# Patient Record
Sex: FEMALE | Race: BLACK OR AFRICAN AMERICAN | NOT HISPANIC OR LATINO | Employment: OTHER | URBAN - METROPOLITAN AREA
[De-identification: names, ages, dates, MRNs, and addresses within clinical notes are randomized per-mention and may not be internally consistent; named-entity substitution may affect disease eponyms.]

---

## 2017-01-10 ENCOUNTER — DOCUMENTATION ONLY (OUTPATIENT)
Dept: ORTHOPEDICS | Facility: CLINIC | Age: 82
End: 2017-01-10

## 2017-01-12 ENCOUNTER — DOCUMENTATION ONLY (OUTPATIENT)
Dept: ORTHOPEDICS | Facility: CLINIC | Age: 82
End: 2017-01-12

## 2017-01-12 NOTE — PROGRESS NOTES
I am writing to request coverage for Forteo therapy for Nydia Stevens her therapy would include injecting 1 dose (20 µg) subcutaneously every day.  This letter documents the medical necessity for this therapy and treatment of osteoporosis. It provides information about the patient's medical history, treatment, and a copy of the products labeling.     Forteo is used in both men and postmenopausal women with osteoporosis at high risk for having broken bones (fractures).  Forteo was also used in both men and women with osteoporosis secondary  to use of corticosteroids such as prednisone, for several months, placing them at high risk for fractures.     Endogenous 84-amino acid parathyroid hormone (PTH) is the primary regulator of calcium and phosphate metabolism in bone and kidney.  Forteo contained recombinant human parathyroid hormone (hPTH) the first 34 amino acids of the 84 amino acids hPTH.  Physiological actions of PTH include regulation of bone metabolism, renal tubular reabsorption of calcium and phosphate and intestinal calcium absorption.  The biological actions of PTH and teriparatide are mediated through binding to specific high affinity cell surface receptors.  teriparatide and the 34 N-terminal amino acids of PTH bind to these receptors with the same affinity and have the same physiological actions on bone and kidney.  Teriparatide is not expected to accumulate in bone or other tissues.  The skeletal affects of teriparatide depend upon the pattern of systemic exposure.  Once daily administration of teriparatide stimulates new bone formation on trabecular and cortical ( Periosteal and./ or endosteal ) bone surfaces by preferential stimulations of osteoblastic activity over osteoclastic activity.  New bone overfills existing resorption sites and new bone is formed on adjacent or quiescent surfaces without prior resorption resulting in a positive bone balance. In monkey studies teriparatide  improved trabecular micro-architecture and increased bone mass and strength by stimulating new bone formation in both cancellous and cortical bone.  In human's, the anabolic effects of teriparatide manifest as an increase in skeletal mass, an increase in markers of bone formation and resorption, and an increase in bone strength.  By contrast, continuous excess of endogenous PTH, as occurs in hyperparathyroidism, may be detrimental to the skeleton because bone resorption may be stimulated more than bone formation.     Nydia Stevens is a 82 year old female who suffers from postmenopausal/senile osteoporosis (diagnosis code 773.01)@ her most recent T score was -3.9 for the lumbar spine and -4.4 for the hip   FRAX  Score  Major fracture risk 24 %  Hip fracture 13%    she is at high risk for additional fracture.  According to the World Health Organization, who had sent the standards for osteoporosis diagnostics, a patient's diagnosis should not be solely based off of T score, but other risk factors should also be evaluated. Nydia Stevens's additional risk factors include early-onset menopause, deficiency anemia. her symptoms include fracture of left femur and left elbow which required surgical repair; she is suffering with pain, and instability.  The national osteoporosis Foundation recommends considering initiation of treatment in individuals 50 years or older if the 10 year probability of major osteoporotic fracture is 20% or higher or the 10 year probability of the hip fracture is 3% or higher.  Also, any secondary causes should be treated.  Note that the FRAX is intended for use in untreated individuals 50 years of age or older. she is not a candidate for Bisphosphonates due to her  medical history of an deficiency anemia and concern for gastrointestinal bleeding.  Biphosphonate's at best will only maintain her current bone density which is at a dangerous level.  Anabolic drugs work by increasing  formation of new bone, whereas other drugs such as oral biphosphonate's (Alendronate) only block the re-absorption of bone.  Forteo has been shown to be effective in speeding up the healing process and enhancing bone formation.  Forteo will actually help grow new bone, improving her overall bone health.  Forteo is her best treatment option at this time.  Without this treatment I fear her condition will worsen causing increased pain and suffering, additional bone fractures, additional surgeries and potential hospitalization.  Please approve Forteo for Nydia Stevens as soon as possible soshecan begin treatment immediately.     Forteo is used to treat severe osteoporosis and promote healing of hard to manage fractures in the elderly and others.  Forteo reduces pain and time spent in nursing homes.  In preliminary studies, 93% of 145 patient's who had unhealed bone fractures (some for as long as 6 months) had significant healing after only 8-12 weeks on Forteo.  Studies suggest that once a patient suffers an osteoporotic fracture, is found to have a very low T score, or cannot tolerate biphosphonate therapy, Forteo is the drug of choice.  Even patients who have been on biphosphonate previously can repeat benefits from Forteo, but the effects may not be seen as quickly as in patients who have never taken medication for osteoporosis.      In my clinical judgment, Forteo therapy would provide significant clinical benefit for Nydia Stevens.  Forteo is medically necessary and appropriate to treat her at this stage in her course of care.  I am enclosing documentation supporting the medical necessity for Forteo for this patient.  I urged to provide coverage at this time.  Please contact me at [phone] if you require additional information would like to discuss the case in greater detail.        Thank you,            Abner Harman PA-C  [facility]  Orthopedic Surgery

## 2017-01-13 ENCOUNTER — TELEPHONE (OUTPATIENT)
Dept: ORTHOPEDICS | Facility: CLINIC | Age: 82
End: 2017-01-13

## 2017-01-13 NOTE — TELEPHONE ENCOUNTER
Called Representative back to correct issue with patient's prescription. Prescription had been authorized since 1/10/17 and is good until 1/10/18.

## 2017-01-13 NOTE — TELEPHONE ENCOUNTER
----- Message from Santos Suárez sent at 1/11/2017  2:38 PM CST -----  [facility]  Requesting additional information from fax that was received. Regarding patient teriparatide (FORTEO) 20 mcg/dose - 600 mcg/2.4 mL R[place]    Please call; [phone]

## 2017-02-01 ENCOUNTER — LAB VISIT (OUTPATIENT)
Dept: LAB | Facility: HOSPITAL | Age: 82
End: 2017-02-01
Attending: INTERNAL MEDICINE
Payer: MEDICARE

## 2017-02-01 DIAGNOSIS — E78.5 HYPERLIPIDEMIA, UNSPECIFIED HYPERLIPIDEMIA TYPE: ICD-10-CM

## 2017-02-01 LAB
CHOLEST/HDLC SERPL: 3.9 {RATIO}
HDL/CHOLESTEROL RATIO: 25.8 %
HDLC SERPL-MCNC: 233 MG/DL
HDLC SERPL-MCNC: 60 MG/DL
LDLC SERPL CALC-MCNC: 153.8 MG/DL
NONHDLC SERPL-MCNC: 173 MG/DL
TRIGL SERPL-MCNC: 96 MG/DL

## 2017-02-01 PROCEDURE — 36415 COLL VENOUS BLD VENIPUNCTURE: CPT

## 2017-02-01 PROCEDURE — 80061 LIPID PANEL: CPT

## 2017-02-02 ENCOUNTER — TELEPHONE (OUTPATIENT)
Dept: PHARMACY | Facility: CLINIC | Age: 82
End: 2017-02-02

## 2017-02-02 NOTE — TELEPHONE ENCOUNTER
DOCUMENTATION ONLY:  Prior authorization for forteo approved from 10/14/2016-1/12/2018  REf# [id number]

## 2017-02-08 ENCOUNTER — LAB VISIT (OUTPATIENT)
Dept: LAB | Facility: HOSPITAL | Age: 82
End: 2017-02-08
Attending: INTERNAL MEDICINE
Payer: MEDICARE

## 2017-02-08 ENCOUNTER — OFFICE VISIT (OUTPATIENT)
Dept: INTERNAL MEDICINE | Facility: CLINIC | Age: 82
End: 2017-02-08
Payer: MEDICARE

## 2017-02-08 VITALS
HEIGHT: 67 IN | WEIGHT: 121.69 LBS | DIASTOLIC BLOOD PRESSURE: 60 MMHG | BODY MASS INDEX: 19.1 KG/M2 | HEART RATE: 96 BPM | SYSTOLIC BLOOD PRESSURE: 132 MMHG

## 2017-02-08 DIAGNOSIS — S72.002D CLOSED FRACTURE OF NECK OF LEFT FEMUR WITH ROUTINE HEALING, SUBSEQUENT ENCOUNTER: ICD-10-CM

## 2017-02-08 DIAGNOSIS — E78.5 HYPERLIPIDEMIA, UNSPECIFIED HYPERLIPIDEMIA TYPE: ICD-10-CM

## 2017-02-08 DIAGNOSIS — D50.9 IRON DEFICIENCY ANEMIA, UNSPECIFIED IRON DEFICIENCY ANEMIA TYPE: ICD-10-CM

## 2017-02-08 DIAGNOSIS — S52.022D CLOSED FRACTURE OF LEFT OLECRANON PROCESS, WITH ROUTINE HEALING, SUBSEQUENT ENCOUNTER: ICD-10-CM

## 2017-02-08 DIAGNOSIS — M81.0 OSTEOPOROSIS: ICD-10-CM

## 2017-02-08 DIAGNOSIS — Z23 NEED FOR TDAP VACCINATION: ICD-10-CM

## 2017-02-08 DIAGNOSIS — Z00.00 ANNUAL PHYSICAL EXAM: ICD-10-CM

## 2017-02-08 DIAGNOSIS — I35.0 AORTIC STENOSIS, MILD: ICD-10-CM

## 2017-02-08 DIAGNOSIS — H61.23 CERUMINOSIS, BILATERAL: ICD-10-CM

## 2017-02-08 DIAGNOSIS — D50.9 IRON DEFICIENCY ANEMIA, UNSPECIFIED IRON DEFICIENCY ANEMIA TYPE: Primary | ICD-10-CM

## 2017-02-08 DIAGNOSIS — H25.12 NUCLEAR SCLEROSIS, LEFT: ICD-10-CM

## 2017-02-08 LAB
ERYTHROCYTE [DISTWIDTH] IN BLOOD BY AUTOMATED COUNT: 15.9 %
FERRITIN SERPL-MCNC: 26 NG/ML
HCT VFR BLD AUTO: 29.7 %
HGB BLD-MCNC: 9.2 G/DL
IRON SERPL-MCNC: 15 UG/DL
MCH RBC QN AUTO: 26.4 PG
MCHC RBC AUTO-ENTMCNC: 31 %
MCV RBC AUTO: 85 FL
PLATELET # BLD AUTO: 498 K/UL
PMV BLD AUTO: 9.3 FL
RBC # BLD AUTO: 3.48 M/UL
RETICS/RBC NFR AUTO: 3.6 %
SATURATED IRON: 3 %
TOTAL IRON BINDING CAPACITY: 481 UG/DL
TRANSFERRIN SERPL-MCNC: 325 MG/DL
WBC # BLD AUTO: 5.67 K/UL

## 2017-02-08 PROCEDURE — 36415 COLL VENOUS BLD VENIPUNCTURE: CPT

## 2017-02-08 PROCEDURE — 99215 OFFICE O/P EST HI 40 MIN: CPT | Mod: S$PBB,,, | Performed by: INTERNAL MEDICINE

## 2017-02-08 PROCEDURE — 83540 ASSAY OF IRON: CPT

## 2017-02-08 PROCEDURE — 99999 PR PBB SHADOW E&M-EST. PATIENT-LVL III: CPT | Mod: PBBFAC,,, | Performed by: INTERNAL MEDICINE

## 2017-02-08 PROCEDURE — 82728 ASSAY OF FERRITIN: CPT

## 2017-02-08 PROCEDURE — 85045 AUTOMATED RETICULOCYTE COUNT: CPT

## 2017-02-08 PROCEDURE — 85027 COMPLETE CBC AUTOMATED: CPT

## 2017-02-08 RX ORDER — FERROUS SULFATE 325(65) MG
325 TABLET ORAL 2 TIMES DAILY WITH MEALS
Qty: 60 TABLET | Refills: 5 | Status: SHIPPED | OUTPATIENT
Start: 2017-02-08 | End: 2017-11-08 | Stop reason: SDUPTHER

## 2017-02-08 NOTE — MR AVS SNAPSHOT
[facility] - Internal Medicine  [address] [address]  Phone: [phone]  Fax: [phone]                  Nydia Stevens   2017 9:00 AM   Office Visit    Description:  Female : 1934   Provider:  Donaldo Carlin MD   Department:  [facility] - Internal Medicine           Diagnoses this Visit        Comments    Need for Tdap vaccination    -  Primary     Iron deficiency anemia, unspecified iron deficiency anemia type         Aortic stenosis, mild         Ceruminosis, bilateral         Osteoporosis         Hyperlipidemia, unspecified hyperlipidemia type         Closed fracture of left olecranon process, with routine healing, subsequent encounter         Closed fracture of neck of left femur with routine healing, subsequent encounter         Nuclear sclerosis, left                To Do List           Goals (5 Years of Data)     None      Follow-Up and Disposition     Return in about 6 months (around 2017).       These Medications        Disp Refills Start End    ferrous sulfate 325 mg (65 mg iron) Tab tablet 60 tablet 5 2017     Take 1 tablet (325 mg total) by mouth 2 (two) times daily with meals. - Oral    Pharmacy:  [facility] [id number] [address] [facility] Ph #: [phone]       diphth,pertus,acell,,tetanus (BOOSTRIX) 2.5-8-5 Lf-mcg-Lf/0.5mL Susp 0.5 mL 0 2017    Inject 0.5 mLs into the muscle once. - Intramuscular    [facility] [address] [facility] Ph #: [phone]         [facility]     [facility] On Call Nurse Care Line -  Assistance  Registered nurses in the [facility] provide clinical advisement, health education, appointment booking, and other advisory services.  Call for this free service at [phone].             Medications           Message regarding Medications     Verify the changes and/or additions to your medication regime listed below are  the same as discussed with your clinician today.  If any of these changes or additions are incorrect, please notify your healthcare provider.        START taking these NEW medications        Refills    diphth,pertus,acell,,tetanus (BOOSTRIX) 2.5-8-5 Lf-mcg-Lf/0.5mL Susp 0    Sig: Inject 0.5 mLs into the muscle once.    Class: Print    Route: Intramuscular      CHANGE how you are taking these medications     Start Taking Instead of    ferrous sulfate 325 mg (65 mg iron) Tab tablet ferrous sulfate 325 mg (65 mg iron) Tab tablet    Dosage:  Take 1 tablet (325 mg total) by mouth 2 (two) times daily with meals. Dosage:  Take 1 tablet (325 mg total) by mouth 2 (two) times daily.    Reason for Change:  Reorder       STOP taking these medications     oxycodone-acetaminophen (PERCOCET) 5-325 mg per tablet Take 1 tablet by mouth every 6 (six) hours as needed for Pain.           Verify that the below list of medications is an accurate representation of the medications you are currently taking.  If none reported, the list may be blank. If incorrect, please contact your healthcare provider. Carry this list with you in case of emergency.           Current Medications     amlodipine (NORVASC) 10 MG tablet Take 1 tablet (10 mg total) by mouth once daily.    aspirin (ECOTRIN) 81 MG EC tablet Take 1 tablet (81 mg total) by mouth once daily.    calcium carbonate (OS-JAILYN) 500 mg calcium (1,250 mg) tablet Take 1 tablet (500 mg total) by mouth once daily.    cholecalciferol, vitamin D3, (VITAMIN D3) 1,000 unit capsule Take 1,000 Units by mouth once daily.    ferrous sulfate 325 mg (65 mg iron) Tab tablet Take 1 tablet (325 mg total) by mouth 2 (two) times daily with meals.    rosuvastatin (CRESTOR) 40 MG Tab Take 1 tablet (40 mg total) by mouth once daily.    VIT C/VIT E ACETATE/LUTEIN/MIN (OCUVITE LUTEIN ORAL) Take 1 tablet by mouth once daily.      diphth,pertus,acell,,tetanus (BOOSTRIX) 2.5-8-5 Lf-mcg-Lf/0.5mL Susp Inject 0.5 mLs into  "the muscle once.    senna-docusate 8.6-50 mg (PERICOLACE) 8.6-50 mg per tablet Take 1 tablet by mouth 2 (two) times daily.    sodium,potassium,mag sulfates (SUPREP BOWEL PREP KIT) 17.5-3.13-1.6 gram SolR Take 1 each by mouth as directed.    teriparatide (FORTEO) 20 mcg/dose - 600 mcg/2.4 mL PnIj Inject 0.08 mLs (20 mcg total) into the skin once daily.           Clinical Reference Information           Your Vitals Were     BP Pulse Height Weight BMI    132/60 (BP Location: Right arm, Patient Position: Sitting, BP Method: Manual) 96 5' 7" (1.702 m) 55.2 kg (121 lb 11.1 oz) 19.06 kg/m2      Blood Pressure          Most Recent Value    BP  132/60      Allergies as of 2/8/2017     No Known Allergies      Immunizations Administered on Date of Encounter - 2/8/2017     None      Orders Placed During Today's Visit     Future Labs/Procedures Expected by Expires    CBC Without Differential  2/8/2017 4/9/2018    Ferritin  2/8/2017 4/9/2018    Iron and TIBC  2/8/2017 4/9/2018    Reticulocytes  2/8/2017 4/9/2018      Instructions    For pain in elbow and hip, please try over-the-counter extra-strength Tylenol 500 mg every 6 hours as needed. You can also try using over-the-counter Aleve two times a day as needed. You can continue to use over-the-counter topical treatments like IcyHot, Aspercreme, or TigerBalm. If the pain persists or worsens, we can try to get insurance approval for other medicated lotions.    Please take the iron supplements with breakfast and with dinner. Please also make sure to drink a full glass of water with this to limit constipation.    For earwax, avoid Qtips or cotton swabs so that wax doesn't get pushed further in the ear. Instead use 1 drop daily of mineral oil or olive oil to the ears. After about 2-3 days, the wax should be loose and can be rinsed out with water from the shower or an over-the-counter syringe with warm water. If this doesn't work, please notify the office for a referral to the ear " clinic to help clean out the wax.        Language Assistance Services     ATTENTION: Language assistance services are available, free of charge. Please call [phone].      ATENCIÓN: Si habla ana m, tiene a stockton disposición servicios gratuitos de asistencia lingüística. Llame al [phone].     CHÚ Ý: N?u b?n nói Ti?ng Vi?t, có các d?ch v? h? tr? ngôn ng? mi?n phí dành cho b?n. G?i s? [phone].         [facility] complies with applicable Federal civil rights laws and does not discriminate on the basis of race, color, national origin, age, disability, or sex.

## 2017-02-08 NOTE — PROGRESS NOTES
"Subjective:       Patient ID: Nydia Stevens is a 82 y.o. female.    Chief Complaint: Follow-up    HPI    Last visit with me 11/2016. Since then seen by Orthopedics, Gastroenterology.     Being cautious with falls. Did Home Health with Rehab and is doing HEP. occasionally light pain in left hip and left elbow. Using some over-the-counter meds and helps the pain, also pain is improving. Uses Aleve.    Using iron pills two times a day.     Reviewed PMH, PSH, SH, FH, allergies, and medications.     Review of Systems   All other systems reviewed and are negative.      Objective:      Physical Exam   Constitutional: She is oriented to person, place, and time. No distress.   HENT:   Head: Atraumatic.   Mouth/Throat: Oropharynx is clear and moist. No oropharyngeal exudate.   tympanic membrane obscured by cerumen bilaterally    Eyes: Pupils are equal, round, and reactive to light. Right eye exhibits no discharge. Left eye exhibits no discharge.   Neck: Normal range of motion. No thyromegaly present.   Cardiovascular: Normal rate and regular rhythm.    Murmur heard.   Systolic (RUSB) murmur is present with a grade of 2/6   Pulmonary/Chest: Effort normal and breath sounds normal. No stridor. She has no wheezes. She has no rales.   Abdominal: Soft. She exhibits no distension. There is no tenderness. There is no guarding.   Musculoskeletal: She exhibits no edema or tenderness.        Left elbow: Normal.   Lymphadenopathy:     She has no cervical adenopathy.   Neurological: She is alert and oriented to person, place, and time.   Skin: Skin is warm and dry. No rash noted.   Psychiatric: She has a normal mood and affect. Her behavior is normal.   Nursing note and vitals reviewed.      Vitals:    02/08/17 0913   BP: 132/60   BP Location: Right arm   Patient Position: Sitting   BP Method: Manual   Pulse: 96   Weight: 55.2 kg (121 lb 11.1 oz)   Height: 5' 7" (1.702 m)     Body mass index is 19.06 kg/(m^2).    RESULTS: Reviewed " "labs from last 12 months    Assessment:       1. Iron deficiency anemia, unspecified iron deficiency anemia type    2. Aortic stenosis, mild    3. Ceruminosis, bilateral    4. Osteoporosis    5. Hyperlipidemia, unspecified hyperlipidemia type    6. Closed fracture of left olecranon process, with routine healing, subsequent encounter    7. Closed fracture of neck of left femur with routine healing, subsequent encounter    8. Nuclear sclerosis, left    9. Need for Tdap vaccination    10. Annual physical exam        Plan:   Nydia was seen today for follow-up.    Diagnoses and all orders for this visit:    Iron deficiency anemia, unspecified iron deficiency anemia type:  Unclear etiology, pt currently defers further Gastroenterology workup. Recheck labs.  -     ferrous sulfate 325 mg (65 mg iron) Tab tablet; Take 1 tablet (325 mg total) by mouth 2 (two) times daily with meals.  -     CBC Without Differential; Future  -     Ferritin; Future  -     Reticulocytes; Future  -     Iron and TIBC; Future    Aortic stenosis, mild:  Seen on prior echocardiogram, not repeated for over 1 year, recheck at next visit.    Ceruminosis, bilateral:  Use oil and water to rinse out:  "For earwax, avoid Qtips or cotton swabs so that wax doesn't get pushed further in the ear. Instead use 1 drop daily of mineral oil or olive oil to the ears. After about 2-3 days, the wax should be loose and can be rinsed out with water from the shower or an over-the-counter syringe with warm water. If this doesn't work, please notify the office for a referral to the ear clinic to help clean out the wax."     Osteoporosis:  Started on Forteo by Orthopedics, continue follow up with their service, ask schedulers to coordinate an appointment.    Hyperlipidemia, unspecified hyperlipidemia type:  Prior diagnosis, well controlled on current management.     Closed fracture of left olecranon process, with routine healing, subsequent encounter:  continue follow up " "with Orthopedics. occasionally some mild pain, treatment with over-the-counter meds as below:  "For pain in elbow and hip, please try over-the-counter extra-strength Tylenol 500 mg every 6 hours as needed. You can also try using over-the-counter Aleve two times a day as needed. You can continue to use over-the-counter topical treatments like IcyHot, Aspercreme, or TigerBalm. If the pain persists or worsens, we can try to get insurance approval for other medicated lotions."    Closed fracture of neck of left femur with routine healing, subsequent encounter:  continue follow up with Orthopedics     Nuclear sclerosis, left:  Return for follow up with Optometry.    Need for Tdap vaccination  -     diphth,pertus,acell,,tetanus (BOOSTRIX) 2.5-8-5 Lf-mcg-Lf/0.5mL Susp; Inject 0.5 mLs into the muscle once.    Annual physical exam:  Age-appropriate health screening reviewed, indicated tests ordered.       Return in about 6 months (around 8/8/2017). Repeat echocardiogram next visit to evaluate aortic stenosis.  Donaldo Carlin MD  Internal Medicine    Portions of this note were completed using [employer] dictation software. Please excuse typographical or syntax errors.   "

## 2017-02-08 NOTE — PATIENT INSTRUCTIONS
For pain in elbow and hip, please try over-the-counter extra-strength Tylenol 500 mg every 6 hours as needed. You can also try using over-the-counter Aleve two times a day as needed. You can continue to use over-the-counter topical treatments like IcyHot, Aspercreme, or TigerBalm. If the pain persists or worsens, we can try to get insurance approval for other medicated lotions.    Please take the iron supplements with breakfast and with dinner. Please also make sure to drink a full glass of water with this to limit constipation.    For earwax, avoid Qtips or cotton swabs so that wax doesn't get pushed further in the ear. Instead use 1 drop daily of mineral oil or olive oil to the ears. After about 2-3 days, the wax should be loose and can be rinsed out with water from the shower or an over-the-counter syringe with warm water. If this doesn't work, please notify the office for a referral to the ear clinic to help clean out the wax.

## 2017-02-10 ENCOUNTER — TELEPHONE (OUTPATIENT)
Dept: PHARMACY | Facility: CLINIC | Age: 82
End: 2017-02-10

## 2017-02-10 NOTE — TELEPHONE ENCOUNTER
DOCUMENTATION ONLY    Forteo [facility] approved 10-14-16 through 1-12-18  Reference # [id number]      Jinny  2-10-17

## 2017-02-15 NOTE — TELEPHONE ENCOUNTER
Patient presented to pharmacy for counseling on once daily Forteo 20mcg pen injection.  Patient injected first dose of Forteo in the presence of clinical pharmacist on 2/15/17. Confirmed 2 patient identifiers - name and . Therapy Appropriate.     Counseled patient on administration directions:  -Keep your FORTEO delivery device in the refrigerator between 36° to 46°F (2° to 8°C).  -Do not use FORTEO after the expiration date printed on the delivery device and packaging. Throw away the -FORTEO delivery device after 28 days even if it has medicine in it   - Wash hands before and after injection.  - Monthly RX will come with pen needles and alcohol swabs.  - Patient may inject in either the tops of the thighs or abdomen- but at least 2 inches away from her belly button.  Patient was instructed to rotate injections sites.  - Patient is to wipe down the injection site with the alcohol pad, wait to dry.    1. Pull off white cap,   2. Attach new pen needle,   3. Set dose by pulling out black injection button until it stops - make sure red line shows.  4. Gently squeeze the area of the cleaned skin and hold it firmly. Insert needle straight into the skin. Push black injection button until it stops - hold and count to 5 to ensure complete dose administered.  5. Pull the needle from skin and confirm dose - black button all the way in confirms full dose administered.  6. Use large needle cover to remove needle safely and recap.    - Patient will use sharps container to discard all injectable items; once full, per [place] law, she/ he may lock the sharps container and place in her trash. She/ he can then contact the Pharmacy and we will replace the sharps at no additional charge.  -Patient demonstrated ability of injection technique with test pen.    DDIs: Medication list reviewed. No DDIs or allergies. Medication list reviewed and potential DDIs addressed.     Counseled patient on possible side effects: dizziness, joint pain,  upset stomach.  Patient advised to administer injection while seated and to watch for symptoms of orthostatic hypotension.    Patient confirmed understanding. Patient did not have additional questions. I will f/u with patient in 2 weeks and [facility] will contact patient in 3 weeks to coordinate further refills.   Patient injected first dose of Forteo in the presence of clinical pharmacist on 2/15/17. Consultation included: indication; goals of treatment; administration; storage and handling; side effects; how to handle side effects; the importance of compliance; how to handle missed doses; the importance of laboratory monitoring; the importance of keeping all follow up appointments.  Patient understands to report any medication changes to [facility] and provider. All questions answered and addressed to patients satisfaction. I will f/u with her in 1 week from start, [facility] to contact patient in 3 weeks for refills.

## 2017-02-22 DIAGNOSIS — I10 ESSENTIAL HYPERTENSION: ICD-10-CM

## 2017-02-22 DIAGNOSIS — E78.5 HYPERLIPIDEMIA: ICD-10-CM

## 2017-02-22 NOTE — TELEPHONE ENCOUNTER
Forteo f/u complete. Name/ confirmed. Medication list reviewed and updated. Consultation included: indication; goals of treatment; administration; storage and handling; side effects; how to handle side effects; the importance of compliance; how to handle missed doses; the importance of laboratory monitoring; the importance of keeping all follow up appointments. Patient understands to report any medication changes to [facility] and provider. All questions answered and addressed to patients satisfaction. Patient endorses strict compliance. She has no questions regarding treatment goals and regimen.

## 2017-02-23 DIAGNOSIS — I10 ESSENTIAL HYPERTENSION: ICD-10-CM

## 2017-02-23 RX ORDER — ROSUVASTATIN CALCIUM 40 MG/1
40 TABLET, COATED ORAL DAILY
Qty: 90 TABLET | Refills: 3 | Status: SHIPPED | OUTPATIENT
Start: 2017-02-23 | End: 2018-05-14 | Stop reason: SDUPTHER

## 2017-02-23 RX ORDER — AMLODIPINE BESYLATE 10 MG/1
10 TABLET ORAL DAILY
Qty: 90 TABLET | Refills: 3 | Status: SHIPPED | OUTPATIENT
Start: 2017-02-23 | End: 2017-02-23 | Stop reason: SDUPTHER

## 2017-02-24 RX ORDER — AMLODIPINE BESYLATE 10 MG/1
TABLET ORAL
Qty: 90 TABLET | Refills: 3 | Status: SHIPPED | OUTPATIENT
Start: 2017-02-24 | End: 2018-03-07 | Stop reason: SDUPTHER

## 2017-03-06 ENCOUNTER — OFFICE VISIT (OUTPATIENT)
Dept: OPTOMETRY | Facility: CLINIC | Age: 82
End: 2017-03-06
Payer: MEDICARE

## 2017-03-06 DIAGNOSIS — I10 ESSENTIAL HYPERTENSION: ICD-10-CM

## 2017-03-06 DIAGNOSIS — Z98.41 S/P CATARACT SURGERY, RIGHT: ICD-10-CM

## 2017-03-06 DIAGNOSIS — Z98.42 S/P CATARACT SURGERY, LEFT: ICD-10-CM

## 2017-03-06 DIAGNOSIS — Z96.1 PSEUDOPHAKIA OF BOTH EYES: ICD-10-CM

## 2017-03-06 DIAGNOSIS — H52.7 REFRACTIVE ERRORS: ICD-10-CM

## 2017-03-06 DIAGNOSIS — H35.371 EPIRETINAL MEMBRANE, RIGHT: Primary | ICD-10-CM

## 2017-03-06 DIAGNOSIS — Z13.5 SCREENING FOR EYE CONDITION: ICD-10-CM

## 2017-03-06 PROCEDURE — 99999 PR PBB SHADOW E&M-EST. PATIENT-LVL III: CPT | Mod: PBBFAC,,, | Performed by: OPTOMETRIST

## 2017-03-06 PROCEDURE — 99213 OFFICE O/P EST LOW 20 MIN: CPT | Mod: PBBFAC | Performed by: OPTOMETRIST

## 2017-03-06 PROCEDURE — 92015 DETERMINE REFRACTIVE STATE: CPT | Mod: ,,, | Performed by: OPTOMETRIST

## 2017-03-06 PROCEDURE — 92014 COMPRE OPH EXAM EST PT 1/>: CPT | Mod: S$PBB,,, | Performed by: OPTOMETRIST

## 2017-03-06 NOTE — MR AVS SNAPSHOT
[facility]  [address]  [facility] [address]  Phone: [phone]  Fax: [phone]                  Nydia SERRA Hilda   3/6/2017 9:30 AM   Office Visit    Description:  Female : 1934   Provider:  Rick Maxwell OD   Department:  [facility]           Reason for Visit     Concerns About Ocular Health                To Do List           Future Appointments        Provider Department Dept Phone    4/3/2017 9:15 AM Ander Alegria MD [facility] [phone]      Goals (5 Years of Data)     None      [facility] On [facility]     [facility] On Call Nurse Care Line -  Assistance  Registered nurses in the [facility] On Call Center provide clinical advisement, health education, appointment booking, and other advisory services.  Call for this free service at [phone].             Medications           Message regarding Medications     Verify the changes and/or additions to your medication regime listed below are the same as discussed with your clinician today.  If any of these changes or additions are incorrect, please notify your healthcare provider.             Verify that the below list of medications is an accurate representation of the medications you are currently taking.  If none reported, the list may be blank. If incorrect, please contact your healthcare provider. Carry this list with you in case of emergency.           Current Medications     amlodipine (NORVASC) 10 MG tablet TAKE ONE TABLET BY MOUTH EVERY DAY    aspirin (ECOTRIN) 81 MG EC tablet Take 1 tablet (81 mg total) by mouth once daily.    cholecalciferol, vitamin D3, (VITAMIN D3) 1,000 unit capsule Take 1,000 Units by mouth once daily.    ferrous sulfate 325 mg (65 mg iron) Tab tablet Take 1 tablet (325 mg total) by mouth 2 (two) times daily with meals.    rosuvastatin (CRESTOR) 40 MG Tab Take 1 tablet (40 mg total) by mouth once daily.    senna-docusate 8.6-50 mg (PERICOLACE) 8.6-50 mg per  tablet Take 1 tablet by mouth 2 (two) times daily.    sodium,potassium,mag sulfates (SUPREP BOWEL PREP KIT) 17.5-3.13-1.6 gram SolR Take 1 each by mouth as directed.    teriparatide (FORTEO) 20 mcg/dose - 600 mcg/2.4 mL PnIj Inject 0.08 mLs (20 mcg total) into the skin once daily.    VIT C/VIT E ACETATE/LUTEIN/MIN (OCUVITE LUTEIN ORAL) Take 1 tablet by mouth once daily.      calcium carbonate (OS-JAILYN) 500 mg calcium (1,250 mg) tablet Take 1 tablet (500 mg total) by mouth once daily.           Clinical Reference Information           Allergies as of 3/6/2017     No Known Allergies      Immunizations Administered on Date of Encounter - 3/6/2017     None      Instructions    Epiretinal membrane at posterior pole of the right eye, as noted previously Not impacting correctable VA. No need for treatment.  Otherwise, ocular health appears good in both eyes.  S/P cataract surgery in both eyes. Bilateral posterior chamber intraocular lens.  Residual refractive error in each eye, with very satisfactory visual acuity in each eye.   New spectacle lens Rx issued for full-time wear, but no compelling need to replace lenses at this time.  Recheck in one year.             Language Assistance Services     ATTENTION: Language assistance services are available, free of charge. Please call [phone].      ATENCIÓN: Si joe viramontes, tiene a stockton disposición servicios gratuitos de asistencia lingüística. Llame al [phone].     [facility] Ý: N?u b?n nói Ti?ng Vi?t, có các d?ch v? h? tr? ngôn ng? mi?n phí dành cho b?n. G?i s? [phone].         [facility] complies with applicable Federal civil rights laws and does not discriminate on the basis of race, color, national origin, age, disability, or sex.

## 2017-03-06 NOTE — PATIENT INSTRUCTIONS
Epiretinal membrane at posterior pole of the right eye, as noted previously Not impacting correctable VA. No need for treatment.  Otherwise, ocular health appears good in both eyes.  S/P cataract surgery in both eyes. Bilateral posterior chamber intraocular lens.  Residual refractive error in each eye, with very satisfactory visual acuity in each eye.   New spectacle lens Rx issued for full-time wear, but no compelling need to replace lenses at this time.  Recheck in one year.

## 2017-03-06 NOTE — PROGRESS NOTES
"HPI     Concerns About Ocular Health    Additional comments: Eye exam and refaction.            Comments   Patient's age: 82 y.o. female  Occupation:  [employer].  Approximate date of last eye examination:  10/19/2015  Name of last eye doctor seen: Dr. Maxwell   City/State: [facility]  Wears glasses? Yes     If yes, wears  Full-time or part-time?  Full-time  Present glasses are: Bifocal, SV Distance, SV Reading?  Lined Bifocal  Approximate age of present glasses:  2014   Got new glasses following last exam, or subsequently?:  Yes   Any problem with VA with glasses?  No  Wears CLs?:  No  Headaches?  No  Eye pain/discomfort?  No                                                                                     Flashes?  No  Floaters?  No  Diplopia/Double vision?  No  Patient's Ocular History:         Any eye surgeries? [facility] - Dr Llamas         Any eye injury?  No         Any treatment for eye disease?  No  Family history of eye disease?  No  Significant patient medical history:         1. Diabetes?  No   2. HBP?  Yes, controlled by medication and diet              3. Other (describe):     ! OTC eyedrops currently using:  None - Ocuvite   ! Prescription eye meds currently using:  None   ! Any history of allergy/adverse reaction to any eye meds used   previously?  No    ! Any history of allergy/adverse reaction to eyedrops used during prior   eye exam(s)? No    ! Any history of allergy/adverse reaction to Novacaine or similar meds?   No   ! Any history of allergy/adverse reaction to Epinephrine or similar meds?   No    ! Patient okay with use of anesthetic eyedrops to check eye pressure?    Yes        ! Patient okay with use of eyedrops to dilate pupils today?  Yes   !  Allergies/Medications/Medical History/Family History reviewed today?    Yes      PD =   69/65  Desired reading distance =  13.5"                                                                      Last edited by Rick Maxwell, " OD on 3/6/2017 10:25 AM. (History)            Assessment /Plan     For exam results, see Encounter Report.    1. Epiretinal membrane, right     2. S/P cataract surgery, left     3. S/P cataract surgery, right     4. Pseudophakia of both eyes     5. Essential hypertension     6. Screening for eye condition     7. Refractive errors                  Epiretinal membrane at posterior pole of the right eye, as noted previously Not impacting correctable VA. No need for treatment.  Otherwise, ocular health appears good in both eyes.  S/P cataract surgery in both eyes. Bilateral posterior chamber intraocular lens.  Residual refractive error in each eye, with very satisfactory visual acuity in each eye.   New spectacle lens Rx issued for full-time wear, but no compelling need to replace lenses at this time.  Recheck in one year.

## 2017-03-23 ENCOUNTER — TELEPHONE (OUTPATIENT)
Dept: PHARMACY | Facility: CLINIC | Age: 82
End: 2017-03-23

## 2017-04-03 ENCOUNTER — OFFICE VISIT (OUTPATIENT)
Dept: ORTHOPEDICS | Facility: CLINIC | Age: 82
End: 2017-04-03
Payer: MEDICARE

## 2017-04-03 ENCOUNTER — LAB VISIT (OUTPATIENT)
Dept: LAB | Facility: HOSPITAL | Age: 82
End: 2017-04-03
Attending: ORTHOPAEDIC SURGERY
Payer: MEDICARE

## 2017-04-03 VITALS
DIASTOLIC BLOOD PRESSURE: 69 MMHG | HEART RATE: 88 BPM | SYSTOLIC BLOOD PRESSURE: 146 MMHG | BODY MASS INDEX: 18.68 KG/M2 | WEIGHT: 119 LBS | HEIGHT: 67 IN

## 2017-04-03 DIAGNOSIS — M81.0 AGE-RELATED OSTEOPOROSIS WITHOUT CURRENT PATHOLOGICAL FRACTURE: ICD-10-CM

## 2017-04-03 DIAGNOSIS — M80.80XA PATHOLOGICAL FRACTURE DUE TO OSTEOPOROSIS, UNSPECIFIED FRACTURE SITE, UNSPECIFIED OSTEOPOROSIS TYPE, INITIAL ENCOUNTER: Primary | ICD-10-CM

## 2017-04-03 LAB — CALCIUM SERPL-MCNC: 9.4 MG/DL

## 2017-04-03 PROCEDURE — 99213 OFFICE O/P EST LOW 20 MIN: CPT | Mod: S$PBB,,, | Performed by: ORTHOPAEDIC SURGERY

## 2017-04-03 PROCEDURE — 36415 COLL VENOUS BLD VENIPUNCTURE: CPT

## 2017-04-03 PROCEDURE — 99999 PR PBB SHADOW E&M-EST. PATIENT-LVL III: CPT | Mod: PBBFAC,,,

## 2017-04-03 PROCEDURE — 82310 ASSAY OF CALCIUM: CPT

## 2017-04-03 NOTE — PROGRESS NOTES
Chief Complaint & History of Present Illness:  Nydia Stevens is a established patient 82 y.o. female who is seen here today for lab she has been taking her Forteo since December and is tolerating it very well.  She is able to give herself injections daily with no problems and is feeling very good.  She did report some body aches during the first week but these have since resolved.      Review of patient's allergies indicates:  No Known Allergies      Current Outpatient Prescriptions   Medication Sig Dispense Refill    amlodipine (NORVASC) 10 MG tablet TAKE ONE TABLET BY MOUTH EVERY DAY 90 tablet 3    aspirin (ECOTRIN) 81 MG EC tablet Take 1 tablet (81 mg total) by mouth once daily.      cholecalciferol, vitamin D3, (VITAMIN D3) 1,000 unit capsule Take 1,000 Units by mouth once daily.      ferrous sulfate 325 mg (65 mg iron) Tab tablet Take 1 tablet (325 mg total) by mouth 2 (two) times daily with meals. 60 tablet 5    rosuvastatin (CRESTOR) 40 MG Tab Take 1 tablet (40 mg total) by mouth once daily. 90 tablet 3    senna-docusate 8.6-50 mg (PERICOLACE) 8.6-50 mg per tablet Take 1 tablet by mouth 2 (two) times daily.      teriparatide (FORTEO) 20 mcg/dose - 600 mcg/2.4 mL PnIj Inject 0.08 mLs (20 mcg total) into the skin once daily. 2.4 mL 12    VIT C/VIT E ACETATE/LUTEIN/MIN (OCUVITE LUTEIN ORAL) Take 1 tablet by mouth once daily.        calcium carbonate (OS-JAILYN) 500 mg calcium (1,250 mg) tablet Take 1 tablet (500 mg total) by mouth once daily.  0    sodium,potassium,mag sulfates (SUPREP BOWEL PREP KIT) 17.5-3.13-1.6 gram SolR Take 1 each by mouth as directed. 1 Bottle 0     No current facility-administered medications for this visit.        Past Medical History:   Diagnosis Date    Cataract     Hypertension        Past Surgical History:   Procedure Laterality Date    CATARACT EXTRACTION W/  INTRAOCULAR LENS IMPLANT Left 8/11/14    [facility]    CATARACT EXTRACTION W/  INTRAOCULAR LENS IMPLANT Right  09/15/14    [facility]    FOOT SURGERY      left    HYSTERECTOMY         Lab Results   Component Value Date     11/29/2016    K 4.1 11/29/2016     11/29/2016    CO2 26 11/29/2016    GLU 98 11/29/2016    BUN 12 11/29/2016    CREATININE 0.8 11/29/2016    CALCIUM 9.6 11/29/2016    CALCIUM 9.6 11/29/2016    PROT 7.4 11/29/2016    ALBUMIN 4.0 11/29/2016    BILITOT 0.2 11/29/2016    ALKPHOS 84 11/29/2016    AST 18 11/29/2016    ALT 8 (L) 11/29/2016    ANIONGAP 10 11/29/2016    ESTGFRAFRICA >60.0 11/29/2016    EGFRNONAA >60.0 11/29/2016      Lab Results   Component Value Date    WBC 5.67 02/08/2017    RBC 3.48 (L) 02/08/2017    HGB 9.2 (L) 02/08/2017    HCT 29.7 (L) 02/08/2017    MCV 85 02/08/2017    MCH 26.4 (L) 02/08/2017    MCHC 31.0 (L) 02/08/2017    RDW 15.9 (H) 02/08/2017     (H) 02/08/2017    MPV 9.3 02/08/2017    GRAN 3.2 10/24/2016    GRAN 58.8 10/24/2016    LYMPH 1.0 10/24/2016    LYMPH 18.9 10/24/2016    MONO 1.0 10/24/2016    MONO 18.9 (H) 10/24/2016    EOS 0.2 10/24/2016    BASO 0.03 10/24/2016    EOSINOPHIL 2.9 10/24/2016    BASOPHIL 0.5 10/24/2016    DIFFMETHOD Automated 10/24/2016      Lab Results   Component Value Date    MG 2.0 10/24/2016      Lab Results   Component Value Date    PHOS 4.2 10/24/2016      Lab Results   Component Value Date    MHSGYMVR40VE 43 11/29/2016      Lab Results   Component Value Date    PTH 45.0 11/29/2016      Lab Results   Component Value Date    TSH 1.453 11/29/2016      Lab Results   Component Value Date    FREET4 1.18 11/29/2016      Lab Results   Component Value Date    HGBA1C 5.8 12/11/2015    ESTIMATEDAVG 120 12/11/2015      No results found for: FREETESTOSTE       Vital Signs (Most Recent)  Vitals:    04/03/17 1003   BP: (!) 146/69   Pulse: 88         Review of Systems:  ROS:  Constitutional: no fever or chills  Eyes: no visual changes  ENT: no nasal congestion or sore throat  Respiratory: no cough or shortness of breath  Cardiovascular: no chest pain or  palpitations  Gastrointestinal: no nausea or vomiting, tolerating diet  Genitourinary: no hematuria or dysuria  Integument/Breast: no rash or pruritis  Hematologic/Lymphatic: no easy bruising or lymphadenopathy  Musculoskeletal: no arthralgias or myalgias  Neurological: no seizures or tremors  Behavioral/Psych: no auditory or visual hallucinations  Endocrine: no heat or cold intolerance      She will continue with her calcium and vitamin D, falls prevention and personal safety.  We have discussed the need for core strengthening and balance exercises for fall prevention, we may consider formal physical therapy at her next visit.    Assessment and plan:    Osteoporosis    Continue Forteo treatments follow-up in 1 year   Calcium, Vitamin D levels and access tolerance to medication.

## 2017-04-07 ENCOUNTER — TELEPHONE (OUTPATIENT)
Dept: PHARMACY | Facility: CLINIC | Age: 82
End: 2017-04-07

## 2017-04-21 ENCOUNTER — TELEPHONE (OUTPATIENT)
Dept: PHARMACY | Facility: CLINIC | Age: 82
End: 2017-04-21

## 2017-04-21 NOTE — TELEPHONE ENCOUNTER
Spoke to pt for Forteo refill. Verified 2 patient identifiers. Reviewed general adherence. Patient has 0 injections remaining and injects everyday. No new medications, allergies or health conditions reported at this time. Address confirmed. Will ship 4/24 For 4/25 delivery via [employer]. $0 Copay. Patient voiced understanding.    Upon speaking to the patient she said she has 19 doses left and upon further questioning, she was basing doses on the amount of Pen needles she had left (box of 100 was supplied with First fill of Forteo.)    As of today patient was under the impression that she was injecting medication into herself everyday, when in fact she had run out. She would have been due to be out March 14th (based of initial start date of 2/15). Estimated 41 doses/days missed.    When asked if she needed a refill last month, patient requested we call her in 2 weeks which is why I called her today. She cannot come pickup the medication therefore we will be shipping the soonest we can which is Monday for Tuesday delivery. She will throw away old pen and all needles and we will dispense new Forteo pen which i emphasized is only to be used for 28 days then thrown in sharps container. Offered phone number for Forteo [facility] [phone] is she wanted a nurse to come to her home for any extra help     Going forward, we will only send 28 pen needles to avoid future confusion and I will be following up with her more frequently for now. Pt states she feels great and has not experienced any side effects. Pt voiced understanding.     Autumn Reyes, Pharm.D  Specialty Pharmacy Clinical Pharmacist  [facility]  Phone: [phone]

## 2017-04-25 ENCOUNTER — TELEPHONE (OUTPATIENT)
Dept: ORTHOPEDICS | Facility: CLINIC | Age: 82
End: 2017-04-25

## 2017-04-25 NOTE — TELEPHONE ENCOUNTER
Patient returned call. Informed her that her medication is not being sent to her requested pharmacy but she should be expecting her medication today. If she does not receive her medication, she should return call to clinic. She verbalized understanding.

## 2017-04-25 NOTE — TELEPHONE ENCOUNTER
----- Message from Milton Mata sent at 4/25/2017 10:46 AM CDT -----  Contact: [facility] ([facility]  Pharmacy request a call regarding the pt's miladys. [phone]

## 2017-05-10 ENCOUNTER — TELEPHONE (OUTPATIENT)
Dept: ORTHOPEDICS | Facility: CLINIC | Age: 82
End: 2017-05-10

## 2017-05-11 NOTE — TELEPHONE ENCOUNTER
Returned patient's call. Informed her that she should call the pharmacy where she received her medication and needles from to receive refills. Informed her that a number to reach the pharmacy should be on the box the medication arrived in. She found the medication's packaging and located the phone number for the pharmacy, [facility]. She states she will contact pharmacy in the morning for more medication and needles.

## 2017-05-11 NOTE — TELEPHONE ENCOUNTER
----- Message from Abner Harman PA-C sent at 5/10/2017  1:13 PM CDT -----  Contact: self  Please check in with her  ----- Message -----     From: Danni Go MA     Sent: 5/10/2017  10:07 AM       To: Abner Harman PA-C    Notified pt. Pt was only calling D.S; to let him that she have 8 pins left.  ----- Message -----     From: Ousmane Rviera     Sent: 5/10/2017   8:53 AM       To: [facility] Staff    Pt would like to speak with you regarding her injections. Pt can be reached at [phone].

## 2017-05-11 NOTE — TELEPHONE ENCOUNTER
Returned call. Spoke with patient. She stated she has 7 needles left to give herself her daily injection of Forteo. I informed patient that I would call Forteo in the morning to arrange more needles and medication be delivered. She verbalized understanding.

## 2017-05-12 ENCOUNTER — TELEPHONE (OUTPATIENT)
Dept: PHARMACY | Facility: CLINIC | Age: 82
End: 2017-05-12

## 2017-06-07 ENCOUNTER — TELEPHONE (OUTPATIENT)
Dept: PHARMACY | Facility: CLINIC | Age: 82
End: 2017-06-07

## 2017-06-07 NOTE — TELEPHONE ENCOUNTER
Spoke to pt's Nurse Rayne for a Forteo refill. Verified 2 patient identifiers. Reviewed general adherence. Patient has unknown number of injections remaining (she was in mass and unable to check) and injects everyday around the same time. No missed doses. No new medications, allergies, health conditions or side effects reported at this time. Address confirmed. Will ship 6/8 for 6/9 delivery via [employer]. $0 Copay. Nurse voiced understanding.

## 2017-07-03 ENCOUNTER — TELEPHONE (OUTPATIENT)
Dept: PHARMACY | Facility: CLINIC | Age: 82
End: 2017-07-03

## 2017-07-03 NOTE — TELEPHONE ENCOUNTER
Spoke to pt for Forteo refill. Verified 2 patient identifiers. Reviewed general adherence. Patient has 10 injections remaining and injects everyday around 9:15am. No missed doses. No new medications, allergies, health conditions or side effects reported at this time. Address confirmed. Will ship 7/10 for 7/11 delivery via [employer]. $0 Copay- 004. Patient voiced understanding.    Patient states she is doing well on therapy and reviewed general information about Forteo. She is taking calcium and vitamin D and doing exercises.    Autumn Reyes, Pharm.D  Specialty Pharmacy Clinical Pharmacist  [facility]  Phone: [phone]

## 2017-07-31 ENCOUNTER — TELEPHONE (OUTPATIENT)
Dept: PHARMACY | Facility: CLINIC | Age: 82
End: 2017-07-31

## 2017-07-31 NOTE — TELEPHONE ENCOUNTER
forteo refill call, confirmed shipping for 8/3 to arrive 8/4 with consent, shipping address confirmed, $0 copay . no new medications or allergies and has no questions for a pharmacist at this time. patient request we sent her some more alcohol pads and #28 pen needles.    Ame Chen  [facility]- Refill Technician  Phone: [phone]

## 2017-08-04 ENCOUNTER — TELEPHONE (OUTPATIENT)
Dept: PHARMACY | Facility: CLINIC | Age: 82
End: 2017-08-04

## 2017-08-04 NOTE — TELEPHONE ENCOUNTER
Delivery Exception from [employer] was received. Called [facility] (pt's residence) and confirmed that they were open and someone was there to receive the package, as delivery was expected.  [employer] was called and asked to reattempt delivery, since Forteo is a refrigerated medication. [employer] will return call to confirm.

## 2017-08-25 ENCOUNTER — TELEPHONE (OUTPATIENT)
Dept: PHARMACY | Facility: CLINIC | Age: 82
End: 2017-08-25

## 2017-10-02 ENCOUNTER — TELEPHONE (OUTPATIENT)
Dept: PHARMACY | Facility: CLINIC | Age: 82
End: 2017-10-02

## 2017-10-02 NOTE — TELEPHONE ENCOUNTER
Spoke to patient for Forteo refill. Verified 2 patient identifiers. Reviewed general adherence. Patient has 3 injections remaining and injects every morning.    No missed doses. No new medications, allergies, health conditions or side effects reported at this time. Address confirmed. Will ship Forteo on 10/3 for 10/4 delivery via [employer]. $0 Copay- 004. Patient voiced understanding.    Autumn Reyes, Pharm.D  Specialty Pharmacy Clinical Pharmacist  [facility]  Phone: [phone]

## 2017-10-26 ENCOUNTER — TELEPHONE (OUTPATIENT)
Dept: PHARMACY | Facility: CLINIC | Age: 82
End: 2017-10-26

## 2017-11-08 DIAGNOSIS — D50.9 IRON DEFICIENCY ANEMIA, UNSPECIFIED IRON DEFICIENCY ANEMIA TYPE: ICD-10-CM

## 2017-11-08 RX ORDER — FERROUS SULFATE 325(65) MG
TABLET ORAL
Qty: 60 TABLET | Refills: 11 | Status: SHIPPED | OUTPATIENT
Start: 2017-11-08 | End: 2018-02-16 | Stop reason: SDUPTHER

## 2017-11-17 ENCOUNTER — TELEPHONE (OUTPATIENT)
Dept: PHARMACY | Facility: CLINIC | Age: 82
End: 2017-11-17

## 2017-12-06 ENCOUNTER — TELEPHONE (OUTPATIENT)
Dept: PHARMACY | Facility: CLINIC | Age: 82
End: 2017-12-06

## 2017-12-11 RX ORDER — TERIPARATIDE 250 UG/ML
20 INJECTION, SOLUTION SUBCUTANEOUS DAILY
Qty: 2.4 ML | Refills: 12 | OUTPATIENT
Start: 2017-12-11 | End: 2018-12-11

## 2017-12-11 NOTE — TELEPHONE ENCOUNTER
I advised patient with appointment on 12/15/2017 @ 1pm, to discuss changing forteo to prolia, patient understood.

## 2017-12-11 NOTE — TELEPHONE ENCOUNTER
----- Message from Abner Harman PA-C sent at 12/11/2017 12:47 PM CST -----  She needs to come in so we can stop Forteo and start Prolia

## 2017-12-15 ENCOUNTER — OFFICE VISIT (OUTPATIENT)
Dept: ORTHOPEDICS | Facility: CLINIC | Age: 82
End: 2017-12-15
Payer: MEDICARE

## 2017-12-15 VITALS
HEIGHT: 67 IN | BODY MASS INDEX: 19.98 KG/M2 | WEIGHT: 127.31 LBS | HEART RATE: 98 BPM | DIASTOLIC BLOOD PRESSURE: 81 MMHG | SYSTOLIC BLOOD PRESSURE: 130 MMHG

## 2017-12-15 DIAGNOSIS — M81.0 AGE-RELATED OSTEOPOROSIS WITHOUT CURRENT PATHOLOGICAL FRACTURE: Primary | ICD-10-CM

## 2017-12-15 PROCEDURE — 99999 PR PBB SHADOW E&M-EST. PATIENT-LVL III: CPT | Mod: PBBFAC,,, | Performed by: PHYSICIAN ASSISTANT

## 2017-12-15 PROCEDURE — 99213 OFFICE O/P EST LOW 20 MIN: CPT | Mod: S$PBB,,, | Performed by: PHYSICIAN ASSISTANT

## 2017-12-15 PROCEDURE — 99213 OFFICE O/P EST LOW 20 MIN: CPT | Mod: PBBFAC

## 2017-12-15 NOTE — PROGRESS NOTES
Chief Complaint & History of Present Illness:  Nydia Stevens is a established patient 83 y.o. female who is seen here today for her one-year follow-up after initiation of Forteo in January 2017 she has 3 more doses of Forteo left.  She has had no problems complications or concerns with her medication treatment.  His had no other instances of injury or fractures      Review of patient's allergies indicates:  No Known Allergies      Current Outpatient Prescriptions   Medication Sig Dispense Refill    amlodipine (NORVASC) 10 MG tablet TAKE ONE TABLET BY MOUTH EVERY DAY 90 tablet 3    aspirin (ECOTRIN) 81 MG EC tablet Take 1 tablet (81 mg total) by mouth once daily.      cholecalciferol, vitamin D3, (VITAMIN D3) 1,000 unit capsule Take 1,000 Units by mouth once daily.      ferrous sulfate 325 mg (65 mg iron) Tab tablet TAKE ONE TABLET BY MOUTH TWICE DAILY WITH MEALS 60 tablet 11    rosuvastatin (CRESTOR) 40 MG Tab Take 1 tablet (40 mg total) by mouth once daily. 90 tablet 3    senna-docusate 8.6-50 mg (PERICOLACE) 8.6-50 mg per tablet Take 1 tablet by mouth 2 (two) times daily.      sodium,potassium,mag sulfates (SUPREP BOWEL PREP KIT) 17.5-3.13-1.6 gram SolR Take 1 each by mouth as directed. 1 Bottle 0    VIT C/VIT E ACETATE/LUTEIN/MIN (OCUVITE LUTEIN ORAL) Take 1 tablet by mouth once daily.        calcium carbonate (OS-JAILYN) 500 mg calcium (1,250 mg) tablet Take 1 tablet (500 mg total) by mouth once daily.  0    teriparatide (FORTEO) 20 mcg/dose - 600 mcg/2.4 mL PnIj Inject 0.08 mLs (20 mcg total) into the skin once daily. 2.4 mL 12     No current facility-administered medications for this visit.        Past Medical History:   Diagnosis Date    Cataract     Hypertension        Past Surgical History:   Procedure Laterality Date    CATARACT EXTRACTION W/  INTRAOCULAR LENS IMPLANT Left 8/11/14    [facility]    CATARACT EXTRACTION W/  INTRAOCULAR LENS IMPLANT Right 09/15/14    [facility]    FOOT SURGERY      left     HYSTERECTOMY         Lab Results   Component Value Date     11/29/2016    K 4.1 11/29/2016     11/29/2016    CO2 26 11/29/2016    GLU 98 11/29/2016    BUN 12 11/29/2016    CREATININE 0.8 11/29/2016    CALCIUM 9.4 04/03/2017    PROT 7.4 11/29/2016    ALBUMIN 4.0 11/29/2016    BILITOT 0.2 11/29/2016    ALKPHOS 84 11/29/2016    AST 18 11/29/2016    ALT 8 (L) 11/29/2016    ANIONGAP 10 11/29/2016    ESTGFRAFRICA >60.0 11/29/2016    EGFRNONAA >60.0 11/29/2016      Lab Results   Component Value Date    WBC 5.67 02/08/2017    RBC 3.48 (L) 02/08/2017    HGB 9.2 (L) 02/08/2017    HCT 29.7 (L) 02/08/2017    MCV 85 02/08/2017    MCH 26.4 (L) 02/08/2017    MCHC 31.0 (L) 02/08/2017    RDW 15.9 (H) 02/08/2017     (H) 02/08/2017    MPV 9.3 02/08/2017    GRAN 3.2 10/24/2016    GRAN 58.8 10/24/2016    LYMPH 1.0 10/24/2016    LYMPH 18.9 10/24/2016    MONO 1.0 10/24/2016    MONO 18.9 (H) 10/24/2016    EOS 0.2 10/24/2016    BASO 0.03 10/24/2016    EOSINOPHIL 2.9 10/24/2016    BASOPHIL 0.5 10/24/2016    DIFFMETHOD Automated 10/24/2016      Lab Results   Component Value Date    MG 2.0 10/24/2016      Lab Results   Component Value Date    PHOS 4.2 10/24/2016      Lab Results   Component Value Date    XOJMOCRT30LS 43 11/29/2016      Lab Results   Component Value Date    PTH 45.0 11/29/2016      Lab Results   Component Value Date    TSH 1.453 11/29/2016      Lab Results   Component Value Date    FREET4 1.18 11/29/2016      Lab Results   Component Value Date    HGBA1C 5.8 12/11/2015    ESTIMATEDAVG 120 12/11/2015    Vital Signs (Most Recent)  Vitals:    12/15/17 1311   BP: 130/81   Pulse: 98         Review of Systems:  ROS:  Constitutional: no fever or chills  Eyes: no visual changes  ENT: no nasal congestion or sore throat  Respiratory: no cough or shortness of breath  Cardiovascular: no chest pain or palpitations  Gastrointestinal: no nausea or vomiting, tolerating diet  Genitourinary: no hematuria or  dysuria  Integument/Breast: no rash or pruritis  Hematologic/Lymphatic: no easy bruising or lymphadenopathy  Musculoskeletal: no arthralgias or myalgias  Neurological: no seizures or tremors  Behavioral/Psych: no auditory or visual hallucinations  Endocrine: no heat or cold intolerance      Nydia Stevens will discontinue her Forteo injections this weekend we will scheduled to begin following treatment with Prolia first injection scheduled for 01/03/2018 was given instructions on administration of Prolia today.    She will continue with her calcium and vitamin D.  Fall prevention and personal safety have been reviewed.    We have discussed the need for continued core strengthening and balance exercises for fall prevention,   Assessment and plan:    Osteoporosis    Discontinue Forteo injection therapy  Begin Prolia 60 mg IM every 6 months 01/03/2018  Scheduled DEXA scan in follow-up visit July 2018 to coordinate with second Prolia injection

## 2018-01-03 ENCOUNTER — INFUSION (OUTPATIENT)
Dept: INFECTIOUS DISEASES | Facility: HOSPITAL | Age: 83
End: 2018-01-03
Attending: INTERNAL MEDICINE
Payer: MEDICARE

## 2018-01-03 VITALS — BODY MASS INDEX: 19.98 KG/M2 | WEIGHT: 127.31 LBS | HEIGHT: 67 IN

## 2018-01-03 DIAGNOSIS — M81.0 AGE-RELATED OSTEOPOROSIS WITHOUT CURRENT PATHOLOGICAL FRACTURE: Primary | ICD-10-CM

## 2018-01-03 PROCEDURE — 63600175 PHARM REV CODE 636 W HCPCS: Mod: JG | Performed by: PHYSICIAN ASSISTANT

## 2018-01-03 PROCEDURE — 96372 THER/PROPH/DIAG INJ SC/IM: CPT

## 2018-01-03 RX ADMIN — DENOSUMAB 60 MG: 60 INJECTION SUBCUTANEOUS at 10:01

## 2018-02-15 ENCOUNTER — OFFICE VISIT (OUTPATIENT)
Dept: INTERNAL MEDICINE | Facility: CLINIC | Age: 83
End: 2018-02-15
Payer: MEDICARE

## 2018-02-15 ENCOUNTER — LAB VISIT (OUTPATIENT)
Dept: LAB | Facility: HOSPITAL | Age: 83
End: 2018-02-15
Attending: INTERNAL MEDICINE
Payer: MEDICARE

## 2018-02-15 VITALS
HEART RATE: 82 BPM | WEIGHT: 127.88 LBS | HEIGHT: 67 IN | BODY MASS INDEX: 20.07 KG/M2 | SYSTOLIC BLOOD PRESSURE: 130 MMHG | DIASTOLIC BLOOD PRESSURE: 60 MMHG

## 2018-02-15 DIAGNOSIS — D50.9 IRON DEFICIENCY ANEMIA, UNSPECIFIED IRON DEFICIENCY ANEMIA TYPE: Primary | ICD-10-CM

## 2018-02-15 DIAGNOSIS — Z23 NEED FOR DIPHTHERIA-TETANUS-PERTUSSIS (TDAP) VACCINE: ICD-10-CM

## 2018-02-15 DIAGNOSIS — I10 ESSENTIAL HYPERTENSION: ICD-10-CM

## 2018-02-15 DIAGNOSIS — I35.0 AORTIC STENOSIS, MILD: ICD-10-CM

## 2018-02-15 DIAGNOSIS — M81.0 AGE-RELATED OSTEOPOROSIS WITHOUT CURRENT PATHOLOGICAL FRACTURE: ICD-10-CM

## 2018-02-15 DIAGNOSIS — E78.5 HYPERLIPIDEMIA, UNSPECIFIED HYPERLIPIDEMIA TYPE: ICD-10-CM

## 2018-02-15 DIAGNOSIS — D50.9 IRON DEFICIENCY ANEMIA, UNSPECIFIED IRON DEFICIENCY ANEMIA TYPE: ICD-10-CM

## 2018-02-15 LAB
ALBUMIN SERPL BCP-MCNC: 4.2 G/DL
ALP SERPL-CCNC: 78 U/L
ALT SERPL W/O P-5'-P-CCNC: 14 U/L
ANION GAP SERPL CALC-SCNC: 10 MMOL/L
AST SERPL-CCNC: 18 U/L
BILIRUB SERPL-MCNC: 0.2 MG/DL
BUN SERPL-MCNC: 16 MG/DL
CALCIUM SERPL-MCNC: 8.9 MG/DL
CHLORIDE SERPL-SCNC: 104 MMOL/L
CO2 SERPL-SCNC: 26 MMOL/L
CREAT SERPL-MCNC: 0.8 MG/DL
ERYTHROCYTE [DISTWIDTH] IN BLOOD BY AUTOMATED COUNT: 22.6 %
EST. GFR  (AFRICAN AMERICAN): >60 ML/MIN/1.73 M^2
EST. GFR  (NON AFRICAN AMERICAN): >60 ML/MIN/1.73 M^2
FERRITIN SERPL-MCNC: 12 NG/ML
GLUCOSE SERPL-MCNC: 101 MG/DL
HCT VFR BLD AUTO: 27.4 %
HGB BLD-MCNC: 7.6 G/DL
IRON SERPL-MCNC: 11 UG/DL
MCH RBC QN AUTO: 22.1 PG
MCHC RBC AUTO-ENTMCNC: 27.7 G/DL
MCV RBC AUTO: 80 FL
PLATELET # BLD AUTO: 543 K/UL
PMV BLD AUTO: 9.5 FL
POTASSIUM SERPL-SCNC: 4.1 MMOL/L
PROT SERPL-MCNC: 7.2 G/DL
RBC # BLD AUTO: 3.44 M/UL
SATURATED IRON: 2 %
SODIUM SERPL-SCNC: 140 MMOL/L
TOTAL IRON BINDING CAPACITY: 512 UG/DL
TRANSFERRIN SERPL-MCNC: 346 MG/DL
WBC # BLD AUTO: 5.82 K/UL

## 2018-02-15 PROCEDURE — 85027 COMPLETE CBC AUTOMATED: CPT

## 2018-02-15 PROCEDURE — 99999 PR PBB SHADOW E&M-EST. PATIENT-LVL IV: CPT | Mod: PBBFAC,,, | Performed by: INTERNAL MEDICINE

## 2018-02-15 PROCEDURE — 82728 ASSAY OF FERRITIN: CPT

## 2018-02-15 PROCEDURE — 83540 ASSAY OF IRON: CPT

## 2018-02-15 PROCEDURE — 99214 OFFICE O/P EST MOD 30 MIN: CPT | Mod: PBBFAC | Performed by: INTERNAL MEDICINE

## 2018-02-15 PROCEDURE — 36415 COLL VENOUS BLD VENIPUNCTURE: CPT

## 2018-02-15 PROCEDURE — 99215 OFFICE O/P EST HI 40 MIN: CPT | Mod: S$PBB,,, | Performed by: INTERNAL MEDICINE

## 2018-02-15 PROCEDURE — 80053 COMPREHEN METABOLIC PANEL: CPT

## 2018-02-15 NOTE — PROGRESS NOTES
"Subjective:       Patient ID: Nydia Stevens is a 83 y.o. female.    Chief Complaint: Annual Exam    HPI    Last visit with me 02/2017. Seen in last year by Optometry and Orthopedics. Uses walker occasionally but not always.    No new problems or issues.  Denies any palpitations, chest pain, dyspnea on exertion.    Review of Systems   All other systems reviewed and are negative.      Objective:      Physical Exam   Constitutional: No distress.   -American woman whose Body mass index is 20.03 kg/m².    HENT:   Head: Atraumatic.   Mouth/Throat: Oropharynx is clear and moist. No oropharyngeal exudate.   tympanic membrane obscured by cerumen bilaterally    Eyes: Pupils are equal, round, and reactive to light. Right eye exhibits no discharge. Left eye exhibits no discharge.   Neck: Normal range of motion. No thyromegaly present.   Cardiovascular: Regular rhythm.  Tachycardia present.    Murmur heard.   Systolic (RUSB) murmur is present with a grade of 1/6   Tachycardic on exam, resting pulse is normal    Pulmonary/Chest: Effort normal and breath sounds normal. No stridor. She has no wheezes. She has no rales.   Abdominal: Soft. There is no tenderness. There is no guarding.   Musculoskeletal: She exhibits no edema or tenderness.   No foot pain or tenderness to palpation    Lymphadenopathy:     She has no cervical adenopathy.   Skin: Skin is warm and dry. No rash noted.   Psychiatric: She has a normal mood and affect. Her behavior is normal.   Nursing note and vitals reviewed.      Vitals:    02/15/18 0753   BP: 130/60   BP Location: Right arm   Patient Position: Sitting   BP Method: Medium (Manual)   Pulse: 82   Weight: 58 kg (127 lb 13.9 oz)   Height: 5' 7" (1.702 m)     Body mass index is 20.03 kg/m².    RESULTS: Reviewed labs from last 18 months    Assessment:       1. Iron deficiency anemia, unspecified iron deficiency anemia type    2. Age-related osteoporosis without current pathological fracture    3. " Essential hypertension -- goal SBP < 150    4. Hyperlipidemia, unspecified hyperlipidemia type    5. Aortic stenosis, mild    6. Need for diphtheria-tetanus-pertussis (Tdap) vaccine        Plan:   Nydia was seen today for annual exam.    Diagnoses and all orders for this visit:    Iron deficiency anemia, unspecified iron deficiency anemia type:  Prior dx, persistent but stable at last check. Continue daily iron supplement, recheck levels. For now continue ASA, but if ANANDA is worse will tell the patient to stop ASA as well.  -     Iron and TIBC; Future  -     Ferritin; Future  -     CBC Without Differential; Future    Age-related osteoporosis without current pathological fracture:  Prior dx, needs treatment. Sees Orthopedics, they stopped Forteo in favor of Prolia q6mo. continue follow up with Orthopedics.  -     Cancel: DXA Bone Density Spine And Hip; Future    Essential hypertension -- goal SBP < 150:  Prior diagnosis, well controlled on current management. No changes at this time, will continue to monitor.    -     Comprehensive metabolic panel; Future    Hyperlipidemia, unspecified hyperlipidemia type:  Prior diagnosis, well controlled on current management. No changes at this time, will continue to monitor. Continue ASA for now for primary prevention, but if iron deficiency anemia recurred will stop. Plan to stop by 85th birthday to minimize risks as benefits likely low by this age.    Aortic stenosis, mild:  Prior diagnosis, asymptomatic. No evidence worsening. No changes at this time, will continue to monitor.   -     Cancel: 2D echo with color flow doppler; Future    Need for diphtheria-tetanus-pertussis (Tdap) vaccine  -     diphth,pertus,acell,,tetanus (BOOSTRIX) 2.5-8-5 Lf-mcg-Lf/0.5mL Susp; Inject 0.5 mLs into the muscle once.    Follow-up in about 1 year (around 2/15/2019) for EPP annual exam.  Donaldo Carlin MD  Internal Medicine    Portions of this note were completed using Dragon medical dictation  software. Please excuse typographical or syntax errors.

## 2018-02-16 ENCOUNTER — TELEPHONE (OUTPATIENT)
Dept: INTERNAL MEDICINE | Facility: CLINIC | Age: 83
End: 2018-02-16

## 2018-02-16 DIAGNOSIS — D50.9 IRON DEFICIENCY ANEMIA, UNSPECIFIED IRON DEFICIENCY ANEMIA TYPE: Primary | ICD-10-CM

## 2018-02-16 RX ORDER — FERROUS SULFATE 325(65) MG
1 TABLET ORAL
Qty: 30 TABLET | Refills: 11 | Status: SHIPPED | OUTPATIENT
Start: 2018-02-16 | End: 2019-04-18 | Stop reason: SDUPTHER

## 2018-02-16 NOTE — TELEPHONE ENCOUNTER
Pt wanted clarification of which meds to take, informed pt to continue taking all medications except Asprin.

## 2018-02-16 NOTE — TELEPHONE ENCOUNTER
Please call the patient to notify that her iron is still low, and her anemia has gotten worse. She needs to continue on her daily iron supplement. She should take this once daily, preferably in the morning before breakfast. She should also stop taking the daily aspirin as well. I would like to recheck her levels in 8-12 weeks; please schedule with the patient.    All other labs are stable. I have sent the results in a letter.

## 2018-02-21 ENCOUNTER — TELEPHONE (OUTPATIENT)
Dept: INTERNAL MEDICINE | Facility: CLINIC | Age: 83
End: 2018-02-21

## 2018-02-21 NOTE — TELEPHONE ENCOUNTER
Please advise if it is okay for pt to eat crawfish, pt states she hasn't had any reactions or anything.

## 2018-02-21 NOTE — TELEPHONE ENCOUNTER
Eating the crawfish should be fine, no concerns on my end.  If she notices any problems later that day or the following day, please have her notify the office so we can make note of it.

## 2018-02-21 NOTE — TELEPHONE ENCOUNTER
----- Message from Madeline Flores sent at 2/21/2018 10:01 AM CST -----  Contact: patient [phone]  Patient's school is having a  and selling crawfish. Patient wants to know if it is ok to eat them, She has no seafood allergies to her knowledge.

## 2018-02-22 ENCOUNTER — TELEPHONE (OUTPATIENT)
Dept: INTERNAL MEDICINE | Facility: CLINIC | Age: 83
End: 2018-02-22

## 2018-02-22 NOTE — TELEPHONE ENCOUNTER
----- Message from Ignacio Pizano sent at 2/22/2018  2:18 PM CST -----  Contact: self/[phone]  Pt is calling to speak with someone in regards to some things that she is able to eat. Pt is wanting to know if she can eat crawfish. Please advise.    Thank You

## 2018-03-07 DIAGNOSIS — I10 ESSENTIAL HYPERTENSION: ICD-10-CM

## 2018-03-08 RX ORDER — AMLODIPINE BESYLATE 10 MG/1
TABLET ORAL
Qty: 90 TABLET | Refills: 3 | Status: SHIPPED | OUTPATIENT
Start: 2018-03-08 | End: 2019-04-18 | Stop reason: SDUPTHER

## 2018-03-09 ENCOUNTER — OFFICE VISIT (OUTPATIENT)
Dept: OPTOMETRY | Facility: CLINIC | Age: 83
End: 2018-03-09
Payer: MEDICARE

## 2018-03-09 DIAGNOSIS — Z98.41 S/P CATARACT SURGERY, RIGHT: ICD-10-CM

## 2018-03-09 DIAGNOSIS — H35.371 EPIRETINAL MEMBRANE, RIGHT: Primary | ICD-10-CM

## 2018-03-09 DIAGNOSIS — I10 ESSENTIAL HYPERTENSION: ICD-10-CM

## 2018-03-09 DIAGNOSIS — H52.7 REFRACTIVE ERRORS: ICD-10-CM

## 2018-03-09 DIAGNOSIS — Z98.42 S/P CATARACT SURGERY, LEFT: ICD-10-CM

## 2018-03-09 DIAGNOSIS — Z13.5 SCREENING FOR EYE CONDITION: ICD-10-CM

## 2018-03-09 DIAGNOSIS — Z96.1 PSEUDOPHAKIA OF BOTH EYES: ICD-10-CM

## 2018-03-09 PROCEDURE — 92014 COMPRE OPH EXAM EST PT 1/>: CPT | Mod: S$PBB,,, | Performed by: OPTOMETRIST

## 2018-03-09 PROCEDURE — 99212 OFFICE O/P EST SF 10 MIN: CPT | Mod: PBBFAC | Performed by: OPTOMETRIST

## 2018-03-09 PROCEDURE — 99999 PR PBB SHADOW E&M-EST. PATIENT-LVL II: CPT | Mod: PBBFAC,,, | Performed by: OPTOMETRIST

## 2018-03-09 NOTE — PATIENT INSTRUCTIONS
Mild epiretinal membrane at posterior pole of the right eye, as noted previously Not impacting correctable VA. No need for treatment.  Otherwise, ocular health appears good in both eyes.  S/P cataract surgery in both eyes. Bilateral posterior chamber intraocular lens.  Residual refractive error in each eye, with very satisfactory visual acuity in each eye.   New spectacle lens Rx issued for full-time wear, but no compelling need to replace lenses at this time.  Recheck in one year.

## 2018-03-09 NOTE — PROGRESS NOTES
"HPI     Concerns About Ocular Health    Additional comments: Eye exam            Comments   Patient's age: 83 y.o. female  Occupation:  [employer].  Approximate date of last eye examination:  03/06/2017  Name of last eye doctor seen: Dr. Maxwell   City/State: [facility]  Wears glasses? Yes     If yes, wears  Full-time or part-time?  Full-time  Present glasses are: Bifocal, SV Distance, SV Reading?  Lined Bifocal  Approximate age of present glasses:  2014   Got new glasses following last exam, or subsequently?:  NO   Any problem with VA with glasses?  No  Wears CLs?:  No  Headaches?  No  Eye pain/discomfort?  No                                                                                     Flashes?  No  Floaters?  No  Diplopia/Double vision?  No  Patient's Ocular History:         Any eye surgeries? [facility] - Dr Llamas         Any eye injury?  No         Any treatment for eye disease?  No  Family history of eye disease?  No  Significant patient medical history:         1. Diabetes?  No   2. HBP?  Yes, controlled by medication and diet                ! OTC eyedrops currently using:  None - Ocuvite   ! Prescription eye meds currently using:  None   ! Any history of allergy/adverse reaction to any eye meds used   previously?  No    ! Any history of allergy/adverse reaction to eyedrops used during prior   eye exam(s)? No    ! Any history of allergy/adverse reaction to Novacaine or similar meds?   No   ! Any history of allergy/adverse reaction to Epinephrine or similar meds?   No    ! Patient okay with use of anesthetic eyedrops to check eye pressure?    Yes        ! Patient okay with use of eyedrops to dilate pupils today?  Yes   !  Allergies/Medications/Medical History/Family History reviewed today?    Yes      PD =   69/65  Desired reading distance =  13.5"                                                                      Last edited by Rick Maxwell, OD on 3/9/2018 10:10 AM. (History)    "         Assessment /Plan     For exam results, see Encounter Report.    1. Epiretinal membrane, right     2. S/P cataract surgery, left     3. S/P cataract surgery, right     4. Pseudophakia of both eyes     5. Essential hypertension     6. Screening for eye condition     7. Refractive errors                  Mild epiretinal membrane at posterior pole of the right eye, as noted previously Not impacting correctable VA. No need for treatment.  Otherwise, ocular health appears good in both eyes.  S/P cataract surgery in both eyes. Bilateral posterior chamber intraocular lens.  Residual refractive error in each eye, with very satisfactory visual acuity in each eye.   New spectacle lens Rx issued for full-time wear, but no compelling need to replace lenses at this time.  Recheck in one year.

## 2018-04-03 ENCOUNTER — OFFICE VISIT (OUTPATIENT)
Dept: ORTHOPEDICS | Facility: CLINIC | Age: 83
End: 2018-04-03
Payer: MEDICARE

## 2018-04-03 ENCOUNTER — HOSPITAL ENCOUNTER (OUTPATIENT)
Dept: RADIOLOGY | Facility: CLINIC | Age: 83
Discharge: HOME | End: 2018-04-03
Attending: PHYSICIAN ASSISTANT
Payer: MEDICARE

## 2018-04-03 VITALS
WEIGHT: 129.06 LBS | HEART RATE: 84 BPM | HEIGHT: 67 IN | DIASTOLIC BLOOD PRESSURE: 75 MMHG | SYSTOLIC BLOOD PRESSURE: 142 MMHG | BODY MASS INDEX: 20.26 KG/M2

## 2018-04-03 DIAGNOSIS — M81.0 AGE-RELATED OSTEOPOROSIS WITHOUT CURRENT PATHOLOGICAL FRACTURE: ICD-10-CM

## 2018-04-03 DIAGNOSIS — M81.0 AGE-RELATED OSTEOPOROSIS WITHOUT CURRENT PATHOLOGICAL FRACTURE: Primary | ICD-10-CM

## 2018-04-03 PROCEDURE — 77080 DXA BONE DENSITY AXIAL: CPT | Mod: TC

## 2018-04-03 PROCEDURE — 99999 PR PBB SHADOW E&M-EST. PATIENT-LVL III: CPT | Mod: PBBFAC,,, | Performed by: PHYSICIAN ASSISTANT

## 2018-04-03 PROCEDURE — 77080 DXA BONE DENSITY AXIAL: CPT | Mod: 26,,, | Performed by: INTERNAL MEDICINE

## 2018-04-03 PROCEDURE — 99213 OFFICE O/P EST LOW 20 MIN: CPT | Mod: PBBFAC,25

## 2018-04-03 PROCEDURE — 99213 OFFICE O/P EST LOW 20 MIN: CPT | Mod: S$PBB,,, | Performed by: PHYSICIAN ASSISTANT

## 2018-04-03 NOTE — PROGRESS NOTES
Chief Complaint & History of Present Illness:  Nydia Stevens is a established patient 83 y.o. female who is seen here today for review of , DEXA scan results, and determine a treatment plan for her osteoporosis.  She has completed one year of Forteo treatment and is approximate 1 month from her second Prolia injection.       Review of patient's allergies indicates:  No Known Allergies      Current Outpatient Prescriptions   Medication Sig Dispense Refill    amLODIPine (NORVASC) 10 MG tablet TAKE ONE TABLET BY MOUTH EVERY DAY 90 tablet 3    cholecalciferol, vitamin D3, (VITAMIN D3) 1,000 unit capsule Take 1,000 Units by mouth once daily.      ferrous sulfate 325 mg (65 mg iron) Tab tablet Take 1 tablet (325 mg total) by mouth before breakfast. 30 tablet 11    rosuvastatin (CRESTOR) 40 MG Tab Take 1 tablet (40 mg total) by mouth once daily. 90 tablet 3    senna-docusate 8.6-50 mg (PERICOLACE) 8.6-50 mg per tablet Take 1 tablet by mouth 2 (two) times daily.      VIT C/VIT E ACETATE/LUTEIN/MIN (OCUVITE LUTEIN ORAL) Take 1 tablet by mouth once daily.        calcium carbonate (OS-JAILYN) 500 mg calcium (1,250 mg) tablet Take 1 tablet (500 mg total) by mouth once daily.  0     No current facility-administered medications for this visit.        Past Medical History:   Diagnosis Date    Cataract     Hypertension        Past Surgical History:   Procedure Laterality Date    CATARACT EXTRACTION W/  INTRAOCULAR LENS IMPLANT [facility] 8/11/14    [facility]    CATARACT EXTRACTION W/  INTRAOCULAR LENS IMPLANT [facility] 09/15/14    [facility]    FOOT SURGERY      left    HYSTERECTOMY         Lab Results   Component Value Date     02/15/2018    K 4.1 02/15/2018     02/15/2018    CO2 26 02/15/2018     02/15/2018    BUN 16 02/15/2018    CREATININE 0.8 02/15/2018    CALCIUM 8.9 02/15/2018    PROT 7.2 02/15/2018    ALBUMIN 4.2 02/15/2018    BILITOT 0.2 02/15/2018    ALKPHOS 78 02/15/2018    AST 18 02/15/2018    ALT 14  02/15/2018    ANIONGAP 10 02/15/2018    ESTGFRAFRICA >60 02/15/2018    EGFRNONAA >60 02/15/2018      Lab Results   Component Value Date    WBC 5.82 02/15/2018    RBC 3.44 (L) 02/15/2018    HGB 7.6 (L) 02/15/2018    HCT 27.4 (L) 02/15/2018    MCV 80 (L) 02/15/2018    MCH 22.1 (L) 02/15/2018    MCHC 27.7 (L) 02/15/2018    RDW 22.6 (H) 02/15/2018     (H) 02/15/2018    MPV 9.5 02/15/2018    GRAN 3.2 10/24/2016    GRAN 58.8 10/24/2016    LYMPH 1.0 10/24/2016    LYMPH 18.9 10/24/2016    MONO 1.0 10/24/2016    MONO 18.9 (H) 10/24/2016    EOS 0.2 10/24/2016    BASO 0.03 10/24/2016    EOSINOPHIL 2.9 10/24/2016    BASOPHIL 0.5 10/24/2016    DIFFMETHOD Automated 10/24/2016      Lab Results   Component Value Date    MG 2.0 10/24/2016      Lab Results   Component Value Date    PHOS 4.2 10/24/2016      Lab Results   Component Value Date    CWUAGHWN00PH 43 11/29/2016      Lab Results   Component Value Date    PTH 45.0 11/29/2016      Lab Results   Component Value Date    TSH 1.453 11/29/2016      Lab Results   Component Value Date    FREET4 1.18 11/29/2016      Lab Results   Component Value Date    HGBA1C 5.8 12/11/2015    ESTIMATEDAVG 120 12/11/2015      No results found for: FREETESTOSTE     DEXA Scan was reviewed and demonstrates :     T-Score Hip: -3.5     An improvement of 14.9 %     T-Score Spine: -2.6     An improvement of  21.6 %      FRAX:      Major Fx.: 18%         Hip Fx.: 8.1%      Vital Signs (Most Recent)  Vitals:    04/03/18 0924   BP: (!) 142/75   Pulse: 84         Review of Systems:  ROS:  Constitutional: no fever or chills  Eyes: no visual changes  ENT: no nasal congestion or sore throat  Respiratory: no cough or shortness of breath  Cardiovascular: no chest pain or palpitations  Gastrointestinal: no nausea or vomiting, tolerating diet  Genitourinary: no hematuria or dysuria  Integument/Breast: no rash or pruritis  Hematologic/Lymphatic: no easy bruising or lymphadenopathy  Musculoskeletal: no arthralgias  or myalgias  Neurological: no seizures or tremors  Behavioral/Psych: no auditory or visual hallucinations  Endocrine: no heat or cold intolerance      She will continue with her calcium and vitamin D.  Fall prevention and personal safety have been reviewed.    We have discussed the need for core strengthening and balance exercises for fall prevention,       Assessment and plan:    Osteoporosis    Continue with Prolia treatments as directed    Follow up 1-2 weeks after initiation of treatment to check Calcium, Vitamin D levels and access tolerance to medication.

## 2018-04-27 ENCOUNTER — TELEPHONE (OUTPATIENT)
Dept: INTERNAL MEDICINE | Facility: CLINIC | Age: 83
End: 2018-04-27

## 2018-04-27 ENCOUNTER — LAB VISIT (OUTPATIENT)
Dept: LAB | Facility: HOSPITAL | Age: 83
End: 2018-04-27
Attending: INTERNAL MEDICINE
Payer: MEDICARE

## 2018-04-27 DIAGNOSIS — D50.9 IRON DEFICIENCY ANEMIA, UNSPECIFIED IRON DEFICIENCY ANEMIA TYPE: Primary | ICD-10-CM

## 2018-04-27 DIAGNOSIS — D50.9 IRON DEFICIENCY ANEMIA, UNSPECIFIED IRON DEFICIENCY ANEMIA TYPE: ICD-10-CM

## 2018-04-27 LAB
ERYTHROCYTE [DISTWIDTH] IN BLOOD BY AUTOMATED COUNT: 18.3 %
FERRITIN SERPL-MCNC: 16 NG/ML
HCT VFR BLD AUTO: 31.4 %
HGB BLD-MCNC: 9.2 G/DL
IRON SERPL-MCNC: 17 UG/DL
MCH RBC QN AUTO: 22.8 PG
MCHC RBC AUTO-ENTMCNC: 29.3 G/DL
MCV RBC AUTO: 78 FL
PLATELET # BLD AUTO: 465 K/UL
PMV BLD AUTO: 9.2 FL
RBC # BLD AUTO: 4.03 M/UL
RETICS/RBC NFR AUTO: 1.7 %
SATURATED IRON: 4 %
TOTAL IRON BINDING CAPACITY: 466 UG/DL
TRANSFERRIN SERPL-MCNC: 315 MG/DL
WBC # BLD AUTO: 6.32 K/UL

## 2018-04-27 PROCEDURE — 36415 COLL VENOUS BLD VENIPUNCTURE: CPT

## 2018-04-27 PROCEDURE — 85045 AUTOMATED RETICULOCYTE COUNT: CPT

## 2018-04-27 PROCEDURE — 83540 ASSAY OF IRON: CPT

## 2018-04-27 PROCEDURE — 82728 ASSAY OF FERRITIN: CPT

## 2018-04-27 PROCEDURE — 85027 COMPLETE CBC AUTOMATED: CPT

## 2018-04-27 NOTE — TELEPHONE ENCOUNTER
Please call the patient to notify that she still has iron deficiency anemia, but it is getting slightly better.  Please stay off of the aspirin and continue the iron supplement daily.  I would like to follow up with her in in clinic 3 months with labs 3-7 days prior to reassess, please schedule with the patient.

## 2018-05-11 DIAGNOSIS — E78.5 HYPERLIPIDEMIA: ICD-10-CM

## 2018-05-11 RX ORDER — ROSUVASTATIN CALCIUM 40 MG/1
TABLET, COATED ORAL
Qty: 90 TABLET | Refills: 4 | Status: CANCELLED | OUTPATIENT
Start: 2018-05-11

## 2018-05-11 NOTE — TELEPHONE ENCOUNTER
----- Message from González Eden sent at 5/11/2018  9:20 AM CDT -----  Contact: [facility] [phone]  Is this a refill or new RX:  Refill    RX name and strength: rosuvastatin (CRESTOR) 40 MG Tab  :    Pharmacy name and phone # [facility] - [place], [place] - [id number] [place] [address] GLADIS BURGESS [phone] (Phone) [phone] (Fax)

## 2018-05-14 DIAGNOSIS — E78.5 HYPERLIPIDEMIA: ICD-10-CM

## 2018-05-14 DIAGNOSIS — E78.5 HYPERLIPIDEMIA, UNSPECIFIED HYPERLIPIDEMIA TYPE: ICD-10-CM

## 2018-05-15 RX ORDER — ROSUVASTATIN CALCIUM 40 MG/1
TABLET, COATED ORAL
Qty: 90 TABLET | Refills: 0 | OUTPATIENT
Start: 2018-05-15

## 2018-05-15 RX ORDER — ROSUVASTATIN CALCIUM 40 MG/1
40 TABLET, COATED ORAL DAILY
Qty: 90 TABLET | Refills: 3 | Status: SHIPPED | OUTPATIENT
Start: 2018-05-15 | End: 2019-07-05 | Stop reason: SDUPTHER

## 2018-05-15 NOTE — TELEPHONE ENCOUNTER
----- Message from Ronaldo Hennessy sent at 5/15/2018  9:29 AM CDT -----  Contact: self [phone]  Type: Rx    Name of medication(s):  rosuvastatin (CRESTOR) 40 MG Tab    Is this a refill? New rx? Refill      Who prescribed medication?    Pharmacy Name, Phone, & Location: [facility]    Comments: please advise , Thanks

## 2018-07-06 ENCOUNTER — INFUSION (OUTPATIENT)
Dept: INFECTIOUS DISEASES | Facility: HOSPITAL | Age: 83
End: 2018-07-06
Attending: INTERNAL MEDICINE
Payer: MEDICARE

## 2018-07-06 VITALS — BODY MASS INDEX: 20.26 KG/M2 | HEIGHT: 67 IN | WEIGHT: 129.06 LBS

## 2018-07-06 DIAGNOSIS — M81.0 AGE-RELATED OSTEOPOROSIS WITHOUT CURRENT PATHOLOGICAL FRACTURE: Primary | ICD-10-CM

## 2018-07-06 PROCEDURE — 63600175 PHARM REV CODE 636 W HCPCS: Mod: JG | Performed by: PHYSICIAN ASSISTANT

## 2018-07-06 PROCEDURE — 96372 THER/PROPH/DIAG INJ SC/IM: CPT

## 2018-07-06 RX ADMIN — DENOSUMAB 60 MG: 60 INJECTION SUBCUTANEOUS at 09:07

## 2018-07-06 NOTE — PLAN OF CARE
Problem: Health Knowledge, Opportunity to Enhance (Adult,NICU,[place],Obstetrics,Pediatric)  Goal: Knowledgeable about Health Subject/Topic  Patient will demonstrate the desired outcomes by discharge/transition of care.  Outcome: Ongoing (interventions implemented as appropriate)  PATIENT EDUCATED ON HAND WASHING AND INFECTION CONTROL. PATIENT VERBALIZED UNDERSTANDING

## 2018-07-13 ENCOUNTER — LAB VISIT (OUTPATIENT)
Dept: LAB | Facility: HOSPITAL | Age: 83
End: 2018-07-13
Attending: INTERNAL MEDICINE
Payer: MEDICARE

## 2018-07-13 DIAGNOSIS — D50.9 IRON DEFICIENCY ANEMIA, UNSPECIFIED IRON DEFICIENCY ANEMIA TYPE: ICD-10-CM

## 2018-07-13 LAB
ERYTHROCYTE [DISTWIDTH] IN BLOOD BY AUTOMATED COUNT: 16.8 %
FERRITIN SERPL-MCNC: 16 NG/ML
HCT VFR BLD AUTO: 30.8 %
HGB BLD-MCNC: 9 G/DL
IRON SERPL-MCNC: 15 UG/DL
MCH RBC QN AUTO: 23.4 PG
MCHC RBC AUTO-ENTMCNC: 29.2 G/DL
MCV RBC AUTO: 80 FL
PLATELET # BLD AUTO: 448 K/UL
PMV BLD AUTO: 9.7 FL
RBC # BLD AUTO: 3.84 M/UL
SATURATED IRON: 3 %
TOTAL IRON BINDING CAPACITY: 438 UG/DL
TRANSFERRIN SERPL-MCNC: 296 MG/DL
WBC # BLD AUTO: 6.41 K/UL

## 2018-07-13 PROCEDURE — 85027 COMPLETE CBC AUTOMATED: CPT

## 2018-07-13 PROCEDURE — 83540 ASSAY OF IRON: CPT

## 2018-07-13 PROCEDURE — 82728 ASSAY OF FERRITIN: CPT

## 2018-07-13 PROCEDURE — 36415 COLL VENOUS BLD VENIPUNCTURE: CPT

## 2018-07-25 ENCOUNTER — OFFICE VISIT (OUTPATIENT)
Dept: INTERNAL MEDICINE | Facility: CLINIC | Age: 83
End: 2018-07-25
Payer: MEDICARE

## 2018-07-25 VITALS
WEIGHT: 125.69 LBS | HEART RATE: 94 BPM | BODY MASS INDEX: 19.73 KG/M2 | DIASTOLIC BLOOD PRESSURE: 62 MMHG | SYSTOLIC BLOOD PRESSURE: 134 MMHG | HEIGHT: 67 IN

## 2018-07-25 DIAGNOSIS — I10 ESSENTIAL HYPERTENSION: ICD-10-CM

## 2018-07-25 DIAGNOSIS — D50.9 IRON DEFICIENCY ANEMIA, UNSPECIFIED IRON DEFICIENCY ANEMIA TYPE: Primary | ICD-10-CM

## 2018-07-25 DIAGNOSIS — Z99.89 USES ROLLER WALKER: ICD-10-CM

## 2018-07-25 DIAGNOSIS — I35.0 AORTIC STENOSIS, MILD: ICD-10-CM

## 2018-07-25 DIAGNOSIS — E78.5 HYPERLIPIDEMIA, UNSPECIFIED HYPERLIPIDEMIA TYPE: ICD-10-CM

## 2018-07-25 DIAGNOSIS — R26.81 UNSTEADINESS ON FEET: ICD-10-CM

## 2018-07-25 PROCEDURE — 99999 PR PBB SHADOW E&M-EST. PATIENT-LVL III: CPT | Mod: PBBFAC,,, | Performed by: INTERNAL MEDICINE

## 2018-07-25 PROCEDURE — 99214 OFFICE O/P EST MOD 30 MIN: CPT | Mod: S$PBB,,, | Performed by: INTERNAL MEDICINE

## 2018-07-25 PROCEDURE — 99213 OFFICE O/P EST LOW 20 MIN: CPT | Mod: PBBFAC | Performed by: INTERNAL MEDICINE

## 2018-07-25 NOTE — PROGRESS NOTES
"Subjective:       Patient ID: Nydia Stevens is a 83 y.o. female.    Chief Complaint: Follow-up    HPI    Last visit with me 02/2018. Since then seen by Optometry and Orthopedics. Has been keeping healthy diet with greens and beets for iron, also no fatigue or restless leg syndrome. Pt not interested in more workup. Uses Rollator for long distance walker.     Reviewed PMH, PSH, SH, FH, allergies, and medications.     Review of Systems   All other systems reviewed and are negative.      Objective:      Physical Exam   Constitutional: No distress.   -American woman whose Body mass index is 19.68 kg/m².    HENT:   Mouth/Throat: Oropharynx is clear and moist. No oropharyngeal exudate.   Neck: No thyromegaly present.   Cardiovascular: Normal rate and regular rhythm.    Murmur heard.   Systolic (RUSB) murmur is present with a grade of 1/6   Pulmonary/Chest: Effort normal and breath sounds normal. No stridor. She has no wheezes. She has no rales.   Lymphadenopathy:     She has no cervical adenopathy.   Psychiatric: She has a normal mood and affect. Her behavior is normal.   Nursing note and vitals reviewed.      Vitals:    07/25/18 0843   BP: 134/62   BP Location: Right arm   Patient Position: Sitting   BP Method: Large (Manual)   Pulse: 94   Weight: 57 kg (125 lb 10.6 oz)   Height: 5' 7" (1.702 m)     Body mass index is 19.68 kg/m².    RESULTS: Reviewed labs from last 12 months    Assessment:       1. Iron deficiency anemia, unspecified iron deficiency anemia type    2. Hyperlipidemia, unspecified hyperlipidemia type    3. Essential hypertension -- goal SBP < 150    4. Aortic stenosis, mild    5. Unsteadiness on feet     6. Uses roller walker        Plan:   Nydia was seen today for follow-up.    Diagnoses and all orders for this visit:    Iron deficiency anemia, unspecified iron deficiency anemia type:  Prior dx, persists, continue iron supplement at least 4 days/week with diet of high iron vegetables. Pt " denies symptoms, declines further workup, will continue to monitor on labs.  -     CBC Without Differential; Future  -     Ferritin; Future  -     Iron and TIBC; Future    Hyperlipidemia, unspecified hyperlipidemia type:  Prior diagnosis, sufficiently controlled on current management. No changes at this time, will continue to monitor.   -     Lipid panel; Future    Essential hypertension -- goal SBP < 150:  Prior diagnosis, well controlled on current management. No changes at this time, will continue to monitor.   -     Comprehensive metabolic panel; Future    Aortic stenosis, mild:  Prior dx, stable, the patient is asymptomatic. No new workup indicated.    Unsteadiness on feet:  Uses rolling walking long distance. Longstanding problem. rule out B12 deficiency.  -     Vitamin B12; Future    Uses roller walker  -     Vitamin B12; Future    Follow-up in about 6 months (around 1/25/2019) for EPP annual exam, fasting labs 1 week prior.  Donaldo Carlin MD  Internal Medicine    Portions of this note were completed using medical dictation software. Please excuse typographical or syntax errors that were missed on review.

## 2018-12-03 ENCOUNTER — TELEPHONE (OUTPATIENT)
Dept: INTERNAL MEDICINE | Facility: CLINIC | Age: 83
End: 2018-12-03

## 2018-12-03 NOTE — TELEPHONE ENCOUNTER
----- Message from Madeline Flores sent at 12/3/2018  9:31 AM CST -----  Contact: fyi  Doctor appointment and lab have been scheduled.  Please link lab orders to the lab appointment.  Date of doctor appointment:  02/15/19  Physical or EP:  physical  Date of lab appointment:  02/08/19  Comments:

## 2019-01-07 ENCOUNTER — INFUSION (OUTPATIENT)
Dept: INFECTIOUS DISEASES | Facility: HOSPITAL | Age: 84
End: 2019-01-07
Attending: INTERNAL MEDICINE
Payer: MEDICARE

## 2019-01-07 VITALS — WEIGHT: 125.69 LBS | BODY MASS INDEX: 19.73 KG/M2 | HEIGHT: 67 IN

## 2019-01-07 DIAGNOSIS — M81.0 AGE-RELATED OSTEOPOROSIS WITHOUT CURRENT PATHOLOGICAL FRACTURE: Primary | ICD-10-CM

## 2019-01-07 PROCEDURE — 63600175 PHARM REV CODE 636 W HCPCS: Mod: JG | Performed by: PHYSICIAN ASSISTANT

## 2019-01-07 PROCEDURE — 96372 THER/PROPH/DIAG INJ SC/IM: CPT

## 2019-01-07 RX ADMIN — DENOSUMAB 60 MG: 60 INJECTION SUBCUTANEOUS at 08:01

## 2019-01-07 NOTE — PLAN OF CARE
Problem: Adult Inpatient Plan of Care  Goal: Absence of Hospital-Acquired Illness or Injury    Intervention: Prevent Infection  PATIENT EDUCATED ON HAND WASHING AND INFECTION CONTROL. PATIENT VERBALIZED UNDERSTANDING

## 2019-02-28 ENCOUNTER — TELEPHONE (OUTPATIENT)
Dept: INTERNAL MEDICINE | Facility: CLINIC | Age: 84
End: 2019-02-28

## 2019-02-28 ENCOUNTER — LAB VISIT (OUTPATIENT)
Dept: LAB | Facility: HOSPITAL | Age: 84
End: 2019-02-28
Attending: INTERNAL MEDICINE
Payer: MEDICARE

## 2019-02-28 ENCOUNTER — OFFICE VISIT (OUTPATIENT)
Dept: INTERNAL MEDICINE | Facility: CLINIC | Age: 84
End: 2019-02-28
Payer: MEDICARE

## 2019-02-28 VITALS
BODY MASS INDEX: 18.68 KG/M2 | DIASTOLIC BLOOD PRESSURE: 60 MMHG | HEIGHT: 67 IN | SYSTOLIC BLOOD PRESSURE: 142 MMHG | WEIGHT: 119 LBS | HEART RATE: 83 BPM

## 2019-02-28 DIAGNOSIS — I10 ESSENTIAL HYPERTENSION: ICD-10-CM

## 2019-02-28 DIAGNOSIS — D50.9 IRON DEFICIENCY ANEMIA, UNSPECIFIED IRON DEFICIENCY ANEMIA TYPE: ICD-10-CM

## 2019-02-28 DIAGNOSIS — I49.49 EXTRASYSTOLES: ICD-10-CM

## 2019-02-28 DIAGNOSIS — R26.9 GAIT ABNORMALITY: Primary | ICD-10-CM

## 2019-02-28 DIAGNOSIS — R26.81 UNSTEADINESS ON FEET: ICD-10-CM

## 2019-02-28 DIAGNOSIS — M81.0 AGE-RELATED OSTEOPOROSIS WITHOUT CURRENT PATHOLOGICAL FRACTURE: ICD-10-CM

## 2019-02-28 DIAGNOSIS — Z99.89 USES ROLLER WALKER: ICD-10-CM

## 2019-02-28 DIAGNOSIS — I35.0 AORTIC STENOSIS, MILD: ICD-10-CM

## 2019-02-28 DIAGNOSIS — E78.5 HYPERLIPIDEMIA, UNSPECIFIED HYPERLIPIDEMIA TYPE: ICD-10-CM

## 2019-02-28 LAB
ALBUMIN SERPL BCP-MCNC: 3.6 G/DL
ALP SERPL-CCNC: 73 U/L
ALT SERPL W/O P-5'-P-CCNC: 8 U/L
ANION GAP SERPL CALC-SCNC: 8 MMOL/L
AST SERPL-CCNC: 12 U/L
BILIRUB SERPL-MCNC: 0.2 MG/DL
BUN SERPL-MCNC: 12 MG/DL
CALCIUM SERPL-MCNC: 9 MG/DL
CHLORIDE SERPL-SCNC: 107 MMOL/L
CHOLEST SERPL-MCNC: 200 MG/DL
CHOLEST/HDLC SERPL: 4.5 {RATIO}
CO2 SERPL-SCNC: 24 MMOL/L
CREAT SERPL-MCNC: 0.7 MG/DL
ERYTHROCYTE [DISTWIDTH] IN BLOOD BY AUTOMATED COUNT: 21.4 %
EST. GFR  (AFRICAN AMERICAN): >60 ML/MIN/1.73 M^2
EST. GFR  (NON AFRICAN AMERICAN): >60 ML/MIN/1.73 M^2
FERRITIN SERPL-MCNC: 8 NG/ML
GLUCOSE SERPL-MCNC: 99 MG/DL
HCT VFR BLD AUTO: 24 %
HDLC SERPL-MCNC: 44 MG/DL
HDLC SERPL: 22 %
HGB BLD-MCNC: 6.2 G/DL
IRON SERPL-MCNC: <10 UG/DL
LDLC SERPL CALC-MCNC: 138.8 MG/DL
MCH RBC QN AUTO: 16.1 PG
MCHC RBC AUTO-ENTMCNC: 25.8 G/DL
MCV RBC AUTO: 63 FL
NONHDLC SERPL-MCNC: 156 MG/DL
PLATELET # BLD AUTO: 707 K/UL
PMV BLD AUTO: 9.3 FL
POTASSIUM SERPL-SCNC: 4.3 MMOL/L
PROT SERPL-MCNC: 6.7 G/DL
RBC # BLD AUTO: 3.84 M/UL
SATURATED IRON: ABNORMAL %
SODIUM SERPL-SCNC: 139 MMOL/L
TOTAL IRON BINDING CAPACITY: 465 UG/DL
TRANSFERRIN SERPL-MCNC: 314 MG/DL
TRIGL SERPL-MCNC: 86 MG/DL
VIT B12 SERPL-MCNC: 184 PG/ML
WBC # BLD AUTO: 6.56 K/UL

## 2019-02-28 PROCEDURE — 82607 VITAMIN B-12: CPT

## 2019-02-28 PROCEDURE — 99213 OFFICE O/P EST LOW 20 MIN: CPT | Mod: PBBFAC | Performed by: INTERNAL MEDICINE

## 2019-02-28 PROCEDURE — 80061 LIPID PANEL: CPT

## 2019-02-28 PROCEDURE — 99215 PR OFFICE/OUTPT VISIT, EST, LEVL V, 40-54 MIN: ICD-10-PCS | Mod: S$PBB,,, | Performed by: INTERNAL MEDICINE

## 2019-02-28 PROCEDURE — 85027 COMPLETE CBC AUTOMATED: CPT

## 2019-02-28 PROCEDURE — 99999 PR PBB SHADOW E&M-EST. PATIENT-LVL III: CPT | Mod: PBBFAC,,, | Performed by: INTERNAL MEDICINE

## 2019-02-28 PROCEDURE — 80053 COMPREHEN METABOLIC PANEL: CPT

## 2019-02-28 PROCEDURE — 99999 PR PBB SHADOW E&M-EST. PATIENT-LVL III: ICD-10-PCS | Mod: PBBFAC,,, | Performed by: INTERNAL MEDICINE

## 2019-02-28 PROCEDURE — 36415 COLL VENOUS BLD VENIPUNCTURE: CPT

## 2019-02-28 PROCEDURE — 99215 OFFICE O/P EST HI 40 MIN: CPT | Mod: S$PBB,,, | Performed by: INTERNAL MEDICINE

## 2019-02-28 PROCEDURE — 83540 ASSAY OF IRON: CPT

## 2019-02-28 PROCEDURE — 82728 ASSAY OF FERRITIN: CPT

## 2019-02-28 NOTE — PATIENT INSTRUCTIONS
Please take the printed order for the walker to a durable medical equipment (DME) store. [facility] has a DME store on [address]. Let them know you are looking to get a tall Rollator walker. They can let you know if it is covered by insurance.

## 2019-02-28 NOTE — PROGRESS NOTES
Subjective:       Patient ID: Nydia Stevens is a 84 y.o. female.    Chief Complaint: Annual Exam    HPI    Last visit with me July 2018, no visits since.  Upcoming appointment with Optometry.  Labs drawn today, currently pending.     Still taking iron and eating raisins and beets. Denies palpitations.    occasionally uses Rollator walker that is too low. Looking to get higher walker.    Reviewed PMH, PSH, SH, FH, allergies, and medications.     Review of Systems   All other systems reviewed and are negative.      Objective:      Physical Exam   Constitutional: No distress.   HENT:   Head: Atraumatic.   Right Ear: Tympanic membrane normal. No tenderness.   Left Ear: Tympanic membrane normal. No tenderness.   Mouth/Throat: Oropharynx is clear and moist. No oropharyngeal exudate.   Eyes: Pupils are equal, round, and reactive to light. Right eye exhibits no discharge. Left eye exhibits no discharge.   Neck: Normal range of motion. No thyromegaly present.   Cardiovascular: Regular rhythm. Frequent extrasystoles are present. Tachycardia present.   Murmur heard.   Systolic (RUSB) murmur is present with a grade of 3/6.  Pulmonary/Chest: Effort normal and breath sounds normal. No stridor. She has no wheezes. She has no rales.   Abdominal: Soft. She exhibits no distension and no mass. There is no tenderness. There is no guarding.   Musculoskeletal: She exhibits no edema or tenderness.   Lymphadenopathy:     She has no cervical adenopathy.   Neurological: She is alert. She displays no tremor. Gait (narrow steps, uses examiner hand to get up and down from exam table) abnormal.   Skin: Skin is warm and dry. No rash noted.   Psychiatric: She has a normal mood and affect. Her behavior is normal.   Nursing note and vitals reviewed.      Vitals:    02/28/19 0914 02/28/19 0931   BP: (!) 140/60 (!) 142/60   BP Location: Right arm Left arm   Patient Position: Sitting Sitting   BP Method: Small (Manual) Medium (Manual)   Pulse: 83  "   Weight: 54 kg (119 lb)    Height: 5' 7" (1.702 m)      Body mass index is 18.64 kg/m².    I have personally checked the blood pressure and documented my findings.     RESULTS: Reviewed labs from last 12 months    Assessment:       1. Gait abnormality    2. Extrasystoles    3. Essential hypertension -- goal SBP < 150    4. Iron deficiency anemia, unspecified iron deficiency anemia type    5. Aortic stenosis, mild    6. Age-related osteoporosis without current pathological fracture        Plan:   Nydia was seen today for annual exam.    Diagnoses and all orders for this visit:    Gait abnormality:  Narrow gait, does not appear related to Park[place]'s, more consistent with age-related debility.  Current walker has her bent over to far, asking for taller walker, I have placed an order.  Have instructed the patient to go to a [employer] store.  -     WALKER FOR HOME USE    Extrasystoles:  New dx, the patient asymptomatic. slightly tachycardic on exam. Pt attributes to stress related to her sister's health. No irregularity to suggest afib. Last EKG stable in 2016, recheck in 3 weeks.  -     EKG 12-lead; Future    Essential hypertension -- goal SBP < 150:  Prior diagnosis, stable, well controlled on current management. No changes at this time, will continue to monitor.     Iron deficiency anemia, unspecified iron deficiency anemia type:  Prior dx, labs from today pending, if worsening consider referral for IV iron infusions.    Aortic stenosis, mild:  Prior dx, stable. Asymptomatic. No further evaluation needed at this time.    Age-related osteoporosis without current pathological fracture:  Prior dx, stable. Continue Prolia infusions. Repeat DXA later this year.    Follow-up in about 6 months (around 8/28/2019) for follow up visit.  Donaldo Carlin MD  Internal Medicine    Portions of this note were completed using medical dictation software. Please excuse typographical or syntax errors that were missed on review.    "

## 2019-02-28 NOTE — TELEPHONE ENCOUNTER
Please notify the patient review that she has severe iron deficiency anemia.  She needs to be set up at the infusion center for IV iron treatment.  Order has been placed, please call the infusion center to coordinate this.  If additional orders are needed please let me know.    The patient will also subsequently need upper endoscopy and colonoscopy for evaluation since the anemia is so severe. I have placed the order, they should be calling her to schedule.

## 2019-03-01 RX ORDER — LANOLIN ALCOHOL/MO/W.PET/CERES
1000 CREAM (GRAM) TOPICAL DAILY
COMMUNITY
End: 2021-06-04 | Stop reason: SDUPTHER

## 2019-03-01 NOTE — TELEPHONE ENCOUNTER
In addition to the information in the prior note, please also notify the patient that she has severe vitamin B12 deficiency, which is likely causing some of her unsteadiness. She needs to start an over-the-counter supplement of vitamin B12. She should take 1000 mcg (micrograms or MCG) once daily.

## 2019-03-21 ENCOUNTER — OFFICE VISIT (OUTPATIENT)
Dept: OPTOMETRY | Facility: CLINIC | Age: 84
End: 2019-03-21
Payer: MEDICARE

## 2019-03-21 ENCOUNTER — HOSPITAL ENCOUNTER (OUTPATIENT)
Dept: CARDIOLOGY | Facility: CLINIC | Age: 84
Discharge: HOME | End: 2019-03-21
Payer: MEDICARE

## 2019-03-21 ENCOUNTER — TELEPHONE (OUTPATIENT)
Dept: INTERNAL MEDICINE | Facility: CLINIC | Age: 84
End: 2019-03-21

## 2019-03-21 DIAGNOSIS — H35.371 EPIRETINAL MEMBRANE, RIGHT: Primary | ICD-10-CM

## 2019-03-21 DIAGNOSIS — Z13.5 SCREENING FOR EYE CONDITION: ICD-10-CM

## 2019-03-21 DIAGNOSIS — I10 ESSENTIAL HYPERTENSION: ICD-10-CM

## 2019-03-21 DIAGNOSIS — Z98.41 S/P CATARACT SURGERY, RIGHT: ICD-10-CM

## 2019-03-21 DIAGNOSIS — Z96.1 PSEUDOPHAKIA OF BOTH EYES: ICD-10-CM

## 2019-03-21 DIAGNOSIS — Z98.42 S/P CATARACT SURGERY, LEFT: ICD-10-CM

## 2019-03-21 DIAGNOSIS — H52.7 REFRACTIVE ERRORS: ICD-10-CM

## 2019-03-21 DIAGNOSIS — I49.49 EXTRASYSTOLES: ICD-10-CM

## 2019-03-21 PROCEDURE — 92014 PR EYE EXAM, EST PATIENT,COMPREHESV: ICD-10-PCS | Mod: S$PBB,,, | Performed by: OPTOMETRIST

## 2019-03-21 PROCEDURE — 92014 COMPRE OPH EXAM EST PT 1/>: CPT | Mod: S$PBB,,, | Performed by: OPTOMETRIST

## 2019-03-21 PROCEDURE — 93010 ELECTROCARDIOGRAM REPORT: CPT | Mod: S$PBB,,, | Performed by: INTERNAL MEDICINE

## 2019-03-21 PROCEDURE — 93010 EKG 12-LEAD: ICD-10-PCS | Mod: S$PBB,,, | Performed by: INTERNAL MEDICINE

## 2019-03-21 PROCEDURE — 92015 PR REFRACTION: ICD-10-PCS | Mod: ,,, | Performed by: OPTOMETRIST

## 2019-03-21 PROCEDURE — 99999 PR PBB SHADOW E&M-EST. PATIENT-LVL III: ICD-10-PCS | Mod: PBBFAC,,, | Performed by: OPTOMETRIST

## 2019-03-21 PROCEDURE — 99999 PR PBB SHADOW E&M-EST. PATIENT-LVL III: CPT | Mod: PBBFAC,,, | Performed by: OPTOMETRIST

## 2019-03-21 PROCEDURE — 93005 ELECTROCARDIOGRAM TRACING: CPT | Mod: PBBFAC | Performed by: INTERNAL MEDICINE

## 2019-03-21 PROCEDURE — 99213 OFFICE O/P EST LOW 20 MIN: CPT | Mod: PBBFAC,25 | Performed by: OPTOMETRIST

## 2019-03-21 PROCEDURE — 92015 DETERMINE REFRACTIVE STATE: CPT | Mod: ,,, | Performed by: OPTOMETRIST

## 2019-03-21 NOTE — PROGRESS NOTES
"HPI     Concerns About Ocular Health      Additional comments: Annual general eye examination.               Comments     Patient's age: 84 y.o. female  Occupation:  [employer].  Approximate date of last eye examination:  03/09/2018  Name of last eye doctor seen: Dr. Maxwell   City/State: [facility]  Wears glasses? Yes     If yes, wears  Full-time or part-time?  Full-time  Present glasses are: Bifocal, SV Distance, SV Reading?  Lined Bifocal  Approximate age of present glasses:  2014   Got new glasses following last exam, or subsequently?:  NO   Any problem with VA with glasses?  No  Wears CLs?:  No  Headaches?  No  Eye pain/discomfort?  No                                                                                     Flashes?  No  Floaters?  No  Diplopia/Double vision?  No  Patient's Ocular History:         Any eye surgeries? Phaco WIOL OU - Dr Llamas         Any eye injury?  No         Any treatment for eye disease?  No  Family history of eye disease?  No  Significant patient medical history:         1. Diabetes?  No   2. HBP?  Yes, controlled by medication and diet                ! OTC eyedrops currently using:  None - Ocuvite   ! Prescription eye meds currently using:  None   ! Any history of allergy/adverse reaction to any eye meds used   previously?  No    ! Any history of allergy/adverse reaction to eyedrops used during prior   eye exam(s)? No    ! Any history of allergy/adverse reaction to Novacaine or similar meds?   No   ! Any history of allergy/adverse reaction to Epinephrine or similar meds?   No    ! Patient okay with use of anesthetic eyedrops to check eye pressure?    Yes        ! Patient okay with use of eyedrops to dilate pupils today?  Yes   !  Allergies/Medications/Medical History/Family History reviewed today?    Yes      PD =   69/65  Desired reading distance =  13.5"                                                                         Last edited by Rick Maxwell, OD on " 3/21/2019 10:57 AM. (History)            Assessment /Plan     For exam results, see Encounter Report.    1. Epiretinal membrane, right     2. S/P cataract surgery, left     3. S/P cataract surgery, right     4. Pseudophakia of both eyes     5. Essential hypertension     6. Screening for eye condition     7. Refractive errors                  Mild epiretinal membrane at posterior pole of the right eye, as noted previously Not impacting correctable VA. No need for treatment.    Otherwise, ocular health appears good in both eyes.    S/P cataract surgery in both eyes. Bilateral posterior chamber intraocular lens.  Residual refractive error in each eye, with very satisfactory visual acuity in each eye.   Stable refraction in each eye.   New (duplicate)spectacle lens Rx issued for full-time wear, but no compelling need to replace lenses at this time.    Recheck in one year.

## 2019-03-21 NOTE — PATIENT INSTRUCTIONS
Mild epiretinal membrane at posterior pole of the right eye, as noted previously Not impacting correctable VA. No need for treatment.    Otherwise, ocular health appears good in both eyes.    S/P cataract surgery in both eyes. Bilateral posterior chamber intraocular lens.  Residual refractive error in each eye, with very satisfactory visual acuity in each eye.   Stable refraction in each eye.   New (duplicate)spectacle lens Rx issued for full-time wear, but no compelling need to replace lenses at this time.    Recheck in one year.

## 2019-03-21 NOTE — TELEPHONE ENCOUNTER
Please call the patient to notify that her EKG shows a normal heart rhythm without any signs of heart injury or heart attack. No further evaluation needed at this time.

## 2019-04-18 DIAGNOSIS — D50.9 IRON DEFICIENCY ANEMIA, UNSPECIFIED IRON DEFICIENCY ANEMIA TYPE: ICD-10-CM

## 2019-04-18 DIAGNOSIS — I10 ESSENTIAL HYPERTENSION: ICD-10-CM

## 2019-04-18 RX ORDER — AMLODIPINE BESYLATE 10 MG/1
TABLET ORAL
Qty: 90 TABLET | Refills: 0 | Status: SHIPPED | OUTPATIENT
Start: 2019-04-18 | End: 2019-09-23 | Stop reason: SDUPTHER

## 2019-04-18 RX ORDER — FERROUS SULFATE 325(65) MG
TABLET ORAL
Qty: 60 TABLET | Refills: 0 | Status: SHIPPED | OUTPATIENT
Start: 2019-04-18 | End: 2019-10-24 | Stop reason: SDUPTHER

## 2019-04-24 ENCOUNTER — TELEPHONE (OUTPATIENT)
Dept: INTERNAL MEDICINE | Facility: CLINIC | Age: 84
End: 2019-04-24

## 2019-04-24 NOTE — TELEPHONE ENCOUNTER
----- Message from Jocelyne Aaron sent at 4/24/2019  9:35 AM CDT -----  Contact: [phone]  Patient is requesting a call from the office regarding medication amLODIPine (NORVASC) 10 MG tablet.  Patient it is making her ankles swell, should she continue to take the medication?    Please advise, thank you

## 2019-04-24 NOTE — TELEPHONE ENCOUNTER
Spoke to Sister Hilda she sd she is not having any pain no other symptoms just the swelling of the ankles  Wants to know if she should cont this medication

## 2019-04-24 NOTE — TELEPHONE ENCOUNTER
This is a common side effect.  If it is not causing any pain or otherwise bothering her then it is safe for her to continue the medication.

## 2019-07-05 DIAGNOSIS — E78.5 HYPERLIPIDEMIA, UNSPECIFIED HYPERLIPIDEMIA TYPE: ICD-10-CM

## 2019-07-05 RX ORDER — ROSUVASTATIN CALCIUM 40 MG/1
TABLET, COATED ORAL
Qty: 90 TABLET | Refills: 3 | Status: SHIPPED | OUTPATIENT
Start: 2019-07-05 | End: 2020-07-07

## 2019-07-11 ENCOUNTER — INFUSION (OUTPATIENT)
Dept: INFECTIOUS DISEASES | Facility: HOSPITAL | Age: 84
End: 2019-07-11
Attending: PHYSICIAN ASSISTANT
Payer: MEDICARE

## 2019-07-11 VITALS — HEIGHT: 67 IN | BODY MASS INDEX: 18.69 KG/M2 | WEIGHT: 119.06 LBS

## 2019-07-11 DIAGNOSIS — M81.0 AGE-RELATED OSTEOPOROSIS WITHOUT CURRENT PATHOLOGICAL FRACTURE: Primary | ICD-10-CM

## 2019-07-11 PROCEDURE — 63600175 PHARM REV CODE 636 W HCPCS: Mod: JG | Performed by: PHYSICIAN ASSISTANT

## 2019-07-11 PROCEDURE — 96372 THER/PROPH/DIAG INJ SC/IM: CPT

## 2019-07-11 RX ADMIN — DENOSUMAB 60 MG: 60 INJECTION SUBCUTANEOUS at 08:07

## 2019-08-28 ENCOUNTER — TELEPHONE (OUTPATIENT)
Dept: ENDOSCOPY | Facility: HOSPITAL | Age: 84
End: 2019-08-28

## 2019-08-28 NOTE — TELEPHONE ENCOUNTER
Patient has order for EGD and colonoscopy. Spoke with patient and she stated she was 84 years old and did not want to have either of these procedures

## 2019-09-23 DIAGNOSIS — I10 ESSENTIAL HYPERTENSION: ICD-10-CM

## 2019-09-23 RX ORDER — AMLODIPINE BESYLATE 10 MG/1
TABLET ORAL
Qty: 90 TABLET | Refills: 3 | Status: SHIPPED | OUTPATIENT
Start: 2019-09-23 | End: 2020-02-03

## 2019-10-24 DIAGNOSIS — D50.9 IRON DEFICIENCY ANEMIA, UNSPECIFIED IRON DEFICIENCY ANEMIA TYPE: ICD-10-CM

## 2019-10-24 RX ORDER — FERROUS SULFATE 325(65) MG
TABLET ORAL
Qty: 60 TABLET | Refills: 0 | Status: ON HOLD | OUTPATIENT
Start: 2019-10-24 | End: 2020-03-23 | Stop reason: HOSPADM

## 2019-12-23 ENCOUNTER — TELEPHONE (OUTPATIENT)
Dept: INFECTIOUS DISEASES | Facility: HOSPITAL | Age: 84
End: 2019-12-23

## 2020-01-14 ENCOUNTER — INFUSION (OUTPATIENT)
Dept: INFECTIOUS DISEASES | Facility: HOSPITAL | Age: 85
End: 2020-01-14
Attending: INTERNAL MEDICINE
Payer: MEDICARE

## 2020-01-14 VITALS — BODY MASS INDEX: 18.69 KG/M2 | WEIGHT: 119.06 LBS | HEIGHT: 67 IN

## 2020-01-14 DIAGNOSIS — M81.0 AGE-RELATED OSTEOPOROSIS WITHOUT CURRENT PATHOLOGICAL FRACTURE: Primary | ICD-10-CM

## 2020-01-14 PROCEDURE — 96372 THER/PROPH/DIAG INJ SC/IM: CPT

## 2020-01-14 PROCEDURE — 63600175 PHARM REV CODE 636 W HCPCS: Mod: JG | Performed by: PHYSICIAN ASSISTANT

## 2020-01-14 RX ADMIN — DENOSUMAB 60 MG: 60 INJECTION SUBCUTANEOUS at 09:01

## 2020-01-31 ENCOUNTER — TELEPHONE (OUTPATIENT)
Dept: INTERNAL MEDICINE | Facility: CLINIC | Age: 85
End: 2020-01-31

## 2020-01-31 NOTE — TELEPHONE ENCOUNTER
I called the number provided in the message. I was placed on hold then disconnected. I called back and there is no answer.

## 2020-01-31 NOTE — TELEPHONE ENCOUNTER
----- Message from Doris Negrete sent at 1/31/2020  2:12 PM CST -----  Type:  Needs Medical Advice    Who Called: Sister Hilda    Symptoms (please be specific): bilateral lower extremity edema    Would the patient rather a call back or a response via [employer]? Call    Best Call Back Number: [phone]    Additional Information: Sister Hilda called to speak with provider or nurse. She is requesting a call back and asks nurse or dr to ask for Sister Hilda.

## 2020-02-03 ENCOUNTER — OFFICE VISIT (OUTPATIENT)
Dept: INTERNAL MEDICINE | Facility: CLINIC | Age: 85
End: 2020-02-03
Payer: MEDICARE

## 2020-02-03 VITALS — OXYGEN SATURATION: 96 % | DIASTOLIC BLOOD PRESSURE: 60 MMHG | HEART RATE: 102 BPM | SYSTOLIC BLOOD PRESSURE: 120 MMHG

## 2020-02-03 DIAGNOSIS — R60.9 EDEMA, UNSPECIFIED TYPE: Primary | ICD-10-CM

## 2020-02-03 DIAGNOSIS — I10 HYPERTENSION, UNSPECIFIED TYPE: ICD-10-CM

## 2020-02-03 PROCEDURE — 99214 OFFICE O/P EST MOD 30 MIN: CPT | Mod: PBBFAC | Performed by: INTERNAL MEDICINE

## 2020-02-03 PROCEDURE — 99213 OFFICE O/P EST LOW 20 MIN: CPT | Mod: S$PBB,,, | Performed by: INTERNAL MEDICINE

## 2020-02-03 PROCEDURE — 99999 PR PBB SHADOW E&M-EST. PATIENT-LVL IV: CPT | Mod: PBBFAC,,, | Performed by: INTERNAL MEDICINE

## 2020-02-03 PROCEDURE — 99999 PR PBB SHADOW E&M-EST. PATIENT-LVL IV: ICD-10-PCS | Mod: PBBFAC,,, | Performed by: INTERNAL MEDICINE

## 2020-02-03 PROCEDURE — 99213 PR OFFICE/OUTPT VISIT, EST, LEVL III, 20-29 MIN: ICD-10-PCS | Mod: S$PBB,,, | Performed by: INTERNAL MEDICINE

## 2020-02-03 RX ORDER — VALSARTAN AND HYDROCHLOROTHIAZIDE 80; 12.5 MG/1; MG/1
1 TABLET, FILM COATED ORAL DAILY
Qty: 90 TABLET | Refills: 3 | Status: SHIPPED | OUTPATIENT
Start: 2020-02-03 | End: 2020-03-02

## 2020-02-03 NOTE — TELEPHONE ENCOUNTER
----- Message from Shelia Seo sent at 2/3/2020 11:51 AM CST -----  Contact: [facility] [place] - [address] [phone] (Phone)  Patient is calling for an RX refill or new RX.  Is this a refill or new RX:  refill  RX name and strength: valsartan-hydrochlorothiazide (DIOVAN-HCT) 80-12.5 mg per tablet  Directions (copy/paste from chart):    Is this a 30 day or 90 day RX:    Local pharmacy or mail order pharmacy:  local  Pharmacy name and phone # (copy/paste from chart):   [facility] [place] - [address] [phone] (Phone)  [phone] (Fax)    Comments:  On backorder

## 2020-02-05 RX ORDER — VALSARTAN AND HYDROCHLOROTHIAZIDE 80; 12.5 MG/1; MG/1
1 TABLET, FILM COATED ORAL DAILY
Qty: 90 TABLET | Refills: 3 | OUTPATIENT
Start: 2020-02-05

## 2020-02-05 NOTE — PROGRESS NOTES
Refill Authorization Note     is requesting a refill authorization.    Brief assessment and rationale for refill: QUICK DC: duplicate                                         Comments:     Last Prescribed Info:        Ordering Provider: -- DILIP #:  -- NPI:  --    Authorizing Provider: Gus Farrell Jr., MD DILIP #:  [id number] NPI:  [id number]    Ordering User:  Gus Farrell Jr., MD               Pharmacy:  [facility] [employer] DILIP #:  --     Pharmacy Comments:  --          Fill quantity remaining:  -- Fill quantity used:  -- Next fill due: --       Outpatient Medication Detail      Disp Refills Start End    valsartan-hydrochlorothiazide (DIOVAN-HCT) 80-12.5 mg per tablet 90 tablet 3 2/3/2020     Sig - Route: Take 1 tablet by mouth once daily. - Oral    Sent to pharmacy as: valsartan-hydrochlorothiazide (DIOVAN-HCT) 80-12.5 mg per tablet    E-Prescribing Status: Receipt confirmed by pharmacy (2/3/2020 10:33 AM CST)         Appointments  past 12m or future 3m with PCP    Date Provider   Last Visit   2/28/2019 Donaldo Carlin MD   Next Visit   3/2/2020 Donaldo Carlin MD

## 2020-02-17 ENCOUNTER — PATIENT OUTREACH (OUTPATIENT)
Dept: ADMINISTRATIVE | Facility: HOSPITAL | Age: 85
End: 2020-02-17

## 2020-02-17 DIAGNOSIS — M81.0 AGE-RELATED OSTEOPOROSIS WITHOUT CURRENT PATHOLOGICAL FRACTURE: Primary | ICD-10-CM

## 2020-03-02 ENCOUNTER — TELEPHONE (OUTPATIENT)
Dept: HEMATOLOGY/ONCOLOGY | Facility: CLINIC | Age: 85
End: 2020-03-02

## 2020-03-02 ENCOUNTER — HOSPITAL ENCOUNTER (INPATIENT)
Facility: HOSPITAL | Age: 85
LOS: 1 days | Discharge: HOME | DRG: 812 | End: 2020-03-03
Attending: EMERGENCY MEDICINE | Admitting: EMERGENCY MEDICINE
Payer: MEDICARE

## 2020-03-02 ENCOUNTER — OFFICE VISIT (OUTPATIENT)
Dept: INTERNAL MEDICINE | Facility: CLINIC | Age: 85
DRG: 812 | End: 2020-03-02
Payer: MEDICARE

## 2020-03-02 ENCOUNTER — TELEPHONE (OUTPATIENT)
Dept: INTERNAL MEDICINE | Facility: CLINIC | Age: 85
End: 2020-03-02

## 2020-03-02 VITALS
WEIGHT: 109.13 LBS | DIASTOLIC BLOOD PRESSURE: 52 MMHG | HEIGHT: 67 IN | BODY MASS INDEX: 17.13 KG/M2 | SYSTOLIC BLOOD PRESSURE: 118 MMHG | HEART RATE: 84 BPM

## 2020-03-02 DIAGNOSIS — D50.9 IRON DEFICIENCY ANEMIA, UNSPECIFIED IRON DEFICIENCY ANEMIA TYPE: Primary | ICD-10-CM
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
## 2020-03-02 DIAGNOSIS — D64.9 SYMPTOMATIC ANEMIA: ICD-10-CM

## 2020-03-02 DIAGNOSIS — D50.9 IRON DEFICIENCY ANEMIA, UNSPECIFIED IRON DEFICIENCY ANEMIA TYPE: ICD-10-CM

## 2020-03-02 DIAGNOSIS — I10 ESSENTIAL HYPERTENSION: ICD-10-CM

## 2020-03-02 DIAGNOSIS — R64 CACHEXIA: ICD-10-CM

## 2020-03-02 DIAGNOSIS — M81.0 AGE-RELATED OSTEOPOROSIS WITHOUT CURRENT PATHOLOGICAL FRACTURE: ICD-10-CM

## 2020-03-02 DIAGNOSIS — E78.5 HYPERLIPIDEMIA, UNSPECIFIED HYPERLIPIDEMIA TYPE: ICD-10-CM

## 2020-03-02 PROBLEM — R60.0 BILATERAL LOWER EXTREMITY EDEMA: Status: ACTIVE | Noted: 2020-03-02

## 2020-03-02 LAB
ABO + RH BLD: NORMAL
ALBUMIN SERPL BCP-MCNC: 3.1 G/DL (ref 3.5–5.2)
ALP SERPL-CCNC: 82 U/L (ref 55–135)
ALT SERPL W/O P-5'-P-CCNC: 13 U/L (ref 10–44)
ANION GAP SERPL CALC-SCNC: 12 MMOL/L (ref 8–16)
AST SERPL-CCNC: 35 U/L (ref 10–40)
BASOPHILS # BLD AUTO: 0.04 K/UL (ref 0–0.2)
BASOPHILS NFR BLD: 0.6 % (ref 0–1.9)
BILIRUB SERPL-MCNC: 0.2 MG/DL (ref 0.1–1)
BLD GP AB SCN CELLS X3 SERPL QL: NORMAL
BUN SERPL-MCNC: 17 MG/DL (ref 8–23)
CALCIUM SERPL-MCNC: 8.5 MG/DL (ref 8.7–10.5)
CHLORIDE SERPL-SCNC: 102 MMOL/L (ref 95–110)
CO2 SERPL-SCNC: 23 MMOL/L (ref 23–29)
CREAT SERPL-MCNC: 0.8 MG/DL (ref 0.5–1.4)
DIFFERENTIAL METHOD: ABNORMAL
EOSINOPHIL # BLD AUTO: 0.1 K/UL (ref 0–0.5)
EOSINOPHIL NFR BLD: 1.8 % (ref 0–8)
ERYTHROCYTE [DISTWIDTH] IN BLOOD BY AUTOMATED COUNT: 22 % (ref 11.5–14.5)
EST. GFR  (AFRICAN AMERICAN): >60 ML/MIN/1.73 M^2
EST. GFR  (NON AFRICAN AMERICAN): >60 ML/MIN/1.73 M^2
GLUCOSE SERPL-MCNC: 102 MG/DL (ref 70–110)
HCT VFR BLD AUTO: 17.7 % (ref 37–48.5)
HGB BLD-MCNC: 4.4 G/DL (ref 12–16)
IMM GRANULOCYTES # BLD AUTO: 0.04 K/UL (ref 0–0.04)
IMM GRANULOCYTES NFR BLD AUTO: 0.6 % (ref 0–0.5)
INR PPP: 1 (ref 0.8–1.2)
LYMPHOCYTES # BLD AUTO: 1.8 K/UL (ref 1–4.8)
LYMPHOCYTES NFR BLD: 26.2 % (ref 18–48)
MCH RBC QN AUTO: 14.6 PG (ref 27–31)
MCHC RBC AUTO-ENTMCNC: 24.9 G/DL (ref 32–36)
MCV RBC AUTO: 59 FL (ref 82–98)
MONOCYTES # BLD AUTO: 1.1 K/UL (ref 0.3–1)
MONOCYTES NFR BLD: 16.2 % (ref 4–15)
NEUTROPHILS # BLD AUTO: 3.7 K/UL (ref 1.8–7.7)
NEUTROPHILS NFR BLD: 54.6 % (ref 38–73)
NRBC BLD-RTO: 0 /100 WBC
PLATELET # BLD AUTO: 611 K/UL (ref 150–350)
PMV BLD AUTO: 9.4 FL (ref 9.2–12.9)
POTASSIUM SERPL-SCNC: 4.2 MMOL/L (ref 3.5–5.1)
PROT SERPL-MCNC: 6.5 G/DL (ref 6–8.4)
PROTHROMBIN TIME: 10.6 SEC (ref 9–12.5)
RBC # BLD AUTO: 3.02 M/UL (ref 4–5.4)
SODIUM SERPL-SCNC: 137 MMOL/L (ref 136–145)
WBC # BLD AUTO: 6.71 K/UL (ref 3.9–12.7)

## 2020-03-02 PROCEDURE — 99285 EMERGENCY DEPT VISIT HI MDM: CPT | Mod: ,,, | Performed by: EMERGENCY MEDICINE

## 2020-03-02 PROCEDURE — 99285 EMERGENCY DEPT VISIT HI MDM: CPT | Mod: 25,27

## 2020-03-02 PROCEDURE — 93010 ELECTROCARDIOGRAM REPORT: CPT | Mod: ,,, | Performed by: INTERNAL MEDICINE

## 2020-03-02 PROCEDURE — P9021 RED BLOOD CELLS UNIT: HCPCS

## 2020-03-02 PROCEDURE — 99215 OFFICE O/P EST HI 40 MIN: CPT | Mod: S$PBB,,, | Performed by: INTERNAL MEDICINE

## 2020-03-02 PROCEDURE — 85025 COMPLETE CBC W/AUTO DIFF WBC: CPT

## 2020-03-02 PROCEDURE — 99999 PR PBB SHADOW E&M-EST. PATIENT-LVL III: CPT | Mod: PBBFAC,,, | Performed by: INTERNAL MEDICINE

## 2020-03-02 PROCEDURE — 86920 COMPATIBILITY TEST SPIN: CPT

## 2020-03-02 PROCEDURE — 93005 ELECTROCARDIOGRAM TRACING: CPT

## 2020-03-02 PROCEDURE — 86901 BLOOD TYPING SEROLOGIC RH(D): CPT

## 2020-03-02 PROCEDURE — 80053 COMPREHEN METABOLIC PANEL: CPT

## 2020-03-02 PROCEDURE — 36430 TRANSFUSION BLD/BLD COMPNT: CPT

## 2020-03-02 PROCEDURE — 93010 EKG 12-LEAD: ICD-10-PCS | Mod: ,,, | Performed by: INTERNAL MEDICINE

## 2020-03-02 PROCEDURE — 99223 1ST HOSP IP/OBS HIGH 75: CPT | Mod: ,,, | Performed by: PHYSICIAN ASSISTANT

## 2020-03-02 PROCEDURE — 99215 PR OFFICE/OUTPT VISIT, EST, LEVL V, 40-54 MIN: ICD-10-PCS | Mod: S$PBB,,, | Performed by: INTERNAL MEDICINE

## 2020-03-02 PROCEDURE — 99999 PR PBB SHADOW E&M-EST. PATIENT-LVL III: ICD-10-PCS | Mod: PBBFAC,,, | Performed by: INTERNAL MEDICINE

## 2020-03-02 PROCEDURE — 11000001 HC ACUTE MED/SURG PRIVATE ROOM

## 2020-03-02 PROCEDURE — 85610 PROTHROMBIN TIME: CPT

## 2020-03-02 PROCEDURE — 99213 OFFICE O/P EST LOW 20 MIN: CPT | Mod: PBBFAC,25 | Performed by: INTERNAL MEDICINE

## 2020-03-02 PROCEDURE — 99223 PR INITIAL HOSPITAL CARE,LEVL III: ICD-10-PCS | Mod: ,,, | Performed by: PHYSICIAN ASSISTANT

## 2020-03-02 PROCEDURE — 99285 PR EMERGENCY DEPT VISIT,LEVEL V: ICD-10-PCS | Mod: ,,, | Performed by: EMERGENCY MEDICINE

## 2020-03-02 RX ORDER — DEXTROSE MONOHYDRATE 100 MG/ML
12.5 INJECTION, SOLUTION INTRAVENOUS
Status: DISCONTINUED | OUTPATIENT
Start: 2020-03-02 | End: 2020-03-03 | Stop reason: HOSPADM

## 2020-03-02 RX ORDER — POLYETHYLENE GLYCOL 3350 17 G/17G
17 POWDER, FOR SOLUTION ORAL DAILY
Status: DISCONTINUED | OUTPATIENT
Start: 2020-03-03 | End: 2020-03-03 | Stop reason: HOSPADM

## 2020-03-02 RX ORDER — HYDROCODONE BITARTRATE AND ACETAMINOPHEN 500; 5 MG/1; MG/1
TABLET ORAL
Status: DISCONTINUED | OUTPATIENT
Start: 2020-03-02 | End: 2020-03-03 | Stop reason: HOSPADM

## 2020-03-02 RX ORDER — IBUPROFEN 200 MG
16 TABLET ORAL
Status: DISCONTINUED | OUTPATIENT
Start: 2020-03-02 | End: 2020-03-03 | Stop reason: HOSPADM

## 2020-03-02 RX ORDER — ROSUVASTATIN CALCIUM 20 MG/1
40 TABLET, COATED ORAL DAILY
Status: DISCONTINUED | OUTPATIENT
Start: 2020-03-03 | End: 2020-03-03 | Stop reason: HOSPADM

## 2020-03-02 RX ORDER — TALC
6 POWDER (GRAM) TOPICAL NIGHTLY PRN
Status: DISCONTINUED | OUTPATIENT
Start: 2020-03-02 | End: 2020-03-03 | Stop reason: HOSPADM

## 2020-03-02 RX ORDER — LANOLIN ALCOHOL/MO/W.PET/CERES
1000 CREAM (GRAM) TOPICAL DAILY
Status: DISCONTINUED | OUTPATIENT
Start: 2020-03-03 | End: 2020-03-03 | Stop reason: HOSPADM

## 2020-03-02 RX ORDER — ONDANSETRON 4 MG/1
4 TABLET, ORALLY DISINTEGRATING ORAL EVERY 8 HOURS PRN
Status: DISCONTINUED | OUTPATIENT
Start: 2020-03-02 | End: 2020-03-03 | Stop reason: HOSPADM

## 2020-03-02 RX ORDER — CALCIUM CARBONATE 500(1250)
500 TABLET ORAL DAILY
Status: DISCONTINUED | OUTPATIENT
Start: 2020-03-03 | End: 2020-03-03 | Stop reason: HOSPADM

## 2020-03-02 RX ORDER — DEXTROSE MONOHYDRATE 100 MG/ML
25 INJECTION, SOLUTION INTRAVENOUS
Status: DISCONTINUED | OUTPATIENT
Start: 2020-03-02 | End: 2020-03-03 | Stop reason: HOSPADM

## 2020-03-02 RX ORDER — NAPROXEN 500 MG/1
500 TABLET ORAL
Status: ON HOLD | COMMUNITY
Start: 2012-12-12 | End: 2020-03-02

## 2020-03-02 RX ORDER — ACETAMINOPHEN 325 MG/1
650 TABLET ORAL EVERY 4 HOURS PRN
Status: DISCONTINUED | OUTPATIENT
Start: 2020-03-02 | End: 2020-03-03 | Stop reason: HOSPADM

## 2020-03-02 RX ORDER — IPRATROPIUM BROMIDE AND ALBUTEROL SULFATE 2.5; .5 MG/3ML; MG/3ML
3 SOLUTION RESPIRATORY (INHALATION) EVERY 4 HOURS PRN
Status: DISCONTINUED | OUTPATIENT
Start: 2020-03-02 | End: 2020-03-03 | Stop reason: HOSPADM

## 2020-03-02 RX ORDER — FERROUS SULFATE 325(65) MG
325 TABLET, DELAYED RELEASE (ENTERIC COATED) ORAL 2 TIMES DAILY
Status: DISCONTINUED | OUTPATIENT
Start: 2020-03-03 | End: 2020-03-03 | Stop reason: HOSPADM

## 2020-03-02 RX ORDER — IBUPROFEN 200 MG
24 TABLET ORAL
Status: DISCONTINUED | OUTPATIENT
Start: 2020-03-02 | End: 2020-03-03 | Stop reason: HOSPADM

## 2020-03-02 RX ORDER — AMOXICILLIN 250 MG
1 CAPSULE ORAL 2 TIMES DAILY PRN
Status: DISCONTINUED | OUTPATIENT
Start: 2020-03-02 | End: 2020-03-03 | Stop reason: HOSPADM

## 2020-03-02 RX ORDER — CHOLECALCIFEROL (VITAMIN D3) 25 MCG
1000 TABLET ORAL DAILY
Status: DISCONTINUED | OUTPATIENT
Start: 2020-03-03 | End: 2020-03-03 | Stop reason: HOSPADM

## 2020-03-02 RX ORDER — GLUCAGON 1 MG
1 KIT INJECTION
Status: DISCONTINUED | OUTPATIENT
Start: 2020-03-02 | End: 2020-03-03 | Stop reason: HOSPADM

## 2020-03-02 RX ORDER — SODIUM CHLORIDE 0.9 % (FLUSH) 0.9 %
5 SYRINGE (ML) INJECTION
Status: DISCONTINUED | OUTPATIENT
Start: 2020-03-02 | End: 2020-03-03 | Stop reason: HOSPADM

## 2020-03-02 NOTE — PROGRESS NOTES
"Subjective:       Patient ID: Ndyia Stevens is a 85 y.o. female.    Chief Complaint: No chief complaint on file.      Last visit with me 2/28/2019. Seen by Internal Medicine last month, no visits since.  After last visit ordered iron injections for severe iron deficiency anemia.    HPI     Patient reports feeling sad regarding the death of her sister 3 weeks ago.  Also reports having a fall yesterday because of low blood pressure.  Patient does not recall being contacted for scheduling of iron injections.    Review of Systems   All other systems reviewed and are negative.      Objective:      Physical Exam   Constitutional: No distress.   Unsteady getting down from exam table   HENT:   Head: Atraumatic.   Mouth/Throat: Oropharynx is clear and moist. No oropharyngeal exudate.   Eyes: Pupils are equal, round, and reactive to light. Right eye exhibits no discharge. Left eye exhibits no discharge.   Conjunctival pallor bilaterally    Neck: Normal range of motion. No thyromegaly present.   Cardiovascular: Regular rhythm. Tachycardia present.   Murmur heard.   Systolic (RUSB) murmur is present with a grade of 2/6.  Pulmonary/Chest: Effort normal and breath sounds normal. No stridor. She has no wheezes. She has no rales.   Abdominal: Soft. There is no tenderness. There is no guarding.   Musculoskeletal: She exhibits edema (pitting in b/L LE). She exhibits no tenderness.   Lymphadenopathy:     She has no cervical adenopathy.   Neurological: She is alert. She displays no tremor.   Skin: Skin is warm and dry. No rash noted. There is pallor.   Psychiatric: She has a normal mood and affect. Her behavior is normal.   Nursing note and vitals reviewed.      Vitals:    03/02/20 0936   BP: (!) 118/52   BP Location: Right arm   Patient Position: Sitting   BP Method: Medium (Manual)   Pulse: 84   Weight: 49.5 kg (109 lb 2 oz)   Height: 5' 7" (1.702 m)     Body mass index is 17.09 kg/m².    RESULTS: Reviewed labs from last 12 " months    Assessment:       1. Fall, initial encounter    2. Essential hypertension -- goal SBP < 150    3. Iron deficiency anemia, unspecified iron deficiency anemia type    4. Hyperlipidemia, unspecified hyperlipidemia type    5. Age-related osteoporosis without current pathological fracture    6. Cachexia        Plan:   Diagnoses and all orders for this visit:    Fall, initial encounter:  New problem, patient attributes to hypoTN but suspect severe anemia as well. Check labs today.    Essential hypertension -- goal SBP < 150:  Prior diagnosis, resolved likely due to weight loss, well controlled off of medications. continue to monitor at home.    -     CBC Without Differential; Future  -     Magnesium; Future    Iron deficiency anemia, unspecified iron deficiency anemia type:  Prior diagnosis, severe, no follow up in 1 yr. Recheck today, patient reports taking iron supplement but given weight loss and pallor suspect severe iron deficiency anemia. If still with Hgb <7 will need hospital admission.  -     Ferritin; Future  -     Iron and TIBC; Future  -     Ambulatory referral/consult to Hematology / Oncology; Future    Hyperlipidemia, unspecified hyperlipidemia type  -     TSH; Future  -     Comprehensive metabolic panel; Future  -     Lipid panel; Future    Age-related osteoporosis without current pathological fracture    Cachexia:  New diagnosis, BMI <17.5, possibly related to recent death of her sister. Concern for underlying malignancy but patient hasn't wanted workup in past.     Follow up in about 12 weeks (around 5/25/2020) for follow up visit. Will likely need to be involved in additional discussions regarding iron deficiency anemia; patient hasn't wanted additional workup in past, may need to consider hospice for decline.    Donaldo Carlin MD  Internal Medicine    Portions of this note were completed using medical dictation software. Please excuse typographical or syntax errors that were missed on review.

## 2020-03-02 NOTE — PATIENT INSTRUCTIONS
Currently please make sure to hold all blood pressure medications (including the valsartan-hydrochlorothiazide and amlodipine). Please continue to monitor the blood pressure. Goal blood pressure is top number under 150 and bottom under 90. If the blood pressure consistently is elevated above this (2 or more times) please notify the office.

## 2020-03-03 ENCOUNTER — TELEPHONE (OUTPATIENT)
Dept: HEMATOLOGY/ONCOLOGY | Facility: CLINIC | Age: 85
End: 2020-03-03

## 2020-03-03 VITALS
HEIGHT: 67 IN | HEART RATE: 74 BPM | OXYGEN SATURATION: 96 % | BODY MASS INDEX: 17.11 KG/M2 | DIASTOLIC BLOOD PRESSURE: 64 MMHG | RESPIRATION RATE: 18 BRPM | TEMPERATURE: 98 F | SYSTOLIC BLOOD PRESSURE: 112 MMHG | WEIGHT: 109 LBS

## 2020-03-03 LAB
ALBUMIN SERPL BCP-MCNC: 2.9 G/DL (ref 3.5–5.2)
ALP SERPL-CCNC: 80 U/L (ref 55–135)
ALT SERPL W/O P-5'-P-CCNC: 11 U/L (ref 10–44)
ANION GAP SERPL CALC-SCNC: 10 MMOL/L (ref 8–16)
ANISOCYTOSIS BLD QL SMEAR: SLIGHT
AST SERPL-CCNC: 27 U/L (ref 10–40)
BASOPHILS # BLD AUTO: 0.05 K/UL (ref 0–0.2)
BASOPHILS NFR BLD: 0.8 % (ref 0–1.9)
BILIRUB SERPL-MCNC: 0.6 MG/DL (ref 0.1–1)
BLD PROD TYP BPU: NORMAL
BLD PROD TYP BPU: NORMAL
BLOOD UNIT EXPIRATION DATE: NORMAL
BLOOD UNIT EXPIRATION DATE: NORMAL
BLOOD UNIT TYPE CODE: 6200
BLOOD UNIT TYPE CODE: 6200
BLOOD UNIT TYPE: NORMAL
BLOOD UNIT TYPE: NORMAL
BNP SERPL-MCNC: 1213 PG/ML (ref 0–99)
BUN SERPL-MCNC: 14 MG/DL (ref 8–23)
BURR CELLS BLD QL SMEAR: ABNORMAL
CALCIUM SERPL-MCNC: 8 MG/DL (ref 8.7–10.5)
CHLORIDE SERPL-SCNC: 103 MMOL/L (ref 95–110)
CO2 SERPL-SCNC: 23 MMOL/L (ref 23–29)
CODING SYSTEM: NORMAL
CODING SYSTEM: NORMAL
CREAT SERPL-MCNC: 0.7 MG/DL (ref 0.5–1.4)
DACRYOCYTES BLD QL SMEAR: ABNORMAL
DIFFERENTIAL METHOD: ABNORMAL
DISPENSE STATUS: NORMAL
DISPENSE STATUS: NORMAL
EOSINOPHIL # BLD AUTO: 0.1 K/UL (ref 0–0.5)
EOSINOPHIL NFR BLD: 1.7 % (ref 0–8)
ERYTHROCYTE [DISTWIDTH] IN BLOOD BY AUTOMATED COUNT: ABNORMAL % (ref 11.5–14.5)
EST. GFR  (AFRICAN AMERICAN): >60 ML/MIN/1.73 M^2
EST. GFR  (NON AFRICAN AMERICAN): >60 ML/MIN/1.73 M^2
GLUCOSE SERPL-MCNC: 87 MG/DL (ref 70–110)
HCT VFR BLD AUTO: 31.6 % (ref 37–48.5)
HGB BLD-MCNC: 8.9 G/DL (ref 12–16)
HYPOCHROMIA BLD QL SMEAR: ABNORMAL
IMM GRANULOCYTES # BLD AUTO: 0.03 K/UL (ref 0–0.04)
IMM GRANULOCYTES NFR BLD AUTO: 0.5 % (ref 0–0.5)
LYMPHOCYTES # BLD AUTO: 0.6 K/UL (ref 1–4.8)
LYMPHOCYTES NFR BLD: 8.5 % (ref 18–48)
MAGNESIUM SERPL-MCNC: 1.8 MG/DL (ref 1.6–2.6)
MCH RBC QN AUTO: 20.6 PG (ref 27–31)
MCHC RBC AUTO-ENTMCNC: 28.2 G/DL (ref 32–36)
MCV RBC AUTO: 73 FL (ref 82–98)
MONOCYTES # BLD AUTO: 0.5 K/UL (ref 0.3–1)
MONOCYTES NFR BLD: 7.8 % (ref 4–15)
NEUTROPHILS # BLD AUTO: 5.3 K/UL (ref 1.8–7.7)
NEUTROPHILS NFR BLD: 80.7 % (ref 38–73)
NRBC BLD-RTO: 0 /100 WBC
OVALOCYTES BLD QL SMEAR: ABNORMAL
PHOSPHATE SERPL-MCNC: 3 MG/DL (ref 2.7–4.5)
PLATELET # BLD AUTO: 551 K/UL (ref 150–350)
PLATELET BLD QL SMEAR: ABNORMAL
PMV BLD AUTO: 9 FL (ref 9.2–12.9)
POIKILOCYTOSIS BLD QL SMEAR: ABNORMAL
POLYCHROMASIA BLD QL SMEAR: ABNORMAL
POTASSIUM SERPL-SCNC: 3.6 MMOL/L (ref 3.5–5.1)
PROT SERPL-MCNC: 6.1 G/DL (ref 6–8.4)
RBC # BLD AUTO: 4.31 M/UL (ref 4–5.4)
SCHISTOCYTES BLD QL SMEAR: PRESENT
SODIUM SERPL-SCNC: 136 MMOL/L (ref 136–145)
TRANS ERYTHROCYTES VOL PATIENT: NORMAL ML
TRANS ERYTHROCYTES VOL PATIENT: NORMAL ML
VIT B12 SERPL-MCNC: <146 PG/ML (ref 210–950)
WBC # BLD AUTO: 6.58 K/UL (ref 3.9–12.7)

## 2020-03-03 PROCEDURE — 80053 COMPREHEN METABOLIC PANEL: CPT

## 2020-03-03 PROCEDURE — 94761 N-INVAS EAR/PLS OXIMETRY MLT: CPT

## 2020-03-03 PROCEDURE — 36415 COLL VENOUS BLD VENIPUNCTURE: CPT

## 2020-03-03 PROCEDURE — 83735 ASSAY OF MAGNESIUM: CPT

## 2020-03-03 PROCEDURE — 82607 VITAMIN B-12: CPT

## 2020-03-03 PROCEDURE — 99239 PR HOSPITAL DISCHARGE DAY,>30 MIN: ICD-10-PCS | Mod: ,,, | Performed by: INTERNAL MEDICINE

## 2020-03-03 PROCEDURE — 83880 ASSAY OF NATRIURETIC PEPTIDE: CPT

## 2020-03-03 PROCEDURE — 84100 ASSAY OF PHOSPHORUS: CPT

## 2020-03-03 PROCEDURE — 99239 HOSP IP/OBS DSCHRG MGMT >30: CPT | Mod: ,,, | Performed by: INTERNAL MEDICINE

## 2020-03-03 PROCEDURE — P9021 RED BLOOD CELLS UNIT: HCPCS

## 2020-03-03 PROCEDURE — 85025 COMPLETE CBC W/AUTO DIFF WBC: CPT

## 2020-03-03 PROCEDURE — 25000003 PHARM REV CODE 250: Performed by: PHYSICIAN ASSISTANT

## 2020-03-03 RX ORDER — EZETIMIBE 10 MG/1
10 TABLET ORAL DAILY
Status: DISCONTINUED | OUTPATIENT
Start: 2020-03-03 | End: 2020-03-03 | Stop reason: HOSPADM

## 2020-03-03 RX ADMIN — MELATONIN 1000 UNITS: at 08:03

## 2020-03-03 RX ADMIN — CALCIUM 500 MG: 500 TABLET ORAL at 08:03

## 2020-03-03 RX ADMIN — CYANOCOBALAMIN TAB 1000 MCG 1000 MCG: 1000 TAB at 08:03

## 2020-03-03 RX ADMIN — POLYETHYLENE GLYCOL 3350 17 G: 17 POWDER, FOR SOLUTION ORAL at 08:03

## 2020-03-03 RX ADMIN — ROSUVASTATIN CALCIUM 40 MG: 20 TABLET, FILM COATED ORAL at 08:03

## 2020-03-03 RX ADMIN — FERROUS SULFATE TAB EC 325 MG (65 MG FE EQUIVALENT) 325 MG: 325 (65 FE) TABLET DELAYED RESPONSE at 08:03

## 2020-03-03 RX ADMIN — EZETIMIBE 10 MG: 10 TABLET ORAL at 08:03

## 2020-03-03 NOTE — HPI
Sister Nydia Stevens is a 85 y.o. F with severe iron deficiency anemia, HTN, HLD who presents from PCP office for low hemoglobin of 5.1.  Patient reports DONOVAN and fatigue on exertion, but no sxs at rest. She denies any abd pain, NVD, hematuria, melena, hematemesis and reports normal brown stools.  Sh denies any NSAID uses, blood thinners, alcohol.  She reports last colonoscopy >10 years ago and was unremarkable (per patient).  She denies any previous need for blood transfusions.  She does report weight loss, but is unsure how much.  Of note, she does not like the prep for colonoscopies and reports she is 85 and does not want further GI workup. She states that she wants to received blood transfusions and return home tomorrow.     Also, patient reports lower ext edema over the past year, which has recently improved with valsartan/hctz (started 2/3).  She denies orthopnea and reports improvement in her edema.     ED: Mildly tachycardic, HDS,  Hg 4.4 (BL 7-9; no recent CBC).  Anemia workup remarkable for iron deficiency. She deferred rectal exam.  2U prbcs ordered.

## 2020-03-03 NOTE — PLAN OF CARE
03/03/20 1013   Post-Acute Status   Post-Acute Authorization Home Health/Hospice   Home Health/Hospice Status Referrals Sent     SW faxed HH orders to [facility] via  for review. SW will follow up as needed.    11:19 AM  Patient was accepted to [facility] for HH services.     Ce Weber LMSW  Case Management   [facility]

## 2020-03-03 NOTE — ASSESSMENT & PLAN NOTE
Bilateral lower extremity edema  Aortic stenosis, mild    - holding valsartan/hctz in setting of anemia  - checking BNP; no sxs of CHF  - strict I/O, daily weights

## 2020-03-03 NOTE — PLAN OF CARE
Patient discharged to home with [facility].    Future Appointments   Date Time Provider Department Center   3/17/2020  2:30 PM Donaldo Carlin MD [facility] IM [facility]   6/11/2020  9:30 AM Donaldo Carlin MD [facility]   7/16/2020  9:45 AM EPO, INJECTION [facility] ID INF [facility]        03/03/20 7390   Final Note   Assessment Type Final Discharge Note   Anticipated Discharge Disposition Home-Health   Right Care Referral Info   Post Acute Recommendation Home-care   Referral Type Home Health   Facility Name [facility]

## 2020-03-03 NOTE — ED NOTES
Adult Physical Assessment  LOC: Nydia Stevens, 85 y.o. female verified via two identifiers.  The patient is awake, alert, oriented x4 and speaking appropriately at this time.  APPEARANCE: Patient resting comfortably and appears to be in no acute distress at this time. Patient is clean and well groomed, patient's clothing is properly fastened.  SKIN:The skin is warm and dry, color consistent with ethnicity, patient has normal skin turgor and moist mucus membranes, skin intact, no breakdown or brusing noted. Pt notably pale in color to all extremities.  MUSCULOSKELETAL: Patient moving all extremities well, no obvious swelling or deformities noted. Pt reports generalized weakness  RESPIRATORY: Airway is open and patent, respirations are spontaneous, patient has a normal effort and rate, no accessory muscle use noted. Denies SOB.  CARDIAC: Patient has a normal rate and rhythm, no periphreal edema noted in any extremity, capillary refill < 3 seconds in all extremities. Denies CP.  ABDOMEN: Soft and non tender to palpation, no abdominal distention noted. Bowel sounds present in all four quadrants. Denies N/V/D.  NEUROLOGIC: Eyes open spontaneously, behavior appropriate to situation, follows commands, facial expression symmetrical, bilateral hand grasp equal and even, purposeful motor response noted, normal sensation in all extremities when touched with a finger. Denies dizziness/lightheadedness, numbness/tingling, headache.

## 2020-03-03 NOTE — ASSESSMENT & PLAN NOTE
Iron deficiency anemia    - Hg 4.4 on arrival with no labs since 2/2019.  - HDS, asymptomatic at rest  - Giving 2U prbcs and rechecking labs in AM; may need add'l unit of prbc  - Hypochromic microcytic and confirmed iron deficiency anemia: iron 11, TIBC 537, sat iron 2%, ferritin 6%  - Hg 6.2 2/2019 and was advised to receive iron infusion and was lost to follow up.    - c/w ferrous sulfate PO BID  - refusing any further GI workup and do not suspect GI source;  abd exam benign, no GI sxs.   - monitor with orthostatics, transfuse for Hg<7

## 2020-03-03 NOTE — ED NOTES
Telemetry Verification   Patient placed on Telemetry Box  Verified with War Room  Box # [id number]   Monitor Tech    Rate 90   Rhythm NSR

## 2020-03-03 NOTE — ED TRIAGE NOTES
Patient comes into ER with complaints of low blood count. Patient states that she had blood work done and her physician told her to come to the ER to be transfused.

## 2020-03-03 NOTE — PLAN OF CARE
[facility]    HOME HEALTH ORDERS  FACE TO FACE ENCOUNTER    Patient Name: Nydia Stevens  YOB: 1934    PCP: Donaldo Carlin MD   PCP Address: [address] / [place] [address]  PCP Phone Number: [phone]  PCP Fax: [phone]    Encounter Date: 03/03/2020    Admit to Home Health    Diagnoses:  Active Hospital Problems    Diagnosis  POA    *Symptomatic anemia [D64.9]  Yes    Bilateral lower extremity edema [R60.0]  Unknown    Iron deficiency anemia [D50.9]  Yes    Pure hypercholesterolemia [E78.00]  Yes    Essential hypertension -- goal SBP < 150 [I10]  Yes      Resolved Hospital Problems   No resolved problems to display.       Future Appointments   Date Time Provider Department Center   6/11/2020  9:30 AM Donaldo Carlin MD [facility] IM [facility] PC[facility]   7/16/2020  9:45 AM EPO, INJECTION [facility] ID INF [facility]     Follow-up Information     Donaldo Carlin MD.    Specialty:  Internal Medicine  Contact information:  [address]  [place] [address]  [phone]                     I have seen and examined this patient face to face today. My clinical findings that support the need for the home health skilled services and home bound status are the following:  Weakness/numbness causing balance and gait disturbance due to Weakness/Debility and Anemia making it taxing to leave home.    Allergies:Review of patient's allergies indicates:  No Known Allergies    Diet: regular diet    Activities: activity as tolerated    Nursing:   SN to complete comprehensive assessment including routine vital signs. Instruct on disease process and s/s of complications to report to MD. Review/verify medication list sent home with the patient at time of discharge  and instruct patient/caregiver as needed. Frequency may be adjusted depending on start of care date.    Notify MD if SBP > 160 or < 90; DBP > 90 or < 50; HR > 120 or < 50; Temp > 101; Other:         CONSULTS:    Physical Therapy  to evaluate and treat. Evaluate for home safety and equipment needs; Establish/upgrade home exercise program. Perform / instruct on therapeutic exercises, gait training, transfer training, and Range of Motion.  Occupational Therapy to evaluate and treat. Evaluate home environment for safety and equipment needs. Perform/Instruct on transfers, ADL training, ROM, and therapeutic exercises.   to evaluate for community resources/long-range planning.  Aide to provide assistance with personal care, ADLs, and vital signs.    MISCELLANEOUS CARE:  N/A    WOUND CARE ORDERS  n/a      Medications: Review discharge medications with patient and family and provide education.      Current Discharge Medication List      CONTINUE these medications which have NOT CHANGED    Details   calcium carbonate (OS-JAILYN) 500 mg calcium (1,250 mg) tablet Take 1 tablet (500 mg total) by mouth once daily.  Refills: 0      cholecalciferol, vitamin D3, (VITAMIN D3) 1,000 unit capsule Take 1,000 Units by mouth once daily.      cyanocobalamin (VITAMIN B-12) 1000 MCG tablet Take 1,000 mcg by mouth once daily.      ferrous sulfate (FEOSOL) 325 mg (65 mg iron) Tab tablet TAKE ONE TABLET BY MOUTH TWICE DAILY WITH MEALS  Qty: 60 tablet, Refills: 0    Comments: $  Associated Diagnoses: Iron deficiency anemia, unspecified iron deficiency anemia type      rosuvastatin (CRESTOR) 40 MG Tab TAKE ONE TABLET BY MOUTH EVERY DAY FOR CHOLESTEROL  Qty: 90 tablet, Refills: 3    Associated Diagnoses: Hyperlipidemia, unspecified hyperlipidemia type      senna-docusate 8.6-50 mg (PERICOLACE) 8.6-50 mg per tablet Take 1 tablet by mouth 2 (two) times daily.      VIT C/VIT E ACETATE/LUTEIN/MIN (OCUVITE LUTEIN ORAL) Take 1 tablet by mouth once daily.           STOP taking these medications       naproxen (NAPROSYN) 500 MG tablet Comments:   Reason for Stopping:         valsartan-hydrochlorothiazide (DIOVAN-HCT) 80-12.5 mg per tablet Comments:   Reason for Stopping:                I certify that this patient is confined to her home and needs intermittent skilled nursing care, physical therapy and occupational therapy.

## 2020-03-03 NOTE — H&P
[facility] Hospital Medicine  History & Physical    Patient Name: Nydia Stevens  MRN: [id number]  Admission Date: 3/2/2020  Attending Physician: Terrance Purvis MD   Primary Care Provider: Donaldo Carlin MD    [facility] Medicine Team: Networked reference to record PCT  Ayaz Prado PA-C     Patient information was obtained from patient, past medical records and ER records.     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  Patient reports DONOVAN and fatigue on exertion, but no sxs at rest. She denies any abd pain, NVD, hematuria, melena, hematemesis and reports normal brown stools.  Sh denies any NSAID uses, blood thinners, alcohol.  She reports last colonoscopy >10 years ago and was unremarkable (per patient).  She denies any previous need for blood transfusions.  She does report weight loss, but is unsure how much.  Of note, she does not like the prep for colonoscopies and reports she is 85 and does not want further GI workup. She states that she wants to received blood transfusions and return home tomorrow.     Also, patient reports lower ext edema over the past year, which has recently improved with valsartan/hctz (started 2/3).  She denies orthopnea and reports improvement in her edema.     ED: Mildly tachycardic, HDS,  Hg 4.4 (BL 7-9; no recent CBC).  Anemia workup remarkable for iron deficiency. She deferred rectal exam.  2U prbcs ordered.     Past Medical History:   Diagnosis Date    Cataract     Hypertension        Past Surgical History:   Procedure Laterality Date    CATARACT EXTRACTION W/  INTRAOCULAR LENS IMPLANT Left 8/11/14    [facility]    CATARACT EXTRACTION W/  INTRAOCULAR LENS IMPLANT  Right 09/15/14    [facility]    FOOT SURGERY      left    HYSTERECTOMY         Review of patient's allergies indicates:  No Known Allergies    No current facility-administered medications on file prior to encounter.      Current Outpatient Medications on File Prior to Encounter   Medication Sig    calcium carbonate (OS-JAILYN) 500 mg calcium (1,250 mg) tablet Take 1 tablet (500 mg total) by mouth once daily.    cholecalciferol, vitamin D3, (VITAMIN D3) 1,000 unit capsule Take 1,000 Units by mouth once daily.    cyanocobalamin (VITAMIN B-12) 1000 MCG tablet Take 1,000 mcg by mouth once daily.    ferrous sulfate (FEOSOL) 325 mg (65 mg iron) Tab tablet TAKE ONE TABLET BY MOUTH TWICE DAILY WITH MEALS    naproxen (NAPROSYN) 500 MG tablet Take 500 mg by mouth.    rosuvastatin (CRESTOR) 40 MG Tab TAKE ONE TABLET BY MOUTH EVERY DAY FOR CHOLESTEROL    senna-docusate 8.6-50 mg (PERICOLACE) 8.6-50 mg per tablet Take 1 tablet by mouth 2 (two) times daily.    VIT C/VIT E ACETATE/LUTEIN/MIN (OCUVITE LUTEIN ORAL) Take 1 tablet by mouth once daily.      [DISCONTINUED] valsartan-hydrochlorothiazide (DIOVAN-HCT) 80-12.5 mg per tablet Take 1 tablet by mouth once daily.     Family History     Problem Relation (Age of Onset)    Cataracts Brother, Sister, Sister    Diabetes Brother, Sister    Heart attack Father, Brother    No Known Problems Mother, Maternal Aunt, Maternal Uncle, Paternal Aunt, Paternal Uncle, Maternal Grandmother, Maternal Grandfather, Paternal Grandmother, Paternal Grandfather    Stroke Sister, Sister        Tobacco Use    Smoking status: Never Smoker    Smokeless tobacco: Never Used   Substance and Sexual Activity    Alcohol use: No     Alcohol/week: 0.0 standard drinks    Drug use: No    Sexual activity: Not on file     Review of Systems   Constitutional: Positive for fatigue and unexpected weight change (weight loss). Negative for chills and fever.   HENT: Negative for congestion and rhinorrhea.    Eyes:  Negative for photophobia and visual disturbance.   Respiratory: Positive for shortness of breath (on exertion). Negative for cough and chest tightness.    Cardiovascular: Positive for leg swelling (chronic). Negative for chest pain and palpitations.   Gastrointestinal: Negative for abdominal pain, diarrhea, nausea and vomiting.   Genitourinary: Negative for difficulty urinating and dysuria.   Musculoskeletal: Negative for back pain and gait problem.   Skin: Positive for color change and pallor. Negative for rash and wound.   Neurological: Positive for weakness. Negative for dizziness and headaches.   Psychiatric/Behavioral: Negative for agitation and confusion.     Objective:     Vital Signs (Most Recent):  Temp: 98 °F (36.7 °C) (03/02/20 2159)  Pulse: 99 (03/02/20 2159)  Resp: 18 (03/02/20 2159)  BP: 124/60 (03/02/20 2159)  SpO2: 100 % (03/02/20 2159) Vital Signs (24h Range):  Temp:  [98 °F (36.7 °C)-98.8 °F (37.1 °C)] 98 °F (36.7 °C)  Pulse:  [] 99  Resp:  [18] 18  SpO2:  [96 %-100 %] 100 %  BP: (118-144)/(52-63) 124/60     Weight: 49.4 kg (109 lb)  Body mass index is 17.07 kg/m².    Physical Exam   Constitutional: She is oriented to person, place, and time. She appears well-developed and well-nourished.   Cachectic elderly female   HENT:   Head: Normocephalic and atraumatic.   Eyes: Pupils are equal, round, and reactive to light. EOM are normal.   Pale conjunctiva   Neck: Normal range of motion. Neck supple.   Cardiovascular: Normal rate and regular rhythm.   Murmur (3/6 systolic murmur) heard.  Pulmonary/Chest: Effort normal and breath sounds normal. She has no wheezes.   Abdominal: Soft. Bowel sounds are normal.   Musculoskeletal: Normal range of motion.   Neurological: She is alert and oriented to person, place, and time.   Skin: Skin is warm and dry. Capillary refill takes less than 2 seconds. There is pallor (severe pallor).   Psychiatric: She has a normal mood and affect. Her speech is normal and  behavior is normal. Judgment and thought content normal.   Poor historian but oriented x3 and appropriate   Nursing note and vitals reviewed.        CRANIAL NERVES     CN III, IV, VI   Pupils are equal, round, and reactive to light.  Extraocular motions are normal.        Significant Labs:   CBC:   Recent Labs   Lab 03/02/20  1022 03/02/20 2003   WBC 6.44 6.71   HGB 5.1* 4.4*   HCT 22.3* 17.7*   * 611*     CMP:   Recent Labs   Lab 03/02/20 1022 03/02/20 2003    137   K 4.4 4.2    102   CO2 22* 23   GLU 95 102   BUN 11 17   CREATININE 0.8 0.8   CALCIUM 8.8 8.5*   PROT 7.1 6.5   ALBUMIN 3.4* 3.1*   BILITOT 0.3 0.2   ALKPHOS 87 82   AST 43* 35   ALT 13 13   ANIONGAP 13 12   EGFRNONAA >60.0 >60.0       Significant Imaging: I have reviewed all pertinent imaging results/findings within the past 24 hours.    Assessment/Plan:     * Symptomatic anemia  Iron deficiency anemia    - Hg 4.4 on arrival with no labs since 2/2019.  - HDS, asymptomatic at rest  - Giving 2U prbcs and rechecking labs in AM; may need add'l unit of prbc  - Hypochromic microcytic and confirmed iron deficiency anemia: iron 11, TIBC 537, sat iron 2%, ferritin 6%  - Hg 6.2 2/2019 and was advised to receive iron infusion and was lost to follow up.    - c/w ferrous sulfate PO BID  - refusing any further GI workup and do not suspect GI source;  abd exam benign, no GI sxs.   - monitor with orthostatics, transfuse for Hg<7    Pure hypercholesterolemia  - LDL elevated to 187 despite statin  - c/w rosuvastatin 40 mg PO daily  - discussed diet changes; possible addition of ezetimibe  - cardiac/low chol diet    Essential hypertension -- goal SBP < 150  Bilateral lower extremity edema  Aortic stenosis, mild    - holding valsartan/hctz in setting of anemia  - checking BNP; no sxs of CHF  - strict I/O, daily weights    VTE Risk Mitigation (From admission, onward)         Ordered     IP VTE HIGH RISK PATIENT  Once      03/02/20 2301     [facility]  hose  Until discontinued      03/02/20 2301     Place sequential compression device  Until discontinued      03/02/20 2301                   Ayaz Prado PA-C  Department of Hospital Medicine   [facility]

## 2020-03-03 NOTE — SUBJECTIVE & OBJECTIVE
Past Medical History:   Diagnosis Date    Cataract     Hypertension        Past Surgical History:   Procedure Laterality Date    CATARACT EXTRACTION W/  INTRAOCULAR LENS IMPLANT Left 8/11/14    [facility]    CATARACT EXTRACTION W/  INTRAOCULAR LENS IMPLANT Right 09/15/14    [facility]    FOOT SURGERY      left    HYSTERECTOMY         Review of patient's allergies indicates:  No Known Allergies    No current facility-administered medications on file prior to encounter.      Current Outpatient Medications on File Prior to Encounter   Medication Sig    calcium carbonate (OS-JAILYN) 500 mg calcium (1,250 mg) tablet Take 1 tablet (500 mg total) by mouth once daily.    cholecalciferol, vitamin D3, (VITAMIN D3) 1,000 unit capsule Take 1,000 Units by mouth once daily.    cyanocobalamin (VITAMIN B-12) 1000 MCG tablet Take 1,000 mcg by mouth once daily.    ferrous sulfate (FEOSOL) 325 mg (65 mg iron) Tab tablet TAKE ONE TABLET BY MOUTH TWICE DAILY WITH MEALS    naproxen (NAPROSYN) 500 MG tablet Take 500 mg by mouth.    rosuvastatin (CRESTOR) 40 MG Tab TAKE ONE TABLET BY MOUTH EVERY DAY FOR CHOLESTEROL    senna-docusate 8.6-50 mg (PERICOLACE) 8.6-50 mg per tablet Take 1 tablet by mouth 2 (two) times daily.    VIT C/VIT E ACETATE/LUTEIN/MIN (OCUVITE LUTEIN ORAL) Take 1 tablet by mouth once daily.      [DISCONTINUED] valsartan-hydrochlorothiazide (DIOVAN-HCT) 80-12.5 mg per tablet Take 1 tablet by mouth once daily.     Family History     Problem Relation (Age of Onset)    Cataracts Brother, Sister, Sister    Diabetes Brother, Sister    Heart attack Father, Brother    No Known Problems Mother, Maternal Aunt, Maternal Uncle, Paternal Aunt, Paternal Uncle, Maternal Grandmother, Maternal Grandfather, Paternal Grandmother, Paternal Grandfather    Stroke Sister, Sister        Tobacco Use    Smoking status: Never Smoker    Smokeless tobacco: Never Used   Substance and Sexual Activity    Alcohol use: No     Alcohol/week: 0.0 standard  drinks    Drug use: No    Sexual activity: Not on file     Review of Systems   Constitutional: Positive for fatigue and unexpected weight change (weight loss). Negative for chills and fever.   HENT: Negative for congestion and rhinorrhea.    Eyes: Negative for photophobia and visual disturbance.   Respiratory: Positive for shortness of breath (on exertion). Negative for cough and chest tightness.    Cardiovascular: Positive for leg swelling (chronic). Negative for chest pain and palpitations.   Gastrointestinal: Negative for abdominal pain, diarrhea, nausea and vomiting.   Genitourinary: Negative for difficulty urinating and dysuria.   Musculoskeletal: Negative for back pain and gait problem.   Skin: Positive for color change and pallor. Negative for rash and wound.   Neurological: Positive for weakness. Negative for dizziness and headaches.   Psychiatric/Behavioral: Negative for agitation and confusion.     Objective:     Vital Signs (Most Recent):  Temp: 98 °F (36.7 °C) (03/02/20 2159)  Pulse: 99 (03/02/20 2159)  Resp: 18 (03/02/20 2159)  BP: 124/60 (03/02/20 2159)  SpO2: 100 % (03/02/20 2159) Vital Signs (24h Range):  Temp:  [98 °F (36.7 °C)-98.8 °F (37.1 °C)] 98 °F (36.7 °C)  Pulse:  [] 99  Resp:  [18] 18  SpO2:  [96 %-100 %] 100 %  BP: (118-144)/(52-63) 124/60     Weight: 49.4 kg (109 lb)  Body mass index is 17.07 kg/m².    Physical Exam   Constitutional: She is oriented to person, place, and time. She appears well-developed and well-nourished.   Cachectic elderly female   HENT:   Head: Normocephalic and atraumatic.   Eyes: Pupils are equal, round, and reactive to light. EOM are normal.   Pale conjunctiva   Neck: Normal range of motion. Neck supple.   Cardiovascular: Normal rate and regular rhythm.   Murmur (3/6 systolic murmur) heard.  Pulmonary/Chest: Effort normal and breath sounds normal. She has no wheezes.   Abdominal: Soft. Bowel sounds are normal.   Musculoskeletal: Normal range of motion.    Neurological: She is alert and oriented to person, place, and time.   Skin: Skin is warm and dry. Capillary refill takes less than 2 seconds. There is pallor (severe pallor).   Psychiatric: She has a normal mood and affect. Her speech is normal and behavior is normal. Judgment and thought content normal.   Poor historian but oriented x3 and appropriate   Nursing note and vitals reviewed.        CRANIAL NERVES     CN III, IV, VI   Pupils are equal, round, and reactive to light.  Extraocular motions are normal.        Significant Labs:   CBC:   Recent Labs   Lab 03/02/20  1022 03/02/20 2003   WBC 6.44 6.71   HGB 5.1* 4.4*   HCT 22.3* 17.7*   * 611*     CMP:   Recent Labs   Lab 03/02/20  1022 03/02/20 2003    137   K 4.4 4.2    102   CO2 22* 23   GLU 95 102   BUN 11 17   CREATININE 0.8 0.8   CALCIUM 8.8 8.5*   PROT 7.1 6.5   ALBUMIN 3.4* 3.1*   BILITOT 0.3 0.2   ALKPHOS 87 82   AST 43* 35   ALT 13 13   ANIONGAP 13 12   EGFRNONAA >60.0 >60.0       Significant Imaging: I have reviewed all pertinent imaging results/findings within the past 24 hours.

## 2020-03-03 NOTE — ED PROVIDER NOTES
Encounter Date: 3/2/2020       History     Chief Complaint   Patient presents with    Abnormal Lab     sent from  for blood transfusion, very pale     HPI       This is an 85-year-old woman with a history of iron deficiency anemia, hypertension, who is referred in with low hemoglobin, of 5.1.  She  Was seen by her internal medicine doctor today, who farida labs,  And the resulted as above.  She denies any blood loss, reports brown stool, longstanding history of iron deficiency anemia for which she takes iron.  She does feel dyspnea on exertion and fatigue, but denies any symptoms at rest.  She does not take blood thinning medications.  She has had a colonoscopy many years ago, but reports that because she is 85 years old she does not want that any more.  When I instructed her that she may be recommended to have it, she declined any further GI workup, reports that she just wants a transfusion and then to go home.  Review of patient's allergies indicates:  No Known Allergies  Past Medical History:   Diagnosis Date    Cataract     Hypertension      Past Surgical History:   Procedure Laterality Date    CATARACT EXTRACTION W/  INTRAOCULAR LENS IMPLANT Left 8/11/14    [facility]    CATARACT EXTRACTION W/  INTRAOCULAR LENS IMPLANT Right 09/15/14    [facility]    FOOT SURGERY      left    HYSTERECTOMY       Family History   Problem Relation Age of Onset    Heart attack Father     Cataracts Brother     Heart attack Brother     Diabetes Brother     No Known Problems Mother     Cataracts Sister     Stroke Sister     Diabetes Sister     Cataracts Sister     Stroke Sister     No Known Problems Maternal Aunt     No Known Problems Maternal Uncle     No Known Problems Paternal Aunt     No Known Problems Paternal Uncle     No Known Problems Maternal Grandmother     No Known Problems Maternal Grandfather     No Known Problems Paternal Grandmother     No Known Problems Paternal Grandfather     Amblyopia Neg Hx      Blindness Neg Hx     Cancer Neg Hx     Glaucoma Neg Hx     Hypertension Neg Hx     Macular degeneration Neg Hx     Retinal detachment Neg Hx     Strabismus Neg Hx     Thyroid disease Neg Hx     Colon cancer Neg Hx     Esophageal cancer Neg Hx     Irritable bowel syndrome Neg Hx     Rectal cancer Neg Hx     Stomach cancer Neg Hx     Ulcerative colitis Neg Hx     Crohn's disease Neg Hx     Celiac disease Neg Hx      Social History     Tobacco Use    Smoking status: Never Smoker    Smokeless tobacco: Never Used   Substance Use Topics    Alcohol use: No     Alcohol/week: 0.0 standard drinks    Drug use: No     Review of Systems   Constitutional: Positive for fatigue. Negative for chills, diaphoresis and fever.   HENT: Negative for congestion, rhinorrhea and sore throat.    Eyes: Negative for visual disturbance.   Respiratory: Negative for cough, chest tightness and shortness of breath.    Cardiovascular: Negative for chest pain.   Gastrointestinal: Negative for abdominal pain, blood in stool, constipation, diarrhea and vomiting.   Genitourinary: Negative for dysuria, hematuria and urgency.   Musculoskeletal: Negative for back pain.   Skin: Negative for rash.   Neurological: Negative for seizures and syncope.   Hematological: Does not bruise/bleed easily.   Psychiatric/Behavioral: Negative for agitation and hallucinations.       Physical Exam     Initial Vitals [03/02/20 1742]   BP Pulse Resp Temp SpO2   125/61 105 18 98.8 °F (37.1 °C) 96 %      MAP       --         Physical Exam    Gen: AxOx3, NAD, well nourished  Eye: sclera anicteric,  Subconjunctival pallor, EOMI, no conjunctivitis, no periorbital edema  ENT: NCAT, OP clear, neck supple with FROM, no stridor  CVS: RRR, no m/r/g, distal pulses intact/symmetric  Pulm: CTAB, no wheezes, rales or rhonchi, no increased work of breathing  Abd: soft, nontender, nondistended, no organomegaly, no CVAT   patient refused a rectal exam  Ext: no edema, lesions,  rashes, or deformity  Neuro: GCS15, moving all extremities, gait intact  Psych: normal affect, cooperative  ED Course   Procedures  Labs Reviewed   CBC W/ AUTO DIFFERENTIAL - Abnormal; Notable for the following components:       Result Value    RBC 3.02 (*)     Hemoglobin 4.4 (*)     Hematocrit 17.7 (*)     Mean Corpuscular Volume 59 (*)     Mean Corpuscular Hemoglobin 14.6 (*)     Mean Corpuscular Hemoglobin Conc 24.9 (*)     RDW 22.0 (*)     Platelets 611 (*)     Immature Granulocytes 0.6 (*)     Mono # 1.1 (*)     Mono% 16.2 (*)     All other components within normal limits    Narrative:       hgb & hct critical result(s) called and verbal readback obtained   from ann ryan rn  by SEBASTIEN 03/02/2020 20:45   COMPREHENSIVE METABOLIC PANEL - Abnormal; Notable for the following components:    Calcium 8.5 (*)     Albumin 3.1 (*)     All other components within normal limits   PROTIME-INR   TYPE & SCREEN   PREPARE RBC SOFT     EKG Readings: (Independently Interpreted)    Normal sinus rhythm at a rate of 98, downsloping ST segments V4 through V6  Worse from prior from March 2019, no ST elevation, or other ischemic changes       Imaging Results    None          Medical Decision Making:   History:   Old Medical Records: I decided to obtain old medical records.  Old Records Summarized: records from clinic visits and records from previous admission(s).  Initial Assessment:    This is an 85-year-old woman who has a chronic history of iron deficiency anemia, has refused GI workup in the past and is refusing today, on iron, who presents with worsening anemia.  She has required transfusions before, and feels similar to prior.   Her exam is notable for subconjunctival pallor, and pale skin, but is otherwise unremarkable, though she did refuse rectal exam.  She does not describe signs of melena.  I discussed the patient risks versus benefits of blood transfusion, and she consented to transfusion.  I have ordered her 2 units at  this time.  Plan for CBC, CMP, type and screen, and close reassessment, cardiac monitoring.  Clinical Tests:   Lab Tests: Ordered and Reviewed  ED Management:    Patient's labs show a hemoglobin of 4.4, worse from earlier today.  She is hemodynamically stable, I doubt hemorrhage.  Plan for transfusion of multiple units, and admission to the hospital for further management of her iron deficiency anemia and critical anemia.  She is agreeable to plan.  I discussed the patient with Hospital Medicine who accepted her for admission.  Other:   I have discussed this case with another health care provider.                                 Clinical Impression:       ICD-10-CM ICD-9-CM   1. Symptomatic anemia D64.9 285.9             ED Disposition Condition    Admit                           Chitra Cuba MD  03/02/20 7902

## 2020-03-03 NOTE — ASSESSMENT & PLAN NOTE
- LDL elevated to 187 despite statin  - c/w rosuvastatin 40 mg PO daily  - discussed diet changes; possible addition of ezetimibe  - cardiac/low chol diet

## 2020-03-03 NOTE — ED NOTES
Admitted to floor. Diagnosis, medications, & POC discussed with patient. Patient verbalized understanding. All questions and concerns answered. No needs expressed at the time. Pt is awake, alert and oriented with no acute distress noted. Respirations even and unlabored. Per stretcher out of ED to floor by RN with blood transfusing. Blood consent with pt upon departure.

## 2020-03-04 ENCOUNTER — TELEPHONE (OUTPATIENT)
Dept: INTERNAL MEDICINE | Facility: CLINIC | Age: 85
End: 2020-03-04

## 2020-03-04 NOTE — TELEPHONE ENCOUNTER
----- Message from Alison Gonzales sent at 3/4/2020  2:58 PM CST -----  Contact: Self [phone]  Patient want to thank you for your service and you're such a wonderful doctor who cares for his patient. She want you to know that she appreciates you .

## 2020-03-04 NOTE — TELEPHONE ENCOUNTER
Contact: Jair Crawford [phone]  Patient was referred for home health Dr. Garcia.  Need to know if you would signing future orders. Request all back

## 2020-03-05 PROCEDURE — G0180 MD CERTIFICATION HHA PATIENT: HCPCS | Mod: ,,, | Performed by: INTERNAL MEDICINE

## 2020-03-05 PROCEDURE — G0180 PR HOME HEALTH MD CERTIFICATION: ICD-10-PCS | Mod: ,,, | Performed by: INTERNAL MEDICINE

## 2020-03-05 NOTE — DISCHARGE SUMMARY
[facility] Hospital Medicine  Discharge Summary      Patient Name: Nydia Stevens  MRN: [id number]  Admission Date: 3/2/2020  Hospital Length of Stay: 1 days  Discharge Date and Time:  03/04/2020 10:04 PM  Attending Physician: No att. providers found   Discharging Provider: Lily Osborne MD  Primary Care Provider: Donaldo Carlin MD  [facility] Medicine Team: [facility]OSP MED  Lily Osborne MD    HPI:   Sister Nydia Stevens is a 85 y.o. F with severe iron deficiency anemia, HTN, HLD who presents from PCP office for low hemoglobin of 5.1.  Patient reports DONOVAN and fatigue on exertion, but no sxs at rest. She denies any abd pain, NVD, hematuria, melena, hematemesis and reports normal brown stools.  Sh denies any NSAID uses, blood thinners, alcohol.  She reports last colonoscopy >10 years ago and was unremarkable (per patient).  She denies any previous need for blood transfusions.  She does report weight loss, but is unsure how much.  Of note, she does not like the prep for colonoscopies and reports she is 85 and does not want further GI workup. She states that she wants to received blood transfusions and return home tomorrow.     Also, patient reports lower ext edema over the past year, which has recently improved with valsartan/hctz (started 2/3).  She denies orthopnea and reports improvement in her edema.     ED: Mildly tachycardic, HDS,  Hg 4.4 (BL 7-9; no recent CBC).  Anemia workup remarkable for iron deficiency. She deferred rectal exam.  2U prbcs ordered.     * No surgery found *      Hospital Course:   Admitted for profound anemia with hgb 4.4. Received 2 u prbc with symptomatic improvement and rise in hgb to 8.9. Anemia workup remarkable for severe Fe deficiency. Pt not interested in further workup including upper or lower GI scope. Medically stable for discharge home with hh, PCP and hematology follow-up.     Consults:     Pure hypercholesterolemia  - LDL elevated  to 187 despite statin  - c/w rosuvastatin 40 mg PO daily  - discussed diet changes; possible addition of ezetimibe  - cardiac/low chol diet      Final Active Diagnoses:    Diagnosis Date Noted POA    PRINCIPAL PROBLEM:  Symptomatic anemia [D64.9] 03/02/2020 Yes    Bilateral lower extremity edema [R60.0] 03/02/2020 Unknown    Iron deficiency anemia [D50.9] 06/20/2014 Yes    Pure hypercholesterolemia [E78.00] 01/22/2013 Yes    Essential hypertension -- goal SBP < 150 [I10] 01/22/2013 Yes      Problems Resolved During this Admission:       Discharged Condition: stable    Disposition: Home or Self Care    Follow Up:  Follow-up Information     Donaldo Carlin MD.    Specialty:  Internal Medicine  Contact information:  [address]  [place] [id number] [phone]                 Patient Instructions:      Ambulatory referral/consult to Hematology / Oncology   Standing Status: Future   Referral Priority: Routine Referral Type: Consultation   Referral Reason: Specialty Services Required   Requested Specialty: Hematology and Oncology   Number of Visits Requested: 1     Diet Adult Regular     Notify your health care provider if you experience any of the following:  temperature >100.4     Notify your health care provider if you experience any of the following:  persistent dizziness, light-headedness, or visual disturbances     Notify your health care provider if you experience any of the following:  increased confusion or weakness     Activity as tolerated       Significant Diagnostic Studies: Labs: All labs within the past 24 hours have been reviewed    Pending Diagnostic Studies:     None         Medications:  Reconciled Home Medications:      Medication List      CONTINUE taking these medications    calcium carbonate 500 mg calcium (1,250 mg) tablet  Commonly known as:  OS-JAILYN  Take 1 tablet (500 mg total) by mouth once daily.     cyanocobalamin 1000 MCG tablet  Commonly known as:  VITAMIN B-12  Take 1,000 mcg by  mouth once daily.     ferrous sulfate 325 mg (65 mg iron) Tab tablet  Commonly known as:  FEOSOL  TAKE ONE TABLET BY MOUTH TWICE DAILY WITH MEALS     OCUVITE LUTEIN ORAL  Take 1 tablet by mouth once daily.     rosuvastatin 40 MG Tab  Commonly known as:  CRESTOR  TAKE ONE TABLET BY MOUTH EVERY DAY FOR CHOLESTEROL     senna-docusate 8.6-50 mg 8.6-50 mg per tablet  Commonly known as:  PERICOLACE  Take 1 tablet by mouth 2 (two) times daily.     Vitamin D3 25 mcg (1,000 unit) capsule  Generic drug:  cholecalciferol (vitamin D3)  Take 1,000 Units by mouth once daily.        STOP taking these medications    naproxen 500 MG tablet  Commonly known as:  NAPROSYN     valsartan-hydrochlorothiazide 80-12.5 mg per tablet  Commonly known as:  DIOVAN-HCT            Indwelling Lines/Drains at time of discharge:   Lines/Drains/Airways     None                 Time spent on the discharge of patient: 35 minutes  Patient was seen and examined on the date of discharge and determined to be suitable for discharge.         Lily Osborne MD  Department of Hospital Medicine  [facility]

## 2020-03-05 NOTE — HOSPITAL COURSE
Admitted for profound anemia with hgb 4.4. Received 2 u prbc with symptomatic improvement and rise in hgb to 8.9. Anemia workup remarkable for severe Fe deficiency. Pt not interested in further workup including upper or lower GI scope. Medically stable for discharge home with hh, PCP and hematology follow-up.

## 2020-03-06 ENCOUNTER — TELEPHONE (OUTPATIENT)
Dept: HEMATOLOGY/ONCOLOGY | Facility: CLINIC | Age: 85
End: 2020-03-06

## 2020-03-12 ENCOUNTER — EXTERNAL HOME HEALTH (OUTPATIENT)
Dept: HOME HEALTH SERVICES | Facility: HOSPITAL | Age: 85
End: 2020-03-12
Payer: MEDICARE

## 2020-03-13 ENCOUNTER — TELEPHONE (OUTPATIENT)
Dept: HOME HEALTH SERVICES | Facility: HOSPITAL | Age: 85
End: 2020-03-13

## 2020-03-13 ENCOUNTER — PATIENT OUTREACH (OUTPATIENT)
Dept: ADMINISTRATIVE | Facility: HOSPITAL | Age: 85
End: 2020-03-13

## 2020-03-16 ENCOUNTER — TELEPHONE (OUTPATIENT)
Dept: HOME HEALTH SERVICES | Facility: HOSPITAL | Age: 85
End: 2020-03-16

## 2020-03-16 ENCOUNTER — TELEPHONE (OUTPATIENT)
Dept: INTERNAL MEDICINE | Facility: CLINIC | Age: 85
End: 2020-03-16

## 2020-03-16 NOTE — TELEPHONE ENCOUNTER
Pt reported to the nurse that this am when she pushed to have a BM she saw just blood come out and no BM. The nurse did not seen this. Pt had a transfusion 2 weeks ago. The pt is not complaining of any abdominal pain, dizziness or feeling faint.

## 2020-03-16 NOTE — TELEPHONE ENCOUNTER
Please notify that they should check her blood pressure tonight, and tomorrow in the morning before her appointment with me that is scheduled for 10:30 a.m.  If her blood pressure drops or if she starts feeling weak, she should present to the emergency room.

## 2020-03-16 NOTE — TELEPHONE ENCOUNTER
----- Message from Mavis Flowers sent at 3/16/2020  8:20 AM CDT -----  Needs Advice    Reason for call:--Blood in stool--        Communication Preference:--Yasemin-- house nurse--[phone]--    Additional Information:yasemin the nurse states that pt informed her that she had blood in her stool. She would liket o see if the pt needs to be seen. I schedule an appointment for  tomorrow. Please call to advise.

## 2020-03-17 ENCOUNTER — TELEPHONE (OUTPATIENT)
Dept: INTERNAL MEDICINE | Facility: CLINIC | Age: 85
End: 2020-03-17

## 2020-03-17 ENCOUNTER — HOSPITAL ENCOUNTER (INPATIENT)
Facility: HOSPITAL | Age: 85
LOS: 6 days | Discharge: HOME HEALTH | DRG: 330 | End: 2020-03-23
Attending: EMERGENCY MEDICINE | Admitting: EMERGENCY MEDICINE
Payer: MEDICARE

## 2020-03-17 ENCOUNTER — OFFICE VISIT (OUTPATIENT)
Dept: INTERNAL MEDICINE | Facility: CLINIC | Age: 85
DRG: 330 | End: 2020-03-17
Payer: MEDICARE

## 2020-03-17 VITALS
HEART RATE: 104 BPM | BODY MASS INDEX: 16.88 KG/M2 | DIASTOLIC BLOOD PRESSURE: 61 MMHG | WEIGHT: 107.56 LBS | TEMPERATURE: 98 F | HEIGHT: 67 IN | SYSTOLIC BLOOD PRESSURE: 134 MMHG

## 2020-03-17 DIAGNOSIS — D64.9 SYMPTOMATIC ANEMIA: ICD-10-CM

## 2020-03-17 DIAGNOSIS — D50.0 IRON DEFICIENCY ANEMIA DUE TO CHRONIC BLOOD LOSS: ICD-10-CM

## 2020-03-17 DIAGNOSIS — D50.9 IRON DEFICIENCY ANEMIA, UNSPECIFIED IRON DEFICIENCY ANEMIA TYPE: ICD-10-CM

## 2020-03-17 DIAGNOSIS — Z09 HOSPITAL DISCHARGE FOLLOW-UP: Primary | ICD-10-CM

## 2020-03-17 DIAGNOSIS — E53.8 VITAMIN B12 DEFICIENCY: ICD-10-CM

## 2020-03-17 DIAGNOSIS — R53.81 DEBILITY: ICD-10-CM

## 2020-03-17 DIAGNOSIS — K92.2 GASTROINTESTINAL BLEED: ICD-10-CM

## 2020-03-17 DIAGNOSIS — D75.839 THROMBOCYTOSIS: ICD-10-CM

## 2020-03-17 DIAGNOSIS — K63.89 COLONIC MASS: Primary | ICD-10-CM

## 2020-03-17 DIAGNOSIS — I50.9 HEART FAILURE: ICD-10-CM

## 2020-03-17 DIAGNOSIS — R79.89 ELEVATED BRAIN NATRIURETIC PEPTIDE (BNP) LEVEL: ICD-10-CM

## 2020-03-17 DIAGNOSIS — K92.2 GASTROINTESTINAL HEMORRHAGE, UNSPECIFIED GASTROINTESTINAL HEMORRHAGE TYPE: ICD-10-CM

## 2020-03-17 DIAGNOSIS — R64 CACHEXIA: ICD-10-CM

## 2020-03-17 PROBLEM — I50.42 CHRONIC COMBINED SYSTOLIC AND DIASTOLIC HEART FAILURE: Status: ACTIVE | Noted: 2020-03-17

## 2020-03-17 LAB
ABO + RH BLD: NORMAL
ALBUMIN SERPL BCP-MCNC: 3.2 G/DL (ref 3.5–5.2)
ALP SERPL-CCNC: 67 U/L (ref 55–135)
ALT SERPL W/O P-5'-P-CCNC: 5 U/L (ref 10–44)
ANION GAP SERPL CALC-SCNC: 9 MMOL/L (ref 8–16)
APTT BLDCRRT: 21.1 SEC (ref 21–32)
ASCENDING AORTA: 2.58 CM
AST SERPL-CCNC: 35 U/L (ref 10–40)
AV INDEX (PROSTH): 0.45
AV MEAN GRADIENT: 14 MMHG
AV PEAK GRADIENT: 21 MMHG
AV VALVE AREA: 1.42 CM2
AV VELOCITY RATIO: 0.51
BILIRUB SERPL-MCNC: 1.4 MG/DL (ref 0.1–1)
BLD GP AB SCN CELLS X3 SERPL QL: NORMAL
BLD PROD TYP BPU: NORMAL
BLD PROD TYP BPU: NORMAL
BLOOD UNIT EXPIRATION DATE: NORMAL
BLOOD UNIT EXPIRATION DATE: NORMAL
BLOOD UNIT TYPE CODE: 6200
BLOOD UNIT TYPE CODE: 6200
BLOOD UNIT TYPE: NORMAL
BLOOD UNIT TYPE: NORMAL
BSA FOR ECHO PROCEDURE: 1.51 M2
BUN SERPL-MCNC: 11 MG/DL (ref 8–23)
BUN SERPL-MCNC: 12 MG/DL (ref 6–30)
CALCIUM SERPL-MCNC: 7.9 MG/DL (ref 8.7–10.5)
CHLORIDE SERPL-SCNC: 104 MMOL/L (ref 95–110)
CHLORIDE SERPL-SCNC: 105 MMOL/L (ref 95–110)
CO2 SERPL-SCNC: 23 MMOL/L (ref 23–29)
CODING SYSTEM: NORMAL
CODING SYSTEM: NORMAL
CREAT SERPL-MCNC: 0.7 MG/DL (ref 0.5–1.4)
CREAT SERPL-MCNC: 0.7 MG/DL (ref 0.5–1.4)
CV ECHO LV RWT: 0.43 CM
DISPENSE STATUS: NORMAL
DISPENSE STATUS: NORMAL
DOP CALC AO PEAK VEL: 2.28 M/S
DOP CALC AO VTI: 55.12 CM
DOP CALC LVOT AREA: 3.1 CM2
DOP CALC LVOT DIAMETER: 2 CM
DOP CALC LVOT PEAK VEL: 1.17 M/S
DOP CALC LVOT STROKE VOLUME: 78.12 CM3
DOP CALCLVOT PEAK VEL VTI: 24.88 CM
E WAVE DECELERATION TIME: 302.26 MSEC
E/A RATIO: 0.77
E/E' RATIO: 19.67 M/S
ECHO LV POSTERIOR WALL: 0.77 CM (ref 0.6–1.1)
EST. GFR  (AFRICAN AMERICAN): >60 ML/MIN/1.73 M^2
EST. GFR  (NON AFRICAN AMERICAN): >60 ML/MIN/1.73 M^2
FRACTIONAL SHORTENING: 14 % (ref 28–44)
GLUCOSE SERPL-MCNC: 88 MG/DL (ref 70–110)
GLUCOSE SERPL-MCNC: 94 MG/DL (ref 70–110)
HCT VFR BLD CALC: 19 %PCV (ref 36–54)
INR PPP: 0.9 (ref 0.8–1.2)
INTERVENTRICULAR SEPTUM: 0.79 CM (ref 0.6–1.1)
LA MAJOR: 5.69 CM
LA MINOR: 5.6 CM
LA WIDTH: 4.11 CM
LEFT ATRIUM SIZE: 3.48 CM
LEFT ATRIUM VOLUME INDEX: 44.3 ML/M2
LEFT ATRIUM VOLUME: 68.62 CM3
LEFT INTERNAL DIMENSION IN SYSTOLE: 3.09 CM (ref 2.1–4)
LEFT VENTRICLE DIASTOLIC VOLUME INDEX: 34.53 ML/M2
LEFT VENTRICLE DIASTOLIC VOLUME: 53.53 ML
LEFT VENTRICLE MASS INDEX: 49 G/M2
LEFT VENTRICLE SYSTOLIC VOLUME INDEX: 24.2 ML/M2
LEFT VENTRICLE SYSTOLIC VOLUME: 37.57 ML
LEFT VENTRICULAR INTERNAL DIMENSION IN DIASTOLE: 3.58 CM (ref 3.5–6)
LEFT VENTRICULAR MASS: 75.41 G
LV LATERAL E/E' RATIO: 16.86 M/S
LV SEPTAL E/E' RATIO: 23.6 M/S
MAGNESIUM SERPL-MCNC: 2.1 MG/DL (ref 1.6–2.6)
MV PEAK A VEL: 1.54 M/S
MV PEAK E VEL: 1.18 M/S
PISA TR MAX VEL: 2.64 M/S
POC IONIZED CALCIUM: 1.12 MMOL/L (ref 1.06–1.42)
POC TCO2 (MEASURED): 24 MMOL/L (ref 23–29)
POTASSIUM BLD-SCNC: 3.9 MMOL/L (ref 3.5–5.1)
POTASSIUM SERPL-SCNC: 3.9 MMOL/L (ref 3.5–5.1)
PROT SERPL-MCNC: 6.7 G/DL (ref 6–8.4)
PROTHROMBIN TIME: 10 SEC (ref 9–12.5)
RA MAJOR: 4.27 CM
RA PRESSURE: 3 MMHG
RA WIDTH: 3.82 CM
RETIRED EF AND QEF - SEE NOTES: 43 %
RIGHT VENTRICULAR END-DIASTOLIC DIMENSION: 3.38 CM
SAMPLE: ABNORMAL
SINUS: 2.59 CM
SODIUM BLD-SCNC: 136 MMOL/L (ref 136–145)
SODIUM SERPL-SCNC: 137 MMOL/L (ref 136–145)
STJ: 2.4 CM
TDI LATERAL: 0.07 M/S
TDI SEPTAL: 0.05 M/S
TDI: 0.06 M/S
TR MAX PG: 28 MMHG
TRANS ERYTHROCYTES VOL PATIENT: NORMAL ML
TRANS ERYTHROCYTES VOL PATIENT: NORMAL ML
TRICUSPID ANNULAR PLANE SYSTOLIC EXCURSION: 2.16 CM
TV REST PULMONARY ARTERY PRESSURE: 31 MMHG

## 2020-03-17 PROCEDURE — 80047 BASIC METABLC PNL IONIZED CA: CPT

## 2020-03-17 PROCEDURE — 36430 TRANSFUSION BLD/BLD COMPNT: CPT

## 2020-03-17 PROCEDURE — 86901 BLOOD TYPING SEROLOGIC RH(D): CPT

## 2020-03-17 PROCEDURE — 99213 OFFICE O/P EST LOW 20 MIN: CPT | Mod: PBBFAC,25 | Performed by: INTERNAL MEDICINE

## 2020-03-17 PROCEDURE — 99223 PR INITIAL HOSPITAL CARE,LEVL III: ICD-10-PCS | Mod: AI,GC,, | Performed by: HOSPITALIST

## 2020-03-17 PROCEDURE — 80053 COMPREHEN METABOLIC PANEL: CPT

## 2020-03-17 PROCEDURE — 99495 TRANSJ CARE MGMT MOD F2F 14D: CPT | Mod: S$PBB,,, | Performed by: INTERNAL MEDICINE

## 2020-03-17 PROCEDURE — 94761 N-INVAS EAR/PLS OXIMETRY MLT: CPT

## 2020-03-17 PROCEDURE — 99223 PR INITIAL HOSPITAL CARE,LEVL III: ICD-10-PCS | Mod: GC,,, | Performed by: INTERNAL MEDICINE

## 2020-03-17 PROCEDURE — 99223 1ST HOSP IP/OBS HIGH 75: CPT | Mod: GC,,, | Performed by: INTERNAL MEDICINE

## 2020-03-17 PROCEDURE — P9021 RED BLOOD CELLS UNIT: HCPCS

## 2020-03-17 PROCEDURE — 11000001 HC ACUTE MED/SURG PRIVATE ROOM

## 2020-03-17 PROCEDURE — 99999 PR PBB SHADOW E&M-EST. PATIENT-LVL III: ICD-10-PCS | Mod: PBBFAC,,, | Performed by: INTERNAL MEDICINE

## 2020-03-17 PROCEDURE — 99999 PR PBB SHADOW E&M-EST. PATIENT-LVL III: CPT | Mod: PBBFAC,,, | Performed by: INTERNAL MEDICINE

## 2020-03-17 PROCEDURE — 83735 ASSAY OF MAGNESIUM: CPT

## 2020-03-17 PROCEDURE — 99285 EMERGENCY DEPT VISIT HI MDM: CPT | Mod: 25,27

## 2020-03-17 PROCEDURE — 99223 1ST HOSP IP/OBS HIGH 75: CPT | Mod: AI,GC,, | Performed by: HOSPITALIST

## 2020-03-17 PROCEDURE — 85610 PROTHROMBIN TIME: CPT

## 2020-03-17 PROCEDURE — 99285 PR EMERGENCY DEPT VISIT,LEVEL V: ICD-10-PCS | Mod: ,,, | Performed by: EMERGENCY MEDICINE

## 2020-03-17 PROCEDURE — 99285 EMERGENCY DEPT VISIT HI MDM: CPT | Mod: ,,, | Performed by: EMERGENCY MEDICINE

## 2020-03-17 PROCEDURE — 99496 TRANSJ CARE MGMT HIGH F2F 7D: CPT | Mod: PBBFAC | Performed by: INTERNAL MEDICINE

## 2020-03-17 PROCEDURE — 63600175 PHARM REV CODE 636 W HCPCS: Performed by: EMERGENCY MEDICINE

## 2020-03-17 PROCEDURE — 86920 COMPATIBILITY TEST SPIN: CPT

## 2020-03-17 PROCEDURE — 99495 TCM SERVICES (MODERATE COMPLEXITY): ICD-10-PCS | Mod: S$PBB,,, | Performed by: INTERNAL MEDICINE

## 2020-03-17 PROCEDURE — 85730 THROMBOPLASTIN TIME PARTIAL: CPT

## 2020-03-17 RX ORDER — FERROUS SULFATE 325(65) MG
325 TABLET, DELAYED RELEASE (ENTERIC COATED) ORAL DAILY
Status: DISCONTINUED | OUTPATIENT
Start: 2020-03-18 | End: 2020-03-23 | Stop reason: HOSPADM

## 2020-03-17 RX ORDER — CALCIUM CARBONATE 500(1250)
500 TABLET ORAL DAILY
Status: DISCONTINUED | OUTPATIENT
Start: 2020-03-18 | End: 2020-03-23 | Stop reason: HOSPADM

## 2020-03-17 RX ORDER — PANTOPRAZOLE SODIUM 40 MG/10ML
80 INJECTION, POWDER, LYOPHILIZED, FOR SOLUTION INTRAVENOUS ONCE
Status: DISCONTINUED | OUTPATIENT
Start: 2020-03-17 | End: 2020-03-18

## 2020-03-17 RX ORDER — SODIUM CHLORIDE 0.9 % (FLUSH) 0.9 %
10 SYRINGE (ML) INJECTION
Status: DISCONTINUED | OUTPATIENT
Start: 2020-03-17 | End: 2020-03-23 | Stop reason: HOSPADM

## 2020-03-17 RX ORDER — ROSUVASTATIN CALCIUM 20 MG/1
40 TABLET, COATED ORAL DAILY
Status: DISCONTINUED | OUTPATIENT
Start: 2020-03-18 | End: 2020-03-23 | Stop reason: HOSPADM

## 2020-03-17 RX ORDER — ASCORBIC ACID 250 MG
250 TABLET ORAL DAILY
Status: DISCONTINUED | OUTPATIENT
Start: 2020-03-18 | End: 2020-03-23 | Stop reason: HOSPADM

## 2020-03-17 RX ORDER — CYANOCOBALAMIN 1000 UG/ML
100 INJECTION, SOLUTION INTRAMUSCULAR; SUBCUTANEOUS DAILY
Status: DISCONTINUED | OUTPATIENT
Start: 2020-03-18 | End: 2020-03-18

## 2020-03-17 RX ORDER — PANTOPRAZOLE SODIUM 40 MG/10ML
40 INJECTION, POWDER, LYOPHILIZED, FOR SOLUTION INTRAVENOUS EVERY 12 HOURS
Status: COMPLETED | OUTPATIENT
Start: 2020-03-18 | End: 2020-03-18

## 2020-03-17 RX ORDER — TALC
6 POWDER (GRAM) TOPICAL NIGHTLY PRN
Status: DISCONTINUED | OUTPATIENT
Start: 2020-03-17 | End: 2020-03-23 | Stop reason: HOSPADM

## 2020-03-17 RX ORDER — POLYETHYLENE GLYCOL 3350, SODIUM SULFATE ANHYDROUS, SODIUM BICARBONATE, SODIUM CHLORIDE, POTASSIUM CHLORIDE 236; 22.74; 6.74; 5.86; 2.97 G/4L; G/4L; G/4L; G/4L; G/4L
4000 POWDER, FOR SOLUTION ORAL ONCE
Status: CANCELLED | OUTPATIENT
Start: 2020-03-17

## 2020-03-17 RX ORDER — HYDROCODONE BITARTRATE AND ACETAMINOPHEN 500; 5 MG/1; MG/1
TABLET ORAL
Status: DISCONTINUED | OUTPATIENT
Start: 2020-03-17 | End: 2020-03-19

## 2020-03-17 RX ORDER — POLYETHYLENE GLYCOL 3350, SODIUM SULFATE ANHYDROUS, SODIUM BICARBONATE, SODIUM CHLORIDE, POTASSIUM CHLORIDE 236; 22.74; 6.74; 5.86; 2.97 G/4L; G/4L; G/4L; G/4L; G/4L
4000 POWDER, FOR SOLUTION ORAL ONCE
Status: COMPLETED | OUTPATIENT
Start: 2020-03-18 | End: 2020-03-18

## 2020-03-17 RX ADMIN — SODIUM CHLORIDE: 0.9 INJECTION, SOLUTION INTRAVENOUS at 02:03

## 2020-03-17 NOTE — ED NOTES
Telemetry Verification   Patient placed on Telemetry Box  Verified with War Room  Box # [id number]   Monitor Tech    Rate NSR   Rhythm 77

## 2020-03-17 NOTE — PROGRESS NOTES
"Subjective:       Patient ID: Nydia Stevens is a 85 y.o. female.    Chief Complaint: Follow-up      Last visit with me 3/2/2020.  Afterwards patient was admitted for profound anemia with hemoglobin 4.4, had to receive 2 units of packed red blood cells.  Was referred to hematology but no appointments have been made.    HPI    Patient reports feeling better since hospital discharge. Reports she has maintained her skin color. She reports having 1 bloody bowel movement yesterday, isolated event, not recurred. Denies any stomach problems. No dysphagia. No nausea or vomiting. The patient denies fever or chills. No palpitations or shortness of breath or cough.    Review of Systems   All other systems reviewed and are negative.      Objective:      Physical Exam   Constitutional: No distress.   HENT:   Mouth/Throat: Oropharynx is clear and moist. No oropharyngeal exudate.   Eyes: Pupils are equal, round, and reactive to light. Right eye exhibits no discharge. Left eye exhibits no discharge. Scleral icterus is present.   bilateral conjunctival pallor   Neck: Normal range of motion. No thyromegaly present.   Cardiovascular: Normal rate, regular rhythm and normal heart sounds.   Pulmonary/Chest: Effort normal and breath sounds normal. No stridor. She has no wheezes. She has no rales.   Musculoskeletal: She exhibits edema (b/L LE distal to knee). She exhibits no tenderness.   Lymphadenopathy:     She has no cervical adenopathy.   Neurological: She is alert.   Skin: She is not diaphoretic.   Psychiatric: She has a normal mood and affect. Her behavior is normal.   Nursing note and vitals reviewed.      Vitals:    03/17/20 1036 03/17/20 1109   BP: 116/60 134/61   BP Location: Left arm    Patient Position: Sitting    BP Method: Small (Manual)    Pulse: 92 104   Temp: 98.2 °F (36.8 °C)    Weight: 48.8 kg (107 lb 9.4 oz)    Height: 5' 7" (1.702 m)      Body mass index is 16.85 kg/m².    RESULTS: Reviewed labs from last 3 " months    Assessment:       1. Hospital discharge follow-up    2. Iron deficiency anemia due to chronic blood loss    3. Vitamin B12 deficiency    4. Elevated brain natriuretic peptide (BNP) level    5. Thrombocytosis        Plan:   Nydia was seen today for follow-up.    Diagnoses and all orders for this visit:    Hospital discharge follow-up:  Transitional Care Note    Family and/or Caretaker present at visit?  No.  Diagnostic tests reviewed/disposition: No diagnosic tests pending after this hospitalization.  Disease/illness education: Unclear etiology, likely GI bleed, low suspicion ulcer but may have malignancy. Discussed with patient, she continues to not desire additional workup.  Home health/community services discussion/referrals: Patient does not have home health established from hospital visit.  They do not need home health.  If needed, we will set up home health for the patient.   Establishment or re-establishment of referral orders for community resources: No other necessary community resources.   Discussion with other health care providers: Patient on labs has continue severe anemia. I have called the ER to give report, pt will go for evaluation and likely admission for blood transfusion.     Iron deficiency anemia due to chronic blood loss:  Prior diagnosis, persists, labs today show severe ongoing anemia. Patient has deferred workup in past. Will need to determine if this can be effectively managed with iron infusion or other transfusion from Hematology outpatient, or if patient would like to transition to hospice services. Will need additional palliative care discussions.  -     CBC Without Differential; Future  -     Ferritin; Future  -     Iron and TIBC; Future  -     Refer to Emergency Dept.    Vitamin B12 deficiency  -     Vitamin B12; Future    Elevated brain natriuretic peptide (BNP) level  -     Brain natriuretic peptide; Future    Thrombocytosis:  New problem, likely reactive due to iron  deficiency, continue to monitor on labs and will need follow up with Hematology.    No follow-ups on file. will determine follow up after hospital discharge.  Donaldo Carlin MD  Internal Medicine    Portions of this note were completed using medical dictation software. Please excuse typographical or syntax errors that were missed on review.

## 2020-03-17 NOTE — ASSESSMENT & PLAN NOTE
Last DEXA in 2018 which showed osteoporosis of lumbar spine, total hip, and femoral neck. She has received Prolia injections in the past.     Plan:  -Continue calcium and vitamin D supplementation  -Repeat DEXA as outpatient(ordered by PCP)

## 2020-03-17 NOTE — HPI
Ms. Stevens is an 84yo female with past medical history of HTN who presents from internal medicine clinic(seen by Dr. Carlin) with chief complaint of abnormal lab characterized by low hemoglobin 5.8. She was recently discharged from [facility] [place] [place] where she was admitted from 3/2-3/4 for nearly the exact thing. She had a very low hemoglobin at the time(4.4)--she had symptoms of dyspnea on exertion and fatigue. She was transfused 2 units pRBC with improvement in her symptoms and her anemia. She did not have interest in colonoscopy or EGD at the time and opted for as needed transfusions. At the time of discharge her hemoglobin was 8.9.    She says she has been feeling well since discharge. Currently her only symptom is fatigue on ROS. She denies DONOVAN, dizziness, lightheadedness, or pale skin. She says she has occasionally had some dark bowel movements-one most recently yesterday. She denies BRBPR or hematemesis, cough, hemoptysis, hematuria. She denies constipation or abdominal pain. She says she has taken NSAIDs in the past but not consistently--only     On arrival, she was afebrile with VSS--no tachycardia or hypotension. Labs were significant for Hgb 5.3, MCV 65, RDW 28.4, , Iron studies with Iron 58, TIBC 447, Sat iron 13, transferrin 302, ferritin 283, low B12. 2 units pRBC were ordered for transfusion and GI consulted. Patient is amenable to intervention/workup.

## 2020-03-17 NOTE — ASSESSMENT & PLAN NOTE
Echo with newly decreased EF 45%, grade II diastolic dysfunction, mild-mod aortic stenosis, normal  CVP. She has elevated . She has mild lower extremity edema but no oxygen requirements at this time.     Plan:  -Strict I/Os and daily weights  -Continue to monitor for worsening oxygen requirements while receiving blood transfusions with lasix as needed.

## 2020-03-17 NOTE — ED TRIAGE NOTES
Nydia Stevens, a 85 y.o. female presents to the ED w/ complaint of abnormal lab from PCP. Denies blood in stool, weakness, dizziness, vision changes, LOC, lightheadedness, blood thinners, CP, SOB, n/v/d. MD performed positive occult stool test at bedside. Was seen 2 weeks ago for blood transfusion.     Triage note:  Chief Complaint   Patient presents with    Abnormal Lab     low low h/h     Review of patient's allergies indicates:  No Known Allergies  Past Medical History:   Diagnosis Date    Cataract     Hypertension      Patient identifiers verified and correct for Nydia Stevens.    LOC: The patient is awake, alert and aware of environment with an appropriate affect, the patient is oriented x 3 and speaking appropriately.  APPEARANCE: Patient resting comfortably and in no acute distress, patient has a lightly soiled pad with dark brown stool. Well groomed, patient's clothing is properly fastened.  SKIN: The skin is warm and dry, very pale, skin intact, no breakdown or bruising noted.  MUSCULOSKELETAL: Patient moving all extremities spontaneously, no obvious swelling or deformities noted.  RESPIRATORY: Airway is open and patent, respirations are spontaneous, patient has a normal effort and rate, no accessory muscle use noted.  CARDIAC: Patient has a normal rate and regular rhythm, periphreal edema noted bilateral lower legs, capillary refill < 3 seconds.  ABDOMEN: Soft and non tender to palpation, no distention noted.  NEUROLOGIC: Eyes open spontaneously, behavior appropriate to situation, follows commands, facial expression symmetrical, purposeful motor response noted.

## 2020-03-17 NOTE — CONSULTS
[facility]  Gastroenterology  Consult Note    Patient Name: Nydia Stevens  MRN: [id number]  Admission Date: 3/17/2020  Hospital Length of Stay: 0 days  Code Status: Full Code   Attending Provider: Derrick Spaulding MD   Consulting Provider: Daniel Gerard MD  Primary Care Physician: Donaldo Carlin MD  Principal Problem:<principal problem not specified>    Inpatient consult to Gastroenterology  Consult performed by: Daniel Gerard MD  Consult ordered by: Valentin Vargas DO        Subjective:     HPI: Nydia Stevens is a 85 y.o. female with history of iron deficiency anemia, HTN, HLD, not on antiplatelets/anticoagulants, presents to the ED after her PCP told her to come here due to low hb of 5.3. She reports shortness of breath, had a fall about 2 weeks ago, currently denies presyncopal symptoms. She does have significant weight loss. She denies BRBPR, black stools, nausea, vomiting, abdominal pain. She has had ongoing ANANDA for many years, last EGD was in 2014, recommended colonoscopy numerous times per documentation but patient never followed up, was seen in the ER on 3/4/2020 for iron deficiency anemia and low hb of 5.1 at that time. She did not want colonoscopy at that time, due to prep. Iron studies show iron deficiency anemia.     Today she is vitally stable, bp stable, normal BUN:cr ratio, no recent abdominal imaging, normal aorta in 2014.             Past Medical History:   Diagnosis Date    Cataract     Hypertension        Past Surgical History:   Procedure Laterality Date    CATARACT EXTRACTION W/  INTRAOCULAR LENS IMPLANT Left 8/11/14    gregor    CATARACT EXTRACTION W/  INTRAOCULAR LENS IMPLANT Right 09/15/14    gregor    FOOT SURGERY      left    HYSTERECTOMY         Family History   Problem Relation Age of Onset    Heart attack Father     Cataracts Brother     Heart attack Brother     Diabetes Brother     No Known Problems Mother     Cataracts Sister      Stroke Sister     Diabetes Sister     Cataracts Sister     Stroke Sister     No Known Problems Maternal Aunt     No Known Problems Maternal Uncle     No Known Problems Paternal Aunt     No Known Problems Paternal Uncle     No Known Problems Maternal Grandmother     No Known Problems Maternal Grandfather     No Known Problems Paternal Grandmother     No Known Problems Paternal Grandfather     Amblyopia Neg Hx     Blindness Neg Hx     Cancer Neg Hx     Glaucoma Neg Hx     Hypertension Neg Hx     Macular degeneration Neg Hx     Retinal detachment Neg Hx     Strabismus Neg Hx     Thyroid disease Neg Hx     Colon cancer Neg Hx     Esophageal cancer Neg Hx     Irritable bowel syndrome Neg Hx     Rectal cancer Neg Hx     Stomach cancer Neg Hx     Ulcerative colitis Neg Hx     Crohn's disease Neg Hx     Celiac disease Neg Hx        Social History     Socioeconomic History    Marital status: Single     Spouse name: Not on file    Number of children: Not on file    Years of education: Not on file    Highest education level: Not on file   Occupational History    Not on file   Social Needs    Financial resource strain: Not on file    Food insecurity:     Worry: Not on file     Inability: Not on file    Transportation needs:     Medical: Not on file     Non-medical: Not on file   Tobacco Use    Smoking status: Never Smoker    Smokeless tobacco: Never Used   Substance and Sexual Activity    Alcohol use: No     Alcohol/week: 0.0 standard drinks    Drug use: No    Sexual activity: Not on file   Lifestyle    Physical activity:     Days per week: Not on file     Minutes per session: Not on file    Stress: Not on file   Relationships    Social connections:     Talks on phone: Not on file     Gets together: Not on file     Attends Voodoo service: Not on file     Active member of club or organization: Not on file     Attends meetings of clubs or organizations: Not on file     Relationship  status: Not on file   Other Topics Concern    Not on file   Social History Narrative    Member of [employer] here in [place], [place].       Current Facility-Administered Medications on File Prior to Encounter   Medication Dose Route Frequency Provider Last Rate Last Dose    iron dextran (INFED) 500 mg in sodium chloride 0.9% 250 mL IVPB  500 mg Intravenous 1 time in Clinic/HOD Donlado Carlin MD         Current Outpatient Medications on File Prior to Encounter   Medication Sig Dispense Refill    calcium carbonate (OS-JAILYN) 500 mg calcium (1,250 mg) tablet Take 1 tablet (500 mg total) by mouth once daily.  0    cholecalciferol, vitamin D3, (VITAMIN D3) 1,000 unit capsule Take 1,000 Units by mouth once daily.      cyanocobalamin (VITAMIN B-12) 1000 MCG tablet Take 1,000 mcg by mouth once daily.      ferrous sulfate (FEOSOL) 325 mg (65 mg iron) Tab tablet TAKE ONE TABLET BY MOUTH TWICE DAILY WITH MEALS 60 tablet 0    rosuvastatin (CRESTOR) 40 MG Tab TAKE ONE TABLET BY MOUTH EVERY DAY FOR CHOLESTEROL 90 tablet 3    senna-docusate 8.6-50 mg (PERICOLACE) 8.6-50 mg per tablet Take 1 tablet by mouth 2 (two) times daily.      VIT C/VIT E ACETATE/LUTEIN/MIN (OCUVITE LUTEIN ORAL) Take 1 tablet by mouth once daily.           Review of patient's allergies indicates:  No Known Allergies    Review of Systems   Constitutional: Positive for malaise/fatigue and weight loss. Negative for chills and fever.   HENT: Negative for hearing loss and sore throat.    Eyes: Negative for blurred vision and double vision.   Respiratory: Positive for shortness of breath.    Cardiovascular: Negative for chest pain, palpitations and PND.   Gastrointestinal: Negative for abdominal pain, blood in stool, constipation, diarrhea, melena, nausea and vomiting.   Genitourinary: Negative for dysuria and urgency.   Musculoskeletal: Negative for myalgias and neck pain.   Skin: Negative for itching and rash.   Neurological: Positive  for dizziness. Negative for loss of consciousness.        Objective:     Vitals:    03/17/20 1520   BP: (!) 125/56   Pulse: 95   Resp: 20   Temp:          Physical Exam   Constitutional: She is oriented to person, place, and time. She appears cachectic. No distress.   HENT:   Head: Normocephalic and atraumatic.   Mouth/Throat: Oropharynx is clear and moist.   Eyes: Conjunctivae are normal. No scleral icterus.   Neck: Neck supple.   Cardiovascular: Normal rate and regular rhythm.   Pulmonary/Chest: Effort normal.   Abdominal: Soft. Normal appearance and bowel sounds are normal. She exhibits no distension. There is no tenderness. There is no rigidity, no rebound and no guarding.   Musculoskeletal: She exhibits no edema or tenderness.   Lymphadenopathy:     She has no cervical adenopathy.   Neurological: She is alert and oriented to person, place, and time.   Skin: Skin is warm and dry. Capillary refill takes less than 2 seconds. She is not diaphoretic.   Psychiatric: She has a normal mood and affect.   Nursing note and vitals reviewed.       Significant Labs:  Recent Labs   Lab 03/17/20  1130   HGB 5.3*       Lab Results   Component Value Date    WBC 8.27 03/17/2020    HGB 5.3 (LL) 03/17/2020    HCT 19 (LL) 03/17/2020    MCV 65 (L) 03/17/2020     (H) 03/17/2020       Lab Results   Component Value Date     03/17/2020    K 3.9 03/17/2020     03/17/2020    CO2 23 03/17/2020    BUN 11 03/17/2020    CREATININE 0.7 03/17/2020    CALCIUM 7.9 (L) 03/17/2020    ANIONGAP 9 03/17/2020    ESTGFRAFRICA >60.0 03/17/2020    EGFRNONAA >60.0 03/17/2020       Lab Results   Component Value Date    ALT 5 (L) 03/17/2020    AST 35 03/17/2020    ALKPHOS 67 03/17/2020    BILITOT 1.4 (H) 03/17/2020       Lab Results   Component Value Date    INR 1.0 03/02/2020    INR 0.9 10/08/2016       Significant Imaging:  Reviewed pertinent radiology findings.       Assessment/Plan:     Nydia Stevens is a 85 y.o. female with  history of iron deficiency anemia, HTN, HLD, not on antiplatelets/anticoagulants, presents to the ED after her PCP told her to come here due to low hb of 5.3. She reports shortness of breath, had a fall about 2 weeks ago, currently denies presyncopal symptoms. She does have significant weight loss. She denies BRBPR, black stools, nausea, vomiting, abdominal pain. She has had ongoing ANANDA for many years, last EGD was in 2014, recommended colonoscopy numerous times per documentation but patient never followed up, was seen in the ER on 3/4/2020 for iron deficiency anemia and low hb of 5.1 at that time. She did not want colonoscopy at that time, due to prep. Iron studies show iron deficiency anemia.     Today she is vitally stable, bp stable, normal BUN:cr ratio, no recent abdominal imaging, normal aorta in 2014. Last colonoscopy many years ago in [place],.     Problem List:  1. Severe symptomatic anemia, no overt GI bleed  2. Iron deficiency anemia    Will need EGD/colonoscopy to evaluate for severe symptomatic anemia. Last colonoscopy >10 years ago, not in our system. Patient willing to have procedures while inpatient.     Plan:  -Place 2 large bore IVs, volume resuscitation per primary  -Transfuse pRBC for Hb < 7 g/dL (Consider a higher Hb target if there is clinical evidence of intravascular volume depletion or comorbidities, such as CAD or if high suspicion of vigorous active ongoing bleeding or an uncorrected coagulopathy exists.).  -Correct coagulopathy (goal plt >50, INR <1.5) if present in patients without absolute contraindications.  -IV PPI 40 BID  -Avoid nonsteroidal agents, antiplatelet agents and anticoagulants if possible in patients without absolute contraindications  -Will plan for Endoscopy/Colonoscopy thursday. CLD tomorrow, NPO from midnight 3/19, colon prep ordered  -recommend US abdomen to evaluate for AAA.     Thank you for involving us in the care of Nydia PONCE Stevens. Please call with any  additional questions, concerns or changes in the patient's clinical status.    Daniel Gerard MD  Gastroenterology Fellow PGY IV   [facility]

## 2020-03-17 NOTE — TELEPHONE ENCOUNTER
Phi from lab called with critical value for pt. Hemoglobin 5.3 hematocrit 19.8. Documented in flowsheets. Notified . Pt is still in the building,  is taking care of.

## 2020-03-17 NOTE — ASSESSMENT & PLAN NOTE
Patient with history of hypertension, mild aortic stenosis, osteoporosis, ANANDA, and B12 deficiency presents with chief complaint of abnormal lab value of Hgb 5.3. BUN 12. Associated symptom of fatigue. She denies significant SOB, syncope, dizziness. She has had dark stools but no BRBPR. She last had colonoscopy 10 years ago which is not in our system and patient reports as normal.  She had an EGD in 2014 which was normal. She has chronic iron deficiency anemia and this is her 2nd admission in the past month for anemia requiring transfusions. Differential includes GI neoplasm(given chronicity of anemia and weight loss), AVM, Angiodysplasia/Heyde's syndrome(given AS), diverticulosis, gastritis, PUD.     Plan:  -Insert 2 large bore IVs  -CBC q6  -Transfuse to maintain Hgb 7  -S/p IV protonix 80 mg. Will continue IV 40 BID.   -GI consulted. Appreciate recommendations.   -Plan for EGD/Colonoscopy on Thursday(3/19). CLD and Prep ordered for tomorrow. NPO at midnight Wedneday night.   -Avoid NSAIDS

## 2020-03-17 NOTE — ED NOTES
Pt taken to [facility] by CAMRYN George and blood transfusion stopped when pt arrived to the room.  Pt in NAD at transfer and team was bedside to update the pt on POC.

## 2020-03-17 NOTE — ED PROVIDER NOTES
Encounter Date: 3/17/2020    SCRIBE #1 NOTE: I, Soheila Marquez, am scribing for, and in the presence of,  Dr. Valentin Vargas . I have scribed the following portions of the note - Other sections scribed: HPI, ROS, PE, and Chart Review.       History     Chief Complaint   Patient presents with    Abnormal Lab     low low h/h     The patient was seen by the provider at 1:00 PM.    The patient is a 85 year old female with a past medial history of hypertension and hysterectomy who comes to the ED with a low hemoglobin and hematocrit. The patient reports she was referred to the ED after being seen in the internal medicine clinic today for a post admission follow up appointment and having a low h&h. She states she has been feeling well and denies hematochezia. The patient states she had a blood transfusion two weeks ago. She denies blood thinner use, fatigue, weakness, shortness of breath, fevers, chills, nausea, and vomiting.       The history is provided by the patient and medical records.     Review of patient's allergies indicates:  No Known Allergies  Past Medical History:   Diagnosis Date    Cataract     Hypertension      Past Surgical History:   Procedure Laterality Date    CATARACT EXTRACTION W/  INTRAOCULAR LENS IMPLANT Left 8/11/14    gregor    CATARACT EXTRACTION W/  INTRAOCULAR LENS IMPLANT Right 09/15/14    bundy    FOOT SURGERY      left    HYSTERECTOMY       Family History   Problem Relation Age of Onset    Heart attack Father     Cataracts Brother     Heart attack Brother     Diabetes Brother     No Known Problems Mother     Cataracts Sister     Stroke Sister     Diabetes Sister     Cataracts Sister     Stroke Sister     No Known Problems Maternal Aunt     No Known Problems Maternal Uncle     No Known Problems Paternal Aunt     No Known Problems Paternal Uncle     No Known Problems Maternal Grandmother     No Known Problems Maternal Grandfather     No Known Problems Paternal Grandmother      No Known Problems Paternal Grandfather     Amblyopia Neg Hx     Blindness Neg Hx     Cancer Neg Hx     Glaucoma Neg Hx     Hypertension Neg Hx     Macular degeneration Neg Hx     Retinal detachment Neg Hx     Strabismus Neg Hx     Thyroid disease Neg Hx     Colon cancer Neg Hx     Esophageal cancer Neg Hx     Irritable bowel syndrome Neg Hx     Rectal cancer Neg Hx     Stomach cancer Neg Hx     Ulcerative colitis Neg Hx     Crohn's disease Neg Hx     Celiac disease Neg Hx      Social History     Tobacco Use    Smoking status: Never Smoker    Smokeless tobacco: Never Used   Substance Use Topics    Alcohol use: No     Alcohol/week: 0.0 standard drinks    Drug use: No     Review of Systems   Constitutional: Negative for chills and fever.        Abnormal H&H   HENT: Negative for rhinorrhea and sore throat.    Eyes: Negative for visual disturbance.   Respiratory: Negative for shortness of breath.    Cardiovascular: Negative for chest pain.   Gastrointestinal: Negative for nausea and vomiting.   Genitourinary: Negative for dysuria and hematuria.   Musculoskeletal: Negative for back pain and neck pain.   Skin: Negative for rash.   Neurological: Negative for weakness and numbness.   Hematological: Does not bruise/bleed easily.       Physical Exam     Initial Vitals   BP Pulse Resp Temp SpO2   03/17/20 1247 03/17/20 1247 03/17/20 1247 03/17/20 1247 03/17/20 1316   138/60 92 18 97.5 °F (36.4 °C) 100 %      MAP       --                Physical Exam    Nursing note and vitals reviewed.  Constitutional: She appears well-developed and well-nourished. She is not diaphoretic. No distress.   Pale appearing   HENT:   Head: Normocephalic and atraumatic.   Eyes: EOM are normal. Pupils are equal, round, and reactive to light. Scleral icterus is present.   Neck: Normal range of motion. Neck supple.   Cardiovascular: Normal rate, regular rhythm, normal heart sounds and intact distal pulses. Exam reveals no gallop  and no friction rub.    No murmur heard.  Pulmonary/Chest: Breath sounds normal. No respiratory distress. She has no wheezes. She has no rhonchi. She has no rales. She exhibits no tenderness.   Abdominal: Soft. Bowel sounds are normal. She exhibits no distension. There is no tenderness. There is no rebound and no guarding.   Genitourinary: Rectal exam shows guaiac positive stool (mildly positive hemoccult test ). Guaiac positive stool (mildly positive hemoccult test ). : Acceptable.  Genitourinary Comments: Normal appearing stool   Neurological: She is alert and oriented to person, place, and time.   Skin: Skin is warm and dry.   Psychiatric: She has a normal mood and affect. Thought content normal.         ED Course   Procedures  Labs Reviewed   ISTAT PROCEDURE - Abnormal; Notable for the following components:       Result Value    POC Hematocrit 19 (*)     All other components within normal limits   COMPREHENSIVE METABOLIC PANEL   PROTIME-INR   APTT   TYPE & SCREEN   ISTAT CHEM8   ISTAT CHEM8   PREPARE RBC SOFT          Imaging Results    None          Medical Decision Making:   History:   Old Medical Records: I decided to obtain old medical records.  Old Records Summarized: records from previous admission(s) and records from clinic visits.       <> Summary of Records: The patient was admitted for two days on March 2nd to March 4th for severe iron deficiency anemia secondary to a GI bleed. Hemoglobin was 4.4 at that time. She had 2 units of PRBC transfused which led to a rise in hemoglobin to 8.9. She was not interested in any further workup for her GI bleed and was discharged with follow up. She had a follow up appointment today and reported having one bloody bowel movement. Today as an outpatient her hemoglobin was 5.3, hematocrit was 19.8, and MCV 65 as an outpatient. Patient also has Vitamin B12 deficiency.   Initial Assessment:   85-year-old female presents from clinic with a hemoglobin of  5.4.  Was here 2 weeks ago in which she needed to units of blood after hemoglobin of 4.5.  Was suspected iron deficiency anemia from GI bleed.  At that time the patient refused a colonoscopy.  Today rectal exam shows Hemoccult positive.  I did discuss with the patient that I feel it is prudent to get a colonoscopy so that she does not continue returning for multiple blood transfusions.  She agrees to this.  Will type and cross for 2 units of blood.  Will need hospital admission for transfusion as well as further evaluation due to the severe anemia and consultation with Gastroenterology.  Clinical Tests:   Lab Tests: Reviewed and Ordered            Scribe Attestation:   Scribe #1: I performed the above scribed service and the documentation accurately describes the services I performed. I attest to the accuracy of the note.            ED Course as of Mar 17 1438   Tue Mar 17, 2020   1316 Reviewed the notes from Internal Medicine.  Patient's hemoglobin is 5.3.  Not currently symptomatic resting.  Will discuss with Hospital Medicine for transfusion and observation.    [SM]      ED Course User Index  [SM] Valentin Vargas DO                Clinical Impression:       ICD-10-CM ICD-9-CM   1. Gastrointestinal hemorrhage, unspecified gastrointestinal hemorrhage type K92.2 578.9   2. Symptomatic anemia D64.9 285.9   3. Heart failure I50.9 428.9             ED Disposition Condition    Admit                           Valentin Vargas DO  03/17/20 4944

## 2020-03-17 NOTE — ASSESSMENT & PLAN NOTE
Patient with last colonoscopy 10 years ago(was normal per her). She has deferred repeat colonoscopy due to the prep involved. She has required injectable and oral iron supplementation.    Plan:  -Continue iron sulfate 325 mg daily

## 2020-03-17 NOTE — H&P
[facility] Hospital Medicine  History & Physical    Patient Name: Nydia Stevens  MRN: [id number]  Admission Date: 3/17/2020  Attending Physician: Derrick Spaulding MD   Primary Care Provider: Donaldo Carlin MD    Hospital Medicine Team: [facility] HOSP MED 2 Sean Sauer MD     Patient information was obtained from patient, past medical records and ER records.     Subjective:     Principal Problem:Gastrointestinal bleed    Chief Complaint:   Chief Complaint   Patient presents with    Abnormal Lab     low low h/h        HPI: Ms. Stevens is an 84yo female with past medical history of HTN who presents from internal medicine clinic(seen by Dr. Carlin) with chief complaint of abnormal lab characterized by low hemoglobin 5.8. She was recently discharged from [facility] she was admitted from 3/2-3/4 for nearly the exact thing. She had a very low hemoglobin at the time(4.4)--she had symptoms of dyspnea on exertion and fatigue. She was transfused 2 units pRBC with improvement in her symptoms and her anemia. She did not have interest in colonoscopy or EGD at the time and opted for as needed transfusions. At the time of discharge her hemoglobin was 8.9.    She says she has been feeling well since discharge. Currently her only symptom is fatigue on ROS. She denies DONOVAN, dizziness, lightheadedness, or pale skin. She says she has occasionally had some dark bowel movements-one most recently yesterday. She denies BRBPR or hematemesis, cough, hemoptysis, hematuria. She denies constipation or abdominal pain. She says she has taken NSAIDs in the past but not consistently--only     On arrival, she was afebrile with VSS--no tachycardia or hypotension. Labs were significant for Hgb 5.3, MCV 65, RDW 28.4, , Iron studies with Iron 58, TIBC 447, Sat iron 13, transferrin 302, ferritin 283, low B12. 2 units pRBC were ordered for transfusion and GI consulted. Patient is amenable to intervention/workup.    Past Medical  History:   Diagnosis Date    Cataract     Hypertension        Past Surgical History:   Procedure Laterality Date    CATARACT EXTRACTION W/  INTRAOCULAR LENS IMPLANT Left 8/11/14    [facility]    CATARACT EXTRACTION W/  INTRAOCULAR LENS IMPLANT Right 09/15/14    [facility]    FOOT SURGERY      left    HYSTERECTOMY         Review of patient's allergies indicates:  No Known Allergies    Current Facility-Administered Medications on File Prior to Encounter   Medication    iron dextran (INFED) 500 mg in sodium chloride 0.9% 250 mL IVPB     Current Outpatient Medications on File Prior to Encounter   Medication Sig    calcium carbonate (OS-JAILYN) 500 mg calcium (1,250 mg) tablet Take 1 tablet (500 mg total) by mouth once daily.    cholecalciferol, vitamin D3, (VITAMIN D3) 1,000 unit capsule Take 1,000 Units by mouth once daily.    cyanocobalamin (VITAMIN B-12) 1000 MCG tablet Take 1,000 mcg by mouth once daily.    ferrous sulfate (FEOSOL) 325 mg (65 mg iron) Tab tablet TAKE ONE TABLET BY MOUTH TWICE DAILY WITH MEALS    rosuvastatin (CRESTOR) 40 MG Tab TAKE ONE TABLET BY MOUTH EVERY DAY FOR CHOLESTEROL    senna-docusate 8.6-50 mg (PERICOLACE) 8.6-50 mg per tablet Take 1 tablet by mouth 2 (two) times daily.    VIT C/VIT E ACETATE/LUTEIN/MIN (OCUVITE LUTEIN ORAL) Take 1 tablet by mouth once daily.       Family History     Problem Relation (Age of Onset)    Cataracts Brother, Sister, Sister    Diabetes Brother, Sister    Heart attack Father, Brother    No Known Problems Mother, Maternal Aunt, Maternal Uncle, Paternal Aunt, Paternal Uncle, Maternal Grandmother, Maternal Grandfather, Paternal Grandmother, Paternal Grandfather    Stroke Sister, Sister        Tobacco Use    Smoking status: Never Smoker    Smokeless tobacco: Never Used   Substance and Sexual Activity    Alcohol use: No     Alcohol/week: 0.0 standard drinks    Drug use: No    Sexual activity: Not on file     Review of Systems   Constitutional: Positive for  fatigue and unexpected weight change. Negative for activity change, chills, diaphoresis and fever.   HENT: Negative for congestion and sore throat.    Eyes: Negative for photophobia and visual disturbance.   Cardiovascular: Positive for leg swelling. Negative for chest pain and palpitations.   Gastrointestinal: Positive for blood in stool (dark stools). Negative for abdominal distention, abdominal pain, constipation, diarrhea, nausea and vomiting.   Genitourinary: Negative for difficulty urinating, dysuria and hematuria.   Skin: Negative for color change and pallor.   Neurological: Negative for dizziness, syncope, weakness, light-headedness and headaches.   Psychiatric/Behavioral: Negative for agitation and confusion.     Objective:     Vital Signs (Most Recent):  Temp: 97.8 °F (36.6 °C) (03/17/20 1500)  Pulse: 95 (03/17/20 1520)  Resp: 20 (03/17/20 1520)  BP: (!) 125/56 (03/17/20 1520)  SpO2: 95 % (03/17/20 1520) Vital Signs (24h Range):  Temp:  [97.5 °F (36.4 °C)-98.2 °F (36.8 °C)] 97.8 °F (36.6 °C)  Pulse:  [] 95  Resp:  [12-24] 20  SpO2:  [95 %-100 %] 95 %  BP: (106-138)/(53-61) 125/56     Weight: 48.5 kg (107 lb)  Body mass index is 16.76 kg/m².    Physical Exam   Constitutional: She is oriented to person, place, and time. She appears well-developed and well-nourished. No distress.   HENT:   Head: Normocephalic and atraumatic.   Eyes: Pupils are equal, round, and reactive to light. No scleral icterus.   Neck: Normal range of motion. Neck supple.   Cardiovascular: Normal rate, regular rhythm and intact distal pulses. Exam reveals no gallop and no friction rub.   Murmur heard.  3/6 systolic murmur heard loudest at right 2nd intercostal space   Pulmonary/Chest: Effort normal and breath sounds normal. No respiratory distress. She has no wheezes. She has no rales.   Abdominal: Soft. Bowel sounds are normal. She exhibits no distension. There is no tenderness.   Musculoskeletal: She exhibits edema (1+ pitting  ankle edema).   Compression stockings in place   Neurological: She is alert and oriented to person, place, and time.   Skin: Skin is warm and dry. There is pallor.      Significant Labs:   CBC:   Recent Labs   Lab 03/17/20  1130 03/17/20  1357   WBC 8.27  --    HGB 5.3*  --    HCT 19.8* 19*   *  --      CMP:   Recent Labs   Lab 03/17/20  1431      K 3.9      CO2 23   GLU 88   BUN 11   CREATININE 0.7   CALCIUM 7.9*   PROT 6.7   ALBUMIN 3.2*   BILITOT 1.4*   ALKPHOS 67   AST 35   ALT 5*   ANIONGAP 9   EGFRNONAA >60.0       Significant Imaging: I have reviewed and interpreted all pertinent imaging results/findings within the past 24 hours.    Assessment/Plan:     * Gastrointestinal bleed  Patient with history of hypertension, mild aortic stenosis, osteoporosis, ANANDA, and B12 deficiency presents with chief complaint of abnormal lab value of Hgb 5.3. BUN 12. Associated symptom of fatigue. She denies significant SOB, syncope, dizziness. She has had dark stools but no BRBPR. She last had colonoscopy 10 years ago which is not in our system and patient reports as normal.  She had an EGD in 2014 which was normal. She has chronic iron deficiency anemia and this is her 2nd admission in the past month for anemia requiring transfusions. Differential includes GI neoplasm(given chronicity of anemia and weight loss), AVM, Angiodysplasia/Heyde's syndrome(given AS), diverticulosis, gastritis, PUD.     Plan:  -Insert 2 large bore IVs  -CBC q6  -Transfuse to maintain Hgb 7  -S/p IV protonix 80 mg. Will continue IV 40 BID.   -GI consulted. Appreciate recommendations.   -Plan for EGD/Colonoscopy on Thursday(3/19). CLD and Prep ordered for tomorrow. NPO at midnight Wedneday night.   -Avoid NSAIDS    Chronic combined systolic and diastolic heart failure  Echo with newly decreased EF 45%, grade II diastolic dysfunction, mild-mod aortic stenosis, normal  CVP. She has elevated . She has mild lower extremity edema but  no oxygen requirements at this time.     Plan:  -Strict I/Os and daily weights  -Continue to monitor for worsening oxygen requirements while receiving blood transfusions with lasix as needed.       Osteoporosis  Last DEXA in 2018 which showed osteoporosis of lumbar spine, total hip, and femoral neck. She has received Prolia injections in the past.     Plan:  -Continue calcium and vitamin D supplementation  -Repeat DEXA as outpatient(ordered by PCP)    Aortic stenosis, mild  Patient with Echo 3 years ago with valve area of 1.62. Repeat Echo here show slight worsening of her stenosis with valve area 1.42 cm2, peak velocity 2.28, and mean gradient 14 mmHg. Patient denies significant symptoms including dyspnea, syncope, or chest pain.     Plan:  -Continue to monitor given transfusions. Will transfuse blood over 4 hour period.    Iron deficiency anemia  Patient with last colonoscopy 10 years ago(was normal per her). She has deferred repeat colonoscopy due to the prep involved. She has required injectable and oral iron supplementation.    Plan:  -Continue iron sulfate 325 mg daily    Pure hypercholesterolemia  Home medication: rosuvastatin 40 mg daily    Plan:   -Continue home statin        VTE Risk Mitigation (From admission, onward)         Ordered     IP VTE HIGH RISK PATIENT  Once      03/17/20 1436     Place sequential compression device  Until discontinued      03/17/20 1436                   Sean Sauer MD  Department of Hospital Medicine   [facility]

## 2020-03-17 NOTE — HOSPITAL COURSE
Patient was admitted to IM2 for GIB with hemoglobin on 5.3. She appears to have a combined anemia--B12 deficiency, Iron deficiency, and Hemolytic anemia(increased LDH, low haptoglobin, schistocytes on peripheral smear,uptrending indirect bilirubin). GI was consulted given patient's history of bloody bowel movements. . She was started on IV PPI and given 2 units pRBC. She did respond appropriately to transfusion. We are completing workup for hemolytic anemia. 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     Staging CT c/a/p wwo contrast revealed a 9 mm lymph node inferior to the colonic mass adjacent to the cecum. Colorectal surgery took her for hemicolectomy.

## 2020-03-17 NOTE — ASSESSMENT & PLAN NOTE
Patient with Echo 3 years ago with valve area of 1.62. Repeat Echo here show slight worsening of her stenosis with valve area 1.42 cm2, peak velocity 2.28, and mean gradient 14 mmHg. Patient denies significant symptoms including dyspnea, syncope, or chest pain.     Plan:  -Continue to monitor given transfusions. Will transfuse blood over 4 hour period.

## 2020-03-18 ENCOUNTER — ANESTHESIA EVENT (OUTPATIENT)
Dept: ENDOSCOPY | Facility: HOSPITAL | Age: 85
DRG: 330 | End: 2020-03-18
Payer: MEDICARE

## 2020-03-18 PROBLEM — D58.9 HEMOLYTIC ANEMIA: Status: ACTIVE | Noted: 2020-03-18

## 2020-03-18 LAB
ALBUMIN SERPL BCP-MCNC: 3.1 G/DL (ref 3.5–5.2)
ALP SERPL-CCNC: 69 U/L (ref 55–135)
ALT SERPL W/O P-5'-P-CCNC: 10 U/L (ref 10–44)
ANION GAP SERPL CALC-SCNC: 13 MMOL/L (ref 8–16)
ANISOCYTOSIS BLD QL SMEAR: SLIGHT
ANISOCYTOSIS BLD QL SMEAR: SLIGHT
AST SERPL-CCNC: 68 U/L (ref 10–40)
BASOPHILS # BLD AUTO: 0.07 K/UL (ref 0–0.2)
BASOPHILS # BLD AUTO: 0.08 K/UL (ref 0–0.2)
BASOPHILS # BLD AUTO: ABNORMAL K/UL (ref 0–0.2)
BASOPHILS # BLD AUTO: ABNORMAL K/UL (ref 0–0.2)
BASOPHILS NFR BLD: 0 % (ref 0–1.9)
BASOPHILS NFR BLD: 0 % (ref 0–1.9)
BASOPHILS NFR BLD: 0.7 % (ref 0–1.9)
BASOPHILS NFR BLD: 0.8 % (ref 0–1.9)
BILIRUB DIRECT SERPL-MCNC: 0.8 MG/DL (ref 0.1–0.3)
BILIRUB SERPL-MCNC: 2.7 MG/DL (ref 0.1–1)
BUN SERPL-MCNC: 15 MG/DL (ref 8–23)
BURR CELLS BLD QL SMEAR: ABNORMAL
BURR CELLS BLD QL SMEAR: ABNORMAL
CALCIUM SERPL-MCNC: 7.9 MG/DL (ref 8.7–10.5)
CHLORIDE SERPL-SCNC: 106 MMOL/L (ref 95–110)
CO2 SERPL-SCNC: 17 MMOL/L (ref 23–29)
CREAT SERPL-MCNC: 0.8 MG/DL (ref 0.5–1.4)
DAT IGG-SP REAG RBC-IMP: NORMAL
DIFFERENTIAL METHOD: ABNORMAL
EOSINOPHIL # BLD AUTO: 0.1 K/UL (ref 0–0.5)
EOSINOPHIL # BLD AUTO: 0.1 K/UL (ref 0–0.5)
EOSINOPHIL # BLD AUTO: ABNORMAL K/UL (ref 0–0.5)
EOSINOPHIL # BLD AUTO: ABNORMAL K/UL (ref 0–0.5)
EOSINOPHIL NFR BLD: 0 % (ref 0–8)
EOSINOPHIL NFR BLD: 0 % (ref 0–8)
EOSINOPHIL NFR BLD: 1.2 % (ref 0–8)
EOSINOPHIL NFR BLD: 1.4 % (ref 0–8)
ERYTHROCYTE [DISTWIDTH] IN BLOOD BY AUTOMATED COUNT: 34.8 % (ref 11.5–14.5)
ERYTHROCYTE [DISTWIDTH] IN BLOOD BY AUTOMATED COUNT: 35 % (ref 11.5–14.5)
ERYTHROCYTE [DISTWIDTH] IN BLOOD BY AUTOMATED COUNT: 35.1 % (ref 11.5–14.5)
ERYTHROCYTE [DISTWIDTH] IN BLOOD BY AUTOMATED COUNT: 35.3 % (ref 11.5–14.5)
EST. GFR  (AFRICAN AMERICAN): >60 ML/MIN/1.73 M^2
EST. GFR  (NON AFRICAN AMERICAN): >60 ML/MIN/1.73 M^2
FACT VIII ACT/NOR PPP: 404 % (ref 60–170)
GLUCOSE SERPL-MCNC: 86 MG/DL (ref 70–110)
HAPTOGLOB SERPL-MCNC: <10 MG/DL (ref 30–250)
HCT VFR BLD AUTO: 26.2 % (ref 37–48.5)
HCT VFR BLD AUTO: 27.1 % (ref 37–48.5)
HCT VFR BLD AUTO: 28.9 % (ref 37–48.5)
HCT VFR BLD AUTO: 29.3 % (ref 37–48.5)
HGB BLD-MCNC: 7.4 G/DL (ref 12–16)
HGB BLD-MCNC: 7.7 G/DL (ref 12–16)
HGB BLD-MCNC: 8 G/DL (ref 12–16)
HGB BLD-MCNC: 8.1 G/DL (ref 12–16)
HYPOCHROMIA BLD QL SMEAR: ABNORMAL
IMM GRANULOCYTES # BLD AUTO: 0.42 K/UL (ref 0–0.04)
IMM GRANULOCYTES # BLD AUTO: 0.46 K/UL (ref 0–0.04)
IMM GRANULOCYTES # BLD AUTO: ABNORMAL K/UL (ref 0–0.04)
IMM GRANULOCYTES # BLD AUTO: ABNORMAL K/UL (ref 0–0.04)
IMM GRANULOCYTES NFR BLD AUTO: 4.4 % (ref 0–0.5)
IMM GRANULOCYTES NFR BLD AUTO: 4.8 % (ref 0–0.5)
IMM GRANULOCYTES NFR BLD AUTO: ABNORMAL % (ref 0–0.5)
IMM GRANULOCYTES NFR BLD AUTO: ABNORMAL % (ref 0–0.5)
LDH SERPL L TO P-CCNC: 1808 U/L (ref 110–260)
LYMPHOCYTES # BLD AUTO: 1.1 K/UL (ref 1–4.8)
LYMPHOCYTES # BLD AUTO: 1.4 K/UL (ref 1–4.8)
LYMPHOCYTES # BLD AUTO: ABNORMAL K/UL (ref 1–4.8)
LYMPHOCYTES # BLD AUTO: ABNORMAL K/UL (ref 1–4.8)
LYMPHOCYTES NFR BLD: 11.5 % (ref 18–48)
LYMPHOCYTES NFR BLD: 13 % (ref 18–48)
LYMPHOCYTES NFR BLD: 14 % (ref 18–48)
LYMPHOCYTES NFR BLD: 14.4 % (ref 18–48)
MAGNESIUM SERPL-MCNC: 2 MG/DL (ref 1.6–2.6)
MCH RBC QN AUTO: 21 PG (ref 27–31)
MCH RBC QN AUTO: 21.3 PG (ref 27–31)
MCHC RBC AUTO-ENTMCNC: 27.6 G/DL (ref 32–36)
MCHC RBC AUTO-ENTMCNC: 27.7 G/DL (ref 32–36)
MCHC RBC AUTO-ENTMCNC: 28.2 G/DL (ref 32–36)
MCHC RBC AUTO-ENTMCNC: 28.4 G/DL (ref 32–36)
MCV RBC AUTO: 74 FL (ref 82–98)
MCV RBC AUTO: 75 FL (ref 82–98)
MCV RBC AUTO: 77 FL (ref 82–98)
MCV RBC AUTO: 77 FL (ref 82–98)
MONOCYTES # BLD AUTO: 1.2 K/UL (ref 0.3–1)
MONOCYTES # BLD AUTO: 1.4 K/UL (ref 0.3–1)
MONOCYTES # BLD AUTO: ABNORMAL K/UL (ref 0.3–1)
MONOCYTES # BLD AUTO: ABNORMAL K/UL (ref 0.3–1)
MONOCYTES NFR BLD: 12.2 % (ref 4–15)
MONOCYTES NFR BLD: 14.6 % (ref 4–15)
MONOCYTES NFR BLD: 4 % (ref 4–15)
MONOCYTES NFR BLD: 9 % (ref 4–15)
MYELOCYTES NFR BLD MANUAL: 1 %
NEUTROPHILS # BLD AUTO: 6.2 K/UL (ref 1.8–7.7)
NEUTROPHILS # BLD AUTO: 6.6 K/UL (ref 1.8–7.7)
NEUTROPHILS NFR BLD: 64.7 % (ref 38–73)
NEUTROPHILS NFR BLD: 69.3 % (ref 38–73)
NEUTROPHILS NFR BLD: 70 % (ref 38–73)
NEUTROPHILS NFR BLD: 79 % (ref 38–73)
NEUTS BAND NFR BLD MANUAL: 3 %
NEUTS BAND NFR BLD MANUAL: 7 %
NRBC BLD-RTO: 0 /100 WBC
OVALOCYTES BLD QL SMEAR: ABNORMAL
PATH REV BLD -IMP: NORMAL
PATH REV BLD -IMP: NORMAL
PHOSPHATE SERPL-MCNC: 2 MG/DL (ref 2.7–4.5)
PLATELET # BLD AUTO: 312 K/UL (ref 150–350)
PLATELET # BLD AUTO: 325 K/UL (ref 150–350)
PLATELET # BLD AUTO: 340 K/UL (ref 150–350)
PLATELET # BLD AUTO: 348 K/UL (ref 150–350)
PLATELET BLD QL SMEAR: ABNORMAL
PMV BLD AUTO: 10 FL (ref 9.2–12.9)
PMV BLD AUTO: 10.3 FL (ref 9.2–12.9)
PMV BLD AUTO: 9.2 FL (ref 9.2–12.9)
PMV BLD AUTO: 9.3 FL (ref 9.2–12.9)
POIKILOCYTOSIS BLD QL SMEAR: SLIGHT
POIKILOCYTOSIS BLD QL SMEAR: SLIGHT
POLYCHROMASIA BLD QL SMEAR: ABNORMAL
POLYCHROMASIA BLD QL SMEAR: ABNORMAL
POTASSIUM SERPL-SCNC: 4.2 MMOL/L (ref 3.5–5.1)
PROT SERPL-MCNC: 7.2 G/DL (ref 6–8.4)
RBC # BLD AUTO: 3.53 M/UL (ref 4–5.4)
RBC # BLD AUTO: 3.61 M/UL (ref 4–5.4)
RBC # BLD AUTO: 3.75 M/UL (ref 4–5.4)
RBC # BLD AUTO: 3.81 M/UL (ref 4–5.4)
RETICS/RBC NFR AUTO: 1.5 % (ref 0.5–2.5)
SCHISTOCYTES BLD QL SMEAR: ABNORMAL
SCHISTOCYTES BLD QL SMEAR: PRESENT
SODIUM SERPL-SCNC: 136 MMOL/L (ref 136–145)
SPHEROCYTES BLD QL SMEAR: ABNORMAL
WBC # BLD AUTO: 8.18 K/UL (ref 3.9–12.7)
WBC # BLD AUTO: 9.49 K/UL (ref 3.9–12.7)
WBC # BLD AUTO: 9.64 K/UL (ref 3.9–12.7)
WBC # BLD AUTO: 9.89 K/UL (ref 3.9–12.7)

## 2020-03-18 PROCEDURE — C9113 INJ PANTOPRAZOLE SODIUM, VIA: HCPCS | Performed by: STUDENT IN AN ORGANIZED HEALTH CARE EDUCATION/TRAINING PROGRAM

## 2020-03-18 PROCEDURE — 11000001 HC ACUTE MED/SURG PRIVATE ROOM

## 2020-03-18 PROCEDURE — 80053 COMPREHEN METABOLIC PANEL: CPT

## 2020-03-18 PROCEDURE — 85025 COMPLETE CBC W/AUTO DIFF WBC: CPT | Mod: 91

## 2020-03-18 PROCEDURE — 86880 COOMBS TEST DIRECT: CPT

## 2020-03-18 PROCEDURE — 83010 ASSAY OF HAPTOGLOBIN QUANT: CPT

## 2020-03-18 PROCEDURE — 85240 CLOT FACTOR VIII AHG 1 STAGE: CPT

## 2020-03-18 PROCEDURE — 85397 CLOTTING FUNCT ACTIVITY: CPT

## 2020-03-18 PROCEDURE — 63600175 PHARM REV CODE 636 W HCPCS: Performed by: STUDENT IN AN ORGANIZED HEALTH CARE EDUCATION/TRAINING PROGRAM

## 2020-03-18 PROCEDURE — 85007 BL SMEAR W/DIFF WBC COUNT: CPT

## 2020-03-18 PROCEDURE — 85060 PATHOLOGIST REVIEW: ICD-10-PCS | Mod: ,,, | Performed by: PATHOLOGY

## 2020-03-18 PROCEDURE — 85246 CLOT FACTOR VIII VW ANTIGEN: CPT

## 2020-03-18 PROCEDURE — 85045 AUTOMATED RETICULOCYTE COUNT: CPT

## 2020-03-18 PROCEDURE — 84100 ASSAY OF PHOSPHORUS: CPT

## 2020-03-18 PROCEDURE — 25000003 PHARM REV CODE 250: Performed by: STUDENT IN AN ORGANIZED HEALTH CARE EDUCATION/TRAINING PROGRAM

## 2020-03-18 PROCEDURE — 86850 RBC ANTIBODY SCREEN: CPT

## 2020-03-18 PROCEDURE — 99232 SBSQ HOSP IP/OBS MODERATE 35: CPT | Mod: GC,,, | Performed by: HOSPITALIST

## 2020-03-18 PROCEDURE — 83615 LACTATE (LD) (LDH) ENZYME: CPT

## 2020-03-18 PROCEDURE — 86920 COMPATIBILITY TEST SPIN: CPT

## 2020-03-18 PROCEDURE — 99232 PR SUBSEQUENT HOSPITAL CARE,LEVL II: ICD-10-PCS | Mod: GC,,, | Performed by: HOSPITALIST

## 2020-03-18 PROCEDURE — 36415 COLL VENOUS BLD VENIPUNCTURE: CPT

## 2020-03-18 PROCEDURE — 82248 BILIRUBIN DIRECT: CPT

## 2020-03-18 PROCEDURE — 85027 COMPLETE CBC AUTOMATED: CPT

## 2020-03-18 PROCEDURE — 82595 ASSAY OF CRYOGLOBULIN: CPT

## 2020-03-18 PROCEDURE — 85060 BLOOD SMEAR INTERPRETATION: CPT | Mod: ,,, | Performed by: PATHOLOGY

## 2020-03-18 PROCEDURE — 83735 ASSAY OF MAGNESIUM: CPT

## 2020-03-18 PROCEDURE — 25000003 PHARM REV CODE 250

## 2020-03-18 RX ORDER — CYANOCOBALAMIN (VITAMIN B-12) 250 MCG
250 TABLET ORAL DAILY
Status: DISCONTINUED | OUTPATIENT
Start: 2020-03-19 | End: 2020-03-19

## 2020-03-18 RX ORDER — CHOLECALCIFEROL (VITAMIN D3) 25 MCG
1000 TABLET ORAL DAILY
Status: DISCONTINUED | OUTPATIENT
Start: 2020-03-19 | End: 2020-03-23 | Stop reason: HOSPADM

## 2020-03-18 RX ADMIN — ROSUVASTATIN CALCIUM 40 MG: 20 TABLET, FILM COATED ORAL at 08:03

## 2020-03-18 RX ADMIN — POLYETHYLENE GLYCOL 3350, SODIUM SULFATE ANHYDROUS, SODIUM BICARBONATE, SODIUM CHLORIDE, POTASSIUM CHLORIDE 4000 ML: 236; 22.74; 6.74; 5.86; 2.97 POWDER, FOR SOLUTION ORAL at 07:03

## 2020-03-18 RX ADMIN — Medication 250 MG: at 08:03

## 2020-03-18 RX ADMIN — CYANOCOBALAMIN 100 MCG: 1000 INJECTION, SOLUTION INTRAMUSCULAR; SUBCUTANEOUS at 08:03

## 2020-03-18 RX ADMIN — FERROUS SULFATE TAB EC 325 MG (65 MG FE EQUIVALENT) 325 MG: 325 (65 FE) TABLET DELAYED RESPONSE at 08:03

## 2020-03-18 RX ADMIN — PANTOPRAZOLE SODIUM 40 MG: 40 INJECTION, POWDER, FOR SOLUTION INTRAVENOUS at 08:03

## 2020-03-18 RX ADMIN — CALCIUM 500 MG: 500 TABLET ORAL at 08:03

## 2020-03-18 RX ADMIN — SODIUM PHOSPHATE, MONOBASIC, MONOHYDRATE 20.01 MMOL: 276; 142 INJECTION, SOLUTION INTRAVENOUS at 09:03

## 2020-03-18 NOTE — SUBJECTIVE & OBJECTIVE
Interval History:   Patient with no acute events overnight. She is s/p 2 units pRBC. She is feeling a little better today. She denies any more bloody bowel movements. She denies shortness of breath. To complete prep for colonoscopy scheduled for tomorrow.    Review of Systems   Constitutional: Positive for fatigue and unexpected weight change. Negative for activity change, chills, diaphoresis and fever.   HENT: Negative for congestion and sore throat.    Eyes: Negative for photophobia and visual disturbance.   Cardiovascular: Negative for chest pain, palpitations and leg swelling.   Gastrointestinal: Negative for abdominal distention, abdominal pain, blood in stool, constipation, diarrhea, nausea and vomiting.   Genitourinary: Negative for difficulty urinating, dysuria and hematuria.   Skin: Negative for color change and pallor.   Neurological: Negative for dizziness, syncope, weakness, light-headedness and headaches.   Psychiatric/Behavioral: Negative for agitation and confusion.     Objective:     Vital Signs (Most Recent):  Temp: 98.9 °F (37.2 °C) (03/18/20 1500)  Pulse: 88 (03/18/20 1535)  Resp: 18 (03/18/20 1500)  BP: 132/64 (03/18/20 1500)  SpO2: 98 % (03/18/20 1500) Vital Signs (24h Range):  Temp:  [97.9 °F (36.6 °C)-99.4 °F (37.4 °C)] 98.9 °F (37.2 °C)  Pulse:  [79-92] 88  Resp:  [16-18] 18  SpO2:  [90 %-98 %] 98 %  BP: (126-143)/(60-69) 132/64     Weight: 48.5 kg (107 lb)  Body mass index is 16.76 kg/m².    Intake/Output Summary (Last 24 hours) at 3/18/2020 1628  Last data filed at 3/17/2020 1815  Gross per 24 hour   Intake 443 ml   Output --   Net 443 ml      Physical Exam   Constitutional: She is oriented to person, place, and time. She appears well-developed and well-nourished. No distress.   HENT:   Head: Normocephalic and atraumatic.   Eyes: Pupils are equal, round, and reactive to light. No scleral icterus.   Neck: Normal range of motion. Neck supple.   Cardiovascular: Normal rate, regular rhythm and  normal heart sounds. Exam reveals no gallop and no friction rub.   No murmur heard.  Pulmonary/Chest: Effort normal and breath sounds normal. No respiratory distress. She has no wheezes. She has no rales.   Abdominal: Soft. Bowel sounds are normal. She exhibits no distension. There is no tenderness.   Musculoskeletal: She exhibits no edema.   Neurological: She is alert and oriented to person, place, and time.   Skin: Skin is warm and dry. There is pallor.   Jaundice present       Significant Labs:   Bilirubin:   Recent Labs   Lab 03/02/20  1022 03/02/20 2003 03/03/20  0711 03/17/20  1431 03/18/20  0538 03/18/20  1137   BILIDIR  --   --   --   --   --  0.8*   BILITOT 0.3 0.2 0.6 1.4* 2.7*  --      CBC:   Recent Labs   Lab 03/18/20  0738 03/18/20  0834 03/18/20  1527   WBC 9.89 9.64 8.18   HGB 8.1* 7.7* 7.4*   HCT 29.3* 27.1* 26.2*    340 348     CMP:   Recent Labs   Lab 03/17/20  1431 03/18/20  0538    136   K 3.9 4.2    106   CO2 23 17*   GLU 88 86   BUN 11 15   CREATININE 0.7 0.8   CALCIUM 7.9* 7.9*   PROT 6.7 7.2   ALBUMIN 3.2* 3.1*   BILITOT 1.4* 2.7*   ALKPHOS 67 69   AST 35 68*   ALT 5* 10   ANIONGAP 9 13   EGFRNONAA >60.0 >60.0     Magnesium:   Recent Labs   Lab 03/17/20  1431 03/18/20  0538   MG 2.1 2.0       Significant Imaging:  EXAMINATION:  US ABDOMINAL AORTA    CLINICAL HISTORY:  To evaluate for AAA;    TECHNIQUE:  Ultrasound aorta limited.    COMPARISON:  None.    FINDINGS:  Aortic measurements in cm:    Proximal abdominal aorta: 1.4 x 1.4    Mid abdominal aorta: 1.2 x 1.1    Distal abdominal aorta: 0.8 x 0.9    The right iliac artery measures 0.7 x 0.7 cm.    The left iliac artery measures 0.7 x 0.7 cm.    No evidence of aneurysm.      Impression       Extensive calcific atherosclerotic plaques of the abdominal aorta.  No evidence of aneurysmal dilatation.

## 2020-03-18 NOTE — ANESTHESIA PREPROCEDURE EVALUATION
[facility]  Anesthesia Pre-Operative Evaluation         Patient Name: Nydia Stevens  YOB: 1934  MRN: [id number]    SUBJECTIVE:     Pre-operative evaluation for Procedure(s) (LRB):  EGD (ESOPHAGOGASTRODUODENOSCOPY) (N/A)  COLONOSCOPY (N/A)     03/18/2020    Nydia Stevens is a 85 y.o. female w/ a significant PMHx of iron deficiency anemia, HTN, HLD, not on antiplatelets/anticoagulants. She presented to the ED after her PCP told her to come here due to low hb of 5.3.     Hgb 7.7 on 3/18/20.    Patient now presents for the above procedure(s).    2D ECHO: TTE 3/17/20    · Mildly decreased left ventricular systolic function. The estimated ejection fraction is 45%.  · Grade I (mild) grade II (moderate) left ventricular diastolic dysfunction consistent with pseudonormalization.  · Concentric left ventricular remodeling.  · Moderate mitral sclerosis.  · Mild-to-moderate aortic valve stenosis.  · Aortic valve area is 1.42 cm2; peak velocity is 2.28 m/s; mean gradient is 14 mmHg.  · Moderate left atrial enlargement.  · Mild mitral regurgitation.  · Small posterolateral pericardial effusion.  · Mild to moderate tricuspid regurgitation.  · Normal right ventricular systolic function.  · Moderate right atrial enlargement.  · Mild right ventricular enlargement.  · The quantitatively dervived ejection fraction is 43%.  · Local segmental wall motion abnormalities.  · Mild aortic regurgitation.  · Normal central venous pressure (3 mmHg).  · The estimated PA systolic pressure is 31 mmHg.      LDA:        Peripheral IV - Single Lumen 03/02/20 2011 20 G Right Antecubital (Active)   Number of days: 15            Peripheral IV - Single Lumen 03/17/20 1326 20 G Right Antecubital (Active)   Site Assessment Clean;Dry;Intact;No redness 3/18/2020 10:26 AM   Line Status Flushed 3/18/2020 10:26 AM   Dressing Status Clean;Dry;Intact 3/18/2020 10:26 AM   Number of days: 0       Prev airway:    10/10/16;  Placement Time: 1623; Method of Intubation: Direct laryngoscopy; Inserted by: CRNA; Airway Device: Endotracheal Tube; Mask Ventilation: Easy; Intubated: Postinduction; Blade: Degroot #2; Airway Device Size: 7.0; Style: Cuffed; Cuff Inflation: Minimal occlusive pressure; Placement Verified By: Auscultation, Capnometry; Grade: Grade I; Complicating Factors: None; Intubation Findings: Positive EtCO2, Bilateral breath sounds, Atraumatic/Condition of teeth unchanged;  Depth of Insertion: 22; Securment: Lips; Complications: None;     Drips: None documented.      Patient Active Problem List   Diagnosis    Essential hypertension -- goal SBP < 150    Pure hypercholesterolemia    Iron deficiency anemia    Nuclear sclerosis    Palliative care encounter    Syncope    Closed fracture of left olecranon process    Abnormal CT of the head    Debility    Aortic stenosis, mild    Osteoporosis    Cachexia    Symptomatic anemia    Bilateral lower extremity edema    Thrombocytosis    Gastrointestinal bleed    Chronic combined systolic and diastolic heart failure       Review of patient's allergies indicates:  No Known Allergies    Current Inpatient Medications:   ascorbic acid (vitamin C)  250 mg Oral Daily    calcium carbonate  500 mg Oral Daily    cyanocobalamin  100 mcg Intramuscular Daily    ferrous sulfate  325 mg Oral Daily    pantoprazole  40 mg Intravenous Q12H    pantoprazole  80 mg Intravenous Once    polyethylene glycol  4,000 mL Oral Once    rosuvastatin  40 mg Oral Daily    sodium phosphate IVPB  20.01 mmol Intravenous Once       No current facility-administered medications on file prior to encounter.      Current Outpatient Medications on File Prior to Encounter   Medication Sig Dispense Refill    calcium carbonate (OS-JAILYN) 500 mg calcium (1,250 mg) tablet Take 1 tablet (500 mg total) by mouth once daily.  0    cholecalciferol, vitamin D3, (VITAMIN D3) 1,000 unit capsule Take 1,000 Units by mouth  once daily.      cyanocobalamin (VITAMIN B-12) 1000 MCG tablet Take 1,000 mcg by mouth once daily.      ferrous sulfate (FEOSOL) 325 mg (65 mg iron) Tab tablet TAKE ONE TABLET BY MOUTH TWICE DAILY WITH MEALS 60 tablet 0    rosuvastatin (CRESTOR) 40 MG Tab TAKE ONE TABLET BY MOUTH EVERY DAY FOR CHOLESTEROL 90 tablet 3    senna-docusate 8.6-50 mg (PERICOLACE) 8.6-50 mg per tablet Take 1 tablet by mouth 2 (two) times daily.      VIT C/VIT E ACETATE/LUTEIN/MIN (OCUVITE LUTEIN ORAL) Take 1 tablet by mouth once daily.           Past Surgical History:   Procedure Laterality Date    CATARACT EXTRACTION W/  INTRAOCULAR LENS IMPLANT Left 8/11/14    gregor    CATARACT EXTRACTION W/  INTRAOCULAR LENS IMPLANT Right 09/15/14    bundy    FOOT SURGERY      left    HYSTERECTOMY         Social History     Socioeconomic History    Marital status: Single     Spouse name: Not on file    Number of children: Not on file    Years of education: Not on file    Highest education level: Not on file   Occupational History    Not on file   Social Needs    Financial resource strain: Not on file    Food insecurity:     Worry: Not on file     Inability: Not on file    Transportation needs:     Medical: Not on file     Non-medical: Not on file   Tobacco Use    Smoking status: Never Smoker    Smokeless tobacco: Never Used   Substance and Sexual Activity    Alcohol use: No     Alcohol/week: 0.0 standard drinks    Drug use: No    Sexual activity: Not on file   Lifestyle    Physical activity:     Days per week: Not on file     Minutes per session: Not on file    Stress: Not on file   Relationships    Social connections:     Talks on phone: Not on file     Gets together: Not on file     Attends Nondenominational service: Not on file     Active member of club or organization: Not on file     Attends meetings of clubs or organizations: Not on file     Relationship status: Not on file   Other Topics Concern    Not on file   Social History  Narrative    Member of [employer] here in [place], [place].       OBJECTIVE:     Vital Signs Range (Last 24H):  Temp:  [36.4 °C (97.5 °F)-37.4 °C (99.4 °F)]   Pulse:  []   Resp:  [12-24]   BP: (106-143)/(53-69)   SpO2:  [90 %-100 %]       Significant Labs:  Lab Results   Component Value Date    WBC 9.64 03/18/2020    HGB 7.7 (L) 03/18/2020    HCT 27.1 (L) 03/18/2020     03/18/2020    CHOL 252 (H) 03/02/2020    TRIG 118 03/02/2020    HDL 41 03/02/2020    ALT 10 03/18/2020    AST 68 (H) 03/18/2020     03/18/2020    K 4.2 03/18/2020     03/18/2020    CREATININE 0.8 03/18/2020    BUN 15 03/18/2020    CO2 17 (L) 03/18/2020    TSH 2.918 03/02/2020    INR 0.9 03/17/2020    HGBA1C 5.8 12/11/2015       Diagnostic Studies: No relevant studies.    EKG: No recent studies available.    2D ECHO: TTE 3/17/20    · Mildly decreased left ventricular systolic function. The estimated ejection fraction is 45%.  · Grade I (mild) grade II (moderate) left ventricular diastolic dysfunction consistent with pseudonormalization.  · Concentric left ventricular remodeling.  · Moderate mitral sclerosis.  · Mild-to-moderate aortic valve stenosis.  · Aortic valve area is 1.42 cm2; peak velocity is 2.28 m/s; mean gradient is 14 mmHg.  · Moderate left atrial enlargement.  · Mild mitral regurgitation.  · Small posterolateral pericardial effusion.  · Mild to moderate tricuspid regurgitation.  · Normal right ventricular systolic function.  · Moderate right atrial enlargement.  · Mild right ventricular enlargement.  · The quantitatively dervived ejection fraction is 43%.  · Local segmental wall motion abnormalities.  · Mild aortic regurgitation.  · Normal central venous pressure (3 mmHg).  · The estimated PA systolic pressure is 31 mmHg.    ASSESSMENT/PLAN:       Anesthesia Evaluation    I have reviewed the Patient Summary Reports.        Review of Systems  Anesthesia Hx:  No problems with previous Anesthesia  Denies Hx of Anesthetic complications  History of prior surgery of interest to airway management or planning: Denies Family Hx of Anesthesia complications.   Denies Personal Hx of Anesthesia complications.   Social:  Non-Smoker, No Alcohol Use    Hematology/Oncology:         -- Anemia:   Cardiovascular:   Hypertension (amlodipine. BPs currently 120s-130s/70s.), well controlled Valvular problems/Murmurs (mild AS per TTE 2014.  3/6 systolic murmur on exam with radiation to carotids.), AS  hyperlipidemia    Renal/:  Renal/ Normal         Physical Exam  General:  Well nourished    Airway/Jaw/Neck:  Airway Findings: Mouth Opening: Normal Tongue: Normal  General Airway Assessment: Adult  Mallampati: II  Improves to I with phonation.  TM Distance: 4 - 6 cm  Jaw/Neck Findings:         Dental:  Dental Findings: In tact, Periodontal disease, Mild   Chest/Lungs:  Chest/Lungs Findings: Clear to auscultation, Normal Respiratory Rate     Heart/Vascular:  Heart Findings: Rate: Tachycardia  Rhythm: Regular Rhythm  Sounds: Normal  Heart Murmur  Systolic  Systolic Heart Murmur Description: R Upper Sternal Border  Systolic Heart Murmur Grade: Grade III     Abdomen:  Abdomen Findings:  Soft       Mental Status:  Mental Status Findings:  Cooperative, Alert and Oriented         Anesthesia Plan  Type of Anesthesia, risks & benefits discussed:  Anesthesia Type:  general, MAC, regional  Patient's Preference:   Intra-op Monitoring Plan: standard ASA monitors  Intra-op Monitoring Plan Comments:   Post Op Pain Control Plan: multimodal analgesia and per primary service following discharge from PACU  Post Op Pain Control Plan Comments:   Induction:   IV  Beta Blocker:  Patient is not currently on a Beta-Blocker (No further documentation required).       Informed Consent: Patient understands risks and agrees with Anesthesia plan.  Questions answered. Anesthesia consent signed with patient.  ASA Score: 3     Day of Surgery Review of History &  Physical:    H&P update referred to the surgeon.         Ready For Surgery From Anesthesia Perspective.

## 2020-03-18 NOTE — PLAN OF CARE
CM called patient for discharge planning, no answer, CM called Sister Saima, patient's emergency contact.  Sister Saima states the patient lives in a [facility] with other Nuns.  There are steps to climb but she can take the elevator.  Patient ambulates with a rollator.  Patient is current with [facility].  Plan is to discharge home with continued home health.    Pharmacy:    [facility] [place] [facility] [address] [address] [facility]  [address] [address] [facility] [address]  Phone: [phone] Fax: [phone]    PCP:  Donaldo Carlin MD    Payor: MEDICARE / Plan: MEDICARE PART A & B / Product Type: [facility] /      03/18/20 1021   Discharge Assessment   Assessment Type Discharge Planning Assessment   Confirmed/corrected address and phone number on facesheet? Yes   Assessment information obtained from? Caregiver   Expected Length of Stay (days) 3   Communicated expected length of stay with patient/caregiver yes   Prior to hospitalization functional status: Assistive Equipment  (Rollator)   Current Functional Status: Assistive Equipment   Facility Arrived From: Emergency room   Lives With other (see comments)  ([facility]uns at [facility])   Able to Return to Prior Arrangements yes   Is patient able to care for self after discharge? Unable to determine at this time (comments)   Patient's perception of discharge disposition home health   Readmission Within the Last 30 Days previous discharge plan unsuccessful   Patient currently being followed by outpatient case management? No   Patient currently receives any other outside agency services? No   Equipment Currently Used at Home rollator   Do you have any problems affording any of your prescribed medications? No   Is the patient taking medications as prescribed? yes   Does the patient have transportation home? Yes   Transportation Anticipated family or friend will provide   Does the patient receive services at the [facility]? No   Discharge Plan A Home  Health   Discharge Plan B Skilled Nursing Facility   DME Needed Upon Discharge  none   Patient/Family in Agreement with Plan yes   Readmission Questionnaire   At the time of your discharge, did someone talk to you about what your health problems were? Yes   At the time of discharge, did someone talk to you about what to watch out for regarding worsening of your health problem? Yes   At the time of discharge, did someone talk to you about what to do if you experienced worsening of your health problem? Yes   At the time of discharge, did someone talk to you about which medication to take when you left the hospital and which ones to stop taking? Yes   At the time of discharge, did someone talk to you about when and where to follow up with a doctor after you left the hospital? Yes   What do you believe caused you to be sick enough to be re-admitted? Anemia   How often do you need to have someone help you when you read instructions, pamphlets, or other written material from your doctor or pharmacy? Never   Do you have problems taking your medications as prescribed? No   Do you have any problems affording any of  your prescribed medications? No   Do you have problems obtaining/receiving your medications? No   Living Arrangements house

## 2020-03-18 NOTE — PROGRESS NOTES
Patient alert and oriented x4. Sat up in chair for a good bit of the shift today. Spoke to the medical team and she will start the [place] this evening. I ordered a bedside commode but it has not reached the floor Yet. I mixed the powder with water and left it at the bedside with a few packets of crystal light and some extra cups. Will call again about the status of the bedside commode. Patient will have colonoscopy and scope in the morning. Please make sure she drinks all the regime.

## 2020-03-18 NOTE — PLAN OF CARE
Pt aaox4. Blood transfused. Npo for upcoming EGD. She is up with assistance and is able to make needs known. She is free of any falls or trauma. Call bell in reach. Bed wheels locked and low. wctm

## 2020-03-18 NOTE — ASSESSMENT & PLAN NOTE
Patient with history of hypertension, mild aortic stenosis, osteoporosis, ANANDA, and B12 deficiency presents with chief complaint of abnormal lab value of Hgb 5.3. BUN 12. Associated symptom of fatigue. She denies significant SOB, syncope, dizziness. She has had dark stools but no BRBPR. She last had colonoscopy 10 years ago which is not in our system and patient reports as normal.  She had an EGD in 2014 which was normal. She has chronic iron deficiency anemia and this is her 2nd admission in the past month for anemia requiring transfusions. Differential includes GI neoplasm(given chronicity of anemia and weight loss), AVM, Angiodysplasia/Heyde's syndrome(given AS), diverticulosis, gastritis, PUD.     Plan:  -Insert 2 large bore IVs  -CBC spaced to q8  -Transfuse to maintain Hgb 7  -S/p IV protonix 80 mg. Will continue IV 40 BID.   -GI consulted. Appreciate recommendations.   -Plan for EGD/Colonoscopy on Thursday(3/18). CLD and Prep today. NPO at midnight.  -Avoid NSAIDS

## 2020-03-18 NOTE — PROGRESS NOTES
[facility] Hospital Medicine  Progress Note    Patient Name: Nydia Stevens  MRN: [id number]  Patient Class: IP- Inpatient   Admission Date: 3/17/2020  Length of Stay: 1 days  Attending Physician: Derrick Spaulding MD  Primary Care Provider: Donaldo Carlin MD    [facility] Medicine Team: [facility] 2 Sean Sauer MD    Subjective:     Principal Problem:Gastrointestinal bleed        HPI:  Ms. Stevens is an 84yo female with past medical history of HTN who presents from internal medicine clinic(seen by Dr. Carlin) with chief complaint of abnormal lab characterized by low hemoglobin 5.8. [place] [facility] [place] [facility]2-3/4 for nearly the exact thing. She had a very low hemoglobin at the time(4.4)--she had symptoms of dyspnea on exertion and fatigue. She was transfused 2 units pRBC with improvement in her symptoms and her anemia. She did not have interest in colonoscopy or EGD at the time and opted for as needed transfusions. At the time of discharge her hemoglobin was 8.9.    She says she has been feeling well since discharge. Currently her only symptom is fatigue on ROS. She denies DONOVAN, dizziness, lightheadedness, or pale skin. She says she has occasionally had some dark bowel movements-one most recently yesterday. She denies BRBPR or hematemesis, cough, hemoptysis, hematuria. She denies constipation or abdominal pain. She says she has taken NSAIDs in the past but not consistently--only     On arrival, she was afebrile with VSS--no tachycardia or hypotension. Labs were significant for Hgb 5.3, MCV 65, RDW 28.4, , Iron studies with Iron 58, TIBC 447, Sat iron 13, transferrin 302, ferritin 283, low B12. 2 units pRBC were ordered for transfusion and GI consulted. Patient is amenable to intervention/workup.    Overview/Hospital Course:  Patient was admitted to [facility] for GIB with hemoglobin on 5.3. She appears to have a combined anemia--B12 deficiency, Iron  deficiency, and Hemolytic anemia(increased LDH, low haptoglobin, schistocytes on peripheral smear,uptrending indirect bilirubin). GI was consulted given patient's history of bloody bowel movements. . She was started on IV PPI and given 2 units pRBC. She did respond appropriately to transfusion. We are completing workup for hemolytic anemia.     Interval History:   Patient with no acute events overnight. She is s/p 2 units pRBC. She is feeling a little better today. She denies any more bloody bowel movements. She denies shortness of breath. To complete prep for colonoscopy scheduled for tomorrow.    Review of Systems   Constitutional: Positive for fatigue and unexpected weight change. Negative for activity change, chills, diaphoresis and fever.   HENT: Negative for congestion and sore throat.    Eyes: Negative for photophobia and visual disturbance.   Cardiovascular: Negative for chest pain, palpitations and leg swelling.   Gastrointestinal: Negative for abdominal distention, abdominal pain, blood in stool, constipation, diarrhea, nausea and vomiting.   Genitourinary: Negative for difficulty urinating, dysuria and hematuria.   Skin: Negative for color change and pallor.   Neurological: Negative for dizziness, syncope, weakness, light-headedness and headaches.   Psychiatric/Behavioral: Negative for agitation and confusion.     Objective:     Vital Signs (Most Recent):  Temp: 98.9 °F (37.2 °C) (03/18/20 1500)  Pulse: 88 (03/18/20 1535)  Resp: 18 (03/18/20 1500)  BP: 132/64 (03/18/20 1500)  SpO2: 98 % (03/18/20 1500) Vital Signs (24h Range):  Temp:  [97.9 °F (36.6 °C)-99.4 °F (37.4 °C)] 98.9 °F (37.2 °C)  Pulse:  [79-92] 88  Resp:  [16-18] 18  SpO2:  [90 %-98 %] 98 %  BP: (126-143)/(60-69) 132/64     Weight: 48.5 kg (107 lb)  Body mass index is 16.76 kg/m².    Intake/Output Summary (Last 24 hours) at 3/18/2020 1628  Last data filed at 3/17/2020 1815  Gross per 24 hour   Intake 443 ml   Output --   Net 443 ml       Physical Exam   Constitutional: She is oriented to person, place, and time. She appears well-developed and well-nourished. No distress.   HENT:   Head: Normocephalic and atraumatic.   Eyes: Pupils are equal, round, and reactive to light. No scleral icterus.   Neck: Normal range of motion. Neck supple.   Cardiovascular: Normal rate, regular rhythm and normal heart sounds. Exam reveals no gallop and no friction rub.   No murmur heard.  Pulmonary/Chest: Effort normal and breath sounds normal. No respiratory distress. She has no wheezes. She has no rales.   Abdominal: Soft. Bowel sounds are normal. She exhibits no distension. There is no tenderness.   Musculoskeletal: She exhibits no edema.   Neurological: She is alert and oriented to person, place, and time.   Skin: Skin is warm and dry. There is pallor.   Jaundice present       Significant Labs:   Bilirubin:   Recent Labs   Lab 03/02/20  1022 03/02/20  2003 03/03/20  0711 03/17/20  1431 03/18/20  0538 03/18/20  1137   BILIDIR  --   --   --   --   --  0.8*   BILITOT 0.3 0.2 0.6 1.4* 2.7*  --      CBC:   Recent Labs   Lab 03/18/20  0738 03/18/20  0834 03/18/20  1527   WBC 9.89 9.64 8.18   HGB 8.1* 7.7* 7.4*   HCT 29.3* 27.1* 26.2*    340 348     CMP:   Recent Labs   Lab 03/17/20  1431 03/18/20  0538    136   K 3.9 4.2    106   CO2 23 17*   GLU 88 86   BUN 11 15   CREATININE 0.7 0.8   CALCIUM 7.9* 7.9*   PROT 6.7 7.2   ALBUMIN 3.2* 3.1*   BILITOT 1.4* 2.7*   ALKPHOS 67 69   AST 35 68*   ALT 5* 10   ANIONGAP 9 13   EGFRNONAA >60.0 >60.0     Magnesium:   Recent Labs   Lab 03/17/20  1431 03/18/20  0538   MG 2.1 2.0       Significant Imaging:  EXAMINATION:  US ABDOMINAL AORTA    CLINICAL HISTORY:  To evaluate for AAA;    TECHNIQUE:  Ultrasound aorta limited.    COMPARISON:  None.    FINDINGS:  Aortic measurements in cm:    Proximal abdominal aorta: 1.4 x 1.4    Mid abdominal aorta: 1.2 x 1.1    Distal abdominal aorta: 0.8 x 0.9    The right iliac artery  measures 0.7 x 0.7 cm.    The left iliac artery measures 0.7 x 0.7 cm.    No evidence of aneurysm.      Impression       Extensive calcific atherosclerotic plaques of the abdominal aorta.  No evidence of aneurysmal dilatation.           Assessment/Plan:      * Gastrointestinal bleed  Patient with history of hypertension, mild aortic stenosis, osteoporosis, ANANDA, and B12 deficiency presents with chief complaint of abnormal lab value of Hgb 5.3. BUN 12. Associated symptom of fatigue. She denies significant SOB, syncope, dizziness. She has had dark stools but no BRBPR. She last had colonoscopy 10 years ago which is not in our system and patient reports as normal.  She had an EGD in 2014 which was normal. She has chronic iron deficiency anemia and this is her 2nd admission in the past month for anemia requiring transfusions. Differential includes GI neoplasm(given chronicity of anemia and weight loss), AVM, Angiodysplasia/Heyde's syndrome(given AS), diverticulosis, gastritis, PUD.     Plan:  -Insert 2 large bore IVs  -CBC spaced to q8  -Transfuse to maintain Hgb 7  -S/p IV protonix 80 mg. Will continue IV 40 BID.   -GI consulted. Appreciate recommendations.   -Plan for EGD/Colonoscopy on Thursday(3/18). CLD and Prep today. NPO at midnight.  -Avoid NSAIDS    Hemolytic anemia  Patient with elevated LDH, low haptoglobin, inappropriately low retic count, uptrending bilirubin. She does not have thromboyctopenia. She has normal coags. 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 No thrombocytopenia argues against MAHA. Could be 2/2 aortic stenosis.     Plan:  -Continue to monitor  -F/u indirect regina, cryoglobulins    Chronic combined systolic and diastolic heart failure  Echo with newly decreased EF 45%, grade II diastolic dysfunction, mild-mod aortic stenosis, normal  CVP. She has elevated . She has mild lower extremity edema but no oxygen requirements at this time.     Plan:  -Strict I/Os and daily  weights  -Continue to monitor for worsening oxygen requirements while receiving blood transfusions with lasix as needed.       Osteoporosis  Last DEXA in 2018 which showed osteoporosis of lumbar spine, total hip, and femoral neck. She has received Prolia injections in the past.     Plan:  -Continue calcium and vitamin D supplementation  -Repeat DEXA as outpatient(ordered by PCP)    Aortic stenosis, mild  Patient with Echo 3 years ago with valve area of 1.62. Repeat Echo here show slight worsening of her stenosis with valve area 1.42 cm2, peak velocity 2.28, and mean gradient 14 mmHg. Patient denies significant symptoms including dyspnea, syncope, or chest pain.     Plan:  -Continue to monitor given transfusions. Will transfuse blood over 4 hour period.    Iron deficiency anemia  Patient with last colonoscopy 10 years ago(was normal per her). She has deferred repeat colonoscopy due to the prep involved. She has required injectable and oral iron supplementation.    Plan:  -Continue iron sulfate 325 mg daily    Pure hypercholesterolemia  Home medication: rosuvastatin 40 mg daily    Plan:   -Continue home statin        VTE Risk Mitigation (From admission, onward)         Ordered     IP VTE HIGH RISK PATIENT  Once      03/17/20 1436     Place sequential compression device  Until discontinued      03/17/20 1436                      Sean Sauer MD  Department of Hospital Medicine   [facility]

## 2020-03-18 NOTE — ASSESSMENT & PLAN NOTE
Patient with elevated LDH, low haptoglobin, inappropriately low retic count, uptrending bilirubin. She does not have thromboyctopenia. She has normal coags. 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 No thrombocytopenia argues against MAHA. Could be 2/2 aortic stenosis.     Plan:  -Continue to monitor  -F/u indirect regina, cryoglobulins

## 2020-03-19 ENCOUNTER — ANESTHESIA (OUTPATIENT)
Dept: ENDOSCOPY | Facility: HOSPITAL | Age: 85
DRG: 330 | End: 2020-03-19
Payer: MEDICARE

## 2020-03-19 ENCOUNTER — ANESTHESIA EVENT (OUTPATIENT)
Dept: SURGERY | Facility: HOSPITAL | Age: 85
DRG: 330 | End: 2020-03-19
Payer: MEDICARE

## 2020-03-19 DIAGNOSIS — K29.60 EROSIVE GASTRITIS: Primary | ICD-10-CM

## 2020-03-19 DIAGNOSIS — D50.9 IRON DEFICIENCY ANEMIA, UNSPECIFIED IRON DEFICIENCY ANEMIA TYPE: ICD-10-CM

## 2020-03-19 DIAGNOSIS — K29.60 EROSIVE GASTRITIS: ICD-10-CM

## 2020-03-19 PROBLEM — K63.89 COLONIC MASS: Status: ACTIVE | Noted: 2020-03-19

## 2020-03-19 LAB
ACANTHOCYTES BLD QL SMEAR: PRESENT
ALBUMIN SERPL BCP-MCNC: 2.9 G/DL (ref 3.5–5.2)
ALP SERPL-CCNC: 69 U/L (ref 55–135)
ALT SERPL W/O P-5'-P-CCNC: 9 U/L (ref 10–44)
ANION GAP SERPL CALC-SCNC: 10 MMOL/L (ref 8–16)
ANISOCYTOSIS BLD QL SMEAR: ABNORMAL
ANISOCYTOSIS BLD QL SMEAR: ABNORMAL
ANISOCYTOSIS BLD QL SMEAR: SLIGHT
AST SERPL-CCNC: 48 U/L (ref 10–40)
BASOPHILS # BLD AUTO: 0.03 K/UL (ref 0–0.2)
BASOPHILS # BLD AUTO: 0.05 K/UL (ref 0–0.2)
BASOPHILS # BLD AUTO: 0.05 K/UL (ref 0–0.2)
BASOPHILS NFR BLD: 0.4 % (ref 0–1.9)
BASOPHILS NFR BLD: 0.6 % (ref 0–1.9)
BASOPHILS NFR BLD: 0.6 % (ref 0–1.9)
BILIRUB SERPL-MCNC: 1.6 MG/DL (ref 0.1–1)
BUN SERPL-MCNC: 9 MG/DL (ref 8–23)
BURR CELLS BLD QL SMEAR: ABNORMAL
BURR CELLS BLD QL SMEAR: ABNORMAL
CALCIUM SERPL-MCNC: 7.9 MG/DL (ref 8.7–10.5)
CEA SERPL-MCNC: 16.7 NG/ML (ref 0–5)
CHLORIDE SERPL-SCNC: 102 MMOL/L (ref 95–110)
CO2 SERPL-SCNC: 23 MMOL/L (ref 23–29)
CREAT SERPL-MCNC: 0.7 MG/DL (ref 0.5–1.4)
DACRYOCYTES BLD QL SMEAR: ABNORMAL
DIFFERENTIAL METHOD: ABNORMAL
EOSINOPHIL # BLD AUTO: 0.1 K/UL (ref 0–0.5)
EOSINOPHIL # BLD AUTO: 0.1 K/UL (ref 0–0.5)
EOSINOPHIL # BLD AUTO: 0.2 K/UL (ref 0–0.5)
EOSINOPHIL NFR BLD: 0.9 % (ref 0–8)
EOSINOPHIL NFR BLD: 1.6 % (ref 0–8)
EOSINOPHIL NFR BLD: 1.8 % (ref 0–8)
ERYTHROCYTE [DISTWIDTH] IN BLOOD BY AUTOMATED COUNT: 36 % (ref 11.5–14.5)
ERYTHROCYTE [DISTWIDTH] IN BLOOD BY AUTOMATED COUNT: 36.9 % (ref 11.5–14.5)
ERYTHROCYTE [DISTWIDTH] IN BLOOD BY AUTOMATED COUNT: 37.4 % (ref 11.5–14.5)
EST. GFR  (AFRICAN AMERICAN): >60 ML/MIN/1.73 M^2
EST. GFR  (NON AFRICAN AMERICAN): >60 ML/MIN/1.73 M^2
GLUCOSE SERPL-MCNC: 93 MG/DL (ref 70–110)
HCT VFR BLD AUTO: 26.4 % (ref 37–48.5)
HCT VFR BLD AUTO: 27.1 % (ref 37–48.5)
HCT VFR BLD AUTO: 27.3 % (ref 37–48.5)
HGB BLD-MCNC: 7.4 G/DL (ref 12–16)
HGB BLD-MCNC: 7.5 G/DL (ref 12–16)
HGB BLD-MCNC: 7.7 G/DL (ref 12–16)
HYPOCHROMIA BLD QL SMEAR: ABNORMAL
IMM GRANULOCYTES # BLD AUTO: 0.22 K/UL (ref 0–0.04)
IMM GRANULOCYTES # BLD AUTO: 0.27 K/UL (ref 0–0.04)
IMM GRANULOCYTES # BLD AUTO: 0.35 K/UL (ref 0–0.04)
IMM GRANULOCYTES NFR BLD AUTO: 3 % (ref 0–0.5)
IMM GRANULOCYTES NFR BLD AUTO: 3.1 % (ref 0–0.5)
IMM GRANULOCYTES NFR BLD AUTO: 3.9 % (ref 0–0.5)
LYMPHOCYTES # BLD AUTO: 1.2 K/UL (ref 1–4.8)
LYMPHOCYTES # BLD AUTO: 1.2 K/UL (ref 1–4.8)
LYMPHOCYTES # BLD AUTO: 1.3 K/UL (ref 1–4.8)
LYMPHOCYTES NFR BLD: 13.7 % (ref 18–48)
LYMPHOCYTES NFR BLD: 14 % (ref 18–48)
LYMPHOCYTES NFR BLD: 17.7 % (ref 18–48)
MAGNESIUM SERPL-MCNC: 1.9 MG/DL (ref 1.6–2.6)
MCH RBC QN AUTO: 20.6 PG (ref 27–31)
MCH RBC QN AUTO: 20.8 PG (ref 27–31)
MCH RBC QN AUTO: 21.3 PG (ref 27–31)
MCHC RBC AUTO-ENTMCNC: 27.7 G/DL (ref 32–36)
MCHC RBC AUTO-ENTMCNC: 28 G/DL (ref 32–36)
MCHC RBC AUTO-ENTMCNC: 28.2 G/DL (ref 32–36)
MCV RBC AUTO: 74 FL (ref 82–98)
MCV RBC AUTO: 75 FL (ref 82–98)
MCV RBC AUTO: 76 FL (ref 82–98)
MONOCYTES # BLD AUTO: 1.2 K/UL (ref 0.3–1)
MONOCYTES NFR BLD: 13.6 % (ref 4–15)
MONOCYTES NFR BLD: 13.8 % (ref 4–15)
MONOCYTES NFR BLD: 16.2 % (ref 4–15)
NEUTROPHILS # BLD AUTO: 4.5 K/UL (ref 1.8–7.7)
NEUTROPHILS # BLD AUTO: 6 K/UL (ref 1.8–7.7)
NEUTROPHILS # BLD AUTO: 6 K/UL (ref 1.8–7.7)
NEUTROPHILS NFR BLD: 61.1 % (ref 38–73)
NEUTROPHILS NFR BLD: 66.2 % (ref 38–73)
NEUTROPHILS NFR BLD: 67.8 % (ref 38–73)
NRBC BLD-RTO: 0 /100 WBC
OVALOCYTES BLD QL SMEAR: ABNORMAL
PHOSPHATE SERPL-MCNC: 1.9 MG/DL (ref 2.7–4.5)
PLATELET # BLD AUTO: 364 K/UL (ref 150–350)
PLATELET # BLD AUTO: 367 K/UL (ref 150–350)
PLATELET # BLD AUTO: 384 K/UL (ref 150–350)
PLATELET BLD QL SMEAR: ABNORMAL
PMV BLD AUTO: 10 FL (ref 9.2–12.9)
PMV BLD AUTO: 9.3 FL (ref 9.2–12.9)
PMV BLD AUTO: 9.4 FL (ref 9.2–12.9)
POIKILOCYTOSIS BLD QL SMEAR: SLIGHT
POLYCHROMASIA BLD QL SMEAR: ABNORMAL
POTASSIUM SERPL-SCNC: 3.7 MMOL/L (ref 3.5–5.1)
PROT SERPL-MCNC: 6.4 G/DL (ref 6–8.4)
RBC # BLD AUTO: 3.56 M/UL (ref 4–5.4)
RBC # BLD AUTO: 3.61 M/UL (ref 4–5.4)
RBC # BLD AUTO: 3.64 M/UL (ref 4–5.4)
SCHISTOCYTES BLD QL SMEAR: ABNORMAL
SCHISTOCYTES BLD QL SMEAR: PRESENT
SCHISTOCYTES BLD QL SMEAR: PRESENT
SICKLE CELLS BLD QL SMEAR: ABNORMAL
SICKLE CELLS BLD QL SMEAR: ABNORMAL
SMUDGE CELLS BLD QL SMEAR: PRESENT
SODIUM SERPL-SCNC: 135 MMOL/L (ref 136–145)
VWF AG ACT/NOR PPP IA: NORMAL %
VWF:AC ACT/NOR PPP IA: NORMAL %
WBC # BLD AUTO: 7.39 K/UL (ref 3.9–12.7)
WBC # BLD AUTO: 8.81 K/UL (ref 3.9–12.7)
WBC # BLD AUTO: 8.98 K/UL (ref 3.9–12.7)

## 2020-03-19 PROCEDURE — 11000001 HC ACUTE MED/SURG PRIVATE ROOM

## 2020-03-19 PROCEDURE — 25000003 PHARM REV CODE 250: Performed by: NURSE ANESTHETIST, CERTIFIED REGISTERED

## 2020-03-19 PROCEDURE — 45381 COLONOSCOPY SUBMUCOUS NJX: CPT | Performed by: INTERNAL MEDICINE

## 2020-03-19 PROCEDURE — 45385 COLONOSCOPY W/LESION REMOVAL: CPT | Performed by: INTERNAL MEDICINE

## 2020-03-19 PROCEDURE — D9220A PRA ANESTHESIA: ICD-10-PCS | Mod: CRNA,,, | Performed by: NURSE ANESTHETIST, CERTIFIED REGISTERED

## 2020-03-19 PROCEDURE — 25000003 PHARM REV CODE 250: Performed by: STUDENT IN AN ORGANIZED HEALTH CARE EDUCATION/TRAINING PROGRAM

## 2020-03-19 PROCEDURE — 82378 CARCINOEMBRYONIC ANTIGEN: CPT

## 2020-03-19 PROCEDURE — 27200997: Performed by: INTERNAL MEDICINE

## 2020-03-19 PROCEDURE — 99223 PR INITIAL HOSPITAL CARE,LEVL III: ICD-10-PCS | Mod: ,,, | Performed by: COLON & RECTAL SURGERY

## 2020-03-19 PROCEDURE — 45381 PR COLONOSCPY,FLEX,W/DIR SUBMUC INJECT: ICD-10-PCS | Mod: 51,,, | Performed by: INTERNAL MEDICINE

## 2020-03-19 PROCEDURE — 63600175 PHARM REV CODE 636 W HCPCS: Performed by: STUDENT IN AN ORGANIZED HEALTH CARE EDUCATION/TRAINING PROGRAM

## 2020-03-19 PROCEDURE — 85025 COMPLETE CBC W/AUTO DIFF WBC: CPT

## 2020-03-19 PROCEDURE — 63600175 PHARM REV CODE 636 W HCPCS: Performed by: NURSE ANESTHETIST, CERTIFIED REGISTERED

## 2020-03-19 PROCEDURE — 97161 PT EVAL LOW COMPLEX 20 MIN: CPT

## 2020-03-19 PROCEDURE — 97116 GAIT TRAINING THERAPY: CPT

## 2020-03-19 PROCEDURE — 99233 PR SUBSEQUENT HOSPITAL CARE,LEVL III: ICD-10-PCS | Mod: GC,,, | Performed by: HOSPITALIST

## 2020-03-19 PROCEDURE — 43239 PR EGD, FLEX, W/BIOPSY, SGL/MULTI: ICD-10-PCS | Mod: 51,,, | Performed by: INTERNAL MEDICINE

## 2020-03-19 PROCEDURE — 45380 PR COLONOSCOPY,BIOPSY: ICD-10-PCS | Mod: 59,,, | Performed by: INTERNAL MEDICINE

## 2020-03-19 PROCEDURE — 43239 EGD BIOPSY SINGLE/MULTIPLE: CPT | Performed by: INTERNAL MEDICINE

## 2020-03-19 PROCEDURE — 80053 COMPREHEN METABOLIC PANEL: CPT

## 2020-03-19 PROCEDURE — D9220A PRA ANESTHESIA: ICD-10-PCS | Mod: ANES,,, | Performed by: ANESTHESIOLOGY

## 2020-03-19 PROCEDURE — 36415 COLL VENOUS BLD VENIPUNCTURE: CPT

## 2020-03-19 PROCEDURE — 45381 COLONOSCOPY SUBMUCOUS NJX: CPT | Mod: 51,,, | Performed by: INTERNAL MEDICINE

## 2020-03-19 PROCEDURE — 97165 OT EVAL LOW COMPLEX 30 MIN: CPT

## 2020-03-19 PROCEDURE — 37000009 HC ANESTHESIA EA ADD 15 MINS: Performed by: INTERNAL MEDICINE

## 2020-03-19 PROCEDURE — 45385 COLONOSCOPY W/LESION REMOVAL: CPT | Mod: 22,,, | Performed by: INTERNAL MEDICINE

## 2020-03-19 PROCEDURE — 88305 TISSUE EXAM BY PATHOLOGIST: CPT | Performed by: PATHOLOGY

## 2020-03-19 PROCEDURE — 27202087 HC PROBE, APC: Performed by: INTERNAL MEDICINE

## 2020-03-19 PROCEDURE — 97110 THERAPEUTIC EXERCISES: CPT

## 2020-03-19 PROCEDURE — 37000008 HC ANESTHESIA 1ST 15 MINUTES: Performed by: INTERNAL MEDICINE

## 2020-03-19 PROCEDURE — 25500020 PHARM REV CODE 255: Performed by: STUDENT IN AN ORGANIZED HEALTH CARE EDUCATION/TRAINING PROGRAM

## 2020-03-19 PROCEDURE — 88305 TISSUE EXAM BY PATHOLOGIST: ICD-10-PCS | Mod: 26,,, | Performed by: PATHOLOGY

## 2020-03-19 PROCEDURE — 27201012 HC FORCEPS, HOT/COLD, DISP: Performed by: INTERNAL MEDICINE

## 2020-03-19 PROCEDURE — 88305 TISSUE EXAM BY PATHOLOGIST: CPT | Mod: 26,,, | Performed by: PATHOLOGY

## 2020-03-19 PROCEDURE — 83735 ASSAY OF MAGNESIUM: CPT

## 2020-03-19 PROCEDURE — D9220A PRA ANESTHESIA: Mod: CRNA,,, | Performed by: NURSE ANESTHETIST, CERTIFIED REGISTERED

## 2020-03-19 PROCEDURE — 25500020 PHARM REV CODE 255: Performed by: HOSPITALIST

## 2020-03-19 PROCEDURE — D9220A PRA ANESTHESIA: Mod: ANES,,, | Performed by: ANESTHESIOLOGY

## 2020-03-19 PROCEDURE — 45380 COLONOSCOPY AND BIOPSY: CPT | Performed by: INTERNAL MEDICINE

## 2020-03-19 PROCEDURE — 27201028 HC NEEDLE, SCLERO: Performed by: INTERNAL MEDICINE

## 2020-03-19 PROCEDURE — 99223 1ST HOSP IP/OBS HIGH 75: CPT | Mod: ,,, | Performed by: COLON & RECTAL SURGERY

## 2020-03-19 PROCEDURE — 99233 SBSQ HOSP IP/OBS HIGH 50: CPT | Mod: GC,,, | Performed by: HOSPITALIST

## 2020-03-19 PROCEDURE — 45385 PR COLONOSCOPY,REMV LESN,SNARE: ICD-10-PCS | Mod: 22,,, | Performed by: INTERNAL MEDICINE

## 2020-03-19 PROCEDURE — 84100 ASSAY OF PHOSPHORUS: CPT

## 2020-03-19 PROCEDURE — 27201089 HC SNARE, DISP (ANY): Performed by: INTERNAL MEDICINE

## 2020-03-19 PROCEDURE — 45380 COLONOSCOPY AND BIOPSY: CPT | Mod: 59,,, | Performed by: INTERNAL MEDICINE

## 2020-03-19 PROCEDURE — 43239 EGD BIOPSY SINGLE/MULTIPLE: CPT | Mod: 51,,, | Performed by: INTERNAL MEDICINE

## 2020-03-19 RX ORDER — PHENYLEPHRINE HYDROCHLORIDE 10 MG/ML
INJECTION INTRAVENOUS
Status: DISCONTINUED | OUTPATIENT
Start: 2020-03-19 | End: 2020-03-19

## 2020-03-19 RX ORDER — METRONIDAZOLE 500 MG/1
500 TABLET ORAL ONCE
Status: COMPLETED | OUTPATIENT
Start: 2020-03-20 | End: 2020-03-20

## 2020-03-19 RX ORDER — METRONIDAZOLE 500 MG/1
500 TABLET ORAL ONCE
Status: COMPLETED | OUTPATIENT
Start: 2020-03-19 | End: 2020-03-19

## 2020-03-19 RX ORDER — HYDROCODONE BITARTRATE AND ACETAMINOPHEN 500; 5 MG/1; MG/1
TABLET ORAL
Status: DISCONTINUED | OUTPATIENT
Start: 2020-03-19 | End: 2020-03-23 | Stop reason: HOSPADM

## 2020-03-19 RX ORDER — LIDOCAINE HYDROCHLORIDE 20 MG/ML
INJECTION INTRAVENOUS
Status: DISCONTINUED | OUTPATIENT
Start: 2020-03-19 | End: 2020-03-19

## 2020-03-19 RX ORDER — NEOMYCIN SULFATE 500 MG/1
1000 TABLET ORAL ONCE
Status: COMPLETED | OUTPATIENT
Start: 2020-03-19 | End: 2020-03-19

## 2020-03-19 RX ORDER — FERROUS SULFATE 325(65) MG
325 TABLET ORAL EVERY 12 HOURS
Qty: 60 TABLET | Refills: 3 | Status: SHIPPED | OUTPATIENT
Start: 2020-03-19 | End: 2020-03-19 | Stop reason: SDUPTHER

## 2020-03-19 RX ORDER — PROPOFOL 10 MG/ML
VIAL (ML) INTRAVENOUS
Status: DISCONTINUED | OUTPATIENT
Start: 2020-03-19 | End: 2020-03-19

## 2020-03-19 RX ORDER — CYANOCOBALAMIN 1000 UG/ML
1000 INJECTION, SOLUTION INTRAMUSCULAR; SUBCUTANEOUS ONCE
Status: COMPLETED | OUTPATIENT
Start: 2020-03-19 | End: 2020-03-19

## 2020-03-19 RX ORDER — PANTOPRAZOLE SODIUM 40 MG/1
40 TABLET, DELAYED RELEASE ORAL
Qty: 90 TABLET | Refills: 3 | Status: ON HOLD | OUTPATIENT
Start: 2020-03-19 | End: 2021-04-15

## 2020-03-19 RX ORDER — GLYCOPYRROLATE 0.2 MG/ML
INJECTION INTRAMUSCULAR; INTRAVENOUS
Status: DISCONTINUED | OUTPATIENT
Start: 2020-03-19 | End: 2020-03-19

## 2020-03-19 RX ORDER — CEFTRIAXONE 1 G/1
1 INJECTION, POWDER, FOR SOLUTION INTRAMUSCULAR; INTRAVENOUS
Status: DISCONTINUED | OUTPATIENT
Start: 2020-03-20 | End: 2020-03-20

## 2020-03-19 RX ORDER — PANTOPRAZOLE SODIUM 40 MG/1
40 TABLET, DELAYED RELEASE ORAL DAILY
Status: DISCONTINUED | OUTPATIENT
Start: 2020-03-20 | End: 2020-03-23 | Stop reason: HOSPADM

## 2020-03-19 RX ORDER — LANOLIN ALCOHOL/MO/W.PET/CERES
1000 CREAM (GRAM) TOPICAL DAILY
Status: DISCONTINUED | OUTPATIENT
Start: 2020-03-20 | End: 2020-03-23 | Stop reason: HOSPADM

## 2020-03-19 RX ORDER — METRONIDAZOLE 500 MG/100ML
500 INJECTION, SOLUTION INTRAVENOUS
Status: DISCONTINUED | OUTPATIENT
Start: 2020-03-20 | End: 2020-03-20

## 2020-03-19 RX ORDER — FENTANYL CITRATE 50 UG/ML
INJECTION, SOLUTION INTRAMUSCULAR; INTRAVENOUS
Status: DISCONTINUED | OUTPATIENT
Start: 2020-03-19 | End: 2020-03-19

## 2020-03-19 RX ORDER — SODIUM CHLORIDE 9 MG/ML
INJECTION, SOLUTION INTRAVENOUS CONTINUOUS PRN
Status: DISCONTINUED | OUTPATIENT
Start: 2020-03-19 | End: 2020-03-19

## 2020-03-19 RX ORDER — PANTOPRAZOLE SODIUM 40 MG/1
40 TABLET, DELAYED RELEASE ORAL
Qty: 90 TABLET | Refills: 3 | Status: SHIPPED | OUTPATIENT
Start: 2020-03-19 | End: 2020-03-19 | Stop reason: SDUPTHER

## 2020-03-19 RX ORDER — NEOMYCIN SULFATE 500 MG/1
1000 TABLET ORAL ONCE
Status: COMPLETED | OUTPATIENT
Start: 2020-03-20 | End: 2020-03-20

## 2020-03-19 RX ORDER — FERROUS SULFATE 325(65) MG
325 TABLET ORAL EVERY 12 HOURS
Qty: 60 TABLET | Refills: 3 | Status: SHIPPED | OUTPATIENT
Start: 2020-03-19 | End: 2020-07-17

## 2020-03-19 RX ORDER — PROPOFOL 10 MG/ML
VIAL (ML) INTRAVENOUS CONTINUOUS PRN
Status: DISCONTINUED | OUTPATIENT
Start: 2020-03-19 | End: 2020-03-19

## 2020-03-19 RX ADMIN — IOHEXOL 15 ML: 350 INJECTION, SOLUTION INTRAVENOUS at 06:03

## 2020-03-19 RX ADMIN — GLYCOPYRROLATE 0.1 MG: 0.2 INJECTION, SOLUTION INTRAMUSCULAR; INTRAVENOUS at 02:03

## 2020-03-19 RX ADMIN — NEOMYCIN SULFATE 1000 MG: 500 TABLET ORAL at 07:03

## 2020-03-19 RX ADMIN — PHENYLEPHRINE HYDROCHLORIDE 100 MCG: 10 INJECTION INTRAVENOUS at 02:03

## 2020-03-19 RX ADMIN — LIDOCAINE HYDROCHLORIDE 40 MG: 20 INJECTION, SOLUTION INTRAVENOUS at 02:03

## 2020-03-19 RX ADMIN — METRONIDAZOLE 500 MG: 500 TABLET ORAL at 06:03

## 2020-03-19 RX ADMIN — ROSUVASTATIN CALCIUM 40 MG: 20 TABLET, FILM COATED ORAL at 10:03

## 2020-03-19 RX ADMIN — METRONIDAZOLE 500 MG: 500 TABLET ORAL at 08:03

## 2020-03-19 RX ADMIN — PHENYLEPHRINE HYDROCHLORIDE 50 MCG: 10 INJECTION INTRAVENOUS at 03:03

## 2020-03-19 RX ADMIN — Medication 250 MG: at 10:03

## 2020-03-19 RX ADMIN — FENTANYL CITRATE 25 MCG: 50 INJECTION, SOLUTION INTRAMUSCULAR; INTRAVENOUS at 03:03

## 2020-03-19 RX ADMIN — PROPOFOL 75 MCG/KG/MIN: 10 INJECTION, EMULSION INTRAVENOUS at 02:03

## 2020-03-19 RX ADMIN — CYANOCOBALAMIN 1000 MCG: 1000 INJECTION, SOLUTION INTRAMUSCULAR; SUBCUTANEOUS at 10:03

## 2020-03-19 RX ADMIN — IOHEXOL 75 ML: 350 INJECTION, SOLUTION INTRAVENOUS at 10:03

## 2020-03-19 RX ADMIN — FENTANYL CITRATE 25 MCG: 50 INJECTION, SOLUTION INTRAMUSCULAR; INTRAVENOUS at 02:03

## 2020-03-19 RX ADMIN — NEOMYCIN SULFATE 1000 MG: 500 TABLET ORAL at 08:03

## 2020-03-19 RX ADMIN — SODIUM CHLORIDE, SODIUM GLUCONATE, SODIUM ACETATE, POTASSIUM CHLORIDE, MAGNESIUM CHLORIDE, SODIUM PHOSPHATE, DIBASIC, AND POTASSIUM PHOSPHATE: .53; .5; .37; .037; .03; .012; .00082 INJECTION, SOLUTION INTRAVENOUS at 03:03

## 2020-03-19 RX ADMIN — FERROUS SULFATE TAB EC 325 MG (65 MG FE EQUIVALENT) 325 MG: 325 (65 FE) TABLET DELAYED RESPONSE at 10:03

## 2020-03-19 RX ADMIN — SODIUM CHLORIDE: 0.9 INJECTION, SOLUTION INTRAVENOUS at 02:03

## 2020-03-19 RX ADMIN — PHENYLEPHRINE HYDROCHLORIDE 100 MCG: 10 INJECTION INTRAVENOUS at 03:03

## 2020-03-19 RX ADMIN — VITAMIN D, TAB 1000IU (100/BT) 1000 UNITS: 25 TAB at 10:03

## 2020-03-19 RX ADMIN — CALCIUM 500 MG: 500 TABLET ORAL at 10:03

## 2020-03-19 RX ADMIN — PROPOFOL 30 MG: 10 INJECTION, EMULSION INTRAVENOUS at 02:03

## 2020-03-19 RX ADMIN — SODIUM PHOSPHATE, MONOBASIC, MONOHYDRATE 30 MMOL: 276; 142 INJECTION, SOLUTION INTRAVENOUS at 10:03

## 2020-03-19 NOTE — ASSESSMENT & PLAN NOTE
Hilda is an 86 yo female with past medical history of HTN who was admitted recently for anemia found to have a large obstructing mass in the Ascending colon today.     1. Partially obstructing and bleeding mass in the ascending colon - discussed with patient will proceed with surgery tomorrow given rapidly recurrent anemic symptoms.     - Patient can have Clear liquid diet today but NPO after Midnight.   - Obtain CT scan overnight for staging.   - Oral antibiotics (neomycin and Flagyl) ordered.   - Consent to be obtained tomorrow as patient just had anesthesia.   - States she can go up to flight of stairs without any issues.   - Please call if any questions, thank you.

## 2020-03-19 NOTE — HPI
Hilda is an 86 yo female with past medical history of HTN who was admitted recently for anemia found to have a large obstructing mass in the Right Colon today.     Patient presented from internal medicine clinic as she was found to have a low hemoglobin 5.8 on lab work. She was recently [place] [facility] [place] where she was admitted for anemia with an Hb down to 4.4 and was given 2 pRBC. She had a good response with an increase of her Hb up to 8.2 but at that time she refused any further work up with EGD or Colonoscopy.     Since then patient reports some fatigue and some darker BM but no bright red blood, last BM yesterday and she is passing flatus.     GI did a colonoscopy today which showed a near obstructing mass in the ascending colon as well as a descending colonic anastomosis.     She had a post procedure XR that showed Clips in the RLQ.     States she only had a hysterectomy in the past.     States she is able to go up a flight of stairs with any issues.

## 2020-03-19 NOTE — PLAN OF CARE
Problem: Fall Injury Risk  Goal: Absence of Fall and Fall-Related Injury  Outcome: Ongoing, Progressing  Intervention: Identify and Manage Contributors to Fall Injury Risk  Flowsheets (Taken 3/19/2020 0432)  Self-Care Promotion: safe use of adaptive equipment encouraged  Intervention: Promote Injury-Free Environment  Flowsheets (Taken 3/19/2020 0432)  Safety Promotion/Fall Prevention: assistive device/personal item within reach; bed alarm set; commode/urinal/bedpan at bedside; Fall Risk reviewed with patient/family; nonskid shoes/socks when out of bed  Environmental Safety Modification: assistive device/personal items within reach; clutter free environment maintained  Educated pt on RTF, use of call light, and bed alarm. Bed alarm set. Bedside commode at bedside. Fall risk band on.      Problem: Adult Inpatient Plan of Care  Goal: Plan of Care Review  Outcome: Ongoing, Progressing  Flowsheets (Taken 3/19/2020 0432)  Plan of Care Reviewed With: patient  Goal: Patient-Specific Goal (Individualization)  Outcome: Ongoing, Progressing  Flowsheets (Taken 3/19/2020 0432)  Patient-Specific Goals (Include Timeframe): to get some sleep tonight  Goal: Absence of Hospital-Acquired Illness or Injury  Outcome: Ongoing, Progressing  Goal: Optimal Comfort and Wellbeing  Outcome: Ongoing, Progressing  Goal: Readiness for Transition of Care  Outcome: Ongoing, Progressing  Goal: Rounds/Family Conference  Outcome: Ongoing, Progressing     Problem: Adjustment to Illness (Gastrointestinal Bleeding)  Goal: Optimal Coping with Acute Illness  Outcome: Ongoing, Progressing  Intervention: Optimize Psychosocial Response to Unexpected Illness  Flowsheets (Taken 3/19/2020 0432)  Supportive Measures: active listening utilized; positive reinforcement provided; verbalization of feelings encouraged     Problem: Bleeding (Gastrointestinal Bleeding)  Goal: Hemostasis  Outcome: Ongoing, Progressing  Intervention: Manage Gastrointestinal  Bleeding  Flowsheets (Taken 3/19/2020 8177)  Environmental Support: calm environment promoted; rest periods encouraged; environmental consistency promoted    Pt has been prepped overnight for a colonoscopy in the AM. Sonal almost completed. Stool clear and yellowish/orange. No evidence of GIB at this time. Pt asymptomatic

## 2020-03-19 NOTE — PLAN OF CARE
Eval completed and POC established     Derrick Mcdaniel, PT, DPT  3/19/2020         Problem: Physical Therapy Goal  Goal: Physical Therapy Goal  Description  Goals to be met by: 2020    Patient will increase functional independence with mobility by performin. Supine to sit with Modified [place]  2. Sit to supine with Modified [place]  3. Sit to stand transfer with Modified [place]  4. Gait  x 200 feet with Modified [place] using LRAD  5. Lower extremity exercise program x15 reps per handout, with independence    Outcome: Ongoing, Progressing

## 2020-03-19 NOTE — [employer] PATIENT INSTRUCTIONS
Discharge Summary/Instructions after an Endoscopic Procedure  Patient Name: Nydia Stevens  Patient MRN: [id number]  Patient YOB: 1934 Thursday, March 19, 2020  Real Mabry MD  RESTRICTIONS:  During your procedure today, you received medications for sedation.  These   medications may affect your judgment, balance and coordination.  Therefore,   for 24 hours, you have the following restrictions:   - DO NOT drive a car, operate machinery, make legal/financial decisions,   sign important papers or drink alcohol.    ACTIVITY:  Today: no heavy lifting, straining or running due to procedural   sedation/anesthesia.  The following day: return to full activity including work.  DIET:  Eat and drink normally unless instructed otherwise.     TREATMENT FOR COMMON SIDE EFFECTS:  - Mild abdominal pain, nausea, belching, bloating or excessive gas:  rest,   eat lightly and use a heating pad.  - Sore Throat: treat with throat lozenges and/or gargle with warm salt   water.  - Because air was used during the procedure, expelling large amounts of air   from your rectum or belching is normal.  - If a bowel prep was taken, you may not have a bowel movement for 1-3 days.    This is normal.  SYMPTOMS TO WATCH FOR AND REPORT TO YOUR PHYSICIAN:  1. Abdominal pain or bloating, other than gas cramps.  2. Chest pain.  3. Back pain.  4. Signs of infection such as: chills or fever occurring within 24 hours   after the procedure.  5. Rectal bleeding, which would show as bright red, maroon, or black stools.   (A tablespoon of blood from the rectum is not serious, especially if   hemorrhoids are present.)  6. Vomiting.  7. Weakness or dizziness.  GO DIRECTLY TO THE NEAREST EMERGENCY ROOM IF YOU HAVE ANY OF THE FOLLOWING:      Difficulty breathing              Chills and/or fever over 101 F   Persistent vomiting and/or vomiting blood   Severe abdominal pain   Severe chest pain   Black, tarry stools   Bleeding- more than one  tablespoon   Any other symptom or condition that you feel may need urgent attention  Your doctor recommends these additional instructions:  If any biopsies were taken, your doctors clinic will contact you in 1 to 2   weeks with any results.  - [facility].   - Perform a CT scan (computed tomography) of chest with contrast, abdomen   with contrast and pelvis with contrast for staging.   - Await pathology results.   - Refer to a colo-rectal surgeon today - Case discussed with Dr. Thierno Berg.   - Repeat colonoscopy in 1 year for surveillance.   - Consider avoiding all non-steroidal anti-inflammatory drugs (aspirin,   ibuprofen, naproxen, etc.), unless needed for cardiovascular protection.    Recommend you discuss with your prescribing doctor (of your aspirin) to see   if cardiovascular benefits of your aspirin outweigh the risks of GI   bleeding.  - Perform a flat plate abdominal x-ray today -FOR MARKING CLIP LOCATION   (CLIP PLACE AT THE SITE OF THE RIGHT SIDED COLON MASS).   - Return to GI clinic in 3 months.   - The findings and recommendations were discussed with the patient's primary   physician.   - The findings and recommendations were discussed with the patient.  For questions, problems or results please call your physician - Real Mabry MD at Work:  [phone].  [facility], EMERGENCY ROOM PHONE NUMBER: [phone]  IF A COMPLICATION OR EMERGENCY SITUATION ARISES AND YOU ARE UNABLE TO REACH   YOUR PHYSICIAN - GO DIRECTLY TO THE EMERGENCY ROOM.  Real Mabry MD  3/19/2020 4:00:33 PM  This report has been verified and signed electronically.  [employer]

## 2020-03-19 NOTE — ANESTHESIA POSTPROCEDURE EVALUATION
Anesthesia Post Evaluation    Patient: Nydia Stevens    Procedure(s) Performed: Procedure(s) (LRB):  EGD (ESOPHAGOGASTRODUODENOSCOPY) (N/A)  COLONOSCOPY (N/A)    Final Anesthesia Type: general    Patient location during evaluation: PACU  Patient participation: Yes- Able to Participate  Level of consciousness: awake and alert  Post-procedure vital signs: reviewed and stable  Pain management: adequate  Airway patency: patent    PONV status at discharge: No PONV  Anesthetic complications: no      Cardiovascular status: stable  Respiratory status: spontaneous ventilation and room air  Hydration status: euvolemic  Follow-up not needed.          Vitals Value Taken Time   /59 3/19/2020  4:01 PM   Temp 36.1 °C (97 °F) 3/19/2020  3:46 PM   Pulse 87 3/19/2020  4:11 PM   Resp 17 3/19/2020  4:11 PM   SpO2 100 % 3/19/2020  4:11 PM   Vitals shown include unvalidated device data.      No case tracking events are documented in the log.      Pain/Connie Score: Connie Score: 9 (3/19/2020  3:46 PM)

## 2020-03-19 NOTE — CONSULTS
[facility]  Colorectal Surgery  Consult Note    Patient Name: Nydia Stevens  MRN: [id number]  Admission Date: 3/17/2020  Hospital Length of Stay: 2 days  Attending Physician: Derrick Spaulding MD  Primary Care Provider: Donaldo Carlin MD    Inpatient consult to Colorectal Surgery  Consult performed by: Precious Cota MD  Consult ordered by: Sean Sauer MD        Subjective:     Chief Complaint/Reason for Admission: Mass in the Ascending colon.     History of Present Illness:    Hilda is an 84 yo female with past medical history of HTN who was admitted recently for anemia found to have a large obstructing mass in the Right Colon today.     Patient presented from internal medicine clinic as she was found to have a low hemoglobin 5.8 on lab work. She was recently discharged from [facility] (3/2-3/4) where she was admitted for anemia with an Hb down to 4.4 and was given 2 pRBC. She had a good response with an increase of her Hb up to 8.2 but at that time she refused any further work up with EGD or Colonoscopy.     Since then patient reports some fatigue and some darker BM but no bright red blood, last BM yesterday and she is passing flatus.     GI did a colonoscopy today which showed a near obstructing mass in the ascending colon as well as a descending colonic anastomosis.     She had a post procedure XR that showed Clips in the RLQ.     States she only had a hysterectomy in the past.     States she is able to go up a flight of stairs with any issues.     Facility-Administered Medications Prior to Admission   Medication    iron dextran (INFED) 500 mg in sodium chloride 0.9% 250 mL IVPB     PTA Medications   Medication Sig    calcium carbonate (OS-JAILYN) 500 mg calcium (1,250 mg) tablet Take 1 tablet (500 mg total) by mouth once daily.    cholecalciferol, vitamin D3, (VITAMIN D3) 1,000 unit capsule Take 1,000 Units by mouth once daily.    cyanocobalamin (VITAMIN B-12) 1000 MCG tablet Take  1,000 mcg by mouth once daily.    ferrous sulfate (FEOSOL) 325 mg (65 mg iron) Tab tablet TAKE ONE TABLET BY MOUTH TWICE DAILY WITH MEALS    rosuvastatin (CRESTOR) 40 MG Tab TAKE ONE TABLET BY MOUTH EVERY DAY FOR CHOLESTEROL    senna-docusate 8.6-50 mg (PERICOLACE) 8.6-50 mg per tablet Take 1 tablet by mouth 2 (two) times daily.    VIT C/VIT E ACETATE/LUTEIN/MIN (OCUVITE LUTEIN ORAL) Take 1 tablet by mouth once daily.         Review of patient's allergies indicates:  No Known Allergies    Past Medical History:   Diagnosis Date    Cataract     Hypertension      Past Surgical History:   Procedure Laterality Date    CATARACT EXTRACTION W/  INTRAOCULAR LENS IMPLANT Left 8/11/14    [facility]    CATARACT EXTRACTION W/  INTRAOCULAR LENS IMPLANT Right 09/15/14    gregor    FOOT SURGERY      left    HYSTERECTOMY       Family History     Problem Relation (Age of Onset)    Cataracts Brother, Sister, Sister    Diabetes Brother, Sister    Heart attack Father, Brother    No Known Problems Mother, Maternal Aunt, Maternal Uncle, Paternal Aunt, Paternal Uncle, Maternal Grandmother, Maternal Grandfather, Paternal Grandmother, Paternal Grandfather    Stroke Sister, Sister        Tobacco Use    Smoking status: Never Smoker    Smokeless tobacco: Never Used   Substance and Sexual Activity    Alcohol use: No     Alcohol/week: 0.0 standard drinks    Drug use: No    Sexual activity: Not on file     Review of Systems     Negative except above.     Objective:     Vital Signs (Most Recent):  Temp: 97 °F (36.1 °C) (03/19/20 1546)  Pulse: 93 (03/19/20 1546)  Resp: 19 (03/19/20 1546)  BP: 119/70 (03/19/20 1546)  SpO2: 98 % (03/19/20 1546) Vital Signs (24h Range):  Temp:  [97 °F (36.1 °C)-98.9 °F (37.2 °C)] 97 °F (36.1 °C)  Pulse:  [] 93  Resp:  [16-24] 19  SpO2:  [93 %-100 %] 98 %  BP: (119-145)/(53-70) 119/70     Weight: 48.3 kg (106 lb 7.7 oz)  Body mass index is 16.68 kg/m².    Physical Exam    General: In no acute distress, well  appearance.   CV: RRR   Pulm: non labored breathing  Abd: soft, non distended, non tender. Midline surgical scar from above the umbilicus to the pubis.   Ext: wwp      Significant Labs:    BMP (Last 3 Results):   Recent Labs   Lab 03/17/20  1431 03/18/20  0538 03/19/20  0701   GLU 88 86 93    136 135*   K 3.9 4.2 3.7    106 102   CO2 23 17* 23   BUN 11 15 9   CREATININE 0.7 0.8 0.7   CALCIUM 7.9* 7.9* 7.9*   MG 2.1 2.0 1.9     CBC (Last 3 Results):   Recent Labs   Lab 03/18/20  1527 03/18/20  2314 03/19/20  0913   WBC 8.18 8.98 8.81   RBC 3.53* 3.64* 3.56*   HGB 7.4* 7.5* 7.4*   HCT 26.2* 27.1* 26.4*    384* 364*   MCV 74* 75* 74*   MCH 21.0* 20.6* 20.8*   MCHC 28.2* 27.7* 28.0*           Assessment/Plan:     Colonic mass  Hilda is an 84 yo female with past medical history of HTN who was admitted recently for anemia found to have a large obstructing mass in the Ascending colon today.     1. Partially obstructing and bleeding mass in the ascending colon - discussed with patient will proceed with surgery tomorrow given rapidly recurrent anemic symptoms.     - Patient can have Clear liquid diet today but NPO after Midnight.   - Obtain CT scan overnight for staging.   - Oral antibiotics (neomycin and Flagyl) ordered.   - Consent to be obtained tomorrow as patient just had anesthesia.   - States she can go up to flight of stairs without any issues.   - Please call if any questions, thank you.       Precious Brand MD  Colorectal Research Fellow PGY VI  Beeper: [phone]      I have personally obtained a history and performed a physical exam with and independent to my resident and discussed the findings and plan with the patient.  I agree with the above findings and plan with the following exceptions:  None    H, Thierno Berg MD, FACS, FASCRS  Staff Surgeon  Dept of Colon and Rectal Surgery  [facility] [place], [place]

## 2020-03-19 NOTE — PT/OT/SLP EVAL
Physical Therapy Evaluation    Patient Name:  Nydia Stevens   MRN:  [id number]    Recommendations:     Discharge Recommendations:  home health PT   Discharge Equipment Recommendations:     Barriers to discharge: None    Assessment:     Nydia Stevens is a 85 y.o. female admitted with a medical diagnosis of Gastrointestinal bleed.  She presents with the following impairments/functional limitations:  weakness, impaired endurance, gait instability, impaired balance. Pt presenting near functional baseline but slightly limited by decreased endurance. Pt was current w/HH therapy prior to this admission and would benefit from continued skilled acute PT 4x/wk to improve functional mobility.  Recommending pt receive PT services in HHPT setting following discharge from hospital once medically cleared.     Rehab Prognosis: Good; patient would benefit from acute skilled PT services to address these deficits and reach maximum level of function.    Recent Surgery: Procedure(s) (LRB):  EGD (ESOPHAGOGASTRODUODENOSCOPY) (N/A)  COLONOSCOPY (N/A) Day of Surgery    Plan:     During this hospitalization, patient to be seen 4 x/week to address the identified rehab impairments via gait training, therapeutic exercises, therapeutic activities, neuromuscular re-education and progress toward the following goals:    · Plan of Care Expires:  04/19/20    Subjective     Chief Complaint: none noted   Patient/Family Comments/goals: Pt pleasant and willing to participate with therapy on this date.    Pain/Comfort:  · Pain Rating 1: 0/10    Patients cultural, spiritual, Gnosticist conflicts given the current situation: no    Living Environment:  Pt lives at mother house w/other nuns on second floor w/elevtor access.   Prior to admission, patients level of function was amb w/Rollator Mod I and (I) ADLs.  Equipment used at home: rollator, walker, rolling, shower chair, wheelchair.  DME owned (not currently used): none.    Objective:      Communicated with NSG prior to session.  Patient found supine with    upon PT entry to room.    General Precautions: Standard, fall   Orthopedic Precautions:N/A   Braces: N/A     Exams:  · Cognitive Exam:  Patient is oriented to Person, Place, Time and Situation  · Gross Motor Coordination:  WFL  · RLE ROM: WFL  · RLE Strength: WFL  · LLE ROM: WFL  · LLE Strength: WFL    Functional Mobility:  · Bed Mobility:     · Scooting: stand by assistance  · Supine to Sit: stand by assistance  · Transfers:     · Sit to Stand:  stand by assistance with 4 wheeled walker  · Gait: 105ft Rollator SBA  · Balance: SBA      Therapeutic Activities and Exercises:   - Pt educated on:   -PT roles, expectations, and POC    -Safety with mobility   -Benefits of OOB activities to increase strength and functional mobility    -Performing ther ex for increasing LE ROM and strength   -Discharge recommendations     AM-PAC 6 CLICK MOBILITY  Total Score:18     Patient left up in chair with call button in reach.    GOALS:   Multidisciplinary Problems     Physical Therapy Goals        Problem: Physical Therapy Goal    Goal Priority Disciplines Outcome Goal Variances Interventions   Physical Therapy Goal     PT, PT/OT Ongoing, Progressing     Description:  Goals to be met by: 2020    Patient will increase functional independence with mobility by performin. Supine to sit with Modified [place]  2. Sit to supine with Modified [place]  3. Sit to stand transfer with Modified [place]  4. Gait  x 200 feet with Modified [place] using LRAD  5. Lower extremity exercise program x15 reps per handout, with independence                     History:     Past Medical History:   Diagnosis Date    Cataract     Hypertension        Past Surgical History:   Procedure Laterality Date    CATARACT EXTRACTION W/  INTRAOCULAR LENS IMPLANT Left 14    gregor    CATARACT EXTRACTION W/  INTRAOCULAR LENS IMPLANT Right 09/15/14    gregor     FOOT SURGERY      left    HYSTERECTOMY         Time Tracking:     PT Received On: 03/19/20  PT Start Time: 0935     PT Stop Time: 0953  PT Total Time (min): 18 min     Billable Minutes: Evaluation 10 and Gait Training 8      Temo Mcdaniel, PT  03/19/2020

## 2020-03-19 NOTE — H&P
[facility]  History & Physical    Subjective:      Chief Complaint/Reason for Admission:     EGD and colonoscopy    Nydia Stevens is a 85 y.o. female.    Past Medical History:   Diagnosis Date    Cataract     Hypertension      Past Surgical History:   Procedure Laterality Date    CATARACT EXTRACTION W/  INTRAOCULAR LENS IMPLANT Left 8/11/14    [facility]    CATARACT EXTRACTION W/  INTRAOCULAR LENS IMPLANT Right 09/15/14    [facility]    FOOT SURGERY      left    HYSTERECTOMY       Family History   Problem Relation Age of Onset    Heart attack Father     Cataracts Brother     Heart attack Brother     Diabetes Brother     No Known Problems Mother     Cataracts Sister     Stroke Sister     Diabetes Sister     Cataracts Sister     Stroke Sister     No Known Problems Maternal Aunt     No Known Problems Maternal Uncle     No Known Problems Paternal Aunt     No Known Problems Paternal Uncle     No Known Problems Maternal Grandmother     No Known Problems Maternal Grandfather     No Known Problems Paternal Grandmother     No Known Problems Paternal Grandfather     Amblyopia Neg Hx     Blindness Neg Hx     Cancer Neg Hx     Glaucoma Neg Hx     Hypertension Neg Hx     Macular degeneration Neg Hx     Retinal detachment Neg Hx     Strabismus Neg Hx     Thyroid disease Neg Hx     Colon cancer Neg Hx     Esophageal cancer Neg Hx     Irritable bowel syndrome Neg Hx     Rectal cancer Neg Hx     Stomach cancer Neg Hx     Ulcerative colitis Neg Hx     Crohn's disease Neg Hx     Celiac disease Neg Hx      Social History     Tobacco Use    Smoking status: Never Smoker    Smokeless tobacco: Never Used   Substance Use Topics    Alcohol use: No     Alcohol/week: 0.0 standard drinks    Drug use: No       Facility-Administered Medications Prior to Admission   Medication    iron dextran (INFED) 500 mg in sodium chloride 0.9% 250 mL IVPB     PTA Medications   Medication Sig     calcium carbonate (OS-JAILYN) 500 mg calcium (1,250 mg) tablet Take 1 tablet (500 mg total) by mouth once daily.    cholecalciferol, vitamin D3, (VITAMIN D3) 1,000 unit capsule Take 1,000 Units by mouth once daily.    cyanocobalamin (VITAMIN B-12) 1000 MCG tablet Take 1,000 mcg by mouth once daily.    ferrous sulfate (FEOSOL) 325 mg (65 mg iron) Tab tablet TAKE ONE TABLET BY MOUTH TWICE DAILY WITH MEALS    rosuvastatin (CRESTOR) 40 MG Tab TAKE ONE TABLET BY MOUTH EVERY DAY FOR CHOLESTEROL    senna-docusate 8.6-50 mg (PERICOLACE) 8.6-50 mg per tablet Take 1 tablet by mouth 2 (two) times daily.    VIT C/VIT E ACETATE/LUTEIN/MIN (OCUVITE LUTEIN ORAL) Take 1 tablet by mouth once daily.       Review of patient's allergies indicates:  No Known Allergies     Review of Systems   Constitutional: Positive for weight loss. Negative for chills and fever.   Respiratory: Negative for shortness of breath and wheezing.    Cardiovascular: Negative for chest pain.   Gastrointestinal: Negative for abdominal pain.       Objective:      Vital Signs (Most Recent)  Temp: 98.1 °F (36.7 °C) (03/19/20 1232)  Pulse: 95 (03/19/20 1232)  Resp: 18 (03/19/20 1232)  BP: 138/65 (03/19/20 1232)  SpO2: 99 % (03/19/20 1232)    Vital Signs Range (Last 24H):  Temp:  [97.7 °F (36.5 °C)-98.9 °F (37.2 °C)]   Pulse:  []   Resp:  [16-24]   BP: (119-145)/(53-65)   SpO2:  [93 %-100 %]     Physical Exam   Constitutional: She is oriented to person, place, and time. She appears well-developed and well-nourished.   Cardiovascular: Normal rate.   Pulmonary/Chest: Effort normal.   Abdominal: Soft.   Neurological: She is alert and oriented to person, place, and time.   Psychiatric: She has a normal mood and affect. Her behavior is normal. Judgment and thought content normal.         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 Scheduled DEXA scan in follow-up visit July 2018 to coordinate with second Prolia injection.      Iron deficiency anemia [D50.9]  Yes    Essential hypertension -- goal SBP < 150 [I10]  Yes    Pure hypercholesterolemia [E78.00]  Yes      Resolved Hospital Problems   No resolved problems to display.       Plan:    EGD and colonoscopy

## 2020-03-19 NOTE — TRANSFER OF CARE
"Anesthesia Transfer of Care Note    Patient: Nydia Stevens    Procedure(s) Performed: Procedure(s) (LRB):  EGD (ESOPHAGOGASTRODUODENOSCOPY) (N/A)  COLONOSCOPY (N/A)    Patient location: PACU    Anesthesia Type: general    Transport from OR: Transported from OR on room air with adequate spontaneous ventilation    Post pain: adequate analgesia    Post assessment: no apparent anesthetic complications and tolerated procedure well    Post vital signs: stable    Level of consciousness: awake    Nausea/Vomiting: no nausea/vomiting    Complications: none    Transfer of care protocol was followed      Last vitals:   Visit Vitals  /70   Pulse 93   Temp 36.1 °C (97 °F) (Axillary)   Resp 19   Ht 5' 7" (1.702 m)   Wt 48.3 kg (106 lb 7.7 oz)   SpO2 98%   Breastfeeding? No   BMI 16.68 kg/m²     "

## 2020-03-19 NOTE — PROGRESS NOTES
[facility] Hospital Medicine  Progress Note    Patient Name: Nydia Stevens  MRN: [id number]  Patient Class: IP- Inpatient   Admission Date: 3/17/2020  Length of Stay: 2 days  Attending Physician: Derrick Spaulding MD  Primary Care Provider: Donaldo Carlin MD    [facility] Medicine Team: [facility] MED 2 Sean Sauer MD    Subjective:     Principal Problem:Gastrointestinal bleed        HPI:  Ms. Stevens is an 84yo female with past medical history of HTN who presents from internal medicine clinic(seen by Dr. Carlin) with chief complaint of abnormal lab characterized by low hemoglobin 5.8. She was recently discharged from [facility] where she was admitted from 3/2-3/4 for nearly the exact thing. She had a very low hemoglobin at the time(4.4)--she had symptoms of dyspnea on exertion and fatigue. She was transfused 2 units pRBC with improvement in her symptoms and her anemia. She did not have interest in colonoscopy or EGD at the time and opted for as needed transfusions. At the time of discharge her hemoglobin was 8.9.    She says she has been feeling well since discharge. Currently her only symptom is fatigue on ROS. She denies DONOVAN, dizziness, lightheadedness, or pale skin. She says she has occasionally had some dark bowel movements-one most recently yesterday. She denies BRBPR or hematemesis, cough, hemoptysis, hematuria. She denies constipation or abdominal pain. She says she has taken NSAIDs in the past but not consistently--only     On arrival, she was afebrile with VSS--no tachycardia or hypotension. Labs were significant for Hgb 5.3, MCV 65, RDW 28.4, , Iron studies with Iron 58, TIBC 447, Sat iron 13, transferrin 302, ferritin 283, low B12. 2 units pRBC were ordered for transfusion and GI consulted. Patient is amenable to intervention/workup.    Overview/Hospital Course:  Patient was admitted to [facility] for GIB with hemoglobin on 5.3. She appears to have a combined anemia--B12 deficiency, Iron  deficiency, and Hemolytic anemia(increased LDH, low haptoglobin, schistocytes on peripheral smear,uptrending indirect bilirubin). GI was consulted given patient's history of bloody bowel movements. . She was started on IV PPI and given 2 units pRBC. She did respond appropriately to transfusion. We are completing workup for hemolytic anemia. 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    Interval History: No acute events overnight. Hemoglobin stable. Patient without any bloody bowel movements. She had successful prep. She denies other symptoms.    Review of Systems   Constitutional: Positive for fatigue and unexpected weight change. Negative for activity change, chills, diaphoresis and fever.   HENT: Negative for congestion and sore throat.    Eyes: Negative for photophobia and visual disturbance.   Cardiovascular: Negative for chest pain, palpitations and leg swelling.   Gastrointestinal: Negative for abdominal distention, abdominal pain, blood in stool, constipation, diarrhea, nausea and vomiting.   Genitourinary: Negative for difficulty urinating, dysuria and hematuria.   Skin: Negative for color change and pallor.   Neurological: Negative for dizziness, syncope, weakness, light-headedness and headaches.   Psychiatric/Behavioral: Negative for agitation and confusion.     Objective:     Vital Signs (Most Recent):  Temp: 98.1 °F (36.7 °C) (03/19/20 1232)  Pulse: 95 (03/19/20 1232)  Resp: 18 (03/19/20 1232)  BP: 138/65 (03/19/20 1232)  SpO2: 99 % (03/19/20 1232) Vital Signs (24h Range):  Temp:  [97.7 °F (36.5 °C)-98.9 °F (37.2 °C)] 98.1 °F (36.7 °C)  Pulse:  [] 95  Resp:  [16-24] 18  SpO2:  [93 %-100 %] 99 %  BP: (119-145)/(53-65) 138/65     Weight: 48.3 kg (106 lb 7.7 oz)  Body mass index is 16.68 kg/m².    Intake/Output Summary (Last 24 hours) at 3/19/2020 8322  Last data filed at 3/19/2020 0503  Gross per 24 hour   Intake  4000 ml   Output --   Net 4000 ml      Physical Exam   Constitutional: She is oriented to person, place, and time. She appears well-developed and well-nourished. No distress.   HENT:   Head: Normocephalic and atraumatic.   Eyes: Pupils are equal, round, and reactive to light. No scleral icterus.   Neck: Normal range of motion. Neck supple.   Cardiovascular: Normal rate, regular rhythm and normal heart sounds. Exam reveals no gallop and no friction rub.   No murmur heard.  Pulmonary/Chest: Effort normal and breath sounds normal. No respiratory distress. She has no wheezes. She has no rales.   Abdominal: Soft. Bowel sounds are normal. She exhibits no distension. There is no tenderness.   Musculoskeletal: She exhibits no edema.   Neurological: She is alert and oriented to person, place, and time.   Skin: Skin is warm and dry. There is pallor.   Jaundice present       Significant Labs:   CBC:   Recent Labs   Lab 03/18/20  1527 03/18/20  2314 03/19/20  0913   WBC 8.18 8.98 8.81   HGB 7.4* 7.5* 7.4*   HCT 26.2* 27.1* 26.4*    384* 364*     CMP:   Recent Labs   Lab 03/18/20  0538 03/19/20  0701    135*   K 4.2 3.7    102   CO2 17* 23   GLU 86 93   BUN 15 9   CREATININE 0.8 0.7   CALCIUM 7.9* 7.9*   PROT 7.2 6.4   ALBUMIN 3.1* 2.9*   BILITOT 2.7* 1.6*   ALKPHOS 69 69   AST 68* 48*   ALT 10 9*   ANIONGAP 13 10   EGFRNONAA >60.0 >60.0     Magnesium:   Recent Labs   Lab 03/18/20  0538 03/19/20  0701   MG 2.0 1.9       Significant Imaging: I have reviewed and interpreted all pertinent imaging results/findings within the past 24 hours.      Assessment/Plan:      * Gastrointestinal bleed  Patient with history of hypertension, mild aortic stenosis, osteoporosis, ANANDA, and B12 deficiency presents with chief complaint of abnormal lab value of Hgb 5.3. BUN 12. Associated symptom of fatigue. She denies significant SOB, syncope, dizziness. She has had dark stools but no BRBPR. She last had colonoscopy 10 years ago  which is not in our system and patient reports as normal.  She had an EGD in 2014 which was normal. She has chronic iron deficiency anemia and this is her 2nd admission in the past month for anemia requiring transfusions. Differential includes GI neoplasm(given chronicity of anemia and weight loss), AVM, Angiodysplasia/Heyde's syndrome(given AS), diverticulosis, gastritis, PUD.     Plan:  -Insert 2 large bore IVs  -CBC spaced to q12  -Transfuse to maintain Hgb 7  -S/p IV protonix 80 mg and 40 BID. Now discontinued.   -GI consulted. Appreciate recommendations.   -Patient s/p colonoscopy with obstructing mass present. Biopsies taken andolorectal surgery consulted.  -Avoid NSAIDS    Colonic mass  84 yo female with ANANDA and evidence of BRBPR. She is s/p colonoscopy with evidence of obstructing colon mass. Biopsies have been taken and area tattooed by GI.     Plan:  -Colorectal surgery consulted for further eval(Dr. Berg). Appreciate recommendations.  -CT Chest Abdomen Pelvis with oral and IV contrast  -CEA ordered  -Pending imaging will discuss case and follow up with heme onc      Hemolytic anemia  Patient with elevated LDH, low haptoglobin, inappropriately low retic count, uptrending bilirubin. She does not have thromboyctopenia. She has normal coags. 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 No thrombocytopenia argues against MAHA. Could be 2/2 aortic stenosis.     Plan:  -Continue to monitor  -F/u indirect regina, cryoglobulins  -She may need to have Heme/onc consulted given colon mass and hemolytic anemia    Chronic combined systolic and diastolic heart failure  Echo with newly decreased EF 45%, grade II diastolic dysfunction, mild-mod aortic stenosis, normal  CVP. She has elevated . She has mild lower extremity edema but no oxygen requirements at this time.     Plan:  -Strict I/Os and daily weights  -Continue to monitor for worsening oxygen requirements while receiving blood transfusions  with lasix as needed.       Osteoporosis  Last DEXA in 2018 which showed osteoporosis of lumbar spine, total hip, and femoral neck. She has received Prolia injections in the past.     Plan:  -Continue calcium and vitamin D supplementation  -Repeat DEXA as outpatient(ordered by PCP)    Aortic stenosis, mild  Patient with Echo 3 years ago with valve area of 1.62. Repeat Echo here show slight worsening of her stenosis with valve area 1.42 cm2, peak velocity 2.28, and mean gradient 14 mmHg. Patient denies significant symptoms including dyspnea, syncope, or chest pain.     Plan:  -Continue to monitor given transfusions. Will transfuse blood over 4 hour period.    Iron deficiency anemia  Patient with last colonoscopy 10 years ago(was normal per her). She has deferred repeat colonoscopy due to the prep involved. She has required injectable and oral iron supplementation.    Plan:  -Continue iron sulfate 325 mg daily    Pure hypercholesterolemia  Home medication: rosuvastatin 40 mg daily    Plan:   -Continue home statin        VTE Risk Mitigation (From admission, onward)         Ordered     IP VTE HIGH RISK PATIENT  Once      03/17/20 1436     Place sequential compression device  Until discontinued      03/17/20 1436                      Sean Sauer MD  Department of Hospital Medicine   [facility]

## 2020-03-19 NOTE — ASSESSMENT & PLAN NOTE
Patient with elevated LDH, low haptoglobin, inappropriately low retic count, uptrending bilirubin. She does not have thromboyctopenia. She has normal coags. 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 No thrombocytopenia argues against MAHA. Could be 2/2 aortic stenosis.     Plan:  -Continue to monitor  -F/u indirect regina, cryoglobulins  -She may need to have Heme/onc consulted given colon mass and hemolytic anemia

## 2020-03-19 NOTE — SUBJECTIVE & OBJECTIVE
Facility-Administered Medications Prior to Admission   Medication    iron dextran (INFED) 500 mg in sodium chloride 0.9% 250 mL IVPB     PTA Medications   Medication Sig    calcium carbonate (OS-JAILYN) 500 mg calcium (1,250 mg) tablet Take 1 tablet (500 mg total) by mouth once daily.    cholecalciferol, vitamin D3, (VITAMIN D3) 1,000 unit capsule Take 1,000 Units by mouth once daily.    cyanocobalamin (VITAMIN B-12) 1000 MCG tablet Take 1,000 mcg by mouth once daily.    ferrous sulfate (FEOSOL) 325 mg (65 mg iron) Tab tablet TAKE ONE TABLET BY MOUTH TWICE DAILY WITH MEALS    rosuvastatin (CRESTOR) 40 MG Tab TAKE ONE TABLET BY MOUTH EVERY DAY FOR CHOLESTEROL    senna-docusate 8.6-50 mg (PERICOLACE) 8.6-50 mg per tablet Take 1 tablet by mouth 2 (two) times daily.    VIT C/VIT E ACETATE/LUTEIN/MIN (OCUVITE LUTEIN ORAL) Take 1 tablet by mouth once daily.         Review of patient's allergies indicates:  No Known Allergies    Past Medical History:   Diagnosis Date    Cataract     Hypertension      Past Surgical History:   Procedure Laterality Date    CATARACT EXTRACTION W/  INTRAOCULAR LENS IMPLANT Left 8/11/14    gregor    CATARACT EXTRACTION W/  INTRAOCULAR LENS IMPLANT Right 09/15/14    gregor    FOOT SURGERY      left    HYSTERECTOMY       Family History     Problem Relation (Age of Onset)    Cataracts Brother, Sister, Sister    Diabetes Brother, Sister    Heart attack Father, Brother    No Known Problems Mother, Maternal Aunt, Maternal Uncle, Paternal Aunt, Paternal Uncle, Maternal Grandmother, Maternal Grandfather, Paternal Grandmother, Paternal Grandfather    Stroke Sister, Sister        Tobacco Use    Smoking status: Never Smoker    Smokeless tobacco: Never Used   Substance and Sexual Activity    Alcohol use: No     Alcohol/week: 0.0 standard drinks    Drug use: No    Sexual activity: Not on file     Review of Systems     Negative except above.     Objective:     Vital Signs (Most Recent):  Temp: 97  °F (36.1 °C) (03/19/20 1546)  Pulse: 93 (03/19/20 1546)  Resp: 19 (03/19/20 1546)  BP: 119/70 (03/19/20 1546)  SpO2: 98 % (03/19/20 1546) Vital Signs (24h Range):  Temp:  [97 °F (36.1 °C)-98.9 °F (37.2 °C)] 97 °F (36.1 °C)  Pulse:  [] 93  Resp:  [16-24] 19  SpO2:  [93 %-100 %] 98 %  BP: (119-145)/(53-70) 119/70     Weight: 48.3 kg (106 lb 7.7 oz)  Body mass index is 16.68 kg/m².    Physical Exam    General: In no acute distress, well appearance.   CV: RRR   Pulm: non labored breathing  Abd: soft, non distended, non tender. Midline surgical scar from above the umbilicus to the pubis.   Ext: wwp      Significant Labs:    BMP (Last 3 Results):   Recent Labs   Lab 03/17/20  1431 03/18/20  0538 03/19/20  0701   GLU 88 86 93    136 135*   K 3.9 4.2 3.7    106 102   CO2 23 17* 23   BUN 11 15 9   CREATININE 0.7 0.8 0.7   CALCIUM 7.9* 7.9* 7.9*   MG 2.1 2.0 1.9     CBC (Last 3 Results):   Recent Labs   Lab 03/18/20  1527 03/18/20  2314 03/19/20  0913   WBC 8.18 8.98 8.81   RBC 3.53* 3.64* 3.56*   HGB 7.4* 7.5* 7.4*   HCT 26.2* 27.1* 26.4*    384* 364*   MCV 74* 75* 74*   MCH 21.0* 20.6* 20.8*   MCHC 28.2* 27.7* 28.0*

## 2020-03-19 NOTE — PROVATION PATIENT INSTRUCTIONS
Discharge Summary/Instructions after an Endoscopic Procedure  Patient Name: Nydia Stevens  Patient MRN: [id number]  Patient YOB: 1934 Thursday, March 19, 2020  Real Mabry MD  RESTRICTIONS:  During your procedure today, you received medications for sedation.  These   medications may affect your judgment, balance and coordination.  Therefore,   for 24 hours, you have the following restrictions:   - DO NOT drive a car, operate machinery, make legal/financial decisions,   sign important papers or drink alcohol.    ACTIVITY:  Today: no heavy lifting, straining or running due to procedural   sedation/anesthesia.  The following day: return to full activity including work.  DIET:  Eat and drink normally unless instructed otherwise.     TREATMENT FOR COMMON SIDE EFFECTS:  - Mild abdominal pain, nausea, belching, bloating or excessive gas:  rest,   eat lightly and use a heating pad.  - Sore Throat: treat with throat lozenges and/or gargle with warm salt   water.  - Because air was used during the procedure, expelling large amounts of air   from your rectum or belching is normal.  - If a bowel prep was taken, you may not have a bowel movement for 1-3 days.    This is normal.  SYMPTOMS TO WATCH FOR AND REPORT TO YOUR PHYSICIAN:  1. Abdominal pain or bloating, other than gas cramps.  2. Chest pain.  3. Back pain.  4. Signs of infection such as: chills or fever occurring within 24 hours   after the procedure.  5. Rectal bleeding, which would show as bright red, maroon, or black stools.   (A tablespoon of blood from the rectum is not serious, especially if   hemorrhoids are present.)  6. Vomiting.  7. Weakness or dizziness.  GO DIRECTLY TO THE NEAREST EMERGENCY ROOM IF YOU HAVE ANY OF THE FOLLOWING:      Difficulty breathing              Chills and/or fever over 101 F   Persistent vomiting and/or vomiting blood   Severe abdominal pain   Severe chest pain   Black, tarry stools   Bleeding- more than one  tablespoon   Any other symptom or condition that you feel may need urgent attention  Your doctor recommends these additional instructions:  If any biopsies were taken, your doctors clinic will contact you in 1 to 2   weeks with any results.  - Return patient to [facility] for [facility] care.   - Await pathology results.   - Telephone endoscopist for pathology results in 2 weeks.   - Use Protonix (pantoprazole) 40 mg by mouth once daily for 12 weeks - best   taken 45 minutes before your first protein containing meal.   - Perform a colonoscopy today.   - The findings and recommendations were discussed with the patient's primary   physician.   - The findings and recommendations were discussed with the patient.  For questions, problems or results please call your physician - Real Mabry MD at Work:  [phone].  [facility], EMERGENCY ROOM PHONE NUMBER: [phone]  IF A COMPLICATION OR EMERGENCY SITUATION ARISES AND YOU ARE UNABLE TO REACH   YOUR PHYSICIAN - GO DIRECTLY TO THE EMERGENCY ROOM.  Real Mabry MD  3/19/2020 4:13:21 PM  This report has been verified and signed electronically.  [employer]ATION

## 2020-03-19 NOTE — PT/OT/SLP EVAL
"Occupational Therapy   Evaluation and Discharge Note    Name: Nydia Stevens  MRN: [id number]  Admitting Diagnosis:  Gastrointestinal bleed Day of Surgery    Recommendations:     Discharge Recommendations: home with home health  Discharge Equipment Recommendations:  shower chair  Barriers to discharge:  None    Assessment:     Nydia Stevens is a 85 y.o. female with a medical diagnosis of Gastrointestinal bleed. At this time, patient is functioning at their prior level of function and does not require further acute OT services.   Patient to continue with PT. Please re consult OT in event of (-) status changes. Patient functioning at mod (I). No current nursing reports of unreasonable safety concerns at present re: Patient in room mobility and ADL task initiation.    Plan:     During this hospitalization, patient does not require further acute OT services.  Please re-consult if situation changes.    · Plan of Care Reviewed with: patient    Subjective     Chief Complaint: No patient complaints at time of eval.Patient seeking reassurance in vague terms only "Harjeet everything be alright, do you think?" Positive Patient responses to casual orientation measures. Patient/Family Comments/goals: Patient states wish for return to [facility] following in Patient intervention " a transfusion I am waiting for"    Occupational Profile:  Living Environment: [employer] residing in [facility].   Previous level of function: (I) ADLs, mod (I) ambulation with rollator walker standing for showers w/ walk in shower, no driving, house keeping and meal prep provided at [facility] where Patient resides.Roles and Routines:   Equipment Used at home:  rollator  Assistance upon Discharge: Communal living in [place]. Hx of .     Pain/Comfort:  · Pain Rating 1: 0/10    Patients cultural, spiritual, Cheondoism conflicts given the current situation: no    Objective:     Communicated with: RN prior to session.  Patient found supine with   " upon OT entry to room.    General Precautions: Standard, fall   Orthopedic Precautions:N/A   Braces: N/A     Occupational Performance:    Bed Mobility:    · Patient completed Rolling/Turning to Right with independence and modified independence  · Patient completed Scooting/Bridging with independence  · Patient completed Supine to Sit with independence  · Patient completed Sit to Supine with modified independence    Functional Mobility/Transfers:  · Patient completed Sit <> Stand Transfer with modified independence  with  hand-held assist   · Patient completed Bed <> Chair Transfer using Step Transfer technique with modified independence with rolling walker  Functional Mobility: Patient demonstrating (I) to mod (I) bed mobility, good task tolerance for OOB with VC only and in room amb.   Activities of Daily Living:  · Upper Body Dressing: independence VC for prompt/task intation only.   · Lower Body Dressing: supervision seated OOB socks. VC for prompt/task intation only.     Cognitive/Visual Perceptual:  Cog: A+Ox4.  Mild reassurance re: [facility] POC requested x3. (+) Patient responses to supportive feedback.   Patient has glasses at bedside, politely refused offer. States primarily for reading. Patient reports no visual difficulties at time of eval.   Physical Exam:  Balance: GOOD (-) dynamic standing without functional reach component and with rollator walker use.   Upper Extremity Range of Motion:     -       Right Upper Extremity: WNL  -       Left Upper Extremity: WNL  Upper Extremity Strength:    Strength:    3+/5 (B)  -       Right Upper Extremity:  3+/5 grossly  -       Left Upper Extremity: 3+/5 grossly    AMPAC 6 Click ADL:  AMPAC Total Score: 20    Treatment & Education:  In room safety eud, return to room for (I) there ex intro/edu. Patient return demonstration (+).   Education:    Patient left up in chair with call button in reach, RN notified.     GOALS:   Multidisciplinary Problems      Occupational Therapy Goals     Not on file                History:     Past Medical History:   Diagnosis Date    Cataract     Hypertension        Past Surgical History:   Procedure Laterality Date    CATARACT EXTRACTION W/  INTRAOCULAR LENS IMPLANT Left 8/11/14    gregor    CATARACT EXTRACTION W/  INTRAOCULAR LENS IMPLANT Right 09/15/14    gregor    FOOT SURGERY      left    HYSTERECTOMY         Time Tracking:     OT Date of Treatment: 03/19/20  OT Start Time: 0956  OT Stop Time: 1036  OT Total Time (min): 40 min    Billable Minutes:Evaluation 30 minutes  Therapeutic Exercise 10 minutes    Shani Kiser OT  3/19/2020

## 2020-03-19 NOTE — ASSESSMENT & PLAN NOTE
86 yo female with ANANDA and evidence of BRBPR. She is s/p colonoscopy with evidence of obstructing colon mass. Biopsies have been taken and area tattooed by GI.     Plan:  -Colorectal surgery consulted for further eval(Dr. Berg). Appreciate recommendations.  -CT Chest Abdomen Pelvis with oral and IV contrast  -CEA ordered  -Pending imaging will discuss case and follow up with heme onc

## 2020-03-19 NOTE — ASSESSMENT & PLAN NOTE
Patient with history of hypertension, mild aortic stenosis, osteoporosis, ANANDA, and B12 deficiency presents with chief complaint of abnormal lab value of Hgb 5.3. BUN 12. Associated symptom of fatigue. She denies significant SOB, syncope, dizziness. She has had dark stools but no BRBPR. She last had colonoscopy 10 years ago which is not in our system and patient reports as normal.  She had an EGD in 2014 which was normal. She has chronic iron deficiency anemia and this is her 2nd admission in the past month for anemia requiring transfusions. Differential includes GI neoplasm(given chronicity of anemia and weight loss), AVM, Angiodysplasia/Heyde's syndrome(given AS), diverticulosis, gastritis, PUD.     Plan:  -Insert 2 large bore IVs  -CBC spaced to q12  -Transfuse to maintain Hgb 7  -S/p IV protonix 80 mg and 40 BID. Now discontinued.   -GI consulted. Appreciate recommendations.   -Patient s/p colonoscopy with obstructing mass present. Biopsies taken andolorectal surgery consulted.  -Avoid NSAIDS

## 2020-03-19 NOTE — SUBJECTIVE & OBJECTIVE
Interval History: No acute events overnight. Hemoglobin stable. Patient without any bloody bowel movements. She had successful prep. She denies other symptoms.    Review of Systems   Constitutional: Positive for fatigue and unexpected weight change. Negative for activity change, chills, diaphoresis and fever.   HENT: Negative for congestion and sore throat.    Eyes: Negative for photophobia and visual disturbance.   Cardiovascular: Negative for chest pain, palpitations and leg swelling.   Gastrointestinal: Negative for abdominal distention, abdominal pain, blood in stool, constipation, diarrhea, nausea and vomiting.   Genitourinary: Negative for difficulty urinating, dysuria and hematuria.   Skin: Negative for color change and pallor.   Neurological: Negative for dizziness, syncope, weakness, light-headedness and headaches.   Psychiatric/Behavioral: Negative for agitation and confusion.     Objective:     Vital Signs (Most Recent):  Temp: 98.1 °F (36.7 °C) (03/19/20 1232)  Pulse: 95 (03/19/20 1232)  Resp: 18 (03/19/20 1232)  BP: 138/65 (03/19/20 1232)  SpO2: 99 % (03/19/20 1232) Vital Signs (24h Range):  Temp:  [97.7 °F (36.5 °C)-98.9 °F (37.2 °C)] 98.1 °F (36.7 °C)  Pulse:  [] 95  Resp:  [16-24] 18  SpO2:  [93 %-100 %] 99 %  BP: (119-145)/(53-65) 138/65     Weight: 48.3 kg (106 lb 7.7 oz)  Body mass index is 16.68 kg/m².    Intake/Output Summary (Last 24 hours) at 3/19/2020 1517  Last data filed at 3/19/2020 0525  Gross per 24 hour   Intake 4000 ml   Output --   Net 4000 ml      Physical Exam   Constitutional: She is oriented to person, place, and time. She appears well-developed and well-nourished. No distress.   HENT:   Head: Normocephalic and atraumatic.   Eyes: Pupils are equal, round, and reactive to light. No scleral icterus.   Neck: Normal range of motion. Neck supple.   Cardiovascular: Normal rate, regular rhythm and normal heart sounds. Exam reveals no gallop and no friction rub.   No murmur  heard.  Pulmonary/Chest: Effort normal and breath sounds normal. No respiratory distress. She has no wheezes. She has no rales.   Abdominal: Soft. Bowel sounds are normal. She exhibits no distension. There is no tenderness.   Musculoskeletal: She exhibits no edema.   Neurological: She is alert and oriented to person, place, and time.   Skin: Skin is warm and dry. There is pallor.   Jaundice present       Significant Labs:   CBC:   Recent Labs   Lab 03/18/20  1527 03/18/20  2314 03/19/20  0913   WBC 8.18 8.98 8.81   HGB 7.4* 7.5* 7.4*   HCT 26.2* 27.1* 26.4*    384* 364*     CMP:   Recent Labs   Lab 03/18/20  0538 03/19/20  0701    135*   K 4.2 3.7    102   CO2 17* 23   GLU 86 93   BUN 15 9   CREATININE 0.8 0.7   CALCIUM 7.9* 7.9*   PROT 7.2 6.4   ALBUMIN 3.1* 2.9*   BILITOT 2.7* 1.6*   ALKPHOS 69 69   AST 68* 48*   ALT 10 9*   ANIONGAP 13 10   EGFRNONAA >60.0 >60.0     Magnesium:   Recent Labs   Lab 03/18/20  0538 03/19/20  0701   MG 2.0 1.9       Significant Imaging: I have reviewed and interpreted all pertinent imaging results/findings within the past 24 hours.

## 2020-03-20 ENCOUNTER — ANESTHESIA (OUTPATIENT)
Dept: SURGERY | Facility: HOSPITAL | Age: 85
DRG: 330 | End: 2020-03-20
Payer: MEDICARE

## 2020-03-20 ENCOUNTER — TELEPHONE (OUTPATIENT)
Dept: SURGERY | Facility: CLINIC | Age: 85
End: 2020-03-20

## 2020-03-20 LAB
ACANTHOCYTES BLD QL SMEAR: PRESENT
ALBUMIN SERPL BCP-MCNC: 3.1 G/DL (ref 3.5–5.2)
ALP SERPL-CCNC: 75 U/L (ref 55–135)
ALT SERPL W/O P-5'-P-CCNC: 11 U/L (ref 10–44)
ANION GAP SERPL CALC-SCNC: 13 MMOL/L (ref 8–16)
ANION GAP SERPL CALC-SCNC: 14 MMOL/L (ref 8–16)
ANISOCYTOSIS BLD QL SMEAR: ABNORMAL
ANISOCYTOSIS BLD QL SMEAR: SLIGHT
AST SERPL-CCNC: 54 U/L (ref 10–40)
BASOPHILS # BLD AUTO: 0.05 K/UL (ref 0–0.2)
BASOPHILS # BLD AUTO: 0.07 K/UL (ref 0–0.2)
BASOPHILS NFR BLD: 0.7 % (ref 0–1.9)
BASOPHILS NFR BLD: 0.8 % (ref 0–1.9)
BILIRUB SERPL-MCNC: 2.3 MG/DL (ref 0.1–1)
BLD PROD TYP BPU: NORMAL
BLOOD UNIT EXPIRATION DATE: NORMAL
BLOOD UNIT TYPE CODE: 6200
BLOOD UNIT TYPE: NORMAL
BUN SERPL-MCNC: 5 MG/DL (ref 8–23)
BUN SERPL-MCNC: 5 MG/DL (ref 8–23)
BURR CELLS BLD QL SMEAR: ABNORMAL
BURR CELLS BLD QL SMEAR: ABNORMAL
CALCIUM SERPL-MCNC: 7.4 MG/DL (ref 8.7–10.5)
CALCIUM SERPL-MCNC: 7.8 MG/DL (ref 8.7–10.5)
CHLORIDE SERPL-SCNC: 101 MMOL/L (ref 95–110)
CHLORIDE SERPL-SCNC: 103 MMOL/L (ref 95–110)
CO2 SERPL-SCNC: 18 MMOL/L (ref 23–29)
CO2 SERPL-SCNC: 18 MMOL/L (ref 23–29)
CODING SYSTEM: NORMAL
CREAT SERPL-MCNC: 0.6 MG/DL (ref 0.5–1.4)
CREAT SERPL-MCNC: 0.7 MG/DL (ref 0.5–1.4)
DACRYOCYTES BLD QL SMEAR: ABNORMAL
DIFFERENTIAL METHOD: ABNORMAL
DIFFERENTIAL METHOD: ABNORMAL
DISPENSE STATUS: NORMAL
EOSINOPHIL # BLD AUTO: 0.2 K/UL (ref 0–0.5)
EOSINOPHIL # BLD AUTO: 0.2 K/UL (ref 0–0.5)
EOSINOPHIL NFR BLD: 1.9 % (ref 0–8)
EOSINOPHIL NFR BLD: 2.2 % (ref 0–8)
ERYTHROCYTE [DISTWIDTH] IN BLOOD BY AUTOMATED COUNT: 35.2 % (ref 11.5–14.5)
ERYTHROCYTE [DISTWIDTH] IN BLOOD BY AUTOMATED COUNT: 35.3 % (ref 11.5–14.5)
EST. GFR  (AFRICAN AMERICAN): >60 ML/MIN/1.73 M^2
EST. GFR  (AFRICAN AMERICAN): >60 ML/MIN/1.73 M^2
EST. GFR  (NON AFRICAN AMERICAN): >60 ML/MIN/1.73 M^2
EST. GFR  (NON AFRICAN AMERICAN): >60 ML/MIN/1.73 M^2
GLUCOSE SERPL-MCNC: 101 MG/DL (ref 70–110)
GLUCOSE SERPL-MCNC: 73 MG/DL (ref 70–110)
GLUCOSE SERPL-MCNC: 87 MG/DL (ref 70–110)
GLUCOSE SERPL-MCNC: 97 MG/DL (ref 70–110)
HCO3 UR-SCNC: 17.2 MMOL/L (ref 24–28)
HCO3 UR-SCNC: 18.5 MMOL/L (ref 24–28)
HCT VFR BLD AUTO: 33.8 % (ref 37–48.5)
HCT VFR BLD AUTO: 37.7 % (ref 37–48.5)
HCT VFR BLD CALC: 24 %PCV (ref 36–54)
HCT VFR BLD CALC: 25 %PCV (ref 36–54)
HGB BLD-MCNC: 10.5 G/DL (ref 12–16)
HGB BLD-MCNC: 9.6 G/DL (ref 12–16)
HGB FRACT BLD ELPH-IMP: NORMAL
HGB S MFR BLD ELPH: 0 %
HYPOCHROMIA BLD QL SMEAR: ABNORMAL
HYPOCHROMIA BLD QL SMEAR: ABNORMAL
IMM GRANULOCYTES # BLD AUTO: 0.22 K/UL (ref 0–0.04)
IMM GRANULOCYTES # BLD AUTO: 0.29 K/UL (ref 0–0.04)
IMM GRANULOCYTES NFR BLD AUTO: 3 % (ref 0–0.5)
IMM GRANULOCYTES NFR BLD AUTO: 3.3 % (ref 0–0.5)
LYMPHOCYTES # BLD AUTO: 0.8 K/UL (ref 1–4.8)
LYMPHOCYTES # BLD AUTO: 1 K/UL (ref 1–4.8)
LYMPHOCYTES NFR BLD: 14.1 % (ref 18–48)
LYMPHOCYTES NFR BLD: 8.6 % (ref 18–48)
MAGNESIUM SERPL-MCNC: 1.7 MG/DL (ref 1.6–2.6)
MAGNESIUM SERPL-MCNC: 1.8 MG/DL (ref 1.6–2.6)
MCH RBC QN AUTO: 21.5 PG (ref 27–31)
MCH RBC QN AUTO: 21.7 PG (ref 27–31)
MCHC RBC AUTO-ENTMCNC: 27.9 G/DL (ref 32–36)
MCHC RBC AUTO-ENTMCNC: 28.4 G/DL (ref 32–36)
MCV RBC AUTO: 76 FL (ref 82–98)
MCV RBC AUTO: 78 FL (ref 82–98)
MONOCYTES # BLD AUTO: 0.8 K/UL (ref 0.3–1)
MONOCYTES # BLD AUTO: 1.1 K/UL (ref 0.3–1)
MONOCYTES NFR BLD: 10.2 % (ref 4–15)
MONOCYTES NFR BLD: 12.6 % (ref 4–15)
NEUTROPHILS # BLD AUTO: 5.1 K/UL (ref 1.8–7.7)
NEUTROPHILS # BLD AUTO: 6.4 K/UL (ref 1.8–7.7)
NEUTROPHILS NFR BLD: 69.8 % (ref 38–73)
NEUTROPHILS NFR BLD: 72.8 % (ref 38–73)
NRBC BLD-RTO: 0 /100 WBC
NRBC BLD-RTO: 0 /100 WBC
OVALOCYTES BLD QL SMEAR: ABNORMAL
OVALOCYTES BLD QL SMEAR: ABNORMAL
PCO2 BLDA: 30.2 MMHG (ref 35–45)
PCO2 BLDA: 35.1 MMHG (ref 35–45)
PH SMN: 7.33 [PH] (ref 7.35–7.45)
PH SMN: 7.36 [PH] (ref 7.35–7.45)
PHOSPHATE SERPL-MCNC: 2.3 MG/DL (ref 2.7–4.5)
PHOSPHATE SERPL-MCNC: 2.4 MG/DL (ref 2.7–4.5)
PLATELET # BLD AUTO: 317 K/UL (ref 150–350)
PLATELET # BLD AUTO: 324 K/UL (ref 150–350)
PLATELET BLD QL SMEAR: ABNORMAL
PMV BLD AUTO: 9.5 FL (ref 9.2–12.9)
PMV BLD AUTO: ABNORMAL FL (ref 9.2–12.9)
PO2 BLDA: 170 MMHG (ref 80–100)
PO2 BLDA: 209 MMHG (ref 80–100)
POC BE: -7 MMOL/L
POC BE: -8 MMOL/L
POC IONIZED CALCIUM: 0.9 MMOL/L (ref 1.06–1.42)
POC IONIZED CALCIUM: 0.99 MMOL/L (ref 1.06–1.42)
POC SATURATED O2: 100 % (ref 95–100)
POC SATURATED O2: 100 % (ref 95–100)
POC TCO2: 18 MMOL/L (ref 23–27)
POC TCO2: 20 MMOL/L (ref 23–27)
POIKILOCYTOSIS BLD QL SMEAR: ABNORMAL
POIKILOCYTOSIS BLD QL SMEAR: SLIGHT
POLYCHROMASIA BLD QL SMEAR: ABNORMAL
POLYCHROMASIA BLD QL SMEAR: ABNORMAL
POTASSIUM BLD-SCNC: 2.6 MMOL/L (ref 3.5–5.1)
POTASSIUM BLD-SCNC: 3.3 MMOL/L (ref 3.5–5.1)
POTASSIUM SERPL-SCNC: 3.2 MMOL/L (ref 3.5–5.1)
POTASSIUM SERPL-SCNC: 3.5 MMOL/L (ref 3.5–5.1)
PROT SERPL-MCNC: 7.2 G/DL (ref 6–8.4)
RBC # BLD AUTO: 4.47 M/UL (ref 4–5.4)
RBC # BLD AUTO: 4.84 M/UL (ref 4–5.4)
SAMPLE: ABNORMAL
SAMPLE: ABNORMAL
SCHISTOCYTES BLD QL SMEAR: ABNORMAL
SCHISTOCYTES BLD QL SMEAR: ABNORMAL
SODIUM BLD-SCNC: 136 MMOL/L (ref 136–145)
SODIUM BLD-SCNC: 136 MMOL/L (ref 136–145)
SODIUM SERPL-SCNC: 133 MMOL/L (ref 136–145)
SODIUM SERPL-SCNC: 134 MMOL/L (ref 136–145)
TRANS ERYTHROCYTES VOL PATIENT: NORMAL ML
WBC # BLD AUTO: 7.35 K/UL (ref 3.9–12.7)
WBC # BLD AUTO: 8.84 K/UL (ref 3.9–12.7)

## 2020-03-20 PROCEDURE — D9220A PRA ANESTHESIA: Mod: ANES,,, | Performed by: ANESTHESIOLOGY

## 2020-03-20 PROCEDURE — 63600175 PHARM REV CODE 636 W HCPCS: Performed by: NURSE ANESTHETIST, CERTIFIED REGISTERED

## 2020-03-20 PROCEDURE — 71000016 HC POSTOP RECOV ADDL HR: Performed by: COLON & RECTAL SURGERY

## 2020-03-20 PROCEDURE — 88341 PR IHC OR ICC EACH ADD'L SINGLE ANTIBODY  STAINPR: ICD-10-PCS | Mod: 26,,, | Performed by: PATHOLOGY

## 2020-03-20 PROCEDURE — 63600175 PHARM REV CODE 636 W HCPCS: Performed by: STUDENT IN AN ORGANIZED HEALTH CARE EDUCATION/TRAINING PROGRAM

## 2020-03-20 PROCEDURE — 37000008 HC ANESTHESIA 1ST 15 MINUTES: Performed by: COLON & RECTAL SURGERY

## 2020-03-20 PROCEDURE — 80053 COMPREHEN METABOLIC PANEL: CPT

## 2020-03-20 PROCEDURE — 25000003 PHARM REV CODE 250: Performed by: NURSE ANESTHETIST, CERTIFIED REGISTERED

## 2020-03-20 PROCEDURE — 36000711: Performed by: COLON & RECTAL SURGERY

## 2020-03-20 PROCEDURE — 25000003 PHARM REV CODE 250: Performed by: STUDENT IN AN ORGANIZED HEALTH CARE EDUCATION/TRAINING PROGRAM

## 2020-03-20 PROCEDURE — D9220A PRA ANESTHESIA: Mod: CRNA,,, | Performed by: NURSE ANESTHETIST, CERTIFIED REGISTERED

## 2020-03-20 PROCEDURE — 88341 IMHCHEM/IMCYTCHM EA ADD ANTB: CPT | Mod: 26,,, | Performed by: PATHOLOGY

## 2020-03-20 PROCEDURE — 71000015 HC POSTOP RECOV 1ST HR: Performed by: COLON & RECTAL SURGERY

## 2020-03-20 PROCEDURE — 36620 PR INSERT CATH,ART,PERCUT,SHORTTERM: ICD-10-PCS | Mod: 59,,, | Performed by: ANESTHESIOLOGY

## 2020-03-20 PROCEDURE — 88342 IMHCHEM/IMCYTCHM 1ST ANTB: CPT | Mod: 26,,, | Performed by: PATHOLOGY

## 2020-03-20 PROCEDURE — 85025 COMPLETE CBC W/AUTO DIFF WBC: CPT

## 2020-03-20 PROCEDURE — P9021 RED BLOOD CELLS UNIT: HCPCS

## 2020-03-20 PROCEDURE — 88341 IMHCHEM/IMCYTCHM EA ADD ANTB: CPT | Performed by: PATHOLOGY

## 2020-03-20 PROCEDURE — 25000003 PHARM REV CODE 250: Performed by: COLON & RECTAL SURGERY

## 2020-03-20 PROCEDURE — D9220A PRA ANESTHESIA: ICD-10-PCS | Mod: ANES,,, | Performed by: ANESTHESIOLOGY

## 2020-03-20 PROCEDURE — D9220A PRA ANESTHESIA: ICD-10-PCS | Mod: CRNA,,, | Performed by: NURSE ANESTHETIST, CERTIFIED REGISTERED

## 2020-03-20 PROCEDURE — 71000039 HC RECOVERY, EACH ADD'L HOUR: Performed by: COLON & RECTAL SURGERY

## 2020-03-20 PROCEDURE — 63600175 PHARM REV CODE 636 W HCPCS: Performed by: COLON & RECTAL SURGERY

## 2020-03-20 PROCEDURE — 44160 PR REMVL COLON & TERM ILEUM W/ILEOCOLOSTOMY: ICD-10-PCS | Mod: ,,, | Performed by: COLON & RECTAL SURGERY

## 2020-03-20 PROCEDURE — 88342 CHG IMMUNOCYTOCHEMISTRY: ICD-10-PCS | Mod: 26,,, | Performed by: PATHOLOGY

## 2020-03-20 PROCEDURE — 88342 IMHCHEM/IMCYTCHM 1ST ANTB: CPT | Performed by: PATHOLOGY

## 2020-03-20 PROCEDURE — S0030 INJECTION, METRONIDAZOLE: HCPCS | Performed by: STUDENT IN AN ORGANIZED HEALTH CARE EDUCATION/TRAINING PROGRAM

## 2020-03-20 PROCEDURE — 27201423 OPTIME MED/SURG SUP & DEVICES STERILE SUPPLY: Performed by: COLON & RECTAL SURGERY

## 2020-03-20 PROCEDURE — 83020 HEMOGLOBIN ELECTROPHORESIS: CPT

## 2020-03-20 PROCEDURE — 20600001 HC STEP DOWN PRIVATE ROOM

## 2020-03-20 PROCEDURE — 80048 BASIC METABOLIC PNL TOTAL CA: CPT

## 2020-03-20 PROCEDURE — 88307 PR  SURG PATH,LEVEL V: ICD-10-PCS | Mod: 26,,, | Performed by: PATHOLOGY

## 2020-03-20 PROCEDURE — 36620 INSERTION CATHETER ARTERY: CPT | Mod: 59,,, | Performed by: ANESTHESIOLOGY

## 2020-03-20 PROCEDURE — 94761 N-INVAS EAR/PLS OXIMETRY MLT: CPT

## 2020-03-20 PROCEDURE — 37000009 HC ANESTHESIA EA ADD 15 MINS: Performed by: COLON & RECTAL SURGERY

## 2020-03-20 PROCEDURE — 84100 ASSAY OF PHOSPHORUS: CPT | Mod: 91

## 2020-03-20 PROCEDURE — 27201037 HC PRESSURE MONITORING SET UP

## 2020-03-20 PROCEDURE — 36000710: Performed by: COLON & RECTAL SURGERY

## 2020-03-20 PROCEDURE — 83735 ASSAY OF MAGNESIUM: CPT | Mod: 91

## 2020-03-20 PROCEDURE — 99233 SBSQ HOSP IP/OBS HIGH 50: CPT | Mod: 57,,, | Performed by: COLON & RECTAL SURGERY

## 2020-03-20 PROCEDURE — 71000033 HC RECOVERY, INTIAL HOUR: Performed by: COLON & RECTAL SURGERY

## 2020-03-20 PROCEDURE — 88307 TISSUE EXAM BY PATHOLOGIST: CPT | Mod: 26,,, | Performed by: PATHOLOGY

## 2020-03-20 PROCEDURE — 99233 PR SUBSEQUENT HOSPITAL CARE,LEVL III: ICD-10-PCS | Mod: 57,,, | Performed by: COLON & RECTAL SURGERY

## 2020-03-20 PROCEDURE — 36000708 HC OR TIME LEV III 1ST 15 MIN: Performed by: COLON & RECTAL SURGERY

## 2020-03-20 PROCEDURE — 44160 REMOVAL OF COLON: CPT | Mod: ,,, | Performed by: COLON & RECTAL SURGERY

## 2020-03-20 PROCEDURE — 36000709 HC OR TIME LEV III EA ADD 15 MIN: Performed by: COLON & RECTAL SURGERY

## 2020-03-20 PROCEDURE — C9290 INJ, BUPIVACAINE LIPOSOME: HCPCS | Performed by: COLON & RECTAL SURGERY

## 2020-03-20 PROCEDURE — 88307 TISSUE EXAM BY PATHOLOGIST: CPT | Performed by: PATHOLOGY

## 2020-03-20 PROCEDURE — 36415 COLL VENOUS BLD VENIPUNCTURE: CPT

## 2020-03-20 RX ORDER — PHENYLEPHRINE HYDROCHLORIDE 10 MG/ML
INJECTION INTRAVENOUS
Status: DISCONTINUED | OUTPATIENT
Start: 2020-03-20 | End: 2020-03-20

## 2020-03-20 RX ORDER — LIDOCAINE HYDROCHLORIDE 20 MG/ML
INJECTION INTRAVENOUS
Status: DISCONTINUED | OUTPATIENT
Start: 2020-03-20 | End: 2020-03-20

## 2020-03-20 RX ORDER — ONDANSETRON 2 MG/ML
INJECTION INTRAMUSCULAR; INTRAVENOUS
Status: DISCONTINUED | OUTPATIENT
Start: 2020-03-20 | End: 2020-03-20

## 2020-03-20 RX ORDER — ACETAMINOPHEN 10 MG/ML
15 INJECTION, SOLUTION INTRAVENOUS EVERY 8 HOURS
Status: DISPENSED | OUTPATIENT
Start: 2020-03-20 | End: 2020-03-21

## 2020-03-20 RX ORDER — MUPIROCIN 20 MG/G
OINTMENT TOPICAL 2 TIMES DAILY
Status: DISCONTINUED | OUTPATIENT
Start: 2020-03-20 | End: 2020-03-23 | Stop reason: HOSPADM

## 2020-03-20 RX ORDER — OXYCODONE HYDROCHLORIDE 5 MG/1
5 TABLET ORAL EVERY 4 HOURS PRN
Status: DISCONTINUED | OUTPATIENT
Start: 2020-03-20 | End: 2020-03-23 | Stop reason: HOSPADM

## 2020-03-20 RX ORDER — BUPIVACAINE HYDROCHLORIDE 2.5 MG/ML
INJECTION, SOLUTION EPIDURAL; INFILTRATION; INTRACAUDAL
Status: DISCONTINUED | OUTPATIENT
Start: 2020-03-20 | End: 2020-03-20 | Stop reason: HOSPADM

## 2020-03-20 RX ORDER — CEFTRIAXONE 1 G/1
1 INJECTION, POWDER, FOR SOLUTION INTRAMUSCULAR; INTRAVENOUS
Status: DISCONTINUED | OUTPATIENT
Start: 2020-03-20 | End: 2020-03-20

## 2020-03-20 RX ORDER — ONDANSETRON 2 MG/ML
4 INJECTION INTRAMUSCULAR; INTRAVENOUS EVERY 6 HOURS PRN
Status: DISCONTINUED | OUTPATIENT
Start: 2020-03-20 | End: 2020-03-23 | Stop reason: HOSPADM

## 2020-03-20 RX ORDER — HEPARIN SODIUM 5000 [USP'U]/ML
5000 INJECTION, SOLUTION INTRAVENOUS; SUBCUTANEOUS
Status: COMPLETED | OUTPATIENT
Start: 2020-03-20 | End: 2020-03-20

## 2020-03-20 RX ORDER — POTASSIUM CHLORIDE 750 MG/1
30 CAPSULE, EXTENDED RELEASE ORAL
Status: DISCONTINUED | OUTPATIENT
Start: 2020-03-20 | End: 2020-03-20

## 2020-03-20 RX ORDER — POTASSIUM CHLORIDE 14.9 MG/ML
INJECTION INTRAVENOUS CONTINUOUS PRN
Status: DISCONTINUED | OUTPATIENT
Start: 2020-03-20 | End: 2020-03-20

## 2020-03-20 RX ORDER — FENTANYL CITRATE 50 UG/ML
INJECTION, SOLUTION INTRAMUSCULAR; INTRAVENOUS
Status: DISCONTINUED | OUTPATIENT
Start: 2020-03-20 | End: 2020-03-20

## 2020-03-20 RX ORDER — PROPOFOL 10 MG/ML
VIAL (ML) INTRAVENOUS
Status: DISCONTINUED | OUTPATIENT
Start: 2020-03-20 | End: 2020-03-20

## 2020-03-20 RX ORDER — OXYCODONE HYDROCHLORIDE 10 MG/1
10 TABLET ORAL EVERY 4 HOURS PRN
Status: DISCONTINUED | OUTPATIENT
Start: 2020-03-20 | End: 2020-03-23 | Stop reason: HOSPADM

## 2020-03-20 RX ORDER — SODIUM CHLORIDE 0.9 % (FLUSH) 0.9 %
10 SYRINGE (ML) INJECTION
Status: DISCONTINUED | OUTPATIENT
Start: 2020-03-20 | End: 2020-03-23 | Stop reason: HOSPADM

## 2020-03-20 RX ORDER — GABAPENTIN 300 MG/1
300 CAPSULE ORAL 3 TIMES DAILY
Status: DISCONTINUED | OUTPATIENT
Start: 2020-03-20 | End: 2020-03-23 | Stop reason: HOSPADM

## 2020-03-20 RX ORDER — SODIUM CHLORIDE 9 MG/ML
INJECTION, SOLUTION INTRAVENOUS CONTINUOUS
Status: DISCONTINUED | OUTPATIENT
Start: 2020-03-20 | End: 2020-03-21

## 2020-03-20 RX ORDER — ACETAMINOPHEN 10 MG/ML
INJECTION, SOLUTION INTRAVENOUS
Status: DISCONTINUED | OUTPATIENT
Start: 2020-03-20 | End: 2020-03-20

## 2020-03-20 RX ORDER — ENOXAPARIN SODIUM 100 MG/ML
40 INJECTION SUBCUTANEOUS EVERY 24 HOURS
Status: DISCONTINUED | OUTPATIENT
Start: 2020-03-21 | End: 2020-03-21

## 2020-03-20 RX ORDER — TRAMADOL HYDROCHLORIDE 50 MG/1
50 TABLET ORAL EVERY 6 HOURS PRN
Status: DISCONTINUED | OUTPATIENT
Start: 2020-03-20 | End: 2020-03-23 | Stop reason: HOSPADM

## 2020-03-20 RX ORDER — HYDROCODONE BITARTRATE AND ACETAMINOPHEN 500; 5 MG/1; MG/1
TABLET ORAL
Status: DISCONTINUED | OUTPATIENT
Start: 2020-03-20 | End: 2020-03-20

## 2020-03-20 RX ORDER — ACETAMINOPHEN 500 MG
1000 TABLET ORAL EVERY 8 HOURS
Status: DISCONTINUED | OUTPATIENT
Start: 2020-03-21 | End: 2020-03-23 | Stop reason: HOSPADM

## 2020-03-20 RX ORDER — ROCURONIUM BROMIDE 10 MG/ML
INJECTION, SOLUTION INTRAVENOUS
Status: DISCONTINUED | OUTPATIENT
Start: 2020-03-20 | End: 2020-03-20

## 2020-03-20 RX ORDER — IBUPROFEN 400 MG/1
800 TABLET ORAL EVERY 8 HOURS
Status: DISCONTINUED | OUTPATIENT
Start: 2020-03-21 | End: 2020-03-23 | Stop reason: HOSPADM

## 2020-03-20 RX ORDER — SODIUM CHLORIDE 9 MG/ML
INJECTION, SOLUTION INTRAVENOUS CONTINUOUS PRN
Status: DISCONTINUED | OUTPATIENT
Start: 2020-03-20 | End: 2020-03-20

## 2020-03-20 RX ORDER — INDOMETHACIN 25 MG/1
CAPSULE ORAL
Status: DISCONTINUED | OUTPATIENT
Start: 2020-03-20 | End: 2020-03-20

## 2020-03-20 RX ADMIN — MUPIROCIN: 20 OINTMENT TOPICAL at 09:03

## 2020-03-20 RX ADMIN — ROCURONIUM BROMIDE 10 MG: 10 INJECTION, SOLUTION INTRAVENOUS at 10:03

## 2020-03-20 RX ADMIN — SODIUM CHLORIDE: 0.9 INJECTION, SOLUTION INTRAVENOUS at 09:03

## 2020-03-20 RX ADMIN — PROPOFOL 100 MG: 10 INJECTION, EMULSION INTRAVENOUS at 09:03

## 2020-03-20 RX ADMIN — FENTANYL CITRATE 50 MCG: 50 INJECTION, SOLUTION INTRAMUSCULAR; INTRAVENOUS at 10:03

## 2020-03-20 RX ADMIN — METRONIDAZOLE 500 MG: 500 SOLUTION INTRAVENOUS at 10:03

## 2020-03-20 RX ADMIN — IBUPROFEN 800 MG: 800 INJECTION INTRAVENOUS at 02:03

## 2020-03-20 RX ADMIN — POTASSIUM PHOSPHATE, MONOBASIC AND POTASSIUM PHOSPHATE, DIBASIC 20 MMOL: 224; 236 INJECTION, SOLUTION, CONCENTRATE INTRAVENOUS at 02:03

## 2020-03-20 RX ADMIN — SODIUM BICARBONATE 50 MEQ: 84 INJECTION, SOLUTION INTRAVENOUS at 12:03

## 2020-03-20 RX ADMIN — LIDOCAINE HYDROCHLORIDE 40 MG: 20 INJECTION, SOLUTION INTRAVENOUS at 09:03

## 2020-03-20 RX ADMIN — GABAPENTIN 300 MG: 300 CAPSULE ORAL at 09:03

## 2020-03-20 RX ADMIN — POTASSIUM CHLORIDE: 14.9 INJECTION, SOLUTION INTRAVENOUS at 10:03

## 2020-03-20 RX ADMIN — ONDANSETRON 4 MG: 2 INJECTION INTRAMUSCULAR; INTRAVENOUS at 12:03

## 2020-03-20 RX ADMIN — ACETAMINOPHEN 1000 MG: 10 INJECTION, SOLUTION INTRAVENOUS at 10:03

## 2020-03-20 RX ADMIN — FERROUS SULFATE TAB EC 325 MG (65 MG FE EQUIVALENT) 325 MG: 325 (65 FE) TABLET DELAYED RESPONSE at 04:03

## 2020-03-20 RX ADMIN — ROCURONIUM BROMIDE 10 MG: 10 INJECTION, SOLUTION INTRAVENOUS at 11:03

## 2020-03-20 RX ADMIN — CALCIUM CHLORIDE 500 MG: 100 INJECTION, SOLUTION INTRAVENOUS at 12:03

## 2020-03-20 RX ADMIN — CALCIUM 500 MG: 500 TABLET ORAL at 04:03

## 2020-03-20 RX ADMIN — PHENYLEPHRINE HYDROCHLORIDE 200 MCG: 10 INJECTION INTRAVENOUS at 10:03

## 2020-03-20 RX ADMIN — CALCIUM CHLORIDE 500 MG: 100 INJECTION, SOLUTION INTRAVENOUS at 11:03

## 2020-03-20 RX ADMIN — METRONIDAZOLE 500 MG: 500 TABLET ORAL at 12:03

## 2020-03-20 RX ADMIN — ROCURONIUM BROMIDE 40 MG: 10 INJECTION, SOLUTION INTRAVENOUS at 09:03

## 2020-03-20 RX ADMIN — GABAPENTIN 300 MG: 300 CAPSULE ORAL at 04:03

## 2020-03-20 RX ADMIN — IBUPROFEN 800 MG: 800 INJECTION INTRAVENOUS at 09:03

## 2020-03-20 RX ADMIN — NEOMYCIN SULFATE 1000 MG: 500 TABLET ORAL at 12:03

## 2020-03-20 RX ADMIN — SODIUM CHLORIDE: 0.9 INJECTION, SOLUTION INTRAVENOUS at 01:03

## 2020-03-20 RX ADMIN — FENTANYL CITRATE 50 MCG: 50 INJECTION, SOLUTION INTRAMUSCULAR; INTRAVENOUS at 09:03

## 2020-03-20 RX ADMIN — HEPARIN SODIUM 5000 UNITS: 5000 INJECTION, SOLUTION INTRAVENOUS; SUBCUTANEOUS at 09:03

## 2020-03-20 RX ADMIN — SUGAMMADEX 100 MG: 100 INJECTION, SOLUTION INTRAVENOUS at 12:03

## 2020-03-20 RX ADMIN — CEFTRIAXONE SODIUM 1 G: 1 INJECTION, POWDER, FOR SOLUTION INTRAMUSCULAR; INTRAVENOUS at 10:03

## 2020-03-20 RX ADMIN — PANTOPRAZOLE SODIUM 40 MG: 40 TABLET, DELAYED RELEASE ORAL at 04:03

## 2020-03-20 NOTE — ASSESSMENT & PLAN NOTE
1. Partially obstructing and bleeding mass in the ascending colon - discussed with patient will proceed with surgery tomorrow given rapidly recurrent anemic symptoms.     - NPO for procedure  - CT obtained overnight for staging; no evidence of metastases on my read; official report pending  - Oral antibiotics (neomycin and Flagyl) given yesterday; IV antibiotic on call for OR   - Consent obtained after discussing risks, benefits, and alternatives including risk of bleeding with need for transfusion, anastomotic leak, infection, and damage to surrounding structures.  She wishes to proceed.  - [facility] on call to OR for immediately prior to procedure.

## 2020-03-20 NOTE — TRANSFER OF CARE
"Anesthesia Transfer of Care Note    Patient: Nydia Stevens    Procedure(s) Performed: Procedure(s) (LRB):  HEMICOLECTOMY, RIGHT (Right)    Patient location: PACU    Anesthesia Type: general    Transport from OR: Transported from OR on 6-10 L/min O2 by face mask with adequate spontaneous ventilation    Post pain: adequate analgesia    Post assessment: no apparent anesthetic complications and tolerated procedure well    Post vital signs: stable    Level of consciousness: awake    Nausea/Vomiting: no nausea/vomiting    Complications: none    Transfer of care protocol was followed      Last vitals:   Visit Vitals  BP (!) 178/77   Pulse 68   Temp 36.3 °C (97.3 °F) (Temporal)   Resp 15   Ht 5' 7" (1.702 m)   Wt 48.1 kg (106 lb)   SpO2 98%   Breastfeeding? No   BMI 16.60 kg/m²     "

## 2020-03-20 NOTE — ANESTHESIA PROCEDURE NOTES
Arterial    Diagnosis: hemicolectomy  Doctor requesting consult: Otis    Patient location during procedure: done in OR  Procedure start time: 3/20/2020 10:10 AM  Timeout: 3/20/2020 10:10 AM  Procedure end time: 3/20/2020 10:15 AM    Staffing  Authorizing Provider: Tim Olvera MD  Performing Provider: Tim Olvera MD    Anesthesiologist was present at the time of the procedure.    Preanesthetic Checklist  Completed: patient identified, surgical consent, pre-op evaluation, timeout performed, IV checked, risks and benefits discussed, monitors and equipment checked and anesthesia consent givenArterial  Skin Prep: chlorhexidine gluconate  Local Infiltration: none  Location: radial  Catheter Size: 20 G  Catheter placement by Ultrasound guidance. Heme positive aspiration all ports.  Vessel Caliber: small, patent  Needle advanced into vessel with real time Ultrasound guidance.Insertion Attempts: 2  Assessment  Dressing: secured with tape and tegaderm  Patient: Tolerated well

## 2020-03-20 NOTE — PROGRESS NOTES
[facility]  Colorectal Surgery  Progress Note    Patient Name: Nydia Stevens  MRN: [id number]  Admission Date: 3/17/2020  Hospital Length of Stay: 3 days  Attending Physician: Derrick Spaulding MD    Subjective:     Interval History: No acute events overnight  Patient able to relate understanding of cancer diagnosis and need for right colectomy; consent obtained  Denies nausea/vomiting  Denies abdominal pain  Patient unsure regarding BM/flatus since colonoscopy    Post-Op Info:  Procedure(s) (LRB):  EGD (ESOPHAGOGASTRODUODENOSCOPY) (N/A)  COLONOSCOPY (N/A)   1 Day Post-Op      Medications:  Continuous Infusions:  Scheduled Meds:   ascorbic acid (vitamin C)  250 mg Oral Daily    calcium carbonate  500 mg Oral Daily    cefTRIAXone (ROCEPHIN) IVPB  1 g Intravenous Once Pre-Op    cyanocobalamin  1,000 mcg Oral Daily    ferrous sulfate  325 mg Oral Daily    pantoprazole  40 mg Oral Daily    rosuvastatin  40 mg Oral Daily    vitamin D  1,000 Units Oral Daily     PRN Meds:   sodium chloride    melatonin    metronidazole    sodium chloride 0.9%        Objective:     Vital Signs (Most Recent):  Temp: 97.4 °F (36.3 °C) (03/20/20 0428)  Pulse: 74 (03/20/20 0428)  Resp: 18 (03/20/20 0428)  BP: (!) 153/71 (03/20/20 0428)  SpO2: 100 % (03/20/20 0428) Vital Signs (24h Range):  Temp:  [97 °F (36.1 °C)-98.6 °F (37 °C)] 97.4 °F (36.3 °C)  Pulse:  [64-99] 74  Resp:  [16-20] 18  SpO2:  [97 %-100 %] 100 %  BP: (117-153)/(55-71) 153/71     Intake/Output - Last 3 Shifts       03/18 0700 - 03/19 0659 03/19 0700 - 03/20 0659    P.O. 4000 900    I.V. (mL/kg)  1000 (20.7)    Blood  482.5    Other  0    Total Intake(mL/kg) 4000 (82.8) 2382.5 (49.3)    Net +4000 +2382.5          Urine Occurrence 6 x 3 x    Stool Occurrence 9 x 0 x    Emesis Occurrence  0 x          Physical Exam   Constitutional: She is oriented to person, place, and time. She appears well-nourished. No distress.   HENT:   Head: Normocephalic.    Eyes: Conjunctivae are normal.   Neck: No tracheal deviation present.   Cardiovascular: Regular rhythm.   Pulmonary/Chest: Effort normal. No respiratory distress.   Abdominal:   Soft, non-tender.  Well healed midline scar   Neurological: She is alert and oriented to person, place, and time.       Significant Labs:  BMP (Last 3 Results):   Recent Labs   Lab 03/17/20  1431 03/18/20  0538 03/19/20  0701   GLU 88 86 93    136 135*   K 3.9 4.2 3.7    106 102   CO2 23 17* 23   BUN 11 15 9   CREATININE 0.7 0.8 0.7   CALCIUM 7.9* 7.9* 7.9*   MG 2.1 2.0 1.9     CBC (Last 3 Results):   Recent Labs   Lab 03/18/20  2314 03/19/20  0913 03/19/20  1939   WBC 8.98 8.81 7.39   RBC 3.64* 3.56* 3.61*   HGB 7.5* 7.4* 7.7*   HCT 27.1* 26.4* 27.3*   * 364* 367*   MCV 75* 74* 76*   MCH 20.6* 20.8* 21.3*   MCHC 27.7* 28.0* 28.2*       Significant Diagnostics:  CT: I have reviewed all pertinent results/findings within the past 24 hours:  Mass in the cecum; no evidence of metastatic disease on my personal read    Assessment/Plan:     Colonic mass  1. Partially obstructing and bleeding mass in the ascending colon - discussed with patient will proceed with surgery tomorrow given rapidly recurrent anemic symptoms.     - NPO for procedure  - CT obtained overnight for staging; no evidence of metastases on my read; official report pending  - Oral antibiotics (neomycin and Flagyl) given yesterday; IV antibiotic on call for OR   - Consent obtained after discussing risks, benefits, and alternatives including risk of bleeding with need for transfusion, anastomotic leak, infection, and damage to surrounding structures.  She wishes to proceed.  - [facility] on call to OR for immediately prior to procedure.          Thierno Mccartney MD  Colorectal Surgery  [facility]

## 2020-03-20 NOTE — ANESTHESIA PREPROCEDURE EVALUATION
[facility]  Anesthesia Pre-Operative Evaluation         Patient Name: Nydia Stevens  YOB: 1934  MRN: [id number]    SUBJECTIVE:     Pre-operative evaluation for Procedure(s) (LRB):  HEMICOLECTOMY, RIGHT (N/A)       03/19/2020    Nydia Stevens is a 85 y.o. female w/ a significant PMHx of hypercholesterolemia,  HTN who was admitted recently for anemia found to have a large obstructing mass in the Right Colon today.     Patient now presents for the above procedure(s).      LDA: None documented.       Peripheral IV - Single Lumen 03/02/20 2011 20 G Right Antecubital (Active)   Number of days: 16            Peripheral IV - Single Lumen 03/19/20 1238 20 G Right Forearm (Active)   Site Assessment Clean;No redness;Intact;Dry;No swelling 3/19/2020  4:30 PM   Line Status Saline locked 3/19/2020  4:30 PM   Dressing Status Clean;Dry;Intact 3/19/2020  4:30 PM   Number of days: 0       Prev airway: Placement Date: 10/08/16; Placement Time: 0814; Method of Intubation: Direct laryngoscopy; Inserted by: CRNA; Airway Device: Endotracheal Tube; Mask Ventilation: Easy; Blade: Degroot #2; Airway Device Size: 7.0; Style: Cuffed; Cuff Inflation: Minimal occlusive pressure; Inflation Amount: 5; Placement Verified By: Auscultation, Capnometry; Grade: Grade I; Complicating Factors: None; Intubation Findings: Positive EtCO2, Bilateral breath sounds, Atraumatic/Condition of teeth unchanged;  Depth of Insertion: 21; Securment: Lips; Complications: None; Breath Sounds: Equal Bilateral; Insertion Attempts: 1; Removal Date: 10/08/16;  Removal Time: 0944    Drips: None documented.      Patient Active Problem List   Diagnosis    Essential hypertension -- goal SBP < 150    Pure hypercholesterolemia    Iron deficiency anemia    Nuclear sclerosis    Palliative care encounter    Syncope    Closed fracture of left olecranon process    Abnormal CT of the head    Debility    Aortic stenosis, mild     Osteoporosis    Cachexia    Symptomatic anemia    Bilateral lower extremity edema    Thrombocytosis    Gastrointestinal bleed    Chronic combined systolic and diastolic heart failure    Hemolytic anemia    Colonic mass       Review of patient's allergies indicates:  No Known Allergies    Current Inpatient Medications:   ascorbic acid (vitamin C)  250 mg Oral Daily    calcium carbonate  500 mg Oral Daily    [START ON 3/20/2020] cefTRIAXone (ROCEPHIN) IVPB  1 g Intravenous Once Pre-Op    [START ON 3/20/2020] cyanocobalamin  1,000 mcg Oral Daily    ferrous sulfate  325 mg Oral Daily    metroNIDAZOLE  500 mg Oral Once    Followed by    [START ON 3/20/2020] metroNIDAZOLE  500 mg Oral Once    neomycin  1,000 mg Oral Once    Followed by    [START ON 3/20/2020] neomycin  1,000 mg Oral Once    rosuvastatin  40 mg Oral Daily    vitamin D  1,000 Units Oral Daily       No current facility-administered medications on file prior to encounter.      Current Outpatient Medications on File Prior to Encounter   Medication Sig Dispense Refill    calcium carbonate (OS-JAILYN) 500 mg calcium (1,250 mg) tablet Take 1 tablet (500 mg total) by mouth once daily.  0    cholecalciferol, vitamin D3, (VITAMIN D3) 1,000 unit capsule Take 1,000 Units by mouth once daily.      cyanocobalamin (VITAMIN B-12) 1000 MCG tablet Take 1,000 mcg by mouth once daily.      ferrous sulfate (FEOSOL) 325 mg (65 mg iron) Tab tablet TAKE ONE TABLET BY MOUTH TWICE DAILY WITH MEALS 60 tablet 0    rosuvastatin (CRESTOR) 40 MG Tab TAKE ONE TABLET BY MOUTH EVERY DAY FOR CHOLESTEROL 90 tablet 3    senna-docusate 8.6-50 mg (PERICOLACE) 8.6-50 mg per tablet Take 1 tablet by mouth 2 (two) times daily.      VIT C/VIT E ACETATE/LUTEIN/MIN (OCUVITE LUTEIN ORAL) Take 1 tablet by mouth once daily.           Past Surgical History:   Procedure Laterality Date    CATARACT EXTRACTION W/  INTRAOCULAR LENS IMPLANT Left 8/11/14    bundy    CATARACT EXTRACTION  W/  INTRAOCULAR LENS IMPLANT Right 09/15/14    bundy    FOOT SURGERY      left    HYSTERECTOMY         Social History     Socioeconomic History    Marital status: Single     Spouse name: Not on file    Number of children: Not on file    Years of education: Not on file    Highest education level: Not on file   Occupational History    Not on file   Social Needs    Financial resource strain: Not on file    Food insecurity:     Worry: Not on file     Inability: Not on file    Transportation needs:     Medical: Not on file     Non-medical: Not on file   Tobacco Use    Smoking status: Never Smoker    Smokeless tobacco: Never Used   Substance and Sexual Activity    Alcohol use: No     Alcohol/week: 0.0 standard drinks    Drug use: No    Sexual activity: Not on file   Lifestyle    Physical activity:     Days per week: Not on file     Minutes per session: Not on file    Stress: Not on file   Relationships    Social connections:     Talks on phone: Not on file     Gets together: Not on file     Attends Muslim service: Not on file     Active member of club or organization: Not on file     Attends meetings of clubs or organizations: Not on file     Relationship status: Not on file   Other Topics Concern    Not on file   Social History Narrative    Member of [employer] here in [place], [place].       OBJECTIVE:     Vital Signs Range (Last 24H):  Temp:  [36.1 °C (97 °F)-37.2 °C (98.9 °F)]   Pulse:  []   Resp:  [16-24]   BP: (117-145)/(53-70)   SpO2:  [93 %-100 %]       Significant Labs:  Lab Results   Component Value Date    WBC 8.81 03/19/2020    HGB 7.4 (L) 03/19/2020    HCT 26.4 (L) 03/19/2020     (H) 03/19/2020    CHOL 252 (H) 03/02/2020    TRIG 118 03/02/2020    HDL 41 03/02/2020    ALT 9 (L) 03/19/2020    AST 48 (H) 03/19/2020     (L) 03/19/2020    K 3.7 03/19/2020     03/19/2020    CREATININE 0.7 03/19/2020    BUN 9 03/19/2020    CO2 23 03/19/2020    TSH  2.918 03/02/2020    INR 0.9 03/17/2020    HGBA1C 5.8 12/11/2015       Diagnostic Studies: No relevant studies.    EKG:   Results for orders placed or performed during the hospital encounter of 03/02/20   EKG 12-lead    Collection Time: 03/02/20  8:08 PM    Narrative    Test Reason : D64.9,    Vent. Rate : 098 BPM     Atrial Rate : 098 BPM     P-R Int : 136 ms          QRS Dur : 078 ms      QT Int : 338 ms       P-R-T Axes : 094 078 -39 degrees     QTc Int : 431 ms      Normal sinus rhythm  ST and T wave abnormality, consider lateral ischemia  Abnormal ECG  When compared with ECG of 21-MAR-2019 08:43,  Inverted T waves have replaced nonspecific T wave abnormality in Inferior  leads  Confirmed by Timbo Whitfield MD ([id number]) on 3/3/2020 7:50:42 AM    Referred By: [email or web address]   SELF           Confirmed By:Timbo Whitfield MD       2D ECHO:  TTE:  Results for orders placed or performed during the hospital encounter of 03/17/20   Echo Color Flow Doppler? Yes   Result Value Ref Range    Ascending aorta 2.58 cm    STJ 2.40 cm    AV mean gradient 14 mmHg    Ao peak trudy 2.28 m/s    Ao VTI 55.12 cm    IVS 0.79 0.6 - 1.1 cm    LA size 3.48 cm    Left Atrium Major Axis 5.69 cm    Left Atrium Minor Axis 5.60 cm    LVIDD 3.58 3.5 - 6.0 cm    LVIDS 3.09 2.1 - 4.0 cm    LVOT diameter 2.00 cm    LVOT peak VTI 24.88 cm    PW 0.77 0.6 - 1.1 cm    MV Peak A Trudy 1.54 m/s    E wave decelartion time 302.26 msec    MV Peak E Trudy 1.18 m/s    RA Major Axis 4.27 cm    RA Width 3.82 cm    RVDD 3.38 cm    Sinus 2.59 cm    TAPSE 2.16 cm    TR Max Trudy 2.64 m/s    TDI LATERAL 0.07 m/s    TDI SEPTAL 0.05 m/s    LA WIDTH 4.11 cm    LV Diastolic Volume 53.53 mL    LV Systolic Volume 37.57 mL    LVOT peak trudy 1.17 m/s    LV LATERAL E/E' RATIO 16.86 m/s    LV SEPTAL E/E' RATIO 23.60 m/s    FS 14 %    LA volume 68.62 cm3    LV mass 75.41 g    Left Ventricle Relative Wall Thickness 0.43 cm    AV valve area 1.42 cm2    AV Velocity Ratio 0.51     AV index (prosthetic)  0.45     E/A ratio 0.77     Mean e' 0.06 m/s    LVOT area 3.1 cm2    LVOT stroke volume 78.12 cm3    AV peak gradient 21 mmHg    E/E' ratio 19.67 m/s    LV Systolic Volume Index 24.2 mL/m2    LV Diastolic Volume Index 34.53 mL/m2    LA Volume Index 44.3 mL/m2    LV Mass Index 49 g/m2    Triscuspid Valve Regurgitation Peak Gradient 28 mmHg    BSA 1.51 m2    Right Atrial Pressure (from IVC) 3 mmHg    QEF 43 %    TV rest pulmonary artery pressure 31 mmHg    Narrative    · Mildly decreased left ventricular systolic function. The estimated   ejection fraction is 45%.  · Grade I (mild) grade II (moderate) left ventricular diastolic   dysfunction consistent with pseudonormalization.  · Concentric left ventricular remodeling.  · Moderate mitral sclerosis.  · Mild-to-moderate aortic valve stenosis.  · Aortic valve area is 1.42 cm2; peak velocity is 2.28 m/s; mean gradient   is 14 mmHg.  · Moderate left atrial enlargement.  · Mild mitral regurgitation.  · Small posterolateral pericardial effusion.  · Mild to moderate tricuspid regurgitation.  · Normal right ventricular systolic function.  · Moderate right atrial enlargement.  · Mild right ventricular enlargement.  · The quantitatively dervived ejection fraction is 43%.  · Local segmental wall motion abnormalities.  · Mild aortic regurgitation.  · Normal central venous pressure (3 mmHg).  · The estimated PA systolic pressure is 31 mmHg.          ASAF:  No results found for this or any previous visit.    ASSESSMENT/PLAN:                                                                                                                 03/19/2020  Nydia Stevens is a 85 y.o., female.    Anesthesia Evaluation    I have reviewed the Patient Summary Reports.        Review of Systems  Anesthesia Hx:  No problems with previous Anesthesia Denies Hx of Anesthetic complications  History of prior surgery of interest to airway management or planning: Denies Family Hx of Anesthesia  complications.   Denies Personal Hx of Anesthesia complications.   Social:  Non-Smoker, No Alcohol Use    Hematology/Oncology:         -- Anemia:   Cardiovascular:   Hypertension (amlodipine. BPs currently 120s-130s/70s.), well controlled Valvular problems/Murmurs (mild AS per TTE 2014.  3/6 systolic murmur on exam with radiation to carotids.), AS  hyperlipidemia    > 4METS.   Pulmonary:   Denies COPD.  Denies Asthma.    Renal/:  Renal/ Normal     Hepatic/GI:   Denies PUD. Denies GERD.    Neurological:  Neurology Normal    Endocrine:  Endocrine Normal    Psych:  Psychiatric Normal           Physical Exam  General:  Well nourished    Airway/Jaw/Neck:  Airway Findings: Mouth Opening: Normal Tongue: Normal  General Airway Assessment: Adult  Mallampati: II  Improves to I with phonation.  TM Distance: 4 - 6 cm  Jaw/Neck Findings:         Dental:  Dental Findings: In tact, Periodontal disease, Mild   Chest/Lungs:  Chest/Lungs Findings: Clear to auscultation, Normal Respiratory Rate     Heart/Vascular:  Heart Findings: Rate: Tachycardia  Rhythm: Regular Rhythm  Sounds: Normal        Mental Status:  Mental Status Findings:  Cooperative, Alert and Oriented         Anesthesia Plan  Type of Anesthesia, risks & benefits discussed:  Anesthesia Type:  general, MAC, regional  Patient's Preference:   Intra-op Monitoring Plan: standard ASA monitors  Intra-op Monitoring Plan Comments:   Post Op Pain Control Plan: multimodal analgesia and per primary service following discharge from PACU  Post Op Pain Control Plan Comments:   Induction:   IV  Beta Blocker:  Patient is not currently on a Beta-Blocker (No further documentation required).       Informed Consent: Patient understands risks and agrees with Anesthesia plan.  Questions answered. Anesthesia consent signed with patient.  ASA Score: 3     Day of Surgery Review of History & Physical:    H&P update referred to the surgeon.         Ready For Surgery From Anesthesia Perspective.

## 2020-03-20 NOTE — PROGRESS NOTES
Type and screen verified with Jenifer in the blood bank. Type and screen is valid for surgery and expires tonight at midnight.

## 2020-03-20 NOTE — NURSING
Pt arrived to room 507 via stretcher by hospital staff. Pt AA&O x 4, VSS, and no s/s of distress. Pt on tele monitor NSR. All needs addressed.

## 2020-03-20 NOTE — PLAN OF CARE
03/20/20 0935   Post-Acute Status   Post-Acute Authorization Placement   Post-Acute Placement Status Referrals Sent  (Referral sent to [facility] )       SW sent referral to [facility] via  per consultation w/ CM Patricia.     Will follow as needed.    Tori Leo LMSW  Case Management Social Worker   [facility]

## 2020-03-20 NOTE — TELEPHONE ENCOUNTER
Called phone number I was given but I believe it is a nursing home. I was told pt is still in the hospital and they didn't think she was being discharged this weekend. Will get with Dr Berg to inquire about pt's discharge.

## 2020-03-20 NOTE — PLAN OF CARE
Plan of care reviewed and discussed with pt. Pt verbalizes understanding. Pt AA&O x 4, VSS, and no s/s of distress. PIV x 1 saline locked. Purposeful rounding completed. All safety precautions in place. No falls this shift.

## 2020-03-20 NOTE — PT/OT/SLP PROGRESS
Physical Therapy      Patient Name:  Nydia Stevens   MRN:  [id number]    Patient not seen today secondary to (pt off the floor for procedure ). Will follow-up as able.    Temo Mcdaniel, PT

## 2020-03-20 NOTE — PLAN OF CARE
Pt AAOx4; Plan of care reviewed with patient; verbalized understanding.   Medications reviewed and administered as ordered. Pain assessed, no pain noted. Pt NPO since midnight for procedure today. 1 Unit of blood administered, tolerated well. Rounding for safety and patient care per policy. Vital signs as charted. Safety precautions maintained. Call light within reach, bed wheels locked, bed in lowest position, side rails ^x2, safety maintained. NADN, Will continue monitor.

## 2020-03-20 NOTE — PROGRESS NOTES
Spoke with MD May regarding potassium 3.2. No further orders at this time. Pt NSR on monitor rhythm free from ectopy. Monitor alarms on. Will continue to monitor.

## 2020-03-20 NOTE — OP NOTE
Date of procedure:   3/20/2020    Indications for procedure:  Nydia Stevens is an 86 yo female who was found to have severe anemia of 4.4 and was [place] [facility] [place].  She was given 2 units of pRBCs with good response but she refused any further work-up at that time.  She then presented with fatigue and continued melena.  She had a colonoscopy yesterday that revealed a partially obstructing mass in the ascending colon.  CT chest/abdomen/pelvis was negative for metastatic disease but revealed visible ascending colon mass.  CEA 16.7  She had a previous hysterectomy and a descending colectomy without known details.  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 No evidence of metastatic disease  2. Near circumferential right colon mass    Technique in detail:  The patient was brought to the operating room where her identity was confirmed.  She had received a pre-operative mechanical and antibiotic bowel prep.  The patient was administered general endotracheal anesthesia.  Bilateral SCDs were applied.  A yu catheter was inserted.  She was positioned supine with the right upper extremity abducted and the left upper extremity tucked at her side with cushioning of the elbow.  The abdomen was prepped and draped in sterile fashion.    A multidisciplinary time out was performed.    A midline incision approximately 8 cm in length was made through the umbilicus.  Lysis of omental adhesions to the abdominal wall was performed.  The right colon was  palpated and the site of the mass confirmed.  Exploration of the abdomen did not reveal any evidence of metastatic disease.  An Sheng wound protector was placed.    The gastrocolic ligament was then divided with entry into the lesser sac.  The hepatic flexure was mobilized with division of renocolic ligament and the duodenum was identified and protect.  Mobilization of the right colon was continued with division of Toldt's fascia and development of the retromesenteric plane.  The right ureter was identified and protected.  Non-embryologic adhesions of the terminal ileum were also divided until the terminal ileum was also free with complete mobilization of the right colon and ileum.    A high ligation of the ileocolic artery was then performed with skeletonization of the vessel near its takeoff from the SMA and a suture ligasure of the vessel performed with a heavy Vicryl.  The right branch of the middle colic was identified and divided and tied.  The marginal artery of T[place]hawn was then sharply divided and excellent pulsatile flow present at the level of division.  The proximal division site on the ileum was then selected 15 cm proximal from the ileocecal valve.  A mesenteric window was created and the remainder of the mesentery divided with successive clamp, division, and tying.   At the most distal aspect of the mesentery the mesentery was sharply divided and excellent pulsatile flow confirmed.    The ileum was then divided with a [facility]ess clamp proximally and an [facility] distally.  A 2-0 Nylon pursestring was placed utilizing the Furness clamp.  The anvil from a 25 mm EEA stapler was placed and the pursestring tied in anticipation of performing a end ileum to side transverse colon anastomosis.  The transverse colon was divided sharply.  The EEA stapler was inserted and the spike deployed through the antimesenteric transverse colon.  The spike was  with the anvil and the stapler fired.  A 60 mm TA stapler  was then used to close the end of the transverse colon.  The TA staple line was inverted using a running 3-0 PDS.  Omentum was then sutured over the anastomosis.  The anastomosis was reduced into the abdomen.    All participants gown and gloves were changed and the Sheng removed.  The suction tip and light handles were changed.  A dedicated closing tray was utilized.  The fascia was closed with a heavy looped PDS suture.  Irrigation was performed and hemostasis confirmed.  The skin was then closed using a running 4-0 Monocryl.  Skin glue was applied.    There were no complications.  Sponge and instrument counts were reported to be correct.  The patient was extubated and taken to the post-anesthesia recovery unit in stable condition.      I was scrubbed and present for the entire procedure.      AUTUMN Berg MD

## 2020-03-20 NOTE — BRIEF OP NOTE
[facility]  Brief Operative Note    SUMMARY     Surgery Date: 3/20/2020     Surgeon(s) and Role:     * AUTUMN Berg MD - Primary     * Thierno Mccartney MD - Fellow    Pre-Operative Care:  Pre-operative bowel prep Mechanical and PO antibiotics  IV antibiotics given within 1 hour of incision? Yes  Preoperative multimodal pain control? TAP    Pre-op Diagnosis:  Colonic mass [K63.89]    Operative Care:  Post-op Diagnosis: Post-Op Diagnosis Codes:     * Colonic mass [K63.89]    Procedure(s) (LRB):  Open right colectomy with ileocolic anastomosis    Anesthesia: General  Total volume administered 2000 ml   Total UOP: 600 ml  Anesthesia Start: 0942  Anesthesia Stop: 1223    Technical Procedures Used:   Use of closing instrument setup? Yes  Gown/Glove Change @ time of closing? Yes  Suction tip change at time of closing? Yes  Wound Protector used if open case? Yes    Description of the findings of the procedure:   1. Ascending colon mass near circumferential  2. Prior intra-abdominal adhesions  Wound Class (Clean-contaminated)    Complications: No     Estimated Blood Loss: 50 mL          Specimens: Right colon and terminal ileum    Implants: None    Post-Operative Care:         Disposition: PACU - hemodynamically stable.           Condition: Good  PT Temp >36 upon leaving the OR? Yes    Thierno Mccartney MD  Colon and Rectal Surgery Fellow

## 2020-03-20 NOTE — TELEPHONE ENCOUNTER
----- Message from Lian Ferguson sent at 3/20/2020  2:33 PM CDT -----  Contact: pt [phone]  Pt needs a post op appt due case dee. Please call pt

## 2020-03-21 LAB
ALBUMIN SERPL BCP-MCNC: 2.1 G/DL (ref 3.5–5.2)
ALP SERPL-CCNC: 55 U/L (ref 55–135)
ALT SERPL W/O P-5'-P-CCNC: 8 U/L (ref 10–44)
ANION GAP SERPL CALC-SCNC: 12 MMOL/L (ref 8–16)
ANISOCYTOSIS BLD QL SMEAR: SLIGHT
AST SERPL-CCNC: 39 U/L (ref 10–40)
BASOPHILS # BLD AUTO: 0.03 K/UL (ref 0–0.2)
BASOPHILS NFR BLD: 0.3 % (ref 0–1.9)
BILIRUB SERPL-MCNC: 0.9 MG/DL (ref 0.1–1)
BLD PROD TYP BPU: NORMAL
BLOOD UNIT EXPIRATION DATE: NORMAL
BLOOD UNIT TYPE CODE: 6200
BLOOD UNIT TYPE: NORMAL
BUN SERPL-MCNC: 5 MG/DL (ref 8–23)
BURR CELLS BLD QL SMEAR: ABNORMAL
CALCIUM SERPL-MCNC: 6.8 MG/DL (ref 8.7–10.5)
CHLORIDE SERPL-SCNC: 102 MMOL/L (ref 95–110)
CO2 SERPL-SCNC: 20 MMOL/L (ref 23–29)
CODING SYSTEM: NORMAL
CREAT SERPL-MCNC: 0.6 MG/DL (ref 0.5–1.4)
DACRYOCYTES BLD QL SMEAR: ABNORMAL
DIFFERENTIAL METHOD: ABNORMAL
DISPENSE STATUS: NORMAL
EOSINOPHIL # BLD AUTO: 0.2 K/UL (ref 0–0.5)
EOSINOPHIL NFR BLD: 2.3 % (ref 0–8)
ERYTHROCYTE [DISTWIDTH] IN BLOOD BY AUTOMATED COUNT: 36.2 % (ref 11.5–14.5)
EST. GFR  (AFRICAN AMERICAN): >60 ML/MIN/1.73 M^2
EST. GFR  (NON AFRICAN AMERICAN): >60 ML/MIN/1.73 M^2
GLUCOSE SERPL-MCNC: 85 MG/DL (ref 70–110)
HCT VFR BLD AUTO: 30.9 % (ref 37–48.5)
HGB BLD-MCNC: 8.9 G/DL (ref 12–16)
HYPOCHROMIA BLD QL SMEAR: ABNORMAL
IMM GRANULOCYTES # BLD AUTO: 0.14 K/UL (ref 0–0.04)
IMM GRANULOCYTES NFR BLD AUTO: 1.6 % (ref 0–0.5)
LYMPHOCYTES # BLD AUTO: 1 K/UL (ref 1–4.8)
LYMPHOCYTES NFR BLD: 11.4 % (ref 18–48)
MAGNESIUM SERPL-MCNC: 1.5 MG/DL (ref 1.6–2.6)
MCH RBC QN AUTO: 21.9 PG (ref 27–31)
MCHC RBC AUTO-ENTMCNC: 28.8 G/DL (ref 32–36)
MCV RBC AUTO: 76 FL (ref 82–98)
MONOCYTES # BLD AUTO: 1 K/UL (ref 0.3–1)
MONOCYTES NFR BLD: 11.8 % (ref 4–15)
NEUTROPHILS # BLD AUTO: 6.4 K/UL (ref 1.8–7.7)
NEUTROPHILS NFR BLD: 72.6 % (ref 38–73)
NRBC BLD-RTO: 0 /100 WBC
OVALOCYTES BLD QL SMEAR: ABNORMAL
PHOSPHATE SERPL-MCNC: 2.7 MG/DL (ref 2.7–4.5)
PLATELET # BLD AUTO: 305 K/UL (ref 150–350)
PLATELET BLD QL SMEAR: ABNORMAL
PMV BLD AUTO: 9.7 FL (ref 9.2–12.9)
POIKILOCYTOSIS BLD QL SMEAR: ABNORMAL
POLYCHROMASIA BLD QL SMEAR: ABNORMAL
POTASSIUM SERPL-SCNC: 3.2 MMOL/L (ref 3.5–5.1)
PROT SERPL-MCNC: 4.9 G/DL (ref 6–8.4)
RBC # BLD AUTO: 4.06 M/UL (ref 4–5.4)
SCHISTOCYTES BLD QL SMEAR: PRESENT
SICKLE CELLS BLD QL SMEAR: ABNORMAL
SODIUM SERPL-SCNC: 134 MMOL/L (ref 136–145)
TRANS ERYTHROCYTES VOL PATIENT: NORMAL ML
WBC # BLD AUTO: 8.84 K/UL (ref 3.9–12.7)

## 2020-03-21 PROCEDURE — 36415 COLL VENOUS BLD VENIPUNCTURE: CPT

## 2020-03-21 PROCEDURE — 80053 COMPREHEN METABOLIC PANEL: CPT

## 2020-03-21 PROCEDURE — 97164 PT RE-EVAL EST PLAN CARE: CPT

## 2020-03-21 PROCEDURE — 83735 ASSAY OF MAGNESIUM: CPT

## 2020-03-21 PROCEDURE — 97116 GAIT TRAINING THERAPY: CPT

## 2020-03-21 PROCEDURE — 97535 SELF CARE MNGMENT TRAINING: CPT

## 2020-03-21 PROCEDURE — 25000003 PHARM REV CODE 250: Performed by: STUDENT IN AN ORGANIZED HEALTH CARE EDUCATION/TRAINING PROGRAM

## 2020-03-21 PROCEDURE — 63600175 PHARM REV CODE 636 W HCPCS: Performed by: STUDENT IN AN ORGANIZED HEALTH CARE EDUCATION/TRAINING PROGRAM

## 2020-03-21 PROCEDURE — 84100 ASSAY OF PHOSPHORUS: CPT

## 2020-03-21 PROCEDURE — 85025 COMPLETE CBC W/AUTO DIFF WBC: CPT

## 2020-03-21 PROCEDURE — 97168 OT RE-EVAL EST PLAN CARE: CPT

## 2020-03-21 PROCEDURE — 20600001 HC STEP DOWN PRIVATE ROOM

## 2020-03-21 PROCEDURE — 94799 UNLISTED PULMONARY SVC/PX: CPT

## 2020-03-21 RX ORDER — POTASSIUM CHLORIDE 7.45 MG/ML
10 INJECTION INTRAVENOUS
Status: DISPENSED | OUTPATIENT
Start: 2020-03-21 | End: 2020-03-21

## 2020-03-21 RX ORDER — POTASSIUM CHLORIDE 20 MEQ/1
20 TABLET, EXTENDED RELEASE ORAL ONCE
Status: COMPLETED | OUTPATIENT
Start: 2020-03-21 | End: 2020-03-21

## 2020-03-21 RX ORDER — MAGNESIUM SULFATE HEPTAHYDRATE 40 MG/ML
2 INJECTION, SOLUTION INTRAVENOUS ONCE
Status: COMPLETED | OUTPATIENT
Start: 2020-03-21 | End: 2020-03-21

## 2020-03-21 RX ORDER — ENOXAPARIN SODIUM 100 MG/ML
30 INJECTION SUBCUTANEOUS EVERY 24 HOURS
Status: DISCONTINUED | OUTPATIENT
Start: 2020-03-21 | End: 2020-03-23 | Stop reason: HOSPADM

## 2020-03-21 RX ORDER — CALCIUM CARBONATE 200(500)MG
1000 TABLET,CHEWABLE ORAL DAILY PRN
Status: DISCONTINUED | OUTPATIENT
Start: 2020-03-21 | End: 2020-03-23 | Stop reason: HOSPADM

## 2020-03-21 RX ADMIN — IBUPROFEN 800 MG: 800 INJECTION INTRAVENOUS at 07:03

## 2020-03-21 RX ADMIN — GABAPENTIN 300 MG: 300 CAPSULE ORAL at 02:03

## 2020-03-21 RX ADMIN — Medication 250 MG: at 09:03

## 2020-03-21 RX ADMIN — CALCIUM 500 MG: 500 TABLET ORAL at 09:03

## 2020-03-21 RX ADMIN — CALCIUM GLUCONATE 1000 MG: 98 INJECTION, SOLUTION INTRAVENOUS at 10:03

## 2020-03-21 RX ADMIN — ROSUVASTATIN CALCIUM 40 MG: 20 TABLET, FILM COATED ORAL at 09:03

## 2020-03-21 RX ADMIN — ENOXAPARIN SODIUM 30 MG: 30 INJECTION SUBCUTANEOUS at 05:03

## 2020-03-21 RX ADMIN — ACETAMINOPHEN 1000 MG: 500 TABLET ORAL at 06:03

## 2020-03-21 RX ADMIN — MUPIROCIN: 20 OINTMENT TOPICAL at 09:03

## 2020-03-21 RX ADMIN — FERROUS SULFATE TAB EC 325 MG (65 MG FE EQUIVALENT) 325 MG: 325 (65 FE) TABLET DELAYED RESPONSE at 09:03

## 2020-03-21 RX ADMIN — POTASSIUM CHLORIDE 20 MEQ: 1500 TABLET, EXTENDED RELEASE ORAL at 02:03

## 2020-03-21 RX ADMIN — PANTOPRAZOLE SODIUM 40 MG: 40 TABLET, DELAYED RELEASE ORAL at 09:03

## 2020-03-21 RX ADMIN — GABAPENTIN 300 MG: 300 CAPSULE ORAL at 09:03

## 2020-03-21 RX ADMIN — IBUPROFEN 800 MG: 400 TABLET, FILM COATED ORAL at 02:03

## 2020-03-21 RX ADMIN — MAGNESIUM SULFATE HEPTAHYDRATE 2 G: 40 INJECTION, SOLUTION INTRAVENOUS at 11:03

## 2020-03-21 RX ADMIN — CYANOCOBALAMIN TAB 1000 MCG 1000 MCG: 1000 TAB at 09:03

## 2020-03-21 RX ADMIN — ACETAMINOPHEN 720 MG: 10 INJECTION, SOLUTION INTRAVENOUS at 03:03

## 2020-03-21 RX ADMIN — VITAMIN D, TAB 1000IU (100/BT) 1000 UNITS: 25 TAB at 09:03

## 2020-03-21 RX ADMIN — ACETAMINOPHEN 720 MG: 10 INJECTION, SOLUTION INTRAVENOUS at 09:03

## 2020-03-21 NOTE — PLAN OF CARE
Plan of care reviewed with pt: transferred here from PACU at 1820, AAOx4, on RA, VS stable. Midline incision with Dermabond CDI JUAN DAVID. Tolerated her clear liquid diet with no complaints of nausea. No complains of pain. Xiong intact with jayson color urine. NS at 40. SCD on. Call light in reach. WCTM

## 2020-03-21 NOTE — PLAN OF CARE
POC reviewed with patient, states understanding. AOx4. VS WDL. ML with dermabond. Clear liquid diet, tolerated well. No complaints of pain or nausea. Tyrese d/c this am @ 0600 as ordered. IVF infusing per order. Will continue to manage POC.

## 2020-03-21 NOTE — ASSESSMENT & PLAN NOTE
1. Partially obstructing and bleeding mass in the ascending colon - discussed with patient will proceed with surgery tomorrow given rapidly recurrent anemic symptoms. Open right colectomy w/ ileocolic anastomosis on 3/20/20    - CLD for now due to abdominal distension. Possibly advance later today  - continue mIVF  - PO PRN/scheduled pain control  - Encourage OOB/ambulation  - d/c yu this AM - voiding trial  - monitor for full return of bowel function

## 2020-03-21 NOTE — PLAN OF CARE
Problem: Physical Therapy Goal  Goal: Physical Therapy Goal  Description  Goals to be met by: 2020    Patient will increase functional independence with mobility by performin. Supine to sit with Modified [place] -not met  2. Sit to stand transfer with Modified [place] -not met  3. Gait  x 250 feet with Modified [place] using rollator -not met     Outcome: Ongoing, Progressing   Re-evaluation completed and goals appropriate. Marialuisa Ndiaye, PT  3/21/2020

## 2020-03-21 NOTE — SUBJECTIVE & OBJECTIVE
Subjective:     Interval History: POD 1 from open right colectomy w/ ileocolic anastomosis. No acute events overnight. Patient tolerating CLD w/o N/V. Pain well controlled. No flatus or BM. Not yet OOB.     Post-Op Info:  Procedure(s) (LRB):  HEMICOLECTOMY, RIGHT (Right)   1 Day Post-Op      Medications:  Continuous Infusions:   sodium chloride 0.9% 40 mL/hr at 03/20/20 1303     Scheduled Meds:   acetaminophen  15 mg/kg Intravenous Q8H    Followed by    acetaminophen  1,000 mg Oral Q8H    ascorbic acid (vitamin C)  250 mg Oral Daily    calcium carbonate  500 mg Oral Daily    calcium gluconate IVPB  1,000 mg Intravenous Once    cyanocobalamin  1,000 mcg Oral Daily    enoxaparin  40 mg Subcutaneous Daily    ferrous sulfate  325 mg Oral Daily    gabapentin  300 mg Oral TID    ibuprofen  800 mg Intravenous Q8H    Followed by    ibuprofen  800 mg Oral Q8H    magnesium sulfate IVPB  2 g Intravenous Once    mupirocin   Nasal BID    pantoprazole  40 mg Oral Daily    rosuvastatin  40 mg Oral Daily    sodium phosphate IVPB  15 mmol Intravenous Once    vitamin D  1,000 Units Oral Daily     PRN Meds:   sodium chloride    melatonin    ondansetron    oxyCODONE    oxyCODONE    sodium chloride 0.9%    sodium chloride 0.9%    traMADoL        Objective:     Vital Signs (Most Recent):  Temp: 98.3 °F (36.8 °C) (03/21/20 0406)  Pulse: 78 (03/21/20 0718)  Resp: 16 (03/21/20 0406)  BP: (!) 125/59 (03/21/20 0406)  SpO2: 99 % (03/21/20 0406) Vital Signs (24h Range):  Temp:  [96.2 °F (35.7 °C)-98.4 °F (36.9 °C)] 98.3 °F (36.8 °C)  Pulse:  [64-91] 78  Resp:  [13-20] 16  SpO2:  [91 %-100 %] 99 %  BP: (120-184)/(58-81) 125/59     Intake/Output - Last 3 Shifts       03/19 0700 - 03/20 0659 03/20 0700 - 03/21 0659 03/21 0700 - 03/22 0659    P.O. 900 240     I.V. (mL/kg) 1000 (20.7) 2025.7 (42.1)     Blood 482.5      Other 0      IV Piggyback  500     Total Intake(mL/kg) 2382.5 (49.3) 2765.7 (57.5)     Urine (mL/kg/hr)   2750 (2.4)     Blood  50     Total Output  2800     Net +2382.5 -34.3            Urine Occurrence 3 x      Stool Occurrence 0 x      Emesis Occurrence 0 x            Physical Exam   Constitutional: She is oriented to person, place, and time. She appears well-nourished. No distress.   HENT:   Head: Normocephalic.   Eyes: Conjunctivae are normal.   Neck: No tracheal deviation present.   Cardiovascular: Regular rhythm.   Pulmonary/Chest: Effort normal. No respiratory distress.   Abdominal: She exhibits distension.   Appropriately tender  Surgical incision sites c/d/i   Neurological: She is alert and oriented to person, place, and time.       Significant Labs:  BMP (Last 3 Results):   Recent Labs   Lab 03/20/20  0556 03/20/20  1257 03/21/20  0333   GLU 73 101 85   * 134* 134*   K 3.2* 3.5 3.2*    103 102   CO2 18* 18* 20*   BUN 5* 5* 5*   CREATININE 0.7 0.6 0.6   CALCIUM 7.8* 7.4* 6.8*   MG 1.8 1.7 1.5*     CBC (Last 3 Results):   Recent Labs   Lab 03/20/20  0741  03/20/20  1158 03/20/20  1257 03/21/20  0333   WBC 8.84  --   --  7.35 8.84   RBC 4.84  --   --  4.47 4.06   HGB 10.5*  --   --  9.6* 8.9*   HCT 37.7   < > 25* 33.8* 30.9*     --   --  317 305   MCV 78*  --   --  76* 76*   MCH 21.7*  --   --  21.5* 21.9*   MCHC 27.9*  --   --  28.4* 28.8*    < > = values in this interval not displayed.       Significant Diagnostics:  CT: I have reviewed all pertinent results/findings within the past 24 hours:  Mass in the cecum; no evidence of metastatic disease on my personal read

## 2020-03-21 NOTE — PROGRESS NOTES
[facility]  Colorectal Surgery  Progress Note    Patient Name: Nydia Stevens  MRN: [id number]  Admission Date: 3/17/2020  Hospital Length of Stay: 4 days  Attending Physician: AUTUMN Berg MD    Subjective:     Interval History: POD 1 from open right colectomy w/ ileocolic anastomosis. No acute events overnight. Patient tolerating CLD w/o N/V. Pain well controlled. No flatus or BM. Not yet OOB.     Post-Op Info:  Procedure(s) (LRB):  HEMICOLECTOMY, RIGHT (Right)   1 Day Post-Op      Medications:  Continuous Infusions:   sodium chloride 0.9% 40 mL/hr at 03/20/20 1303     Scheduled Meds:   acetaminophen  15 mg/kg Intravenous Q8H    Followed by    acetaminophen  1,000 mg Oral Q8H    ascorbic acid (vitamin C)  250 mg Oral Daily    calcium carbonate  500 mg Oral Daily    calcium gluconate IVPB  1,000 mg Intravenous Once    cyanocobalamin  1,000 mcg Oral Daily    enoxaparin  40 mg Subcutaneous Daily    ferrous sulfate  325 mg Oral Daily    gabapentin  300 mg Oral TID    ibuprofen  800 mg Intravenous Q8H    Followed by    ibuprofen  800 mg Oral Q8H    magnesium sulfate IVPB  2 g Intravenous Once    mupirocin   Nasal BID    pantoprazole  40 mg Oral Daily    rosuvastatin  40 mg Oral Daily    sodium phosphate IVPB  15 mmol Intravenous Once    vitamin D  1,000 Units Oral Daily     PRN Meds:   sodium chloride    melatonin    ondansetron    oxyCODONE    oxyCODONE    sodium chloride 0.9%    sodium chloride 0.9%    traMADoL        Objective:     Vital Signs (Most Recent):  Temp: 98.3 °F (36.8 °C) (03/21/20 0406)  Pulse: 78 (03/21/20 0718)  Resp: 16 (03/21/20 0406)  BP: (!) 125/59 (03/21/20 0406)  SpO2: 99 % (03/21/20 0406) Vital Signs (24h Range):  Temp:  [96.2 °F (35.7 °C)-98.4 °F (36.9 °C)] 98.3 °F (36.8 °C)  Pulse:  [64-91] 78  Resp:  [13-20] 16  SpO2:  [91 %-100 %] 99 %  BP: (120-184)/(58-81) 125/59     Intake/Output - Last 3 Shifts       03/19 0700 - 03/20 0659 03/20 0700 -  03/21 0659 03/21 0700 - 03/22 0659    P.O. 900 240     I.V. (mL/kg) 1000 (20.7) 2025.7 (42.1)     Blood 482.5      Other 0      IV Piggyback  500     Total Intake(mL/kg) 2382.5 (49.3) 2765.7 (57.5)     Urine (mL/kg/hr)  2750 (2.4)     Blood  50     Total Output  2800     Net +2382.5 -34.3            Urine Occurrence 3 x      Stool Occurrence 0 x      Emesis Occurrence 0 x            Physical Exam   Constitutional: She is oriented to person, place, and time. She appears well-nourished. No distress.   HENT:   Head: Normocephalic.   Eyes: Conjunctivae are normal.   Neck: No tracheal deviation present.   Cardiovascular: Regular rhythm.   Pulmonary/Chest: Effort normal. No respiratory distress.   Abdominal: She exhibits distension.   Appropriately tender  Surgical incision sites c/d/i   Neurological: She is alert and oriented to person, place, and time.       Significant Labs:  BMP (Last 3 Results):   Recent Labs   Lab 03/20/20  0556 03/20/20  1257 03/21/20  0333   GLU 73 101 85   * 134* 134*   K 3.2* 3.5 3.2*    103 102   CO2 18* 18* 20*   BUN 5* 5* 5*   CREATININE 0.7 0.6 0.6   CALCIUM 7.8* 7.4* 6.8*   MG 1.8 1.7 1.5*     CBC (Last 3 Results):   Recent Labs   Lab 03/20/20  0741  03/20/20  1158 03/20/20  1257 03/21/20  0333   WBC 8.84  --   --  7.35 8.84   RBC 4.84  --   --  4.47 4.06   HGB 10.5*  --   --  9.6* 8.9*   HCT 37.7   < > 25* 33.8* 30.9*     --   --  317 305   MCV 78*  --   --  76* 76*   MCH 21.7*  --   --  21.5* 21.9*   MCHC 27.9*  --   --  28.4* 28.8*    < > = values in this interval not displayed.       Significant Diagnostics:  CT: I have reviewed all pertinent results/findings within the past 24 hours:  Mass in the cecum; no evidence of metastatic disease on my personal read    Assessment/Plan:     * Colonic mass  1. Partially obstructing and bleeding mass in the ascending colon - discussed with patient will proceed with surgery tomorrow given rapidly recurrent anemic symptoms. Open  right colectomy w/ ileocolic anastomosis on 3/20/20    - CLD for now due to abdominal distension. Possibly advance later today  - continue mIVF  - PO PRN/scheduled pain control  - Encourage OOB/ambulation  - d/c yu this AM - voiding trial  - monitor for full return of bowel function          Abigail Ayala MD  Colorectal Surgery  [facility]

## 2020-03-21 NOTE — PT/OT/SLP RE-EVAL
Physical Therapy Re-evaluation and treatment    Patient Name:  Nydia Stevens   MRN:  [id number]    Recommendations:     Discharge Recommendations:  home health PT   Discharge Equipment Recommendations: none   Barriers to discharge: None    Assessment:     Nydia Stevens is a 85 y.o. female admitted with a medical diagnosis of Colonic mass.  She presents with the following impairments/functional limitations:  gait instability, impaired balance, impaired endurance, impaired functional mobilty. pt regla treatment well and will benefit from skilled PT 3x/wk to progress physically. Pt will be able to discharge home with HHPT and no DME when medically stable. Pt was evaluated on 3/19 with GIB. Pt is s/p R open colectomy with ileocolic anastomosis 3/20/20.      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 She lives on the 2nd floor and has elevator access. Pt uses rollator. Pt owns rollator, RW, shower chair, w/c.     Rehab Prognosis:  good; patient would benefit from acute skilled PT services to address these deficits and reach maximum level of function.      Recent Surgery: Procedure(s) (LRB):  HEMICOLECTOMY, RIGHT (Right) 1 Day Post-Op    Plan:     During this hospitalization, patient to be seen 3 x/week to address the above listed problems via gait training, therapeutic activities  · Plan of Care Expires:  04/19/20   Plan of Care Reviewed with: patient    Subjective     Communicated with nurse prior to session.  Patient found supine with telemetry, peripheral IV upon PT entry to room, agreeable to evaluation.      Chief Complaint: pt had no complaints during treatment.   Patient comments/goals:  To get better and go home.   Pain/Comfort:  · Pain Rating 1: 0/10  · Pain Rating Post-Intervention 1: 0/10    Patients cultural, spiritual, Baptism conflicts given the current situation: no      Objective:     Patient found with: telemetry, peripheral IV     General Precautions: Standard,  fall   Orthopedic Precautions:N/A   Braces:       Exams:  · Cognitive Exam:  Patient is oriented to Person, Place, Time and Situation  · RLE ROM: WFL  · RLE Strength: WFL  · LLE ROM: WFL  · LLE Strength: WFL    Functional Mobility:  · Bed Mobility:   Pt needed verbal cues for hand placement and sequencing for functional mobility.   · Rolling Right: minimum assistance  · Supine to Sit: minimum assistance  ·   · Transfers:     · Sit to Stand:  contact guard assistance with 4 wheeled walker. Pt stood at sink with SBA to perform grooming.   ·   · Gait: pt received gait training ~ 550 ft with rollator and SBA.     AM-PAC 6 CLICK MOBILITY  Total Score:18       Therapeutic Activities and Exercises:   pt received verbal instructions in role of PT and POC. Pt verbally expressed understanding of such.     Patient left up in chair with all lines intact and call button in reach.    GOALS:   Multidisciplinary Problems     Physical Therapy Goals        Problem: Physical Therapy Goal    Goal Priority Disciplines Outcome Goal Variances Interventions   Physical Therapy Goal     PT, PT/OT Ongoing, Progressing     Description:  Goals to be met by: 2020    Patient will increase functional independence with mobility by performin. Supine to sit with Modified [place] -not met  2. Sit to stand transfer with Modified [place] -not met  3. Gait  x 250 feet with Modified [place] using rollator -not met                      History:     Past Medical History:   Diagnosis Date    Cataract     Hypertension        Past Surgical History:   Procedure Laterality Date    CATARACT EXTRACTION W/  INTRAOCULAR LENS IMPLANT Left 14    gregor    CATARACT EXTRACTION W/  INTRAOCULAR LENS IMPLANT Right 09/15/14    gregor    COLONOSCOPY N/A 3/19/2020    Procedure: COLONOSCOPY;  Surgeon: Real Mabry MD;  Location: [address];  Service: Endoscopy;  Laterality: N/A;    ESOPHAGOGASTRODUODENOSCOPY N/A 3/19/2020     Procedure: EGD (ESOPHAGOGASTRODUODENOSCOPY);  Surgeon: Real Mabry MD;  Location: [facility] ([address] [place] [address]);  Service: Endoscopy;  Laterality: N/A;    FOOT SURGERY      left    HYSTERECTOMY         Time Tracking:     PT Received On: 03/21/20  PT Start Time: 0829     PT Stop Time: 0854  PT Total Time (min): 25 min     Billable Minutes: Re-eval 8 min  and Gait Training 17 min      Marialuisa Ndiaye, PT  03/21/2020

## 2020-03-21 NOTE — NURSING
POC reviewed with patient who verbalized understanding. AAOx4 VSS on RA. Patient up to bathroom to urinate, had 2 loose BM today. Unable to maintain IV Midline was consult but unable to place during shift today refer to nursing note. Up to chair through out the day. Tolerating diet well. No Complaints of pain during nursing shift. Denies any further needs at this time. Bed in lowest position call light in reach bed rails up x2 WCTM

## 2020-03-21 NOTE — CONSULTS
Informed Charge RN that [facility] is unable to see patient for access placement.  Suggested calling anesthesia fellow on call if needed for access.

## 2020-03-22 LAB
ALBUMIN SERPL BCP-MCNC: 2.5 G/DL (ref 3.5–5.2)
ALP SERPL-CCNC: 67 U/L (ref 55–135)
ALT SERPL W/O P-5'-P-CCNC: 11 U/L (ref 10–44)
ANION GAP SERPL CALC-SCNC: 9 MMOL/L (ref 8–16)
ANISOCYTOSIS BLD QL SMEAR: SLIGHT
AST SERPL-CCNC: 43 U/L (ref 10–40)
BASOPHILS # BLD AUTO: 0.05 K/UL (ref 0–0.2)
BASOPHILS NFR BLD: 0.8 % (ref 0–1.9)
BILIRUB SERPL-MCNC: 0.9 MG/DL (ref 0.1–1)
BUN SERPL-MCNC: 5 MG/DL (ref 8–23)
BURR CELLS BLD QL SMEAR: ABNORMAL
CALCIUM SERPL-MCNC: 7.7 MG/DL (ref 8.7–10.5)
CHLORIDE SERPL-SCNC: 101 MMOL/L (ref 95–110)
CO2 SERPL-SCNC: 23 MMOL/L (ref 23–29)
CREAT SERPL-MCNC: 0.7 MG/DL (ref 0.5–1.4)
DACRYOCYTES BLD QL SMEAR: ABNORMAL
DIFFERENTIAL METHOD: ABNORMAL
EOSINOPHIL # BLD AUTO: 0.3 K/UL (ref 0–0.5)
EOSINOPHIL NFR BLD: 4.6 % (ref 0–8)
ERYTHROCYTE [DISTWIDTH] IN BLOOD BY AUTOMATED COUNT: 38.2 % (ref 11.5–14.5)
EST. GFR  (AFRICAN AMERICAN): >60 ML/MIN/1.73 M^2
EST. GFR  (NON AFRICAN AMERICAN): >60 ML/MIN/1.73 M^2
GIANT PLATELETS BLD QL SMEAR: PRESENT
GLUCOSE SERPL-MCNC: 71 MG/DL (ref 70–110)
HCT VFR BLD AUTO: 34 % (ref 37–48.5)
HGB BLD-MCNC: 9.4 G/DL (ref 12–16)
HYPOCHROMIA BLD QL SMEAR: ABNORMAL
IMM GRANULOCYTES # BLD AUTO: 0.09 K/UL (ref 0–0.04)
IMM GRANULOCYTES NFR BLD AUTO: 1.4 % (ref 0–0.5)
LYMPHOCYTES # BLD AUTO: 1 K/UL (ref 1–4.8)
LYMPHOCYTES NFR BLD: 15.7 % (ref 18–48)
MAGNESIUM SERPL-MCNC: 1.7 MG/DL (ref 1.6–2.6)
MCH RBC QN AUTO: 21.7 PG (ref 27–31)
MCHC RBC AUTO-ENTMCNC: 27.6 G/DL (ref 32–36)
MCV RBC AUTO: 78 FL (ref 82–98)
MONOCYTES # BLD AUTO: 0.9 K/UL (ref 0.3–1)
MONOCYTES NFR BLD: 13.8 % (ref 4–15)
NEUTROPHILS # BLD AUTO: 4.2 K/UL (ref 1.8–7.7)
NEUTROPHILS NFR BLD: 63.7 % (ref 38–73)
NRBC BLD-RTO: 0 /100 WBC
OVALOCYTES BLD QL SMEAR: ABNORMAL
PHOSPHATE SERPL-MCNC: 2.4 MG/DL (ref 2.7–4.5)
PLATELET # BLD AUTO: 334 K/UL (ref 150–350)
PLATELET BLD QL SMEAR: ABNORMAL
PMV BLD AUTO: ABNORMAL FL (ref 9.2–12.9)
POIKILOCYTOSIS BLD QL SMEAR: SLIGHT
POLYCHROMASIA BLD QL SMEAR: ABNORMAL
POTASSIUM SERPL-SCNC: 3.8 MMOL/L (ref 3.5–5.1)
PROT SERPL-MCNC: 5.9 G/DL (ref 6–8.4)
RBC # BLD AUTO: 4.34 M/UL (ref 4–5.4)
SCHISTOCYTES BLD QL SMEAR: ABNORMAL
SCHISTOCYTES BLD QL SMEAR: PRESENT
SICKLE CELLS BLD QL SMEAR: ABNORMAL
SODIUM SERPL-SCNC: 133 MMOL/L (ref 136–145)
SPHEROCYTES BLD QL SMEAR: ABNORMAL
WBC # BLD AUTO: 6.58 K/UL (ref 3.9–12.7)

## 2020-03-22 PROCEDURE — 80053 COMPREHEN METABOLIC PANEL: CPT

## 2020-03-22 PROCEDURE — 83735 ASSAY OF MAGNESIUM: CPT

## 2020-03-22 PROCEDURE — 25000003 PHARM REV CODE 250: Performed by: STUDENT IN AN ORGANIZED HEALTH CARE EDUCATION/TRAINING PROGRAM

## 2020-03-22 PROCEDURE — 63600175 PHARM REV CODE 636 W HCPCS: Performed by: STUDENT IN AN ORGANIZED HEALTH CARE EDUCATION/TRAINING PROGRAM

## 2020-03-22 PROCEDURE — 84100 ASSAY OF PHOSPHORUS: CPT

## 2020-03-22 PROCEDURE — 20600001 HC STEP DOWN PRIVATE ROOM

## 2020-03-22 PROCEDURE — 36415 COLL VENOUS BLD VENIPUNCTURE: CPT

## 2020-03-22 PROCEDURE — 85025 COMPLETE CBC W/AUTO DIFF WBC: CPT

## 2020-03-22 RX ADMIN — CYANOCOBALAMIN TAB 1000 MCG 1000 MCG: 1000 TAB at 10:03

## 2020-03-22 RX ADMIN — MUPIROCIN: 20 OINTMENT TOPICAL at 09:03

## 2020-03-22 RX ADMIN — MUPIROCIN: 20 OINTMENT TOPICAL at 10:03

## 2020-03-22 RX ADMIN — PANTOPRAZOLE SODIUM 40 MG: 40 TABLET, DELAYED RELEASE ORAL at 10:03

## 2020-03-22 RX ADMIN — Medication 250 MG: at 10:03

## 2020-03-22 RX ADMIN — FERROUS SULFATE TAB EC 325 MG (65 MG FE EQUIVALENT) 325 MG: 325 (65 FE) TABLET DELAYED RESPONSE at 10:03

## 2020-03-22 RX ADMIN — ENOXAPARIN SODIUM 30 MG: 30 INJECTION SUBCUTANEOUS at 06:03

## 2020-03-22 RX ADMIN — CALCIUM 500 MG: 500 TABLET ORAL at 10:03

## 2020-03-22 RX ADMIN — VITAMIN D, TAB 1000IU (100/BT) 1000 UNITS: 25 TAB at 10:03

## 2020-03-22 RX ADMIN — ROSUVASTATIN CALCIUM 40 MG: 20 TABLET, FILM COATED ORAL at 10:03

## 2020-03-22 RX ADMIN — GABAPENTIN 300 MG: 300 CAPSULE ORAL at 10:03

## 2020-03-22 NOTE — PLAN OF CARE
POC reviewed with patient, understanding verbalized. Pt ANOx4, VSS on RA. Pts ML incision w/ DB is approximated, C/D/I. Low fiber/low residue diet tolerated well as pt denies any N/V/D. Pt ambulating to RR w/ minimal assist and walker as needed. Denies any pain tonight. Telemetry monitoring continued, NSR noted. Pt sleeping quietly between care and able to make needs known. Remains free of fall or injury as safety measures intact. No further questions or needs reported at this time. Will continue to monitor.

## 2020-03-22 NOTE — SUBJECTIVE & OBJECTIVE
Subjective:     Interval History: No acute events overnight. Patient did not received low fiber diet on yesterday despite order being in place. Continued to tolerated CLD w/o N/V. Pain well controlled. Passing flatus and having BMs. Voiding spontaneously.    Post-Op Info:  Procedure(s) (LRB):  HEMICOLECTOMY, RIGHT (Right)   2 Days Post-Op      Medications:  Continuous Infusions:    Scheduled Meds:   acetaminophen  1,000 mg Oral Q8H    ascorbic acid (vitamin C)  250 mg Oral Daily    calcium carbonate  500 mg Oral Daily    cyanocobalamin  1,000 mcg Oral Daily    enoxaparin  30 mg Subcutaneous Daily    ferrous sulfate  325 mg Oral Daily    gabapentin  300 mg Oral TID    ibuprofen  800 mg Oral Q8H    mupirocin   Nasal BID    pantoprazole  40 mg Oral Daily    rosuvastatin  40 mg Oral Daily    vitamin D  1,000 Units Oral Daily     PRN Meds:   sodium chloride    calcium carbonate    melatonin    ondansetron    oxyCODONE    oxyCODONE    sodium chloride 0.9%    sodium chloride 0.9%    traMADoL        Objective:     Vital Signs (Most Recent):  Temp: 97.6 °F (36.4 °C) (03/22/20 0435)  Pulse: 76 (03/22/20 0435)  Resp: 16 (03/22/20 0435)  BP: (!) 121/55 (03/22/20 0435)  SpO2: 100 % (03/22/20 0435) Vital Signs (24h Range):  Temp:  [96.2 °F (35.7 °C)-97.6 °F (36.4 °C)] 97.6 °F (36.4 °C)  Pulse:  [69-89] 76  Resp:  [15-18] 16  SpO2:  [95 %-100 %] 100 %  BP: ()/(48-67) 121/55     Intake/Output - Last 3 Shifts       03/20 0700 - 03/21 0659 03/21 0700 - 03/22 0659 03/22 0700 - 03/23 0659    P.O. 240 600     I.V. (mL/kg) 2025.7 (42.1)      Blood       Other       IV Piggyback 500 144     Total Intake(mL/kg) 2765.7 (57.5) 744 (15.5)     Urine (mL/kg/hr) 2750 (2.4) 200 (0.2)     Stool  0     Blood 50      Total Output 2800 200     Net -34.3 +544            Urine Occurrence  6 x     Stool Occurrence  4 x           Physical Exam   Constitutional: She is oriented to person, place, and time. She appears  well-nourished. No distress.   HENT:   Head: Normocephalic.   Eyes: Conjunctivae are normal.   Neck: No tracheal deviation present.   Cardiovascular: Regular rhythm.   Pulmonary/Chest: Effort normal. No respiratory distress.   Abdominal: She exhibits no distension.   Appropriately tender  Surgical incision sites c/d/i   Neurological: She is alert and oriented to person, place, and time.       Significant Labs:  BMP (Last 3 Results):   Recent Labs   Lab 03/20/20  1257 03/21/20  0333 03/22/20  0451    85 71   * 134* 133*   K 3.5 3.2* 3.8    102 101   CO2 18* 20* 23   BUN 5* 5* 5*   CREATININE 0.6 0.6 0.7   CALCIUM 7.4* 6.8* 7.7*   MG 1.7 1.5* 1.7     CBC (Last 3 Results):   Recent Labs   Lab 03/20/20  1257 03/21/20  0333 03/22/20  0451   WBC 7.35 8.84 6.58   RBC 4.47 4.06 4.34   HGB 9.6* 8.9* 9.4*   HCT 33.8* 30.9* 34.0*    305 334   MCV 76* 76* 78*   MCH 21.5* 21.9* 21.7*   MCHC 28.4* 28.8* 27.6*       Significant Diagnostics:  CT: I have reviewed all pertinent results/findings within the past 24 hours:  Mass in the cecum; no evidence of metastatic disease on my personal read

## 2020-03-22 NOTE — PROGRESS NOTES
[facility]  Colorectal Surgery  Progress Note    Patient Name: Nydia Stevens  MRN: [id number]  Admission Date: 3/17/2020  Hospital Length of Stay: 5 days  Attending Physician: AUTUMN Berg MD    Subjective:     Interval History: No acute events overnight. Patient did not received low fiber diet on yesterday despite order being in place. Continued to tolerated CLD w/o N/V. Pain well controlled. Passing flatus and having BMs. Voiding spontaneously.    Post-Op Info:  Procedure(s) (LRB):  HEMICOLECTOMY, RIGHT (Right)   2 Days Post-Op      Medications:  Continuous Infusions:    Scheduled Meds:   acetaminophen  1,000 mg Oral Q8H    ascorbic acid (vitamin C)  250 mg Oral Daily    calcium carbonate  500 mg Oral Daily    cyanocobalamin  1,000 mcg Oral Daily    enoxaparin  30 mg Subcutaneous Daily    ferrous sulfate  325 mg Oral Daily    gabapentin  300 mg Oral TID    ibuprofen  800 mg Oral Q8H    mupirocin   Nasal BID    pantoprazole  40 mg Oral Daily    rosuvastatin  40 mg Oral Daily    vitamin D  1,000 Units Oral Daily     PRN Meds:   sodium chloride    calcium carbonate    melatonin    ondansetron    oxyCODONE    oxyCODONE    sodium chloride 0.9%    sodium chloride 0.9%    traMADoL        Objective:     Vital Signs (Most Recent):  Temp: 97.6 °F (36.4 °C) (03/22/20 0435)  Pulse: 76 (03/22/20 0435)  Resp: 16 (03/22/20 0435)  BP: (!) 121/55 (03/22/20 0435)  SpO2: 100 % (03/22/20 0435) Vital Signs (24h Range):  Temp:  [96.2 °F (35.7 °C)-97.6 °F (36.4 °C)] 97.6 °F (36.4 °C)  Pulse:  [69-89] 76  Resp:  [15-18] 16  SpO2:  [95 %-100 %] 100 %  BP: ()/(48-67) 121/55     Intake/Output - Last 3 Shifts       03/20 0700 - 03/21 0659 03/21 0700 - 03/22 0659 03/22 0700 - 03/23 0659    P.O. 240 600     I.V. (mL/kg) 2025.7 (42.1)      Blood       Other       IV Piggyback 500 144     Total Intake(mL/kg) 2765.7 (57.5) 744 (15.5)     Urine (mL/kg/hr) 2750 (2.4) 200 (0.2)     Stool  0      Blood 50      Total Output 2800 200     Net -34.3 +544            Urine Occurrence  6 x     Stool Occurrence  4 x           Physical Exam   Constitutional: She is oriented to person, place, and time. She appears well-nourished. No distress.   HENT:   Head: Normocephalic.   Eyes: Conjunctivae are normal.   Neck: No tracheal deviation present.   Cardiovascular: Regular rhythm.   Pulmonary/Chest: Effort normal. No respiratory distress.   Abdominal: She exhibits no distension.   Appropriately tender  Surgical incision sites c/d/i   Neurological: She is alert and oriented to person, place, and time.       Significant Labs:  BMP (Last 3 Results):   Recent Labs   Lab 03/20/20  1257 03/21/20  0333 03/22/20  0451    85 71   * 134* 133*   K 3.5 3.2* 3.8    102 101   CO2 18* 20* 23   BUN 5* 5* 5*   CREATININE 0.6 0.6 0.7   CALCIUM 7.4* 6.8* 7.7*   MG 1.7 1.5* 1.7     CBC (Last 3 Results):   Recent Labs   Lab 03/20/20  1257 03/21/20  0333 03/22/20  0451   WBC 7.35 8.84 6.58   RBC 4.47 4.06 4.34   HGB 9.6* 8.9* 9.4*   HCT 33.8* 30.9* 34.0*    305 334   MCV 76* 76* 78*   MCH 21.5* 21.9* 21.7*   MCHC 28.4* 28.8* 27.6*       Significant Diagnostics:  CT: I have reviewed all pertinent results/findings within the past 24 hours:  Mass in the cecum; no evidence of metastatic disease on my personal read    Assessment/Plan:     * Colonic mass  1. Partially obstructing and bleeding mass in the ascending colon - discussed with patient will proceed with surgery tomorrow given rapidly recurrent anemic symptoms. Open right colectomy w/ ileocolic anastomosis on 3/20/20    - Low fiber diet. Discussed with nurse about making sure patient gets breakfast  - will discontinue mIVF once tolerating low fiber diet  - PO PRN/scheduled pain control  - Encourage OOB/ambulation    Dispo: Will plan for discharge once patient has full return of bowel function, tolerating low fiber diet, voiding spontaneously and pain is controlled  with PO medications.           Abigail Ayala MD  Colorectal Surgery  [facility]

## 2020-03-22 NOTE — ASSESSMENT & PLAN NOTE
1. Partially obstructing and bleeding mass in the ascending colon - discussed with patient will proceed with surgery tomorrow given rapidly recurrent anemic symptoms. Open right colectomy w/ ileocolic anastomosis on 3/20/20    - Low fiber diet. Discussed with nurse about making sure patient gets breakfast  - will discontinue mIVF once tolerating low fiber diet  - PO PRN/scheduled pain control  - Encourage OOB/ambulation    Dispo: Will plan for discharge once patient has full return of bowel function, tolerating low fiber diet, voiding spontaneously and pain is controlled with PO medications.

## 2020-03-23 ENCOUNTER — TELEPHONE (OUTPATIENT)
Dept: HEMATOLOGY/ONCOLOGY | Facility: CLINIC | Age: 85
End: 2020-03-23

## 2020-03-23 VITALS
BODY MASS INDEX: 16.64 KG/M2 | SYSTOLIC BLOOD PRESSURE: 134 MMHG | OXYGEN SATURATION: 100 % | HEART RATE: 90 BPM | RESPIRATION RATE: 14 BRPM | TEMPERATURE: 98 F | HEIGHT: 67 IN | WEIGHT: 106 LBS | DIASTOLIC BLOOD PRESSURE: 63 MMHG

## 2020-03-23 LAB
ALBUMIN SERPL BCP-MCNC: 2.4 G/DL (ref 3.5–5.2)
ALP SERPL-CCNC: 61 U/L (ref 55–135)
ALT SERPL W/O P-5'-P-CCNC: 11 U/L (ref 10–44)
ANION GAP SERPL CALC-SCNC: 8 MMOL/L (ref 8–16)
ANISOCYTOSIS BLD QL SMEAR: ABNORMAL
AST SERPL-CCNC: 35 U/L (ref 10–40)
BASOPHILS # BLD AUTO: 0.06 K/UL (ref 0–0.2)
BASOPHILS NFR BLD: 0.9 % (ref 0–1.9)
BILIRUB SERPL-MCNC: 0.7 MG/DL (ref 0.1–1)
BUN SERPL-MCNC: 4 MG/DL (ref 8–23)
BURR CELLS BLD QL SMEAR: ABNORMAL
CALCIUM SERPL-MCNC: 7.3 MG/DL (ref 8.7–10.5)
CHLORIDE SERPL-SCNC: 105 MMOL/L (ref 95–110)
CO2 SERPL-SCNC: 24 MMOL/L (ref 23–29)
CREAT SERPL-MCNC: 0.7 MG/DL (ref 0.5–1.4)
DACRYOCYTES BLD QL SMEAR: ABNORMAL
DIFFERENTIAL METHOD: ABNORMAL
EOSINOPHIL # BLD AUTO: 0.3 K/UL (ref 0–0.5)
EOSINOPHIL NFR BLD: 5 % (ref 0–8)
ERYTHROCYTE [DISTWIDTH] IN BLOOD BY AUTOMATED COUNT: 39.4 % (ref 11.5–14.5)
EST. GFR  (AFRICAN AMERICAN): >60 ML/MIN/1.73 M^2
EST. GFR  (NON AFRICAN AMERICAN): >60 ML/MIN/1.73 M^2
GLUCOSE SERPL-MCNC: 94 MG/DL (ref 70–110)
HCT VFR BLD AUTO: 34.1 % (ref 37–48.5)
HGB BLD-MCNC: 9.2 G/DL (ref 12–16)
HYPOCHROMIA BLD QL SMEAR: ABNORMAL
IMM GRANULOCYTES # BLD AUTO: 0.12 K/UL (ref 0–0.04)
IMM GRANULOCYTES NFR BLD AUTO: 1.8 % (ref 0–0.5)
LYMPHOCYTES # BLD AUTO: 1.1 K/UL (ref 1–4.8)
LYMPHOCYTES NFR BLD: 16.2 % (ref 18–48)
MAGNESIUM SERPL-MCNC: 1.8 MG/DL (ref 1.6–2.6)
MCH RBC QN AUTO: 21.9 PG (ref 27–31)
MCHC RBC AUTO-ENTMCNC: 27 G/DL (ref 32–36)
MCV RBC AUTO: 81 FL (ref 82–98)
MONOCYTES # BLD AUTO: 1.2 K/UL (ref 0.3–1)
MONOCYTES NFR BLD: 17.6 % (ref 4–15)
NEUTROPHILS # BLD AUTO: 3.9 K/UL (ref 1.8–7.7)
NEUTROPHILS NFR BLD: 58.5 % (ref 38–73)
NRBC BLD-RTO: 0 /100 WBC
OVALOCYTES BLD QL SMEAR: ABNORMAL
PHOSPHATE SERPL-MCNC: 2.3 MG/DL (ref 2.7–4.5)
PLATELET # BLD AUTO: 387 K/UL (ref 150–350)
PLATELET BLD QL SMEAR: ABNORMAL
PMV BLD AUTO: ABNORMAL FL (ref 9.2–12.9)
POIKILOCYTOSIS BLD QL SMEAR: ABNORMAL
POLYCHROMASIA BLD QL SMEAR: ABNORMAL
POTASSIUM SERPL-SCNC: 3.5 MMOL/L (ref 3.5–5.1)
PROT SERPL-MCNC: 5.5 G/DL (ref 6–8.4)
RBC # BLD AUTO: 4.21 M/UL (ref 4–5.4)
SODIUM SERPL-SCNC: 137 MMOL/L (ref 136–145)
TARGETS BLD QL SMEAR: ABNORMAL
WBC # BLD AUTO: 6.59 K/UL (ref 3.9–12.7)

## 2020-03-23 PROCEDURE — 84100 ASSAY OF PHOSPHORUS: CPT

## 2020-03-23 PROCEDURE — 83735 ASSAY OF MAGNESIUM: CPT

## 2020-03-23 PROCEDURE — 80053 COMPREHEN METABOLIC PANEL: CPT

## 2020-03-23 PROCEDURE — 97535 SELF CARE MNGMENT TRAINING: CPT

## 2020-03-23 PROCEDURE — 85025 COMPLETE CBC W/AUTO DIFF WBC: CPT

## 2020-03-23 PROCEDURE — 36415 COLL VENOUS BLD VENIPUNCTURE: CPT

## 2020-03-23 RX ORDER — ACETAMINOPHEN 500 MG
1000 TABLET ORAL EVERY 8 HOURS
Qty: 30 TABLET | Refills: 0
Start: 2020-03-23 | End: 2021-04-09

## 2020-03-23 NOTE — PHYSICIAN QUERY
PT Name: Nydia Stevens  MR #: [id number]    Physician Query Form - Nutrition Clarification     CDS: Macarena MCKEON RN  Contact information: [email or web address]  Phone number: [phone]    This form is a permanent document in the medical record.     Query Date: March 23, 2020    By submitting this query, we are merely seeking further clarification of documentation.. Please utilize your independent clinical judgment when addressing the question(s) below.    The Medical record contains the following:   Indicators  Supporting Clinical Findings Location in Medical Record    % of Estimated Energy Intake over a time frame from p.o., TF, or TPN      Weight Status over a time frame      Subcutaneous Fat and/or Muscle Loss      Fluid Accumulation or Edema      Reduced  Strength     X Wt / BMI / Usual Body Weight Weight: 48.5 kg (107 lb)  Body mass index is 16.76 kg/m². H&P Hosp Med 3/17/20   X Delayed Wound Healing / Failure to Thrive She appears cachectic.  Consult Gastro 3/17/20   X Acute or Chronic Illness HPI: Nydia Stevens is a 85 y.o. female with history of iron deficiency anemia, HTN, HLD, not on antiplatelets/anticoagulants, presents to the ED after her PCP told her to come here due to low hb of 5.3. She reports shortness of breath, had a fall about 2 weeks ago, currently denies presyncopal symptoms. She does have significant weight loss.    * Colonic mass  1. Partially obstructing and bleeding mass in the ascending colon - discussed with patient will proceed with surgery tomorrow given rapidly recurrent anemic symptoms. Open right colectomy w/ ileocolic anastomosis on 3/20/20   Consults Gastro 3/17/20                Progress Notes Colon and Retal Surg 3/22/20    Medication      Treatment     X Other Differential includes GI neoplasm(given chronicity of anemia and weight loss), AVM, Angiodysplasia/Heyde's syndrome(given AS), diverticulosis, gastritis, PUD.     Constitutional: Positive for  malaise/fatigue and weight loss. Negative for chills and fever. H&P Hosp Med 3/17/20           Consults Gastro 3/17/20     AND / ASPEN Clinical Characteristics (October 2011)  A minimum of two characteristics is recommended for diagnosing either moderate or severe malnutrition   Mild Malnutrition Moderate Malnutrition Severe Malnutrition   Energy Intake from p.o., TF or TPN. < 75% intake of estimated energy needs for less than 7 days < 75% intake of estimated energy needs for greater than 7 days < 50% intake of estimated energy needs for > 5 days   Weight Loss 1-2% in 1 month  5% in 3 months  7.5% in 6 months  10% in 1 year 1-2 % in 1 week  5% in 1 month  7.5% in 3 months  10% in 6 months  20% in 1 year > 2% in 1 week  > 5% in 1 month  > 7.5% in 3 months  > 10% in 6 months  > 20% in 1 year   Physical Findings     None *Mild subcutaneous fat and/or muscle loss  *Mild fluid accumulation  *Stage II decubitus  *Surgical wound or non-healing wound *Mod/severe subcutaneous fat and/or muscle loss  *Mod/severe fluid accumulation  *Stage III or IV decubitus  *Non-healing surgical wound     Provider, please specify diagnosis or diagnoses associated with above clinical findings.    [ x ] Mild Protein-Calorie Malnutrition   [  ] Moderate Protein-Calorie Malnutrition   [  ] Severe Protein-Calorie Malnutrition   [  ] Cachexia   [  ] Abnormal Weight Loss   [  ] Underweight   [  ] Other Nutritional Diagnosis (please specify):    [  ] Other:    [  ] Clinically Undetermined       Please document in your progress notes daily for the duration of treatment until resolved and include in your discharge summary.

## 2020-03-23 NOTE — PLAN OF CARE
POC reviewed with patient, understanding verbalized. Pt ANOx4, VSS on RA. ML incision w/ DB is pink and approximated. Low fiber/low residue diet tolerated well, no c/o N/V/D. Pt ambulating to RR w/ minimal assist and walker as needed. Denies any pain tonight. Pt sleeping quietly between care and able to make needs known. Remains free of fall or injury as safety measures intact. No further questions or needs reported at this time. Will continue to monitor.

## 2020-03-23 NOTE — PLAN OF CARE
Patient AAOx4 w/ VSS on room air w/ unlabored breathing. Denies pain. Tolerated diet w/ no complaints of n/v. Ambulated in [place] x1 w/ walker. Pt is being discharged to home. Discharge education completed and patient verbalized understanding. PIV removed with catheter tip intact. Patient remains free of falls with no distress noted.  Patient awaiting transport.

## 2020-03-23 NOTE — PT/OT/SLP DISCHARGE
Occupational Therapy Discharge Summary    Nydia Stevens  MRN: [id number]   Principal Problem: Colonic mass      Patient Discharged from acute Occupational Therapy on 3/23/2020.  Please refer to prior OT note dated 3/23/2020   for functional status.    Assessment:      Patient appropriate for care in another setting.    Objective:     GOALS:   Multidisciplinary Problems     Occupational Therapy Goals     Not on file          Multidisciplinary Problems (Resolved)        Problem: Occupational Therapy Goal    Goal Priority Disciplines Outcome Interventions   Occupational Therapy Goal   (Resolved)     OT, PT/OT Met    Description:  Goals to be met by: 4/19/2020     Patient will increase functional independence with ADLs by performing:    LE Dressing with Modified [place] and Assistive Devices as needed.  Toileting from toilet with Modified [place] for hygiene and clothing management.   Bathing from  standing at sink with Modified [place] and Assistive Devices as needed.                      Reasons for Discontinuation of Therapy Services  Satisfactory goal achievement.      Plan:     Patient Discharged to: JOSH Barnett  3/23/2020

## 2020-03-23 NOTE — PLAN OF CARE
03/23/20 0825   Post-Acute Status   Post-Acute Authorization Home Health/Hospice   Home Health/Hospice Status Set-up Complete   Pt accepted by Jair SIMENTAL.    Tammy Gonzalez LMSW  [facility]  [id number]

## 2020-03-23 NOTE — ANESTHESIA POSTPROCEDURE EVALUATION
Anesthesia Post Evaluation    Patient: Nydia Stevens    Procedure(s) Performed: Procedure(s) (LRB):  HEMICOLECTOMY, RIGHT (Right)    Final Anesthesia Type: general    Patient location during evaluation: PACU  Patient participation: Yes- Able to Participate  Level of consciousness: awake and alert  Post-procedure vital signs: reviewed and stable  Pain management: adequate  Airway patency: patent    PONV status at discharge: No PONV  Anesthetic complications: no      Cardiovascular status: blood pressure returned to baseline  Respiratory status: unassisted  Hydration status: euvolemic  Follow-up not needed.          Vitals Value Taken Time   /58 3/22/2020  7:14 PM   Temp 36.6 °C (97.8 °F) 3/22/2020  7:14 PM   Pulse 89 3/22/2020  7:14 PM   Resp 16 3/22/2020  7:14 PM   SpO2 100 % 3/22/2020  7:14 PM         Event Time     Out of Recovery 03/20/2020 14:40:00          Pain/Connie Score: Pain Rating Prior to Med Admin: 0 (3/22/2020  9:04 PM)

## 2020-03-23 NOTE — PLAN OF CARE
Pt will discharge to home w/ Jair SIMENTAL.     03/23/20 0948   Final Note   Assessment Type Final Discharge Note   Anticipated Discharge Disposition Home-Health   Right Care Referral Info   Post Acute Recommendation Home-care   Facility Name [facility]

## 2020-03-23 NOTE — PT/OT/SLP PROGRESS
Occupational Therapy   Treatment    Name: Nydia Stevens  MRN: [id number]  Admitting Diagnosis:  Colonic mass  3 Days Post-Op    Recommendations:     Discharge Recommendations: home health PT, home health OT  Discharge Equipment Recommendations:  none  Barriers to discharge:  None    Assessment:     Nydia Stevens is a 85 y.o. female with a medical diagnosis of Colonic mass.  She presents with decline in ADLs. Completed training on ADL compensatory strategies with having abdominal incision. Educated on bed mobility strategies to reduce pain.Performance deficits affecting function are pain, weakness.     Rehab Prognosis:  Good; patient would benefit from acute skilled OT services to address these deficits and reach maximum level of function.       Plan:     Patient to be seen 2 x/week to address the above listed problems via self-care/home management, therapeutic activities, therapeutic exercises, neuromuscular re-education  · Plan of Care Expires: 03/21/20  · Plan of Care Reviewed with: patient    Subjective     Pain/Comfort:  · Pain Rating 1: 0/10  · Pain Rating Post-Intervention 1: 0/10    Objective:     Communicated with: RN prior to session.  Patient found up in chair with telemetry, peripheral IV upon OT entry to room.    General Precautions: Standard, fall   Orthopedic Precautions:N/A   Braces: N/A     Occupational Performance:     Bed Mobility:    · Patient completed Supine to Sit with minimum assistance  · Patient completed Sit to Supine with minimum assistance   · Educated on how to use log rolling to decrease pain  Pt has difficulty with using log roll due to poor understanding despite repeating and visual demonstration. Pt practiced but not able to fully do it correctly.  Functional Mobility/Transfers:  · Patient completed Sit <> Stand Transfer with independence  With rollator  · Functional Mobility: Pt is independent with ambulation in hallway, observed her ambulating on her own today.      Activities of Daily Living:  · Feeding:  independence    · Upper Body Dressing: minimum assistance    · Lower Body Dressing: minimum assistance    · Toileting: independence per pt  ·   AMPAC 6 Click ADL: 20    Treatment & Education:  · Pt educated on role of OT in acute care setting.   · Assisted with ADLs and functional mobility with assist levels noted above    Patient left up in chair with all lines intactEducation:      GOALS:   Multidisciplinary Problems     Occupational Therapy Goals     Not on file          Multidisciplinary Problems (Resolved)        Problem: Occupational Therapy Goal    Goal Priority Disciplines Outcome Interventions   Occupational Therapy Goal   (Resolved)     OT, PT/OT Met    Description:  Goals to be met by: 4/19/2020     Patient will increase functional independence with ADLs by performing:    LE Dressing with Modified [place] and Assistive Devices as needed.  Toileting from toilet with Modified [place] for hygiene and clothing management.   Bathing from  standing at sink with Modified [place] and Assistive Devices as needed.                      Time Tracking:     OT Date of Treatment: 03/21/20  OT Start Time: 0930  OT Stop Time: 0945  OT Total Time (min): 15 min    Billable Minutes:Self Care/Home Management 15    JOSH Rodriguez  3/23/2020

## 2020-03-23 NOTE — DISCHARGE SUMMARY
[facility]  Colorectal Surgery  Discharge Summary      Patient Name: Nydia Stevens  MRN: [id number]  Admission Date: 3/17/2020  Hospital Length of Stay: 6 days  Discharge Date and Time:  03/23/2020 11:14 AM  Attending Physician: AUTUMN Berg MD   Discharging Provider: Ale Peterson MD  Primary Care Provider: Donaldo Carlin MD     HPI:  86 yo female who was admitted to internal medicine with an acute GI bleed (hemoglobin 5.3).      Procedure(s) (LRB):  HEMICOLECTOMY, RIGHT (Right)     Hospital Course: She received 2 units of pRBCs.  An EGD and colonoscopy was performed that revealed an ascending colon mass as the source of bleeding.  Staging work up with a CT c/a/p was obtained that was negative for evidence of metastatic disease.  Her CEA was found to be elevated.  After discussion of risks, benefits, and alternatives, we proceeded with a right colectomy.  She tolerated this very well.  Her pain was controlled without opioids, she tolerated a regular diet with flatus and BMs.  [facility]    Consults (From admission, onward)        Status Ordering Provider     Inpatient consult to Colorectal Surgery  Once     Provider:  AUTUMN Berg MD    Completed JUANA MARISCAL     Inpatient consult to Gastroenterology  Once     Provider:  (Not yet assigned)    Completed ALESIA LIM     Inpatient consult to Midline team  Once     Provider:  (Not yet assigned)    AUTUMN Servin     Inpatient consult to Social Work  Once     Provider:  (Not yet assigned)    Acknowledged ALE PETERSON            Pending Diagnostic Studies:     Procedure Component Value Units Date/Time    CBC auto differential [[id number]] Collected:  03/23/20 0429    Order Status:  Sent Lab Status:  In process Updated:  03/23/20 0608    Specimen:  Blood     CBC with Auto Differential [[id number]] Collected:  03/18/20 0026    Order Status:  Sent Lab Status:  In process Updated:  03/18/20 0026     "Specimen:  Blood     Narrative:       Collection has been rescheduled by ANALIA at 03/17/2020 19:53 Reason: Pt   getting blood...  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    Specialty:  Internal Medicine  Why:  Appt on 3/27/20 @ 1030am.  [facility] information:  [facility] [address]  [phone]             BRISA Berg MD.    Specialty:  Colon and Rectal Surgery  Why:  Message sent to clinic nurse to call pt with follow up appt.   [facility] information:  [address] [facility] [id number] [phone]             Real Mabry MD.    Specialty:  Gastroenterology  Why:  Message sent to clinic to follow up with pt for appt.   Contact information:  [address] [facility] [id number] [phone]                 Patient Instructions:      BATH/SHOWER CHAIR FOR HOME USE     Order Specific Question Answer Comments   Height: 5' 7" " (1.702 m)    Weight: 48.3 kg (106 lb 7.7 oz)    Does patient have medical equipment at home? rollator    Does patient have medical equipment at home? walker, rolling    Does patient have medical equipment at home? shower chair    Does patient have medical equipment at home? wheelchair    Length of need (1-99 months): 99    Type: With back    Vendor: Other (use comments)    Expected Date of Delivery: 3/20/2020      Diet Adult Regular     Notify your health care provider if you experience any of the following:  temperature >100.4     Notify your health care provider if you experience any of the following:  persistent nausea and vomiting or diarrhea     Notify your health care provider if you experience any of the following:  severe uncontrolled pain     No dressing needed   Order Comments: You may shower.  Do not submerge the incision for 2 weeks.     Lifting restrictions   Order Comments: No lifting greater than 10 lbs for 6 weeks.  Otherwise, please walk and stay active.     Medications:  Reconciled Home Medications:      Medication List      START taking these medications    acetaminophen 500 MG tablet  Commonly known as:  TYLENOL  Take 2 tablets (1,000 mg total) by mouth every 8 (eight) hours.     pantoprazole 40 MG tablet  Commonly known as:  PROTONIX  Take 1 tablet (40 mg total) by mouth before breakfast.        CHANGE how you take these medications    ferrous sulfate 325 mg (65 mg iron) Tab tablet  Commonly known as:  FEOSOL  Take 1 tablet (325 mg total) by mouth every 12 (twelve) hours.  What changed:  when to take this        CONTINUE taking these medications    calcium carbonate 500 mg calcium (1,250 mg) tablet  Commonly known as:  OS-JAILYN  Take 1 tablet (500 mg total) by mouth once daily.     cyanocobalamin 1000 MCG tablet  Commonly known as:  VITAMIN B-12  Take 1,000 mcg by mouth once daily.     OCUVITE LUTEIN ORAL  Take 1 tablet by mouth once daily.     rosuvastatin 40 MG Tab  Commonly known as:   CRESTOR  TAKE ONE TABLET BY MOUTH EVERY DAY FOR CHOLESTEROL     senna-docusate 8.6-50 mg 8.6-50 mg per tablet  Commonly known as:  PERICOLACE  Take 1 tablet by mouth 2 (two) times daily.     VITAMIN D3 25 mcg (1,000 unit) capsule  Generic drug:  cholecalciferol (vitamin D3)  Take 1,000 Units by mouth once daily.            Thierno Mccartney MD  Colorectal Surgery  [facility]

## 2020-03-24 ENCOUNTER — TELEPHONE (OUTPATIENT)
Dept: GASTROENTEROLOGY | Facility: CLINIC | Age: 85
End: 2020-03-24

## 2020-03-24 NOTE — TELEPHONE ENCOUNTER
Attempt to call both number on file to schedule hospital follow up appointment. Message left on cell number for patient to return call.

## 2020-03-25 ENCOUNTER — TELEPHONE (OUTPATIENT)
Dept: GASTROENTEROLOGY | Facility: CLINIC | Age: 85
End: 2020-03-25

## 2020-03-25 ENCOUNTER — TELEPHONE (OUTPATIENT)
Dept: SURGERY | Facility: CLINIC | Age: 85
End: 2020-03-25

## 2020-03-25 LAB
COMMENT: ABNORMAL
COMMENT: NORMAL
FINAL PATHOLOGIC DIAGNOSIS: ABNORMAL
FINAL PATHOLOGIC DIAGNOSIS: NORMAL
GROSS: ABNORMAL
GROSS: NORMAL
MICROSCOPIC EXAM: ABNORMAL
SUPPLEMENTAL DIAGNOSIS: ABNORMAL

## 2020-03-25 NOTE — TELEPHONE ENCOUNTER
Called pt to ask about doing a virtual appt with Dr Berg. Pt does not wist to do that. She said she is not having any problems. She goes for a walk daily, is eating and drinking without problems. Her incision does not have any redness or drainage. She is having bm's without any problems. Told her to call if any changes.

## 2020-03-25 NOTE — TELEPHONE ENCOUNTER
----- Message from Funmi Murphy RN sent at 3/23/2020  9:47 AM CDT -----  Contact: Caro morales/ Case Management       ----- Message -----  From: Susy Diaz  Sent: 3/20/2020   2:28 PM CDT  To: Clotilde TUCKER Staff    Caro morales/ Case Management called to schedule a 2 wks hospital fu. Reach out to the pt.

## 2020-03-26 ENCOUNTER — TELEPHONE (OUTPATIENT)
Dept: SURGERY | Facility: CLINIC | Age: 85
End: 2020-03-26

## 2020-03-26 NOTE — TELEPHONE ENCOUNTER
----- Message from Bailee Claros sent at 3/26/2020  2:18 PM CDT -----  Contact: Sister Saima [phone]  Calling in regards to missed call from Doris. Please call

## 2020-03-26 NOTE — TELEPHONE ENCOUNTER
Called sister Saima back, she states she missed a call from Dr Berg, informed that PANDA Nelson LPN had called yesterday to check on pt and talked with someone,  She states pt is doing well and will be following up with her pcp Dr Carlin tomorrow, instructed to call back as needed, verbalized understanding

## 2020-03-26 NOTE — TELEPHONE ENCOUNTER
----- Message from Lian Ferguson sent at 3/26/2020  1:18 PM CDT -----  [facility]  ask for Sister Saima [phone]   She's returning call regarding appt please call back

## 2020-03-27 ENCOUNTER — TELEPHONE (OUTPATIENT)
Dept: INTERNAL MEDICINE | Facility: CLINIC | Age: 85
End: 2020-03-27

## 2020-03-27 ENCOUNTER — PATIENT OUTREACH (OUTPATIENT)
Dept: ADMINISTRATIVE | Facility: CLINIC | Age: 85
End: 2020-03-27

## 2020-03-27 LAB — CRYOGLOB SER QL: NORMAL

## 2020-03-27 NOTE — TELEPHONE ENCOUNTER
Attempted to call patient over the phone to see if we could discuss how she is doing via phone instead of having her come into the clinic.  No answer on any of the phone numbers tried including emergency contact, unable to leave messages.  Will reach out to the patient again shortly.

## 2020-03-27 NOTE — TELEPHONE ENCOUNTER
Please call patient. I tried calling patient earlier but no answer. She's scheduled for 10:30 AM but I'd like to check in with her by phone. She had a partial colon resection last week but seems to be doing well following discharge. Please see if she's feeling well, if so let her know I'll call again by phone later but tell her not to come in for the visit to limit exposure.

## 2020-03-27 NOTE — TELEPHONE ENCOUNTER
Pt already left for the visit but the person who answered the phone will try to contact her prior to coming to see Dr Carlin.

## 2020-03-27 NOTE — TELEPHONE ENCOUNTER
I tried calling too. [phone] mailbox is full and cannot accept any messages. I called [phone] no answer and no recorder.

## 2020-04-02 ENCOUNTER — TELEPHONE (OUTPATIENT)
Dept: INTERNAL MEDICINE | Facility: CLINIC | Age: 85
End: 2020-04-02

## 2020-04-02 ENCOUNTER — TELEPHONE (OUTPATIENT)
Dept: SURGERY | Facility: HOSPITAL | Age: 85
End: 2020-04-02

## 2020-04-02 DIAGNOSIS — I50.22 CHRONIC SYSTOLIC HEART FAILURE: Primary | ICD-10-CM

## 2020-04-02 RX ORDER — FUROSEMIDE 20 MG/1
20 TABLET ORAL DAILY
Qty: 90 TABLET | Refills: 3 | Status: ON HOLD | OUTPATIENT
Start: 2020-04-02 | End: 2021-02-17 | Stop reason: SDUPTHER

## 2020-04-02 RX ORDER — POTASSIUM CHLORIDE 750 MG/1
10 TABLET, EXTENDED RELEASE ORAL DAILY
Qty: 90 TABLET | Refills: 3 | Status: SHIPPED | OUTPATIENT
Start: 2020-04-02 | End: 2021-03-05 | Stop reason: SDUPTHER

## 2020-04-02 NOTE — LETTER
April 2, 2020    Marbinedwige SERRA Hilda  [address]  [facility] [address]             [facility] - Internal Medicine  [address]  [facility] [address]  Phone: [phone]  Fax: [phone] Dear Sr Stevens:    I am starting you on furosemide (Lasix) 20 mg once a day to help with leg swelling. This causes increased urination, so after you take it you will need to go to the bathroom more frequently over the next 4 to 6 hours.    Please check your weight once a day, and if the weight goes up more than 3 pounds in 1 day, or more than 5 pounds in 3 days, please notify the office.    The Lasix also causes low potassium, so I'm starting a once daily supplement to help maintain normal levels.    Once the COVID precautions are lifted we will have you come in for an appointment to see how things are going.      If you have any questions or concerns, please don't hesitate to call.    Sincerely,         Donaldo Carlin MD

## 2020-04-02 NOTE — TELEPHONE ENCOUNTER
----- Message from Clara Iglesias sent at 4/2/2020  9:31 AM CDT -----  Contact: Melva [phone]  nurse 134  leaves at  1:30      mobile [phone]  Patient's hospital fu visit was cancelled.  Patient is experiencing edema calf down. Request call back. Patient is not on diuretics

## 2020-04-02 NOTE — TELEPHONE ENCOUNTER
Called zack, spoke to the patient's nurse. Informed starting Lasix 20 mg along with potassium supplement.    I will send in prescription for Lasix, I have sent a letter with more information as a reminder.

## 2020-04-02 NOTE — TELEPHONE ENCOUNTER
Per PCP request instructions regarding a medication Lasix and checking for weight gain was sent to pt.

## 2020-04-02 NOTE — TELEPHONE ENCOUNTER
I spoke to the nurse/pt. Please use this # [phone]. Her f/u hospital d/c appointment was cancelled and the pt did not received a phone call from the doctor. She is not set up with the Portal.  She was d/c from the hospital 3/23/2020. She was taken off of Amlodipine due to anklel swelling and started on Valsartan. She was taken off of Valsartan due to low blood pressures. Nurse noted bilateral lower extremity edema from the ankle to the calf's starting 3 days ago. Elevation of extremities not helping. She denies shortness of breath.

## 2020-04-05 NOTE — PROGRESS NOTES
She is 85-year-old lady coming in today for leg swelling.  She has recently started on amlodipine 10 mg a day he has been having leg swelling.  She has a history of mild aortic stenosis and hypertension.  She denies any shortness of breath or PND orthopnea.  She says her salt intake really has not changed a lot.  She denies any history of heart failure.  She normally follows with Dr. Dick Carlin.        She denies any PND orthopnea.  No chest pain.  No shortness of breath.  He does have a sedentary lifestyle.  No GI complaints  No headaches or  Vision changes.    No recent travel or plane flight.  No history of DVTs      /60 (BP Location: Right arm, Patient Position: Sitting, BP Method: Medium (Manual))   Pulse 102   SpO2 96%   She is a well-appearing 85-year-old lady no acute distress.  She is elderly and frail-appearing    Her neck is supple and no JVD.  She does have a systolic ejection murmur without radiation.  Cardiac exam is otherwise unremarkable.  Her lungs are clear bilaterally.  Abdomen is soft and nontender  Lower extremity she has bilateral lower extremity edema up to mid calf.  It is 1+ pitting edema.  She has no inguinal adenopathy detected.      Hypertension  Lower extremity edema  Mild aortic stenosis    Plan:  We are going to take her off her amlodipine and we are going to put her on some valsartan hydrochlorothiazide.  She is going to follow-up to primary care doctor.  We also gave her some warning signs to watch out for such as PND, orthopnea, or any shortness of breath.  She is also going to be monitoring for lightheadedness.

## 2020-04-08 ENCOUNTER — TELEPHONE (OUTPATIENT)
Dept: SURGERY | Facility: HOSPITAL | Age: 85
End: 2020-04-08

## 2020-04-08 NOTE — TELEPHONE ENCOUNTER
Pt is magnus, lives in [place], pt was paged but she did not answer, s/w magnus who answered the phone, said she ws doing well, eating, walking around the [place]

## 2020-04-09 ENCOUNTER — TELEPHONE (OUTPATIENT)
Dept: INTERNAL MEDICINE | Facility: CLINIC | Age: 85
End: 2020-04-09

## 2020-04-09 NOTE — TELEPHONE ENCOUNTER
Spoke to jose luis she said sister has been having diarrhea   Iron vs potassium is what she thinks is causing it

## 2020-04-09 NOTE — TELEPHONE ENCOUNTER
I recommend changing the iron to 1 tablet every other day to see if this limits the diarrhea.  Please continue the potassium daily for now.

## 2020-04-16 ENCOUNTER — TELEPHONE (OUTPATIENT)
Dept: INTERNAL MEDICINE | Facility: CLINIC | Age: 85
End: 2020-04-16

## 2020-04-16 NOTE — TELEPHONE ENCOUNTER
----- Message from Donaldo Carlin MD sent at 4/2/2020  1:38 PM CDT -----  (This is an automated message composed on 4/2/2020 that will be delivered at a future date)    I started patient on Lasix 2 weeks ago. Please see if she's called in with any problems or issues; if not then please call to make sure the medication is still working well and let me know if there are any issues. Thanks!

## 2020-04-24 ENCOUNTER — DOCUMENT SCAN (OUTPATIENT)
Dept: HOME HEALTH SERVICES | Facility: HOSPITAL | Age: 85
End: 2020-04-24
Payer: MEDICARE

## 2020-04-30 ENCOUNTER — TELEPHONE (OUTPATIENT)
Dept: INTERNAL MEDICINE | Facility: CLINIC | Age: 85
End: 2020-04-30

## 2020-05-01 ENCOUNTER — TELEPHONE (OUTPATIENT)
Dept: HEMATOLOGY/ONCOLOGY | Facility: CLINIC | Age: 85
End: 2020-05-01

## 2020-05-01 NOTE — TELEPHONE ENCOUNTER
----- Message from Johnnie Craven RN sent at 5/1/2020 10:46 AM CDT -----  Contact: Kylevega  teL:     [phone]      ----- Message -----  From: Mara Ramirez  Sent: 4/30/2020  11:12 AM CDT  To: Phong Barrera Staff    Caller says she needs the nurse to call her about this upcoming appt..  Has some questions. Does not know what this is about or why she is having this appt..   Pls call.

## 2020-05-01 NOTE — TELEPHONE ENCOUNTER
----- Message from Frida Freeman sent at 5/1/2020 11:39 AM CDT -----  Contact: Nydia  teL:     [phone]      ----- Message -----  From: Johnnie Craven RN  Sent: 5/1/2020  10:46 AM CDT  To: Manny JOHNSON Staff, #        ----- Message -----  From: Mara Ramirez  Sent: 4/30/2020  11:12 AM CDT  To: Phong Barrera Staff    Caller says she needs the nurse to call her about this upcoming appt..  Has some questions. Does not know what this is about or why she is having this appt..   Pls call.

## 2020-05-01 NOTE — TELEPHONE ENCOUNTER
Discussed upcoming apt with pt. Pt confirmed apt time and date. No other questions or concerns voiced.

## 2020-05-04 PROCEDURE — G0179 PR HOME HEALTH MD RECERTIFICATION: ICD-10-PCS | Mod: ,,, | Performed by: INTERNAL MEDICINE

## 2020-05-04 PROCEDURE — G0179 MD RECERTIFICATION HHA PT: HCPCS | Mod: ,,, | Performed by: INTERNAL MEDICINE

## 2020-05-07 ENCOUNTER — EXTERNAL HOME HEALTH (OUTPATIENT)
Dept: HOME HEALTH SERVICES | Facility: HOSPITAL | Age: 85
End: 2020-05-07
Payer: MEDICARE

## 2020-05-14 ENCOUNTER — OFFICE VISIT (OUTPATIENT)
Dept: HEMATOLOGY/ONCOLOGY | Facility: CLINIC | Age: 85
End: 2020-05-14
Payer: MEDICARE

## 2020-05-14 DIAGNOSIS — D50.9 IRON DEFICIENCY ANEMIA, UNSPECIFIED IRON DEFICIENCY ANEMIA TYPE: Primary | ICD-10-CM

## 2020-05-14 DIAGNOSIS — C18.9 COLON ADENOCARCINOMA: ICD-10-CM

## 2020-05-14 DIAGNOSIS — D64.9 SYMPTOMATIC ANEMIA: ICD-10-CM

## 2020-05-14 PROCEDURE — 99443 PR PHYSICIAN TELEPHONE EVALUATION 21-30 MIN: ICD-10-PCS | Mod: 95,GC,, | Performed by: STUDENT IN AN ORGANIZED HEALTH CARE EDUCATION/TRAINING PROGRAM

## 2020-05-14 PROCEDURE — 99443 PR PHYSICIAN TELEPHONE EVALUATION 21-30 MIN: CPT | Mod: 95,GC,, | Performed by: STUDENT IN AN ORGANIZED HEALTH CARE EDUCATION/TRAINING PROGRAM

## 2020-05-14 NOTE — Clinical Note
Hi fu plan Nydia Higginbothamx1. Lab apt at [facility] - cbc, cmp, ferritin, tibc, iron and transferrin2. Return to clinic in 6 months with cbc, cmp, ferrritin checked 1 week before. Thanks - oneil

## 2020-05-14 NOTE — PROGRESS NOTES
PATIENT: Nydia Stevens  MRN: [id number]  DATE: 5/14/2020      Diagnosis:   1. Iron deficiency anemia, unspecified iron deficiency anemia type    2. Symptomatic anemia    3. Colon adenocarcinoma        Chief Complaint: ANANDA      Oncologic History:     The patient location is: home  The chief complaint leading to consultation is: anemia  Visit type: audio only  Total time spent with patient: 30min  Each patient to whom he or she provides medical services by telemedicine is:  (1) informed of the relationship between the physician and patient and the respective role of any other health care provider with respect to management of the patient; and (2) notified that he or she may decline to receive medical services by telemedicine and may withdraw from such care at any time.    Nydia Stevens is a 84 y/o F who presents to clinic for evaluation of microcytic, hypochromic anemia likely secondary to iron deficiency. Ms. Stevens was evaluated by her PCP in 03/2020, routine labs found a Hgb of 4.4. She was transferred to the ED for further evaluation. Transfused 2u of PRBCs, she underwent EGD+Colonoscopy which found a near obstructing colon mass + for adenocarcinoma. Evaluated by CRS and underwent right hemicolectomy on 03/19/20. Confirmed stage IIIC ([id number], ANDREW) adenocarcinoma. Since discharge she reports that she has felt well and has no complaints. Denies fatigue, HA, weakness, fever, chills, abdominal pain, rectal bleeding. Initially she was taking iron pills BID but suffered diarrhea so was spaced out to every 2 days which she is tolerating better.     Past Medical History:   Past Medical History:   Diagnosis Date    Cataract     Hypertension        Past Surgical HIstory:   Past Surgical History:   Procedure Laterality Date    CATARACT EXTRACTION W/  INTRAOCULAR LENS IMPLANT Left 8/11/14    gregor    CATARACT EXTRACTION W/  INTRAOCULAR LENS IMPLANT Right 09/15/14    gregor    COLONOSCOPY N/A 3/19/2020     Procedure: COLONOSCOPY;  Surgeon: Real Mabry MD;  Location: [facility] (2ND FLR);  Service: Endoscopy;  Laterality: N/A;    ESOPHAGOGASTRODUODENOSCOPY N/A 3/19/2020    Procedure: EGD (ESOPHAGOGASTRODUODENOSCOPY);  Surgeon: Real Mabry MD;  Location: [facility] (2ND FLR);  Service: Endoscopy;  Laterality: N/A;    FOOT SURGERY      left    HYSTERECTOMY      LAPAROSCOPIC HEMICOLECTOMY Right 3/20/2020    Procedure: HEMICOLECTOMY, RIGHT;  Surgeon: AUTUMN Berg MD;  Location: [facility] OR 2ND FLR;  Service: Colon and Rectal;  Laterality: Right;       Family History:   Family History   Problem Relation Age of Onset    Heart attack Father     Cataracts Brother     Heart attack Brother     Diabetes Brother     No Known Problems Mother     Cataracts Sister     Stroke Sister     Diabetes Sister     Cataracts Sister     Stroke Sister     No Known Problems Maternal Aunt     No Known Problems Maternal Uncle     No Known Problems Paternal Aunt     No Known Problems Paternal Uncle     No Known Problems Maternal Grandmother     No Known Problems Maternal Grandfather     No Known Problems Paternal Grandmother     No Known Problems Paternal Grandfather     Amblyopia Neg Hx     Blindness Neg Hx     Cancer Neg Hx     Glaucoma Neg Hx     Hypertension Neg Hx     Macular degeneration Neg Hx     Retinal detachment Neg Hx     Strabismus Neg Hx     Thyroid disease Neg Hx     Colon cancer Neg Hx     Esophageal cancer Neg Hx     Irritable bowel syndrome Neg Hx     Rectal cancer Neg Hx     Stomach cancer Neg Hx     Ulcerative colitis Neg Hx     Crohn's disease Neg Hx     Celiac disease Neg Hx        Social History:  reports that she has never smoked. She has never used smokeless tobacco. She reports that she does not drink alcohol or use drugs.    Allergies:  Review of patient's allergies indicates:  No Known Allergies    Medications:  Current Outpatient Medications   Medication Sig Dispense  Refill    acetaminophen (TYLENOL) 500 MG tablet Take 2 tablets (1,000 mg total) by mouth every 8 (eight) hours. 30 tablet 0    calcium carbonate (OS-JAILYN) 500 mg calcium (1,250 mg) tablet Take 1 tablet (500 mg total) by mouth once daily.  0    cholecalciferol, vitamin D3, (VITAMIN D3) 1,000 unit capsule Take 1,000 Units by mouth once daily.      cyanocobalamin (VITAMIN B-12) 1000 MCG tablet Take 1,000 mcg by mouth once daily.      ferrous sulfate (FEOSOL) 325 mg (65 mg iron) Tab tablet Take 1 tablet (325 mg total) by mouth every 12 (twelve) hours. 60 tablet 3    furosemide (LASIX) 20 MG tablet Take 1 tablet (20 mg total) by mouth once daily. 90 tablet 3    pantoprazole (PROTONIX) 40 MG tablet Take 1 tablet (40 mg total) by mouth before breakfast. 90 tablet 3    potassium chloride SA (K-DUR,KLOR-CON) 10 MEQ tablet Take 1 tablet (10 mEq total) by mouth once daily. 90 tablet 3    rosuvastatin (CRESTOR) 40 MG Tab TAKE ONE TABLET BY MOUTH EVERY DAY FOR CHOLESTEROL 90 tablet 3    senna-docusate 8.6-50 mg (PERICOLACE) 8.6-50 mg per tablet Take 1 tablet by mouth 2 (two) times daily.      VIT C/VIT E ACETATE/LUTEIN/MIN (OCUVITE LUTEIN ORAL) Take 1 tablet by mouth once daily.         No current facility-administered medications for this visit.        Review of Systems   Constitutional: Negative for activity change, appetite change, chills, fever and unexpected weight change.   HENT: Negative for congestion.    Eyes: Negative for visual disturbance.   Respiratory: Negative for chest tightness and shortness of breath.    Cardiovascular: Negative for chest pain.   Gastrointestinal: Negative for abdominal pain, blood in stool, diarrhea and rectal pain.   Endocrine: Negative for cold intolerance.   Genitourinary: Negative for hematuria and vaginal bleeding.   Neurological: Negative for weakness.   Hematological: Negative for adenopathy. Does not bruise/bleed easily.       ECOG Performance Status: 2   Objective:       Vitals: There were no vitals filed for this visit.  BMI: There is no height or weight on file to calculate BMI.    Physical Exam Not performed as visit is over the phone.    Laboratory Data:  No visits with results within 1 Week(s) from this visit.   Latest known visit with results is:   No results displayed because visit has over 200 results.              Assessment:       1. Iron deficiency anemia, unspecified iron deficiency anemia type    2. Symptomatic anemia    3. Colon adenocarcinoma           Plan:     Nydia Stevens is a patient with stage IIIC CRC s/p hemicolectomy who presents to clinic for evaluation of microcytic, hypochromic anemia likely secondary to ANANDA in the setting of recently diagnosed CRC.   She states that she feels well and has no symptoms.    Plan:  1. Check CBC, CMP, ferritin, iron, TIBC, transferrin. Will call patient to discuss results. Given her lack of symptoms will likely advise continuing iron pills every other day. If labs reveal significant anemia or iron deficiency, IV iron can be considered.     2. Return to hematology clinic for follow up of anemia in 6 months with labs.  3. Regarding her stage IIIC colon cancer, observation can be considered given her age and previously expressed desire for limited interventions. Will reach out to CRS and ensure follow up plan. If needed happy to follow patient in medical oncology clinic for surveillance.     The patient was discussed and interviewed with the attending physician Dr. Aquino.    Diane Darling MD  Clinical Fellow  Hematology and Medical Oncology.  05/14/2020

## 2020-05-18 ENCOUNTER — LAB VISIT (OUTPATIENT)
Dept: LAB | Facility: HOSPITAL | Age: 85
End: 2020-05-18
Payer: MEDICARE

## 2020-05-18 DIAGNOSIS — D50.9 IRON DEFICIENCY ANEMIA, UNSPECIFIED IRON DEFICIENCY ANEMIA TYPE: ICD-10-CM

## 2020-05-18 DIAGNOSIS — D64.9 SYMPTOMATIC ANEMIA: ICD-10-CM

## 2020-05-18 LAB
ALBUMIN SERPL BCP-MCNC: 3.6 G/DL (ref 3.5–5.2)
ALBUMIN SERPL BCP-MCNC: 3.6 G/DL (ref 3.5–5.2)
ALP SERPL-CCNC: 235 U/L (ref 55–135)
ALP SERPL-CCNC: 235 U/L (ref 55–135)
ALT SERPL W/O P-5'-P-CCNC: 294 U/L (ref 10–44)
ALT SERPL W/O P-5'-P-CCNC: 294 U/L (ref 10–44)
ANION GAP SERPL CALC-SCNC: 8 MMOL/L (ref 8–16)
ANION GAP SERPL CALC-SCNC: 8 MMOL/L (ref 8–16)
AST SERPL-CCNC: 250 U/L (ref 10–40)
AST SERPL-CCNC: 250 U/L (ref 10–40)
BASOPHILS # BLD AUTO: 0.05 K/UL (ref 0–0.2)
BASOPHILS # BLD AUTO: 0.05 K/UL (ref 0–0.2)
BASOPHILS NFR BLD: 0.8 % (ref 0–1.9)
BASOPHILS NFR BLD: 0.8 % (ref 0–1.9)
BILIRUB SERPL-MCNC: 0.1 MG/DL (ref 0.1–1)
BILIRUB SERPL-MCNC: 0.1 MG/DL (ref 0.1–1)
BUN SERPL-MCNC: 18 MG/DL (ref 8–23)
BUN SERPL-MCNC: 18 MG/DL (ref 8–23)
CALCIUM SERPL-MCNC: 9.2 MG/DL (ref 8.7–10.5)
CALCIUM SERPL-MCNC: 9.2 MG/DL (ref 8.7–10.5)
CHLORIDE SERPL-SCNC: 100 MMOL/L (ref 95–110)
CHLORIDE SERPL-SCNC: 100 MMOL/L (ref 95–110)
CO2 SERPL-SCNC: 29 MMOL/L (ref 23–29)
CO2 SERPL-SCNC: 29 MMOL/L (ref 23–29)
CREAT SERPL-MCNC: 0.7 MG/DL (ref 0.5–1.4)
CREAT SERPL-MCNC: 0.7 MG/DL (ref 0.5–1.4)
DIFFERENTIAL METHOD: ABNORMAL
DIFFERENTIAL METHOD: ABNORMAL
EOSINOPHIL # BLD AUTO: 0.2 K/UL (ref 0–0.5)
EOSINOPHIL # BLD AUTO: 0.2 K/UL (ref 0–0.5)
EOSINOPHIL NFR BLD: 2.9 % (ref 0–8)
EOSINOPHIL NFR BLD: 2.9 % (ref 0–8)
ERYTHROCYTE [DISTWIDTH] IN BLOOD BY AUTOMATED COUNT: 19 % (ref 11.5–14.5)
ERYTHROCYTE [DISTWIDTH] IN BLOOD BY AUTOMATED COUNT: 19 % (ref 11.5–14.5)
EST. GFR  (AFRICAN AMERICAN): >60 ML/MIN/1.73 M^2
EST. GFR  (AFRICAN AMERICAN): >60 ML/MIN/1.73 M^2
EST. GFR  (NON AFRICAN AMERICAN): >60 ML/MIN/1.73 M^2
EST. GFR  (NON AFRICAN AMERICAN): >60 ML/MIN/1.73 M^2
FERRITIN SERPL-MCNC: 179 NG/ML (ref 20–300)
GLUCOSE SERPL-MCNC: 91 MG/DL (ref 70–110)
GLUCOSE SERPL-MCNC: 91 MG/DL (ref 70–110)
HCT VFR BLD AUTO: 39.8 % (ref 37–48.5)
HCT VFR BLD AUTO: 39.8 % (ref 37–48.5)
HGB BLD-MCNC: 11.5 G/DL (ref 12–16)
HGB BLD-MCNC: 11.5 G/DL (ref 12–16)
IMM GRANULOCYTES # BLD AUTO: 0.04 K/UL (ref 0–0.04)
IMM GRANULOCYTES # BLD AUTO: 0.04 K/UL (ref 0–0.04)
IMM GRANULOCYTES NFR BLD AUTO: 0.6 % (ref 0–0.5)
IMM GRANULOCYTES NFR BLD AUTO: 0.6 % (ref 0–0.5)
IRON SERPL-MCNC: 30 UG/DL (ref 30–160)
LYMPHOCYTES # BLD AUTO: 2 K/UL (ref 1–4.8)
LYMPHOCYTES # BLD AUTO: 2 K/UL (ref 1–4.8)
LYMPHOCYTES NFR BLD: 32.1 % (ref 18–48)
LYMPHOCYTES NFR BLD: 32.1 % (ref 18–48)
MCH RBC QN AUTO: 27.7 PG (ref 27–31)
MCH RBC QN AUTO: 27.7 PG (ref 27–31)
MCHC RBC AUTO-ENTMCNC: 28.9 G/DL (ref 32–36)
MCHC RBC AUTO-ENTMCNC: 28.9 G/DL (ref 32–36)
MCV RBC AUTO: 96 FL (ref 82–98)
MCV RBC AUTO: 96 FL (ref 82–98)
MONOCYTES # BLD AUTO: 0.7 K/UL (ref 0.3–1)
MONOCYTES # BLD AUTO: 0.7 K/UL (ref 0.3–1)
MONOCYTES NFR BLD: 10.9 % (ref 4–15)
MONOCYTES NFR BLD: 10.9 % (ref 4–15)
NEUTROPHILS # BLD AUTO: 3.3 K/UL (ref 1.8–7.7)
NEUTROPHILS # BLD AUTO: 3.3 K/UL (ref 1.8–7.7)
NEUTROPHILS NFR BLD: 52.7 % (ref 38–73)
NEUTROPHILS NFR BLD: 52.7 % (ref 38–73)
NRBC BLD-RTO: 0 /100 WBC
NRBC BLD-RTO: 0 /100 WBC
PLATELET # BLD AUTO: 301 K/UL (ref 150–350)
PLATELET # BLD AUTO: 301 K/UL (ref 150–350)
PMV BLD AUTO: 10.3 FL (ref 9.2–12.9)
PMV BLD AUTO: 10.3 FL (ref 9.2–12.9)
POTASSIUM SERPL-SCNC: 4.3 MMOL/L (ref 3.5–5.1)
POTASSIUM SERPL-SCNC: 4.3 MMOL/L (ref 3.5–5.1)
PROT SERPL-MCNC: 7.4 G/DL (ref 6–8.4)
PROT SERPL-MCNC: 7.4 G/DL (ref 6–8.4)
RBC # BLD AUTO: 4.15 M/UL (ref 4–5.4)
RBC # BLD AUTO: 4.15 M/UL (ref 4–5.4)
SATURATED IRON: 8 % (ref 20–50)
SODIUM SERPL-SCNC: 137 MMOL/L (ref 136–145)
SODIUM SERPL-SCNC: 137 MMOL/L (ref 136–145)
TOTAL IRON BINDING CAPACITY: 364 UG/DL (ref 250–450)
TRANSFERRIN SERPL-MCNC: 246 MG/DL (ref 200–375)
TRANSFERRIN SERPL-MCNC: 246 MG/DL (ref 200–375)
WBC # BLD AUTO: 6.23 K/UL (ref 3.9–12.7)
WBC # BLD AUTO: 6.23 K/UL (ref 3.9–12.7)

## 2020-05-18 PROCEDURE — 85025 COMPLETE CBC W/AUTO DIFF WBC: CPT

## 2020-05-18 PROCEDURE — 36415 COLL VENOUS BLD VENIPUNCTURE: CPT | Mod: PN

## 2020-05-18 PROCEDURE — 83540 ASSAY OF IRON: CPT

## 2020-05-18 PROCEDURE — 82728 ASSAY OF FERRITIN: CPT

## 2020-05-18 PROCEDURE — 80053 COMPREHEN METABOLIC PANEL: CPT

## 2020-05-19 ENCOUNTER — TELEPHONE (OUTPATIENT)
Dept: HEMATOLOGY/ONCOLOGY | Facility: CLINIC | Age: 85
End: 2020-05-19

## 2020-05-19 NOTE — TELEPHONE ENCOUNTER
Patient informed of normal labs. She will continue PO iron. No complaints at this time.    Diane Darling MD  Clinical Fellow  Hematology and Medical Oncology.  05/19/2020

## 2020-06-11 ENCOUNTER — OFFICE VISIT (OUTPATIENT)
Dept: INTERNAL MEDICINE | Facility: CLINIC | Age: 85
End: 2020-06-11
Payer: MEDICARE

## 2020-06-11 ENCOUNTER — LAB VISIT (OUTPATIENT)
Dept: LAB | Facility: HOSPITAL | Age: 85
End: 2020-06-11
Attending: INTERNAL MEDICINE
Payer: MEDICARE

## 2020-06-11 ENCOUNTER — IMMUNIZATION (OUTPATIENT)
Dept: PHARMACY | Facility: CLINIC | Age: 85
End: 2020-06-11
Payer: MEDICARE

## 2020-06-11 VITALS
SYSTOLIC BLOOD PRESSURE: 126 MMHG | HEART RATE: 68 BPM | HEIGHT: 67 IN | DIASTOLIC BLOOD PRESSURE: 86 MMHG | BODY MASS INDEX: 16.48 KG/M2 | WEIGHT: 105 LBS

## 2020-06-11 DIAGNOSIS — C18.9 ADENOCARCINOMA OF COLON: ICD-10-CM

## 2020-06-11 DIAGNOSIS — R74.8 ELEVATED ALKALINE PHOSPHATASE LEVEL: ICD-10-CM

## 2020-06-11 DIAGNOSIS — R74.8 ELEVATED ALKALINE PHOSPHATASE LEVEL: Primary | ICD-10-CM

## 2020-06-11 DIAGNOSIS — R74.01 ELEVATED TRANSAMINASE LEVEL: ICD-10-CM

## 2020-06-11 DIAGNOSIS — D50.0 IRON DEFICIENCY ANEMIA DUE TO CHRONIC BLOOD LOSS: ICD-10-CM

## 2020-06-11 LAB
ALBUMIN SERPL BCP-MCNC: 3.8 G/DL (ref 3.5–5.2)
ALP SERPL-CCNC: 108 U/L (ref 55–135)
ALT SERPL W/O P-5'-P-CCNC: 35 U/L (ref 10–44)
ANION GAP SERPL CALC-SCNC: 5 MMOL/L (ref 8–16)
AST SERPL-CCNC: 27 U/L (ref 10–40)
BILIRUB SERPL-MCNC: 0.2 MG/DL (ref 0.1–1)
BUN SERPL-MCNC: 18 MG/DL (ref 8–23)
CALCIUM SERPL-MCNC: 9 MG/DL (ref 8.7–10.5)
CHLORIDE SERPL-SCNC: 103 MMOL/L (ref 95–110)
CO2 SERPL-SCNC: 32 MMOL/L (ref 23–29)
CREAT SERPL-MCNC: 0.7 MG/DL (ref 0.5–1.4)
ERYTHROCYTE [DISTWIDTH] IN BLOOD BY AUTOMATED COUNT: 15 % (ref 11.5–14.5)
EST. GFR  (AFRICAN AMERICAN): >60 ML/MIN/1.73 M^2
EST. GFR  (NON AFRICAN AMERICAN): >60 ML/MIN/1.73 M^2
FERRITIN SERPL-MCNC: 90 NG/ML (ref 20–300)
GGT SERPL-CCNC: 57 U/L (ref 8–55)
GLUCOSE SERPL-MCNC: 90 MG/DL (ref 70–110)
HCT VFR BLD AUTO: 38 % (ref 37–48.5)
HGB BLD-MCNC: 11.4 G/DL (ref 12–16)
IRON SERPL-MCNC: 26 UG/DL (ref 30–160)
MCH RBC QN AUTO: 28.4 PG (ref 27–31)
MCHC RBC AUTO-ENTMCNC: 30 G/DL (ref 32–36)
MCV RBC AUTO: 95 FL (ref 82–98)
PLATELET # BLD AUTO: 222 K/UL (ref 150–350)
PMV BLD AUTO: 10.4 FL (ref 9.2–12.9)
POTASSIUM SERPL-SCNC: 4.9 MMOL/L (ref 3.5–5.1)
PROT SERPL-MCNC: 7.3 G/DL (ref 6–8.4)
RBC # BLD AUTO: 4.01 M/UL (ref 4–5.4)
SATURATED IRON: 7 % (ref 20–50)
SODIUM SERPL-SCNC: 140 MMOL/L (ref 136–145)
TOTAL IRON BINDING CAPACITY: 391 UG/DL (ref 250–450)
TRANSFERRIN SERPL-MCNC: 264 MG/DL (ref 200–375)
WBC # BLD AUTO: 4.57 K/UL (ref 3.9–12.7)

## 2020-06-11 PROCEDURE — 99999 PR PBB SHADOW E&M-EST. PATIENT-LVL III: CPT | Mod: PBBFAC,,, | Performed by: INTERNAL MEDICINE

## 2020-06-11 PROCEDURE — 99213 OFFICE O/P EST LOW 20 MIN: CPT | Mod: PBBFAC | Performed by: INTERNAL MEDICINE

## 2020-06-11 PROCEDURE — 85027 COMPLETE CBC AUTOMATED: CPT

## 2020-06-11 PROCEDURE — 82728 ASSAY OF FERRITIN: CPT

## 2020-06-11 PROCEDURE — 83540 ASSAY OF IRON: CPT

## 2020-06-11 PROCEDURE — 99999 PR PBB SHADOW E&M-EST. PATIENT-LVL III: ICD-10-PCS | Mod: PBBFAC,,, | Performed by: INTERNAL MEDICINE

## 2020-06-11 PROCEDURE — 99214 PR OFFICE/OUTPT VISIT, EST, LEVL IV, 30-39 MIN: ICD-10-PCS | Mod: S$PBB,,, | Performed by: INTERNAL MEDICINE

## 2020-06-11 PROCEDURE — 80053 COMPREHEN METABOLIC PANEL: CPT

## 2020-06-11 PROCEDURE — 82977 ASSAY OF GGT: CPT

## 2020-06-11 PROCEDURE — 36415 COLL VENOUS BLD VENIPUNCTURE: CPT

## 2020-06-11 PROCEDURE — 99214 OFFICE O/P EST MOD 30 MIN: CPT | Mod: S$PBB,,, | Performed by: INTERNAL MEDICINE

## 2020-06-11 NOTE — PROGRESS NOTES
"Subjective:       Patient ID: Nydia Stevens is a 85 y.o. female.    Chief Complaint: Follow-up    Last visit with me 3/17/2020.  Since then was admitted to the hospital for severe anemia, found to have colon cancer which was surgically removed.  Since that time seen by hematology and oncology.    HPI    Patient colectomy on March 20th revealed invasive adenocarcinoma, however resection margins were negative and 20 lymph nodes also negative for metastatic disease.    Patient reports she is feeling much better following hemicolectomy, she has not had any complications since leaving the hospital.  She continues to take her iron supplement every other day.    Most recent labs showed elevated alkaline phosphatase along with elevated transaminases, patient denies any nausea, vomiting, or abdominal pain.    Review of Systems   All other systems reviewed and are negative.      Objective:      Physical Exam   Cardiovascular: Normal rate, regular rhythm and normal heart sounds.   Pulmonary/Chest: Effort normal and breath sounds normal. No stridor. She has no wheezes. She has no rales.   Abdominal: Soft. There is no tenderness. There is no guarding.   Neurological: She is alert. She displays no tremor. Gait (Ambulates with walker) abnormal.   Skin: Skin is warm and dry. No rash noted.   Midline surgical scar in the lower abdomen is well healed without bleeding or erythema   Psychiatric: She has a normal mood and affect. Her behavior is normal.   Nursing note and vitals reviewed.      Vitals:    06/11/20 0932   BP: 126/86   BP Location: Left arm   Patient Position: Sitting   BP Method: Large (Manual)   Pulse: 68   Weight: 47.6 kg (105 lb)   Height: 5' 7" (1.702 m)     Body mass index is 16.45 kg/m².    RESULTS: Reviewed labs from last 12 months    Assessment:       1. Elevated alkaline phosphatase level    2. Elevated transaminase level    3. Iron deficiency anemia due to chronic blood loss    4. Adenocarcinoma of colon  "       Plan:   Nydia was seen today for follow-up.    Diagnoses and all orders for this visit:    Elevated alkaline phosphatase level:  New diagnosis seen on recent labs, unclear etiology, repeat to see if persistent.  -     Comprehensive metabolic panel; Future  -     Gamma GT; Future    Elevated transaminase level:  New problem, unclear etiology, repeat levels and continue to monitor.  -     Comprehensive metabolic panel; Future    Iron deficiency anemia due to chronic blood loss:  Prior diagnosis, stable, well controlled on current management. No changes at this time, will continue to monitor.   -     Ferritin; Future  -     CBC Without Differential; Future  -     Iron and TIBC; Future    Adenocarcinoma of colon:  Recent diagnosis in hospital, this was likely cause of her persistent iron deficiency anemia. No signs of mets, appears to have been fully resected surgically. continue to monitor with Oncology and/or Colorectal Surgery.    Follow up in about 4 months (around 10/11/2020) for follow up visit.  Donaldo Carlin MD  Internal Medicine    Portions of this note were completed using medical dictation software. Please excuse typographical or syntax errors that were missed on review.

## 2020-07-03 PROCEDURE — G0179 MD RECERTIFICATION HHA PT: HCPCS | Mod: ,,, | Performed by: INTERNAL MEDICINE

## 2020-07-03 PROCEDURE — G0179 PR HOME HEALTH MD RECERTIFICATION: ICD-10-PCS | Mod: ,,, | Performed by: INTERNAL MEDICINE

## 2020-07-06 DIAGNOSIS — E78.5 HYPERLIPIDEMIA, UNSPECIFIED HYPERLIPIDEMIA TYPE: ICD-10-CM

## 2020-07-07 NOTE — PROGRESS NOTES
Refill Routing Note   Medication(s) are not appropriate for processing by [facility]:       - Drug-Disease Interaction (rosuvastatin and Closed fracture of left olecranon process)     Medication-related problems identified: Drug-disease interaction  Medication Therapy Plan: Drug-Disease: rosuvastatin and Closed fracture of left olecranon process; Defer to you  Medication reconciliation completed: No      Automatic Epic Generated Protocol Data:    Requested Prescriptions   Pending Prescriptions Disp Refills    rosuvastatin (CRESTOR) 40 MG Tab [Pharmacy Med Name: ROSUVASTATIN CALCIUM 40MG TAB*] 90 tablet 2     Sig: TAKE ONE TABLET BY MOUTH EVERY DAY FOR CHOLESTEROL       Cardiovascular:  Antilipid - Statins Passed - 7/6/2020 11:25 AM        Passed - Patient is at least 18 years old        Passed - Office visit in past 12 months or future 90 days.     Recent Outpatient Visits            3 weeks ago Elevated alkaline phosphatase level    [facility] Donaldo Carlin MD    1 month ago Iron deficiency anemia, unspecified iron deficiency anemia type    [facility] Diane Darling MD    3 months ago Hospital discharge follow-up    [facility] Donaldo Carlin MD    4 months ago Fall, initial encounter    [facility] Donaldo Carlin MD    5 months ago Edema, unspecified type    [facility] Gus Farrell Jr., MD          Future Appointments              In 1 week INJECTION [facility]    In 3 months [facility]                Passed - Lipid Panel completed in last 360 days     Lab Results   Component Value Date    CHOL 252 (H) 03/02/2020    HDL 41 03/02/2020    LDLCALC 187.4 (H) 03/02/2020    TRIG 118 03/02/2020             Passed - ALT is 94 or below and within 360 days     ALT   Date Value Ref Range Status   06/11/2020 35 10 - 44 U/L Final    05/18/2020 294 (H) 10 - 44 U/L Final   05/18/2020 294 (H) 10 - 44 U/L Final              Passed - AST is 54 or below and within 360 days     AST   Date Value Ref Range Status   06/11/2020 27 10 - 40 U/L Final   05/18/2020 250 (H) 10 - 40 U/L Final   05/18/2020 250 (H) 10 - 40 U/L Final                    Appointments  past 12m or future 3m with PCP    Date Provider   Last Visit   6/11/2020 Donaldo Carlin MD   Next Visit   10/12/2020 Donaldo Carlin MD   ED visits in past 90 days: 0     Note composed:2:21 PM 07/07/2020

## 2020-07-08 DIAGNOSIS — M81.0 AGE-RELATED OSTEOPOROSIS WITHOUT CURRENT PATHOLOGICAL FRACTURE: Primary | ICD-10-CM

## 2020-07-08 RX ORDER — ROSUVASTATIN CALCIUM 40 MG/1
TABLET, COATED ORAL
Qty: 90 TABLET | Refills: 3 | Status: SHIPPED | OUTPATIENT
Start: 2020-07-08 | End: 2021-06-28

## 2020-07-10 ENCOUNTER — EXTERNAL HOME HEALTH (OUTPATIENT)
Dept: HOME HEALTH SERVICES | Facility: HOSPITAL | Age: 85
End: 2020-07-10
Payer: MEDICARE

## 2020-07-16 ENCOUNTER — INFUSION (OUTPATIENT)
Dept: INFECTIOUS DISEASES | Facility: HOSPITAL | Age: 85
End: 2020-07-16
Attending: PHYSICIAN ASSISTANT
Payer: MEDICARE

## 2020-07-16 VITALS
BODY MASS INDEX: 16.47 KG/M2 | HEIGHT: 67 IN | HEART RATE: 76 BPM | SYSTOLIC BLOOD PRESSURE: 173 MMHG | WEIGHT: 104.94 LBS | DIASTOLIC BLOOD PRESSURE: 74 MMHG

## 2020-07-16 DIAGNOSIS — M81.0 AGE-RELATED OSTEOPOROSIS WITHOUT CURRENT PATHOLOGICAL FRACTURE: Primary | ICD-10-CM

## 2020-07-16 PROCEDURE — 96372 THER/PROPH/DIAG INJ SC/IM: CPT

## 2020-07-16 PROCEDURE — 63600175 PHARM REV CODE 636 W HCPCS: Mod: JG | Performed by: PHYSICIAN ASSISTANT

## 2020-07-16 RX ADMIN — DENOSUMAB 60 MG: 60 INJECTION SUBCUTANEOUS at 09:07

## 2020-07-16 NOTE — PROGRESS NOTES
Patient received Prolia 60 mg injection sub Q in right arm.  Tolerated well and left in NAD    Patient is taking Vit D

## 2020-07-17 DIAGNOSIS — Z71.89 COMPLEX CARE COORDINATION: ICD-10-CM

## 2020-09-21 ENCOUNTER — TELEPHONE (OUTPATIENT)
Dept: HEMATOLOGY/ONCOLOGY | Facility: CLINIC | Age: 85
End: 2020-09-21

## 2020-09-21 NOTE — TELEPHONE ENCOUNTER
"----- Message from Cynthia Bennett sent at 9/21/2020 10:32 AM CDT -----  Regarding: appt schedule assist  Patient Assist    Name of caller:    Established or New patient?: est   Contact Preference:  [phone]  Does Patient feel the need to see the MD today? No   Provider name: Phong MANCILLA MD   What is the nature of the call?    - will need to schedule Nov f/u, and lab appts.   (no Friday appts, and prefer mid day time)     Additional Notes:   "Thank you for all that you do for our patients'"          "

## 2020-12-08 ENCOUNTER — TELEPHONE (OUTPATIENT)
Dept: HEMATOLOGY/ONCOLOGY | Facility: CLINIC | Age: 85
End: 2020-12-08

## 2020-12-08 NOTE — TELEPHONE ENCOUNTER
----- Message from Fortunato Gross sent at 12/8/2020  2:32 PM CST -----  Regarding: Scheduling      Pt calling to change appt to 11th or the 18th                  [phone] (M)

## 2020-12-08 NOTE — TELEPHONE ENCOUNTER
Nurse returned call. No answer. Left voice message asking patient to return call to clinic at [phone].

## 2020-12-08 NOTE — TELEPHONE ENCOUNTER
"----- Message from Cynthia Bennett sent at 12/8/2020  3:42 PM CST -----  Patient Assist    Name of caller:  Kylevega   Contact Preference:  [phone]   Does Patient feel the need to see the MD today? No   Physician:  Phong PARHAM MD   What is the nature of the call?    - called to schedule next appt if possible for 12/11 mid day   Please call and advise.     Additional Notes:   "Thank you for all that you do for our patients'"           "

## 2020-12-09 ENCOUNTER — TELEPHONE (OUTPATIENT)
Dept: HEMATOLOGY/ONCOLOGY | Facility: CLINIC | Age: 85
End: 2020-12-09

## 2020-12-09 NOTE — TELEPHONE ENCOUNTER
----- Message from Madison Caraballo sent at 12/9/2020  8:39 AM CST -----  Contact: Patient  Scheduling request    Visit type :: f/u    Treating provider:: Phong    Do you feel you need to be seen today:: No    Communication preference:: [phone]    Additional info::

## 2020-12-09 NOTE — TELEPHONE ENCOUNTER
----- Message from Joselin Garcia sent at 12/9/2020  9:48 AM CST -----  Contact: [phone] @ Patient  Good Morning,  Patient called and would like to have 2 dose of the shingle vaccine on visit 12/31/2020.   Please call and advise

## 2020-12-10 DIAGNOSIS — D50.9 IRON DEFICIENCY ANEMIA, UNSPECIFIED IRON DEFICIENCY ANEMIA TYPE: Primary | ICD-10-CM

## 2020-12-24 ENCOUNTER — HOSPITAL ENCOUNTER (EMERGENCY)
Facility: HOSPITAL | Age: 85
Discharge: HOME | End: 2020-12-24
Attending: EMERGENCY MEDICINE
Payer: MEDICARE

## 2020-12-24 VITALS
TEMPERATURE: 98 F | WEIGHT: 105 LBS | SYSTOLIC BLOOD PRESSURE: 123 MMHG | DIASTOLIC BLOOD PRESSURE: 59 MMHG | OXYGEN SATURATION: 98 % | BODY MASS INDEX: 16.48 KG/M2 | RESPIRATION RATE: 18 BRPM | HEART RATE: 82 BPM | HEIGHT: 67 IN

## 2020-12-24 DIAGNOSIS — R19.7 DIARRHEA, UNSPECIFIED TYPE: Primary | ICD-10-CM

## 2020-12-24 DIAGNOSIS — R10.13 EPIGASTRIC PAIN: ICD-10-CM

## 2020-12-24 LAB
ALBUMIN SERPL BCP-MCNC: 4.2 G/DL (ref 3.5–5.2)
ALP SERPL-CCNC: 74 U/L (ref 55–135)
ALT SERPL W/O P-5'-P-CCNC: 7 U/L (ref 10–44)
ANION GAP SERPL CALC-SCNC: 11 MMOL/L (ref 8–16)
AST SERPL-CCNC: 18 U/L (ref 10–40)
BASOPHILS # BLD AUTO: 0.02 K/UL (ref 0–0.2)
BASOPHILS NFR BLD: 0.2 % (ref 0–1.9)
BILIRUB SERPL-MCNC: 0.3 MG/DL (ref 0.1–1)
BILIRUB UR QL STRIP: NEGATIVE
BUN SERPL-MCNC: 10 MG/DL (ref 6–30)
BUN SERPL-MCNC: 10 MG/DL (ref 8–23)
CALCIUM SERPL-MCNC: 9.3 MG/DL (ref 8.7–10.5)
CHLORIDE SERPL-SCNC: 98 MMOL/L (ref 95–110)
CHLORIDE SERPL-SCNC: 99 MMOL/L (ref 95–110)
CLARITY UR REFRACT.AUTO: CLEAR
CO2 SERPL-SCNC: 27 MMOL/L (ref 23–29)
COLOR UR AUTO: YELLOW
CREAT SERPL-MCNC: 0.6 MG/DL (ref 0.5–1.4)
CREAT SERPL-MCNC: 0.8 MG/DL (ref 0.5–1.4)
CTP QC/QA: YES
DIFFERENTIAL METHOD: ABNORMAL
EOSINOPHIL # BLD AUTO: 0 K/UL (ref 0–0.5)
EOSINOPHIL NFR BLD: 0.1 % (ref 0–8)
ERYTHROCYTE [DISTWIDTH] IN BLOOD BY AUTOMATED COUNT: 14.6 % (ref 11.5–14.5)
EST. GFR  (AFRICAN AMERICAN): >60 ML/MIN/1.73 M^2
EST. GFR  (NON AFRICAN AMERICAN): >60 ML/MIN/1.73 M^2
GLUCOSE SERPL-MCNC: 115 MG/DL (ref 70–110)
GLUCOSE SERPL-MCNC: 117 MG/DL (ref 70–110)
GLUCOSE UR QL STRIP: NEGATIVE
HCT VFR BLD AUTO: 40.5 % (ref 37–48.5)
HCT VFR BLD CALC: 41 %PCV (ref 36–54)
HGB BLD-MCNC: 12.5 G/DL (ref 12–16)
HGB UR QL STRIP: NEGATIVE
IMM GRANULOCYTES # BLD AUTO: 0.08 K/UL (ref 0–0.04)
IMM GRANULOCYTES NFR BLD AUTO: 0.7 % (ref 0–0.5)
KETONES UR QL STRIP: NEGATIVE
LEUKOCYTE ESTERASE UR QL STRIP: NEGATIVE
LIPASE SERPL-CCNC: 58 U/L (ref 4–60)
LYMPHOCYTES # BLD AUTO: 0.9 K/UL (ref 1–4.8)
LYMPHOCYTES NFR BLD: 8.2 % (ref 18–48)
MCH RBC QN AUTO: 27.4 PG (ref 27–31)
MCHC RBC AUTO-ENTMCNC: 30.9 G/DL (ref 32–36)
MCV RBC AUTO: 89 FL (ref 82–98)
MONOCYTES # BLD AUTO: 0.7 K/UL (ref 0.3–1)
MONOCYTES NFR BLD: 6.7 % (ref 4–15)
NEUTROPHILS # BLD AUTO: 9.1 K/UL (ref 1.8–7.7)
NEUTROPHILS NFR BLD: 84.1 % (ref 38–73)
NITRITE UR QL STRIP: NEGATIVE
NRBC BLD-RTO: 0 /100 WBC
PH UR STRIP: 6 [PH] (ref 5–8)
PLATELET # BLD AUTO: 378 K/UL (ref 150–350)
PMV BLD AUTO: 9.1 FL (ref 9.2–12.9)
POC IONIZED CALCIUM: 1.09 MMOL/L (ref 1.06–1.42)
POC TCO2 (MEASURED): 29 MMOL/L (ref 23–29)
POTASSIUM BLD-SCNC: 4 MMOL/L (ref 3.5–5.1)
POTASSIUM SERPL-SCNC: 4.1 MMOL/L (ref 3.5–5.1)
PROT SERPL-MCNC: 8.1 G/DL (ref 6–8.4)
PROT UR QL STRIP: NEGATIVE
RBC # BLD AUTO: 4.57 M/UL (ref 4–5.4)
SAMPLE: ABNORMAL
SARS-COV-2 RDRP RESP QL NAA+PROBE: NEGATIVE
SODIUM BLD-SCNC: 136 MMOL/L (ref 136–145)
SODIUM SERPL-SCNC: 137 MMOL/L (ref 136–145)
SP GR UR STRIP: >=1.03 (ref 1–1.03)
TROPONIN I SERPL DL<=0.01 NG/ML-MCNC: 0.01 NG/ML (ref 0–0.03)
URN SPEC COLLECT METH UR: ABNORMAL
WBC # BLD AUTO: 10.85 K/UL (ref 3.9–12.7)

## 2020-12-24 PROCEDURE — 99285 EMERGENCY DEPT VISIT HI MDM: CPT | Mod: CS,,, | Performed by: EMERGENCY MEDICINE

## 2020-12-24 PROCEDURE — 99285 PR EMERGENCY DEPT VISIT,LEVEL V: ICD-10-PCS | Mod: CS,,, | Performed by: EMERGENCY MEDICINE

## 2020-12-24 PROCEDURE — 83690 ASSAY OF LIPASE: CPT

## 2020-12-24 PROCEDURE — 93010 EKG 12-LEAD: ICD-10-PCS | Mod: ,,, | Performed by: INTERNAL MEDICINE

## 2020-12-24 PROCEDURE — U0002 COVID-19 LAB TEST NON-CDC: HCPCS | Performed by: EMERGENCY MEDICINE

## 2020-12-24 PROCEDURE — 85025 COMPLETE CBC W/AUTO DIFF WBC: CPT

## 2020-12-24 PROCEDURE — 84484 ASSAY OF TROPONIN QUANT: CPT

## 2020-12-24 PROCEDURE — 81003 URINALYSIS AUTO W/O SCOPE: CPT

## 2020-12-24 PROCEDURE — 99285 EMERGENCY DEPT VISIT HI MDM: CPT | Mod: 25

## 2020-12-24 PROCEDURE — 25500020 PHARM REV CODE 255: Performed by: EMERGENCY MEDICINE

## 2020-12-24 PROCEDURE — 93005 ELECTROCARDIOGRAM TRACING: CPT

## 2020-12-24 PROCEDURE — 93010 ELECTROCARDIOGRAM REPORT: CPT | Mod: ,,, | Performed by: INTERNAL MEDICINE

## 2020-12-24 PROCEDURE — 80053 COMPREHEN METABOLIC PANEL: CPT

## 2020-12-24 RX ADMIN — IOHEXOL 75 ML: 350 INJECTION, SOLUTION INTRAVENOUS at 09:12

## 2020-12-24 NOTE — ED PROVIDER NOTES
Encounter Date: 12/24/2020       History     Chief Complaint   Patient presents with    Abdominal Pain     denies NV, fever, chills. 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  Patient arrived via EMS.  Patient reports that her pain has resolved.  History is somewhat limited because of confusion.  Patient reports that abdominal pain began this evening.  Patient informed that it is currently 8:30 in the morning so that time course does not quite make sense..  She reports that she is feeling well at this time.  Patient reports pain was located in the epigastrium.  Pain radiates to the general abdomen.  Pain was associated with nausea.  Patient was unable to vomit.  She reported some mild diarrhea to the nurse but denied to me.  She denied any constipation, dysuria, urinary frequency as well.  She otherwise reports feeling well.        Review of patient's allergies indicates:  No Known Allergies  Past Medical History:   Diagnosis Date    Cataract     Hypertension      Past Surgical History:   Procedure Laterality Date    CATARACT EXTRACTION W/  INTRAOCULAR LENS IMPLANT Left 8/11/14    bundy    CATARACT EXTRACTION W/  INTRAOCULAR LENS IMPLANT Right 09/15/14    gregor    COLONOSCOPY N/A 3/19/2020    Procedure: COLONOSCOPY;  Surgeon: Real Mabry MD;  Location: [facility] ([address] [place] [address]);  Service: Endoscopy;  Laterality: N/A;    ESOPHAGOGASTRODUODENOSCOPY N/A 3/19/2020    Procedure: EGD (ESOPHAGOGASTRODUODENOSCOPY);  Surgeon: Real Mabry MD;  Location: [facility] ([address] [place] [address]);  Service: Endoscopy;  Laterality: N/A;    FOOT SURGERY      left    HYSTERECTOMY      LAPAROSCOPIC HEMICOLECTOMY Right 3/20/2020    Procedure: HEMICOLECTOMY, RIGHT;  Surgeon: AUTUMN Berg MD;  [facility];  Service: Colon and Rectal;  Laterality: Right;     Family History    Problem Relation Age of Onset    Heart attack Father     Cataracts Brother     Heart attack Brother     Diabetes Brother     No Known Problems Mother     Cataracts Sister     Stroke Sister     Diabetes Sister     Cataracts Sister     Stroke Sister     No Known Problems Maternal Aunt     No Known Problems Maternal Uncle     No Known Problems Paternal Aunt     No Known Problems Paternal Uncle     No Known Problems Maternal Grandmother     No Known Problems Maternal Grandfather     No Known Problems Paternal Grandmother     No Known Problems Paternal Grandfather     Amblyopia Neg Hx     Blindness Neg Hx     Cancer Neg Hx     Glaucoma Neg Hx     Hypertension Neg Hx     Macular degeneration Neg Hx     Retinal detachment Neg Hx     Strabismus Neg Hx     Thyroid disease Neg Hx     Colon cancer Neg Hx     Esophageal cancer Neg Hx     Irritable bowel syndrome Neg Hx     Rectal cancer Neg Hx     Stomach cancer Neg Hx     Ulcerative colitis Neg Hx     Crohn's disease Neg Hx     Celiac disease Neg Hx      Social History     Tobacco Use    Smoking status: Never Smoker    Smokeless tobacco: Never Used   Substance Use Topics    Alcohol use: No     Alcohol/week: 0.0 standard drinks    Drug use: No     Review of Systems   Constitutional: Negative for fever.   HENT: Negative for sore throat.    Respiratory: Negative for shortness of breath.    Cardiovascular: Negative for chest pain.   Gastrointestinal: Positive for abdominal pain (Since resolved) and nausea (Since resolved). Negative for constipation, diarrhea (Denied to me) and vomiting.   Genitourinary: Negative for dysuria.   Musculoskeletal: Negative for back pain.   Skin: Negative for rash.   Neurological: Negative for weakness.   Hematological: Does not bruise/bleed easily.       Physical Exam     Initial Vitals [12/24/20 0734]   BP Pulse Resp Temp SpO2   138/74 88 18 97.6 °F (36.4 °C) 98 %      MAP       --         Physical  Exam    Nursing note and vitals reviewed.  Constitutional: She appears well-developed and well-nourished. She is not diaphoretic. No distress.   HENT:   Head: Normocephalic and atraumatic.   Eyes: EOM are normal. Right eye exhibits no discharge. Left eye exhibits no discharge. No scleral icterus.   Neck: Normal range of motion. Neck supple. No JVD present.   Cardiovascular: Normal rate, regular rhythm, normal heart sounds and intact distal pulses. Exam reveals no gallop and no friction rub.    No murmur heard.  Pulmonary/Chest: Breath sounds normal. No respiratory distress. She has no wheezes. She has no rhonchi. She has no rales. She exhibits no tenderness.   Abdominal: Soft. Bowel sounds are normal. She exhibits distension. She exhibits no mass. There is no abdominal tenderness. There is no rebound and no guarding.   Musculoskeletal: No tenderness.   Neurological: She is alert.   Skin: Skin is warm and dry. Capillary refill takes less than 2 seconds.   Psychiatric:   Impaired recent memory but appropriate affect         ED Course   Procedures  Labs Reviewed   CBC W/ AUTO DIFFERENTIAL - Abnormal; Notable for the following components:       Result Value    MCHC 30.9 (*)     RDW 14.6 (*)     Platelets 378 (*)     MPV 9.1 (*)     Immature Granulocytes 0.7 (*)     Gran # (ANC) 9.1 (*)     Immature Grans (Abs) 0.08 (*)     Lymph # 0.9 (*)     Gran % 84.1 (*)     Lymph % 8.2 (*)     All other components within normal limits   COMPREHENSIVE METABOLIC PANEL - Abnormal; Notable for the following components:    Glucose 115 (*)     ALT 7 (*)     All other components within normal limits   URINALYSIS, REFLEX TO URINE CULTURE - Abnormal; Notable for the following components:    Specific Gravity, UA >=1.030 (*)     All other components within normal limits    Narrative:     Specimen Source->Urine   ISTAT PROCEDURE - Abnormal; Notable for the following components:    POC Glucose 117 (*)     All other components within normal  limits   LIPASE   TROPONIN I   SARS-COV-2 RDRP GENE    Narrative:     This test utilizes isothermal nucleic acid amplification   technology to detect the SARS-CoV-2 RdRp nucleic acid segment.   The analytical sensitivity (limit of detection) is 125 genome   equivalents/mL.   A POSITIVE result implies infection with the SARS-CoV-2 virus;   the patient is presumed to be contagious.     A NEGATIVE result means that SARS-CoV-2 nucleic acids are not   present above the limit of detection. A NEGATIVE result should be   treated as presumptive. It does not rule out the possibility of   COVID-19 and should not be the sole basis for treatment decisions.   If COVID-19 is strongly suspected based on clinical and exposure   history, re-testing using an alternate molecular assay should be   considered.   This test is only for use under the Food and Drug   Administration s Emergency Use Authorization (EUA).   Commercial kits are provided by [employer].   Performance characteristics of the EUA have been independently   verified by [facility] Department of   Pathology and Laboratory Medicine.   _________________________________________________________________   The authorized Fact Sheet for Healthcare Providers and the authorized Fact   Sheet for Patients of the ID NOW COVID-19 are available on the FDA   website:     https://www.fda.gov/media/871752/download  https://www.fda.gov/media/260212/download         EKG Readings: (Independently Interpreted)   Initial Reading: No STEMI. Rhythm: Normal Sinus Rhythm. Heart Rate: 79. ST Segments: Normal ST Segments. T Waves Flipped: III. Axis: Normal.       Imaging Results          CT Abdomen Pelvis With Contrast (Final result)  Result time 12/24/20 11:04:48    Final result by Ander Quintero MD (12/24/20 11:04:48)                 Impression:      Findings suggestive of a nonspecific small bowel enteritis.  Liquid stool in the colon which may suggest a diarrheal  illness.    Postoperative changes of partial colectomy.    New small volume abdominopelvic free fluid.    Advanced calcific atherosclerosis involving the abdominal aorta and major arteries.      Electronically signed by: Ander Quintero MD  Date:    12/24/2020  Time:    11:04             Narrative:    EXAMINATION:  CT ABDOMEN PELVIS WITH CONTRAST    CLINICAL HISTORY:  Abdominal pain, acute, nonlocalized;    TECHNIQUE:  Low dose axial images, sagittal and coronal reformations were obtained from the lung bases to the pubic symphysis following the IV administration of 75 mL of Omnipaque 350 .  Oral contrast was not given.    COMPARISON:  CT, 03/19/2020.    FINDINGS:  Lower Chest:    Mild linear bibasilar subsegmental atelectasis.  No focal consolidation or pleural fluid.  Heart size is normal.  No pericardial effusion.  Coronary artery calcifications are present.    Abdomen:    Liver is normal in size and contour.  There are small calcifications in the inferior right hepatic lobe.  Subcentimeter cystic lesion again noted in the left hepatic lobe.  Small amount of focal fat deposition again noted adjacent to the falciform ligament.  No worrisome hepatic mass is identified.  Gallbladder is unremarkable.  No calcified gallstones identified.  No intrahepatic biliary ductal dilatation.    Spleen is normal in size.  Adrenal glands and pancreas are unremarkable.    The kidneys are symmetric.  No hydronephrosis.    No small bowel obstruction.  Scattered areas of small-bowel wall thickening with wall hyperenhancement and adjacent inflammatory changes, though most pronounced in the right lower quadrant of the abdomen.  Findings may represent a nonspecific enteritis.  There are postoperative changes of partial colectomy.  Liquid stool is present in the colon.    Small volume low-density abdominopelvic free fluid.  No pneumoperitoneum.  No organized fluid collection.    No bulky lymphadenopathy.    Abdominal aorta is normal in  caliber with advanced calcific atherosclerosis.    Portal, splenic, and superior mesenteric veins are patent.    Pelvis:    Urinary bladder and rectum are unremarkable.  Small volume pelvic free fluid.  Uterus appears surgically absent.  No pelvic lymphadenopathy.    Bones and soft tissues:    No aggressive osseous lesions.  Remote left inferior pubic ramus fracture.  Postoperative changes involving the left proximal femur.  Stable sclerotic focus in the left pat sacrum.  Extraperitoneal soft tissues are unremarkable.                                 Medical Decision Making:   History:   I obtained history from: someone other than patient.  Old Medical Records: I decided to obtain old medical records.  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  Differential Diagnosis:   ACS, SBO, gastritis, acute cholecystitis, pancreatitis, UTI, dehydration  Clinical Tests:   Lab Tests: Ordered  Radiological Study: Ordered  Medical Tests: Ordered  ED Management:  Will administer IV fluids.  Will obtain labs and CT abdomen/pelvis.  Very atypical presentation for ACS but given impaired history, will pursue this.    Reassessment:  Troponin normal, doubt ACS.  Serum labs are within normal limits.  No leukocytosis.  Creatinine at baseline.  LFTs normal.  Lipase normal.  Urinalysis clear.  CT negative for acute emergent process.  She has findings of a nonspecific small bowel enteritis and a new small volume ascites.  On repeat assessment, patient is well-appearing.  Her vitals are stable.  Her abdomen is soft nontender.  She reports feeling improved and is able tolerate p.o..  I do not appreciate any acute emergent etiology of her symptoms.  Patient encouraged p.o. hydration.  Provided with extensive return precautions.                             Clinical  Impression:       ICD-10-CM ICD-9-CM   1. Diarrhea, unspecified type  R19.7 787.91   2. Epigastric pain  R10.13 789.06                          ED Disposition Condition    Discharge Stable        ED Prescriptions     None        Follow-up Information     Follow up With Specialties Details Why Contact Info    Donaldo Carlin MD Internal Medicine   [address]  [place] [address]  [phone]                                         Conrado Garza MD  12/24/20 9781

## 2020-12-24 NOTE — DISCHARGE INSTRUCTIONS
It appears you are going to have more diarrhea.  Be sure to keep yourself hydrated.  Return to the emergency department for any fevers, inability to tolerate food, abdominal pain, or other concerning symptoms.

## 2020-12-24 NOTE — ED TRIAGE NOTES
Nydia Stevens, a 86 y.o. female presents to the ED w/ complaint of diffuse abd pain starting around 0530. Describes pain as burning associated with nausea but no vomiting, fever, chills, or diarrhea. Symptoms improved on arrival to ED. Pt sent from assisted living facility.     Triage note:  Chief Complaint   Patient presents with    Abdominal Pain     denies NV, fever, chills. Reports diarrhea     Review of patient's allergies indicates:  No Known Allergies  Past Medical History:   Diagnosis Date    Cataract     Hypertension      LOC: The patient is awake, alert, and oriented to place, time, situation. Affect is appropriate.  Speech is appropriate and clear.     APPEARANCE: Patient resting comfortably in no acute distress.  Patient is clean and well groomed.    SKIN: The skin is warm and dry; color consistent with ethnicity.  Patient has normal skin turgor and moist mucus membranes.  Skin intact; no breakdown or bruising noted.     MUSCULOSKELETAL: Patient moving upper and lower extremities without difficulty. Generalized weakness.     RESPIRATORY: Airway is open and patent. Respirations spontaneous, even, easy, and non-labored.  Patient has a normal effort and rate.  No accessory muscle use noted. Denies cough and SOB.     CARDIAC:  Normal rate noted.  No peripheral edema noted. No complaints of chest pain.      ABDOMEN: Soft and tender to palpation.  No distention noted.     NEUROLOGIC: Eyes open spontaneously.  Behavior appropriate to situation.  Follows commands; facial expression symmetrical.  Purposeful motor response noted; normal sensation in all extremities.

## 2020-12-24 NOTE — ED NOTES
I-STAT Chem-8+ Results:   Value Reference Range   Sodium 136 136-145 mmol/L   Potassium  4.0 3.5-5.1 mmol/L   Chloride 98  mmol/L   Ionized Calcium 1.09 1.06-1.42 mmol/L   CO2 (measured) 29 23-29 mmol/L   Glucose 117  mg/dL   BUN 10 6-30 mg/dL   Creatinine 0.6 0.5-1.4 mg/dL   Hematocrit 41 36-54%

## 2020-12-30 ENCOUNTER — TELEPHONE (OUTPATIENT)
Dept: HEMATOLOGY/ONCOLOGY | Facility: CLINIC | Age: 85
End: 2020-12-30

## 2020-12-31 ENCOUNTER — IMMUNIZATION (OUTPATIENT)
Dept: PHARMACY | Facility: CLINIC | Age: 85
End: 2020-12-31
Payer: MEDICARE

## 2020-12-31 ENCOUNTER — LAB VISIT (OUTPATIENT)
Dept: LAB | Facility: HOSPITAL | Age: 85
End: 2020-12-31
Payer: MEDICARE

## 2020-12-31 ENCOUNTER — TELEPHONE (OUTPATIENT)
Dept: HEMATOLOGY/ONCOLOGY | Facility: CLINIC | Age: 85
End: 2020-12-31

## 2020-12-31 ENCOUNTER — OFFICE VISIT (OUTPATIENT)
Dept: HEMATOLOGY/ONCOLOGY | Facility: CLINIC | Age: 85
End: 2020-12-31
Payer: MEDICARE

## 2020-12-31 VITALS
WEIGHT: 115.75 LBS | SYSTOLIC BLOOD PRESSURE: 176 MMHG | DIASTOLIC BLOOD PRESSURE: 75 MMHG | RESPIRATION RATE: 16 BRPM | HEART RATE: 92 BPM | BODY MASS INDEX: 18.17 KG/M2 | TEMPERATURE: 99 F | OXYGEN SATURATION: 100 % | HEIGHT: 67 IN

## 2020-12-31 DIAGNOSIS — D50.8 OTHER IRON DEFICIENCY ANEMIA: Primary | ICD-10-CM

## 2020-12-31 DIAGNOSIS — D50.9 IRON DEFICIENCY ANEMIA, UNSPECIFIED IRON DEFICIENCY ANEMIA TYPE: ICD-10-CM

## 2020-12-31 DIAGNOSIS — C18.9 ADENOCARCINOMA OF COLON: ICD-10-CM

## 2020-12-31 DIAGNOSIS — D64.9 SYMPTOMATIC ANEMIA: ICD-10-CM

## 2020-12-31 LAB
ALBUMIN SERPL BCP-MCNC: 4.1 G/DL (ref 3.5–5.2)
ALP SERPL-CCNC: 63 U/L (ref 55–135)
ALT SERPL W/O P-5'-P-CCNC: 10 U/L (ref 10–44)
ANION GAP SERPL CALC-SCNC: 9 MMOL/L (ref 8–16)
AST SERPL-CCNC: 19 U/L (ref 10–40)
BASOPHILS # BLD AUTO: 0.03 K/UL (ref 0–0.2)
BASOPHILS NFR BLD: 0.5 % (ref 0–1.9)
BILIRUB SERPL-MCNC: 0.3 MG/DL (ref 0.1–1)
BUN SERPL-MCNC: 11 MG/DL (ref 8–23)
CALCIUM SERPL-MCNC: 9.7 MG/DL (ref 8.7–10.5)
CHLORIDE SERPL-SCNC: 100 MMOL/L (ref 95–110)
CO2 SERPL-SCNC: 31 MMOL/L (ref 23–29)
CREAT SERPL-MCNC: 0.8 MG/DL (ref 0.5–1.4)
DIFFERENTIAL METHOD: ABNORMAL
EOSINOPHIL # BLD AUTO: 0.1 K/UL (ref 0–0.5)
EOSINOPHIL NFR BLD: 1.1 % (ref 0–8)
ERYTHROCYTE [DISTWIDTH] IN BLOOD BY AUTOMATED COUNT: 14.9 % (ref 11.5–14.5)
EST. GFR  (AFRICAN AMERICAN): >60 ML/MIN/1.73 M^2
EST. GFR  (NON AFRICAN AMERICAN): >60 ML/MIN/1.73 M^2
FERRITIN SERPL-MCNC: 77 NG/ML (ref 20–300)
GLUCOSE SERPL-MCNC: 96 MG/DL (ref 70–110)
HCT VFR BLD AUTO: 37.3 % (ref 37–48.5)
HGB BLD-MCNC: 11.3 G/DL (ref 12–16)
IMM GRANULOCYTES # BLD AUTO: 0.06 K/UL (ref 0–0.04)
IMM GRANULOCYTES NFR BLD AUTO: 1.1 % (ref 0–0.5)
LYMPHOCYTES # BLD AUTO: 1.6 K/UL (ref 1–4.8)
LYMPHOCYTES NFR BLD: 29.5 % (ref 18–48)
MCH RBC QN AUTO: 27.3 PG (ref 27–31)
MCHC RBC AUTO-ENTMCNC: 30.3 G/DL (ref 32–36)
MCV RBC AUTO: 90 FL (ref 82–98)
MONOCYTES # BLD AUTO: 1 K/UL (ref 0.3–1)
MONOCYTES NFR BLD: 17.8 % (ref 4–15)
NEUTROPHILS # BLD AUTO: 2.8 K/UL (ref 1.8–7.7)
NEUTROPHILS NFR BLD: 50 % (ref 38–73)
NRBC BLD-RTO: 0 /100 WBC
PLATELET # BLD AUTO: 324 K/UL (ref 150–350)
PMV BLD AUTO: 9.5 FL (ref 9.2–12.9)
POTASSIUM SERPL-SCNC: 4.5 MMOL/L (ref 3.5–5.1)
PROT SERPL-MCNC: 7.7 G/DL (ref 6–8.4)
RBC # BLD AUTO: 4.14 M/UL (ref 4–5.4)
SODIUM SERPL-SCNC: 140 MMOL/L (ref 136–145)
WBC # BLD AUTO: 5.56 K/UL (ref 3.9–12.7)

## 2020-12-31 PROCEDURE — 99999 PR PBB SHADOW E&M-EST. PATIENT-LVL III: CPT | Mod: PBBFAC,GC,, | Performed by: STUDENT IN AN ORGANIZED HEALTH CARE EDUCATION/TRAINING PROGRAM

## 2020-12-31 PROCEDURE — 85025 COMPLETE CBC W/AUTO DIFF WBC: CPT

## 2020-12-31 PROCEDURE — 82728 ASSAY OF FERRITIN: CPT

## 2020-12-31 PROCEDURE — 99214 PR OFFICE/OUTPT VISIT, EST, LEVL IV, 30-39 MIN: ICD-10-PCS | Mod: S$PBB,GC,, | Performed by: STUDENT IN AN ORGANIZED HEALTH CARE EDUCATION/TRAINING PROGRAM

## 2020-12-31 PROCEDURE — 99999 PR PBB SHADOW E&M-EST. PATIENT-LVL III: ICD-10-PCS | Mod: PBBFAC,GC,, | Performed by: STUDENT IN AN ORGANIZED HEALTH CARE EDUCATION/TRAINING PROGRAM

## 2020-12-31 PROCEDURE — 80053 COMPREHEN METABOLIC PANEL: CPT

## 2020-12-31 PROCEDURE — 36415 COLL VENOUS BLD VENIPUNCTURE: CPT

## 2020-12-31 PROCEDURE — 99213 OFFICE O/P EST LOW 20 MIN: CPT | Mod: PBBFAC | Performed by: STUDENT IN AN ORGANIZED HEALTH CARE EDUCATION/TRAINING PROGRAM

## 2020-12-31 PROCEDURE — 99214 OFFICE O/P EST MOD 30 MIN: CPT | Mod: S$PBB,GC,, | Performed by: STUDENT IN AN ORGANIZED HEALTH CARE EDUCATION/TRAINING PROGRAM

## 2020-12-31 NOTE — TELEPHONE ENCOUNTER
----- Message from Kelly Son sent at 12/31/2020  9:10 AM CST -----  Regarding: PT  Contact: PT  PT returned a missed call. Please call back     Callback: [phone]

## 2021-01-13 ENCOUNTER — TELEPHONE (OUTPATIENT)
Dept: INTERNAL MEDICINE | Facility: CLINIC | Age: 86
End: 2021-01-13

## 2021-01-22 ENCOUNTER — INFUSION (OUTPATIENT)
Dept: INFECTIOUS DISEASES | Facility: HOSPITAL | Age: 86
End: 2021-01-22
Attending: INTERNAL MEDICINE
Payer: MEDICARE

## 2021-01-22 ENCOUNTER — OFFICE VISIT (OUTPATIENT)
Dept: INTERNAL MEDICINE | Facility: CLINIC | Age: 86
End: 2021-01-22
Payer: MEDICARE

## 2021-01-22 VITALS
BODY MASS INDEX: 17.58 KG/M2 | SYSTOLIC BLOOD PRESSURE: 170 MMHG | DIASTOLIC BLOOD PRESSURE: 72 MMHG | OXYGEN SATURATION: 96 % | HEIGHT: 67 IN | HEART RATE: 93 BPM | WEIGHT: 112 LBS

## 2021-01-22 VITALS
DIASTOLIC BLOOD PRESSURE: 69 MMHG | SYSTOLIC BLOOD PRESSURE: 138 MMHG | WEIGHT: 115.75 LBS | HEART RATE: 89 BPM | HEIGHT: 67 IN | BODY MASS INDEX: 18.17 KG/M2

## 2021-01-22 DIAGNOSIS — I50.42 CHRONIC COMBINED SYSTOLIC AND DIASTOLIC HEART FAILURE: ICD-10-CM

## 2021-01-22 DIAGNOSIS — M81.0 AGE-RELATED OSTEOPOROSIS WITHOUT CURRENT PATHOLOGICAL FRACTURE: Primary | ICD-10-CM

## 2021-01-22 DIAGNOSIS — Z71.85 VACCINE COUNSELING: ICD-10-CM

## 2021-01-22 DIAGNOSIS — C18.9 ADENOCARCINOMA OF COLON: ICD-10-CM

## 2021-01-22 DIAGNOSIS — I10 ESSENTIAL HYPERTENSION: Primary | ICD-10-CM

## 2021-01-22 DIAGNOSIS — M81.0 OSTEOPOROSIS, UNSPECIFIED OSTEOPOROSIS TYPE, UNSPECIFIED PATHOLOGICAL FRACTURE PRESENCE: ICD-10-CM

## 2021-01-22 DIAGNOSIS — D75.839 THROMBOCYTOSIS: ICD-10-CM

## 2021-01-22 PROBLEM — D58.9 HEMOLYTIC ANEMIA: Status: RESOLVED | Noted: 2020-03-18 | Resolved: 2021-01-22

## 2021-01-22 PROBLEM — R64 CACHEXIA: Status: RESOLVED | Noted: 2020-03-02 | Resolved: 2021-01-22

## 2021-01-22 PROCEDURE — 96372 THER/PROPH/DIAG INJ SC/IM: CPT

## 2021-01-22 PROCEDURE — 99999 PR PBB SHADOW E&M-EST. PATIENT-LVL IV: CPT | Mod: PBBFAC,,, | Performed by: INTERNAL MEDICINE

## 2021-01-22 PROCEDURE — 99999 PR PBB SHADOW E&M-EST. PATIENT-LVL IV: ICD-10-PCS | Mod: PBBFAC,,, | Performed by: INTERNAL MEDICINE

## 2021-01-22 PROCEDURE — 99215 PR OFFICE/OUTPT VISIT, EST, LEVL V, 40-54 MIN: ICD-10-PCS | Mod: S$PBB,,, | Performed by: INTERNAL MEDICINE

## 2021-01-22 PROCEDURE — 63600175 PHARM REV CODE 636 W HCPCS: Mod: JG | Performed by: PHYSICIAN ASSISTANT

## 2021-01-22 PROCEDURE — 99215 OFFICE O/P EST HI 40 MIN: CPT | Mod: S$PBB,,, | Performed by: INTERNAL MEDICINE

## 2021-01-22 PROCEDURE — 99214 OFFICE O/P EST MOD 30 MIN: CPT | Mod: PBBFAC,25 | Performed by: INTERNAL MEDICINE

## 2021-01-22 RX ORDER — LOSARTAN POTASSIUM 25 MG/1
25 TABLET ORAL DAILY
Qty: 90 TABLET | Refills: 3 | Status: SHIPPED | OUTPATIENT
Start: 2021-01-22 | End: 2021-06-04 | Stop reason: SDUPTHER

## 2021-01-22 RX ADMIN — DENOSUMAB 60 MG: 60 INJECTION SUBCUTANEOUS at 10:01

## 2021-02-03 ENCOUNTER — OFFICE VISIT (OUTPATIENT)
Dept: SURGERY | Facility: CLINIC | Age: 86
End: 2021-02-03
Payer: MEDICARE

## 2021-02-03 VITALS
WEIGHT: 112.5 LBS | DIASTOLIC BLOOD PRESSURE: 68 MMHG | HEIGHT: 67 IN | SYSTOLIC BLOOD PRESSURE: 163 MMHG | BODY MASS INDEX: 17.66 KG/M2

## 2021-02-03 DIAGNOSIS — C18.9 ADENOCARCINOMA OF COLON: Primary | ICD-10-CM

## 2021-02-03 DIAGNOSIS — C18.2 MALIGNANT NEOPLASM OF ASCENDING COLON: Primary | ICD-10-CM

## 2021-02-03 DIAGNOSIS — Z85.038 ENCOUNTER FOR FOLLOW-UP SURVEILLANCE OF COLON CANCER: ICD-10-CM

## 2021-02-03 DIAGNOSIS — Z08 ENCOUNTER FOR FOLLOW-UP SURVEILLANCE OF COLON CANCER: ICD-10-CM

## 2021-02-03 PROCEDURE — 99999 PR PBB SHADOW E&M-EST. PATIENT-LVL III: CPT | Mod: PBBFAC,,, | Performed by: COLON & RECTAL SURGERY

## 2021-02-03 PROCEDURE — 99999 PR PBB SHADOW E&M-EST. PATIENT-LVL III: ICD-10-PCS | Mod: PBBFAC,,, | Performed by: COLON & RECTAL SURGERY

## 2021-02-03 PROCEDURE — 99213 PR OFFICE/OUTPT VISIT, EST, LEVL III, 20-29 MIN: ICD-10-PCS | Mod: S$PBB,,, | Performed by: COLON & RECTAL SURGERY

## 2021-02-03 PROCEDURE — 99213 OFFICE O/P EST LOW 20 MIN: CPT | Mod: S$PBB,,, | Performed by: COLON & RECTAL SURGERY

## 2021-02-03 PROCEDURE — 99213 OFFICE O/P EST LOW 20 MIN: CPT | Mod: PBBFAC | Performed by: COLON & RECTAL SURGERY

## 2021-02-04 ENCOUNTER — TELEPHONE (OUTPATIENT)
Dept: ENDOSCOPY | Facility: HOSPITAL | Age: 86
End: 2021-02-04

## 2021-02-04 ENCOUNTER — HOSPITAL ENCOUNTER (OUTPATIENT)
Dept: RADIOLOGY | Facility: CLINIC | Age: 86
Discharge: HOME | End: 2021-02-04
Attending: INTERNAL MEDICINE
Payer: MEDICARE

## 2021-02-04 DIAGNOSIS — C18.9 ADENOCARCINOMA OF COLON: Primary | ICD-10-CM

## 2021-02-04 DIAGNOSIS — M81.0 OSTEOPOROSIS, UNSPECIFIED OSTEOPOROSIS TYPE, UNSPECIFIED PATHOLOGICAL FRACTURE PRESENCE: ICD-10-CM

## 2021-02-04 PROCEDURE — 77080 DXA BONE DENSITY AXIAL: CPT | Mod: TC

## 2021-02-04 PROCEDURE — 77080 DEXA BONE DENSITY SPINE HIP: ICD-10-PCS | Mod: 26,,, | Performed by: INTERNAL MEDICINE

## 2021-02-04 PROCEDURE — 77080 DXA BONE DENSITY AXIAL: CPT | Mod: 26,,, | Performed by: INTERNAL MEDICINE

## 2021-02-08 DIAGNOSIS — Z01.818 PRE-OP TESTING: ICD-10-CM

## 2021-02-11 DIAGNOSIS — Z01.818 PRE-OP TESTING: ICD-10-CM

## 2021-02-11 DIAGNOSIS — Z12.11 SPECIAL SCREENING FOR MALIGNANT NEOPLASMS, COLON: Primary | ICD-10-CM

## 2021-02-11 RX ORDER — SODIUM, POTASSIUM,MAG SULFATES 17.5-3.13G
1 SOLUTION, RECONSTITUTED, ORAL ORAL DAILY
Qty: 1 KIT | Refills: 0 | Status: SHIPPED | OUTPATIENT
Start: 2021-02-11 | End: 2021-02-13

## 2021-02-12 ENCOUNTER — HOSPITAL ENCOUNTER (OUTPATIENT)
Dept: RADIOLOGY | Facility: OTHER | Age: 86
Discharge: HOME | End: 2021-02-12
Attending: COLON & RECTAL SURGERY
Payer: MEDICARE

## 2021-02-12 DIAGNOSIS — C18.9 ADENOCARCINOMA OF COLON: ICD-10-CM

## 2021-02-16 ENCOUNTER — HOSPITAL ENCOUNTER (OUTPATIENT)
Facility: HOSPITAL | Age: 86
Discharge: HOME | End: 2021-02-17
Attending: EMERGENCY MEDICINE | Admitting: EMERGENCY MEDICINE
Payer: MEDICARE

## 2021-02-16 DIAGNOSIS — S09.90XA TRAUMATIC INJURY OF HEAD, INITIAL ENCOUNTER: ICD-10-CM

## 2021-02-16 DIAGNOSIS — R55 SYNCOPE: ICD-10-CM

## 2021-02-16 DIAGNOSIS — R55 SYNCOPE AND COLLAPSE: Primary | ICD-10-CM

## 2021-02-16 DIAGNOSIS — I65.23 CAROTID ATHEROSCLEROSIS, BILATERAL: ICD-10-CM

## 2021-02-16 DIAGNOSIS — S01.01XA LACERATION OF SCALP, INITIAL ENCOUNTER: ICD-10-CM

## 2021-02-16 DIAGNOSIS — I50.22 CHRONIC SYSTOLIC HEART FAILURE: ICD-10-CM

## 2021-02-16 PROBLEM — I50.42 CHRONIC COMBINED SYSTOLIC AND DIASTOLIC HEART FAILURE: Chronic | Status: ACTIVE | Noted: 2020-03-17

## 2021-02-16 LAB
ALBUMIN SERPL BCP-MCNC: 4.2 G/DL (ref 3.5–5.2)
ALP SERPL-CCNC: 66 U/L (ref 55–135)
ALT SERPL W/O P-5'-P-CCNC: 7 U/L (ref 10–44)
ANION GAP SERPL CALC-SCNC: 11 MMOL/L (ref 8–16)
AST SERPL-CCNC: 17 U/L (ref 10–40)
BASOPHILS # BLD AUTO: 0.05 K/UL (ref 0–0.2)
BASOPHILS NFR BLD: 0.4 % (ref 0–1.9)
BILIRUB SERPL-MCNC: 0.4 MG/DL (ref 0.1–1)
BILIRUB UR QL STRIP: NEGATIVE
BUN SERPL-MCNC: 12 MG/DL (ref 8–23)
CALCIUM SERPL-MCNC: 9.9 MG/DL (ref 8.7–10.5)
CHLORIDE SERPL-SCNC: 96 MMOL/L (ref 95–110)
CLARITY UR REFRACT.AUTO: CLEAR
CO2 SERPL-SCNC: 27 MMOL/L (ref 23–29)
COLOR UR AUTO: YELLOW
CREAT SERPL-MCNC: 0.8 MG/DL (ref 0.5–1.4)
CTP QC/QA: YES
DIFFERENTIAL METHOD: ABNORMAL
EOSINOPHIL # BLD AUTO: 0 K/UL (ref 0–0.5)
EOSINOPHIL NFR BLD: 0.3 % (ref 0–8)
ERYTHROCYTE [DISTWIDTH] IN BLOOD BY AUTOMATED COUNT: 15.2 % (ref 11.5–14.5)
EST. GFR  (AFRICAN AMERICAN): >60 ML/MIN/1.73 M^2
EST. GFR  (NON AFRICAN AMERICAN): >60 ML/MIN/1.73 M^2
GLUCOSE SERPL-MCNC: 110 MG/DL (ref 70–110)
GLUCOSE UR QL STRIP: NEGATIVE
HCT VFR BLD AUTO: 41.4 % (ref 37–48.5)
HGB BLD-MCNC: 12.9 G/DL (ref 12–16)
HGB UR QL STRIP: NEGATIVE
IMM GRANULOCYTES # BLD AUTO: 0.08 K/UL (ref 0–0.04)
IMM GRANULOCYTES NFR BLD AUTO: 0.6 % (ref 0–0.5)
KETONES UR QL STRIP: ABNORMAL
LEUKOCYTE ESTERASE UR QL STRIP: NEGATIVE
LYMPHOCYTES # BLD AUTO: 1.3 K/UL (ref 1–4.8)
LYMPHOCYTES NFR BLD: 10 % (ref 18–48)
MAGNESIUM SERPL-MCNC: 2 MG/DL (ref 1.6–2.6)
MCH RBC QN AUTO: 27.6 PG (ref 27–31)
MCHC RBC AUTO-ENTMCNC: 31.2 G/DL (ref 32–36)
MCV RBC AUTO: 89 FL (ref 82–98)
MICROSCOPIC COMMENT: NORMAL
MONOCYTES # BLD AUTO: 1.8 K/UL (ref 0.3–1)
MONOCYTES NFR BLD: 13.6 % (ref 4–15)
NEUTROPHILS # BLD AUTO: 9.8 K/UL (ref 1.8–7.7)
NEUTROPHILS NFR BLD: 75.1 % (ref 38–73)
NITRITE UR QL STRIP: NEGATIVE
NRBC BLD-RTO: 0 /100 WBC
PH UR STRIP: 5 [PH] (ref 5–8)
PLATELET # BLD AUTO: 396 K/UL (ref 150–350)
PMV BLD AUTO: 9.7 FL (ref 9.2–12.9)
POTASSIUM SERPL-SCNC: 4.3 MMOL/L (ref 3.5–5.1)
PROT SERPL-MCNC: 7.7 G/DL (ref 6–8.4)
PROT UR QL STRIP: NEGATIVE
RBC # BLD AUTO: 4.68 M/UL (ref 4–5.4)
RBC #/AREA URNS AUTO: 1 /HPF (ref 0–4)
SARS-COV-2 RDRP RESP QL NAA+PROBE: NEGATIVE
SODIUM SERPL-SCNC: 134 MMOL/L (ref 136–145)
SP GR UR STRIP: 1.01 (ref 1–1.03)
TROPONIN I SERPL DL<=0.01 NG/ML-MCNC: 0.01 NG/ML (ref 0–0.03)
TSH SERPL DL<=0.005 MIU/L-ACNC: 2.92 UIU/ML (ref 0.4–4)
URN SPEC COLLECT METH UR: ABNORMAL
WBC # BLD AUTO: 13.1 K/UL (ref 3.9–12.7)

## 2021-02-16 PROCEDURE — 99285 EMERGENCY DEPT VISIT HI MDM: CPT | Mod: 25

## 2021-02-16 PROCEDURE — 84443 ASSAY THYROID STIM HORMONE: CPT

## 2021-02-16 PROCEDURE — 12002 RPR S/N/AX/GEN/TRNK2.6-7.5CM: CPT

## 2021-02-16 PROCEDURE — 80053 COMPREHEN METABOLIC PANEL: CPT

## 2021-02-16 PROCEDURE — 99220 PR INITIAL OBSERVATION CARE,LEVL III: ICD-10-PCS | Mod: ,,, | Performed by: PHYSICIAN ASSISTANT

## 2021-02-16 PROCEDURE — 99284 PR EMERGENCY DEPT VISIT,LEVEL IV: ICD-10-PCS | Mod: 25,CS,, | Performed by: PHYSICIAN ASSISTANT

## 2021-02-16 PROCEDURE — 25000003 PHARM REV CODE 250: Performed by: PHYSICIAN ASSISTANT

## 2021-02-16 PROCEDURE — 63600175 PHARM REV CODE 636 W HCPCS: Performed by: PHYSICIAN ASSISTANT

## 2021-02-16 PROCEDURE — 12002 RPR S/N/AX/GEN/TRNK2.6-7.5CM: CPT | Mod: ,,, | Performed by: PHYSICIAN ASSISTANT

## 2021-02-16 PROCEDURE — 99284 EMERGENCY DEPT VISIT MOD MDM: CPT | Mod: 25,CS,, | Performed by: PHYSICIAN ASSISTANT

## 2021-02-16 PROCEDURE — 85025 COMPLETE CBC W/AUTO DIFF WBC: CPT

## 2021-02-16 PROCEDURE — 83735 ASSAY OF MAGNESIUM: CPT

## 2021-02-16 PROCEDURE — G0378 HOSPITAL OBSERVATION PER HR: HCPCS

## 2021-02-16 PROCEDURE — 96361 HYDRATE IV INFUSION ADD-ON: CPT | Mod: 59

## 2021-02-16 PROCEDURE — 99220 PR INITIAL OBSERVATION CARE,LEVL III: CPT | Mod: ,,, | Performed by: PHYSICIAN ASSISTANT

## 2021-02-16 PROCEDURE — U0002 COVID-19 LAB TEST NON-CDC: HCPCS | Performed by: PHYSICIAN ASSISTANT

## 2021-02-16 PROCEDURE — 96374 THER/PROPH/DIAG INJ IV PUSH: CPT | Mod: 59

## 2021-02-16 PROCEDURE — 81001 URINALYSIS AUTO W/SCOPE: CPT

## 2021-02-16 PROCEDURE — 12002 PR RESUP NPTERF WND BODY 2.6-7.5 CM: ICD-10-PCS | Mod: ,,, | Performed by: PHYSICIAN ASSISTANT

## 2021-02-16 PROCEDURE — 84484 ASSAY OF TROPONIN QUANT: CPT

## 2021-02-16 RX ORDER — ACETAMINOPHEN 325 MG/1
650 TABLET ORAL EVERY 4 HOURS PRN
Status: DISCONTINUED | OUTPATIENT
Start: 2021-02-17 | End: 2021-02-17 | Stop reason: HOSPADM

## 2021-02-16 RX ORDER — AMOXICILLIN 250 MG
1 CAPSULE ORAL 2 TIMES DAILY
Status: DISCONTINUED | OUTPATIENT
Start: 2021-02-17 | End: 2021-02-17 | Stop reason: HOSPADM

## 2021-02-16 RX ORDER — ROSUVASTATIN CALCIUM 20 MG/1
40 TABLET, COATED ORAL DAILY
Status: DISCONTINUED | OUTPATIENT
Start: 2021-02-17 | End: 2021-02-17 | Stop reason: HOSPADM

## 2021-02-16 RX ORDER — IBUPROFEN 200 MG
24 TABLET ORAL
Status: DISCONTINUED | OUTPATIENT
Start: 2021-02-17 | End: 2021-02-17 | Stop reason: HOSPADM

## 2021-02-16 RX ORDER — FUROSEMIDE 20 MG/1
20 TABLET ORAL DAILY
Status: DISCONTINUED | OUTPATIENT
Start: 2021-02-17 | End: 2021-02-17 | Stop reason: HOSPADM

## 2021-02-16 RX ORDER — ONDANSETRON 2 MG/ML
4 INJECTION INTRAMUSCULAR; INTRAVENOUS
Status: COMPLETED | OUTPATIENT
Start: 2021-02-16 | End: 2021-02-16

## 2021-02-16 RX ORDER — ONDANSETRON 8 MG/1
8 TABLET, ORALLY DISINTEGRATING ORAL EVERY 8 HOURS PRN
Status: DISCONTINUED | OUTPATIENT
Start: 2021-02-17 | End: 2021-02-17 | Stop reason: HOSPADM

## 2021-02-16 RX ORDER — LIDOCAINE HYDROCHLORIDE 10 MG/ML
10 INJECTION INFILTRATION; PERINEURAL
Status: COMPLETED | OUTPATIENT
Start: 2021-02-16 | End: 2021-02-16

## 2021-02-16 RX ORDER — PANTOPRAZOLE SODIUM 40 MG/1
40 TABLET, DELAYED RELEASE ORAL
Status: DISCONTINUED | OUTPATIENT
Start: 2021-02-17 | End: 2021-02-17 | Stop reason: HOSPADM

## 2021-02-16 RX ORDER — TALC
6 POWDER (GRAM) TOPICAL NIGHTLY PRN
Status: DISCONTINUED | OUTPATIENT
Start: 2021-02-17 | End: 2021-02-17 | Stop reason: HOSPADM

## 2021-02-16 RX ORDER — GLUCAGON 1 MG
1 KIT INJECTION
Status: DISCONTINUED | OUTPATIENT
Start: 2021-02-17 | End: 2021-02-17 | Stop reason: HOSPADM

## 2021-02-16 RX ORDER — IPRATROPIUM BROMIDE AND ALBUTEROL SULFATE 2.5; .5 MG/3ML; MG/3ML
3 SOLUTION RESPIRATORY (INHALATION) EVERY 4 HOURS PRN
Status: DISCONTINUED | OUTPATIENT
Start: 2021-02-17 | End: 2021-02-17 | Stop reason: HOSPADM

## 2021-02-16 RX ORDER — LOSARTAN POTASSIUM 25 MG/1
25 TABLET ORAL DAILY
Status: DISCONTINUED | OUTPATIENT
Start: 2021-02-17 | End: 2021-02-17 | Stop reason: HOSPADM

## 2021-02-16 RX ORDER — SODIUM CHLORIDE 0.9 % (FLUSH) 0.9 %
5 SYRINGE (ML) INJECTION
Status: DISCONTINUED | OUTPATIENT
Start: 2021-02-17 | End: 2021-02-17 | Stop reason: HOSPADM

## 2021-02-16 RX ORDER — IBUPROFEN 200 MG
16 TABLET ORAL
Status: DISCONTINUED | OUTPATIENT
Start: 2021-02-17 | End: 2021-02-17 | Stop reason: HOSPADM

## 2021-02-16 RX ORDER — PROMETHAZINE HYDROCHLORIDE 25 MG/1
25 TABLET ORAL EVERY 6 HOURS PRN
Status: DISCONTINUED | OUTPATIENT
Start: 2021-02-17 | End: 2021-02-17 | Stop reason: HOSPADM

## 2021-02-16 RX ORDER — POTASSIUM CHLORIDE 750 MG/1
10 CAPSULE, EXTENDED RELEASE ORAL DAILY
Status: DISCONTINUED | OUTPATIENT
Start: 2021-02-17 | End: 2021-02-17 | Stop reason: HOSPADM

## 2021-02-16 RX ADMIN — Medication: at 09:02

## 2021-02-16 RX ADMIN — LIDOCAINE HYDROCHLORIDE 10 ML: 10 INJECTION, SOLUTION INFILTRATION; PERINEURAL at 09:02

## 2021-02-16 RX ADMIN — SODIUM CHLORIDE 1000 ML: 0.9 INJECTION, SOLUTION INTRAVENOUS at 09:02

## 2021-02-16 RX ADMIN — ONDANSETRON 4 MG: 2 INJECTION INTRAMUSCULAR; INTRAVENOUS at 09:02

## 2021-02-17 VITALS
WEIGHT: 130.06 LBS | HEART RATE: 90 BPM | SYSTOLIC BLOOD PRESSURE: 123 MMHG | BODY MASS INDEX: 20.9 KG/M2 | RESPIRATION RATE: 20 BRPM | HEIGHT: 66 IN | OXYGEN SATURATION: 93 % | TEMPERATURE: 98 F | DIASTOLIC BLOOD PRESSURE: 58 MMHG

## 2021-02-17 LAB
25(OH)D3+25(OH)D2 SERPL-MCNC: 43 NG/ML (ref 30–96)
ALBUMIN SERPL BCP-MCNC: 3.5 G/DL (ref 3.5–5.2)
ALP SERPL-CCNC: 59 U/L (ref 55–135)
ALT SERPL W/O P-5'-P-CCNC: 7 U/L (ref 10–44)
ANION GAP SERPL CALC-SCNC: 12 MMOL/L (ref 8–16)
ASCENDING AORTA: 2.66 CM
AST SERPL-CCNC: 19 U/L (ref 10–40)
AV INDEX (PROSTH): 0.82
AV MEAN GRADIENT: 11 MMHG
AV PEAK GRADIENT: 21 MMHG
AV VALVE AREA: 2.49 CM2
AV VELOCITY RATIO: 0.72
BASOPHILS # BLD AUTO: 0.02 K/UL (ref 0–0.2)
BASOPHILS NFR BLD: 0.2 % (ref 0–1.9)
BILIRUB SERPL-MCNC: 0.4 MG/DL (ref 0.1–1)
BSA FOR ECHO PROCEDURE: 1.66 M2
BUN SERPL-MCNC: 13 MG/DL (ref 8–23)
CALCIUM SERPL-MCNC: 8.9 MG/DL (ref 8.7–10.5)
CHLORIDE SERPL-SCNC: 101 MMOL/L (ref 95–110)
CHOLEST SERPL-MCNC: 184 MG/DL (ref 120–199)
CHOLEST/HDLC SERPL: 4.7 {RATIO} (ref 2–5)
CO2 SERPL-SCNC: 22 MMOL/L (ref 23–29)
CREAT SERPL-MCNC: 0.7 MG/DL (ref 0.5–1.4)
CV ECHO LV RWT: 0.4 CM
DIFFERENTIAL METHOD: ABNORMAL
DOP CALC AO PEAK VEL: 2.3 M/S
DOP CALC AO VTI: 40.99 CM
DOP CALC LVOT AREA: 3 CM2
DOP CALC LVOT DIAMETER: 1.97 CM
DOP CALC LVOT PEAK VEL: 1.65 M/S
DOP CALC LVOT STROKE VOLUME: 102 CM3
DOP CALCLVOT PEAK VEL VTI: 33.48 CM
E WAVE DECELERATION TIME: 208.95 MSEC
E/A RATIO: 0.44
E/E' RATIO: 9.71 M/S
ECHO LV POSTERIOR WALL: 0.72 CM (ref 0.6–1.1)
EOSINOPHIL # BLD AUTO: 0 K/UL (ref 0–0.5)
EOSINOPHIL NFR BLD: 0 % (ref 0–8)
ERYTHROCYTE [DISTWIDTH] IN BLOOD BY AUTOMATED COUNT: 15.1 % (ref 11.5–14.5)
EST. GFR  (AFRICAN AMERICAN): >60 ML/MIN/1.73 M^2
EST. GFR  (NON AFRICAN AMERICAN): >60 ML/MIN/1.73 M^2
ESTIMATED AVG GLUCOSE: 117 MG/DL (ref 68–131)
FRACTIONAL SHORTENING: 28 % (ref 28–44)
GLUCOSE SERPL-MCNC: 99 MG/DL (ref 70–110)
HBA1C MFR BLD: 5.7 % (ref 4–5.6)
HCT VFR BLD AUTO: 37 % (ref 37–48.5)
HDLC SERPL-MCNC: 39 MG/DL (ref 40–75)
HDLC SERPL: 21.2 % (ref 20–50)
HGB BLD-MCNC: 11.5 G/DL (ref 12–16)
IMM GRANULOCYTES # BLD AUTO: 0.06 K/UL (ref 0–0.04)
IMM GRANULOCYTES NFR BLD AUTO: 0.6 % (ref 0–0.5)
INTERVENTRICULAR SEPTUM: 1.09 CM (ref 0.6–1.1)
IVRT: 102.76 MSEC
LA MAJOR: 5.79 CM
LA MINOR: 5.57 CM
LA WIDTH: 3.7 CM
LDLC SERPL CALC-MCNC: 132 MG/DL (ref 63–159)
LEFT ATRIUM SIZE: 3.75 CM
LEFT ATRIUM VOLUME INDEX: 40.1 ML/M2
LEFT ATRIUM VOLUME: 66.96 CM3
LEFT INTERNAL DIMENSION IN SYSTOLE: 2.56 CM (ref 2.1–4)
LEFT VENTRICLE DIASTOLIC VOLUME INDEX: 31.63 ML/M2
LEFT VENTRICLE DIASTOLIC VOLUME: 52.83 ML
LEFT VENTRICLE MASS INDEX: 55 G/M2
LEFT VENTRICLE SYSTOLIC VOLUME INDEX: 14.2 ML/M2
LEFT VENTRICLE SYSTOLIC VOLUME: 23.75 ML
LEFT VENTRICULAR INTERNAL DIMENSION IN DIASTOLE: 3.56 CM (ref 3.5–6)
LEFT VENTRICULAR MASS: 91.9 G
LV LATERAL E/E' RATIO: 8.5 M/S
LV SEPTAL E/E' RATIO: 11.33 M/S
LYMPHOCYTES # BLD AUTO: 1.3 K/UL (ref 1–4.8)
LYMPHOCYTES NFR BLD: 11.9 % (ref 18–48)
MAGNESIUM SERPL-MCNC: 1.8 MG/DL (ref 1.6–2.6)
MCH RBC QN AUTO: 26.9 PG (ref 27–31)
MCHC RBC AUTO-ENTMCNC: 31.1 G/DL (ref 32–36)
MCV RBC AUTO: 87 FL (ref 82–98)
MONOCYTES # BLD AUTO: 1.7 K/UL (ref 0.3–1)
MONOCYTES NFR BLD: 15.5 % (ref 4–15)
MV A" WAVE DURATION": 10.28 MSEC
MV PEAK A VEL: 1.53 M/S
MV PEAK E VEL: 0.68 M/S
NEUTROPHILS # BLD AUTO: 7.7 K/UL (ref 1.8–7.7)
NEUTROPHILS NFR BLD: 71.8 % (ref 38–73)
NONHDLC SERPL-MCNC: 145 MG/DL
NRBC BLD-RTO: 0 /100 WBC
PHOSPHATE SERPL-MCNC: 4.1 MG/DL (ref 2.7–4.5)
PISA TR MAX VEL: 2.75 M/S
PLATELET # BLD AUTO: 328 K/UL (ref 150–350)
PMV BLD AUTO: 10.2 FL (ref 9.2–12.9)
POTASSIUM SERPL-SCNC: 4.5 MMOL/L (ref 3.5–5.1)
PROT SERPL-MCNC: 6.5 G/DL (ref 6–8.4)
PULM VEIN S/D RATIO: 2.03
PV PEAK D VEL: 0.31 M/S
PV PEAK S VEL: 0.63 M/S
RA MAJOR: 4.52 CM
RA PRESSURE: 3 MMHG
RA WIDTH: 2.84 CM
RBC # BLD AUTO: 4.27 M/UL (ref 4–5.4)
RIGHT VENTRICULAR END-DIASTOLIC DIMENSION: 2.95 CM
RV TISSUE DOPPLER FREE WALL SYSTOLIC VELOCITY 1 (APICAL 4 CHAMBER VIEW): 13.8 CM/S
SINUS: 2.87 CM
SODIUM SERPL-SCNC: 135 MMOL/L (ref 136–145)
STJ: 2.36 CM
TDI LATERAL: 0.08 M/S
TDI SEPTAL: 0.06 M/S
TDI: 0.07 M/S
TR MAX PG: 30 MMHG
TRICUSPID ANNULAR PLANE SYSTOLIC EXCURSION: 1.68 CM
TRIGL SERPL-MCNC: 65 MG/DL (ref 30–150)
TROPONIN I SERPL DL<=0.01 NG/ML-MCNC: <0.006 NG/ML (ref 0–0.03)
TV REST PULMONARY ARTERY PRESSURE: 33 MMHG
WBC # BLD AUTO: 10.66 K/UL (ref 3.9–12.7)

## 2021-02-17 PROCEDURE — 99217 PR OBSERVATION CARE DISCHARGE: ICD-10-PCS | Mod: ,,, | Performed by: PHYSICIAN ASSISTANT

## 2021-02-17 PROCEDURE — 97110 THERAPEUTIC EXERCISES: CPT

## 2021-02-17 PROCEDURE — G0378 HOSPITAL OBSERVATION PER HR: HCPCS

## 2021-02-17 PROCEDURE — 96361 HYDRATE IV INFUSION ADD-ON: CPT

## 2021-02-17 PROCEDURE — 82306 VITAMIN D 25 HYDROXY: CPT

## 2021-02-17 PROCEDURE — 80061 LIPID PANEL: CPT

## 2021-02-17 PROCEDURE — 25000003 PHARM REV CODE 250: Performed by: PHYSICIAN ASSISTANT

## 2021-02-17 PROCEDURE — 80053 COMPREHEN METABOLIC PANEL: CPT

## 2021-02-17 PROCEDURE — 97535 SELF CARE MNGMENT TRAINING: CPT

## 2021-02-17 PROCEDURE — 85025 COMPLETE CBC W/AUTO DIFF WBC: CPT

## 2021-02-17 PROCEDURE — 84100 ASSAY OF PHOSPHORUS: CPT

## 2021-02-17 PROCEDURE — 36415 COLL VENOUS BLD VENIPUNCTURE: CPT

## 2021-02-17 PROCEDURE — 83735 ASSAY OF MAGNESIUM: CPT

## 2021-02-17 PROCEDURE — 84484 ASSAY OF TROPONIN QUANT: CPT

## 2021-02-17 PROCEDURE — 63600175 PHARM REV CODE 636 W HCPCS: Performed by: PHYSICIAN ASSISTANT

## 2021-02-17 PROCEDURE — 83036 HEMOGLOBIN GLYCOSYLATED A1C: CPT

## 2021-02-17 PROCEDURE — 99217 PR OBSERVATION CARE DISCHARGE: CPT | Mod: ,,, | Performed by: PHYSICIAN ASSISTANT

## 2021-02-17 RX ORDER — FUROSEMIDE 20 MG/1
20 TABLET ORAL DAILY
Qty: 90 TABLET | Refills: 3 | Status: SHIPPED | OUTPATIENT
Start: 2021-02-17 | End: 2021-06-04 | Stop reason: SDUPTHER

## 2021-02-17 RX ADMIN — SODIUM CHLORIDE, SODIUM LACTATE, POTASSIUM CHLORIDE, AND CALCIUM CHLORIDE 500 ML: .6; .31; .03; .02 INJECTION, SOLUTION INTRAVENOUS at 03:02

## 2021-02-17 RX ADMIN — ONDANSETRON 8 MG: 8 TABLET, ORALLY DISINTEGRATING ORAL at 01:02

## 2021-02-17 RX ADMIN — ROSUVASTATIN CALCIUM 40 MG: 20 TABLET, FILM COATED ORAL at 09:02

## 2021-02-17 RX ADMIN — FUROSEMIDE 20 MG: 20 TABLET ORAL at 09:02

## 2021-02-17 RX ADMIN — PANTOPRAZOLE SODIUM 40 MG: 40 TABLET, DELAYED RELEASE ORAL at 06:02

## 2021-02-17 RX ADMIN — LOSARTAN POTASSIUM 25 MG: 25 TABLET, FILM COATED ORAL at 09:02

## 2021-02-17 RX ADMIN — POTASSIUM CHLORIDE 10 MEQ: 10 CAPSULE, COATED, EXTENDED RELEASE ORAL at 09:02

## 2021-02-18 PROCEDURE — G0180 MD CERTIFICATION HHA PATIENT: HCPCS | Mod: ,,, | Performed by: HOSPITALIST

## 2021-02-18 PROCEDURE — G0180 PR HOME HEALTH MD CERTIFICATION: ICD-10-PCS | Mod: ,,, | Performed by: HOSPITALIST

## 2021-02-19 ENCOUNTER — TELEPHONE (OUTPATIENT)
Dept: INTERNAL MEDICINE | Facility: CLINIC | Age: 86
End: 2021-02-19

## 2021-02-26 ENCOUNTER — HOSPITAL ENCOUNTER (OUTPATIENT)
Dept: RADIOLOGY | Facility: OTHER | Age: 86
Discharge: HOME | End: 2021-02-26
Attending: COLON & RECTAL SURGERY
Payer: MEDICARE

## 2021-02-26 PROCEDURE — 74177 CT ABD & PELVIS W/CONTRAST: CPT | Mod: 26,,, | Performed by: RADIOLOGY

## 2021-02-26 PROCEDURE — 71260 CT CHEST ABDOMEN PELVIS WITH CONTRAST (XPD): ICD-10-PCS | Mod: 26,,, | Performed by: RADIOLOGY

## 2021-02-26 PROCEDURE — 74177 CT CHEST ABDOMEN PELVIS WITH CONTRAST (XPD): ICD-10-PCS | Mod: 26,,, | Performed by: RADIOLOGY

## 2021-02-26 PROCEDURE — 25500020 PHARM REV CODE 255: Performed by: COLON & RECTAL SURGERY

## 2021-02-26 PROCEDURE — 71260 CT THORAX DX C+: CPT | Mod: TC

## 2021-02-26 PROCEDURE — A9698 NON-RAD CONTRAST MATERIALNOC: HCPCS | Performed by: COLON & RECTAL SURGERY

## 2021-02-26 PROCEDURE — 74177 CT ABD & PELVIS W/CONTRAST: CPT | Mod: TC

## 2021-02-26 PROCEDURE — 71260 CT THORAX DX C+: CPT | Mod: 26,,, | Performed by: RADIOLOGY

## 2021-02-26 RX ADMIN — IOHEXOL 1000 ML: 9 SOLUTION ORAL at 09:02

## 2021-02-26 RX ADMIN — IOHEXOL 75 ML: 350 INJECTION, SOLUTION INTRAVENOUS at 10:02

## 2021-03-03 ENCOUNTER — OFFICE VISIT (OUTPATIENT)
Dept: INTERNAL MEDICINE | Facility: CLINIC | Age: 86
End: 2021-03-03
Payer: MEDICARE

## 2021-03-03 ENCOUNTER — OFFICE VISIT (OUTPATIENT)
Dept: CARDIOLOGY | Facility: CLINIC | Age: 86
End: 2021-03-03
Payer: MEDICARE

## 2021-03-03 ENCOUNTER — EXTERNAL HOME HEALTH (OUTPATIENT)
Dept: HOME HEALTH SERVICES | Facility: HOSPITAL | Age: 86
End: 2021-03-03
Payer: MEDICARE

## 2021-03-03 ENCOUNTER — TELEPHONE (OUTPATIENT)
Dept: INTERNAL MEDICINE | Facility: CLINIC | Age: 86
End: 2021-03-03

## 2021-03-03 VITALS
WEIGHT: 108 LBS | SYSTOLIC BLOOD PRESSURE: 146 MMHG | DIASTOLIC BLOOD PRESSURE: 72 MMHG | HEIGHT: 67 IN | BODY MASS INDEX: 16.95 KG/M2 | HEART RATE: 88 BPM

## 2021-03-03 VITALS
HEIGHT: 67 IN | SYSTOLIC BLOOD PRESSURE: 141 MMHG | WEIGHT: 108.94 LBS | DIASTOLIC BLOOD PRESSURE: 67 MMHG | BODY MASS INDEX: 17.1 KG/M2 | HEART RATE: 88 BPM

## 2021-03-03 DIAGNOSIS — M81.0 OSTEOPOROSIS, UNSPECIFIED OSTEOPOROSIS TYPE, UNSPECIFIED PATHOLOGICAL FRACTURE PRESENCE: Primary | ICD-10-CM

## 2021-03-03 DIAGNOSIS — M81.0 AGE-RELATED OSTEOPOROSIS WITHOUT CURRENT PATHOLOGICAL FRACTURE: ICD-10-CM

## 2021-03-03 DIAGNOSIS — E78.2 MIXED HYPERLIPIDEMIA: Chronic | ICD-10-CM

## 2021-03-03 DIAGNOSIS — R60.0 BILATERAL LOWER EXTREMITY EDEMA: ICD-10-CM

## 2021-03-03 DIAGNOSIS — I50.42 CHRONIC COMBINED SYSTOLIC AND DIASTOLIC HEART FAILURE: Chronic | ICD-10-CM

## 2021-03-03 DIAGNOSIS — I65.23 CAROTID STENOSIS, ASYMPTOMATIC, BILATERAL: ICD-10-CM

## 2021-03-03 DIAGNOSIS — R55 SYNCOPE AND COLLAPSE: Primary | ICD-10-CM

## 2021-03-03 DIAGNOSIS — I10 ESSENTIAL HYPERTENSION: Chronic | ICD-10-CM

## 2021-03-03 PROCEDURE — 99214 OFFICE O/P EST MOD 30 MIN: CPT | Mod: PBBFAC

## 2021-03-03 PROCEDURE — 99214 PR OFFICE/OUTPT VISIT, EST, LEVL IV, 30-39 MIN: ICD-10-PCS | Mod: S$PBB,,, | Performed by: INTERNAL MEDICINE

## 2021-03-03 PROCEDURE — 99204 PR OFFICE/OUTPT VISIT, NEW, LEVL IV, 45-59 MIN: ICD-10-PCS | Mod: S$PBB,GC,,

## 2021-03-03 PROCEDURE — 99999 PR PBB SHADOW E&M-EST. PATIENT-LVL IV: ICD-10-PCS | Mod: PBBFAC,,, | Performed by: INTERNAL MEDICINE

## 2021-03-03 PROCEDURE — 99999 PR PBB SHADOW E&M-EST. PATIENT-LVL IV: CPT | Mod: PBBFAC,GC,,

## 2021-03-03 PROCEDURE — 99204 OFFICE O/P NEW MOD 45 MIN: CPT | Mod: S$PBB,GC,,

## 2021-03-03 PROCEDURE — 99999 PR PBB SHADOW E&M-EST. PATIENT-LVL IV: ICD-10-PCS | Mod: PBBFAC,GC,,

## 2021-03-03 PROCEDURE — 99214 OFFICE O/P EST MOD 30 MIN: CPT | Mod: S$PBB,,, | Performed by: INTERNAL MEDICINE

## 2021-03-03 PROCEDURE — 99214 OFFICE O/P EST MOD 30 MIN: CPT | Mod: PBBFAC,27 | Performed by: INTERNAL MEDICINE

## 2021-03-03 PROCEDURE — 99999 PR PBB SHADOW E&M-EST. PATIENT-LVL IV: CPT | Mod: PBBFAC,,, | Performed by: INTERNAL MEDICINE

## 2021-03-03 RX ORDER — FERROUS SULFATE 325(65) MG
325 TABLET, DELAYED RELEASE (ENTERIC COATED) ORAL
COMMUNITY
End: 2021-06-04 | Stop reason: SDUPTHER

## 2021-03-03 RX ORDER — ASPIRIN 81 MG/1
81 TABLET ORAL DAILY
Status: ON HOLD | COMMUNITY
End: 2021-04-15

## 2021-03-03 RX ORDER — ALENDRONATE SODIUM 70 MG/1
70 TABLET ORAL
Qty: 12 TABLET | Refills: 3 | Status: SHIPPED | OUTPATIENT
Start: 2021-03-03 | End: 2021-03-08 | Stop reason: SDUPTHER

## 2021-03-05 RX ORDER — POTASSIUM CHLORIDE 750 MG/1
TABLET, EXTENDED RELEASE ORAL
Qty: 90 TABLET | Refills: 3 | Status: SHIPPED | OUTPATIENT
Start: 2021-03-05 | End: 2022-06-02

## 2021-03-08 DIAGNOSIS — M81.0 OSTEOPOROSIS, UNSPECIFIED OSTEOPOROSIS TYPE, UNSPECIFIED PATHOLOGICAL FRACTURE PRESENCE: ICD-10-CM

## 2021-03-08 RX ORDER — ALENDRONATE SODIUM 70 MG/1
70 TABLET ORAL
Qty: 12 TABLET | Refills: 3 | Status: SHIPPED | OUTPATIENT
Start: 2021-03-08 | End: 2022-05-05

## 2021-03-22 ENCOUNTER — ANESTHESIA EVENT (OUTPATIENT)
Dept: ENDOSCOPY | Facility: HOSPITAL | Age: 86
End: 2021-03-22
Payer: MEDICARE

## 2021-03-22 ENCOUNTER — TELEPHONE (OUTPATIENT)
Dept: ENDOSCOPY | Facility: HOSPITAL | Age: 86
End: 2021-03-22

## 2021-03-22 ENCOUNTER — TELEPHONE (OUTPATIENT)
Dept: INTERNAL MEDICINE | Facility: CLINIC | Age: 86
End: 2021-03-22

## 2021-03-23 ENCOUNTER — TELEPHONE (OUTPATIENT)
Dept: SURGERY | Facility: HOSPITAL | Age: 86
End: 2021-03-23

## 2021-03-23 ENCOUNTER — ANESTHESIA (OUTPATIENT)
Dept: ENDOSCOPY | Facility: HOSPITAL | Age: 86
End: 2021-03-23
Payer: MEDICARE

## 2021-03-23 ENCOUNTER — HOSPITAL ENCOUNTER (OUTPATIENT)
Facility: HOSPITAL | Age: 86
Discharge: HOME | End: 2021-03-23
Attending: COLON & RECTAL SURGERY | Admitting: COLON & RECTAL SURGERY
Payer: MEDICARE

## 2021-03-23 VITALS
RESPIRATION RATE: 14 BRPM | OXYGEN SATURATION: 99 % | WEIGHT: 110 LBS | BODY MASS INDEX: 17.27 KG/M2 | TEMPERATURE: 99 F | DIASTOLIC BLOOD PRESSURE: 64 MMHG | HEART RATE: 79 BPM | SYSTOLIC BLOOD PRESSURE: 147 MMHG | HEIGHT: 67 IN

## 2021-03-23 DIAGNOSIS — K63.89 COLONIC MASS: ICD-10-CM

## 2021-03-23 DIAGNOSIS — C18.8 MALIGNANT NEOPLASM OF OVERLAPPING SITES OF COLON: ICD-10-CM

## 2021-03-23 DIAGNOSIS — C18.9 RECURRENT CARCINOMA OF COLON: ICD-10-CM

## 2021-03-23 DIAGNOSIS — Z12.11 SCREEN FOR COLON CANCER: Primary | ICD-10-CM

## 2021-03-23 DIAGNOSIS — R16.0 LIVER MASS, LEFT LOBE: Primary | ICD-10-CM

## 2021-03-23 PROCEDURE — 37000008 HC ANESTHESIA 1ST 15 MINUTES: Performed by: COLON & RECTAL SURGERY

## 2021-03-23 PROCEDURE — 25000003 PHARM REV CODE 250: Performed by: NURSE ANESTHETIST, CERTIFIED REGISTERED

## 2021-03-23 PROCEDURE — 88305 TISSUE EXAM BY PATHOLOGIST: CPT | Mod: 26,,, | Performed by: PATHOLOGY

## 2021-03-23 PROCEDURE — 27201012 HC FORCEPS, HOT/COLD, DISP: Performed by: COLON & RECTAL SURGERY

## 2021-03-23 PROCEDURE — 45380 PR COLONOSCOPY,BIOPSY: ICD-10-PCS | Mod: PT,,, | Performed by: COLON & RECTAL SURGERY

## 2021-03-23 PROCEDURE — 25000003 PHARM REV CODE 250: Performed by: COLON & RECTAL SURGERY

## 2021-03-23 PROCEDURE — 45380 COLONOSCOPY AND BIOPSY: CPT | Mod: PT,,, | Performed by: COLON & RECTAL SURGERY

## 2021-03-23 PROCEDURE — 88305 TISSUE EXAM BY PATHOLOGIST: CPT | Performed by: PATHOLOGY

## 2021-03-23 PROCEDURE — 63600175 PHARM REV CODE 636 W HCPCS: Performed by: NURSE ANESTHETIST, CERTIFIED REGISTERED

## 2021-03-23 PROCEDURE — 27200997: Performed by: COLON & RECTAL SURGERY

## 2021-03-23 PROCEDURE — 88305 TISSUE EXAM BY PATHOLOGIST: ICD-10-PCS | Mod: 26,,, | Performed by: PATHOLOGY

## 2021-03-23 PROCEDURE — 37000009 HC ANESTHESIA EA ADD 15 MINS: Performed by: COLON & RECTAL SURGERY

## 2021-03-23 PROCEDURE — E9220 PRA ENDO ANESTHESIA: ICD-10-PCS | Mod: PT,,, | Performed by: NURSE ANESTHETIST, CERTIFIED REGISTERED

## 2021-03-23 PROCEDURE — E9220 PRA ENDO ANESTHESIA: HCPCS | Mod: PT,,, | Performed by: NURSE ANESTHETIST, CERTIFIED REGISTERED

## 2021-03-23 PROCEDURE — 45380 COLONOSCOPY AND BIOPSY: CPT | Performed by: COLON & RECTAL SURGERY

## 2021-03-23 RX ORDER — SODIUM CHLORIDE 9 MG/ML
INJECTION, SOLUTION INTRAVENOUS CONTINUOUS
Status: DISCONTINUED | OUTPATIENT
Start: 2021-03-23 | End: 2021-03-23 | Stop reason: HOSPADM

## 2021-03-23 RX ORDER — LIDOCAINE HYDROCHLORIDE 20 MG/ML
INJECTION INTRAVENOUS
Status: DISCONTINUED | OUTPATIENT
Start: 2021-03-23 | End: 2021-03-23

## 2021-03-23 RX ORDER — PROPOFOL 10 MG/ML
VIAL (ML) INTRAVENOUS
Status: DISCONTINUED | OUTPATIENT
Start: 2021-03-23 | End: 2021-03-23

## 2021-03-23 RX ORDER — PHENYLEPHRINE HYDROCHLORIDE 10 MG/ML
INJECTION INTRAVENOUS
Status: DISCONTINUED | OUTPATIENT
Start: 2021-03-23 | End: 2021-03-23

## 2021-03-23 RX ORDER — PROPOFOL 10 MG/ML
VIAL (ML) INTRAVENOUS CONTINUOUS PRN
Status: DISCONTINUED | OUTPATIENT
Start: 2021-03-23 | End: 2021-03-23

## 2021-03-23 RX ADMIN — PROPOFOL 40 MG: 10 INJECTION, EMULSION INTRAVENOUS at 07:03

## 2021-03-23 RX ADMIN — SODIUM CHLORIDE: 0.9 INJECTION, SOLUTION INTRAVENOUS at 07:03

## 2021-03-23 RX ADMIN — PHENYLEPHRINE HYDROCHLORIDE 200 MCG: 10 INJECTION INTRAVENOUS at 07:03

## 2021-03-23 RX ADMIN — PROPOFOL 10 MG: 10 INJECTION, EMULSION INTRAVENOUS at 07:03

## 2021-03-23 RX ADMIN — PHENYLEPHRINE HYDROCHLORIDE 100 MCG: 10 INJECTION INTRAVENOUS at 07:03

## 2021-03-23 RX ADMIN — LIDOCAINE HYDROCHLORIDE 50 MG: 20 INJECTION, SOLUTION INTRAVENOUS at 07:03

## 2021-03-23 RX ADMIN — PROPOFOL 50 MCG/KG/MIN: 10 INJECTION, EMULSION INTRAVENOUS at 07:03

## 2021-03-23 RX ADMIN — PROPOFOL 30 MG: 10 INJECTION, EMULSION INTRAVENOUS at 07:03

## 2021-03-24 ENCOUNTER — TELEPHONE (OUTPATIENT)
Dept: SURGERY | Facility: CLINIC | Age: 86
End: 2021-03-24

## 2021-03-24 LAB
COMMENT: NORMAL
FINAL PATHOLOGIC DIAGNOSIS: NORMAL
GROSS: NORMAL
Lab: NORMAL

## 2021-03-31 ENCOUNTER — TELEPHONE (OUTPATIENT)
Dept: INTERNAL MEDICINE | Facility: CLINIC | Age: 86
End: 2021-03-31

## 2021-03-31 DIAGNOSIS — I50.42 CHRONIC COMBINED SYSTOLIC AND DIASTOLIC HEART FAILURE: Primary | ICD-10-CM

## 2021-04-07 ENCOUNTER — HOSPITAL ENCOUNTER (OUTPATIENT)
Dept: RADIOLOGY | Facility: HOSPITAL | Age: 86
Discharge: HOME | End: 2021-04-07
Attending: COLON & RECTAL SURGERY
Payer: MEDICARE

## 2021-04-07 ENCOUNTER — OFFICE VISIT (OUTPATIENT)
Dept: SURGERY | Facility: CLINIC | Age: 86
End: 2021-04-07
Payer: MEDICARE

## 2021-04-07 VITALS
DIASTOLIC BLOOD PRESSURE: 57 MMHG | WEIGHT: 107.81 LBS | HEIGHT: 67 IN | HEART RATE: 124 BPM | SYSTOLIC BLOOD PRESSURE: 130 MMHG | BODY MASS INDEX: 16.92 KG/M2

## 2021-04-07 DIAGNOSIS — C18.8 MALIGNANT NEOPLASM OF OVERLAPPING SITES OF COLON: ICD-10-CM

## 2021-04-07 DIAGNOSIS — C18.9 RECURRENT CARCINOMA OF COLON: ICD-10-CM

## 2021-04-07 DIAGNOSIS — R16.0 LIVER MASS, LEFT LOBE: ICD-10-CM

## 2021-04-07 DIAGNOSIS — C18.4 MALIGNANT NEOPLASM OF TRANSVERSE COLON: Primary | ICD-10-CM

## 2021-04-07 LAB — POCT GLUCOSE: 119 MG/DL (ref 70–110)

## 2021-04-07 PROCEDURE — 99214 OFFICE O/P EST MOD 30 MIN: CPT | Mod: PBBFAC,25 | Performed by: COLON & RECTAL SURGERY

## 2021-04-07 PROCEDURE — 99214 PR OFFICE/OUTPT VISIT, EST, LEVL IV, 30-39 MIN: ICD-10-PCS | Mod: S$PBB,,, | Performed by: COLON & RECTAL SURGERY

## 2021-04-07 PROCEDURE — 99999 PR PBB SHADOW E&M-EST. PATIENT-LVL IV: ICD-10-PCS | Mod: PBBFAC,,, | Performed by: COLON & RECTAL SURGERY

## 2021-04-07 PROCEDURE — 78815 NM PET CT ROUTINE: ICD-10-PCS | Mod: 26,PS,, | Performed by: RADIOLOGY

## 2021-04-07 PROCEDURE — 78815 PET IMAGE W/CT SKULL-THIGH: CPT | Mod: TC

## 2021-04-07 PROCEDURE — 78815 PET IMAGE W/CT SKULL-THIGH: CPT | Mod: 26,PS,, | Performed by: RADIOLOGY

## 2021-04-07 PROCEDURE — 99214 OFFICE O/P EST MOD 30 MIN: CPT | Mod: S$PBB,,, | Performed by: COLON & RECTAL SURGERY

## 2021-04-07 PROCEDURE — 99999 PR PBB SHADOW E&M-EST. PATIENT-LVL IV: CPT | Mod: PBBFAC,,, | Performed by: COLON & RECTAL SURGERY

## 2021-04-08 ENCOUNTER — TELEPHONE (OUTPATIENT)
Dept: PREADMISSION TESTING | Facility: HOSPITAL | Age: 86
End: 2021-04-08

## 2021-04-08 DIAGNOSIS — Z01.818 PRE-OP EVALUATION: Primary | ICD-10-CM

## 2021-04-08 DIAGNOSIS — C18.9 ADENOCARCINOMA OF COLON: ICD-10-CM

## 2021-04-08 RX ORDER — NEOMYCIN SULFATE 500 MG/1
TABLET ORAL
Qty: 6 TABLET | Refills: 0 | Status: ON HOLD | OUTPATIENT
Start: 2021-04-08 | End: 2021-04-15

## 2021-04-08 RX ORDER — METRONIDAZOLE 500 MG/1
TABLET ORAL
Qty: 3 TABLET | Refills: 0 | Status: ON HOLD | OUTPATIENT
Start: 2021-04-08 | End: 2021-04-15

## 2021-04-08 RX ORDER — POLYETHYLENE GLYCOL 3350 17 G/17G
POWDER, FOR SOLUTION ORAL
Qty: 238 G | Refills: 0 | Status: ON HOLD | OUTPATIENT
Start: 2021-04-08 | End: 2021-04-15

## 2021-04-09 ENCOUNTER — HOSPITAL ENCOUNTER (OUTPATIENT)
Dept: CARDIOLOGY | Facility: CLINIC | Age: 86
Discharge: HOME | End: 2021-04-09
Payer: MEDICARE

## 2021-04-09 ENCOUNTER — OFFICE VISIT (OUTPATIENT)
Dept: INTERNAL MEDICINE | Facility: CLINIC | Age: 86
End: 2021-04-09
Payer: MEDICARE

## 2021-04-09 VITALS
SYSTOLIC BLOOD PRESSURE: 150 MMHG | HEIGHT: 67 IN | BODY MASS INDEX: 16.93 KG/M2 | DIASTOLIC BLOOD PRESSURE: 68 MMHG | OXYGEN SATURATION: 98 % | WEIGHT: 107.88 LBS | TEMPERATURE: 98 F | HEART RATE: 98 BPM

## 2021-04-09 DIAGNOSIS — I50.42 CHRONIC COMBINED SYSTOLIC AND DIASTOLIC HEART FAILURE: Chronic | ICD-10-CM

## 2021-04-09 DIAGNOSIS — I65.23 CAROTID STENOSIS, ASYMPTOMATIC, BILATERAL: ICD-10-CM

## 2021-04-09 DIAGNOSIS — R09.89 BILATERAL CAROTID BRUITS: ICD-10-CM

## 2021-04-09 DIAGNOSIS — R55 SYNCOPE, UNSPECIFIED SYNCOPE TYPE: ICD-10-CM

## 2021-04-09 DIAGNOSIS — E87.1 HYPONATREMIA: ICD-10-CM

## 2021-04-09 DIAGNOSIS — D50.8 OTHER IRON DEFICIENCY ANEMIA: ICD-10-CM

## 2021-04-09 DIAGNOSIS — E78.2 MIXED HYPERLIPIDEMIA: Chronic | ICD-10-CM

## 2021-04-09 DIAGNOSIS — R63.4 WEIGHT LOSS: ICD-10-CM

## 2021-04-09 DIAGNOSIS — C18.9 ADENOCARCINOMA OF COLON: ICD-10-CM

## 2021-04-09 DIAGNOSIS — Z01.818 PREOP EXAMINATION: Primary | ICD-10-CM

## 2021-04-09 DIAGNOSIS — Z90.710 H/O: HYSTERECTOMY: ICD-10-CM

## 2021-04-09 DIAGNOSIS — R60.9 EDEMA, UNSPECIFIED TYPE: ICD-10-CM

## 2021-04-09 DIAGNOSIS — M81.0 AGE-RELATED OSTEOPOROSIS WITHOUT CURRENT PATHOLOGICAL FRACTURE: ICD-10-CM

## 2021-04-09 DIAGNOSIS — I10 ESSENTIAL HYPERTENSION: Chronic | ICD-10-CM

## 2021-04-09 DIAGNOSIS — D64.9 SYMPTOMATIC ANEMIA: ICD-10-CM

## 2021-04-09 DIAGNOSIS — R73.03 PREDIABETES: ICD-10-CM

## 2021-04-09 DIAGNOSIS — Z01.818 PRE-OP EVALUATION: ICD-10-CM

## 2021-04-09 DIAGNOSIS — I35.0 AORTIC STENOSIS, MILD: ICD-10-CM

## 2021-04-09 PROBLEM — K92.2 GASTROINTESTINAL BLEED: Status: RESOLVED | Noted: 2020-03-17 | Resolved: 2021-04-09

## 2021-04-09 PROBLEM — D75.839 THROMBOCYTOSIS: Status: RESOLVED | Noted: 2020-03-17 | Resolved: 2021-04-09

## 2021-04-09 PROCEDURE — 99213 OFFICE O/P EST LOW 20 MIN: CPT | Mod: PBBFAC,25 | Performed by: HOSPITALIST

## 2021-04-09 PROCEDURE — 99999 PR PBB SHADOW E&M-EST. PATIENT-LVL III: CPT | Mod: PBBFAC,,, | Performed by: HOSPITALIST

## 2021-04-09 PROCEDURE — 99214 OFFICE O/P EST MOD 30 MIN: CPT | Mod: S$PBB,,, | Performed by: HOSPITALIST

## 2021-04-09 PROCEDURE — 93005 ELECTROCARDIOGRAM TRACING: CPT | Mod: PBBFAC | Performed by: INTERNAL MEDICINE

## 2021-04-09 PROCEDURE — 99999 PR PBB SHADOW E&M-EST. PATIENT-LVL III: ICD-10-PCS | Mod: PBBFAC,,, | Performed by: HOSPITALIST

## 2021-04-09 PROCEDURE — 93010 ELECTROCARDIOGRAM REPORT: CPT | Mod: S$PBB,,, | Performed by: INTERNAL MEDICINE

## 2021-04-09 PROCEDURE — 93010 EKG 12-LEAD: ICD-10-PCS | Mod: S$PBB,,, | Performed by: INTERNAL MEDICINE

## 2021-04-09 PROCEDURE — 99214 PR OFFICE/OUTPT VISIT, EST, LEVL IV, 30-39 MIN: ICD-10-PCS | Mod: S$PBB,,, | Performed by: HOSPITALIST

## 2021-04-13 ENCOUNTER — TELEPHONE (OUTPATIENT)
Dept: SURGERY | Facility: CLINIC | Age: 86
End: 2021-04-13

## 2021-04-14 ENCOUNTER — ANESTHESIA EVENT (OUTPATIENT)
Dept: SURGERY | Facility: HOSPITAL | Age: 86
DRG: 330 | End: 2021-04-14
Payer: MEDICARE

## 2021-04-14 ENCOUNTER — TELEPHONE (OUTPATIENT)
Dept: SURGERY | Facility: CLINIC | Age: 86
End: 2021-04-14

## 2021-04-14 ENCOUNTER — DOCUMENTATION ONLY (OUTPATIENT)
Dept: SURGERY | Facility: HOSPITAL | Age: 86
End: 2021-04-14

## 2021-04-15 ENCOUNTER — TELEPHONE (OUTPATIENT)
Dept: INTERNAL MEDICINE | Facility: CLINIC | Age: 86
End: 2021-04-15

## 2021-04-15 ENCOUNTER — HOSPITAL ENCOUNTER (INPATIENT)
Facility: HOSPITAL | Age: 86
LOS: 3 days | Discharge: HOME HEALTH | DRG: 330 | End: 2021-04-18
Attending: COLON & RECTAL SURGERY | Admitting: COLON & RECTAL SURGERY
Payer: MEDICARE

## 2021-04-15 ENCOUNTER — ANESTHESIA (OUTPATIENT)
Dept: SURGERY | Facility: HOSPITAL | Age: 86
DRG: 330 | End: 2021-04-15
Payer: MEDICARE

## 2021-04-15 DIAGNOSIS — C18.9 COLON CANCER: ICD-10-CM

## 2021-04-15 DIAGNOSIS — K63.89 COLONIC MASS: Primary | ICD-10-CM

## 2021-04-15 LAB
ABO + RH BLD: NORMAL
BLD GP AB SCN CELLS X3 SERPL QL: NORMAL

## 2021-04-15 PROCEDURE — 36000709 HC OR TIME LEV III EA ADD 15 MIN: Performed by: COLON & RECTAL SURGERY

## 2021-04-15 PROCEDURE — 37000008 HC ANESTHESIA 1ST 15 MINUTES: Performed by: COLON & RECTAL SURGERY

## 2021-04-15 PROCEDURE — 37000009 HC ANESTHESIA EA ADD 15 MINS: Performed by: COLON & RECTAL SURGERY

## 2021-04-15 PROCEDURE — 71000033 HC RECOVERY, INTIAL HOUR: Performed by: COLON & RECTAL SURGERY

## 2021-04-15 PROCEDURE — 88307 PR  SURG PATH,LEVEL V: ICD-10-PCS | Mod: 26,,, | Performed by: PATHOLOGY

## 2021-04-15 PROCEDURE — D9220A PRA ANESTHESIA: Mod: CRNA,,, | Performed by: NURSE ANESTHETIST, CERTIFIED REGISTERED

## 2021-04-15 PROCEDURE — S0030 INJECTION, METRONIDAZOLE: HCPCS | Performed by: NURSE PRACTITIONER

## 2021-04-15 PROCEDURE — 94761 N-INVAS EAR/PLS OXIMETRY MLT: CPT

## 2021-04-15 PROCEDURE — D9220A PRA ANESTHESIA: Mod: ANES,,, | Performed by: ANESTHESIOLOGY

## 2021-04-15 PROCEDURE — 36000708 HC OR TIME LEV III 1ST 15 MIN: Performed by: COLON & RECTAL SURGERY

## 2021-04-15 PROCEDURE — 63600175 PHARM REV CODE 636 W HCPCS: Performed by: NURSE ANESTHETIST, CERTIFIED REGISTERED

## 2021-04-15 PROCEDURE — 44160 REMOVAL OF COLON: CPT | Mod: GC,,, | Performed by: COLON & RECTAL SURGERY

## 2021-04-15 PROCEDURE — 88307 TISSUE EXAM BY PATHOLOGIST: CPT | Mod: 26,,, | Performed by: PATHOLOGY

## 2021-04-15 PROCEDURE — 86900 BLOOD TYPING SEROLOGIC ABO: CPT | Performed by: NURSE PRACTITIONER

## 2021-04-15 PROCEDURE — 25000003 PHARM REV CODE 250: Performed by: STUDENT IN AN ORGANIZED HEALTH CARE EDUCATION/TRAINING PROGRAM

## 2021-04-15 PROCEDURE — 63600175 PHARM REV CODE 636 W HCPCS: Performed by: STUDENT IN AN ORGANIZED HEALTH CARE EDUCATION/TRAINING PROGRAM

## 2021-04-15 PROCEDURE — 25000003 PHARM REV CODE 250: Performed by: NURSE PRACTITIONER

## 2021-04-15 PROCEDURE — 25000003 PHARM REV CODE 250: Performed by: NURSE ANESTHETIST, CERTIFIED REGISTERED

## 2021-04-15 PROCEDURE — D9220A PRA ANESTHESIA: ICD-10-PCS | Mod: CRNA,,, | Performed by: NURSE ANESTHETIST, CERTIFIED REGISTERED

## 2021-04-15 PROCEDURE — 88307 TISSUE EXAM BY PATHOLOGIST: CPT | Performed by: PATHOLOGY

## 2021-04-15 PROCEDURE — 20600001 HC STEP DOWN PRIVATE ROOM

## 2021-04-15 PROCEDURE — D9220A PRA ANESTHESIA: ICD-10-PCS | Mod: ANES,,, | Performed by: ANESTHESIOLOGY

## 2021-04-15 PROCEDURE — 63600175 PHARM REV CODE 636 W HCPCS: Performed by: NURSE PRACTITIONER

## 2021-04-15 PROCEDURE — 99900035 HC TECH TIME PER 15 MIN (STAT)

## 2021-04-15 PROCEDURE — 71000015 HC POSTOP RECOV 1ST HR: Performed by: COLON & RECTAL SURGERY

## 2021-04-15 PROCEDURE — 27201423 OPTIME MED/SURG SUP & DEVICES STERILE SUPPLY: Performed by: COLON & RECTAL SURGERY

## 2021-04-15 PROCEDURE — 44160 PR REMVL COLON & TERM ILEUM W/ILEOCOLOSTOMY: ICD-10-PCS | Mod: GC,,, | Performed by: COLON & RECTAL SURGERY

## 2021-04-15 PROCEDURE — 25000003 PHARM REV CODE 250: Performed by: COLON & RECTAL SURGERY

## 2021-04-15 RX ORDER — PHENYLEPHRINE HCL IN 0.9% NACL 1 MG/10 ML
SYRINGE (ML) INTRAVENOUS
Status: DISCONTINUED | OUTPATIENT
Start: 2021-04-15 | End: 2021-04-15

## 2021-04-15 RX ORDER — ONDANSETRON 2 MG/ML
INJECTION INTRAMUSCULAR; INTRAVENOUS
Status: DISCONTINUED | OUTPATIENT
Start: 2021-04-15 | End: 2021-04-15

## 2021-04-15 RX ORDER — SODIUM CHLORIDE 9 MG/ML
INJECTION, SOLUTION INTRAVENOUS CONTINUOUS
Status: DISCONTINUED | OUTPATIENT
Start: 2021-04-15 | End: 2021-04-16

## 2021-04-15 RX ORDER — MUPIROCIN 20 MG/G
OINTMENT TOPICAL 2 TIMES DAILY
Status: DISCONTINUED | OUTPATIENT
Start: 2021-04-15 | End: 2021-04-18 | Stop reason: HOSPADM

## 2021-04-15 RX ORDER — GABAPENTIN 300 MG/1
300 CAPSULE ORAL 3 TIMES DAILY
Status: DISCONTINUED | OUTPATIENT
Start: 2021-04-15 | End: 2021-04-18 | Stop reason: HOSPADM

## 2021-04-15 RX ORDER — METRONIDAZOLE 500 MG/100ML
500 INJECTION, SOLUTION INTRAVENOUS
Status: COMPLETED | OUTPATIENT
Start: 2021-04-15 | End: 2021-04-15

## 2021-04-15 RX ORDER — NEOSTIGMINE METHYLSULFATE 0.5 MG/ML
INJECTION, SOLUTION INTRAVENOUS
Status: DISCONTINUED | OUTPATIENT
Start: 2021-04-15 | End: 2021-04-15

## 2021-04-15 RX ORDER — DEXAMETHASONE SODIUM PHOSPHATE 4 MG/ML
INJECTION, SOLUTION INTRA-ARTICULAR; INTRALESIONAL; INTRAMUSCULAR; INTRAVENOUS; SOFT TISSUE
Status: DISCONTINUED | OUTPATIENT
Start: 2021-04-15 | End: 2021-04-15

## 2021-04-15 RX ORDER — PROPOFOL 10 MG/ML
VIAL (ML) INTRAVENOUS
Status: DISCONTINUED | OUTPATIENT
Start: 2021-04-15 | End: 2021-04-15

## 2021-04-15 RX ORDER — OXYCODONE HYDROCHLORIDE 10 MG/1
10 TABLET ORAL EVERY 4 HOURS PRN
Status: DISCONTINUED | OUTPATIENT
Start: 2021-04-15 | End: 2021-04-18 | Stop reason: HOSPADM

## 2021-04-15 RX ORDER — ONDANSETRON 2 MG/ML
4 INJECTION INTRAMUSCULAR; INTRAVENOUS ONCE AS NEEDED
Status: DISCONTINUED | OUTPATIENT
Start: 2021-04-15 | End: 2021-04-15 | Stop reason: HOSPADM

## 2021-04-15 RX ORDER — ONDANSETRON 2 MG/ML
4 INJECTION INTRAMUSCULAR; INTRAVENOUS EVERY 12 HOURS PRN
Status: DISCONTINUED | OUTPATIENT
Start: 2021-04-15 | End: 2021-04-18 | Stop reason: HOSPADM

## 2021-04-15 RX ORDER — SODIUM CHLORIDE 9 MG/ML
INJECTION, SOLUTION INTRAVENOUS
Status: COMPLETED | OUTPATIENT
Start: 2021-04-15 | End: 2021-04-15

## 2021-04-15 RX ORDER — FENTANYL CITRATE 50 UG/ML
INJECTION, SOLUTION INTRAMUSCULAR; INTRAVENOUS
Status: DISCONTINUED | OUTPATIENT
Start: 2021-04-15 | End: 2021-04-15

## 2021-04-15 RX ORDER — LIDOCAINE HYDROCHLORIDE 20 MG/ML
INJECTION, SOLUTION EPIDURAL; INFILTRATION; INTRACAUDAL; PERINEURAL
Status: DISCONTINUED | OUTPATIENT
Start: 2021-04-15 | End: 2021-04-15

## 2021-04-15 RX ORDER — ROCURONIUM BROMIDE 10 MG/ML
INJECTION, SOLUTION INTRAVENOUS
Status: DISCONTINUED | OUTPATIENT
Start: 2021-04-15 | End: 2021-04-15

## 2021-04-15 RX ORDER — FENTANYL CITRATE 50 UG/ML
25 INJECTION, SOLUTION INTRAMUSCULAR; INTRAVENOUS EVERY 5 MIN PRN
Status: DISCONTINUED | OUTPATIENT
Start: 2021-04-15 | End: 2021-04-15 | Stop reason: HOSPADM

## 2021-04-15 RX ORDER — LIDOCAINE HYDROCHLORIDE 10 MG/ML
1 INJECTION, SOLUTION EPIDURAL; INFILTRATION; INTRACAUDAL; PERINEURAL
Status: DISPENSED | OUTPATIENT
Start: 2021-04-15

## 2021-04-15 RX ORDER — TRIPROLIDINE/PSEUDOEPHEDRINE 2.5MG-60MG
600 TABLET ORAL
Status: COMPLETED | OUTPATIENT
Start: 2021-04-15 | End: 2021-04-15

## 2021-04-15 RX ORDER — ACETAMINOPHEN 10 MG/ML
700 INJECTION, SOLUTION INTRAVENOUS EVERY 8 HOURS
Status: COMPLETED | OUTPATIENT
Start: 2021-04-15 | End: 2021-04-16

## 2021-04-15 RX ORDER — LIDOCAINE HYDROCHLORIDE 10 MG/ML
INJECTION, SOLUTION EPIDURAL; INFILTRATION; INTRACAUDAL; PERINEURAL
Status: DISCONTINUED | OUTPATIENT
Start: 2021-04-15 | End: 2021-04-15 | Stop reason: HOSPADM

## 2021-04-15 RX ORDER — SODIUM CHLORIDE 0.9 % (FLUSH) 0.9 %
10 SYRINGE (ML) INJECTION
Status: DISCONTINUED | OUTPATIENT
Start: 2021-04-15 | End: 2021-04-18 | Stop reason: HOSPADM

## 2021-04-15 RX ORDER — HEPARIN SODIUM 5000 [USP'U]/ML
5000 INJECTION, SOLUTION INTRAVENOUS; SUBCUTANEOUS EVERY 8 HOURS
Status: COMPLETED | OUTPATIENT
Start: 2021-04-15 | End: 2021-04-15

## 2021-04-15 RX ORDER — ACETAMINOPHEN 650 MG/20.3ML
975 LIQUID ORAL
Status: COMPLETED | OUTPATIENT
Start: 2021-04-15 | End: 2021-04-15

## 2021-04-15 RX ORDER — ENOXAPARIN SODIUM 100 MG/ML
30 INJECTION SUBCUTANEOUS EVERY 24 HOURS
Status: DISCONTINUED | OUTPATIENT
Start: 2021-04-15 | End: 2021-04-18 | Stop reason: HOSPADM

## 2021-04-15 RX ORDER — ROSUVASTATIN CALCIUM 40 MG/1
40 TABLET, COATED ORAL DAILY
Status: CANCELLED | OUTPATIENT
Start: 2021-04-16

## 2021-04-15 RX ORDER — BUPIVACAINE HYDROCHLORIDE 5 MG/ML
INJECTION, SOLUTION EPIDURAL; INTRACAUDAL
Status: DISCONTINUED | OUTPATIENT
Start: 2021-04-15 | End: 2021-04-15 | Stop reason: HOSPADM

## 2021-04-15 RX ORDER — SODIUM CHLORIDE 0.9 % (FLUSH) 0.9 %
10 SYRINGE (ML) INJECTION
Status: DISCONTINUED | OUTPATIENT
Start: 2021-04-15 | End: 2021-04-15 | Stop reason: HOSPADM

## 2021-04-15 RX ORDER — ACETAMINOPHEN 325 MG/1
650 TABLET ORAL EVERY 8 HOURS
Status: DISCONTINUED | OUTPATIENT
Start: 2021-04-16 | End: 2021-04-18 | Stop reason: HOSPADM

## 2021-04-15 RX ORDER — MUPIROCIN 20 MG/G
1 OINTMENT TOPICAL
Status: COMPLETED | OUTPATIENT
Start: 2021-04-15 | End: 2021-04-15

## 2021-04-15 RX ORDER — GABAPENTIN 300 MG/1
300 CAPSULE ORAL 3 TIMES DAILY
Status: DISPENSED | OUTPATIENT
Start: 2021-04-15

## 2021-04-15 RX ORDER — TRAMADOL HYDROCHLORIDE 50 MG/1
50 TABLET ORAL EVERY 6 HOURS PRN
Status: DISCONTINUED | OUTPATIENT
Start: 2021-04-15 | End: 2021-04-18 | Stop reason: HOSPADM

## 2021-04-15 RX ORDER — GABAPENTIN 300 MG/1
300 CAPSULE ORAL
Status: COMPLETED | OUTPATIENT
Start: 2021-04-15 | End: 2021-04-15

## 2021-04-15 RX ORDER — OXYCODONE HYDROCHLORIDE 5 MG/1
5 TABLET ORAL EVERY 6 HOURS PRN
Status: DISCONTINUED | OUTPATIENT
Start: 2021-04-15 | End: 2021-04-18 | Stop reason: HOSPADM

## 2021-04-15 RX ORDER — FAMOTIDINE 10 MG/ML
INJECTION INTRAVENOUS
Status: DISCONTINUED | OUTPATIENT
Start: 2021-04-15 | End: 2021-04-15

## 2021-04-15 RX ADMIN — Medication 50 MCG: at 02:04

## 2021-04-15 RX ADMIN — NEOSTIGMINE METHYLSULFATE 3 MG: 0.5 INJECTION INTRAVENOUS at 03:04

## 2021-04-15 RX ADMIN — TRAMADOL HYDROCHLORIDE 50 MG: 50 TABLET, FILM COATED ORAL at 08:04

## 2021-04-15 RX ADMIN — ROCURONIUM BROMIDE 40 MG: 10 INJECTION, SOLUTION INTRAVENOUS at 12:04

## 2021-04-15 RX ADMIN — METRONIDAZOLE 500 MG: 500 INJECTION, SOLUTION INTRAVENOUS at 01:04

## 2021-04-15 RX ADMIN — CEFTRIAXONE SODIUM 2 G: 2 INJECTION, SOLUTION INTRAVENOUS at 01:04

## 2021-04-15 RX ADMIN — ENOXAPARIN SODIUM 30 MG: 30 INJECTION SUBCUTANEOUS at 04:04

## 2021-04-15 RX ADMIN — Medication 100 MCG: at 01:04

## 2021-04-15 RX ADMIN — ROCURONIUM BROMIDE 10 MG: 10 INJECTION, SOLUTION INTRAVENOUS at 02:04

## 2021-04-15 RX ADMIN — ACETAMINOPHEN 700 MG: 10 INJECTION INTRAVENOUS at 09:04

## 2021-04-15 RX ADMIN — HEPARIN SODIUM 5000 UNITS: 5000 INJECTION INTRAVENOUS; SUBCUTANEOUS at 11:04

## 2021-04-15 RX ADMIN — PROPOFOL 30 MG: 10 INJECTION, EMULSION INTRAVENOUS at 01:04

## 2021-04-15 RX ADMIN — OXYCODONE 5 MG: 5 TABLET ORAL at 04:04

## 2021-04-15 RX ADMIN — ROCURONIUM BROMIDE 10 MG: 10 INJECTION, SOLUTION INTRAVENOUS at 01:04

## 2021-04-15 RX ADMIN — Medication 50 MCG: at 01:04

## 2021-04-15 RX ADMIN — GLYCOPYRROLATE 0.4 MCG: 0.2 INJECTION, SOLUTION INTRAMUSCULAR; INTRAVITREAL at 03:04

## 2021-04-15 RX ADMIN — GABAPENTIN 300 MG: 300 CAPSULE ORAL at 10:04

## 2021-04-15 RX ADMIN — FENTANYL CITRATE 50 MCG: 50 INJECTION INTRAMUSCULAR; INTRAVENOUS at 02:04

## 2021-04-15 RX ADMIN — SODIUM CHLORIDE: 9 INJECTION, SOLUTION INTRAVENOUS at 12:04

## 2021-04-15 RX ADMIN — MUPIROCIN: 20 OINTMENT TOPICAL at 08:04

## 2021-04-15 RX ADMIN — SODIUM CHLORIDE: 0.9 INJECTION, SOLUTION INTRAVENOUS at 11:04

## 2021-04-15 RX ADMIN — PROPOFOL 100 MG: 10 INJECTION, EMULSION INTRAVENOUS at 12:04

## 2021-04-15 RX ADMIN — Medication 50 MCG: at 03:04

## 2021-04-15 RX ADMIN — GABAPENTIN 300 MG: 300 CAPSULE ORAL at 04:04

## 2021-04-15 RX ADMIN — Medication 50 MCG: at 12:04

## 2021-04-15 RX ADMIN — ACETAMINOPHEN 976.6 MG: 160 SOLUTION ORAL at 10:04

## 2021-04-15 RX ADMIN — MUPIROCIN 1 G: 20 OINTMENT TOPICAL at 11:04

## 2021-04-15 RX ADMIN — DEXAMETHASONE SODIUM PHOSPHATE 4 MG: 4 INJECTION, SOLUTION INTRAMUSCULAR; INTRAVENOUS at 02:04

## 2021-04-15 RX ADMIN — FAMOTIDINE 20 MG: 10 INJECTION, SOLUTION INTRAVENOUS at 02:04

## 2021-04-15 RX ADMIN — ONDANSETRON 4 MG: 2 INJECTION, SOLUTION INTRAMUSCULAR; INTRAVENOUS at 02:04

## 2021-04-15 RX ADMIN — SODIUM CHLORIDE: 0.9 INJECTION, SOLUTION INTRAVENOUS at 04:04

## 2021-04-15 RX ADMIN — FENTANYL CITRATE 50 MCG: 50 INJECTION INTRAMUSCULAR; INTRAVENOUS at 12:04

## 2021-04-15 RX ADMIN — LIDOCAINE HYDROCHLORIDE 70 MG: 20 INJECTION, SOLUTION EPIDURAL; INFILTRATION; INTRACAUDAL at 12:04

## 2021-04-15 RX ADMIN — IBUPROFEN 600 MG: 100 SUSPENSION ORAL at 10:04

## 2021-04-15 RX ADMIN — FENTANYL CITRATE 25 MCG: 50 INJECTION, SOLUTION INTRAMUSCULAR; INTRAVENOUS at 04:04

## 2021-04-15 RX ADMIN — GABAPENTIN 300 MG: 300 CAPSULE ORAL at 08:04

## 2021-04-16 LAB
ANION GAP SERPL CALC-SCNC: 13 MMOL/L (ref 8–16)
BASOPHILS # BLD AUTO: 0.03 K/UL (ref 0–0.2)
BASOPHILS NFR BLD: 0.2 % (ref 0–1.9)
BUN SERPL-MCNC: 6 MG/DL (ref 8–23)
CALCIUM SERPL-MCNC: 7 MG/DL (ref 8.7–10.5)
CHLORIDE SERPL-SCNC: 104 MMOL/L (ref 95–110)
CO2 SERPL-SCNC: 18 MMOL/L (ref 23–29)
CREAT SERPL-MCNC: 0.8 MG/DL (ref 0.5–1.4)
DIFFERENTIAL METHOD: ABNORMAL
EOSINOPHIL # BLD AUTO: 0 K/UL (ref 0–0.5)
EOSINOPHIL NFR BLD: 0.1 % (ref 0–8)
ERYTHROCYTE [DISTWIDTH] IN BLOOD BY AUTOMATED COUNT: 14.5 % (ref 11.5–14.5)
EST. GFR  (AFRICAN AMERICAN): >60 ML/MIN/1.73 M^2
EST. GFR  (NON AFRICAN AMERICAN): >60 ML/MIN/1.73 M^2
GLUCOSE SERPL-MCNC: 84 MG/DL (ref 70–110)
HCT VFR BLD AUTO: 29.7 % (ref 37–48.5)
HGB BLD-MCNC: 9 G/DL (ref 12–16)
IMM GRANULOCYTES # BLD AUTO: 0.05 K/UL (ref 0–0.04)
IMM GRANULOCYTES NFR BLD AUTO: 0.4 % (ref 0–0.5)
LYMPHOCYTES # BLD AUTO: 0.8 K/UL (ref 1–4.8)
LYMPHOCYTES NFR BLD: 5.7 % (ref 18–48)
MAGNESIUM SERPL-MCNC: 1.8 MG/DL (ref 1.6–2.6)
MCH RBC QN AUTO: 26.7 PG (ref 27–31)
MCHC RBC AUTO-ENTMCNC: 30.3 G/DL (ref 32–36)
MCV RBC AUTO: 88 FL (ref 82–98)
MONOCYTES # BLD AUTO: 1.3 K/UL (ref 0.3–1)
MONOCYTES NFR BLD: 8.9 % (ref 4–15)
NEUTROPHILS # BLD AUTO: 11.9 K/UL (ref 1.8–7.7)
NEUTROPHILS NFR BLD: 84.7 % (ref 38–73)
NRBC BLD-RTO: 0 /100 WBC
PLATELET # BLD AUTO: 369 K/UL (ref 150–450)
PMV BLD AUTO: 9.7 FL (ref 9.2–12.9)
POCT GLUCOSE: 88 MG/DL (ref 70–110)
POTASSIUM SERPL-SCNC: 3.8 MMOL/L (ref 3.5–5.1)
RBC # BLD AUTO: 3.37 M/UL (ref 4–5.4)
SODIUM SERPL-SCNC: 135 MMOL/L (ref 136–145)
WBC # BLD AUTO: 14 K/UL (ref 3.9–12.7)

## 2021-04-16 PROCEDURE — 93010 ELECTROCARDIOGRAM REPORT: CPT | Mod: ,,, | Performed by: INTERNAL MEDICINE

## 2021-04-16 PROCEDURE — 80048 BASIC METABOLIC PNL TOTAL CA: CPT | Performed by: STUDENT IN AN ORGANIZED HEALTH CARE EDUCATION/TRAINING PROGRAM

## 2021-04-16 PROCEDURE — 97165 OT EVAL LOW COMPLEX 30 MIN: CPT

## 2021-04-16 PROCEDURE — 97530 THERAPEUTIC ACTIVITIES: CPT

## 2021-04-16 PROCEDURE — 93005 ELECTROCARDIOGRAM TRACING: CPT

## 2021-04-16 PROCEDURE — 85025 COMPLETE CBC W/AUTO DIFF WBC: CPT | Performed by: STUDENT IN AN ORGANIZED HEALTH CARE EDUCATION/TRAINING PROGRAM

## 2021-04-16 PROCEDURE — 25000003 PHARM REV CODE 250: Performed by: COLON & RECTAL SURGERY

## 2021-04-16 PROCEDURE — 93010 EKG 12-LEAD: ICD-10-PCS | Mod: ,,, | Performed by: INTERNAL MEDICINE

## 2021-04-16 PROCEDURE — 25000003 PHARM REV CODE 250: Performed by: NURSE PRACTITIONER

## 2021-04-16 PROCEDURE — 36415 COLL VENOUS BLD VENIPUNCTURE: CPT | Performed by: STUDENT IN AN ORGANIZED HEALTH CARE EDUCATION/TRAINING PROGRAM

## 2021-04-16 PROCEDURE — 63600175 PHARM REV CODE 636 W HCPCS: Performed by: STUDENT IN AN ORGANIZED HEALTH CARE EDUCATION/TRAINING PROGRAM

## 2021-04-16 PROCEDURE — 97162 PT EVAL MOD COMPLEX 30 MIN: CPT

## 2021-04-16 PROCEDURE — 83735 ASSAY OF MAGNESIUM: CPT | Performed by: STUDENT IN AN ORGANIZED HEALTH CARE EDUCATION/TRAINING PROGRAM

## 2021-04-16 PROCEDURE — 25000003 PHARM REV CODE 250: Performed by: STUDENT IN AN ORGANIZED HEALTH CARE EDUCATION/TRAINING PROGRAM

## 2021-04-16 PROCEDURE — 20600001 HC STEP DOWN PRIVATE ROOM

## 2021-04-16 PROCEDURE — 97535 SELF CARE MNGMENT TRAINING: CPT

## 2021-04-16 RX ORDER — SODIUM CHLORIDE 9 MG/ML
INJECTION, SOLUTION INTRAVENOUS
Status: DISCONTINUED | OUTPATIENT
Start: 2021-04-16 | End: 2021-04-18 | Stop reason: HOSPADM

## 2021-04-16 RX ADMIN — ACETAMINOPHEN 700 MG: 10 INJECTION INTRAVENOUS at 05:04

## 2021-04-16 RX ADMIN — MUPIROCIN: 20 OINTMENT TOPICAL at 08:04

## 2021-04-16 RX ADMIN — TRAMADOL HYDROCHLORIDE 50 MG: 50 TABLET, FILM COATED ORAL at 08:04

## 2021-04-16 RX ADMIN — GABAPENTIN 300 MG: 300 CAPSULE ORAL at 08:04

## 2021-04-16 RX ADMIN — ACETAMINOPHEN 1000 MG: 10 INJECTION INTRAVENOUS at 02:04

## 2021-04-16 RX ADMIN — ENOXAPARIN SODIUM 30 MG: 30 INJECTION SUBCUTANEOUS at 06:04

## 2021-04-16 RX ADMIN — SODIUM CHLORIDE: 0.9 INJECTION, SOLUTION INTRAVENOUS at 02:04

## 2021-04-16 RX ADMIN — OXYCODONE 5 MG: 5 TABLET ORAL at 03:04

## 2021-04-16 RX ADMIN — GABAPENTIN 300 MG: 300 CAPSULE ORAL at 02:04

## 2021-04-17 PROCEDURE — 25000003 PHARM REV CODE 250: Performed by: STUDENT IN AN ORGANIZED HEALTH CARE EDUCATION/TRAINING PROGRAM

## 2021-04-17 PROCEDURE — 20600001 HC STEP DOWN PRIVATE ROOM

## 2021-04-17 PROCEDURE — 63600175 PHARM REV CODE 636 W HCPCS: Performed by: STUDENT IN AN ORGANIZED HEALTH CARE EDUCATION/TRAINING PROGRAM

## 2021-04-17 RX ADMIN — GABAPENTIN 300 MG: 300 CAPSULE ORAL at 08:04

## 2021-04-17 RX ADMIN — GABAPENTIN 300 MG: 300 CAPSULE ORAL at 09:04

## 2021-04-17 RX ADMIN — MUPIROCIN: 20 OINTMENT TOPICAL at 09:04

## 2021-04-17 RX ADMIN — ENOXAPARIN SODIUM 30 MG: 30 INJECTION SUBCUTANEOUS at 05:04

## 2021-04-17 RX ADMIN — GABAPENTIN 300 MG: 300 CAPSULE ORAL at 03:04

## 2021-04-18 VITALS
BODY MASS INDEX: 16.61 KG/M2 | HEIGHT: 67 IN | RESPIRATION RATE: 18 BRPM | TEMPERATURE: 99 F | DIASTOLIC BLOOD PRESSURE: 67 MMHG | SYSTOLIC BLOOD PRESSURE: 154 MMHG | WEIGHT: 105.81 LBS | OXYGEN SATURATION: 97 % | HEART RATE: 86 BPM

## 2021-04-18 LAB — CRP SERPL-MCNC: 41.8 MG/L (ref 0–8.2)

## 2021-04-18 PROCEDURE — 36415 COLL VENOUS BLD VENIPUNCTURE: CPT | Performed by: STUDENT IN AN ORGANIZED HEALTH CARE EDUCATION/TRAINING PROGRAM

## 2021-04-18 PROCEDURE — 25000003 PHARM REV CODE 250: Performed by: STUDENT IN AN ORGANIZED HEALTH CARE EDUCATION/TRAINING PROGRAM

## 2021-04-18 PROCEDURE — 86140 C-REACTIVE PROTEIN: CPT | Performed by: STUDENT IN AN ORGANIZED HEALTH CARE EDUCATION/TRAINING PROGRAM

## 2021-04-18 RX ORDER — OXYCODONE HYDROCHLORIDE 5 MG/1
5 TABLET ORAL EVERY 6 HOURS PRN
Qty: 30 TABLET | Refills: 0 | Status: SHIPPED | OUTPATIENT
Start: 2021-04-18 | End: 2021-06-04

## 2021-04-18 RX ADMIN — GABAPENTIN 300 MG: 300 CAPSULE ORAL at 08:04

## 2021-04-18 RX ADMIN — MUPIROCIN: 20 OINTMENT TOPICAL at 08:04

## 2021-04-18 RX ADMIN — GABAPENTIN 300 MG: 300 CAPSULE ORAL at 03:04

## 2021-04-20 ENCOUNTER — TELEPHONE (OUTPATIENT)
Dept: HEMATOLOGY/ONCOLOGY | Facility: CLINIC | Age: 86
End: 2021-04-20

## 2021-04-20 PROCEDURE — G0180 MD CERTIFICATION HHA PATIENT: HCPCS | Mod: ,,, | Performed by: HOSPITALIST

## 2021-04-20 PROCEDURE — G0180 PR HOME HEALTH MD CERTIFICATION: ICD-10-PCS | Mod: ,,, | Performed by: HOSPITALIST

## 2021-04-23 LAB
FINAL PATHOLOGIC DIAGNOSIS: NORMAL
Lab: NORMAL

## 2021-04-28 ENCOUNTER — DOCUMENTATION ONLY (OUTPATIENT)
Dept: SURGERY | Facility: HOSPITAL | Age: 86
End: 2021-04-28

## 2021-05-05 ENCOUNTER — TELEPHONE (OUTPATIENT)
Dept: SURGERY | Facility: CLINIC | Age: 86
End: 2021-05-05

## 2021-05-06 ENCOUNTER — LAB VISIT (OUTPATIENT)
Dept: LAB | Facility: HOSPITAL | Age: 86
End: 2021-05-06
Payer: MEDICARE

## 2021-05-06 ENCOUNTER — OFFICE VISIT (OUTPATIENT)
Dept: HEMATOLOGY/ONCOLOGY | Facility: CLINIC | Age: 86
End: 2021-05-06
Payer: MEDICARE

## 2021-05-06 VITALS
DIASTOLIC BLOOD PRESSURE: 72 MMHG | WEIGHT: 106.5 LBS | RESPIRATION RATE: 28 BRPM | OXYGEN SATURATION: 99 % | SYSTOLIC BLOOD PRESSURE: 178 MMHG | TEMPERATURE: 98 F | BODY MASS INDEX: 16.72 KG/M2 | HEART RATE: 81 BPM | HEIGHT: 67 IN

## 2021-05-06 DIAGNOSIS — D50.8 OTHER IRON DEFICIENCY ANEMIA: ICD-10-CM

## 2021-05-06 DIAGNOSIS — C18.9 ADENOCARCINOMA OF COLON: ICD-10-CM

## 2021-05-06 DIAGNOSIS — D49.9 IMMUNODEFICIENCY SECONDARY TO NEOPLASM: ICD-10-CM

## 2021-05-06 DIAGNOSIS — C18.9 ADENOCARCINOMA OF COLON: Primary | ICD-10-CM

## 2021-05-06 DIAGNOSIS — I10 ESSENTIAL HYPERTENSION: ICD-10-CM

## 2021-05-06 DIAGNOSIS — D84.81 IMMUNODEFICIENCY SECONDARY TO NEOPLASM: ICD-10-CM

## 2021-05-06 LAB
ALBUMIN SERPL BCP-MCNC: 3.3 G/DL (ref 3.5–5.2)
ALP SERPL-CCNC: 59 U/L (ref 55–135)
ALT SERPL W/O P-5'-P-CCNC: 6 U/L (ref 10–44)
ANION GAP SERPL CALC-SCNC: 10 MMOL/L (ref 8–16)
AST SERPL-CCNC: 15 U/L (ref 10–40)
BASOPHILS # BLD AUTO: 0.04 K/UL (ref 0–0.2)
BASOPHILS NFR BLD: 0.7 % (ref 0–1.9)
BILIRUB SERPL-MCNC: 0.3 MG/DL (ref 0.1–1)
BUN SERPL-MCNC: 6 MG/DL (ref 8–23)
CALCIUM SERPL-MCNC: 8.8 MG/DL (ref 8.7–10.5)
CHLORIDE SERPL-SCNC: 101 MMOL/L (ref 95–110)
CO2 SERPL-SCNC: 31 MMOL/L (ref 23–29)
CREAT SERPL-MCNC: 0.7 MG/DL (ref 0.5–1.4)
DIFFERENTIAL METHOD: ABNORMAL
EOSINOPHIL # BLD AUTO: 0.5 K/UL (ref 0–0.5)
EOSINOPHIL NFR BLD: 8.7 % (ref 0–8)
ERYTHROCYTE [DISTWIDTH] IN BLOOD BY AUTOMATED COUNT: 15.2 % (ref 11.5–14.5)
EST. GFR  (AFRICAN AMERICAN): >60 ML/MIN/1.73 M^2
EST. GFR  (NON AFRICAN AMERICAN): >60 ML/MIN/1.73 M^2
FERRITIN SERPL-MCNC: 159 NG/ML (ref 20–300)
GLUCOSE SERPL-MCNC: 84 MG/DL (ref 70–110)
HCT VFR BLD AUTO: 30.4 % (ref 37–48.5)
HGB BLD-MCNC: 9.2 G/DL (ref 12–16)
IMM GRANULOCYTES # BLD AUTO: 0.04 K/UL (ref 0–0.04)
IMM GRANULOCYTES NFR BLD AUTO: 0.7 % (ref 0–0.5)
IRON SERPL-MCNC: 14 UG/DL (ref 30–160)
LYMPHOCYTES # BLD AUTO: 1.3 K/UL (ref 1–4.8)
LYMPHOCYTES NFR BLD: 23.4 % (ref 18–48)
MAGNESIUM SERPL-MCNC: 1.5 MG/DL (ref 1.6–2.6)
MCH RBC QN AUTO: 26.7 PG (ref 27–31)
MCHC RBC AUTO-ENTMCNC: 30.3 G/DL (ref 32–36)
MCV RBC AUTO: 88 FL (ref 82–98)
MONOCYTES # BLD AUTO: 0.9 K/UL (ref 0.3–1)
MONOCYTES NFR BLD: 16.1 % (ref 4–15)
NEUTROPHILS # BLD AUTO: 2.9 K/UL (ref 1.8–7.7)
NEUTROPHILS NFR BLD: 50.4 % (ref 38–73)
NRBC BLD-RTO: 0 /100 WBC
PLATELET # BLD AUTO: 490 K/UL (ref 150–450)
PMV BLD AUTO: 9.6 FL (ref 9.2–12.9)
POTASSIUM SERPL-SCNC: 3.6 MMOL/L (ref 3.5–5.1)
PROT SERPL-MCNC: 6.4 G/DL (ref 6–8.4)
RBC # BLD AUTO: 3.45 M/UL (ref 4–5.4)
SATURATED IRON: 4 % (ref 20–50)
SODIUM SERPL-SCNC: 142 MMOL/L (ref 136–145)
TOTAL IRON BINDING CAPACITY: 367 UG/DL (ref 250–450)
TRANSFERRIN SERPL-MCNC: 248 MG/DL (ref 200–375)
WBC # BLD AUTO: 5.73 K/UL (ref 3.9–12.7)

## 2021-05-06 PROCEDURE — 80053 COMPREHEN METABOLIC PANEL: CPT | Performed by: STUDENT IN AN ORGANIZED HEALTH CARE EDUCATION/TRAINING PROGRAM

## 2021-05-06 PROCEDURE — 82728 ASSAY OF FERRITIN: CPT | Performed by: STUDENT IN AN ORGANIZED HEALTH CARE EDUCATION/TRAINING PROGRAM

## 2021-05-06 PROCEDURE — 99214 OFFICE O/P EST MOD 30 MIN: CPT | Mod: PBBFAC | Performed by: STUDENT IN AN ORGANIZED HEALTH CARE EDUCATION/TRAINING PROGRAM

## 2021-05-06 PROCEDURE — 99214 PR OFFICE/OUTPT VISIT, EST, LEVL IV, 30-39 MIN: ICD-10-PCS | Mod: S$PBB,GC,, | Performed by: STUDENT IN AN ORGANIZED HEALTH CARE EDUCATION/TRAINING PROGRAM

## 2021-05-06 PROCEDURE — 99214 OFFICE O/P EST MOD 30 MIN: CPT | Mod: S$PBB,GC,, | Performed by: STUDENT IN AN ORGANIZED HEALTH CARE EDUCATION/TRAINING PROGRAM

## 2021-05-06 PROCEDURE — 99999 PR PBB SHADOW E&M-EST. PATIENT-LVL IV: CPT | Mod: PBBFAC,GC,, | Performed by: STUDENT IN AN ORGANIZED HEALTH CARE EDUCATION/TRAINING PROGRAM

## 2021-05-06 PROCEDURE — 83540 ASSAY OF IRON: CPT | Performed by: STUDENT IN AN ORGANIZED HEALTH CARE EDUCATION/TRAINING PROGRAM

## 2021-05-06 PROCEDURE — 36415 COLL VENOUS BLD VENIPUNCTURE: CPT | Performed by: STUDENT IN AN ORGANIZED HEALTH CARE EDUCATION/TRAINING PROGRAM

## 2021-05-06 PROCEDURE — 99999 PR PBB SHADOW E&M-EST. PATIENT-LVL IV: ICD-10-PCS | Mod: PBBFAC,GC,, | Performed by: STUDENT IN AN ORGANIZED HEALTH CARE EDUCATION/TRAINING PROGRAM

## 2021-05-06 PROCEDURE — 85025 COMPLETE CBC W/AUTO DIFF WBC: CPT | Performed by: STUDENT IN AN ORGANIZED HEALTH CARE EDUCATION/TRAINING PROGRAM

## 2021-05-06 PROCEDURE — 83735 ASSAY OF MAGNESIUM: CPT | Performed by: INTERNAL MEDICINE

## 2021-05-11 ENCOUNTER — TELEPHONE (OUTPATIENT)
Dept: HEMATOLOGY/ONCOLOGY | Facility: CLINIC | Age: 86
End: 2021-05-11

## 2021-05-20 ENCOUNTER — OFFICE VISIT (OUTPATIENT)
Dept: PRIMARY CARE CLINIC | Facility: CLINIC | Age: 86
End: 2021-05-20
Payer: MEDICARE

## 2021-05-20 VITALS
WEIGHT: 105.63 LBS | HEIGHT: 67 IN | HEART RATE: 105 BPM | DIASTOLIC BLOOD PRESSURE: 78 MMHG | SYSTOLIC BLOOD PRESSURE: 170 MMHG | BODY MASS INDEX: 16.58 KG/M2 | OXYGEN SATURATION: 97 %

## 2021-05-20 DIAGNOSIS — E78.2 MIXED HYPERLIPIDEMIA: Chronic | ICD-10-CM

## 2021-05-20 DIAGNOSIS — E77.8 HYPOPROTEINEMIA: ICD-10-CM

## 2021-05-20 DIAGNOSIS — I70.0 AORTIC ATHEROSCLEROSIS: ICD-10-CM

## 2021-05-20 DIAGNOSIS — C18.9 MALIGNANT NEOPLASM OF COLON, UNSPECIFIED PART OF COLON: ICD-10-CM

## 2021-05-20 DIAGNOSIS — Z00.00 HEALTHCARE MAINTENANCE: ICD-10-CM

## 2021-05-20 DIAGNOSIS — I65.23 CAROTID STENOSIS, ASYMPTOMATIC, BILATERAL: ICD-10-CM

## 2021-05-20 DIAGNOSIS — M81.0 AGE-RELATED OSTEOPOROSIS WITHOUT CURRENT PATHOLOGICAL FRACTURE: ICD-10-CM

## 2021-05-20 DIAGNOSIS — E53.8 B12 DEFICIENCY: ICD-10-CM

## 2021-05-20 DIAGNOSIS — D75.839 THROMBOCYTOSIS: ICD-10-CM

## 2021-05-20 DIAGNOSIS — R73.03 PREDIABETES: ICD-10-CM

## 2021-05-20 DIAGNOSIS — D50.0 IRON DEFICIENCY ANEMIA DUE TO CHRONIC BLOOD LOSS: ICD-10-CM

## 2021-05-20 PROCEDURE — 99215 OFFICE O/P EST HI 40 MIN: CPT | Mod: S$GLB,,, | Performed by: INTERNAL MEDICINE

## 2021-05-20 PROCEDURE — 99358 PROLONG SERVICE W/O CONTACT: CPT | Mod: S$GLB,,, | Performed by: INTERNAL MEDICINE

## 2021-05-20 PROCEDURE — 99215 PR OFFICE/OUTPT VISIT, EST, LEVL V, 40-54 MIN: ICD-10-PCS | Mod: S$GLB,,, | Performed by: INTERNAL MEDICINE

## 2021-05-20 PROCEDURE — 99358 PR PROLONGED SERV,NO CONTACT,1ST HR: ICD-10-PCS | Mod: S$GLB,,, | Performed by: INTERNAL MEDICINE

## 2021-05-21 ENCOUNTER — TELEPHONE (OUTPATIENT)
Dept: INTERNAL MEDICINE | Facility: CLINIC | Age: 86
End: 2021-05-21

## 2021-05-24 PROBLEM — Z00.00 HEALTHCARE MAINTENANCE: Status: ACTIVE | Noted: 2021-05-24

## 2021-05-24 PROBLEM — E77.8 HYPOPROTEINEMIA: Status: ACTIVE | Noted: 2021-05-24

## 2021-05-24 PROBLEM — R63.4 WEIGHT LOSS: Status: RESOLVED | Noted: 2021-04-09 | Resolved: 2021-05-24

## 2021-05-24 PROBLEM — R60.9 EDEMA: Status: RESOLVED | Noted: 2021-04-09 | Resolved: 2021-05-24

## 2021-05-24 PROBLEM — I70.0 AORTIC ATHEROSCLEROSIS: Status: ACTIVE | Noted: 2021-05-24

## 2021-05-24 PROBLEM — K63.89 COLONIC MASS: Status: RESOLVED | Noted: 2020-03-19 | Resolved: 2021-05-24

## 2021-05-24 PROBLEM — Z12.11 SCREEN FOR COLON CANCER: Status: RESOLVED | Noted: 2021-03-23 | Resolved: 2021-05-24

## 2021-05-24 PROBLEM — R60.0 BILATERAL LOWER EXTREMITY EDEMA: Status: RESOLVED | Noted: 2020-03-02 | Resolved: 2021-05-24

## 2021-05-24 PROBLEM — E87.1 HYPONATREMIA: Status: RESOLVED | Noted: 2021-04-09 | Resolved: 2021-05-24

## 2021-05-24 PROBLEM — E53.8 B12 DEFICIENCY: Status: ACTIVE | Noted: 2021-05-24

## 2021-05-24 PROBLEM — R09.89 BILATERAL CAROTID BRUITS: Status: RESOLVED | Noted: 2021-04-09 | Resolved: 2021-05-24

## 2021-05-31 ENCOUNTER — TELEPHONE (OUTPATIENT)
Dept: HEMATOLOGY/ONCOLOGY | Facility: CLINIC | Age: 86
End: 2021-05-31

## 2021-05-31 ENCOUNTER — OFFICE VISIT (OUTPATIENT)
Dept: HEMATOLOGY/ONCOLOGY | Facility: CLINIC | Age: 86
End: 2021-05-31
Payer: MEDICARE

## 2021-05-31 VITALS
OXYGEN SATURATION: 99 % | HEART RATE: 91 BPM | HEIGHT: 67 IN | TEMPERATURE: 98 F | RESPIRATION RATE: 16 BRPM | SYSTOLIC BLOOD PRESSURE: 161 MMHG | BODY MASS INDEX: 16.06 KG/M2 | DIASTOLIC BLOOD PRESSURE: 73 MMHG | WEIGHT: 102.31 LBS

## 2021-05-31 DIAGNOSIS — D84.81 IMMUNODEFICIENCY SECONDARY TO NEOPLASM: ICD-10-CM

## 2021-05-31 DIAGNOSIS — I10 ESSENTIAL HYPERTENSION: ICD-10-CM

## 2021-05-31 DIAGNOSIS — C18.9 ADENOCARCINOMA OF COLON: Primary | ICD-10-CM

## 2021-05-31 DIAGNOSIS — I70.0 AORTIC ATHEROSCLEROSIS: ICD-10-CM

## 2021-05-31 DIAGNOSIS — D49.9 IMMUNODEFICIENCY SECONDARY TO NEOPLASM: ICD-10-CM

## 2021-05-31 DIAGNOSIS — D50.9 IRON DEFICIENCY ANEMIA, UNSPECIFIED IRON DEFICIENCY ANEMIA TYPE: ICD-10-CM

## 2021-05-31 PROCEDURE — 99215 PR OFFICE/OUTPT VISIT, EST, LEVL V, 40-54 MIN: ICD-10-PCS | Mod: S$PBB,GC,, | Performed by: STUDENT IN AN ORGANIZED HEALTH CARE EDUCATION/TRAINING PROGRAM

## 2021-05-31 PROCEDURE — 99999 PR PBB SHADOW E&M-EST. PATIENT-LVL IV: CPT | Mod: PBBFAC,GC,, | Performed by: STUDENT IN AN ORGANIZED HEALTH CARE EDUCATION/TRAINING PROGRAM

## 2021-05-31 PROCEDURE — 99214 OFFICE O/P EST MOD 30 MIN: CPT | Mod: PBBFAC | Performed by: STUDENT IN AN ORGANIZED HEALTH CARE EDUCATION/TRAINING PROGRAM

## 2021-05-31 PROCEDURE — 99999 PR PBB SHADOW E&M-EST. PATIENT-LVL IV: ICD-10-PCS | Mod: PBBFAC,GC,, | Performed by: STUDENT IN AN ORGANIZED HEALTH CARE EDUCATION/TRAINING PROGRAM

## 2021-05-31 PROCEDURE — 99215 OFFICE O/P EST HI 40 MIN: CPT | Mod: S$PBB,GC,, | Performed by: STUDENT IN AN ORGANIZED HEALTH CARE EDUCATION/TRAINING PROGRAM

## 2021-06-01 DIAGNOSIS — C18.9 ADENOCARCINOMA OF COLON: Primary | ICD-10-CM

## 2021-06-02 ENCOUNTER — OFFICE VISIT (OUTPATIENT)
Dept: SURGERY | Facility: CLINIC | Age: 86
End: 2021-06-02
Payer: MEDICARE

## 2021-06-02 VITALS
HEART RATE: 96 BPM | DIASTOLIC BLOOD PRESSURE: 78 MMHG | HEIGHT: 67 IN | BODY MASS INDEX: 15.96 KG/M2 | WEIGHT: 101.69 LBS | SYSTOLIC BLOOD PRESSURE: 177 MMHG

## 2021-06-02 DIAGNOSIS — Z48.89 ENCOUNTER FOR POST SURGICAL WOUND CHECK: Primary | ICD-10-CM

## 2021-06-02 DIAGNOSIS — C18.4 MALIGNANT NEOPLASM OF TRANSVERSE COLON: ICD-10-CM

## 2021-06-02 PROCEDURE — 99213 OFFICE O/P EST LOW 20 MIN: CPT | Mod: PBBFAC | Performed by: COLON & RECTAL SURGERY

## 2021-06-02 PROCEDURE — 99999 PR PBB SHADOW E&M-EST. PATIENT-LVL III: CPT | Mod: PBBFAC,,, | Performed by: COLON & RECTAL SURGERY

## 2021-06-02 PROCEDURE — 99024 POSTOP FOLLOW-UP VISIT: CPT | Mod: POP,,, | Performed by: COLON & RECTAL SURGERY

## 2021-06-02 PROCEDURE — 99999 PR PBB SHADOW E&M-EST. PATIENT-LVL III: ICD-10-PCS | Mod: PBBFAC,,, | Performed by: COLON & RECTAL SURGERY

## 2021-06-02 PROCEDURE — 99024 PR POST-OP FOLLOW-UP VISIT: ICD-10-PCS | Mod: POP,,, | Performed by: COLON & RECTAL SURGERY

## 2021-06-04 ENCOUNTER — OFFICE VISIT (OUTPATIENT)
Dept: PRIMARY CARE CLINIC | Facility: CLINIC | Age: 86
End: 2021-06-04
Payer: MEDICARE

## 2021-06-04 VITALS
HEART RATE: 66 BPM | OXYGEN SATURATION: 96 % | DIASTOLIC BLOOD PRESSURE: 82 MMHG | SYSTOLIC BLOOD PRESSURE: 160 MMHG | HEIGHT: 67 IN | BODY MASS INDEX: 15.92 KG/M2 | WEIGHT: 101.44 LBS

## 2021-06-04 DIAGNOSIS — I50.22 CHRONIC SYSTOLIC HEART FAILURE: ICD-10-CM

## 2021-06-04 DIAGNOSIS — I10 ESSENTIAL HYPERTENSION: ICD-10-CM

## 2021-06-04 DIAGNOSIS — R41.3 MEMORY LOSS: ICD-10-CM

## 2021-06-04 DIAGNOSIS — Z00.00 HEALTHCARE MAINTENANCE: ICD-10-CM

## 2021-06-04 DIAGNOSIS — E53.8 B12 DEFICIENCY: ICD-10-CM

## 2021-06-04 DIAGNOSIS — C18.9 MALIGNANT NEOPLASM OF COLON, UNSPECIFIED PART OF COLON: ICD-10-CM

## 2021-06-04 PROCEDURE — 99215 OFFICE O/P EST HI 40 MIN: CPT | Mod: S$GLB,,, | Performed by: INTERNAL MEDICINE

## 2021-06-04 PROCEDURE — 99215 PR OFFICE/OUTPT VISIT, EST, LEVL V, 40-54 MIN: ICD-10-PCS | Mod: S$GLB,,, | Performed by: INTERNAL MEDICINE

## 2021-06-04 RX ORDER — GLUCOSAMINE/CHONDR SU A SOD 750-600 MG
1 TABLET ORAL DAILY
Qty: 30 CAPSULE | Refills: 0
Start: 2021-06-04

## 2021-06-04 RX ORDER — LOSARTAN POTASSIUM 50 MG/1
50 TABLET ORAL DAILY
Qty: 90 TABLET | Refills: 3 | Status: SHIPPED | OUTPATIENT
Start: 2021-06-04 | End: 2022-05-27

## 2021-06-04 RX ORDER — FERROUS SULFATE 325(65) MG
TABLET, DELAYED RELEASE (ENTERIC COATED) ORAL
Qty: 30 TABLET | Refills: 0 | Status: SHIPPED | OUTPATIENT
Start: 2021-06-04 | End: 2021-11-30

## 2021-06-04 RX ORDER — LANOLIN ALCOHOL/MO/W.PET/CERES
1000 CREAM (GRAM) TOPICAL DAILY
Qty: 30 TABLET | Refills: 11 | Status: SHIPPED | OUTPATIENT
Start: 2021-06-04

## 2021-06-04 RX ORDER — FUROSEMIDE 20 MG/1
20 TABLET ORAL DAILY
Qty: 90 TABLET | Refills: 3 | Status: SHIPPED | OUTPATIENT
Start: 2021-06-04 | End: 2022-06-16

## 2021-06-07 ENCOUNTER — TELEPHONE (OUTPATIENT)
Dept: PRIMARY CARE CLINIC | Facility: CLINIC | Age: 86
End: 2021-06-07

## 2021-06-11 ENCOUNTER — EXTERNAL HOME HEALTH (OUTPATIENT)
Dept: HOME HEALTH SERVICES | Facility: HOSPITAL | Age: 86
End: 2021-06-11
Payer: MEDICARE

## 2021-06-14 ENCOUNTER — OFFICE VISIT (OUTPATIENT)
Dept: HEMATOLOGY/ONCOLOGY | Facility: CLINIC | Age: 86
End: 2021-06-14
Payer: MEDICARE

## 2021-06-14 ENCOUNTER — LAB VISIT (OUTPATIENT)
Dept: LAB | Facility: HOSPITAL | Age: 86
End: 2021-06-14
Payer: MEDICARE

## 2021-06-14 VITALS
BODY MASS INDEX: 16.09 KG/M2 | HEIGHT: 67 IN | RESPIRATION RATE: 16 BRPM | WEIGHT: 102.5 LBS | SYSTOLIC BLOOD PRESSURE: 192 MMHG | TEMPERATURE: 98 F | HEART RATE: 97 BPM | OXYGEN SATURATION: 97 % | DIASTOLIC BLOOD PRESSURE: 86 MMHG

## 2021-06-14 DIAGNOSIS — D49.9 IMMUNODEFICIENCY SECONDARY TO NEOPLASM: ICD-10-CM

## 2021-06-14 DIAGNOSIS — I10 ESSENTIAL HYPERTENSION: ICD-10-CM

## 2021-06-14 DIAGNOSIS — C18.9 ADENOCARCINOMA OF COLON: Primary | ICD-10-CM

## 2021-06-14 DIAGNOSIS — D50.9 IRON DEFICIENCY ANEMIA, UNSPECIFIED IRON DEFICIENCY ANEMIA TYPE: ICD-10-CM

## 2021-06-14 DIAGNOSIS — C18.9 ADENOCARCINOMA OF COLON: ICD-10-CM

## 2021-06-14 DIAGNOSIS — D84.81 IMMUNODEFICIENCY SECONDARY TO NEOPLASM: ICD-10-CM

## 2021-06-14 LAB
ABO + RH BLD: NORMAL
ALBUMIN SERPL BCP-MCNC: 4.1 G/DL (ref 3.5–5.2)
ALP SERPL-CCNC: 66 U/L (ref 55–135)
ALT SERPL W/O P-5'-P-CCNC: 17 U/L (ref 10–44)
ANION GAP SERPL CALC-SCNC: 13 MMOL/L (ref 8–16)
AST SERPL-CCNC: 21 U/L (ref 10–40)
BASOPHILS # BLD AUTO: 0.03 K/UL (ref 0–0.2)
BASOPHILS NFR BLD: 0.6 % (ref 0–1.9)
BILIRUB SERPL-MCNC: 0.3 MG/DL (ref 0.1–1)
BLD GP AB SCN CELLS X3 SERPL QL: NORMAL
BUN SERPL-MCNC: 11 MG/DL (ref 8–23)
CALCIUM SERPL-MCNC: 9.3 MG/DL (ref 8.7–10.5)
CEA SERPL-MCNC: 2.9 NG/ML (ref 0–5)
CHLORIDE SERPL-SCNC: 105 MMOL/L (ref 95–110)
CO2 SERPL-SCNC: 21 MMOL/L (ref 23–29)
CREAT SERPL-MCNC: 0.7 MG/DL (ref 0.5–1.4)
DIFFERENTIAL METHOD: ABNORMAL
EOSINOPHIL # BLD AUTO: 0.2 K/UL (ref 0–0.5)
EOSINOPHIL NFR BLD: 3.4 % (ref 0–8)
ERYTHROCYTE [DISTWIDTH] IN BLOOD BY AUTOMATED COUNT: 15 % (ref 11.5–14.5)
EST. GFR  (AFRICAN AMERICAN): >60 ML/MIN/1.73 M^2
EST. GFR  (NON AFRICAN AMERICAN): >60 ML/MIN/1.73 M^2
GLUCOSE SERPL-MCNC: 98 MG/DL (ref 70–110)
HCT VFR BLD AUTO: 35.4 % (ref 37–48.5)
HGB BLD-MCNC: 10.4 G/DL (ref 12–16)
IMM GRANULOCYTES # BLD AUTO: 0.02 K/UL (ref 0–0.04)
IMM GRANULOCYTES NFR BLD AUTO: 0.4 % (ref 0–0.5)
LYMPHOCYTES # BLD AUTO: 1.5 K/UL (ref 1–4.8)
LYMPHOCYTES NFR BLD: 29.1 % (ref 18–48)
MCH RBC QN AUTO: 24.4 PG (ref 27–31)
MCHC RBC AUTO-ENTMCNC: 29.4 G/DL (ref 32–36)
MCV RBC AUTO: 83 FL (ref 82–98)
MONOCYTES # BLD AUTO: 0.6 K/UL (ref 0.3–1)
MONOCYTES NFR BLD: 11 % (ref 4–15)
NEUTROPHILS # BLD AUTO: 2.9 K/UL (ref 1.8–7.7)
NEUTROPHILS NFR BLD: 55.5 % (ref 38–73)
NRBC BLD-RTO: 0 /100 WBC
PLATELET # BLD AUTO: 340 K/UL (ref 150–450)
PMV BLD AUTO: 10.5 FL (ref 9.2–12.9)
POTASSIUM SERPL-SCNC: 4.3 MMOL/L (ref 3.5–5.1)
PROT SERPL-MCNC: 7.5 G/DL (ref 6–8.4)
RBC # BLD AUTO: 4.26 M/UL (ref 4–5.4)
SODIUM SERPL-SCNC: 139 MMOL/L (ref 136–145)
WBC # BLD AUTO: 5.29 K/UL (ref 3.9–12.7)

## 2021-06-14 PROCEDURE — 85025 COMPLETE CBC W/AUTO DIFF WBC: CPT | Performed by: STUDENT IN AN ORGANIZED HEALTH CARE EDUCATION/TRAINING PROGRAM

## 2021-06-14 PROCEDURE — 80053 COMPREHEN METABOLIC PANEL: CPT | Performed by: STUDENT IN AN ORGANIZED HEALTH CARE EDUCATION/TRAINING PROGRAM

## 2021-06-14 PROCEDURE — 36415 COLL VENOUS BLD VENIPUNCTURE: CPT | Performed by: STUDENT IN AN ORGANIZED HEALTH CARE EDUCATION/TRAINING PROGRAM

## 2021-06-14 PROCEDURE — 99999 PR PBB SHADOW E&M-EST. PATIENT-LVL IV: CPT | Mod: PBBFAC,GC,, | Performed by: STUDENT IN AN ORGANIZED HEALTH CARE EDUCATION/TRAINING PROGRAM

## 2021-06-14 PROCEDURE — 99999 PR PBB SHADOW E&M-EST. PATIENT-LVL IV: ICD-10-PCS | Mod: PBBFAC,GC,, | Performed by: STUDENT IN AN ORGANIZED HEALTH CARE EDUCATION/TRAINING PROGRAM

## 2021-06-14 PROCEDURE — 86900 BLOOD TYPING SEROLOGIC ABO: CPT | Performed by: STUDENT IN AN ORGANIZED HEALTH CARE EDUCATION/TRAINING PROGRAM

## 2021-06-14 PROCEDURE — 99214 OFFICE O/P EST MOD 30 MIN: CPT | Mod: PBBFAC,25 | Performed by: STUDENT IN AN ORGANIZED HEALTH CARE EDUCATION/TRAINING PROGRAM

## 2021-06-14 PROCEDURE — 99214 OFFICE O/P EST MOD 30 MIN: CPT | Mod: S$PBB,GC,, | Performed by: STUDENT IN AN ORGANIZED HEALTH CARE EDUCATION/TRAINING PROGRAM

## 2021-06-14 PROCEDURE — 99214 PR OFFICE/OUTPT VISIT, EST, LEVL IV, 30-39 MIN: ICD-10-PCS | Mod: S$PBB,GC,, | Performed by: STUDENT IN AN ORGANIZED HEALTH CARE EDUCATION/TRAINING PROGRAM

## 2021-06-14 PROCEDURE — 82378 CARCINOEMBRYONIC ANTIGEN: CPT | Performed by: STUDENT IN AN ORGANIZED HEALTH CARE EDUCATION/TRAINING PROGRAM

## 2021-06-14 RX ORDER — AMLODIPINE BESYLATE 10 MG/1
10 TABLET ORAL DAILY
Qty: 30 TABLET | Refills: 11 | Status: SHIPPED | OUTPATIENT
Start: 2021-06-14 | End: 2022-06-15 | Stop reason: SDUPTHER

## 2021-06-19 PROCEDURE — G0179 PR HOME HEALTH MD RECERTIFICATION: ICD-10-PCS | Mod: ,,, | Performed by: INTERNAL MEDICINE

## 2021-06-19 PROCEDURE — G0179 MD RECERTIFICATION HHA PT: HCPCS | Mod: ,,, | Performed by: INTERNAL MEDICINE

## 2021-06-23 ENCOUNTER — OFFICE VISIT (OUTPATIENT)
Dept: PRIMARY CARE CLINIC | Facility: CLINIC | Age: 86
End: 2021-06-23
Payer: MEDICARE

## 2021-06-23 ENCOUNTER — LAB VISIT (OUTPATIENT)
Dept: LAB | Facility: HOSPITAL | Age: 86
End: 2021-06-23
Attending: INTERNAL MEDICINE
Payer: MEDICARE

## 2021-06-23 ENCOUNTER — TELEPHONE (OUTPATIENT)
Dept: VASCULAR SURGERY | Facility: CLINIC | Age: 86
End: 2021-06-23

## 2021-06-23 VITALS
DIASTOLIC BLOOD PRESSURE: 60 MMHG | HEART RATE: 75 BPM | HEIGHT: 67 IN | WEIGHT: 107.06 LBS | SYSTOLIC BLOOD PRESSURE: 128 MMHG | OXYGEN SATURATION: 99 % | BODY MASS INDEX: 16.8 KG/M2 | TEMPERATURE: 99 F

## 2021-06-23 DIAGNOSIS — C18.4 MALIGNANT NEOPLASM OF TRANSVERSE COLON: ICD-10-CM

## 2021-06-23 DIAGNOSIS — I10 ESSENTIAL HYPERTENSION: Chronic | ICD-10-CM

## 2021-06-23 DIAGNOSIS — R41.3 MEMORY LOSS: ICD-10-CM

## 2021-06-23 DIAGNOSIS — Z00.00 HEALTHCARE MAINTENANCE: ICD-10-CM

## 2021-06-23 DIAGNOSIS — C18.9 ADENOCARCINOMA OF COLON: Primary | ICD-10-CM

## 2021-06-23 PROCEDURE — 84425 ASSAY OF VITAMIN B-1: CPT | Performed by: INTERNAL MEDICINE

## 2021-06-23 PROCEDURE — 99215 PR OFFICE/OUTPT VISIT, EST, LEVL V, 40-54 MIN: ICD-10-PCS | Mod: S$GLB,,, | Performed by: INTERNAL MEDICINE

## 2021-06-23 PROCEDURE — 82607 VITAMIN B-12: CPT | Performed by: INTERNAL MEDICINE

## 2021-06-23 PROCEDURE — 99215 OFFICE O/P EST HI 40 MIN: CPT | Mod: S$GLB,,, | Performed by: INTERNAL MEDICINE

## 2021-06-23 PROCEDURE — 82746 ASSAY OF FOLIC ACID SERUM: CPT | Performed by: INTERNAL MEDICINE

## 2021-06-23 PROCEDURE — 86592 SYPHILIS TEST NON-TREP QUAL: CPT | Performed by: INTERNAL MEDICINE

## 2021-06-23 PROCEDURE — 36415 COLL VENOUS BLD VENIPUNCTURE: CPT | Performed by: INTERNAL MEDICINE

## 2021-06-24 ENCOUNTER — TELEPHONE (OUTPATIENT)
Dept: HEMATOLOGY/ONCOLOGY | Facility: CLINIC | Age: 86
End: 2021-06-24

## 2021-06-24 LAB
FOLATE SERPL-MCNC: 10.3 NG/ML (ref 4–24)
RPR SER QL: NORMAL
VIT B12 SERPL-MCNC: 256 PG/ML (ref 210–950)

## 2021-06-25 DIAGNOSIS — E78.5 HYPERLIPIDEMIA, UNSPECIFIED HYPERLIPIDEMIA TYPE: ICD-10-CM

## 2021-06-28 RX ORDER — ROSUVASTATIN CALCIUM 40 MG/1
40 TABLET, COATED ORAL DAILY
Qty: 90 TABLET | Refills: 3 | Status: SHIPPED | OUTPATIENT
Start: 2021-06-28 | End: 2022-05-10

## 2021-06-29 ENCOUNTER — TELEPHONE (OUTPATIENT)
Dept: HEMATOLOGY/ONCOLOGY | Facility: CLINIC | Age: 86
End: 2021-06-29

## 2021-07-01 ENCOUNTER — ANESTHESIA EVENT (OUTPATIENT)
Dept: SURGERY | Facility: HOSPITAL | Age: 86
End: 2021-07-01
Payer: MEDICARE

## 2021-07-01 ENCOUNTER — TELEPHONE (OUTPATIENT)
Dept: VASCULAR SURGERY | Facility: CLINIC | Age: 86
End: 2021-07-01

## 2021-07-01 LAB — VIT B1 BLD-MCNC: 47 UG/L (ref 38–122)

## 2021-07-01 RX ORDER — OXYCODONE HYDROCHLORIDE 5 MG/1
5 TABLET ORAL EVERY 6 HOURS PRN
Qty: 5 TABLET | Refills: 0 | Status: SHIPPED | OUTPATIENT
Start: 2021-07-01 | End: 2021-07-22

## 2021-07-02 ENCOUNTER — ANESTHESIA (OUTPATIENT)
Dept: SURGERY | Facility: HOSPITAL | Age: 86
End: 2021-07-02
Payer: MEDICARE

## 2021-07-02 ENCOUNTER — HOSPITAL ENCOUNTER (OUTPATIENT)
Facility: HOSPITAL | Age: 86
Discharge: HOME | End: 2021-07-02
Attending: SURGERY | Admitting: SURGERY
Payer: MEDICARE

## 2021-07-02 VITALS
WEIGHT: 107 LBS | HEIGHT: 67 IN | BODY MASS INDEX: 16.79 KG/M2 | OXYGEN SATURATION: 96 % | SYSTOLIC BLOOD PRESSURE: 158 MMHG | TEMPERATURE: 98 F | DIASTOLIC BLOOD PRESSURE: 60 MMHG | HEART RATE: 81 BPM | RESPIRATION RATE: 18 BRPM

## 2021-07-02 DIAGNOSIS — Z45.2 ENCOUNTER FOR INSERTION OF VENOUS ACCESS PORT: Primary | ICD-10-CM

## 2021-07-02 PROCEDURE — 63600175 PHARM REV CODE 636 W HCPCS: Performed by: SURGERY

## 2021-07-02 PROCEDURE — D9220A PRA ANESTHESIA: Mod: ANES,,, | Performed by: ANESTHESIOLOGY

## 2021-07-02 PROCEDURE — 77001 FLUOROGUIDE FOR VEIN DEVICE: CPT | Mod: 26,GC,, | Performed by: SURGERY

## 2021-07-02 PROCEDURE — 71000015 HC POSTOP RECOV 1ST HR: Performed by: SURGERY

## 2021-07-02 PROCEDURE — C1788 PORT, INDWELLING, IMP: HCPCS | Performed by: SURGERY

## 2021-07-02 PROCEDURE — D9220A PRA ANESTHESIA: ICD-10-PCS | Mod: ANES,,, | Performed by: ANESTHESIOLOGY

## 2021-07-02 PROCEDURE — D9220A PRA ANESTHESIA: ICD-10-PCS | Mod: CRNA,,, | Performed by: NURSE ANESTHETIST, CERTIFIED REGISTERED

## 2021-07-02 PROCEDURE — 37000008 HC ANESTHESIA 1ST 15 MINUTES: Performed by: SURGERY

## 2021-07-02 PROCEDURE — 25000003 PHARM REV CODE 250: Performed by: SURGERY

## 2021-07-02 PROCEDURE — 63600175 PHARM REV CODE 636 W HCPCS: Performed by: NURSE ANESTHETIST, CERTIFIED REGISTERED

## 2021-07-02 PROCEDURE — 36000706: Performed by: SURGERY

## 2021-07-02 PROCEDURE — 71000044 HC DOSC ROUTINE RECOVERY FIRST HOUR: Performed by: SURGERY

## 2021-07-02 PROCEDURE — 25000003 PHARM REV CODE 250: Performed by: NURSE ANESTHETIST, CERTIFIED REGISTERED

## 2021-07-02 PROCEDURE — D9220A PRA ANESTHESIA: Mod: CRNA,,, | Performed by: NURSE ANESTHETIST, CERTIFIED REGISTERED

## 2021-07-02 PROCEDURE — 77001 CHG FLUOROGUIDE CNTRL VEN ACCESS,PLACE,REPLACE,REMOVE: ICD-10-PCS | Mod: 26,GC,, | Performed by: SURGERY

## 2021-07-02 PROCEDURE — 37000009 HC ANESTHESIA EA ADD 15 MINS: Performed by: SURGERY

## 2021-07-02 PROCEDURE — 36561 INSERT TUNNELED CV CATH: CPT | Mod: RT,GC,, | Performed by: SURGERY

## 2021-07-02 PROCEDURE — 36000707: Performed by: SURGERY

## 2021-07-02 PROCEDURE — 36561 PR INSERT TUNNELED CV CATH WITH PORT: ICD-10-PCS | Mod: RT,GC,, | Performed by: SURGERY

## 2021-07-02 PROCEDURE — 63600175 PHARM REV CODE 636 W HCPCS: Performed by: STUDENT IN AN ORGANIZED HEALTH CARE EDUCATION/TRAINING PROGRAM

## 2021-07-02 DEVICE — KIT POWERPORT SINGLE 8FR: Type: IMPLANTABLE DEVICE | Site: NECK | Status: FUNCTIONAL

## 2021-07-02 RX ORDER — SODIUM CHLORIDE 9 MG/ML
INJECTION, SOLUTION INTRAVENOUS CONTINUOUS
Status: DISCONTINUED | OUTPATIENT
Start: 2021-07-02 | End: 2021-07-02 | Stop reason: HOSPADM

## 2021-07-02 RX ORDER — CEFAZOLIN SODIUM 1 G/3ML
2 INJECTION, POWDER, FOR SOLUTION INTRAMUSCULAR; INTRAVENOUS
Status: COMPLETED | OUTPATIENT
Start: 2021-07-02 | End: 2021-07-02

## 2021-07-02 RX ORDER — SODIUM CHLORIDE 0.9 % (FLUSH) 0.9 %
3 SYRINGE (ML) INJECTION
Status: DISCONTINUED | OUTPATIENT
Start: 2021-07-02 | End: 2021-07-02 | Stop reason: HOSPADM

## 2021-07-02 RX ORDER — LIDOCAINE HYDROCHLORIDE 20 MG/ML
INJECTION INTRAVENOUS
Status: DISCONTINUED | OUTPATIENT
Start: 2021-07-02 | End: 2021-07-02

## 2021-07-02 RX ORDER — PROPOFOL 10 MG/ML
VIAL (ML) INTRAVENOUS
Status: DISCONTINUED | OUTPATIENT
Start: 2021-07-02 | End: 2021-07-02

## 2021-07-02 RX ORDER — ONDANSETRON 2 MG/ML
4 INJECTION INTRAMUSCULAR; INTRAVENOUS EVERY 12 HOURS PRN
Status: DISCONTINUED | OUTPATIENT
Start: 2021-07-02 | End: 2021-07-02 | Stop reason: HOSPADM

## 2021-07-02 RX ORDER — FENTANYL CITRATE 50 UG/ML
INJECTION, SOLUTION INTRAMUSCULAR; INTRAVENOUS
Status: DISCONTINUED | OUTPATIENT
Start: 2021-07-02 | End: 2021-07-02

## 2021-07-02 RX ORDER — LIDOCAINE HYDROCHLORIDE 10 MG/ML
INJECTION, SOLUTION EPIDURAL; INFILTRATION; INTRACAUDAL; PERINEURAL
Status: DISCONTINUED | OUTPATIENT
Start: 2021-07-02 | End: 2021-07-02 | Stop reason: HOSPADM

## 2021-07-02 RX ORDER — HEPARIN SODIUM 1000 [USP'U]/ML
INJECTION, SOLUTION INTRAVENOUS; SUBCUTANEOUS
Status: DISCONTINUED | OUTPATIENT
Start: 2021-07-02 | End: 2021-07-02 | Stop reason: HOSPADM

## 2021-07-02 RX ORDER — PHENYLEPHRINE HYDROCHLORIDE 10 MG/ML
INJECTION INTRAVENOUS
Status: DISCONTINUED | OUTPATIENT
Start: 2021-07-02 | End: 2021-07-02

## 2021-07-02 RX ORDER — FENTANYL CITRATE 50 UG/ML
25 INJECTION, SOLUTION INTRAMUSCULAR; INTRAVENOUS EVERY 5 MIN PRN
Status: DISCONTINUED | OUTPATIENT
Start: 2021-07-02 | End: 2021-07-02 | Stop reason: HOSPADM

## 2021-07-02 RX ORDER — ESMOLOL HYDROCHLORIDE 10 MG/ML
INJECTION INTRAVENOUS
Status: DISCONTINUED | OUTPATIENT
Start: 2021-07-02 | End: 2021-07-02

## 2021-07-02 RX ORDER — PROPOFOL 10 MG/ML
VIAL (ML) INTRAVENOUS CONTINUOUS PRN
Status: DISCONTINUED | OUTPATIENT
Start: 2021-07-02 | End: 2021-07-02

## 2021-07-02 RX ADMIN — CEFAZOLIN 2 G: 330 INJECTION, POWDER, FOR SOLUTION INTRAMUSCULAR; INTRAVENOUS at 08:07

## 2021-07-02 RX ADMIN — ESMOLOL HYDROCHLORIDE 10 MG: 10 INJECTION INTRAVENOUS at 08:07

## 2021-07-02 RX ADMIN — PHENYLEPHRINE HYDROCHLORIDE 50 MCG: 10 INJECTION INTRAVENOUS at 08:07

## 2021-07-02 RX ADMIN — SODIUM CHLORIDE: 0.9 INJECTION, SOLUTION INTRAVENOUS at 07:07

## 2021-07-02 RX ADMIN — LIDOCAINE HYDROCHLORIDE 40 MG: 20 INJECTION, SOLUTION INTRAVENOUS at 08:07

## 2021-07-02 RX ADMIN — LIDOCAINE HYDROCHLORIDE 60 MG: 20 INJECTION, SOLUTION INTRAVENOUS at 08:07

## 2021-07-02 RX ADMIN — PROPOFOL 30 MG: 10 INJECTION, EMULSION INTRAVENOUS at 08:07

## 2021-07-02 RX ADMIN — PROPOFOL 20 MG: 10 INJECTION, EMULSION INTRAVENOUS at 08:07

## 2021-07-02 RX ADMIN — FENTANYL CITRATE 25 MCG: 50 INJECTION, SOLUTION INTRAMUSCULAR; INTRAVENOUS at 07:07

## 2021-07-02 RX ADMIN — Medication 75 MCG/KG/MIN: at 08:07

## 2021-07-06 ENCOUNTER — TELEPHONE (OUTPATIENT)
Dept: PRIMARY CARE CLINIC | Facility: CLINIC | Age: 86
End: 2021-07-06

## 2021-07-06 ENCOUNTER — TELEPHONE (OUTPATIENT)
Dept: NEUROLOGY | Facility: CLINIC | Age: 86
End: 2021-07-06

## 2021-07-07 ENCOUNTER — TELEPHONE (OUTPATIENT)
Dept: NEUROLOGY | Facility: CLINIC | Age: 86
End: 2021-07-07

## 2021-07-09 DIAGNOSIS — M81.0 AGE-RELATED OSTEOPOROSIS WITHOUT CURRENT PATHOLOGICAL FRACTURE: Primary | ICD-10-CM

## 2021-07-13 ENCOUNTER — TELEPHONE (OUTPATIENT)
Dept: NEUROLOGY | Facility: CLINIC | Age: 86
End: 2021-07-13

## 2021-07-15 ENCOUNTER — PATIENT OUTREACH (OUTPATIENT)
Dept: HOME HEALTH SERVICES | Facility: HOSPITAL | Age: 86
End: 2021-07-15

## 2021-07-19 ENCOUNTER — EXTERNAL HOME HEALTH (OUTPATIENT)
Dept: HOME HEALTH SERVICES | Facility: HOSPITAL | Age: 86
End: 2021-07-19
Payer: MEDICARE

## 2021-07-19 ENCOUNTER — INFUSION (OUTPATIENT)
Dept: INFUSION THERAPY | Facility: HOSPITAL | Age: 86
End: 2021-07-19
Payer: MEDICARE

## 2021-07-19 ENCOUNTER — OFFICE VISIT (OUTPATIENT)
Dept: HEMATOLOGY/ONCOLOGY | Facility: CLINIC | Age: 86
End: 2021-07-19
Payer: MEDICARE

## 2021-07-19 ENCOUNTER — LAB VISIT (OUTPATIENT)
Dept: LAB | Facility: HOSPITAL | Age: 86
End: 2021-07-19
Payer: MEDICARE

## 2021-07-19 VITALS
OXYGEN SATURATION: 100 % | DIASTOLIC BLOOD PRESSURE: 65 MMHG | SYSTOLIC BLOOD PRESSURE: 153 MMHG | HEART RATE: 88 BPM | RESPIRATION RATE: 19 BRPM | TEMPERATURE: 98 F

## 2021-07-19 VITALS
SYSTOLIC BLOOD PRESSURE: 148 MMHG | BODY MASS INDEX: 16.67 KG/M2 | TEMPERATURE: 99 F | RESPIRATION RATE: 16 BRPM | DIASTOLIC BLOOD PRESSURE: 70 MMHG | WEIGHT: 106.25 LBS | HEIGHT: 67 IN | OXYGEN SATURATION: 99 % | HEART RATE: 95 BPM

## 2021-07-19 DIAGNOSIS — D84.81 IMMUNODEFICIENCY SECONDARY TO NEOPLASM: ICD-10-CM

## 2021-07-19 DIAGNOSIS — D49.9 IMMUNODEFICIENCY SECONDARY TO NEOPLASM: ICD-10-CM

## 2021-07-19 DIAGNOSIS — C18.9 ADENOCARCINOMA OF COLON: Primary | ICD-10-CM

## 2021-07-19 DIAGNOSIS — D50.9 IRON DEFICIENCY ANEMIA, UNSPECIFIED IRON DEFICIENCY ANEMIA TYPE: ICD-10-CM

## 2021-07-19 DIAGNOSIS — C18.9 ADENOCARCINOMA OF COLON: ICD-10-CM

## 2021-07-19 DIAGNOSIS — I10 ESSENTIAL HYPERTENSION: ICD-10-CM

## 2021-07-19 LAB
ABO + RH BLD: NORMAL
ALBUMIN SERPL BCP-MCNC: 4.2 G/DL (ref 3.5–5.2)
ALP SERPL-CCNC: 68 U/L (ref 55–135)
ALT SERPL W/O P-5'-P-CCNC: 18 U/L (ref 10–44)
ANION GAP SERPL CALC-SCNC: 13 MMOL/L (ref 8–16)
AST SERPL-CCNC: 19 U/L (ref 10–40)
BASOPHILS # BLD AUTO: 0.05 K/UL (ref 0–0.2)
BASOPHILS NFR BLD: 0.8 % (ref 0–1.9)
BILIRUB SERPL-MCNC: 0.3 MG/DL (ref 0.1–1)
BLD GP AB SCN CELLS X3 SERPL QL: NORMAL
BUN SERPL-MCNC: 15 MG/DL (ref 8–23)
CALCIUM SERPL-MCNC: 9.9 MG/DL (ref 8.7–10.5)
CEA SERPL-MCNC: 3.4 NG/ML (ref 0–5)
CHLORIDE SERPL-SCNC: 102 MMOL/L (ref 95–110)
CO2 SERPL-SCNC: 24 MMOL/L (ref 23–29)
CREAT SERPL-MCNC: 0.8 MG/DL (ref 0.5–1.4)
DIFFERENTIAL METHOD: ABNORMAL
EOSINOPHIL # BLD AUTO: 0.2 K/UL (ref 0–0.5)
EOSINOPHIL NFR BLD: 3.2 % (ref 0–8)
ERYTHROCYTE [DISTWIDTH] IN BLOOD BY AUTOMATED COUNT: 17.2 % (ref 11.5–14.5)
EST. GFR  (AFRICAN AMERICAN): >60 ML/MIN/1.73 M^2
EST. GFR  (NON AFRICAN AMERICAN): >60 ML/MIN/1.73 M^2
GLUCOSE SERPL-MCNC: 94 MG/DL (ref 70–110)
HCT VFR BLD AUTO: 34.5 % (ref 37–48.5)
HGB BLD-MCNC: 10.2 G/DL (ref 12–16)
IMM GRANULOCYTES # BLD AUTO: 0.02 K/UL (ref 0–0.04)
IMM GRANULOCYTES NFR BLD AUTO: 0.3 % (ref 0–0.5)
LYMPHOCYTES # BLD AUTO: 2.2 K/UL (ref 1–4.8)
LYMPHOCYTES NFR BLD: 35.3 % (ref 18–48)
MCH RBC QN AUTO: 24.1 PG (ref 27–31)
MCHC RBC AUTO-ENTMCNC: 29.6 G/DL (ref 32–36)
MCV RBC AUTO: 82 FL (ref 82–98)
MONOCYTES # BLD AUTO: 1 K/UL (ref 0.3–1)
MONOCYTES NFR BLD: 15.7 % (ref 4–15)
NEUTROPHILS # BLD AUTO: 2.8 K/UL (ref 1.8–7.7)
NEUTROPHILS NFR BLD: 44.7 % (ref 38–73)
NRBC BLD-RTO: 0 /100 WBC
PLATELET # BLD AUTO: 301 K/UL (ref 150–450)
PMV BLD AUTO: 10.2 FL (ref 9.2–12.9)
POTASSIUM SERPL-SCNC: 4.6 MMOL/L (ref 3.5–5.1)
PROT SERPL-MCNC: 7.9 G/DL (ref 6–8.4)
RBC # BLD AUTO: 4.23 M/UL (ref 4–5.4)
SODIUM SERPL-SCNC: 139 MMOL/L (ref 136–145)
WBC # BLD AUTO: 6.23 K/UL (ref 3.9–12.7)

## 2021-07-19 PROCEDURE — 86900 BLOOD TYPING SEROLOGIC ABO: CPT | Performed by: STUDENT IN AN ORGANIZED HEALTH CARE EDUCATION/TRAINING PROGRAM

## 2021-07-19 PROCEDURE — 85025 COMPLETE CBC W/AUTO DIFF WBC: CPT | Performed by: STUDENT IN AN ORGANIZED HEALTH CARE EDUCATION/TRAINING PROGRAM

## 2021-07-19 PROCEDURE — 99999 PR PBB SHADOW E&M-EST. PATIENT-LVL V: ICD-10-PCS | Mod: PBBFAC,GC,, | Performed by: STUDENT IN AN ORGANIZED HEALTH CARE EDUCATION/TRAINING PROGRAM

## 2021-07-19 PROCEDURE — 99215 OFFICE O/P EST HI 40 MIN: CPT | Mod: S$PBB,GC,, | Performed by: STUDENT IN AN ORGANIZED HEALTH CARE EDUCATION/TRAINING PROGRAM

## 2021-07-19 PROCEDURE — 63600175 PHARM REV CODE 636 W HCPCS: Performed by: INTERNAL MEDICINE

## 2021-07-19 PROCEDURE — 80053 COMPREHEN METABOLIC PANEL: CPT | Performed by: STUDENT IN AN ORGANIZED HEALTH CARE EDUCATION/TRAINING PROGRAM

## 2021-07-19 PROCEDURE — 99215 PR OFFICE/OUTPT VISIT, EST, LEVL V, 40-54 MIN: ICD-10-PCS | Mod: S$PBB,GC,, | Performed by: STUDENT IN AN ORGANIZED HEALTH CARE EDUCATION/TRAINING PROGRAM

## 2021-07-19 PROCEDURE — 36415 COLL VENOUS BLD VENIPUNCTURE: CPT | Performed by: STUDENT IN AN ORGANIZED HEALTH CARE EDUCATION/TRAINING PROGRAM

## 2021-07-19 PROCEDURE — 82378 CARCINOEMBRYONIC ANTIGEN: CPT | Performed by: STUDENT IN AN ORGANIZED HEALTH CARE EDUCATION/TRAINING PROGRAM

## 2021-07-19 PROCEDURE — 96368 THER/DIAG CONCURRENT INF: CPT

## 2021-07-19 PROCEDURE — 96375 TX/PRO/DX INJ NEW DRUG ADDON: CPT

## 2021-07-19 PROCEDURE — 99999 PR PBB SHADOW E&M-EST. PATIENT-LVL V: CPT | Mod: PBBFAC,GC,, | Performed by: STUDENT IN AN ORGANIZED HEALTH CARE EDUCATION/TRAINING PROGRAM

## 2021-07-19 PROCEDURE — 96416 CHEMO PROLONG INFUSE W/PUMP: CPT

## 2021-07-19 PROCEDURE — 96367 TX/PROPH/DG ADDL SEQ IV INF: CPT

## 2021-07-19 PROCEDURE — 99215 OFFICE O/P EST HI 40 MIN: CPT | Mod: PBBFAC,25 | Performed by: STUDENT IN AN ORGANIZED HEALTH CARE EDUCATION/TRAINING PROGRAM

## 2021-07-19 PROCEDURE — 96411 CHEMO IV PUSH ADDL DRUG: CPT

## 2021-07-19 PROCEDURE — 25000003 PHARM REV CODE 250: Performed by: INTERNAL MEDICINE

## 2021-07-19 PROCEDURE — 96409 CHEMO IV PUSH SNGL DRUG: CPT

## 2021-07-19 RX ORDER — SODIUM CHLORIDE 0.9 % (FLUSH) 0.9 %
10 SYRINGE (ML) INJECTION
Status: DISCONTINUED | OUTPATIENT
Start: 2021-07-19 | End: 2021-07-19 | Stop reason: HOSPADM

## 2021-07-19 RX ORDER — HEPARIN 100 UNIT/ML
500 SYRINGE INTRAVENOUS
Status: CANCELLED | OUTPATIENT
Start: 2021-07-21

## 2021-07-19 RX ORDER — FLUOROURACIL 50 MG/ML
400 INJECTION, SOLUTION INTRAVENOUS
Status: COMPLETED | OUTPATIENT
Start: 2021-07-19 | End: 2021-07-19

## 2021-07-19 RX ORDER — HEPARIN 100 UNIT/ML
500 SYRINGE INTRAVENOUS
Status: CANCELLED | OUTPATIENT
Start: 2021-07-19

## 2021-07-19 RX ORDER — LIDOCAINE AND PRILOCAINE 25; 25 MG/G; MG/G
CREAM TOPICAL
Qty: 30 G | Refills: 3 | Status: SHIPPED | OUTPATIENT
Start: 2021-07-19

## 2021-07-19 RX ORDER — SODIUM CHLORIDE 0.9 % (FLUSH) 0.9 %
10 SYRINGE (ML) INJECTION
Status: CANCELLED | OUTPATIENT
Start: 2021-07-21

## 2021-07-19 RX ORDER — PROMETHAZINE HYDROCHLORIDE 12.5 MG/1
12.5 TABLET ORAL EVERY 4 HOURS PRN
Qty: 60 TABLET | Refills: 1 | Status: SHIPPED | OUTPATIENT
Start: 2021-07-19 | End: 2022-08-25

## 2021-07-19 RX ORDER — SODIUM CHLORIDE 0.9 % (FLUSH) 0.9 %
10 SYRINGE (ML) INJECTION
Status: CANCELLED | OUTPATIENT
Start: 2021-07-19

## 2021-07-19 RX ORDER — FLUOROURACIL 50 MG/ML
400 INJECTION, SOLUTION INTRAVENOUS
Status: CANCELLED | OUTPATIENT
Start: 2021-07-19

## 2021-07-19 RX ORDER — ONDANSETRON 4 MG/1
4 TABLET, FILM COATED ORAL DAILY PRN
Qty: 60 TABLET | Refills: 1 | Status: SHIPPED | OUTPATIENT
Start: 2021-07-19 | End: 2022-07-19

## 2021-07-19 RX ORDER — ONDANSETRON 2 MG/ML
8 INJECTION INTRAMUSCULAR; INTRAVENOUS
Status: CANCELLED | OUTPATIENT
Start: 2021-07-19

## 2021-07-19 RX ORDER — HEPARIN 100 UNIT/ML
500 SYRINGE INTRAVENOUS
Status: DISCONTINUED | OUTPATIENT
Start: 2021-07-19 | End: 2021-07-19 | Stop reason: HOSPADM

## 2021-07-19 RX ORDER — ONDANSETRON 2 MG/ML
8 INJECTION INTRAMUSCULAR; INTRAVENOUS
Status: COMPLETED | OUTPATIENT
Start: 2021-07-19 | End: 2021-07-19

## 2021-07-19 RX ADMIN — FLUOROURACIL 3550 MG: 50 INJECTION, SOLUTION INTRAVENOUS at 03:07

## 2021-07-19 RX ADMIN — ONDANSETRON 8 MG: 2 INJECTION, SOLUTION INTRAMUSCULAR; INTRAVENOUS at 02:07

## 2021-07-19 RX ADMIN — DEXTROSE MONOHYDRATE: 5 INJECTION, SOLUTION INTRAVENOUS at 02:07

## 2021-07-19 RX ADMIN — LEUCOVORIN CALCIUM 590 MG: 500 INJECTION, POWDER, LYOPHILIZED, FOR SOLUTION INTRAMUSCULAR; INTRAVENOUS at 02:07

## 2021-07-19 RX ADMIN — FLUOROURACIL 590 MG: 50 INJECTION, SOLUTION INTRAVENOUS at 03:07

## 2021-07-21 ENCOUNTER — OFFICE VISIT (OUTPATIENT)
Dept: PRIMARY CARE CLINIC | Facility: CLINIC | Age: 86
End: 2021-07-21
Payer: MEDICARE

## 2021-07-21 ENCOUNTER — INFUSION (OUTPATIENT)
Dept: INFUSION THERAPY | Facility: HOSPITAL | Age: 86
End: 2021-07-21
Payer: MEDICARE

## 2021-07-21 VITALS
DIASTOLIC BLOOD PRESSURE: 66 MMHG | HEART RATE: 89 BPM | TEMPERATURE: 99 F | SYSTOLIC BLOOD PRESSURE: 140 MMHG | RESPIRATION RATE: 18 BRPM

## 2021-07-21 VITALS
WEIGHT: 107.56 LBS | SYSTOLIC BLOOD PRESSURE: 134 MMHG | TEMPERATURE: 99 F | HEART RATE: 92 BPM | OXYGEN SATURATION: 95 % | HEIGHT: 67 IN | BODY MASS INDEX: 16.88 KG/M2 | DIASTOLIC BLOOD PRESSURE: 64 MMHG

## 2021-07-21 DIAGNOSIS — R41.3 MEMORY LOSS: ICD-10-CM

## 2021-07-21 DIAGNOSIS — C18.9 ADENOCARCINOMA OF COLON: ICD-10-CM

## 2021-07-21 DIAGNOSIS — C18.9 ADENOCARCINOMA OF COLON: Primary | ICD-10-CM

## 2021-07-21 DIAGNOSIS — I10 ESSENTIAL HYPERTENSION: Chronic | ICD-10-CM

## 2021-07-21 DIAGNOSIS — Z00.00 HEALTHCARE MAINTENANCE: ICD-10-CM

## 2021-07-21 PROCEDURE — A4216 STERILE WATER/SALINE, 10 ML: HCPCS | Performed by: INTERNAL MEDICINE

## 2021-07-21 PROCEDURE — 63600175 PHARM REV CODE 636 W HCPCS: Performed by: INTERNAL MEDICINE

## 2021-07-21 PROCEDURE — 99214 OFFICE O/P EST MOD 30 MIN: CPT | Mod: S$GLB,,, | Performed by: INTERNAL MEDICINE

## 2021-07-21 PROCEDURE — 25000003 PHARM REV CODE 250: Performed by: INTERNAL MEDICINE

## 2021-07-21 PROCEDURE — 99214 PR OFFICE/OUTPT VISIT, EST, LEVL IV, 30-39 MIN: ICD-10-PCS | Mod: S$GLB,,, | Performed by: INTERNAL MEDICINE

## 2021-07-21 RX ORDER — SODIUM CHLORIDE 0.9 % (FLUSH) 0.9 %
10 SYRINGE (ML) INJECTION
Status: DISCONTINUED | OUTPATIENT
Start: 2021-07-21 | End: 2021-07-21 | Stop reason: HOSPADM

## 2021-07-21 RX ORDER — HEPARIN 100 UNIT/ML
500 SYRINGE INTRAVENOUS
Status: DISCONTINUED | OUTPATIENT
Start: 2021-07-21 | End: 2021-07-21 | Stop reason: HOSPADM

## 2021-07-21 RX ADMIN — Medication 10 ML: at 02:07

## 2021-07-21 RX ADMIN — HEPARIN 500 UNITS: 100 SYRINGE at 02:07

## 2021-07-22 ENCOUNTER — OFFICE VISIT (OUTPATIENT)
Dept: NEUROLOGY | Facility: CLINIC | Age: 86
End: 2021-07-22

## 2021-07-22 VITALS
BODY MASS INDEX: 16.67 KG/M2 | SYSTOLIC BLOOD PRESSURE: 155 MMHG | HEART RATE: 98 BPM | WEIGHT: 106.25 LBS | HEIGHT: 67 IN | DIASTOLIC BLOOD PRESSURE: 85 MMHG

## 2021-07-22 DIAGNOSIS — C18.4 MALIGNANT NEOPLASM OF TRANSVERSE COLON: ICD-10-CM

## 2021-07-22 DIAGNOSIS — H91.93 BILATERAL HEARING LOSS, UNSPECIFIED HEARING LOSS TYPE: ICD-10-CM

## 2021-07-22 DIAGNOSIS — E53.8 B12 DEFICIENCY: ICD-10-CM

## 2021-07-22 DIAGNOSIS — E78.2 MIXED HYPERLIPIDEMIA: Chronic | ICD-10-CM

## 2021-07-22 DIAGNOSIS — F03.91 DEMENTIA WITH BEHAVIORAL DISTURBANCE, UNSPECIFIED DEMENTIA TYPE: Primary | ICD-10-CM

## 2021-07-22 DIAGNOSIS — R41.3 MEMORY LOSS: ICD-10-CM

## 2021-07-22 DIAGNOSIS — I10 ESSENTIAL HYPERTENSION: Chronic | ICD-10-CM

## 2021-07-22 PROCEDURE — 99213 OFFICE O/P EST LOW 20 MIN: CPT | Mod: PBBFAC

## 2021-07-22 PROCEDURE — 96116 PR NEUROBEHAVIORAL STATUS EXAM BY PSYCH/PHYS: ICD-10-PCS | Mod: S$PBB,,, | Performed by: CLINICAL NEUROPSYCHOLOGIST

## 2021-07-22 PROCEDURE — 99999 PR PBB SHADOW E&M-EST. PATIENT-LVL III: ICD-10-PCS | Mod: PBBFAC,,,

## 2021-07-22 PROCEDURE — 96139 PR PSYCH/NEUROPSYCH TEST ADMIN/SCORING, BY TECH, 2+ TESTS, EA ADDTL 30 MIN: ICD-10-PCS | Mod: ,,, | Performed by: CLINICAL NEUROPSYCHOLOGIST

## 2021-07-22 PROCEDURE — 96138 PR PSYCH/NEUROPSYCH TEST ADMIN/SCORING, BY TECH, 2+ TESTS, 1ST 30 MIN: ICD-10-PCS | Mod: ,,, | Performed by: CLINICAL NEUROPSYCHOLOGIST

## 2021-07-22 PROCEDURE — 99499 NO LOS: ICD-10-PCS | Mod: S$PBB,,, | Performed by: PSYCHIATRY & NEUROLOGY

## 2021-07-22 PROCEDURE — 96116 NUBHVL XM PHYS/QHP 1ST HR: CPT | Mod: S$PBB,,, | Performed by: CLINICAL NEUROPSYCHOLOGIST

## 2021-07-22 PROCEDURE — 96132 NRPSYC TST EVAL PHYS/QHP 1ST: CPT | Mod: ,,, | Performed by: CLINICAL NEUROPSYCHOLOGIST

## 2021-07-22 PROCEDURE — 99205 PR OFFICE/OUTPT VISIT, NEW, LEVL V, 60-74 MIN: ICD-10-PCS | Mod: S$PBB,,, | Performed by: NURSE PRACTITIONER

## 2021-07-22 PROCEDURE — 96133 NRPSYC TST EVAL PHYS/QHP EA: CPT | Mod: ,,, | Performed by: CLINICAL NEUROPSYCHOLOGIST

## 2021-07-22 PROCEDURE — 96138 PSYCL/NRPSYC TECH 1ST: CPT | Mod: ,,, | Performed by: CLINICAL NEUROPSYCHOLOGIST

## 2021-07-22 PROCEDURE — 96132 PR NEUROPSYCHOLOGIC TEST EVAL SVCS, 1ST HR: ICD-10-PCS | Mod: ,,, | Performed by: CLINICAL NEUROPSYCHOLOGIST

## 2021-07-22 PROCEDURE — 99499 UNLISTED E&M SERVICE: CPT | Mod: S$PBB,,, | Performed by: PSYCHIATRY & NEUROLOGY

## 2021-07-22 PROCEDURE — 96133 PR NEUROPSYCHOLOGIC TEST EVAL SVCS, EA ADDTL HR: ICD-10-PCS | Mod: ,,, | Performed by: CLINICAL NEUROPSYCHOLOGIST

## 2021-07-22 PROCEDURE — 96139 PSYCL/NRPSYC TST TECH EA: CPT | Mod: ,,, | Performed by: CLINICAL NEUROPSYCHOLOGIST

## 2021-07-22 PROCEDURE — 99999 PR PBB SHADOW E&M-EST. PATIENT-LVL III: CPT | Mod: PBBFAC,,,

## 2021-07-22 PROCEDURE — 96116 NUBHVL XM PHYS/QHP 1ST HR: CPT | Mod: PBBFAC | Performed by: CLINICAL NEUROPSYCHOLOGIST

## 2021-07-22 PROCEDURE — 99205 OFFICE O/P NEW HI 60 MIN: CPT | Mod: S$PBB,,, | Performed by: NURSE PRACTITIONER

## 2021-07-23 ENCOUNTER — INFUSION (OUTPATIENT)
Dept: INFECTIOUS DISEASES | Facility: HOSPITAL | Age: 86
End: 2021-07-23
Payer: MEDICARE

## 2021-07-23 VITALS
RESPIRATION RATE: 18 BRPM | WEIGHT: 107.06 LBS | BODY MASS INDEX: 16.8 KG/M2 | OXYGEN SATURATION: 98 % | HEART RATE: 88 BPM | TEMPERATURE: 99 F | HEIGHT: 67 IN | DIASTOLIC BLOOD PRESSURE: 70 MMHG | SYSTOLIC BLOOD PRESSURE: 158 MMHG

## 2021-07-23 DIAGNOSIS — M81.0 AGE-RELATED OSTEOPOROSIS WITHOUT CURRENT PATHOLOGICAL FRACTURE: Primary | ICD-10-CM

## 2021-07-23 PROBLEM — F03.90 DEMENTIA: Status: ACTIVE | Noted: 2021-06-04

## 2021-07-23 PROBLEM — F03.918 DEMENTIA WITH BEHAVIORAL DISTURBANCE: Status: ACTIVE | Noted: 2021-06-04

## 2021-07-23 PROCEDURE — 96372 THER/PROPH/DIAG INJ SC/IM: CPT

## 2021-07-23 PROCEDURE — 63600175 PHARM REV CODE 636 W HCPCS: Mod: JG | Performed by: PHYSICIAN ASSISTANT

## 2021-07-23 RX ADMIN — DENOSUMAB 60 MG: 60 INJECTION SUBCUTANEOUS at 11:07

## 2021-08-02 ENCOUNTER — INFUSION (OUTPATIENT)
Dept: INFUSION THERAPY | Facility: HOSPITAL | Age: 86
End: 2021-08-02
Payer: MEDICARE

## 2021-08-02 ENCOUNTER — OFFICE VISIT (OUTPATIENT)
Dept: HEMATOLOGY/ONCOLOGY | Facility: CLINIC | Age: 86
End: 2021-08-02
Payer: MEDICARE

## 2021-08-02 VITALS
SYSTOLIC BLOOD PRESSURE: 139 MMHG | RESPIRATION RATE: 18 BRPM | DIASTOLIC BLOOD PRESSURE: 75 MMHG | TEMPERATURE: 99 F | HEART RATE: 79 BPM

## 2021-08-02 VITALS
WEIGHT: 109.13 LBS | SYSTOLIC BLOOD PRESSURE: 178 MMHG | HEIGHT: 67 IN | DIASTOLIC BLOOD PRESSURE: 81 MMHG | BODY MASS INDEX: 17.13 KG/M2 | HEART RATE: 116 BPM

## 2021-08-02 DIAGNOSIS — C18.9 ADENOCARCINOMA OF COLON: Primary | ICD-10-CM

## 2021-08-02 DIAGNOSIS — T45.1X5A IMMUNODEFICIENCY SECONDARY TO CHEMOTHERAPY: ICD-10-CM

## 2021-08-02 DIAGNOSIS — D49.9 IMMUNODEFICIENCY SECONDARY TO NEOPLASM: ICD-10-CM

## 2021-08-02 DIAGNOSIS — D84.81 IMMUNODEFICIENCY SECONDARY TO NEOPLASM: ICD-10-CM

## 2021-08-02 DIAGNOSIS — D84.821 IMMUNODEFICIENCY SECONDARY TO CHEMOTHERAPY: ICD-10-CM

## 2021-08-02 DIAGNOSIS — I10 ESSENTIAL HYPERTENSION: ICD-10-CM

## 2021-08-02 DIAGNOSIS — D50.9 IRON DEFICIENCY ANEMIA, UNSPECIFIED IRON DEFICIENCY ANEMIA TYPE: ICD-10-CM

## 2021-08-02 DIAGNOSIS — Z79.899 IMMUNODEFICIENCY SECONDARY TO CHEMOTHERAPY: ICD-10-CM

## 2021-08-02 PROBLEM — Z79.69 IMMUNODEFICIENCY SECONDARY TO CHEMOTHERAPY: Status: ACTIVE | Noted: 2021-08-02

## 2021-08-02 PROCEDURE — 96415 CHEMO IV INFUSION ADDL HR: CPT

## 2021-08-02 PROCEDURE — 63600175 PHARM REV CODE 636 W HCPCS: Performed by: INTERNAL MEDICINE

## 2021-08-02 PROCEDURE — 99999 PR PBB SHADOW E&M-EST. PATIENT-LVL IV: CPT | Mod: PBBFAC,GC,, | Performed by: STUDENT IN AN ORGANIZED HEALTH CARE EDUCATION/TRAINING PROGRAM

## 2021-08-02 PROCEDURE — 99214 OFFICE O/P EST MOD 30 MIN: CPT | Mod: PBBFAC,25 | Performed by: STUDENT IN AN ORGANIZED HEALTH CARE EDUCATION/TRAINING PROGRAM

## 2021-08-02 PROCEDURE — 96367 TX/PROPH/DG ADDL SEQ IV INF: CPT

## 2021-08-02 PROCEDURE — 96375 TX/PRO/DX INJ NEW DRUG ADDON: CPT

## 2021-08-02 PROCEDURE — 96416 CHEMO PROLONG INFUSE W/PUMP: CPT

## 2021-08-02 PROCEDURE — 99215 PR OFFICE/OUTPT VISIT, EST, LEVL V, 40-54 MIN: ICD-10-PCS | Mod: S$PBB,GC,, | Performed by: STUDENT IN AN ORGANIZED HEALTH CARE EDUCATION/TRAINING PROGRAM

## 2021-08-02 PROCEDURE — 96411 CHEMO IV PUSH ADDL DRUG: CPT

## 2021-08-02 PROCEDURE — 96365 THER/PROPH/DIAG IV INF INIT: CPT

## 2021-08-02 PROCEDURE — 99215 OFFICE O/P EST HI 40 MIN: CPT | Mod: S$PBB,GC,, | Performed by: STUDENT IN AN ORGANIZED HEALTH CARE EDUCATION/TRAINING PROGRAM

## 2021-08-02 PROCEDURE — 99999 PR PBB SHADOW E&M-EST. PATIENT-LVL IV: ICD-10-PCS | Mod: PBBFAC,GC,, | Performed by: STUDENT IN AN ORGANIZED HEALTH CARE EDUCATION/TRAINING PROGRAM

## 2021-08-02 PROCEDURE — 96413 CHEMO IV INFUSION 1 HR: CPT

## 2021-08-02 PROCEDURE — 25000003 PHARM REV CODE 250: Performed by: INTERNAL MEDICINE

## 2021-08-02 RX ORDER — HEPARIN 100 UNIT/ML
500 SYRINGE INTRAVENOUS
Status: CANCELLED | OUTPATIENT
Start: 2021-08-04

## 2021-08-02 RX ORDER — FLUOROURACIL 50 MG/ML
400 INJECTION, SOLUTION INTRAVENOUS
Status: COMPLETED | OUTPATIENT
Start: 2021-08-02 | End: 2021-08-02

## 2021-08-02 RX ORDER — HEPARIN 100 UNIT/ML
500 SYRINGE INTRAVENOUS
Status: DISCONTINUED | OUTPATIENT
Start: 2021-08-02 | End: 2021-08-02 | Stop reason: HOSPADM

## 2021-08-02 RX ORDER — ONDANSETRON 2 MG/ML
8 INJECTION INTRAMUSCULAR; INTRAVENOUS
Status: COMPLETED | OUTPATIENT
Start: 2021-08-02 | End: 2021-08-02

## 2021-08-02 RX ORDER — ONDANSETRON 2 MG/ML
8 INJECTION INTRAMUSCULAR; INTRAVENOUS
Status: CANCELLED | OUTPATIENT
Start: 2021-08-02

## 2021-08-02 RX ORDER — SODIUM CHLORIDE 0.9 % (FLUSH) 0.9 %
10 SYRINGE (ML) INJECTION
Status: CANCELLED | OUTPATIENT
Start: 2021-08-04

## 2021-08-02 RX ORDER — SODIUM CHLORIDE 0.9 % (FLUSH) 0.9 %
10 SYRINGE (ML) INJECTION
Status: DISCONTINUED | OUTPATIENT
Start: 2021-08-02 | End: 2021-08-02 | Stop reason: HOSPADM

## 2021-08-02 RX ORDER — HEPARIN 100 UNIT/ML
500 SYRINGE INTRAVENOUS
Status: CANCELLED | OUTPATIENT
Start: 2021-08-02

## 2021-08-02 RX ORDER — SODIUM CHLORIDE 0.9 % (FLUSH) 0.9 %
10 SYRINGE (ML) INJECTION
Status: CANCELLED | OUTPATIENT
Start: 2021-08-02

## 2021-08-02 RX ORDER — FLUOROURACIL 50 MG/ML
400 INJECTION, SOLUTION INTRAVENOUS
Status: CANCELLED | OUTPATIENT
Start: 2021-08-02

## 2021-08-02 RX ADMIN — LEUCOVORIN CALCIUM 590 MG: 500 INJECTION, POWDER, LYOPHILIZED, FOR SOLUTION INTRAMUSCULAR; INTRAVENOUS at 03:08

## 2021-08-02 RX ADMIN — ONDANSETRON 8 MG: 2 INJECTION, SOLUTION INTRAMUSCULAR; INTRAVENOUS at 02:08

## 2021-08-02 RX ADMIN — FLUOROURACIL 590 MG: 50 INJECTION, SOLUTION INTRAVENOUS at 05:08

## 2021-08-02 RX ADMIN — FLUOROURACIL 3550 MG: 50 INJECTION, SOLUTION INTRAVENOUS at 05:08

## 2021-08-02 RX ADMIN — SODIUM CHLORIDE: 9 INJECTION, SOLUTION INTRAVENOUS at 02:08

## 2021-08-03 DIAGNOSIS — C18.9 ADENOCARCINOMA OF COLON: Primary | ICD-10-CM

## 2021-08-04 ENCOUNTER — INFUSION (OUTPATIENT)
Dept: INFUSION THERAPY | Facility: HOSPITAL | Age: 86
End: 2021-08-04
Payer: MEDICARE

## 2021-08-04 VITALS
TEMPERATURE: 98 F | SYSTOLIC BLOOD PRESSURE: 145 MMHG | RESPIRATION RATE: 18 BRPM | HEART RATE: 89 BPM | DIASTOLIC BLOOD PRESSURE: 67 MMHG

## 2021-08-04 DIAGNOSIS — C18.9 ADENOCARCINOMA OF COLON: Primary | ICD-10-CM

## 2021-08-04 RX ORDER — SODIUM CHLORIDE 0.9 % (FLUSH) 0.9 %
10 SYRINGE (ML) INJECTION
Status: DISCONTINUED | OUTPATIENT
Start: 2021-08-04 | End: 2021-08-04 | Stop reason: HOSPADM

## 2021-08-04 RX ORDER — HEPARIN 100 UNIT/ML
500 SYRINGE INTRAVENOUS
Status: DISCONTINUED | OUTPATIENT
Start: 2021-08-04 | End: 2021-08-04 | Stop reason: HOSPADM

## 2021-08-16 ENCOUNTER — INFUSION (OUTPATIENT)
Dept: INFUSION THERAPY | Facility: HOSPITAL | Age: 86
End: 2021-08-16
Payer: MEDICARE

## 2021-08-16 ENCOUNTER — OFFICE VISIT (OUTPATIENT)
Dept: HEMATOLOGY/ONCOLOGY | Facility: CLINIC | Age: 86
End: 2021-08-16
Payer: MEDICARE

## 2021-08-16 VITALS
HEART RATE: 113 BPM | DIASTOLIC BLOOD PRESSURE: 84 MMHG | BODY MASS INDEX: 17.17 KG/M2 | RESPIRATION RATE: 18 BRPM | WEIGHT: 109.38 LBS | HEIGHT: 67 IN | OXYGEN SATURATION: 100 % | TEMPERATURE: 99 F | SYSTOLIC BLOOD PRESSURE: 186 MMHG

## 2021-08-16 VITALS — RESPIRATION RATE: 18 BRPM | HEART RATE: 89 BPM | DIASTOLIC BLOOD PRESSURE: 67 MMHG | SYSTOLIC BLOOD PRESSURE: 149 MMHG

## 2021-08-16 DIAGNOSIS — C18.9 ADENOCARCINOMA OF COLON: Primary | ICD-10-CM

## 2021-08-16 DIAGNOSIS — D84.821 IMMUNODEFICIENCY SECONDARY TO CHEMOTHERAPY: ICD-10-CM

## 2021-08-16 DIAGNOSIS — D50.9 IRON DEFICIENCY ANEMIA, UNSPECIFIED IRON DEFICIENCY ANEMIA TYPE: ICD-10-CM

## 2021-08-16 DIAGNOSIS — D84.81 IMMUNODEFICIENCY SECONDARY TO NEOPLASM: ICD-10-CM

## 2021-08-16 DIAGNOSIS — Z79.899 IMMUNODEFICIENCY SECONDARY TO CHEMOTHERAPY: ICD-10-CM

## 2021-08-16 DIAGNOSIS — T45.1X5A IMMUNODEFICIENCY SECONDARY TO CHEMOTHERAPY: ICD-10-CM

## 2021-08-16 DIAGNOSIS — D49.9 IMMUNODEFICIENCY SECONDARY TO NEOPLASM: ICD-10-CM

## 2021-08-16 PROCEDURE — A4216 STERILE WATER/SALINE, 10 ML: HCPCS | Performed by: INTERNAL MEDICINE

## 2021-08-16 PROCEDURE — 96375 TX/PRO/DX INJ NEW DRUG ADDON: CPT

## 2021-08-16 PROCEDURE — 96366 THER/PROPH/DIAG IV INF ADDON: CPT

## 2021-08-16 PROCEDURE — 96365 THER/PROPH/DIAG IV INF INIT: CPT

## 2021-08-16 PROCEDURE — 99999 PR PBB SHADOW E&M-EST. PATIENT-LVL V: ICD-10-PCS | Mod: PBBFAC,GC,, | Performed by: STUDENT IN AN ORGANIZED HEALTH CARE EDUCATION/TRAINING PROGRAM

## 2021-08-16 PROCEDURE — 96416 CHEMO PROLONG INFUSE W/PUMP: CPT

## 2021-08-16 PROCEDURE — 96409 CHEMO IV PUSH SNGL DRUG: CPT

## 2021-08-16 PROCEDURE — 25000003 PHARM REV CODE 250: Performed by: INTERNAL MEDICINE

## 2021-08-16 PROCEDURE — 99999 PR PBB SHADOW E&M-EST. PATIENT-LVL V: CPT | Mod: PBBFAC,GC,, | Performed by: STUDENT IN AN ORGANIZED HEALTH CARE EDUCATION/TRAINING PROGRAM

## 2021-08-16 PROCEDURE — 99215 OFFICE O/P EST HI 40 MIN: CPT | Mod: PBBFAC,25 | Performed by: STUDENT IN AN ORGANIZED HEALTH CARE EDUCATION/TRAINING PROGRAM

## 2021-08-16 PROCEDURE — 99215 OFFICE O/P EST HI 40 MIN: CPT | Mod: S$PBB,GC,, | Performed by: STUDENT IN AN ORGANIZED HEALTH CARE EDUCATION/TRAINING PROGRAM

## 2021-08-16 PROCEDURE — 99215 PR OFFICE/OUTPT VISIT, EST, LEVL V, 40-54 MIN: ICD-10-PCS | Mod: S$PBB,GC,, | Performed by: STUDENT IN AN ORGANIZED HEALTH CARE EDUCATION/TRAINING PROGRAM

## 2021-08-16 PROCEDURE — 63600175 PHARM REV CODE 636 W HCPCS: Performed by: INTERNAL MEDICINE

## 2021-08-16 PROCEDURE — 96401 CHEMO ANTI-NEOPL SQ/IM: CPT

## 2021-08-16 RX ORDER — FLUOROURACIL 50 MG/ML
400 INJECTION, SOLUTION INTRAVENOUS
Status: COMPLETED | OUTPATIENT
Start: 2021-08-16 | End: 2021-08-16

## 2021-08-16 RX ORDER — ONDANSETRON 2 MG/ML
8 INJECTION INTRAMUSCULAR; INTRAVENOUS
Status: CANCELLED | OUTPATIENT
Start: 2021-08-16

## 2021-08-16 RX ORDER — SODIUM CHLORIDE 0.9 % (FLUSH) 0.9 %
10 SYRINGE (ML) INJECTION
Status: CANCELLED | OUTPATIENT
Start: 2021-08-18

## 2021-08-16 RX ORDER — SODIUM CHLORIDE 0.9 % (FLUSH) 0.9 %
10 SYRINGE (ML) INJECTION
Status: DISCONTINUED | OUTPATIENT
Start: 2021-08-16 | End: 2021-08-16 | Stop reason: HOSPADM

## 2021-08-16 RX ORDER — SODIUM CHLORIDE 0.9 % (FLUSH) 0.9 %
10 SYRINGE (ML) INJECTION
Status: CANCELLED | OUTPATIENT
Start: 2021-08-16

## 2021-08-16 RX ORDER — FLUOROURACIL 50 MG/ML
400 INJECTION, SOLUTION INTRAVENOUS
Status: CANCELLED | OUTPATIENT
Start: 2021-08-16

## 2021-08-16 RX ORDER — ONDANSETRON 2 MG/ML
8 INJECTION INTRAMUSCULAR; INTRAVENOUS
Status: COMPLETED | OUTPATIENT
Start: 2021-08-16 | End: 2021-08-16

## 2021-08-16 RX ORDER — HEPARIN 100 UNIT/ML
500 SYRINGE INTRAVENOUS
Status: CANCELLED | OUTPATIENT
Start: 2021-08-16

## 2021-08-16 RX ORDER — HEPARIN 100 UNIT/ML
500 SYRINGE INTRAVENOUS
Status: DISCONTINUED | OUTPATIENT
Start: 2021-08-16 | End: 2021-08-16 | Stop reason: HOSPADM

## 2021-08-16 RX ORDER — HEPARIN 100 UNIT/ML
500 SYRINGE INTRAVENOUS
Status: CANCELLED | OUTPATIENT
Start: 2021-08-18

## 2021-08-16 RX ADMIN — SODIUM CHLORIDE, PRESERVATIVE FREE 10 ML: 5 INJECTION INTRAVENOUS at 03:08

## 2021-08-16 RX ADMIN — SODIUM CHLORIDE: 9 INJECTION, SOLUTION INTRAVENOUS at 03:08

## 2021-08-16 RX ADMIN — ONDANSETRON 8 MG: 2 INJECTION, SOLUTION INTRAMUSCULAR; INTRAVENOUS at 04:08

## 2021-08-16 RX ADMIN — FLUOROURACIL 590 MG: 50 INJECTION, SOLUTION INTRAVENOUS at 04:08

## 2021-08-16 RX ADMIN — FLUOROURACIL 3550 MG: 50 INJECTION, SOLUTION INTRAVENOUS at 04:08

## 2021-08-16 RX ADMIN — LEUCOVORIN CALCIUM 590 MG: 200 INJECTION, POWDER, LYOPHILIZED, FOR SOLUTION INTRAMUSCULAR; INTRAVENOUS at 04:08

## 2021-08-18 ENCOUNTER — INFUSION (OUTPATIENT)
Dept: INFUSION THERAPY | Facility: HOSPITAL | Age: 86
End: 2021-08-18
Payer: MEDICARE

## 2021-08-18 VITALS
SYSTOLIC BLOOD PRESSURE: 160 MMHG | HEART RATE: 94 BPM | DIASTOLIC BLOOD PRESSURE: 70 MMHG | TEMPERATURE: 98 F | RESPIRATION RATE: 18 BRPM

## 2021-08-18 DIAGNOSIS — C18.9 ADENOCARCINOMA OF COLON: Primary | ICD-10-CM

## 2021-08-18 PROCEDURE — A4216 STERILE WATER/SALINE, 10 ML: HCPCS | Performed by: INTERNAL MEDICINE

## 2021-08-18 PROCEDURE — G0179 PR HOME HEALTH MD RECERTIFICATION: ICD-10-PCS | Mod: ,,, | Performed by: HOSPITALIST

## 2021-08-18 PROCEDURE — 63600175 PHARM REV CODE 636 W HCPCS: Performed by: INTERNAL MEDICINE

## 2021-08-18 PROCEDURE — G0179 MD RECERTIFICATION HHA PT: HCPCS | Mod: ,,, | Performed by: HOSPITALIST

## 2021-08-18 PROCEDURE — 25000003 PHARM REV CODE 250: Performed by: INTERNAL MEDICINE

## 2021-08-18 RX ORDER — SODIUM CHLORIDE 0.9 % (FLUSH) 0.9 %
10 SYRINGE (ML) INJECTION
Status: DISCONTINUED | OUTPATIENT
Start: 2021-08-18 | End: 2021-08-18 | Stop reason: HOSPADM

## 2021-08-18 RX ORDER — HEPARIN 100 UNIT/ML
500 SYRINGE INTRAVENOUS
Status: DISCONTINUED | OUTPATIENT
Start: 2021-08-18 | End: 2021-08-18 | Stop reason: HOSPADM

## 2021-08-18 RX ADMIN — HEPARIN 500 UNITS: 100 SYRINGE at 02:08

## 2021-08-18 RX ADMIN — Medication 10 ML: at 02:08

## 2021-08-23 PROBLEM — Z00.00 HEALTHCARE MAINTENANCE: Status: RESOLVED | Noted: 2021-05-24 | Resolved: 2021-08-23

## 2021-09-07 ENCOUNTER — TELEPHONE (OUTPATIENT)
Dept: HEMATOLOGY/ONCOLOGY | Facility: CLINIC | Age: 86
End: 2021-09-07

## 2021-09-13 ENCOUNTER — OFFICE VISIT (OUTPATIENT)
Dept: HEMATOLOGY/ONCOLOGY | Facility: CLINIC | Age: 86
End: 2021-09-13
Payer: MEDICARE

## 2021-09-13 ENCOUNTER — INFUSION (OUTPATIENT)
Dept: INFUSION THERAPY | Facility: HOSPITAL | Age: 86
End: 2021-09-13
Payer: MEDICARE

## 2021-09-13 ENCOUNTER — LAB VISIT (OUTPATIENT)
Dept: LAB | Facility: HOSPITAL | Age: 86
End: 2021-09-13
Payer: MEDICARE

## 2021-09-13 VITALS
DIASTOLIC BLOOD PRESSURE: 66 MMHG | HEIGHT: 67 IN | BODY MASS INDEX: 16.85 KG/M2 | HEART RATE: 107 BPM | SYSTOLIC BLOOD PRESSURE: 138 MMHG | WEIGHT: 107.38 LBS

## 2021-09-13 VITALS
HEIGHT: 67 IN | TEMPERATURE: 98 F | RESPIRATION RATE: 18 BRPM | SYSTOLIC BLOOD PRESSURE: 132 MMHG | WEIGHT: 107.38 LBS | BODY MASS INDEX: 16.85 KG/M2 | DIASTOLIC BLOOD PRESSURE: 60 MMHG | HEART RATE: 80 BPM

## 2021-09-13 DIAGNOSIS — T45.1X5A IMMUNODEFICIENCY SECONDARY TO CHEMOTHERAPY: ICD-10-CM

## 2021-09-13 DIAGNOSIS — D49.9 IMMUNODEFICIENCY SECONDARY TO NEOPLASM: ICD-10-CM

## 2021-09-13 DIAGNOSIS — I10 ESSENTIAL HYPERTENSION: ICD-10-CM

## 2021-09-13 DIAGNOSIS — Z79.899 IMMUNODEFICIENCY SECONDARY TO CHEMOTHERAPY: ICD-10-CM

## 2021-09-13 DIAGNOSIS — D84.81 IMMUNODEFICIENCY SECONDARY TO NEOPLASM: ICD-10-CM

## 2021-09-13 DIAGNOSIS — D50.9 IRON DEFICIENCY ANEMIA, UNSPECIFIED IRON DEFICIENCY ANEMIA TYPE: ICD-10-CM

## 2021-09-13 DIAGNOSIS — D84.821 IMMUNODEFICIENCY SECONDARY TO CHEMOTHERAPY: ICD-10-CM

## 2021-09-13 DIAGNOSIS — C18.9 ADENOCARCINOMA OF COLON: Primary | ICD-10-CM

## 2021-09-13 DIAGNOSIS — G30.1 LATE ONSET ALZHEIMER'S DISEASE WITH BEHAVIORAL DISTURBANCE: ICD-10-CM

## 2021-09-13 DIAGNOSIS — F02.818 LATE ONSET ALZHEIMER'S DISEASE WITH BEHAVIORAL DISTURBANCE: ICD-10-CM

## 2021-09-13 LAB
ALBUMIN SERPL BCP-MCNC: 4.2 G/DL (ref 3.5–5.2)
ALP SERPL-CCNC: 69 U/L (ref 55–135)
ALT SERPL W/O P-5'-P-CCNC: 21 U/L (ref 10–44)
ANION GAP SERPL CALC-SCNC: 12 MMOL/L (ref 8–16)
AST SERPL-CCNC: 22 U/L (ref 10–40)
BASOPHILS # BLD AUTO: 0.06 K/UL (ref 0–0.2)
BASOPHILS NFR BLD: 0.9 % (ref 0–1.9)
BILIRUB SERPL-MCNC: 0.4 MG/DL (ref 0.1–1)
BUN SERPL-MCNC: 11 MG/DL (ref 8–23)
CALCIUM SERPL-MCNC: 9.7 MG/DL (ref 8.7–10.5)
CHLORIDE SERPL-SCNC: 102 MMOL/L (ref 95–110)
CO2 SERPL-SCNC: 25 MMOL/L (ref 23–29)
CREAT SERPL-MCNC: 0.8 MG/DL (ref 0.5–1.4)
DIFFERENTIAL METHOD: ABNORMAL
EOSINOPHIL # BLD AUTO: 0.1 K/UL (ref 0–0.5)
EOSINOPHIL NFR BLD: 1.7 % (ref 0–8)
ERYTHROCYTE [DISTWIDTH] IN BLOOD BY AUTOMATED COUNT: 20.8 % (ref 11.5–14.5)
EST. GFR  (AFRICAN AMERICAN): >60 ML/MIN/1.73 M^2
EST. GFR  (NON AFRICAN AMERICAN): >60 ML/MIN/1.73 M^2
GLUCOSE SERPL-MCNC: 106 MG/DL (ref 70–110)
HCT VFR BLD AUTO: 36.5 % (ref 37–48.5)
HGB BLD-MCNC: 11.4 G/DL (ref 12–16)
IMM GRANULOCYTES # BLD AUTO: 0.1 K/UL (ref 0–0.04)
IMM GRANULOCYTES NFR BLD AUTO: 1.4 % (ref 0–0.5)
LYMPHOCYTES # BLD AUTO: 1.9 K/UL (ref 1–4.8)
LYMPHOCYTES NFR BLD: 27.1 % (ref 18–48)
MCH RBC QN AUTO: 26.3 PG (ref 27–31)
MCHC RBC AUTO-ENTMCNC: 31.2 G/DL (ref 32–36)
MCV RBC AUTO: 84 FL (ref 82–98)
MONOCYTES # BLD AUTO: 1.2 K/UL (ref 0.3–1)
MONOCYTES NFR BLD: 17.1 % (ref 4–15)
NEUTROPHILS # BLD AUTO: 3.6 K/UL (ref 1.8–7.7)
NEUTROPHILS NFR BLD: 51.8 % (ref 38–73)
NRBC BLD-RTO: 0 /100 WBC
PLATELET # BLD AUTO: 320 K/UL (ref 150–450)
PMV BLD AUTO: 9.8 FL (ref 9.2–12.9)
POTASSIUM SERPL-SCNC: 4.1 MMOL/L (ref 3.5–5.1)
PROT SERPL-MCNC: 7.8 G/DL (ref 6–8.4)
RBC # BLD AUTO: 4.33 M/UL (ref 4–5.4)
SODIUM SERPL-SCNC: 139 MMOL/L (ref 136–145)
WBC # BLD AUTO: 6.9 K/UL (ref 3.9–12.7)

## 2021-09-13 PROCEDURE — 96409 CHEMO IV PUSH SNGL DRUG: CPT

## 2021-09-13 PROCEDURE — 99214 OFFICE O/P EST MOD 30 MIN: CPT | Mod: PBBFAC,25 | Performed by: STUDENT IN AN ORGANIZED HEALTH CARE EDUCATION/TRAINING PROGRAM

## 2021-09-13 PROCEDURE — 63600175 PHARM REV CODE 636 W HCPCS: Performed by: STUDENT IN AN ORGANIZED HEALTH CARE EDUCATION/TRAINING PROGRAM

## 2021-09-13 PROCEDURE — 99999 PR PBB SHADOW E&M-EST. PATIENT-LVL IV: ICD-10-PCS | Mod: PBBFAC,GC,, | Performed by: STUDENT IN AN ORGANIZED HEALTH CARE EDUCATION/TRAINING PROGRAM

## 2021-09-13 PROCEDURE — 99215 OFFICE O/P EST HI 40 MIN: CPT | Mod: S$PBB,GC,, | Performed by: STUDENT IN AN ORGANIZED HEALTH CARE EDUCATION/TRAINING PROGRAM

## 2021-09-13 PROCEDURE — 99999 PR PBB SHADOW E&M-EST. PATIENT-LVL IV: CPT | Mod: PBBFAC,GC,, | Performed by: STUDENT IN AN ORGANIZED HEALTH CARE EDUCATION/TRAINING PROGRAM

## 2021-09-13 PROCEDURE — 96521 REFILL/MAINT PORTABLE PUMP: CPT

## 2021-09-13 PROCEDURE — 25000003 PHARM REV CODE 250: Performed by: STUDENT IN AN ORGANIZED HEALTH CARE EDUCATION/TRAINING PROGRAM

## 2021-09-13 PROCEDURE — 96367 TX/PROPH/DG ADDL SEQ IV INF: CPT

## 2021-09-13 PROCEDURE — 96375 TX/PRO/DX INJ NEW DRUG ADDON: CPT

## 2021-09-13 PROCEDURE — 36415 COLL VENOUS BLD VENIPUNCTURE: CPT | Performed by: STUDENT IN AN ORGANIZED HEALTH CARE EDUCATION/TRAINING PROGRAM

## 2021-09-13 PROCEDURE — 85025 COMPLETE CBC W/AUTO DIFF WBC: CPT | Performed by: STUDENT IN AN ORGANIZED HEALTH CARE EDUCATION/TRAINING PROGRAM

## 2021-09-13 PROCEDURE — 80053 COMPREHEN METABOLIC PANEL: CPT | Performed by: STUDENT IN AN ORGANIZED HEALTH CARE EDUCATION/TRAINING PROGRAM

## 2021-09-13 PROCEDURE — 99215 PR OFFICE/OUTPT VISIT, EST, LEVL V, 40-54 MIN: ICD-10-PCS | Mod: S$PBB,GC,, | Performed by: STUDENT IN AN ORGANIZED HEALTH CARE EDUCATION/TRAINING PROGRAM

## 2021-09-13 RX ORDER — FLUOROURACIL 50 MG/ML
400 INJECTION, SOLUTION INTRAVENOUS
Status: CANCELLED | OUTPATIENT
Start: 2021-09-13

## 2021-09-13 RX ORDER — HEPARIN 100 UNIT/ML
500 SYRINGE INTRAVENOUS
Status: DISCONTINUED | OUTPATIENT
Start: 2021-09-13 | End: 2021-09-13 | Stop reason: HOSPADM

## 2021-09-13 RX ORDER — SODIUM CHLORIDE 0.9 % (FLUSH) 0.9 %
10 SYRINGE (ML) INJECTION
Status: CANCELLED | OUTPATIENT
Start: 2021-09-15

## 2021-09-13 RX ORDER — FLUOROURACIL 50 MG/ML
400 INJECTION, SOLUTION INTRAVENOUS
Status: COMPLETED | OUTPATIENT
Start: 2021-09-13 | End: 2021-09-13

## 2021-09-13 RX ORDER — HEPARIN 100 UNIT/ML
500 SYRINGE INTRAVENOUS
Status: CANCELLED | OUTPATIENT
Start: 2021-09-13

## 2021-09-13 RX ORDER — ONDANSETRON 2 MG/ML
8 INJECTION INTRAMUSCULAR; INTRAVENOUS
Status: CANCELLED | OUTPATIENT
Start: 2021-09-13

## 2021-09-13 RX ORDER — SODIUM CHLORIDE 0.9 % (FLUSH) 0.9 %
10 SYRINGE (ML) INJECTION
Status: DISCONTINUED | OUTPATIENT
Start: 2021-09-13 | End: 2021-09-13 | Stop reason: HOSPADM

## 2021-09-13 RX ORDER — SODIUM CHLORIDE 0.9 % (FLUSH) 0.9 %
10 SYRINGE (ML) INJECTION
Status: CANCELLED | OUTPATIENT
Start: 2021-09-13

## 2021-09-13 RX ORDER — HEPARIN 100 UNIT/ML
500 SYRINGE INTRAVENOUS
Status: CANCELLED | OUTPATIENT
Start: 2021-09-15

## 2021-09-13 RX ORDER — ONDANSETRON 2 MG/ML
8 INJECTION INTRAMUSCULAR; INTRAVENOUS
Status: COMPLETED | OUTPATIENT
Start: 2021-09-13 | End: 2021-09-13

## 2021-09-13 RX ADMIN — FLUOROURACIL 3550 MG: 50 INJECTION, SOLUTION INTRAVENOUS at 05:09

## 2021-09-13 RX ADMIN — SODIUM CHLORIDE: 0.9 INJECTION, SOLUTION INTRAVENOUS at 03:09

## 2021-09-13 RX ADMIN — ONDANSETRON 8 MG: 2 INJECTION, SOLUTION INTRAMUSCULAR; INTRAVENOUS at 03:09

## 2021-09-13 RX ADMIN — FLUOROURACIL 590 MG: 50 INJECTION, SOLUTION INTRAVENOUS at 05:09

## 2021-09-13 RX ADMIN — LEUCOVORIN CALCIUM 590 MG: 200 INJECTION, POWDER, LYOPHILIZED, FOR SOLUTION INTRAMUSCULAR; INTRAVENOUS at 04:09

## 2021-09-14 ENCOUNTER — EXTERNAL HOME HEALTH (OUTPATIENT)
Dept: HOME HEALTH SERVICES | Facility: HOSPITAL | Age: 86
End: 2021-09-14
Payer: MEDICARE

## 2021-09-15 ENCOUNTER — INFUSION (OUTPATIENT)
Dept: INFUSION THERAPY | Facility: HOSPITAL | Age: 86
End: 2021-09-15
Payer: MEDICARE

## 2021-09-15 VITALS
HEART RATE: 104 BPM | DIASTOLIC BLOOD PRESSURE: 67 MMHG | SYSTOLIC BLOOD PRESSURE: 156 MMHG | RESPIRATION RATE: 18 BRPM | TEMPERATURE: 98 F

## 2021-09-15 DIAGNOSIS — C18.9 ADENOCARCINOMA OF COLON: Primary | ICD-10-CM

## 2021-09-15 PROCEDURE — 99211 OFF/OP EST MAY X REQ PHY/QHP: CPT

## 2021-09-15 PROCEDURE — 25000003 PHARM REV CODE 250: Performed by: STUDENT IN AN ORGANIZED HEALTH CARE EDUCATION/TRAINING PROGRAM

## 2021-09-15 PROCEDURE — A4216 STERILE WATER/SALINE, 10 ML: HCPCS | Performed by: STUDENT IN AN ORGANIZED HEALTH CARE EDUCATION/TRAINING PROGRAM

## 2021-09-15 PROCEDURE — 63600175 PHARM REV CODE 636 W HCPCS: Performed by: STUDENT IN AN ORGANIZED HEALTH CARE EDUCATION/TRAINING PROGRAM

## 2021-09-15 RX ORDER — HEPARIN 100 UNIT/ML
500 SYRINGE INTRAVENOUS
Status: DISCONTINUED | OUTPATIENT
Start: 2021-09-15 | End: 2021-09-15 | Stop reason: HOSPADM

## 2021-09-15 RX ORDER — SODIUM CHLORIDE 0.9 % (FLUSH) 0.9 %
10 SYRINGE (ML) INJECTION
Status: DISCONTINUED | OUTPATIENT
Start: 2021-09-15 | End: 2021-09-15 | Stop reason: HOSPADM

## 2021-09-15 RX ADMIN — Medication 10 ML: at 03:09

## 2021-09-15 RX ADMIN — HEPARIN 500 UNITS: 100 SYRINGE at 03:09

## 2021-09-21 ENCOUNTER — OFFICE VISIT (OUTPATIENT)
Dept: PRIMARY CARE CLINIC | Facility: CLINIC | Age: 86
End: 2021-09-21
Payer: MEDICARE

## 2021-09-21 ENCOUNTER — IMMUNIZATION (OUTPATIENT)
Dept: INTERNAL MEDICINE | Facility: CLINIC | Age: 86
End: 2021-09-21
Payer: MEDICARE

## 2021-09-21 VITALS
HEART RATE: 96 BPM | HEIGHT: 67 IN | OXYGEN SATURATION: 99 % | DIASTOLIC BLOOD PRESSURE: 70 MMHG | TEMPERATURE: 99 F | WEIGHT: 111.31 LBS | BODY MASS INDEX: 17.47 KG/M2 | SYSTOLIC BLOOD PRESSURE: 136 MMHG

## 2021-09-21 DIAGNOSIS — C18.9 ADENOCARCINOMA OF COLON: ICD-10-CM

## 2021-09-21 DIAGNOSIS — F03.91 DEMENTIA WITH BEHAVIORAL DISTURBANCE, UNSPECIFIED DEMENTIA TYPE: ICD-10-CM

## 2021-09-21 DIAGNOSIS — Z23 NEED FOR VACCINATION: Primary | ICD-10-CM

## 2021-09-21 DIAGNOSIS — Z00.00 HEALTH CARE MAINTENANCE: ICD-10-CM

## 2021-09-21 DIAGNOSIS — I10 ESSENTIAL HYPERTENSION: Chronic | ICD-10-CM

## 2021-09-21 PROBLEM — Z45.2 ENCOUNTER FOR INSERTION OF VENOUS ACCESS PORT: Status: RESOLVED | Noted: 2021-07-02 | Resolved: 2021-09-21

## 2021-09-21 PROCEDURE — 0003A COVID-19, MRNA, LNP-S, PF, 30 MCG/0.3 ML DOSE VACCINE: CPT | Mod: CV19,PBBFAC | Performed by: INTERNAL MEDICINE

## 2021-09-21 PROCEDURE — 91300 COVID-19, MRNA, LNP-S, PF, 30 MCG/0.3 ML DOSE VACCINE: CPT | Mod: PBBFAC

## 2021-09-21 PROCEDURE — 99215 OFFICE O/P EST HI 40 MIN: CPT | Mod: S$GLB,,, | Performed by: INTERNAL MEDICINE

## 2021-09-21 PROCEDURE — 99215 PR OFFICE/OUTPT VISIT, EST, LEVL V, 40-54 MIN: ICD-10-PCS | Mod: S$GLB,,, | Performed by: INTERNAL MEDICINE

## 2021-09-23 ENCOUNTER — TELEPHONE (OUTPATIENT)
Dept: PRIMARY CARE CLINIC | Facility: CLINIC | Age: 86
End: 2021-09-23
Payer: MEDICARE

## 2021-09-24 ENCOUNTER — HOSPITAL ENCOUNTER (OUTPATIENT)
Dept: RADIOLOGY | Facility: HOSPITAL | Age: 86
Discharge: HOME | End: 2021-09-24
Attending: STUDENT IN AN ORGANIZED HEALTH CARE EDUCATION/TRAINING PROGRAM
Payer: MEDICARE

## 2021-09-24 DIAGNOSIS — C18.9 ADENOCARCINOMA OF COLON: ICD-10-CM

## 2021-09-24 PROCEDURE — 74177 CT ABDOMEN PELVIS WITH CONTRAST: ICD-10-PCS | Mod: 26,,, | Performed by: RADIOLOGY

## 2021-09-24 PROCEDURE — 74177 CT ABD & PELVIS W/CONTRAST: CPT | Mod: 26,,, | Performed by: RADIOLOGY

## 2021-09-24 PROCEDURE — 25500020 PHARM REV CODE 255: Performed by: STUDENT IN AN ORGANIZED HEALTH CARE EDUCATION/TRAINING PROGRAM

## 2021-09-24 PROCEDURE — 74177 CT ABD & PELVIS W/CONTRAST: CPT | Mod: TC

## 2021-09-24 RX ADMIN — IOHEXOL 75 ML: 350 INJECTION, SOLUTION INTRAVENOUS at 02:09

## 2021-09-27 ENCOUNTER — OFFICE VISIT (OUTPATIENT)
Dept: HEMATOLOGY/ONCOLOGY | Facility: CLINIC | Age: 86
End: 2021-09-27
Payer: MEDICARE

## 2021-09-27 ENCOUNTER — INFUSION (OUTPATIENT)
Dept: INFUSION THERAPY | Facility: HOSPITAL | Age: 86
End: 2021-09-27
Payer: MEDICARE

## 2021-09-27 VITALS
HEIGHT: 67 IN | HEART RATE: 100 BPM | SYSTOLIC BLOOD PRESSURE: 157 MMHG | BODY MASS INDEX: 17.13 KG/M2 | OXYGEN SATURATION: 99 % | WEIGHT: 109.13 LBS | DIASTOLIC BLOOD PRESSURE: 67 MMHG | RESPIRATION RATE: 18 BRPM

## 2021-09-27 VITALS
RESPIRATION RATE: 18 BRPM | SYSTOLIC BLOOD PRESSURE: 145 MMHG | TEMPERATURE: 98 F | HEART RATE: 90 BPM | HEIGHT: 67 IN | OXYGEN SATURATION: 99 % | WEIGHT: 109.13 LBS | DIASTOLIC BLOOD PRESSURE: 65 MMHG | BODY MASS INDEX: 17.13 KG/M2

## 2021-09-27 DIAGNOSIS — C18.9 ADENOCARCINOMA OF COLON: Primary | ICD-10-CM

## 2021-09-27 DIAGNOSIS — D84.821 IMMUNODEFICIENCY SECONDARY TO CHEMOTHERAPY: ICD-10-CM

## 2021-09-27 DIAGNOSIS — Z79.899 IMMUNODEFICIENCY SECONDARY TO CHEMOTHERAPY: ICD-10-CM

## 2021-09-27 DIAGNOSIS — D49.9 IMMUNODEFICIENCY SECONDARY TO NEOPLASM: ICD-10-CM

## 2021-09-27 DIAGNOSIS — I10 ESSENTIAL HYPERTENSION: ICD-10-CM

## 2021-09-27 DIAGNOSIS — D50.9 IRON DEFICIENCY ANEMIA, UNSPECIFIED IRON DEFICIENCY ANEMIA TYPE: ICD-10-CM

## 2021-09-27 DIAGNOSIS — T45.1X5A IMMUNODEFICIENCY SECONDARY TO CHEMOTHERAPY: ICD-10-CM

## 2021-09-27 DIAGNOSIS — D84.81 IMMUNODEFICIENCY SECONDARY TO NEOPLASM: ICD-10-CM

## 2021-09-27 PROCEDURE — 25000003 PHARM REV CODE 250: Performed by: STUDENT IN AN ORGANIZED HEALTH CARE EDUCATION/TRAINING PROGRAM

## 2021-09-27 PROCEDURE — 99999 PR PBB SHADOW E&M-EST. PATIENT-LVL III: CPT | Mod: PBBFAC,GC,, | Performed by: STUDENT IN AN ORGANIZED HEALTH CARE EDUCATION/TRAINING PROGRAM

## 2021-09-27 PROCEDURE — 99213 OFFICE O/P EST LOW 20 MIN: CPT | Mod: PBBFAC,25 | Performed by: STUDENT IN AN ORGANIZED HEALTH CARE EDUCATION/TRAINING PROGRAM

## 2021-09-27 PROCEDURE — 96366 THER/PROPH/DIAG IV INF ADDON: CPT

## 2021-09-27 PROCEDURE — 99215 PR OFFICE/OUTPT VISIT, EST, LEVL V, 40-54 MIN: ICD-10-PCS | Mod: S$PBB,GC,, | Performed by: STUDENT IN AN ORGANIZED HEALTH CARE EDUCATION/TRAINING PROGRAM

## 2021-09-27 PROCEDURE — 99215 OFFICE O/P EST HI 40 MIN: CPT | Mod: S$PBB,GC,, | Performed by: STUDENT IN AN ORGANIZED HEALTH CARE EDUCATION/TRAINING PROGRAM

## 2021-09-27 PROCEDURE — 96375 TX/PRO/DX INJ NEW DRUG ADDON: CPT

## 2021-09-27 PROCEDURE — 96416 CHEMO PROLONG INFUSE W/PUMP: CPT

## 2021-09-27 PROCEDURE — 63600175 PHARM REV CODE 636 W HCPCS: Performed by: STUDENT IN AN ORGANIZED HEALTH CARE EDUCATION/TRAINING PROGRAM

## 2021-09-27 PROCEDURE — 96409 CHEMO IV PUSH SNGL DRUG: CPT

## 2021-09-27 PROCEDURE — 99999 PR PBB SHADOW E&M-EST. PATIENT-LVL III: ICD-10-PCS | Mod: PBBFAC,GC,, | Performed by: STUDENT IN AN ORGANIZED HEALTH CARE EDUCATION/TRAINING PROGRAM

## 2021-09-27 RX ORDER — SODIUM CHLORIDE 0.9 % (FLUSH) 0.9 %
10 SYRINGE (ML) INJECTION
Status: DISCONTINUED | OUTPATIENT
Start: 2021-09-27 | End: 2021-09-27 | Stop reason: HOSPADM

## 2021-09-27 RX ORDER — HEPARIN 100 UNIT/ML
500 SYRINGE INTRAVENOUS
Status: CANCELLED | OUTPATIENT
Start: 2021-09-29

## 2021-09-27 RX ORDER — ONDANSETRON 2 MG/ML
8 INJECTION INTRAMUSCULAR; INTRAVENOUS
Status: CANCELLED | OUTPATIENT
Start: 2021-09-27

## 2021-09-27 RX ORDER — FLUOROURACIL 50 MG/ML
400 INJECTION, SOLUTION INTRAVENOUS
Status: COMPLETED | OUTPATIENT
Start: 2021-09-27 | End: 2021-09-27

## 2021-09-27 RX ORDER — FLUOROURACIL 50 MG/ML
400 INJECTION, SOLUTION INTRAVENOUS
Status: CANCELLED | OUTPATIENT
Start: 2021-09-27

## 2021-09-27 RX ORDER — HEPARIN 100 UNIT/ML
500 SYRINGE INTRAVENOUS
Status: CANCELLED | OUTPATIENT
Start: 2021-09-27

## 2021-09-27 RX ORDER — ONDANSETRON 2 MG/ML
8 INJECTION INTRAMUSCULAR; INTRAVENOUS
Status: COMPLETED | OUTPATIENT
Start: 2021-09-27 | End: 2021-09-27

## 2021-09-27 RX ORDER — SODIUM CHLORIDE 0.9 % (FLUSH) 0.9 %
10 SYRINGE (ML) INJECTION
Status: CANCELLED | OUTPATIENT
Start: 2021-09-29

## 2021-09-27 RX ORDER — HEPARIN 100 UNIT/ML
500 SYRINGE INTRAVENOUS
Status: DISCONTINUED | OUTPATIENT
Start: 2021-09-27 | End: 2021-09-27 | Stop reason: HOSPADM

## 2021-09-27 RX ORDER — SODIUM CHLORIDE 0.9 % (FLUSH) 0.9 %
10 SYRINGE (ML) INJECTION
Status: CANCELLED | OUTPATIENT
Start: 2021-09-27

## 2021-09-27 RX ADMIN — FLUOROURACIL 3550 MG: 50 INJECTION, SOLUTION INTRAVENOUS at 04:09

## 2021-09-27 RX ADMIN — DEXTROSE: 50 INJECTION, SOLUTION INTRAVENOUS at 03:09

## 2021-09-27 RX ADMIN — ONDANSETRON 8 MG: 2 INJECTION, SOLUTION INTRAMUSCULAR; INTRAVENOUS at 03:09

## 2021-09-27 RX ADMIN — FLUOROURACIL 590 MG: 50 INJECTION, SOLUTION INTRAVENOUS at 04:09

## 2021-09-27 RX ADMIN — LEUCOVORIN CALCIUM 590 MG: 100 INJECTION, POWDER, LYOPHILIZED, FOR SUSPENSION INTRAMUSCULAR; INTRAVENOUS at 03:09

## 2021-09-29 ENCOUNTER — INFUSION (OUTPATIENT)
Dept: INFUSION THERAPY | Facility: HOSPITAL | Age: 86
End: 2021-09-29
Payer: MEDICARE

## 2021-09-29 VITALS
OXYGEN SATURATION: 100 % | TEMPERATURE: 98 F | SYSTOLIC BLOOD PRESSURE: 142 MMHG | RESPIRATION RATE: 18 BRPM | DIASTOLIC BLOOD PRESSURE: 65 MMHG | HEART RATE: 94 BPM

## 2021-09-29 DIAGNOSIS — C18.9 ADENOCARCINOMA OF COLON: Primary | ICD-10-CM

## 2021-09-29 PROCEDURE — A4216 STERILE WATER/SALINE, 10 ML: HCPCS | Performed by: STUDENT IN AN ORGANIZED HEALTH CARE EDUCATION/TRAINING PROGRAM

## 2021-09-29 PROCEDURE — 25000003 PHARM REV CODE 250: Performed by: STUDENT IN AN ORGANIZED HEALTH CARE EDUCATION/TRAINING PROGRAM

## 2021-09-29 PROCEDURE — 99211 OFF/OP EST MAY X REQ PHY/QHP: CPT

## 2021-09-29 RX ORDER — SODIUM CHLORIDE 0.9 % (FLUSH) 0.9 %
10 SYRINGE (ML) INJECTION
Status: DISCONTINUED | OUTPATIENT
Start: 2021-09-29 | End: 2021-09-29 | Stop reason: HOSPADM

## 2021-09-29 RX ORDER — HEPARIN 100 UNIT/ML
500 SYRINGE INTRAVENOUS
Status: DISCONTINUED | OUTPATIENT
Start: 2021-09-29 | End: 2021-09-29 | Stop reason: HOSPADM

## 2021-09-29 RX ADMIN — Medication 10 ML: at 02:09

## 2021-10-05 ENCOUNTER — TELEPHONE (OUTPATIENT)
Dept: PRIMARY CARE CLINIC | Facility: CLINIC | Age: 86
End: 2021-10-05

## 2021-10-07 ENCOUNTER — PES CALL (OUTPATIENT)
Dept: ADMINISTRATIVE | Facility: CLINIC | Age: 86
End: 2021-10-07

## 2021-10-11 ENCOUNTER — INFUSION (OUTPATIENT)
Dept: INFUSION THERAPY | Facility: HOSPITAL | Age: 86
End: 2021-10-11
Payer: MEDICARE

## 2021-10-11 ENCOUNTER — OFFICE VISIT (OUTPATIENT)
Dept: HEMATOLOGY/ONCOLOGY | Facility: CLINIC | Age: 86
End: 2021-10-11
Payer: MEDICARE

## 2021-10-11 VITALS
TEMPERATURE: 98 F | HEART RATE: 82 BPM | HEIGHT: 67 IN | RESPIRATION RATE: 16 BRPM | OXYGEN SATURATION: 100 % | DIASTOLIC BLOOD PRESSURE: 67 MMHG | WEIGHT: 111.31 LBS | BODY MASS INDEX: 17.47 KG/M2 | SYSTOLIC BLOOD PRESSURE: 154 MMHG

## 2021-10-11 VITALS
HEIGHT: 67 IN | WEIGHT: 111.31 LBS | DIASTOLIC BLOOD PRESSURE: 67 MMHG | SYSTOLIC BLOOD PRESSURE: 146 MMHG | TEMPERATURE: 98 F | BODY MASS INDEX: 17.47 KG/M2 | HEART RATE: 109 BPM

## 2021-10-11 DIAGNOSIS — T45.1X5A IMMUNODEFICIENCY SECONDARY TO CHEMOTHERAPY: ICD-10-CM

## 2021-10-11 DIAGNOSIS — D50.9 IRON DEFICIENCY ANEMIA, UNSPECIFIED IRON DEFICIENCY ANEMIA TYPE: ICD-10-CM

## 2021-10-11 DIAGNOSIS — D49.9 IMMUNODEFICIENCY SECONDARY TO NEOPLASM: ICD-10-CM

## 2021-10-11 DIAGNOSIS — Z79.899 IMMUNODEFICIENCY SECONDARY TO CHEMOTHERAPY: ICD-10-CM

## 2021-10-11 DIAGNOSIS — I10 ESSENTIAL HYPERTENSION: ICD-10-CM

## 2021-10-11 DIAGNOSIS — D84.81 IMMUNODEFICIENCY SECONDARY TO NEOPLASM: ICD-10-CM

## 2021-10-11 DIAGNOSIS — C18.9 ADENOCARCINOMA OF COLON: Primary | ICD-10-CM

## 2021-10-11 DIAGNOSIS — D84.821 IMMUNODEFICIENCY SECONDARY TO CHEMOTHERAPY: ICD-10-CM

## 2021-10-11 PROCEDURE — 99999 PR PBB SHADOW E&M-EST. PATIENT-LVL IV: ICD-10-PCS | Mod: PBBFAC,GC,, | Performed by: STUDENT IN AN ORGANIZED HEALTH CARE EDUCATION/TRAINING PROGRAM

## 2021-10-11 PROCEDURE — 96416 CHEMO PROLONG INFUSE W/PUMP: CPT

## 2021-10-11 PROCEDURE — 99999 PR PBB SHADOW E&M-EST. PATIENT-LVL IV: CPT | Mod: PBBFAC,GC,, | Performed by: STUDENT IN AN ORGANIZED HEALTH CARE EDUCATION/TRAINING PROGRAM

## 2021-10-11 PROCEDURE — 99215 PR OFFICE/OUTPT VISIT, EST, LEVL V, 40-54 MIN: ICD-10-PCS | Mod: S$PBB,GC,, | Performed by: STUDENT IN AN ORGANIZED HEALTH CARE EDUCATION/TRAINING PROGRAM

## 2021-10-11 PROCEDURE — 99215 OFFICE O/P EST HI 40 MIN: CPT | Mod: S$PBB,GC,, | Performed by: STUDENT IN AN ORGANIZED HEALTH CARE EDUCATION/TRAINING PROGRAM

## 2021-10-11 PROCEDURE — 96365 THER/PROPH/DIAG IV INF INIT: CPT

## 2021-10-11 PROCEDURE — 63600175 PHARM REV CODE 636 W HCPCS: Performed by: STUDENT IN AN ORGANIZED HEALTH CARE EDUCATION/TRAINING PROGRAM

## 2021-10-11 PROCEDURE — 96409 CHEMO IV PUSH SNGL DRUG: CPT

## 2021-10-11 PROCEDURE — 25000003 PHARM REV CODE 250: Performed by: STUDENT IN AN ORGANIZED HEALTH CARE EDUCATION/TRAINING PROGRAM

## 2021-10-11 PROCEDURE — 96375 TX/PRO/DX INJ NEW DRUG ADDON: CPT

## 2021-10-11 PROCEDURE — 99214 OFFICE O/P EST MOD 30 MIN: CPT | Mod: PBBFAC,25 | Performed by: STUDENT IN AN ORGANIZED HEALTH CARE EDUCATION/TRAINING PROGRAM

## 2021-10-11 PROCEDURE — 96367 TX/PROPH/DG ADDL SEQ IV INF: CPT

## 2021-10-11 RX ORDER — ONDANSETRON 2 MG/ML
8 INJECTION INTRAMUSCULAR; INTRAVENOUS
Status: CANCELLED | OUTPATIENT
Start: 2021-10-11

## 2021-10-11 RX ORDER — HEPARIN 100 UNIT/ML
500 SYRINGE INTRAVENOUS
Status: DISCONTINUED | OUTPATIENT
Start: 2021-10-11 | End: 2021-10-11 | Stop reason: HOSPADM

## 2021-10-11 RX ORDER — HEPARIN 100 UNIT/ML
500 SYRINGE INTRAVENOUS
Status: CANCELLED | OUTPATIENT
Start: 2021-10-11

## 2021-10-11 RX ORDER — SODIUM CHLORIDE 0.9 % (FLUSH) 0.9 %
10 SYRINGE (ML) INJECTION
Status: CANCELLED | OUTPATIENT
Start: 2021-10-13

## 2021-10-11 RX ORDER — SODIUM CHLORIDE 0.9 % (FLUSH) 0.9 %
10 SYRINGE (ML) INJECTION
Status: DISCONTINUED | OUTPATIENT
Start: 2021-10-11 | End: 2021-10-11 | Stop reason: HOSPADM

## 2021-10-11 RX ORDER — FLUOROURACIL 50 MG/ML
400 INJECTION, SOLUTION INTRAVENOUS
Status: COMPLETED | OUTPATIENT
Start: 2021-10-11 | End: 2021-10-11

## 2021-10-11 RX ORDER — ONDANSETRON 2 MG/ML
8 INJECTION INTRAMUSCULAR; INTRAVENOUS
Status: COMPLETED | OUTPATIENT
Start: 2021-10-11 | End: 2021-10-11

## 2021-10-11 RX ORDER — HEPARIN 100 UNIT/ML
500 SYRINGE INTRAVENOUS
Status: CANCELLED | OUTPATIENT
Start: 2021-10-13

## 2021-10-11 RX ORDER — SODIUM CHLORIDE 0.9 % (FLUSH) 0.9 %
10 SYRINGE (ML) INJECTION
Status: CANCELLED | OUTPATIENT
Start: 2021-10-11

## 2021-10-11 RX ORDER — FLUOROURACIL 50 MG/ML
400 INJECTION, SOLUTION INTRAVENOUS
Status: CANCELLED | OUTPATIENT
Start: 2021-10-11

## 2021-10-11 RX ADMIN — SODIUM CHLORIDE: 0.9 INJECTION, SOLUTION INTRAVENOUS at 04:10

## 2021-10-11 RX ADMIN — FLUOROURACIL 3550 MG: 50 INJECTION, SOLUTION INTRAVENOUS at 05:10

## 2021-10-11 RX ADMIN — DEXTROSE: 50 INJECTION, SOLUTION INTRAVENOUS at 04:10

## 2021-10-11 RX ADMIN — ONDANSETRON 8 MG: 2 INJECTION, SOLUTION INTRAMUSCULAR; INTRAVENOUS at 04:10

## 2021-10-11 RX ADMIN — FLUOROURACIL 590 MG: 50 INJECTION, SOLUTION INTRAVENOUS at 05:10

## 2021-10-11 RX ADMIN — LEUCOVORIN CALCIUM 590 MG: 50 INJECTION, POWDER, LYOPHILIZED, FOR SOLUTION INTRAMUSCULAR; INTRAVENOUS at 04:10

## 2021-10-12 ENCOUNTER — TELEPHONE (OUTPATIENT)
Dept: HEMATOLOGY/ONCOLOGY | Facility: CLINIC | Age: 86
End: 2021-10-12

## 2021-10-13 ENCOUNTER — INFUSION (OUTPATIENT)
Dept: INFUSION THERAPY | Facility: HOSPITAL | Age: 86
End: 2021-10-13
Payer: MEDICARE

## 2021-10-13 VITALS
OXYGEN SATURATION: 100 % | DIASTOLIC BLOOD PRESSURE: 66 MMHG | RESPIRATION RATE: 18 BRPM | TEMPERATURE: 98 F | SYSTOLIC BLOOD PRESSURE: 148 MMHG | HEART RATE: 95 BPM

## 2021-10-13 DIAGNOSIS — C18.9 ADENOCARCINOMA OF COLON: Primary | ICD-10-CM

## 2021-10-13 PROCEDURE — 25000003 PHARM REV CODE 250: Performed by: STUDENT IN AN ORGANIZED HEALTH CARE EDUCATION/TRAINING PROGRAM

## 2021-10-13 PROCEDURE — 63600175 PHARM REV CODE 636 W HCPCS: Performed by: STUDENT IN AN ORGANIZED HEALTH CARE EDUCATION/TRAINING PROGRAM

## 2021-10-13 PROCEDURE — A4216 STERILE WATER/SALINE, 10 ML: HCPCS | Performed by: STUDENT IN AN ORGANIZED HEALTH CARE EDUCATION/TRAINING PROGRAM

## 2021-10-13 RX ORDER — HEPARIN 100 UNIT/ML
500 SYRINGE INTRAVENOUS
Status: DISCONTINUED | OUTPATIENT
Start: 2021-10-13 | End: 2021-10-13 | Stop reason: HOSPADM

## 2021-10-13 RX ORDER — SODIUM CHLORIDE 0.9 % (FLUSH) 0.9 %
10 SYRINGE (ML) INJECTION
Status: DISCONTINUED | OUTPATIENT
Start: 2021-10-13 | End: 2021-10-13 | Stop reason: HOSPADM

## 2021-10-13 RX ADMIN — HEPARIN 500 UNITS: 100 SYRINGE at 03:10

## 2021-10-13 RX ADMIN — Medication 10 ML: at 03:10

## 2021-10-17 PROCEDURE — G0179 PR HOME HEALTH MD RECERTIFICATION: ICD-10-PCS | Mod: ,,, | Performed by: HOSPITALIST

## 2021-10-17 PROCEDURE — G0179 MD RECERTIFICATION HHA PT: HCPCS | Mod: ,,, | Performed by: HOSPITALIST

## 2021-10-25 ENCOUNTER — OFFICE VISIT (OUTPATIENT)
Dept: HEMATOLOGY/ONCOLOGY | Facility: CLINIC | Age: 86
End: 2021-10-25
Payer: MEDICARE

## 2021-10-25 ENCOUNTER — INFUSION (OUTPATIENT)
Dept: INFUSION THERAPY | Facility: HOSPITAL | Age: 86
End: 2021-10-25
Payer: MEDICARE

## 2021-10-25 VITALS
HEIGHT: 67 IN | BODY MASS INDEX: 17.37 KG/M2 | DIASTOLIC BLOOD PRESSURE: 69 MMHG | WEIGHT: 110.69 LBS | TEMPERATURE: 98 F | OXYGEN SATURATION: 98 % | RESPIRATION RATE: 18 BRPM | SYSTOLIC BLOOD PRESSURE: 154 MMHG | HEART RATE: 93 BPM

## 2021-10-25 VITALS
BODY MASS INDEX: 17.37 KG/M2 | HEIGHT: 67 IN | WEIGHT: 110.69 LBS | SYSTOLIC BLOOD PRESSURE: 155 MMHG | RESPIRATION RATE: 18 BRPM | TEMPERATURE: 98 F | OXYGEN SATURATION: 100 % | DIASTOLIC BLOOD PRESSURE: 67 MMHG | HEART RATE: 77 BPM

## 2021-10-25 DIAGNOSIS — T45.1X5A IMMUNODEFICIENCY SECONDARY TO CHEMOTHERAPY: ICD-10-CM

## 2021-10-25 DIAGNOSIS — D50.9 IRON DEFICIENCY ANEMIA, UNSPECIFIED IRON DEFICIENCY ANEMIA TYPE: ICD-10-CM

## 2021-10-25 DIAGNOSIS — Z79.899 IMMUNODEFICIENCY SECONDARY TO CHEMOTHERAPY: ICD-10-CM

## 2021-10-25 DIAGNOSIS — I10 ESSENTIAL HYPERTENSION: ICD-10-CM

## 2021-10-25 DIAGNOSIS — D84.81 IMMUNODEFICIENCY SECONDARY TO NEOPLASM: ICD-10-CM

## 2021-10-25 DIAGNOSIS — C18.9 ADENOCARCINOMA OF COLON: Primary | ICD-10-CM

## 2021-10-25 DIAGNOSIS — D49.9 IMMUNODEFICIENCY SECONDARY TO NEOPLASM: ICD-10-CM

## 2021-10-25 DIAGNOSIS — D84.821 IMMUNODEFICIENCY SECONDARY TO CHEMOTHERAPY: ICD-10-CM

## 2021-10-25 PROCEDURE — 96375 TX/PRO/DX INJ NEW DRUG ADDON: CPT

## 2021-10-25 PROCEDURE — 63600175 PHARM REV CODE 636 W HCPCS: Performed by: INTERNAL MEDICINE

## 2021-10-25 PROCEDURE — 99214 OFFICE O/P EST MOD 30 MIN: CPT | Mod: PBBFAC,25 | Performed by: STUDENT IN AN ORGANIZED HEALTH CARE EDUCATION/TRAINING PROGRAM

## 2021-10-25 PROCEDURE — 99999 PR PBB SHADOW E&M-EST. PATIENT-LVL IV: ICD-10-PCS | Mod: PBBFAC,GC,, | Performed by: STUDENT IN AN ORGANIZED HEALTH CARE EDUCATION/TRAINING PROGRAM

## 2021-10-25 PROCEDURE — 99999 PR PBB SHADOW E&M-EST. PATIENT-LVL IV: CPT | Mod: PBBFAC,GC,, | Performed by: STUDENT IN AN ORGANIZED HEALTH CARE EDUCATION/TRAINING PROGRAM

## 2021-10-25 PROCEDURE — 96367 TX/PROPH/DG ADDL SEQ IV INF: CPT

## 2021-10-25 PROCEDURE — 96416 CHEMO PROLONG INFUSE W/PUMP: CPT

## 2021-10-25 PROCEDURE — 25000003 PHARM REV CODE 250: Performed by: INTERNAL MEDICINE

## 2021-10-25 PROCEDURE — 99215 OFFICE O/P EST HI 40 MIN: CPT | Mod: S$PBB,GC,, | Performed by: STUDENT IN AN ORGANIZED HEALTH CARE EDUCATION/TRAINING PROGRAM

## 2021-10-25 PROCEDURE — 96409 CHEMO IV PUSH SNGL DRUG: CPT

## 2021-10-25 PROCEDURE — 99215 PR OFFICE/OUTPT VISIT, EST, LEVL V, 40-54 MIN: ICD-10-PCS | Mod: S$PBB,GC,, | Performed by: STUDENT IN AN ORGANIZED HEALTH CARE EDUCATION/TRAINING PROGRAM

## 2021-10-25 RX ORDER — FLUOROURACIL 50 MG/ML
400 INJECTION, SOLUTION INTRAVENOUS
Status: COMPLETED | OUTPATIENT
Start: 2021-10-25 | End: 2021-10-25

## 2021-10-25 RX ORDER — ONDANSETRON 2 MG/ML
8 INJECTION INTRAMUSCULAR; INTRAVENOUS
Status: COMPLETED | OUTPATIENT
Start: 2021-10-25 | End: 2021-10-25

## 2021-10-25 RX ORDER — SODIUM CHLORIDE 0.9 % (FLUSH) 0.9 %
10 SYRINGE (ML) INJECTION
Status: CANCELLED | OUTPATIENT
Start: 2021-10-25

## 2021-10-25 RX ORDER — HEPARIN 100 UNIT/ML
500 SYRINGE INTRAVENOUS
Status: DISCONTINUED | OUTPATIENT
Start: 2021-10-25 | End: 2021-10-25 | Stop reason: HOSPADM

## 2021-10-25 RX ORDER — ONDANSETRON 2 MG/ML
8 INJECTION INTRAMUSCULAR; INTRAVENOUS
Status: CANCELLED | OUTPATIENT
Start: 2021-10-25

## 2021-10-25 RX ORDER — HEPARIN 100 UNIT/ML
500 SYRINGE INTRAVENOUS
Status: CANCELLED | OUTPATIENT
Start: 2021-10-25

## 2021-10-25 RX ORDER — SODIUM CHLORIDE 0.9 % (FLUSH) 0.9 %
10 SYRINGE (ML) INJECTION
Status: DISCONTINUED | OUTPATIENT
Start: 2021-10-25 | End: 2021-10-25 | Stop reason: HOSPADM

## 2021-10-25 RX ORDER — FLUOROURACIL 50 MG/ML
400 INJECTION, SOLUTION INTRAVENOUS
Status: CANCELLED | OUTPATIENT
Start: 2021-10-25

## 2021-10-25 RX ADMIN — ONDANSETRON 8 MG: 2 INJECTION, SOLUTION INTRAMUSCULAR; INTRAVENOUS at 04:10

## 2021-10-25 RX ADMIN — SODIUM CHLORIDE: 0.9 INJECTION, SOLUTION INTRAVENOUS at 04:10

## 2021-10-25 RX ADMIN — FLUOROURACIL 590 MG: 50 INJECTION, SOLUTION INTRAVENOUS at 05:10

## 2021-10-25 RX ADMIN — LEUCOVORIN CALCIUM 590 MG: 100 INJECTION, POWDER, LYOPHILIZED, FOR SUSPENSION INTRAMUSCULAR; INTRAVENOUS at 04:10

## 2021-10-25 RX ADMIN — FLUOROURACIL 3550 MG: 50 INJECTION, SOLUTION INTRAVENOUS at 05:10

## 2021-10-25 RX ADMIN — DEXTROSE: 50 INJECTION, SOLUTION INTRAVENOUS at 04:10

## 2021-10-27 ENCOUNTER — INFUSION (OUTPATIENT)
Dept: INFUSION THERAPY | Facility: HOSPITAL | Age: 86
End: 2021-10-27
Payer: MEDICARE

## 2021-10-27 VITALS
WEIGHT: 110.69 LBS | HEART RATE: 82 BPM | BODY MASS INDEX: 17.37 KG/M2 | HEIGHT: 67 IN | RESPIRATION RATE: 16 BRPM | DIASTOLIC BLOOD PRESSURE: 67 MMHG | OXYGEN SATURATION: 100 % | TEMPERATURE: 99 F | SYSTOLIC BLOOD PRESSURE: 150 MMHG

## 2021-10-27 DIAGNOSIS — C18.9 ADENOCARCINOMA OF COLON: Primary | ICD-10-CM

## 2021-10-27 PROCEDURE — 25000003 PHARM REV CODE 250: Performed by: STUDENT IN AN ORGANIZED HEALTH CARE EDUCATION/TRAINING PROGRAM

## 2021-10-27 PROCEDURE — 63600175 PHARM REV CODE 636 W HCPCS: Performed by: STUDENT IN AN ORGANIZED HEALTH CARE EDUCATION/TRAINING PROGRAM

## 2021-10-27 PROCEDURE — A4216 STERILE WATER/SALINE, 10 ML: HCPCS | Performed by: STUDENT IN AN ORGANIZED HEALTH CARE EDUCATION/TRAINING PROGRAM

## 2021-10-27 RX ORDER — SODIUM CHLORIDE 0.9 % (FLUSH) 0.9 %
10 SYRINGE (ML) INJECTION
Status: DISCONTINUED | OUTPATIENT
Start: 2021-10-27 | End: 2021-10-27 | Stop reason: HOSPADM

## 2021-10-27 RX ORDER — HEPARIN 100 UNIT/ML
500 SYRINGE INTRAVENOUS
Status: DISCONTINUED | OUTPATIENT
Start: 2021-10-27 | End: 2021-10-27 | Stop reason: HOSPADM

## 2021-10-27 RX ADMIN — Medication 10 ML: at 03:10

## 2021-10-27 RX ADMIN — HEPARIN 500 UNITS: 100 SYRINGE at 03:10

## 2021-10-28 DIAGNOSIS — C18.9 ADENOCARCINOMA OF COLON: Primary | ICD-10-CM

## 2021-11-03 ENCOUNTER — EXTERNAL HOME HEALTH (OUTPATIENT)
Dept: HOME HEALTH SERVICES | Facility: HOSPITAL | Age: 86
End: 2021-11-03
Payer: MEDICARE

## 2021-11-03 ENCOUNTER — PATIENT OUTREACH (OUTPATIENT)
Dept: HOME HEALTH SERVICES | Facility: HOSPITAL | Age: 86
End: 2021-11-03
Payer: MEDICARE

## 2021-11-08 ENCOUNTER — OFFICE VISIT (OUTPATIENT)
Dept: HEMATOLOGY/ONCOLOGY | Facility: CLINIC | Age: 86
End: 2021-11-08
Payer: MEDICARE

## 2021-11-08 ENCOUNTER — INFUSION (OUTPATIENT)
Dept: INFUSION THERAPY | Facility: HOSPITAL | Age: 86
End: 2021-11-08
Payer: MEDICARE

## 2021-11-08 VITALS
HEART RATE: 82 BPM | WEIGHT: 112.63 LBS | SYSTOLIC BLOOD PRESSURE: 138 MMHG | BODY MASS INDEX: 17.68 KG/M2 | RESPIRATION RATE: 18 BRPM | HEIGHT: 67 IN | DIASTOLIC BLOOD PRESSURE: 63 MMHG | TEMPERATURE: 98 F | OXYGEN SATURATION: 100 %

## 2021-11-08 VITALS
BODY MASS INDEX: 17.68 KG/M2 | SYSTOLIC BLOOD PRESSURE: 151 MMHG | DIASTOLIC BLOOD PRESSURE: 63 MMHG | RESPIRATION RATE: 20 BRPM | WEIGHT: 112.63 LBS | OXYGEN SATURATION: 97 % | HEART RATE: 92 BPM | HEIGHT: 67 IN

## 2021-11-08 DIAGNOSIS — D49.9 IMMUNODEFICIENCY SECONDARY TO NEOPLASM: ICD-10-CM

## 2021-11-08 DIAGNOSIS — D50.9 IRON DEFICIENCY ANEMIA, UNSPECIFIED IRON DEFICIENCY ANEMIA TYPE: ICD-10-CM

## 2021-11-08 DIAGNOSIS — Z79.899 IMMUNODEFICIENCY SECONDARY TO CHEMOTHERAPY: ICD-10-CM

## 2021-11-08 DIAGNOSIS — D84.821 IMMUNODEFICIENCY SECONDARY TO CHEMOTHERAPY: ICD-10-CM

## 2021-11-08 DIAGNOSIS — C18.9 ADENOCARCINOMA OF COLON: Primary | ICD-10-CM

## 2021-11-08 DIAGNOSIS — D84.81 IMMUNODEFICIENCY SECONDARY TO NEOPLASM: ICD-10-CM

## 2021-11-08 DIAGNOSIS — T45.1X5A IMMUNODEFICIENCY SECONDARY TO CHEMOTHERAPY: ICD-10-CM

## 2021-11-08 PROCEDURE — 99999 PR PBB SHADOW E&M-EST. PATIENT-LVL V: ICD-10-PCS | Mod: PBBFAC,GC,, | Performed by: STUDENT IN AN ORGANIZED HEALTH CARE EDUCATION/TRAINING PROGRAM

## 2021-11-08 PROCEDURE — 63600175 PHARM REV CODE 636 W HCPCS: Performed by: INTERNAL MEDICINE

## 2021-11-08 PROCEDURE — 99999 PR PBB SHADOW E&M-EST. PATIENT-LVL V: CPT | Mod: PBBFAC,GC,, | Performed by: STUDENT IN AN ORGANIZED HEALTH CARE EDUCATION/TRAINING PROGRAM

## 2021-11-08 PROCEDURE — 96416 CHEMO PROLONG INFUSE W/PUMP: CPT

## 2021-11-08 PROCEDURE — 96409 CHEMO IV PUSH SNGL DRUG: CPT

## 2021-11-08 PROCEDURE — 96375 TX/PRO/DX INJ NEW DRUG ADDON: CPT

## 2021-11-08 PROCEDURE — 99215 PR OFFICE/OUTPT VISIT, EST, LEVL V, 40-54 MIN: ICD-10-PCS | Mod: S$PBB,GC,, | Performed by: STUDENT IN AN ORGANIZED HEALTH CARE EDUCATION/TRAINING PROGRAM

## 2021-11-08 PROCEDURE — 96367 TX/PROPH/DG ADDL SEQ IV INF: CPT

## 2021-11-08 PROCEDURE — 25000003 PHARM REV CODE 250: Performed by: INTERNAL MEDICINE

## 2021-11-08 PROCEDURE — 99215 OFFICE O/P EST HI 40 MIN: CPT | Mod: PBBFAC,25 | Performed by: STUDENT IN AN ORGANIZED HEALTH CARE EDUCATION/TRAINING PROGRAM

## 2021-11-08 PROCEDURE — 99215 OFFICE O/P EST HI 40 MIN: CPT | Mod: S$PBB,GC,, | Performed by: STUDENT IN AN ORGANIZED HEALTH CARE EDUCATION/TRAINING PROGRAM

## 2021-11-08 RX ORDER — HEPARIN 100 UNIT/ML
500 SYRINGE INTRAVENOUS
Status: CANCELLED | OUTPATIENT
Start: 2021-11-10

## 2021-11-08 RX ORDER — ONDANSETRON 2 MG/ML
8 INJECTION INTRAMUSCULAR; INTRAVENOUS
Status: CANCELLED | OUTPATIENT
Start: 2021-11-08

## 2021-11-08 RX ORDER — HEPARIN 100 UNIT/ML
500 SYRINGE INTRAVENOUS
Status: CANCELLED | OUTPATIENT
Start: 2021-11-08

## 2021-11-08 RX ORDER — FLUOROURACIL 50 MG/ML
400 INJECTION, SOLUTION INTRAVENOUS
Status: CANCELLED | OUTPATIENT
Start: 2021-11-08

## 2021-11-08 RX ORDER — HEPARIN 100 UNIT/ML
500 SYRINGE INTRAVENOUS
Status: DISCONTINUED | OUTPATIENT
Start: 2021-11-08 | End: 2021-11-08 | Stop reason: HOSPADM

## 2021-11-08 RX ORDER — ONDANSETRON 2 MG/ML
8 INJECTION INTRAMUSCULAR; INTRAVENOUS
Status: COMPLETED | OUTPATIENT
Start: 2021-11-08 | End: 2021-11-08

## 2021-11-08 RX ORDER — FLUOROURACIL 50 MG/ML
400 INJECTION, SOLUTION INTRAVENOUS
Status: COMPLETED | OUTPATIENT
Start: 2021-11-08 | End: 2021-11-08

## 2021-11-08 RX ORDER — SODIUM CHLORIDE 0.9 % (FLUSH) 0.9 %
10 SYRINGE (ML) INJECTION
Status: CANCELLED | OUTPATIENT
Start: 2021-11-10

## 2021-11-08 RX ORDER — SODIUM CHLORIDE 0.9 % (FLUSH) 0.9 %
10 SYRINGE (ML) INJECTION
Status: CANCELLED | OUTPATIENT
Start: 2021-11-08

## 2021-11-08 RX ORDER — SODIUM CHLORIDE 0.9 % (FLUSH) 0.9 %
10 SYRINGE (ML) INJECTION
Status: DISCONTINUED | OUTPATIENT
Start: 2021-11-08 | End: 2021-11-08 | Stop reason: HOSPADM

## 2021-11-08 RX ADMIN — FLUOROURACIL 590 MG: 50 INJECTION, SOLUTION INTRAVENOUS at 05:11

## 2021-11-08 RX ADMIN — ONDANSETRON 8 MG: 2 INJECTION, SOLUTION INTRAMUSCULAR; INTRAVENOUS at 04:11

## 2021-11-08 RX ADMIN — DEXTROSE: 50 INJECTION, SOLUTION INTRAVENOUS at 04:11

## 2021-11-08 RX ADMIN — FLUOROURACIL 3550 MG: 50 INJECTION, SOLUTION INTRAVENOUS at 05:11

## 2021-11-08 RX ADMIN — LEUCOVORIN CALCIUM 590 MG: 500 INJECTION, POWDER, LYOPHILIZED, FOR SOLUTION INTRAMUSCULAR; INTRAVENOUS at 05:11

## 2021-11-10 ENCOUNTER — INFUSION (OUTPATIENT)
Dept: INFUSION THERAPY | Facility: HOSPITAL | Age: 86
End: 2021-11-10
Payer: MEDICARE

## 2021-11-10 VITALS
RESPIRATION RATE: 18 BRPM | TEMPERATURE: 99 F | DIASTOLIC BLOOD PRESSURE: 71 MMHG | HEART RATE: 89 BPM | SYSTOLIC BLOOD PRESSURE: 162 MMHG

## 2021-11-10 DIAGNOSIS — C18.9 ADENOCARCINOMA OF COLON: Primary | ICD-10-CM

## 2021-11-10 PROCEDURE — 25000003 PHARM REV CODE 250: Performed by: INTERNAL MEDICINE

## 2021-11-10 PROCEDURE — 63600175 PHARM REV CODE 636 W HCPCS: Performed by: INTERNAL MEDICINE

## 2021-11-10 PROCEDURE — A4216 STERILE WATER/SALINE, 10 ML: HCPCS | Performed by: INTERNAL MEDICINE

## 2021-11-10 RX ORDER — SODIUM CHLORIDE 0.9 % (FLUSH) 0.9 %
10 SYRINGE (ML) INJECTION
Status: DISCONTINUED | OUTPATIENT
Start: 2021-11-10 | End: 2021-11-10 | Stop reason: HOSPADM

## 2021-11-10 RX ORDER — HEPARIN 100 UNIT/ML
500 SYRINGE INTRAVENOUS
Status: DISCONTINUED | OUTPATIENT
Start: 2021-11-10 | End: 2021-11-10 | Stop reason: HOSPADM

## 2021-11-10 RX ADMIN — Medication 10 ML: at 04:11

## 2021-11-10 RX ADMIN — HEPARIN 500 UNITS: 100 SYRINGE at 04:11

## 2021-11-19 ENCOUNTER — TELEPHONE (OUTPATIENT)
Dept: PRIMARY CARE CLINIC | Facility: CLINIC | Age: 86
End: 2021-11-19
Payer: MEDICARE

## 2021-11-22 ENCOUNTER — TELEPHONE (OUTPATIENT)
Dept: HEMATOLOGY/ONCOLOGY | Facility: CLINIC | Age: 86
End: 2021-11-22
Payer: MEDICARE

## 2021-11-29 ENCOUNTER — LAB VISIT (OUTPATIENT)
Dept: LAB | Facility: HOSPITAL | Age: 86
End: 2021-11-29
Payer: MEDICARE

## 2021-11-29 ENCOUNTER — OFFICE VISIT (OUTPATIENT)
Dept: HEMATOLOGY/ONCOLOGY | Facility: CLINIC | Age: 86
End: 2021-11-29
Payer: MEDICARE

## 2021-11-29 ENCOUNTER — INFUSION (OUTPATIENT)
Dept: INFUSION THERAPY | Facility: HOSPITAL | Age: 86
End: 2021-11-29
Payer: MEDICARE

## 2021-11-29 VITALS
SYSTOLIC BLOOD PRESSURE: 153 MMHG | DIASTOLIC BLOOD PRESSURE: 67 MMHG | BODY MASS INDEX: 18.06 KG/M2 | OXYGEN SATURATION: 99 % | HEIGHT: 67 IN | HEART RATE: 92 BPM | RESPIRATION RATE: 20 BRPM | WEIGHT: 115.06 LBS

## 2021-11-29 VITALS
SYSTOLIC BLOOD PRESSURE: 154 MMHG | HEART RATE: 86 BPM | TEMPERATURE: 99 F | RESPIRATION RATE: 16 BRPM | DIASTOLIC BLOOD PRESSURE: 66 MMHG

## 2021-11-29 DIAGNOSIS — D49.9 IMMUNODEFICIENCY SECONDARY TO NEOPLASM: ICD-10-CM

## 2021-11-29 DIAGNOSIS — C18.9 ADENOCARCINOMA OF COLON: ICD-10-CM

## 2021-11-29 DIAGNOSIS — C18.9 ADENOCARCINOMA OF COLON: Primary | ICD-10-CM

## 2021-11-29 DIAGNOSIS — I10 ESSENTIAL HYPERTENSION: ICD-10-CM

## 2021-11-29 DIAGNOSIS — T45.1X5A IMMUNODEFICIENCY SECONDARY TO CHEMOTHERAPY: ICD-10-CM

## 2021-11-29 DIAGNOSIS — D84.81 IMMUNODEFICIENCY SECONDARY TO NEOPLASM: ICD-10-CM

## 2021-11-29 DIAGNOSIS — D84.821 IMMUNODEFICIENCY SECONDARY TO CHEMOTHERAPY: ICD-10-CM

## 2021-11-29 DIAGNOSIS — D50.9 IRON DEFICIENCY ANEMIA, UNSPECIFIED IRON DEFICIENCY ANEMIA TYPE: ICD-10-CM

## 2021-11-29 DIAGNOSIS — Z79.899 IMMUNODEFICIENCY SECONDARY TO CHEMOTHERAPY: ICD-10-CM

## 2021-11-29 LAB
ABO + RH BLD: NORMAL
ALBUMIN SERPL BCP-MCNC: 3.7 G/DL (ref 3.5–5.2)
ALP SERPL-CCNC: 69 U/L (ref 55–135)
ALT SERPL W/O P-5'-P-CCNC: 19 U/L (ref 10–44)
ANION GAP SERPL CALC-SCNC: 11 MMOL/L (ref 8–16)
AST SERPL-CCNC: 25 U/L (ref 10–40)
BASOPHILS # BLD AUTO: 0.03 K/UL (ref 0–0.2)
BASOPHILS NFR BLD: 0.5 % (ref 0–1.9)
BILIRUB SERPL-MCNC: 0.3 MG/DL (ref 0.1–1)
BLD GP AB SCN CELLS X3 SERPL QL: NORMAL
BUN SERPL-MCNC: 9 MG/DL (ref 8–23)
CALCIUM SERPL-MCNC: 8.8 MG/DL (ref 8.7–10.5)
CHLORIDE SERPL-SCNC: 99 MMOL/L (ref 95–110)
CO2 SERPL-SCNC: 25 MMOL/L (ref 23–29)
CREAT SERPL-MCNC: 0.8 MG/DL (ref 0.5–1.4)
DIFFERENTIAL METHOD: ABNORMAL
EOSINOPHIL # BLD AUTO: 0.1 K/UL (ref 0–0.5)
EOSINOPHIL NFR BLD: 1.6 % (ref 0–8)
ERYTHROCYTE [DISTWIDTH] IN BLOOD BY AUTOMATED COUNT: 17.7 % (ref 11.5–14.5)
EST. GFR  (AFRICAN AMERICAN): >60 ML/MIN/1.73 M^2
EST. GFR  (NON AFRICAN AMERICAN): >60 ML/MIN/1.73 M^2
FERRITIN SERPL-MCNC: 82 NG/ML (ref 20–300)
GLUCOSE SERPL-MCNC: 92 MG/DL (ref 70–110)
HCT VFR BLD AUTO: 34.9 % (ref 37–48.5)
HGB BLD-MCNC: 10.6 G/DL (ref 12–16)
IMM GRANULOCYTES # BLD AUTO: 0.03 K/UL (ref 0–0.04)
IMM GRANULOCYTES NFR BLD AUTO: 0.5 % (ref 0–0.5)
IRON SERPL-MCNC: 23 UG/DL (ref 30–160)
LYMPHOCYTES # BLD AUTO: 1.5 K/UL (ref 1–4.8)
LYMPHOCYTES NFR BLD: 25.5 % (ref 18–48)
MCH RBC QN AUTO: 28.8 PG (ref 27–31)
MCHC RBC AUTO-ENTMCNC: 30.4 G/DL (ref 32–36)
MCV RBC AUTO: 95 FL (ref 82–98)
MONOCYTES # BLD AUTO: 1.1 K/UL (ref 0.3–1)
MONOCYTES NFR BLD: 19.8 % (ref 4–15)
NEUTROPHILS # BLD AUTO: 3 K/UL (ref 1.8–7.7)
NEUTROPHILS NFR BLD: 52.1 % (ref 38–73)
NRBC BLD-RTO: 0 /100 WBC
PLATELET # BLD AUTO: 253 K/UL (ref 150–450)
PMV BLD AUTO: 10.8 FL (ref 9.2–12.9)
POTASSIUM SERPL-SCNC: 3.5 MMOL/L (ref 3.5–5.1)
PROT SERPL-MCNC: 6.4 G/DL (ref 6–8.4)
RBC # BLD AUTO: 3.68 M/UL (ref 4–5.4)
SATURATED IRON: 5 % (ref 20–50)
SODIUM SERPL-SCNC: 135 MMOL/L (ref 136–145)
TOTAL IRON BINDING CAPACITY: 448 UG/DL (ref 250–450)
TRANSFERRIN SERPL-MCNC: 303 MG/DL (ref 200–375)
WBC # BLD AUTO: 5.77 K/UL (ref 3.9–12.7)

## 2021-11-29 PROCEDURE — 99999 PR PBB SHADOW E&M-EST. PATIENT-LVL IV: ICD-10-PCS | Mod: PBBFAC,GC,, | Performed by: STUDENT IN AN ORGANIZED HEALTH CARE EDUCATION/TRAINING PROGRAM

## 2021-11-29 PROCEDURE — 36415 COLL VENOUS BLD VENIPUNCTURE: CPT | Performed by: STUDENT IN AN ORGANIZED HEALTH CARE EDUCATION/TRAINING PROGRAM

## 2021-11-29 PROCEDURE — 96375 TX/PRO/DX INJ NEW DRUG ADDON: CPT

## 2021-11-29 PROCEDURE — 80053 COMPREHEN METABOLIC PANEL: CPT | Performed by: STUDENT IN AN ORGANIZED HEALTH CARE EDUCATION/TRAINING PROGRAM

## 2021-11-29 PROCEDURE — 99215 PR OFFICE/OUTPT VISIT, EST, LEVL V, 40-54 MIN: ICD-10-PCS | Mod: S$PBB,GC,, | Performed by: STUDENT IN AN ORGANIZED HEALTH CARE EDUCATION/TRAINING PROGRAM

## 2021-11-29 PROCEDURE — 82728 ASSAY OF FERRITIN: CPT | Performed by: STUDENT IN AN ORGANIZED HEALTH CARE EDUCATION/TRAINING PROGRAM

## 2021-11-29 PROCEDURE — 99999 PR PBB SHADOW E&M-EST. PATIENT-LVL IV: CPT | Mod: PBBFAC,GC,, | Performed by: STUDENT IN AN ORGANIZED HEALTH CARE EDUCATION/TRAINING PROGRAM

## 2021-11-29 PROCEDURE — 86900 BLOOD TYPING SEROLOGIC ABO: CPT | Performed by: STUDENT IN AN ORGANIZED HEALTH CARE EDUCATION/TRAINING PROGRAM

## 2021-11-29 PROCEDURE — 85025 COMPLETE CBC W/AUTO DIFF WBC: CPT | Performed by: STUDENT IN AN ORGANIZED HEALTH CARE EDUCATION/TRAINING PROGRAM

## 2021-11-29 PROCEDURE — 96416 CHEMO PROLONG INFUSE W/PUMP: CPT

## 2021-11-29 PROCEDURE — 99214 OFFICE O/P EST MOD 30 MIN: CPT | Mod: PBBFAC,25 | Performed by: STUDENT IN AN ORGANIZED HEALTH CARE EDUCATION/TRAINING PROGRAM

## 2021-11-29 PROCEDURE — 84466 ASSAY OF TRANSFERRIN: CPT | Performed by: STUDENT IN AN ORGANIZED HEALTH CARE EDUCATION/TRAINING PROGRAM

## 2021-11-29 PROCEDURE — 63600175 PHARM REV CODE 636 W HCPCS: Performed by: STUDENT IN AN ORGANIZED HEALTH CARE EDUCATION/TRAINING PROGRAM

## 2021-11-29 PROCEDURE — 99215 OFFICE O/P EST HI 40 MIN: CPT | Mod: S$PBB,GC,, | Performed by: STUDENT IN AN ORGANIZED HEALTH CARE EDUCATION/TRAINING PROGRAM

## 2021-11-29 PROCEDURE — 25000003 PHARM REV CODE 250: Performed by: STUDENT IN AN ORGANIZED HEALTH CARE EDUCATION/TRAINING PROGRAM

## 2021-11-29 PROCEDURE — 96409 CHEMO IV PUSH SNGL DRUG: CPT

## 2021-11-29 PROCEDURE — 96367 TX/PROPH/DG ADDL SEQ IV INF: CPT

## 2021-11-29 RX ORDER — SODIUM CHLORIDE 0.9 % (FLUSH) 0.9 %
10 SYRINGE (ML) INJECTION
Status: CANCELLED | OUTPATIENT
Start: 2021-12-01

## 2021-11-29 RX ORDER — HEPARIN 100 UNIT/ML
500 SYRINGE INTRAVENOUS
Status: CANCELLED | OUTPATIENT
Start: 2021-11-29

## 2021-11-29 RX ORDER — HEPARIN 100 UNIT/ML
500 SYRINGE INTRAVENOUS
Status: CANCELLED | OUTPATIENT
Start: 2021-12-01

## 2021-11-29 RX ORDER — FLUOROURACIL 50 MG/ML
400 INJECTION, SOLUTION INTRAVENOUS
Status: COMPLETED | OUTPATIENT
Start: 2021-11-29 | End: 2021-11-29

## 2021-11-29 RX ORDER — ONDANSETRON 2 MG/ML
8 INJECTION INTRAMUSCULAR; INTRAVENOUS
Status: COMPLETED | OUTPATIENT
Start: 2021-11-29 | End: 2021-11-29

## 2021-11-29 RX ORDER — SODIUM CHLORIDE 0.9 % (FLUSH) 0.9 %
10 SYRINGE (ML) INJECTION
Status: CANCELLED | OUTPATIENT
Start: 2021-11-29

## 2021-11-29 RX ORDER — ONDANSETRON 2 MG/ML
8 INJECTION INTRAMUSCULAR; INTRAVENOUS
Status: CANCELLED | OUTPATIENT
Start: 2021-11-29

## 2021-11-29 RX ORDER — HEPARIN 100 UNIT/ML
500 SYRINGE INTRAVENOUS
Status: DISCONTINUED | OUTPATIENT
Start: 2021-11-29 | End: 2021-11-29 | Stop reason: HOSPADM

## 2021-11-29 RX ORDER — SODIUM CHLORIDE 0.9 % (FLUSH) 0.9 %
10 SYRINGE (ML) INJECTION
Status: DISCONTINUED | OUTPATIENT
Start: 2021-11-29 | End: 2021-11-29 | Stop reason: HOSPADM

## 2021-11-29 RX ADMIN — FLUOROURACIL 3550 MG: 50 INJECTION, SOLUTION INTRAVENOUS at 06:11

## 2021-11-29 RX ADMIN — FLUOROURACIL 590 MG: 50 INJECTION, SOLUTION INTRAVENOUS at 06:11

## 2021-11-29 RX ADMIN — ONDANSETRON 8 MG: 2 INJECTION, SOLUTION INTRAMUSCULAR; INTRAVENOUS at 05:11

## 2021-11-29 RX ADMIN — DEXTROSE 590 MG: 5 SOLUTION INTRAVENOUS at 05:11

## 2021-12-01 ENCOUNTER — INFUSION (OUTPATIENT)
Dept: INFUSION THERAPY | Facility: HOSPITAL | Age: 86
End: 2021-12-01
Payer: MEDICARE

## 2021-12-01 VITALS
HEART RATE: 87 BPM | RESPIRATION RATE: 18 BRPM | OXYGEN SATURATION: 100 % | TEMPERATURE: 98 F | DIASTOLIC BLOOD PRESSURE: 67 MMHG | SYSTOLIC BLOOD PRESSURE: 148 MMHG

## 2021-12-01 DIAGNOSIS — C18.9 ADENOCARCINOMA OF COLON: Primary | ICD-10-CM

## 2021-12-01 PROCEDURE — 63600175 PHARM REV CODE 636 W HCPCS: Performed by: STUDENT IN AN ORGANIZED HEALTH CARE EDUCATION/TRAINING PROGRAM

## 2021-12-01 RX ORDER — SODIUM CHLORIDE 0.9 % (FLUSH) 0.9 %
10 SYRINGE (ML) INJECTION
Status: DISCONTINUED | OUTPATIENT
Start: 2021-12-01 | End: 2021-12-01 | Stop reason: HOSPADM

## 2021-12-01 RX ORDER — HEPARIN 100 UNIT/ML
500 SYRINGE INTRAVENOUS
Status: DISCONTINUED | OUTPATIENT
Start: 2021-12-01 | End: 2021-12-01 | Stop reason: HOSPADM

## 2021-12-01 RX ADMIN — HEPARIN 500 UNITS: 100 SYRINGE at 02:12

## 2021-12-10 ENCOUNTER — LAB VISIT (OUTPATIENT)
Dept: LAB | Facility: HOSPITAL | Age: 86
End: 2021-12-10
Attending: INTERNAL MEDICINE
Payer: MEDICARE

## 2021-12-10 DIAGNOSIS — C18.9 ADENOCARCINOMA OF COLON: ICD-10-CM

## 2021-12-10 LAB
ALBUMIN SERPL BCP-MCNC: 4.2 G/DL (ref 3.5–5.2)
ALP SERPL-CCNC: 68 U/L (ref 55–135)
ALT SERPL W/O P-5'-P-CCNC: 19 U/L (ref 10–44)
ANION GAP SERPL CALC-SCNC: 14 MMOL/L (ref 8–16)
AST SERPL-CCNC: 23 U/L (ref 10–40)
BASOPHILS # BLD AUTO: 0.02 K/UL (ref 0–0.2)
BASOPHILS NFR BLD: 0.6 % (ref 0–1.9)
BILIRUB SERPL-MCNC: 0.3 MG/DL (ref 0.1–1)
BUN SERPL-MCNC: 12 MG/DL (ref 8–23)
CALCIUM SERPL-MCNC: 9.6 MG/DL (ref 8.7–10.5)
CEA SERPL-MCNC: 5 NG/ML (ref 0–5)
CHLORIDE SERPL-SCNC: 102 MMOL/L (ref 95–110)
CO2 SERPL-SCNC: 24 MMOL/L (ref 23–29)
CREAT SERPL-MCNC: 0.8 MG/DL (ref 0.5–1.4)
DIFFERENTIAL METHOD: ABNORMAL
EOSINOPHIL # BLD AUTO: 0.1 K/UL (ref 0–0.5)
EOSINOPHIL NFR BLD: 2.4 % (ref 0–8)
ERYTHROCYTE [DISTWIDTH] IN BLOOD BY AUTOMATED COUNT: 17.6 % (ref 11.5–14.5)
EST. GFR  (AFRICAN AMERICAN): >60 ML/MIN/1.73 M^2
EST. GFR  (NON AFRICAN AMERICAN): >60 ML/MIN/1.73 M^2
GLUCOSE SERPL-MCNC: 81 MG/DL (ref 70–110)
HCT VFR BLD AUTO: 36.3 % (ref 37–48.5)
HGB BLD-MCNC: 10.9 G/DL (ref 12–16)
IMM GRANULOCYTES # BLD AUTO: 0.03 K/UL (ref 0–0.04)
IMM GRANULOCYTES NFR BLD AUTO: 0.9 % (ref 0–0.5)
LYMPHOCYTES # BLD AUTO: 1.3 K/UL (ref 1–4.8)
LYMPHOCYTES NFR BLD: 39.7 % (ref 18–48)
MCH RBC QN AUTO: 28.6 PG (ref 27–31)
MCHC RBC AUTO-ENTMCNC: 30 G/DL (ref 32–36)
MCV RBC AUTO: 95 FL (ref 82–98)
MONOCYTES # BLD AUTO: 0.6 K/UL (ref 0.3–1)
MONOCYTES NFR BLD: 16.7 % (ref 4–15)
NEUTROPHILS # BLD AUTO: 1.3 K/UL (ref 1.8–7.7)
NEUTROPHILS NFR BLD: 39.7 % (ref 38–73)
NRBC BLD-RTO: 0 /100 WBC
PLATELET # BLD AUTO: 196 K/UL (ref 150–450)
PMV BLD AUTO: 10.1 FL (ref 9.2–12.9)
POTASSIUM SERPL-SCNC: 3.7 MMOL/L (ref 3.5–5.1)
PROT SERPL-MCNC: 7.5 G/DL (ref 6–8.4)
RBC # BLD AUTO: 3.81 M/UL (ref 4–5.4)
SODIUM SERPL-SCNC: 140 MMOL/L (ref 136–145)
WBC # BLD AUTO: 3.35 K/UL (ref 3.9–12.7)

## 2021-12-10 PROCEDURE — 36415 COLL VENOUS BLD VENIPUNCTURE: CPT | Mod: PN | Performed by: STUDENT IN AN ORGANIZED HEALTH CARE EDUCATION/TRAINING PROGRAM

## 2021-12-10 PROCEDURE — 82378 CARCINOEMBRYONIC ANTIGEN: CPT | Performed by: STUDENT IN AN ORGANIZED HEALTH CARE EDUCATION/TRAINING PROGRAM

## 2021-12-10 PROCEDURE — 85025 COMPLETE CBC W/AUTO DIFF WBC: CPT | Performed by: STUDENT IN AN ORGANIZED HEALTH CARE EDUCATION/TRAINING PROGRAM

## 2021-12-10 PROCEDURE — 80053 COMPREHEN METABOLIC PANEL: CPT | Performed by: STUDENT IN AN ORGANIZED HEALTH CARE EDUCATION/TRAINING PROGRAM

## 2021-12-13 ENCOUNTER — INFUSION (OUTPATIENT)
Dept: INFUSION THERAPY | Facility: HOSPITAL | Age: 86
End: 2021-12-13
Payer: MEDICARE

## 2021-12-13 ENCOUNTER — OFFICE VISIT (OUTPATIENT)
Dept: HEMATOLOGY/ONCOLOGY | Facility: CLINIC | Age: 86
End: 2021-12-13
Payer: MEDICARE

## 2021-12-13 VITALS
SYSTOLIC BLOOD PRESSURE: 167 MMHG | HEART RATE: 111 BPM | SYSTOLIC BLOOD PRESSURE: 139 MMHG | HEIGHT: 67 IN | BODY MASS INDEX: 17.44 KG/M2 | HEART RATE: 88 BPM | DIASTOLIC BLOOD PRESSURE: 65 MMHG | DIASTOLIC BLOOD PRESSURE: 74 MMHG | TEMPERATURE: 98 F | WEIGHT: 111.13 LBS | TEMPERATURE: 98 F | RESPIRATION RATE: 18 BRPM

## 2021-12-13 DIAGNOSIS — D84.81 IMMUNODEFICIENCY SECONDARY TO NEOPLASM: ICD-10-CM

## 2021-12-13 DIAGNOSIS — C18.9 ADENOCARCINOMA OF COLON: Primary | ICD-10-CM

## 2021-12-13 DIAGNOSIS — I10 ESSENTIAL HYPERTENSION: ICD-10-CM

## 2021-12-13 DIAGNOSIS — D84.821 IMMUNODEFICIENCY SECONDARY TO CHEMOTHERAPY: ICD-10-CM

## 2021-12-13 DIAGNOSIS — D49.9 IMMUNODEFICIENCY SECONDARY TO NEOPLASM: ICD-10-CM

## 2021-12-13 DIAGNOSIS — D50.9 IRON DEFICIENCY ANEMIA, UNSPECIFIED IRON DEFICIENCY ANEMIA TYPE: ICD-10-CM

## 2021-12-13 DIAGNOSIS — Z79.899 IMMUNODEFICIENCY SECONDARY TO CHEMOTHERAPY: ICD-10-CM

## 2021-12-13 DIAGNOSIS — T45.1X5A IMMUNODEFICIENCY SECONDARY TO CHEMOTHERAPY: ICD-10-CM

## 2021-12-13 PROCEDURE — 99999 PR PBB SHADOW E&M-EST. PATIENT-LVL III: CPT | Mod: PBBFAC,GC,, | Performed by: STUDENT IN AN ORGANIZED HEALTH CARE EDUCATION/TRAINING PROGRAM

## 2021-12-13 PROCEDURE — 99999 PR PBB SHADOW E&M-EST. PATIENT-LVL III: ICD-10-PCS | Mod: PBBFAC,GC,, | Performed by: STUDENT IN AN ORGANIZED HEALTH CARE EDUCATION/TRAINING PROGRAM

## 2021-12-13 PROCEDURE — 96416 CHEMO PROLONG INFUSE W/PUMP: CPT

## 2021-12-13 PROCEDURE — 96375 TX/PRO/DX INJ NEW DRUG ADDON: CPT

## 2021-12-13 PROCEDURE — 99213 OFFICE O/P EST LOW 20 MIN: CPT | Mod: PBBFAC,25 | Performed by: STUDENT IN AN ORGANIZED HEALTH CARE EDUCATION/TRAINING PROGRAM

## 2021-12-13 PROCEDURE — 99215 PR OFFICE/OUTPT VISIT, EST, LEVL V, 40-54 MIN: ICD-10-PCS | Mod: S$PBB,GC,, | Performed by: STUDENT IN AN ORGANIZED HEALTH CARE EDUCATION/TRAINING PROGRAM

## 2021-12-13 PROCEDURE — 63600175 PHARM REV CODE 636 W HCPCS: Performed by: STUDENT IN AN ORGANIZED HEALTH CARE EDUCATION/TRAINING PROGRAM

## 2021-12-13 PROCEDURE — 25000003 PHARM REV CODE 250: Performed by: STUDENT IN AN ORGANIZED HEALTH CARE EDUCATION/TRAINING PROGRAM

## 2021-12-13 PROCEDURE — 96374 THER/PROPH/DIAG INJ IV PUSH: CPT | Mod: 59

## 2021-12-13 PROCEDURE — 99215 OFFICE O/P EST HI 40 MIN: CPT | Mod: S$PBB,GC,, | Performed by: STUDENT IN AN ORGANIZED HEALTH CARE EDUCATION/TRAINING PROGRAM

## 2021-12-13 RX ORDER — ONDANSETRON 2 MG/ML
8 INJECTION INTRAMUSCULAR; INTRAVENOUS
Status: COMPLETED | OUTPATIENT
Start: 2021-12-13 | End: 2021-12-13

## 2021-12-13 RX ORDER — FLUOROURACIL 50 MG/ML
400 INJECTION, SOLUTION INTRAVENOUS
Status: CANCELLED | OUTPATIENT
Start: 2021-12-13

## 2021-12-13 RX ORDER — FLUOROURACIL 50 MG/ML
400 INJECTION, SOLUTION INTRAVENOUS
Status: COMPLETED | OUTPATIENT
Start: 2021-12-13 | End: 2021-12-13

## 2021-12-13 RX ORDER — SODIUM CHLORIDE 0.9 % (FLUSH) 0.9 %
10 SYRINGE (ML) INJECTION
Status: CANCELLED | OUTPATIENT
Start: 2021-12-13

## 2021-12-13 RX ORDER — HEPARIN 100 UNIT/ML
500 SYRINGE INTRAVENOUS
Status: CANCELLED | OUTPATIENT
Start: 2021-12-13

## 2021-12-13 RX ORDER — SODIUM CHLORIDE 0.9 % (FLUSH) 0.9 %
10 SYRINGE (ML) INJECTION
Status: DISCONTINUED | OUTPATIENT
Start: 2021-12-13 | End: 2021-12-13 | Stop reason: HOSPADM

## 2021-12-13 RX ORDER — HEPARIN 100 UNIT/ML
500 SYRINGE INTRAVENOUS
Status: DISCONTINUED | OUTPATIENT
Start: 2021-12-13 | End: 2021-12-13 | Stop reason: HOSPADM

## 2021-12-13 RX ORDER — SODIUM CHLORIDE 0.9 % (FLUSH) 0.9 %
10 SYRINGE (ML) INJECTION
Status: CANCELLED | OUTPATIENT
Start: 2021-12-15

## 2021-12-13 RX ORDER — HEPARIN 100 UNIT/ML
500 SYRINGE INTRAVENOUS
Status: CANCELLED | OUTPATIENT
Start: 2021-12-15

## 2021-12-13 RX ORDER — ONDANSETRON 2 MG/ML
8 INJECTION INTRAMUSCULAR; INTRAVENOUS
Status: CANCELLED | OUTPATIENT
Start: 2021-12-13

## 2021-12-13 RX ADMIN — FLUOROURACIL 3500 MG: 50 INJECTION, SOLUTION INTRAVENOUS at 04:12

## 2021-12-13 RX ADMIN — DEXTROSE 590 MG: 5 SOLUTION INTRAVENOUS at 04:12

## 2021-12-13 RX ADMIN — FLUOROURACIL 590 MG: 50 INJECTION, SOLUTION INTRAVENOUS at 04:12

## 2021-12-13 RX ADMIN — ONDANSETRON 8 MG: 2 INJECTION, SOLUTION INTRAMUSCULAR; INTRAVENOUS at 04:12

## 2021-12-13 RX ADMIN — SODIUM CHLORIDE: 0.9 INJECTION, SOLUTION INTRAVENOUS at 03:12

## 2021-12-15 ENCOUNTER — INFUSION (OUTPATIENT)
Dept: INFUSION THERAPY | Facility: HOSPITAL | Age: 86
End: 2021-12-15
Payer: MEDICARE

## 2021-12-15 VITALS
HEART RATE: 78 BPM | DIASTOLIC BLOOD PRESSURE: 64 MMHG | TEMPERATURE: 98 F | SYSTOLIC BLOOD PRESSURE: 139 MMHG | RESPIRATION RATE: 18 BRPM | OXYGEN SATURATION: 99 %

## 2021-12-15 DIAGNOSIS — C18.9 ADENOCARCINOMA OF COLON: Primary | ICD-10-CM

## 2021-12-15 PROCEDURE — 25000003 PHARM REV CODE 250: Performed by: STUDENT IN AN ORGANIZED HEALTH CARE EDUCATION/TRAINING PROGRAM

## 2021-12-15 PROCEDURE — A4216 STERILE WATER/SALINE, 10 ML: HCPCS | Performed by: STUDENT IN AN ORGANIZED HEALTH CARE EDUCATION/TRAINING PROGRAM

## 2021-12-15 PROCEDURE — 63600175 PHARM REV CODE 636 W HCPCS: Performed by: STUDENT IN AN ORGANIZED HEALTH CARE EDUCATION/TRAINING PROGRAM

## 2021-12-15 RX ORDER — HEPARIN 100 UNIT/ML
500 SYRINGE INTRAVENOUS
Status: DISCONTINUED | OUTPATIENT
Start: 2021-12-15 | End: 2021-12-15 | Stop reason: HOSPADM

## 2021-12-15 RX ORDER — SODIUM CHLORIDE 0.9 % (FLUSH) 0.9 %
10 SYRINGE (ML) INJECTION
Status: DISCONTINUED | OUTPATIENT
Start: 2021-12-15 | End: 2021-12-15 | Stop reason: HOSPADM

## 2021-12-15 RX ADMIN — HEPARIN 500 UNITS: 100 SYRINGE at 03:12

## 2021-12-15 RX ADMIN — Medication 10 ML: at 03:12

## 2021-12-16 PROCEDURE — G0179 PR HOME HEALTH MD RECERTIFICATION: ICD-10-PCS | Mod: ,,, | Performed by: HOSPITALIST

## 2021-12-16 PROCEDURE — G0179 MD RECERTIFICATION HHA PT: HCPCS | Mod: ,,, | Performed by: HOSPITALIST

## 2021-12-21 ENCOUNTER — OFFICE VISIT (OUTPATIENT)
Dept: PRIMARY CARE CLINIC | Facility: CLINIC | Age: 86
End: 2021-12-21
Payer: MEDICARE

## 2021-12-21 VITALS
SYSTOLIC BLOOD PRESSURE: 150 MMHG | DIASTOLIC BLOOD PRESSURE: 64 MMHG | RESPIRATION RATE: 20 BRPM | WEIGHT: 112.19 LBS | HEART RATE: 84 BPM | HEIGHT: 67 IN | BODY MASS INDEX: 17.61 KG/M2 | OXYGEN SATURATION: 97 %

## 2021-12-21 DIAGNOSIS — D22.9 BENIGN MOLE: Primary | ICD-10-CM

## 2021-12-21 DIAGNOSIS — Z00.00 HEALTHCARE MAINTENANCE: ICD-10-CM

## 2021-12-21 DIAGNOSIS — C18.9 ADENOCARCINOMA OF COLON: ICD-10-CM

## 2021-12-21 DIAGNOSIS — I10 ESSENTIAL HYPERTENSION: Chronic | ICD-10-CM

## 2021-12-21 PROCEDURE — 99215 PR OFFICE/OUTPT VISIT, EST, LEVL V, 40-54 MIN: ICD-10-PCS | Mod: S$GLB,,, | Performed by: INTERNAL MEDICINE

## 2021-12-21 PROCEDURE — 99215 OFFICE O/P EST HI 40 MIN: CPT | Mod: S$GLB,,, | Performed by: INTERNAL MEDICINE

## 2021-12-21 NOTE — PROGRESS NOTES
This note has been generated using voice-recognition software. There may be typographical errors that have been missed during proof-reading.  Primary Care Provider Appointment    Subjective:      Patient ID: Nydia Stevens is a 87 y.o. female here for follow up.     Chief Complaint: Follow-up    HPI:  This is an 86-year-old female with hypertension, hyperlipidemia, congestive heart failure, colon cancer, iron deficiency anemia, carotid artery disease, prediabetes, osteoporosis here for f/u.  patient last seen 09/21/2021 09/27/2021 patient seen by Heme-Onc for adenocarcinoma of colon.  Tolerating chemo.  10/11/2021 patient seen by Heme-Onc for adenocarcinoma of colon.  Tolerating chemo.  10/25/2021 patient seen by Heme-Onc for adenocarcinoma of colon.  Tolerating chemo.  11/08/2021 patient seen by Heme-Onc for adenocarcinoma of colon.  Tolerating chemo.  Cycle 8.   11/29/2021 patient seen by Heme-Onc adenocarcinoma of the colon.  Tolerating chemo.  Cycle 9  12/13/21 pt seen by heme-onc for AC colon.  Tolerating chemo cycle cycle 10.      Advance Care Planning   pt LAPOST reviewed. It is mislabeled as a living will.   Pt given a copy of POA and LW to discuss with her order to review her wishes.  Unclear if she has capacity.       pt has a mole on her R cheek which is irritated by her mask.    Patient states she has almost completed chemotherapy.  Tolerating well.  No side effects.  Patient Active Problem List   Diagnosis    Essential hypertension     Mixed hyperlipidemia    Iron deficiency anemia    Palliative care encounter    Syncope    Closed fracture of left olecranon process    Debility    Aortic stenosis, mild    Osteoporosis    Thrombocytosis    Chronic combined systolic and diastolic heart failure    Adenocarcinoma of colon    Carotid stenosis, asymptomatic, bilateral    H/O: hysterectomy    Prediabetes    Colon cancer    Immunodeficiency secondary to neoplasm    Hypoproteinemia    B12  deficiency    Healthcare maintenance    Aortic atherosclerosis    Dementia with behavioral disturbance    Immunodeficiency secondary to chemotherapy    Benign mole        Past Surgical History:   Procedure Laterality Date    CATARACT EXTRACTION W/  INTRAOCULAR LENS IMPLANT Left 8/11/14    [facility]    CATARACT EXTRACTION W/  INTRAOCULAR LENS IMPLANT Right 09/15/14    [facility]    COLONOSCOPY N/A 3/19/2020    Procedure: COLONOSCOPY;  Surgeon: Real Mabry MD;  Location: [facility] ENDO (2ND FLR);  Service: Endoscopy;  Laterality: N/A;    COLONOSCOPY N/A 3/23/2021    Procedure: COLONOSCOPY;  Surgeon: AUTUMN Berg MD;  Location: [facility] ENDO (4TH FLR);  Service: Endoscopy;  Laterality: N/A;  covid test 3/20 [facility]    ESOPHAGOGASTRODUODENOSCOPY N/A 3/19/2020    Procedure: EGD (ESOPHAGOGASTRODUODENOSCOPY);  Surgeon: Real Mabry MD;  Location: [facility] ENDO (2ND FLR);  Service: Endoscopy;  Laterality: N/A;    FOOT SURGERY      left    HYSTERECTOMY  1962    ILEOCOLECTOMY N/A 4/15/2021    Procedure: ILEOCOLECTOMY;  Surgeon: AUTUMN Berg MD;  Location: [facility] OR 2ND FLR;  Service: Colon and Rectal;  Laterality: N/A;    INSERTION OF TUNNELED CENTRAL VENOUS CATHETER (CVC) WITH SUBCUTANEOUS PORT N/A 7/2/2021    Procedure: [id number];  Surgeon: Suresh Agrawal MD;  Location: [facility] OR 2ND FLR;  Service: Vascular;  Laterality: N/A;  Right IJ    LAPAROSCOPIC HEMICOLECTOMY Right 3/20/2020    Procedure: HEMICOLECTOMY, RIGHT;  Surgeon: AUTUMN Berg MD;  Location: [facility] OR 2ND FLR;  Service: Colon and Rectal;  Laterality: Right;    LYSIS OF ADHESIONS N/A 4/15/2021    Procedure: LYSIS, ADHESIONS;  Surgeon: AUTUMN Berg MD;  Location: [facility] OR 2ND FLR;  Service: Colon and Rectal;  Laterality: N/A;  Greater than 2 hours    MOBILIZATION OF SPLENIC FLEXURE N/A 4/15/2021    Procedure: MOBILIZATION, SPLENIC FLEXURE;  Surgeon: AUTUMN Berg MD;  Location: [facility] OR 2ND FLR;  Service: Colon and Rectal;   Laterality: N/A;       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   Tobacco Use    Smoking status: Never Smoker    Smokeless tobacco: Never Used   Substance and Sexual Activity    Alcohol use: No     Alcohol/week: 0.0 standard drinks    Drug use: No    Sexual activity: Never   Social History Narrative    Member of [employer] order here in [place], [place]..  Lives with about 40-50 sisters.  All sisters with her are retired.       Social Determinants of Health     Financial Resource Strain: Low Risk     Difficulty of Paying Living Expenses: Not hard at all   Food Insecurity: No Food Insecurity    Worried About Running Out of Food in the Last Year: Never true    Ran Out of Food in the Last Year: Never true   Physical Activity: Inactive    Days of Exercise per Week: 0 days    Minutes of Exercise per Session: 0 min   Stress: No Stress Concern Present    Feeling of Stress : Not at all   Social Connections: Moderately Integrated    Frequency of Communication with Friends and Family: More than three times a week    Frequency of Social Gatherings with Friends and Family: More than three times a week    Attends Alevism Services: More than 4 times per year    Active Member of Clubs or Organizations: Yes    Attends Club or Organization Meetings: More than 4 times per year    Marital Status: Never        Review of Systems    PHQ9 7/22/2021   Total Score 0        Checklist of Daily Activities:        Objective:   BP (!) 150/64 (BP Location: Right arm, Patient Position: Sitting, BP Method: Medium  "(Manual))   Pulse 84   Resp 20   Ht 5' 7" (1.702 m)   Wt 50.9 kg (112 lb 3.4 oz)   SpO2 97%   BMI 17.58 kg/m²     Physical Exam  Constitutional:       General: She is not in acute distress.     Appearance: Normal appearance. She is not ill-appearing, toxic-appearing or diaphoretic.   HENT:      Head: Normocephalic and atraumatic.   Cardiovascular:      Rate and Rhythm: Normal rate and regular rhythm.      Heart sounds: Normal heart sounds.   Pulmonary:      Effort: Pulmonary effort is normal.      Breath sounds: Normal breath sounds.   Abdominal:      General: Abdomen is flat. Bowel sounds are normal.      Palpations: Abdomen is soft.   Musculoskeletal:      Right lower leg: No edema.      Left lower leg: No edema.   Skin:     Comments: R cheek with mole.     Neurological:      Mental Status: She is alert.             Lab Results   Component Value Date    WBC 6.72 01/07/2022    HGB 11.6 (L) 01/07/2022    HCT 37.1 01/07/2022     01/07/2022    CHOL 184 02/17/2021    TRIG 65 02/17/2021    HDL 39 (L) 02/17/2021    ALT 18 01/07/2022    AST 23 01/07/2022     01/07/2022    K 3.7 01/07/2022     01/07/2022    CREATININE 0.8 01/07/2022    BUN 10 01/07/2022    CO2 28 01/07/2022    TSH 2.916 02/16/2021    INR 0.9 03/17/2020    HGBA1C 5.7 (H) 02/17/2021       Current Outpatient Medications on File Prior to Visit   Medication Sig Dispense Refill    alendronate (FOSAMAX) 70 MG tablet Take 1 tablet (70 mg total) by mouth every 7 days. (Patient taking differently: Take 70 mg by mouth every 7 days. Hold until after surgery) 12 tablet 3    amLODIPine (NORVASC) 10 MG tablet Take 1 tablet (10 mg total) by mouth once daily. 30 tablet 11    calcium carbonate (OS-JAILYN) 500 mg calcium (1,250 mg) tablet Take 1 tablet (500 mg total) by mouth once daily. (Patient taking differently: Take 0.5 tablets by mouth 2 (two) times daily. Hold until after surgery)  0    cholecalciferol, vitamin D3, (VITAMIN D3) 25 mcg (1,000 " unit) capsule Take 1,000 Units by mouth once daily. Hold until after surgery      cyanocobalamin (VITAMIN B-12) 1000 MCG tablet Take 1 tablet (1,000 mcg total) by mouth once daily. Hold until after surgery 30 tablet 11    FEROSUL 325 mg (65 mg iron) Tab tablet TAKE ONE TABLET BY MOUTH ON TUESDAY, THURSDAY, AND SATURDAY HOLD UNTIL AFTER SURGERY 30 tablet 0    furosemide (LASIX) 20 MG tablet Take 1 tablet (20 mg total) by mouth once daily. 90 tablet 3    LIDOcaine-prilocaine (EMLA) cream Apply topically as needed (apply over port before chemo). apply over port before chemo 30 g 3    losartan (COZAAR) 50 MG tablet Take 1 tablet (50 mg total) by mouth once daily. 90 tablet 3    lutein-zeaxanthin (OCUVITE LUTEIN 25) 25-5 mg Cap Take 1 tablet by mouth once daily. 30 capsule 0    ondansetron (ZOFRAN) 4 MG tablet Take 1 tablet (4 mg total) by mouth daily as needed for Nausea. 60 tablet 1    potassium chloride (KLOR-CON) 10 MEQ TbSR TAKE 1 TABLET BY MOUTH ONCE DAILY. (Patient taking differently: Hold the morning of surgery) 90 tablet 3    promethazine (PHENERGAN) 12.5 MG Tab Take 1 tablet (12.5 mg total) by mouth every 4 (four) hours as needed (second line for nausea). 60 tablet 1    rosuvastatin (CRESTOR) 40 MG Tab Take 1 tablet (40 mg total) by mouth once daily. (Patient taking differently: Take 40 mg by mouth every evening.) 90 tablet 3    VIT C/VIT E ACETATE/LUTEIN/MIN (OCUVITE LUTEIN ORAL) Take 1 tablet by mouth once daily. Hold until after surgery       Current Facility-Administered Medications on File Prior to Visit   Medication Dose Route Frequency Provider Last Rate Last Admin    gabapentin capsule 300 mg  300 mg Oral TID Amanda Gaspar NP   300 mg at 04/16/21 0814    LIDOcaine (PF) 10 mg/ml (1%) injection 10 mg  1 mL Intradermal On Call Procedure Amanda Gaspar NP             Assessment:   87 y.o. female with multiple co-morbid illnesses here for continued follow up of medical problems.       Plan:     Problem List Items Addressed This Visit        Cardiac/Vascular    Essential hypertension  (Chronic)     Blood pressure elevated today.  Goal last visit.  · Blood pressure check at next visit continue current medications.            Oncology    Adenocarcinoma of colon     Patient tolerating chemotherapy.  No side effects.  Has almost completed.  Denies any melena or hematochezia.  · Patient doing well.  Will complete chemo in the near future.         Benign mole - Primary     Located on right cheek, irritated by her mask.  · To Dermatology for removal.         Relevant Orders    Ambulatory referral/consult to Dermatology       Other    Healthcare maintenance     · [place] post reviewed today.  Is this labile does living will.  Will attempt to correct this.  Patient given a copy of power-of- and living will to discussed with her order.  · Optometry needed                   Health Maintenance       Date Due Completion Date    DEXA SCAN 02/04/2022 2/4/2021    Override on 2/15/2016: Declined    COVID-19 Vaccine (4 - Booster) 03/21/2022 9/21/2021    Colonoscopy 03/23/2022 3/23/2021    TETANUS VACCINE 02/15/2028 2/15/2018        Medications Reconciliation:   I have not reconciled the patient's home medications with the patient/family. I have updated all changes.  Refer to After-Visit Medication List.      Medication List          Accurate as of December 21, 2021 11:59 PM. If you have any questions, ask your nurse or doctor.            CHANGE how you take these medications    alendronate 70 MG tablet  Commonly known as: FOSAMAX  Take 1 tablet (70 mg total) by mouth every 7 days.  What changed: additional instructions     calcium carbonate 500 mg calcium (1,250 mg) tablet  Commonly known as: OS-JAILYN  Take 1 tablet (500 mg total) by mouth once daily.  What changed:   · how much to take  · when to take this  · additional instructions     potassium chloride 10 MEQ Tbsr  Commonly known as: KLOR-CON  TAKE 1  TABLET BY MOUTH ONCE DAILY.  What changed:   · how much to take  · how to take this  · when to take this  · additional instructions     rosuvastatin 40 MG Tab  Commonly known as: CRESTOR  Take 1 tablet (40 mg total) by mouth once daily.  What changed: when to take this        CONTINUE taking these medications    amLODIPine 10 MG tablet  Commonly known as: NORVASC  Take 1 tablet (10 mg total) by mouth once daily.     cholecalciferol (vitamin D3) 25 mcg (1,000 unit) capsule  Commonly known as: VITAMIN D3     cyanocobalamin 1000 MCG tablet  Commonly known as: VITAMIN B-12  Take 1 tablet (1,000 mcg total) by mouth once daily. Hold until after surgery     FeroSuL 325 mg (65 mg iron) Tab tablet  Generic drug: ferrous sulfate  TAKE ONE TABLET BY MOUTH ON TUESDAY, THURSDAY, AND SATURDAY HOLD UNTIL AFTER SURGERY     furosemide 20 MG tablet  Commonly known as: LASIX  Take 1 tablet (20 mg total) by mouth once daily.     LIDOcaine-prilocaine cream  Commonly known as: EMLA  Apply topically as needed (apply over port before chemo). apply over port before chemo     losartan 50 MG tablet  Commonly known as: COZAAR  Take 1 tablet (50 mg total) by mouth once daily.     lutein-zeaxanthin 25-5 mg Cap  Commonly known as: OCUVITE LUTEIN 25  Take 1 tablet by mouth once daily.     OCUVITE LUTEIN ORAL     ondansetron 4 MG tablet  Commonly known as: ZOFRAN  Take 1 tablet (4 mg total) by mouth daily as needed for Nausea.     promethazine 12.5 MG Tab  Commonly known as: PHENERGAN  Take 1 tablet (12.5 mg total) by mouth every 4 (four) hours as needed (second line for nausea).                 Follow up in about 3 months (around 3/21/2022). Total clinical care time was 40 min. The following issues were discussed:  Mole on cheek, need for eye exam, ACP reviewed, chemotherapy for adenocarcinoma of the colon     Brittney Travis MD  Internal Medicine  [facility]  [phone]

## 2021-12-23 ENCOUNTER — LAB VISIT (OUTPATIENT)
Dept: LAB | Facility: HOSPITAL | Age: 86
End: 2021-12-23
Attending: INTERNAL MEDICINE
Payer: MEDICARE

## 2021-12-23 DIAGNOSIS — C18.9 ADENOCARCINOMA OF COLON: ICD-10-CM

## 2021-12-23 LAB
ALBUMIN SERPL BCP-MCNC: 4.1 G/DL (ref 3.5–5.2)
ALP SERPL-CCNC: 61 U/L (ref 55–135)
ALT SERPL W/O P-5'-P-CCNC: 15 U/L (ref 10–44)
ANION GAP SERPL CALC-SCNC: 7 MMOL/L (ref 8–16)
ANISOCYTOSIS BLD QL SMEAR: SLIGHT
AST SERPL-CCNC: 22 U/L (ref 10–40)
BASOPHILS # BLD AUTO: ABNORMAL K/UL (ref 0–0.2)
BASOPHILS NFR BLD: 0 % (ref 0–1.9)
BILIRUB SERPL-MCNC: 0.4 MG/DL (ref 0.1–1)
BUN SERPL-MCNC: 10 MG/DL (ref 8–23)
CALCIUM SERPL-MCNC: 9.4 MG/DL (ref 8.7–10.5)
CHLORIDE SERPL-SCNC: 105 MMOL/L (ref 95–110)
CO2 SERPL-SCNC: 28 MMOL/L (ref 23–29)
CREAT SERPL-MCNC: 0.7 MG/DL (ref 0.5–1.4)
DIFFERENTIAL METHOD: ABNORMAL
EOSINOPHIL # BLD AUTO: ABNORMAL K/UL (ref 0–0.5)
EOSINOPHIL NFR BLD: 8 % (ref 0–8)
ERYTHROCYTE [DISTWIDTH] IN BLOOD BY AUTOMATED COUNT: 17.2 % (ref 11.5–14.5)
EST. GFR  (AFRICAN AMERICAN): >60 ML/MIN/1.73 M^2
EST. GFR  (NON AFRICAN AMERICAN): >60 ML/MIN/1.73 M^2
GLUCOSE SERPL-MCNC: 99 MG/DL (ref 70–110)
HCT VFR BLD AUTO: 35.5 % (ref 37–48.5)
HGB BLD-MCNC: 10.8 G/DL (ref 12–16)
HYPOCHROMIA BLD QL SMEAR: ABNORMAL
IMM GRANULOCYTES # BLD AUTO: ABNORMAL K/UL (ref 0–0.04)
IMM GRANULOCYTES NFR BLD AUTO: ABNORMAL % (ref 0–0.5)
LYMPHOCYTES # BLD AUTO: ABNORMAL K/UL (ref 1–4.8)
LYMPHOCYTES NFR BLD: 43 % (ref 18–48)
MCH RBC QN AUTO: 28.9 PG (ref 27–31)
MCHC RBC AUTO-ENTMCNC: 30.4 G/DL (ref 32–36)
MCV RBC AUTO: 95 FL (ref 82–98)
METAMYELOCYTES NFR BLD MANUAL: 1 %
MONOCYTES # BLD AUTO: ABNORMAL K/UL (ref 0.3–1)
MONOCYTES NFR BLD: 11 % (ref 4–15)
MYELOCYTES NFR BLD MANUAL: 1 %
NEUTROPHILS # BLD AUTO: ABNORMAL K/UL (ref 1.8–7.7)
NEUTROPHILS NFR BLD: 36 % (ref 38–73)
NRBC BLD-RTO: 0 /100 WBC
OVALOCYTES BLD QL SMEAR: ABNORMAL
PLATELET # BLD AUTO: 235 K/UL (ref 150–450)
PLATELET BLD QL SMEAR: ABNORMAL
PMV BLD AUTO: 10.1 FL (ref 9.2–12.9)
POIKILOCYTOSIS BLD QL SMEAR: SLIGHT
POTASSIUM SERPL-SCNC: 3.7 MMOL/L (ref 3.5–5.1)
PROT SERPL-MCNC: 7.1 G/DL (ref 6–8.4)
RBC # BLD AUTO: 3.74 M/UL (ref 4–5.4)
SODIUM SERPL-SCNC: 140 MMOL/L (ref 136–145)
WBC # BLD AUTO: 3.14 K/UL (ref 3.9–12.7)

## 2021-12-23 PROCEDURE — 85027 COMPLETE CBC AUTOMATED: CPT | Performed by: STUDENT IN AN ORGANIZED HEALTH CARE EDUCATION/TRAINING PROGRAM

## 2021-12-23 PROCEDURE — 80053 COMPREHEN METABOLIC PANEL: CPT | Performed by: STUDENT IN AN ORGANIZED HEALTH CARE EDUCATION/TRAINING PROGRAM

## 2021-12-23 PROCEDURE — 85007 BL SMEAR W/DIFF WBC COUNT: CPT | Performed by: STUDENT IN AN ORGANIZED HEALTH CARE EDUCATION/TRAINING PROGRAM

## 2021-12-23 PROCEDURE — 36415 COLL VENOUS BLD VENIPUNCTURE: CPT | Mod: PN | Performed by: STUDENT IN AN ORGANIZED HEALTH CARE EDUCATION/TRAINING PROGRAM

## 2021-12-27 ENCOUNTER — INFUSION (OUTPATIENT)
Dept: INFUSION THERAPY | Facility: HOSPITAL | Age: 86
End: 2021-12-27
Payer: MEDICARE

## 2021-12-27 ENCOUNTER — OFFICE VISIT (OUTPATIENT)
Dept: HEMATOLOGY/ONCOLOGY | Facility: CLINIC | Age: 86
End: 2021-12-27
Payer: MEDICARE

## 2021-12-27 VITALS
DIASTOLIC BLOOD PRESSURE: 71 MMHG | RESPIRATION RATE: 20 BRPM | BODY MASS INDEX: 17.72 KG/M2 | WEIGHT: 112.88 LBS | OXYGEN SATURATION: 100 % | SYSTOLIC BLOOD PRESSURE: 163 MMHG | HEIGHT: 67 IN | HEART RATE: 109 BPM

## 2021-12-27 VITALS
WEIGHT: 112.88 LBS | SYSTOLIC BLOOD PRESSURE: 142 MMHG | OXYGEN SATURATION: 100 % | TEMPERATURE: 98 F | HEART RATE: 93 BPM | DIASTOLIC BLOOD PRESSURE: 61 MMHG | BODY MASS INDEX: 17.72 KG/M2 | HEIGHT: 67 IN | RESPIRATION RATE: 18 BRPM

## 2021-12-27 DIAGNOSIS — I10 ESSENTIAL HYPERTENSION: ICD-10-CM

## 2021-12-27 DIAGNOSIS — D50.9 IRON DEFICIENCY ANEMIA, UNSPECIFIED IRON DEFICIENCY ANEMIA TYPE: ICD-10-CM

## 2021-12-27 DIAGNOSIS — T45.1X5A IMMUNODEFICIENCY SECONDARY TO CHEMOTHERAPY: ICD-10-CM

## 2021-12-27 DIAGNOSIS — D84.821 IMMUNODEFICIENCY SECONDARY TO CHEMOTHERAPY: ICD-10-CM

## 2021-12-27 DIAGNOSIS — D84.81 IMMUNODEFICIENCY SECONDARY TO NEOPLASM: ICD-10-CM

## 2021-12-27 DIAGNOSIS — D49.9 IMMUNODEFICIENCY SECONDARY TO NEOPLASM: ICD-10-CM

## 2021-12-27 DIAGNOSIS — Z79.899 IMMUNODEFICIENCY SECONDARY TO CHEMOTHERAPY: ICD-10-CM

## 2021-12-27 DIAGNOSIS — C18.9 ADENOCARCINOMA OF COLON: Primary | ICD-10-CM

## 2021-12-27 PROBLEM — Z00.00 HEALTH CARE MAINTENANCE: Status: RESOLVED | Noted: 2021-09-21 | Resolved: 2021-12-27

## 2021-12-27 PROCEDURE — 99215 OFFICE O/P EST HI 40 MIN: CPT | Mod: S$PBB,GC,, | Performed by: STUDENT IN AN ORGANIZED HEALTH CARE EDUCATION/TRAINING PROGRAM

## 2021-12-27 PROCEDURE — 99215 PR OFFICE/OUTPT VISIT, EST, LEVL V, 40-54 MIN: ICD-10-PCS | Mod: S$PBB,GC,, | Performed by: STUDENT IN AN ORGANIZED HEALTH CARE EDUCATION/TRAINING PROGRAM

## 2021-12-27 PROCEDURE — 96416 CHEMO PROLONG INFUSE W/PUMP: CPT

## 2021-12-27 PROCEDURE — 96374 THER/PROPH/DIAG INJ IV PUSH: CPT | Mod: 59

## 2021-12-27 PROCEDURE — 99999 PR PBB SHADOW E&M-EST. PATIENT-LVL IV: ICD-10-PCS | Mod: PBBFAC,GC,, | Performed by: STUDENT IN AN ORGANIZED HEALTH CARE EDUCATION/TRAINING PROGRAM

## 2021-12-27 PROCEDURE — 99214 OFFICE O/P EST MOD 30 MIN: CPT | Mod: PBBFAC,25 | Performed by: STUDENT IN AN ORGANIZED HEALTH CARE EDUCATION/TRAINING PROGRAM

## 2021-12-27 PROCEDURE — 96375 TX/PRO/DX INJ NEW DRUG ADDON: CPT

## 2021-12-27 PROCEDURE — 63600175 PHARM REV CODE 636 W HCPCS: Performed by: STUDENT IN AN ORGANIZED HEALTH CARE EDUCATION/TRAINING PROGRAM

## 2021-12-27 PROCEDURE — 25000003 PHARM REV CODE 250: Performed by: STUDENT IN AN ORGANIZED HEALTH CARE EDUCATION/TRAINING PROGRAM

## 2021-12-27 PROCEDURE — 99999 PR PBB SHADOW E&M-EST. PATIENT-LVL IV: CPT | Mod: PBBFAC,GC,, | Performed by: STUDENT IN AN ORGANIZED HEALTH CARE EDUCATION/TRAINING PROGRAM

## 2021-12-27 RX ORDER — SODIUM CHLORIDE 0.9 % (FLUSH) 0.9 %
10 SYRINGE (ML) INJECTION
Status: CANCELLED | OUTPATIENT
Start: 2021-12-29

## 2021-12-27 RX ORDER — HEPARIN 100 UNIT/ML
500 SYRINGE INTRAVENOUS
Status: CANCELLED | OUTPATIENT
Start: 2021-12-27

## 2021-12-27 RX ORDER — ONDANSETRON 2 MG/ML
8 INJECTION INTRAMUSCULAR; INTRAVENOUS
Status: COMPLETED | OUTPATIENT
Start: 2021-12-27 | End: 2021-12-27

## 2021-12-27 RX ORDER — SODIUM CHLORIDE 0.9 % (FLUSH) 0.9 %
10 SYRINGE (ML) INJECTION
Status: CANCELLED | OUTPATIENT
Start: 2021-12-27

## 2021-12-27 RX ORDER — SODIUM CHLORIDE 0.9 % (FLUSH) 0.9 %
10 SYRINGE (ML) INJECTION
Status: DISCONTINUED | OUTPATIENT
Start: 2021-12-27 | End: 2021-12-27 | Stop reason: HOSPADM

## 2021-12-27 RX ORDER — FLUOROURACIL 50 MG/ML
400 INJECTION, SOLUTION INTRAVENOUS
Status: CANCELLED | OUTPATIENT
Start: 2021-12-27

## 2021-12-27 RX ORDER — HEPARIN 100 UNIT/ML
500 SYRINGE INTRAVENOUS
Status: DISCONTINUED | OUTPATIENT
Start: 2021-12-27 | End: 2021-12-27 | Stop reason: HOSPADM

## 2021-12-27 RX ORDER — ONDANSETRON 2 MG/ML
8 INJECTION INTRAMUSCULAR; INTRAVENOUS
Status: CANCELLED | OUTPATIENT
Start: 2021-12-27

## 2021-12-27 RX ORDER — HEPARIN 100 UNIT/ML
500 SYRINGE INTRAVENOUS
Status: CANCELLED | OUTPATIENT
Start: 2021-12-29

## 2021-12-27 RX ADMIN — ONDANSETRON 8 MG: 2 INJECTION, SOLUTION INTRAMUSCULAR; INTRAVENOUS at 03:12

## 2021-12-27 RX ADMIN — SODIUM CHLORIDE: 0.9 INJECTION, SOLUTION INTRAVENOUS at 03:12

## 2021-12-27 RX ADMIN — FLUOROURACIL 3550 MG: 50 INJECTION, SOLUTION INTRAVENOUS at 04:12

## 2021-12-27 NOTE — Clinical Note
Hello,  Follow up : please schedule clinic visit with me on 1/10 followed by chemo chair after apt with me, pump dc 2 days after.  2. Please schedule labs CBC, CMP, CEA on 1/7 at [facility].
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
## 2021-12-29 ENCOUNTER — INFUSION (OUTPATIENT)
Dept: INFUSION THERAPY | Facility: HOSPITAL | Age: 86
End: 2021-12-29
Payer: MEDICARE

## 2021-12-29 VITALS
TEMPERATURE: 99 F | SYSTOLIC BLOOD PRESSURE: 118 MMHG | OXYGEN SATURATION: 96 % | RESPIRATION RATE: 18 BRPM | HEART RATE: 85 BPM | DIASTOLIC BLOOD PRESSURE: 58 MMHG

## 2021-12-29 DIAGNOSIS — C18.9 ADENOCARCINOMA OF COLON: Primary | ICD-10-CM

## 2021-12-29 PROCEDURE — 99211 OFF/OP EST MAY X REQ PHY/QHP: CPT

## 2021-12-29 PROCEDURE — A4216 STERILE WATER/SALINE, 10 ML: HCPCS | Performed by: STUDENT IN AN ORGANIZED HEALTH CARE EDUCATION/TRAINING PROGRAM

## 2021-12-29 PROCEDURE — 25000003 PHARM REV CODE 250: Performed by: STUDENT IN AN ORGANIZED HEALTH CARE EDUCATION/TRAINING PROGRAM

## 2021-12-29 PROCEDURE — 63600175 PHARM REV CODE 636 W HCPCS: Performed by: STUDENT IN AN ORGANIZED HEALTH CARE EDUCATION/TRAINING PROGRAM

## 2021-12-29 RX ORDER — HEPARIN 100 UNIT/ML
500 SYRINGE INTRAVENOUS
Status: DISCONTINUED | OUTPATIENT
Start: 2021-12-29 | End: 2021-12-29 | Stop reason: HOSPADM

## 2021-12-29 RX ORDER — SODIUM CHLORIDE 0.9 % (FLUSH) 0.9 %
10 SYRINGE (ML) INJECTION
Status: DISCONTINUED | OUTPATIENT
Start: 2021-12-29 | End: 2021-12-29 | Stop reason: HOSPADM

## 2021-12-29 RX ADMIN — HEPARIN SODIUM (PORCINE) LOCK FLUSH IV SOLN 100 UNIT/ML 500 UNITS: 100 SOLUTION at 02:12

## 2021-12-29 RX ADMIN — Medication 10 ML: at 02:12

## 2022-01-07 ENCOUNTER — EXTERNAL HOME HEALTH (OUTPATIENT)
Dept: HOME HEALTH SERVICES | Facility: HOSPITAL | Age: 87
End: 2022-01-07
Payer: MEDICARE

## 2022-01-07 ENCOUNTER — LAB VISIT (OUTPATIENT)
Dept: LAB | Facility: HOSPITAL | Age: 87
End: 2022-01-07
Attending: STUDENT IN AN ORGANIZED HEALTH CARE EDUCATION/TRAINING PROGRAM
Payer: MEDICARE

## 2022-01-07 ENCOUNTER — PATIENT OUTREACH (OUTPATIENT)
Dept: HOME HEALTH SERVICES | Facility: HOSPITAL | Age: 87
End: 2022-01-07
Payer: MEDICARE

## 2022-01-07 DIAGNOSIS — C18.9 ADENOCARCINOMA OF COLON: ICD-10-CM

## 2022-01-07 LAB
ALBUMIN SERPL BCP-MCNC: 4.4 G/DL (ref 3.5–5.2)
ALP SERPL-CCNC: 71 U/L (ref 55–135)
ALT SERPL W/O P-5'-P-CCNC: 18 U/L (ref 10–44)
ANION GAP SERPL CALC-SCNC: 10 MMOL/L (ref 8–16)
AST SERPL-CCNC: 23 U/L (ref 10–40)
BASOPHILS # BLD AUTO: 0.05 K/UL (ref 0–0.2)
BASOPHILS NFR BLD: 0.7 % (ref 0–1.9)
BILIRUB SERPL-MCNC: 0.4 MG/DL (ref 0.1–1)
BUN SERPL-MCNC: 10 MG/DL (ref 8–23)
CALCIUM SERPL-MCNC: 9.7 MG/DL (ref 8.7–10.5)
CEA SERPL-MCNC: 6.3 NG/ML (ref 0–5)
CHLORIDE SERPL-SCNC: 101 MMOL/L (ref 95–110)
CO2 SERPL-SCNC: 28 MMOL/L (ref 23–29)
CREAT SERPL-MCNC: 0.8 MG/DL (ref 0.5–1.4)
DIFFERENTIAL METHOD: ABNORMAL
EOSINOPHIL # BLD AUTO: 0.1 K/UL (ref 0–0.5)
EOSINOPHIL NFR BLD: 1.8 % (ref 0–8)
ERYTHROCYTE [DISTWIDTH] IN BLOOD BY AUTOMATED COUNT: 16.9 % (ref 11.5–14.5)
EST. GFR  (AFRICAN AMERICAN): >60 ML/MIN/1.73 M^2
EST. GFR  (NON AFRICAN AMERICAN): >60 ML/MIN/1.73 M^2
GLUCOSE SERPL-MCNC: 98 MG/DL (ref 70–110)
HCT VFR BLD AUTO: 37.1 % (ref 37–48.5)
HGB BLD-MCNC: 11.6 G/DL (ref 12–16)
IMM GRANULOCYTES # BLD AUTO: 0.15 K/UL (ref 0–0.04)
IMM GRANULOCYTES NFR BLD AUTO: 2.2 % (ref 0–0.5)
LYMPHOCYTES # BLD AUTO: 2 K/UL (ref 1–4.8)
LYMPHOCYTES NFR BLD: 29 % (ref 18–48)
MCH RBC QN AUTO: 29.4 PG (ref 27–31)
MCHC RBC AUTO-ENTMCNC: 31.3 G/DL (ref 32–36)
MCV RBC AUTO: 94 FL (ref 82–98)
MONOCYTES # BLD AUTO: 1.1 K/UL (ref 0.3–1)
MONOCYTES NFR BLD: 16.2 % (ref 4–15)
NEUTROPHILS # BLD AUTO: 3.4 K/UL (ref 1.8–7.7)
NEUTROPHILS NFR BLD: 50.1 % (ref 38–73)
NRBC BLD-RTO: 0 /100 WBC
PLATELET # BLD AUTO: 272 K/UL (ref 150–450)
PMV BLD AUTO: 9.1 FL (ref 9.2–12.9)
POTASSIUM SERPL-SCNC: 3.7 MMOL/L (ref 3.5–5.1)
PROT SERPL-MCNC: 8 G/DL (ref 6–8.4)
RBC # BLD AUTO: 3.95 M/UL (ref 4–5.4)
SODIUM SERPL-SCNC: 139 MMOL/L (ref 136–145)
WBC # BLD AUTO: 6.72 K/UL (ref 3.9–12.7)

## 2022-01-07 PROCEDURE — 36415 COLL VENOUS BLD VENIPUNCTURE: CPT | Performed by: STUDENT IN AN ORGANIZED HEALTH CARE EDUCATION/TRAINING PROGRAM

## 2022-01-07 PROCEDURE — 80053 COMPREHEN METABOLIC PANEL: CPT | Performed by: STUDENT IN AN ORGANIZED HEALTH CARE EDUCATION/TRAINING PROGRAM

## 2022-01-07 PROCEDURE — 85025 COMPLETE CBC W/AUTO DIFF WBC: CPT | Performed by: STUDENT IN AN ORGANIZED HEALTH CARE EDUCATION/TRAINING PROGRAM

## 2022-01-07 PROCEDURE — 82378 CARCINOEMBRYONIC ANTIGEN: CPT | Performed by: STUDENT IN AN ORGANIZED HEALTH CARE EDUCATION/TRAINING PROGRAM

## 2022-01-10 ENCOUNTER — OFFICE VISIT (OUTPATIENT)
Dept: HEMATOLOGY/ONCOLOGY | Facility: CLINIC | Age: 87
End: 2022-01-10
Payer: MEDICARE

## 2022-01-10 ENCOUNTER — INFUSION (OUTPATIENT)
Dept: INFUSION THERAPY | Facility: HOSPITAL | Age: 87
End: 2022-01-10
Payer: MEDICARE

## 2022-01-10 VITALS
BODY MASS INDEX: 17.65 KG/M2 | DIASTOLIC BLOOD PRESSURE: 66 MMHG | TEMPERATURE: 98 F | RESPIRATION RATE: 18 BRPM | HEART RATE: 94 BPM | WEIGHT: 112.44 LBS | HEIGHT: 67 IN | SYSTOLIC BLOOD PRESSURE: 147 MMHG

## 2022-01-10 VITALS
HEIGHT: 67 IN | WEIGHT: 112.44 LBS | SYSTOLIC BLOOD PRESSURE: 171 MMHG | RESPIRATION RATE: 18 BRPM | TEMPERATURE: 98 F | HEART RATE: 100 BPM | DIASTOLIC BLOOD PRESSURE: 75 MMHG | BODY MASS INDEX: 17.65 KG/M2 | OXYGEN SATURATION: 100 %

## 2022-01-10 DIAGNOSIS — I10 ESSENTIAL HYPERTENSION: ICD-10-CM

## 2022-01-10 DIAGNOSIS — D49.9 IMMUNODEFICIENCY SECONDARY TO NEOPLASM: ICD-10-CM

## 2022-01-10 DIAGNOSIS — D50.9 IRON DEFICIENCY ANEMIA, UNSPECIFIED IRON DEFICIENCY ANEMIA TYPE: ICD-10-CM

## 2022-01-10 DIAGNOSIS — C18.9 ADENOCARCINOMA OF COLON: Primary | ICD-10-CM

## 2022-01-10 DIAGNOSIS — D84.821 IMMUNODEFICIENCY SECONDARY TO CHEMOTHERAPY: ICD-10-CM

## 2022-01-10 DIAGNOSIS — D84.81 IMMUNODEFICIENCY SECONDARY TO NEOPLASM: ICD-10-CM

## 2022-01-10 DIAGNOSIS — Z79.899 IMMUNODEFICIENCY SECONDARY TO CHEMOTHERAPY: ICD-10-CM

## 2022-01-10 DIAGNOSIS — T45.1X5A IMMUNODEFICIENCY SECONDARY TO CHEMOTHERAPY: ICD-10-CM

## 2022-01-10 PROCEDURE — 63600175 PHARM REV CODE 636 W HCPCS: Performed by: STUDENT IN AN ORGANIZED HEALTH CARE EDUCATION/TRAINING PROGRAM

## 2022-01-10 PROCEDURE — 99999 PR PBB SHADOW E&M-EST. PATIENT-LVL IV: CPT | Mod: PBBFAC,GC,, | Performed by: STUDENT IN AN ORGANIZED HEALTH CARE EDUCATION/TRAINING PROGRAM

## 2022-01-10 PROCEDURE — 99215 PR OFFICE/OUTPT VISIT, EST, LEVL V, 40-54 MIN: ICD-10-PCS | Mod: S$PBB,GC,, | Performed by: STUDENT IN AN ORGANIZED HEALTH CARE EDUCATION/TRAINING PROGRAM

## 2022-01-10 PROCEDURE — 99214 OFFICE O/P EST MOD 30 MIN: CPT | Mod: PBBFAC,25 | Performed by: STUDENT IN AN ORGANIZED HEALTH CARE EDUCATION/TRAINING PROGRAM

## 2022-01-10 PROCEDURE — 99999 PR PBB SHADOW E&M-EST. PATIENT-LVL IV: ICD-10-PCS | Mod: PBBFAC,GC,, | Performed by: STUDENT IN AN ORGANIZED HEALTH CARE EDUCATION/TRAINING PROGRAM

## 2022-01-10 PROCEDURE — 96375 TX/PRO/DX INJ NEW DRUG ADDON: CPT

## 2022-01-10 PROCEDURE — 96416 CHEMO PROLONG INFUSE W/PUMP: CPT

## 2022-01-10 PROCEDURE — 99215 OFFICE O/P EST HI 40 MIN: CPT | Mod: S$PBB,GC,, | Performed by: STUDENT IN AN ORGANIZED HEALTH CARE EDUCATION/TRAINING PROGRAM

## 2022-01-10 PROCEDURE — 96367 TX/PROPH/DG ADDL SEQ IV INF: CPT

## 2022-01-10 PROCEDURE — 25000003 PHARM REV CODE 250: Performed by: STUDENT IN AN ORGANIZED HEALTH CARE EDUCATION/TRAINING PROGRAM

## 2022-01-10 PROCEDURE — 96409 CHEMO IV PUSH SNGL DRUG: CPT

## 2022-01-10 RX ORDER — HEPARIN 100 UNIT/ML
500 SYRINGE INTRAVENOUS
Status: CANCELLED | OUTPATIENT
Start: 2022-01-10

## 2022-01-10 RX ORDER — SODIUM CHLORIDE 0.9 % (FLUSH) 0.9 %
10 SYRINGE (ML) INJECTION
Status: CANCELLED | OUTPATIENT
Start: 2022-01-12

## 2022-01-10 RX ORDER — SODIUM CHLORIDE 0.9 % (FLUSH) 0.9 %
10 SYRINGE (ML) INJECTION
Status: DISCONTINUED | OUTPATIENT
Start: 2022-01-10 | End: 2022-01-10 | Stop reason: HOSPADM

## 2022-01-10 RX ORDER — FLUOROURACIL 50 MG/ML
400 INJECTION, SOLUTION INTRAVENOUS
Status: COMPLETED | OUTPATIENT
Start: 2022-01-10 | End: 2022-01-10

## 2022-01-10 RX ORDER — SODIUM CHLORIDE 0.9 % (FLUSH) 0.9 %
10 SYRINGE (ML) INJECTION
Status: CANCELLED | OUTPATIENT
Start: 2022-01-10

## 2022-01-10 RX ORDER — ONDANSETRON 2 MG/ML
8 INJECTION INTRAMUSCULAR; INTRAVENOUS
Status: CANCELLED | OUTPATIENT
Start: 2022-01-10

## 2022-01-10 RX ORDER — HEPARIN 100 UNIT/ML
500 SYRINGE INTRAVENOUS
Status: DISCONTINUED | OUTPATIENT
Start: 2022-01-10 | End: 2022-01-10 | Stop reason: HOSPADM

## 2022-01-10 RX ORDER — ONDANSETRON 2 MG/ML
8 INJECTION INTRAMUSCULAR; INTRAVENOUS
Status: COMPLETED | OUTPATIENT
Start: 2022-01-10 | End: 2022-01-10

## 2022-01-10 RX ORDER — HEPARIN 100 UNIT/ML
500 SYRINGE INTRAVENOUS
Status: CANCELLED | OUTPATIENT
Start: 2022-01-12

## 2022-01-10 RX ADMIN — FLUOROURACIL 590 MG: 50 INJECTION, SOLUTION INTRAVENOUS at 05:01

## 2022-01-10 RX ADMIN — SODIUM CHLORIDE: 9 INJECTION, SOLUTION INTRAVENOUS at 04:01

## 2022-01-10 RX ADMIN — FLUOROURACIL 3550 MG: 50 INJECTION, SOLUTION INTRAVENOUS at 05:01

## 2022-01-10 RX ADMIN — LEUCOVORIN CALCIUM 590 MG: 500 INJECTION, POWDER, LYOPHILIZED, FOR SOLUTION INTRAMUSCULAR; INTRAVENOUS at 04:01

## 2022-01-10 RX ADMIN — ONDANSETRON 8 MG: 2 INJECTION, SOLUTION INTRAMUSCULAR; INTRAVENOUS at 04:01

## 2022-01-10 NOTE — Clinical Note
Follow up : please schedue labs : CBC, CMP, CEA and CT chest abdomen pelvis on 2/11 weeks and appt with me on 2/14.
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
## 2022-01-10 NOTE — PLAN OF CARE
Problem: Adult Inpatient Plan of Care  Goal: Optimal Comfort and Wellbeing  Intervention: Provide Person-Centered Care  Flowsheets (Taken 1/10/2022 1712)  Trust Relationship/Rapport:   questions encouraged   care explained   choices provided   reassurance provided   emotional support provided   thoughts/feelings acknowledged   empathic listening provided   questions answered

## 2022-01-10 NOTE — PLAN OF CARE
Patient tolerated 5fu/leucovorin well today. CADD pump infusing at 2.2 ml/hr. Settings verified by 2 RNs. Plan to rtc Wed at 3:15pm for pump dc. Plans to ring bell at that time. NAD noted upon discharge. Verbalized understanding to call MD for any questions/concerns. Discharged home, ambulated independently using walker.

## 2022-01-10 NOTE — Clinical Note
Hi, our mutual pt has completed adjuvant chemo, can you please schedule appt with dr wilkinson to evaluate for surveillance colonoscopy?  Thanks - oneil

## 2022-01-11 ENCOUNTER — TELEPHONE (OUTPATIENT)
Dept: SURGERY | Facility: CLINIC | Age: 87
End: 2022-01-11
Payer: MEDICARE

## 2022-01-12 ENCOUNTER — INFUSION (OUTPATIENT)
Dept: INFUSION THERAPY | Facility: HOSPITAL | Age: 87
End: 2022-01-12
Payer: MEDICARE

## 2022-01-12 VITALS
DIASTOLIC BLOOD PRESSURE: 66 MMHG | SYSTOLIC BLOOD PRESSURE: 145 MMHG | RESPIRATION RATE: 18 BRPM | HEART RATE: 98 BPM | TEMPERATURE: 98 F

## 2022-01-12 DIAGNOSIS — C18.9 ADENOCARCINOMA OF COLON: Primary | ICD-10-CM

## 2022-01-12 PROCEDURE — A4216 STERILE WATER/SALINE, 10 ML: HCPCS | Performed by: STUDENT IN AN ORGANIZED HEALTH CARE EDUCATION/TRAINING PROGRAM

## 2022-01-12 PROCEDURE — 25000003 PHARM REV CODE 250: Performed by: STUDENT IN AN ORGANIZED HEALTH CARE EDUCATION/TRAINING PROGRAM

## 2022-01-12 PROCEDURE — 99211 OFF/OP EST MAY X REQ PHY/QHP: CPT

## 2022-01-12 PROCEDURE — 63600175 PHARM REV CODE 636 W HCPCS: Performed by: STUDENT IN AN ORGANIZED HEALTH CARE EDUCATION/TRAINING PROGRAM

## 2022-01-12 RX ORDER — HEPARIN 100 UNIT/ML
500 SYRINGE INTRAVENOUS
Status: DISCONTINUED | OUTPATIENT
Start: 2022-01-12 | End: 2022-01-12 | Stop reason: HOSPADM

## 2022-01-12 RX ORDER — SODIUM CHLORIDE 0.9 % (FLUSH) 0.9 %
10 SYRINGE (ML) INJECTION
Status: DISCONTINUED | OUTPATIENT
Start: 2022-01-12 | End: 2022-01-12 | Stop reason: HOSPADM

## 2022-01-12 RX ADMIN — Medication 10 ML: at 03:01

## 2022-01-12 RX ADMIN — HEPARIN 500 UNITS: 100 SYRINGE at 03:01

## 2022-01-12 NOTE — NURSING
1510  Pt here for pump d/c, port flush, no complaints or concerns at present; CADD pump infusion completed, port flushed per protocol; pt instructed to remain well hydrated, discussed pacing activities to prevent fatigue; discussed when to contact MD, when to report to ER; pt declined AVS, verbalized understanding of all discussed and when to report next; pt taken to ring bell per pt request

## 2022-01-17 PROBLEM — D22.9 BENIGN MOLE: Status: ACTIVE | Noted: 2022-01-17

## 2022-01-17 NOTE — ASSESSMENT & PLAN NOTE
Blood pressure elevated today.  Goal last visit.  · Blood pressure check at next visit continue current medications.

## 2022-01-17 NOTE — ASSESSMENT & PLAN NOTE
Patient tolerating chemotherapy.  No side effects.  Has almost completed.  Denies any melena or hematochezia.  · Patient doing well.  Will complete chemo in the near future.

## 2022-01-17 NOTE — ASSESSMENT & PLAN NOTE
· LA post reviewed today.  Is this labile does living will.  Will attempt to correct this.  Patient given a copy of power-of- and living will to discussed with her order.  · Optometry needed

## 2022-01-25 ENCOUNTER — INFUSION (OUTPATIENT)
Dept: INFECTIOUS DISEASES | Facility: HOSPITAL | Age: 87
End: 2022-01-25
Attending: INTERNAL MEDICINE
Payer: MEDICARE

## 2022-01-25 VITALS
SYSTOLIC BLOOD PRESSURE: 146 MMHG | RESPIRATION RATE: 17 BRPM | HEART RATE: 89 BPM | DIASTOLIC BLOOD PRESSURE: 67 MMHG | OXYGEN SATURATION: 97 % | BODY MASS INDEX: 17.65 KG/M2 | WEIGHT: 112.44 LBS | HEIGHT: 67 IN | TEMPERATURE: 98 F

## 2022-01-25 DIAGNOSIS — M81.0 AGE-RELATED OSTEOPOROSIS WITHOUT CURRENT PATHOLOGICAL FRACTURE: Primary | ICD-10-CM

## 2022-01-25 PROCEDURE — 63600175 PHARM REV CODE 636 W HCPCS: Mod: JG | Performed by: PHYSICIAN ASSISTANT

## 2022-01-25 PROCEDURE — 96372 THER/PROPH/DIAG INJ SC/IM: CPT

## 2022-01-25 RX ADMIN — DENOSUMAB 60 MG: 60 INJECTION SUBCUTANEOUS at 09:01

## 2022-01-25 NOTE — PROGRESS NOTES
Arrived for Prolia injection, tolerated w/o difficulty,  currently taking Vitamin D and calcium supplements, instructed to remain on campus for the next fifteen minutes, if no reaction to Prolia injection okay to leave, pt acknowledged understanding,left unit in NAD with future appointment on hand

## 2022-01-26 ENCOUNTER — OFFICE VISIT (OUTPATIENT)
Dept: SURGERY | Facility: CLINIC | Age: 87
End: 2022-01-26
Payer: MEDICARE

## 2022-01-26 VITALS
HEART RATE: 103 BPM | DIASTOLIC BLOOD PRESSURE: 70 MMHG | WEIGHT: 112.81 LBS | SYSTOLIC BLOOD PRESSURE: 145 MMHG | HEIGHT: 67 IN | BODY MASS INDEX: 17.71 KG/M2

## 2022-01-26 DIAGNOSIS — Z85.038 ENCOUNTER FOR FOLLOW-UP SURVEILLANCE OF COLON CANCER: ICD-10-CM

## 2022-01-26 DIAGNOSIS — Z08 ENCOUNTER FOR FOLLOW-UP SURVEILLANCE OF COLON CANCER: ICD-10-CM

## 2022-01-26 DIAGNOSIS — C18.4 MALIGNANT NEOPLASM OF TRANSVERSE COLON: Primary | ICD-10-CM

## 2022-01-26 PROCEDURE — 99213 OFFICE O/P EST LOW 20 MIN: CPT | Mod: PBBFAC | Performed by: COLON & RECTAL SURGERY

## 2022-01-26 PROCEDURE — 99214 PR OFFICE/OUTPT VISIT, EST, LEVL IV, 30-39 MIN: ICD-10-PCS | Mod: S$PBB,,, | Performed by: COLON & RECTAL SURGERY

## 2022-01-26 PROCEDURE — 99999 PR PBB SHADOW E&M-EST. PATIENT-LVL III: ICD-10-PCS | Mod: PBBFAC,,, | Performed by: COLON & RECTAL SURGERY

## 2022-01-26 PROCEDURE — 99999 PR PBB SHADOW E&M-EST. PATIENT-LVL III: CPT | Mod: PBBFAC,,, | Performed by: COLON & RECTAL SURGERY

## 2022-01-26 PROCEDURE — 99214 OFFICE O/P EST MOD 30 MIN: CPT | Mod: S$PBB,,, | Performed by: COLON & RECTAL SURGERY

## 2022-01-26 NOTE — PROGRESS NOTES
HPI:  Nydia Stevens is a 87 y.o. female with history of a history of colon cancer, s/p right hemicolectomy, presents to clinic for discussion of anastomotic recurrence.  Pathology from previous surgery showed a pT4N0 lesion.  She was offered adjuvant chemotherapy but she declined.     Immunohistochemical studies for DNA mismatch repair proteins were performed   on this tumor (block 1H) and demonstrate intact staining in all 4 proteins   (MLH1, PMS2, MSH2, and MSH6).  These results indicate that this tumor is   microsatellite stable (ANDREW) and that Santillan syndrome (Hereditary Nonpolyposis   Colorectal Cancer, HNPCC) is unlikely.   Histologic sections highlighting visceral peritoneal involvement were   reviewed by Dr. Clare Plascencia and she concurs with the above impression.   COLON AND RECTUM: Resection, Including Transanal Disk Excision of Rectal   Neoplasms       Procedure         Right hemicolectomy     TUMOR       Tumor Site         Right (ascending) colon       Histologic Type         Adenocarcinoma       Histologic Grade         G2: Moderately differentiated       Tumor Size         Greatest dimension (Centimeters) 6.0 cm       Tumor Deposits         Not identified       Tumor Extension         Tumor invades the visceral peritoneum       Macroscopic Tumor Perforation         Not identified       Lymphovascular Invasion         Not identified       Perineural Invasion         Present       Treatment Effect         No known presurgical therapy           Margins Examined             Proximal             Distal             Radial or Mesenteric     LYMPH NODES       Regional Lymph Nodes           Number of Lymph Nodes Involved 0           Number of Lymph Nodes Examined 20       Primary Tumor (pT)         pT4a       Regional Lymph Nodes (pN)         pN0     ADDITIONAL FINDINGS       Additional Pathologic Findings         Diverticulosis     The patient recently underwent colonoscopy on 3/23 and was found to  have a lesion at her ileocolic anastomosis.  Biopsies of this lesion were obtained and were consistent with invasive colon cancer.  CEA from 2/12/21 was 12.0.     PET scan was performed today.  At the time of the patient visit, no official read was back from radiology, but the area of PET avidity appears to be isolated only to the previous anastomosis.    4- exp lap, ileocolectomy, ileal to descending colon anastomosis.      FINAL PATHOLOGIC DIAGNOSIS  [facility] DIAGNOSIS:   PROCEDURE: lleocolic resection (10 cm ileum, transverse colon and descending   colon, ileal descending colon anastomosis).   TUMOR SITE: Small intestine, not otherwise specified.   TUMOR SIZE: Greatest dimension: 4.2 cm   Additional dimension: 4.2 x 0.6 cm   LOCATION OF TUMOR: Tumor at previous ileocolic anastomosis.   MACROSCOPIC TUMOR PERFORATION: Not identified.   HISTOLOGIC TYPE: Adenocarcinoma, see comment.   HISTOLOGIC GRADE: Moderate to poorly differentiated.   TUMOR EXTENSION: Tumor invades through the muscularis propria and extends to   serosal surface.   MARGINS: All margins are uninvolved by invasive carcinoma, carcinoma in-situ   (high grade dysplasia) and adenoma (closest margin, 12.2 cm).   MARGINS EXAMINED: Proximal and distal.   LYMPHOVASCULAR INVASION: Not identified.   REGIONAL LYMPH NODES:   Number of lymph nodes involved 0   Number of lymph nodes examined: 14   PATHOLOGIC STAGING (pTNM): pT4 N0 MX   Comment:   MMR studies can be performed upon request.   Imani Cuevas MD   Report attached.   Performing site:   [address] [address]     Interval hx:    Completed adjuvant chemo 2 weeks ago. Reports doing very well. Good appetite and energy level. BMs are regular and normal. No blood. Denies pain.   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    Hyperlipidemia     Hypertension     Osteoporosis         Past Surgical History:   Procedure Laterality Date    CATARACT EXTRACTION W/  INTRAOCULAR LENS IMPLANT Left 8/11/14    bunyd    CATARACT EXTRACTION W/  INTRAOCULAR LENS IMPLANT Right 09/15/14    bundy    COLONOSCOPY N/A 3/19/2020    Procedure: COLONOSCOPY;  Surgeon: Real Mabry MD;  Location: [facility] ENDO (2ND FLR);  Service: Endoscopy;  Laterality: N/A;    COLONOSCOPY N/A 3/23/2021    Procedure: COLONOSCOPY;  Surgeon: AUTUMN Berg MD;  Location: [facility] ENDO (4TH FLR);  Service: Endoscopy;  Laterality: N/A;  covid test 3/20 [facility]    ESOPHAGOGASTRODUODENOSCOPY N/A 3/19/2020    Procedure: EGD (ESOPHAGOGASTRODUODENOSCOPY);  Surgeon: Real Mabry MD;  Location: [facility] ENDO (2ND FLR);  Service: Endoscopy;  Laterality: N/A;    FOOT SURGERY      left    HYSTERECTOMY  1962    ILEOCOLECTOMY N/A 4/15/2021    Procedure: ILEOCOLECTOMY;  Surgeon: AUTUMN Berg MD;  Location: [facility] OR 2ND FLR;  Service: Colon and Rectal;  Laterality: N/A;    INSERTION OF TUNNELED CENTRAL VENOUS CATHETER (CVC) WITH SUBCUTANEOUS PORT N/A 7/2/2021    Procedure: [id number];  Surgeon: Suresh Agrawal MD;  Location: [facility] OR 2ND FLR;  Service: Vascular;  Laterality: N/A;  Right IJ    LAPAROSCOPIC HEMICOLECTOMY Right 3/20/2020    Procedure: HEMICOLECTOMY, RIGHT;  Surgeon: AUTUMN Berg MD;  Location: [facility] OR 2ND FLR;  Service: Colon and Rectal;  Laterality: Right;    LYSIS OF ADHESIONS N/A 4/15/2021    Procedure: LYSIS, ADHESIONS;  Surgeon: AUTUMN Berg MD;  Location: [facility] OR 2ND FLR;  Service: Colon and Rectal;  Laterality: N/A;  Greater than 2 hours    MOBILIZATION OF SPLENIC FLEXURE N/A 4/15/2021    Procedure: MOBILIZATION, SPLENIC FLEXURE;  Surgeon: AUTUMN Berg MD;  Location: [facility] OR 2ND FLR;  Service: Colon and Rectal;  Laterality: N/A;       Review of patient's allergies indicates:  No Known  Allergies    Family History   Problem Relation Age of Onset    Heart attack Father     Cataracts Brother     Heart attack Brother     Diabetes Brother     No Known Problems Mother     Cataracts Sister     Stroke Sister     Diabetes Sister     Cataracts Sister     Stroke Sister     No Known Problems Maternal Aunt     No Known Problems Maternal Uncle     No Known Problems Paternal Aunt     No Known Problems Paternal Uncle     No Known Problems Maternal Grandmother     No Known Problems Maternal Grandfather     No Known Problems Paternal Grandmother     No Known Problems Paternal Grandfather     Amblyopia Neg Hx     Blindness Neg Hx     Cancer Neg Hx     Glaucoma Neg Hx     Hypertension Neg Hx     Macular degeneration Neg Hx     Retinal detachment Neg Hx     Strabismus Neg Hx     Thyroid disease Neg Hx     Colon cancer Neg Hx     Esophageal cancer Neg Hx     Irritable bowel syndrome Neg Hx     Rectal cancer Neg Hx     Stomach cancer Neg Hx     Ulcerative colitis Neg Hx     Crohn's disease Neg Hx     Celiac disease Neg Hx        Social History     Socioeconomic History    Marital status: Single   Occupational History    Occupation: teaching retired    Occupation: [employer]   Tobacco Use    Smoking status: Never Smoker    Smokeless tobacco: Never Used   Substance and Sexual Activity    Alcohol use: No     Alcohol/week: 0.0 standard drinks    Drug use: No    Sexual activity: Never   Social History Narrative    Member of [employer] order here in [place], [place]..  Lives with about 40-50 sisters.  All sisters with her are retired.       Social Determinants of Health     Financial Resource Strain: Low Risk     Difficulty of Paying Living Expenses: Not hard at all   Food Insecurity: No Food Insecurity    Worried About Running Out of Food in the Last Year: Never true    Ran Out of Food in the Last Year: Never true   Physical Activity: Inactive     "Days of Exercise per Week: 0 days    Minutes of Exercise per Session: 0 min   Stress: No Stress Concern Present    Feeling of Stress : Not at all   Social Connections: Moderately Integrated    Frequency of Communication with Friends and Family: More than three times a week    Frequency of Social Gatherings with Friends and Family: More than three times a week    Attends Christianity Services: More than 4 times per year    Active Member of Clubs or Organizations: Yes    Attends Club or Organization Meetings: More than 4 times per year    Marital Status: Never        ROS:  GENERAL: No fever, chills, fatigability or weight loss.  Integument: No rashes, redness, icterus  CHEST: Denies DONOVAN, cyanosis, wheezing, cough and sputum production.  CARDIOVASCULAR: Denies chest pain, PND, orthopnea or reduced exercise tolerance.  GI: Denies abd pain, dysphagia, nausea, vomiting, no hematemesis   : Denies burning on urination, no hematuria, no bacteriuria  MSK: No deformities, swelling, joint pain swelling  Neurologic: No HAs, seizures, weakness, paresthesias, gait problems    PE:  General appearance NAD  BP (!) 145/70 (BP Location: Right arm, Patient Position: Sitting, BP Method: Large (Automatic))   Pulse 103   Ht 5' 7" (1.702 m)   Wt 51.2 kg (112 lb 12.8 oz)   BMI 17.67 kg/m²   Sclera/ Skin nonicteric  LN nonpalpable  AT NC EOMI  Neck supple trachea midline   Chest symmetric, nl excursion, no retractions, breathing comfortably  Abdomen  ND soft NT.  no masses, no organomegaly. Well healed incisions  EXT - no CCE  Neuro:  Mood/ affect nl, alert and oriented x 3, moves all ext's, gait nl    Assessment:  Colon cancer s/p right hemicolectomy with recurrence at anastomosis now s/p ileocecectomy and adjuvant chemo  Rising CEA    Plan:  Repeat CT scan in 4 weeks per med onc  Will schedule for colonoscopy      "

## 2022-01-27 DIAGNOSIS — Z12.11 SCREENING FOR COLON CANCER: Primary | ICD-10-CM

## 2022-01-27 DIAGNOSIS — Z01.818 PRE-OP TESTING: Primary | ICD-10-CM

## 2022-01-27 RX ORDER — SODIUM, POTASSIUM,MAG SULFATES 17.5-3.13G
1 SOLUTION, RECONSTITUTED, ORAL ORAL DAILY
Qty: 1 KIT | Refills: 0 | Status: SHIPPED | OUTPATIENT
Start: 2022-01-27 | End: 2022-01-29

## 2022-02-08 ENCOUNTER — TELEPHONE (OUTPATIENT)
Dept: HEMATOLOGY/ONCOLOGY | Facility: CLINIC | Age: 87
End: 2022-02-08
Payer: MEDICARE

## 2022-02-08 NOTE — TELEPHONE ENCOUNTER
"----- Message from Lian Ferguson sent at 2/8/2022  9:40 AM CST -----  Reschedule Existing Appointment         Appt Date:2/14  Type of appt : office visit  Physician: Phong  Reason for rescheduling? Pt is scheduled for colonoscopy on 2/15 and she has to start her prep on 2/14  Caller: Melva  Contact Preference:[phone]         Additional Information:  "Thank you for all that you do for our patients'"       "

## 2022-02-14 ENCOUNTER — TELEPHONE (OUTPATIENT)
Dept: WOUND CARE | Facility: CLINIC | Age: 87
End: 2022-02-14
Payer: MEDICARE

## 2022-02-14 PROCEDURE — G0179 MD RECERTIFICATION HHA PT: HCPCS | Mod: ,,, | Performed by: HOSPITALIST

## 2022-02-14 PROCEDURE — G0179 PR HOME HEALTH MD RECERTIFICATION: ICD-10-PCS | Mod: ,,, | Performed by: HOSPITALIST

## 2022-02-15 ENCOUNTER — ANESTHESIA (OUTPATIENT)
Dept: ENDOSCOPY | Facility: HOSPITAL | Age: 87
End: 2022-02-15
Payer: MEDICARE

## 2022-02-15 ENCOUNTER — ANESTHESIA EVENT (OUTPATIENT)
Dept: ENDOSCOPY | Facility: HOSPITAL | Age: 87
End: 2022-02-15
Payer: MEDICARE

## 2022-02-15 ENCOUNTER — HOSPITAL ENCOUNTER (OUTPATIENT)
Facility: HOSPITAL | Age: 87
Discharge: HOME | End: 2022-02-15
Attending: COLON & RECTAL SURGERY | Admitting: COLON & RECTAL SURGERY
Payer: MEDICARE

## 2022-02-15 VITALS
TEMPERATURE: 98 F | DIASTOLIC BLOOD PRESSURE: 53 MMHG | BODY MASS INDEX: 17.42 KG/M2 | WEIGHT: 111 LBS | HEART RATE: 81 BPM | RESPIRATION RATE: 20 BRPM | HEIGHT: 67 IN | SYSTOLIC BLOOD PRESSURE: 109 MMHG | OXYGEN SATURATION: 97 %

## 2022-02-15 DIAGNOSIS — Z12.11 SCREENING FOR MALIGNANT NEOPLASM OF COLON: ICD-10-CM

## 2022-02-15 PROCEDURE — 88305 TISSUE EXAM BY PATHOLOGIST: CPT | Performed by: PATHOLOGY

## 2022-02-15 PROCEDURE — E9220 PRA ENDO ANESTHESIA: HCPCS | Mod: PT,,, | Performed by: NURSE ANESTHETIST, CERTIFIED REGISTERED

## 2022-02-15 PROCEDURE — 25000003 PHARM REV CODE 250: Performed by: NURSE ANESTHETIST, CERTIFIED REGISTERED

## 2022-02-15 PROCEDURE — 45380 COLONOSCOPY AND BIOPSY: CPT | Mod: PT,,, | Performed by: COLON & RECTAL SURGERY

## 2022-02-15 PROCEDURE — 25000003 PHARM REV CODE 250: Performed by: COLON & RECTAL SURGERY

## 2022-02-15 PROCEDURE — 27200997: Performed by: COLON & RECTAL SURGERY

## 2022-02-15 PROCEDURE — 27201012 HC FORCEPS, HOT/COLD, DISP: Performed by: COLON & RECTAL SURGERY

## 2022-02-15 PROCEDURE — E9220 PRA ENDO ANESTHESIA: ICD-10-PCS | Mod: PT,,, | Performed by: NURSE ANESTHETIST, CERTIFIED REGISTERED

## 2022-02-15 PROCEDURE — 45380 PR COLONOSCOPY,BIOPSY: ICD-10-PCS | Mod: PT,,, | Performed by: COLON & RECTAL SURGERY

## 2022-02-15 PROCEDURE — 63600175 PHARM REV CODE 636 W HCPCS: Performed by: NURSE ANESTHETIST, CERTIFIED REGISTERED

## 2022-02-15 PROCEDURE — 45380 COLONOSCOPY AND BIOPSY: CPT | Mod: PT | Performed by: COLON & RECTAL SURGERY

## 2022-02-15 PROCEDURE — 37000008 HC ANESTHESIA 1ST 15 MINUTES: Performed by: COLON & RECTAL SURGERY

## 2022-02-15 PROCEDURE — 88305 TISSUE EXAM BY PATHOLOGIST: CPT | Mod: 26,,, | Performed by: PATHOLOGY

## 2022-02-15 PROCEDURE — 37000009 HC ANESTHESIA EA ADD 15 MINS: Performed by: COLON & RECTAL SURGERY

## 2022-02-15 PROCEDURE — 88305 TISSUE EXAM BY PATHOLOGIST: ICD-10-PCS | Mod: 26,,, | Performed by: PATHOLOGY

## 2022-02-15 RX ORDER — SODIUM CHLORIDE 9 MG/ML
INJECTION, SOLUTION INTRAVENOUS CONTINUOUS
Status: DISCONTINUED | OUTPATIENT
Start: 2022-02-15 | End: 2022-02-15 | Stop reason: HOSPADM

## 2022-02-15 RX ORDER — LIDOCAINE HYDROCHLORIDE 20 MG/ML
INJECTION INTRAVENOUS
Status: DISCONTINUED | OUTPATIENT
Start: 2022-02-15 | End: 2022-02-15

## 2022-02-15 RX ORDER — PROPOFOL 10 MG/ML
VIAL (ML) INTRAVENOUS
Status: DISCONTINUED | OUTPATIENT
Start: 2022-02-15 | End: 2022-02-15

## 2022-02-15 RX ORDER — PROPOFOL 10 MG/ML
VIAL (ML) INTRAVENOUS CONTINUOUS PRN
Status: DISCONTINUED | OUTPATIENT
Start: 2022-02-15 | End: 2022-02-15

## 2022-02-15 RX ADMIN — PROPOFOL 30 MG: 10 INJECTION, EMULSION INTRAVENOUS at 08:02

## 2022-02-15 RX ADMIN — SODIUM CHLORIDE: 0.9 INJECTION, SOLUTION INTRAVENOUS at 07:02

## 2022-02-15 RX ADMIN — Medication 50 MCG/KG/MIN: at 08:02

## 2022-02-15 RX ADMIN — LIDOCAINE HYDROCHLORIDE 100 MG: 20 INJECTION, SOLUTION INTRAVENOUS at 08:02

## 2022-02-15 NOTE — H&P
COLONOSCOPY HISTORY AND PHYSICAL EXAM    Procedure : Colonoscopy      INDICATIONS: personal history of colon cancer     Family Hx of CRC: denies    Last Colonoscopy:  6 months (3/23/21)     Findings:         A fungating, infiltrative and ulcerated partially obstructing        medium-sized mass was found at the anastomosis. Oozing was      present.        Biopsies were taken with a cold forceps for histology. Verification        of patient identification for the specimen was done.      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    Hyperlipidemia     Hypertension     Osteoporosis      Sedation Problems: NO  Family History   Problem Relation Age of Onset    Heart attack Father     Cataracts Brother     Heart attack Brother     Diabetes Brother     No Known Problems Mother     Cataracts Sister     Stroke Sister     Diabetes Sister     Cataracts Sister     Stroke Sister     No Known Problems Maternal Aunt     No Known Problems Maternal Uncle     No Known Problems Paternal Aunt     No Known Problems Paternal Uncle     No Known Problems Maternal Grandmother     No Known Problems Maternal Grandfather     No Known Problems Paternal Grandmother     No Known Problems Paternal Grandfather     Amblyopia Neg Hx     Blindness Neg Hx     Cancer Neg Hx     Glaucoma Neg Hx     Hypertension Neg Hx     Macular degeneration Neg Hx     Retinal detachment Neg Hx     Strabismus Neg Hx     Thyroid disease Neg Hx     Colon cancer Neg Hx     Esophageal cancer Neg Hx     Irritable bowel syndrome Neg Hx     Rectal cancer Neg Hx     Stomach cancer Neg Hx     Ulcerative colitis Neg Hx     Crohn's disease Neg Hx     Celiac disease Neg Hx      Fam Hx of Sedation Problems: NO  Social History     Socioeconomic History    Marital status:  "Single   Occupational History    Occupation: teaching retired    Occupation: Nun.  [employer]   Tobacco Use    Smoking status: Never Smoker    Smokeless tobacco: Never Used   Substance and Sexual Activity    Alcohol use: No     Alcohol/week: 0.0 standard drinks    Drug use: No    Sexual activity: Never   Social History Narrative    Member of [employer] order here in [place], [place]..  Lives with about 40-50 sisters.  All sisters with her are retired.       Social Determinants of Health     Financial Resource Strain: Low Risk     Difficulty of Paying Living Expenses: Not hard at all   Food Insecurity: No Food Insecurity    Worried About Running Out of Food in the Last Year: Never true    Ran Out of Food in the Last Year: Never true   Physical Activity: Inactive    Days of Exercise per Week: 0 days    Minutes of Exercise per Session: 0 min   Stress: No Stress Concern Present    Feeling of Stress : Not at all   Social Connections: Moderately Integrated    Frequency of Communication with Friends and Family: More than three times a week    Frequency of Social Gatherings with Friends and Family: More than three times a week    Attends Restorationism Services: More than 4 times per year    Active Member of Clubs or Organizations: Yes    Attends Club or Organization Meetings: More than 4 times per year    Marital Status: Never        Review of Systems - Negative except   Respiratory ROS: no dyspnea  Cardiovascular ROS: no exertional chest pain  Gastrointestinal ROS: NO abdominal discomfort,  NO rectal bleeding  Musculoskeletal ROS: no muscular pain  Neurological ROS: no recent stroke    Physical Exam:  /60   Pulse 93   Temp 97.9 °F (36.6 °C)   Resp 16   Ht 5' 7" (1.702 m)   Wt 50.3 kg (111 lb)   SpO2 99%   Breastfeeding No   BMI 17.39 kg/m²   General: no distress  Head: normocephalic  Mallampati Score   Neck: supple, symmetrical, trachea midline  Lungs:  normal " respiratory effort  Heart: regular rate and rhythm and no murmur  Abdomen: soft, non-tender non-distented; bowel sounds normal; no masses,  no organomegaly  Extremities: no cyanosis or edema, or clubbing    ASA:  II    PLAN  COLONOSCOPY.    SedationPlan :MAC    The details of the procedure, the possible need for biopsy or polypectomy and the potential risks including bleeding, perforation, missed polyps were discussed in detail.     no

## 2022-02-15 NOTE — PROVATION PATIENT INSTRUCTIONS
Discharge Summary/Instructions after an Endoscopic Procedure  Patient Name: Nydia Stevens  Patient MRN: [id number]  Patient YOB: 1934  Tuesday, February 15, 2022  Doris Simpson MD  Dear patient,  As a result of recent federal legislation (The Federal Cures Act), you may   receive lab or pathology results from your procedure in your [facility]   account before your physician is able to contact you. Your physician or   their representative will relay the results to you with their   recommendations at their soonest availability.  Thank you,  RESTRICTIONS:  During your procedure today, you received medications for sedation.  These   medications may affect your judgment, balance and coordination.  Therefore,   for 24 hours, you have the following restrictions:   - DO NOT drive a car, operate machinery, make legal/financial decisions,   sign important papers or drink alcohol.    ACTIVITY:  Today: no heavy lifting, straining or running due to procedural   sedation/anesthesia.  The following day: return to full activity including work.  DIET:  Eat and drink normally unless instructed otherwise.     TREATMENT FOR COMMON SIDE EFFECTS:  - Mild abdominal pain, nausea, belching, bloating or excessive gas:  rest,   eat lightly and use a heating pad.  - Sore Throat: treat with throat lozenges and/or gargle with warm salt   water.  - Because air was used during the procedure, expelling large amounts of air   from your rectum or belching is normal.  - If a bowel prep was taken, you may not have a bowel movement for 1-3 days.    This is normal.  SYMPTOMS TO WATCH FOR AND REPORT TO YOUR PHYSICIAN:  1. Abdominal pain or bloating, other than gas cramps.  2. Chest pain.  3. Back pain.  4. Signs of infection such as: chills or fever occurring within 24 hours   after the procedure.  5. Rectal bleeding, which would show as bright red, maroon, or black stools.   (A tablespoon of blood from the rectum is not serious,  especially if   hemorrhoids are present.)  6. Vomiting.  7. Weakness or dizziness.  GO DIRECTLY TO THE NEAREST EMERGENCY ROOM IF YOU HAVE ANY OF THE FOLLOWING:      Difficulty breathing              Chills and/or fever over 101 F   Persistent vomiting and/or vomiting blood   Severe abdominal pain   Severe chest pain   Black, tarry stools   Bleeding- more than one tablespoon   Any other symptom or condition that you feel may need urgent attention  Your doctor recommends these additional instructions:  If any biopsies were taken, your doctors clinic will contact you in 1 to 2   weeks with any results.  - Discharge patient to home.   - Resume previous diet.   - Continue present medications.   - Await pathology results.   - Repeat colonoscopy in 3 years for surveillance.   - Return to referring physician.   - Written discharge instructions were provided to the patient.   - The signs and symptoms of potential delayed complications were discussed   with the patient.   - Patient has a contact number available for emergencies.   - Return to normal activities tomorrow.  For questions, problems or results please call your physician - Doris Simpson MD at Work:  [phone].  [facility], EMERGENCY ROOM PHONE NUMBER: [phone]  IF A COMPLICATION OR EMERGENCY SITUATION ARISES AND YOU ARE UNABLE TO REACH   YOUR PHYSICIAN - GO DIRECTLY TO THE EMERGENCY ROOM.  Doris Simpson MD  2/15/2022 8:28:42 AM  This report has been verified and signed electronically.  Dear patient,  As a result of recent federal legislation (The Federal Cures Act), you may   receive lab or pathology results from your procedure in your [facility]   account before your physician is able to contact you. Your physician or   their representative will relay the results to you with their   recommendations at their soonest availability.  Thank you,  PROVATION

## 2022-02-15 NOTE — ANESTHESIA PREPROCEDURE EVALUATION
02/15/2022  Nydia Stevens is a 87 y.o., female.  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    Hyperlipidemia     Hypertension     Osteoporosis      Past Surgical History:   Procedure Laterality Date    CATARACT EXTRACTION W/  INTRAOCULAR LENS IMPLANT Left 8/11/14    bundy    CATARACT EXTRACTION W/  INTRAOCULAR LENS IMPLANT Right 09/15/14    bundy    COLONOSCOPY N/A 3/19/2020    Procedure: COLONOSCOPY;  Surgeon: Real Mabry MD;  Location: [facility] ENDO (2ND FLR);  Service: Endoscopy;  Laterality: N/A;    COLONOSCOPY N/A 3/23/2021    Procedure: COLONOSCOPY;  Surgeon: AUTUMN Berg MD;  Location: [facility] ENDO (4TH FLR);  Service: Endoscopy;  Laterality: N/A;  covid test 3/20 [facility]    ESOPHAGOGASTRODUODENOSCOPY N/A 3/19/2020    Procedure: EGD (ESOPHAGOGASTRODUODENOSCOPY);  Surgeon: Real Mabry MD;  Location: [facility] ENDO (2ND FLR);  Service: Endoscopy;  Laterality: N/A;    FOOT SURGERY      left    HYSTERECTOMY  1962    ILEOCOLECTOMY N/A 4/15/2021    Procedure: ILEOCOLECTOMY;  Surgeon: AUTUMN Berg MD;  Location: [facility] OR 2ND FLR;  Service: Colon and Rectal;  Laterality: N/A;    INSERTION OF TUNNELED CENTRAL VENOUS CATHETER (CVC) WITH SUBCUTANEOUS PORT N/A 7/2/2021    Procedure: [id number];  Surgeon: Suresh Agrawal MD;  Location: [facility] OR 2ND FLR;  Service: Vascular;  Laterality: N/A;  Right IJ    LAPAROSCOPIC HEMICOLECTOMY Right 3/20/2020    Procedure: HEMICOLECTOMY, RIGHT;  Surgeon: AUTUMN Berg MD;  Location: [facility] OR 2ND FLR;  Service: Colon and Rectal;  Laterality: Right;    LYSIS OF ADHESIONS N/A 4/15/2021    Procedure: LYSIS, ADHESIONS;  Surgeon: AUTUMN Berg MD;  Location: [facility] OR 2ND FLR;   Service: Colon and Rectal;  Laterality: N/A;  Greater than 2 hours    MOBILIZATION OF SPLENIC FLEXURE N/A 4/15/2021    Procedure: MOBILIZATION, SPLENIC FLEXURE;  Surgeon: AUTUMN Berg MD;  Location: [facility] OR [address] [place] [address];  Service: Colon and Rectal;  Laterality: N/A;         Anesthesia Evaluation    I have reviewed the Patient Summary Reports.      I have reviewed the Medications.     Review of Systems  Anesthesia Hx:  Denies Family Hx of Anesthesia complications.   Denies Personal Hx of Anesthesia complications.   Hematology/Oncology:  Hematology Normal   Oncology Normal     EENT/Dental:EENT/Dental Normal   Cardiovascular:  Cardiovascular Normal     Pulmonary:  Pulmonary Normal    Renal/:  Renal/ Normal     Hepatic/GI:  Hepatic/GI Normal    Musculoskeletal:  Musculoskeletal Normal    Neurological:  Neurology Normal    Endocrine:  Endocrine Normal    Dermatological:  Skin Normal    Psych:  Psychiatric Normal           Physical Exam  General:  Well nourished    Airway/Jaw/Neck:  AIRWAY FINDINGS: Normal      Eyes/Ears/Nose:  EYES/EARS/NOSE FINDINGS: Normal   Dental:  DENTAL FINDINGS: Normal   Chest/Lungs:  Chest/Lungs Clear    Heart/Vascular:  Heart Findings: Normal Heart murmur: negative Vascular Findings: Normal    Abdomen:  Abdomen Findings: Normal    Musculoskeletal:  Musculoskeletal Findings: Normal   Skin:  Skin Findings: Normal    Mental Status:  Mental Status Findings: Normal        Anesthesia Plan  Type of Anesthesia, risks & benefits discussed:  Anesthesia Type:  general    Patient's Preference:   Plan Factors:          Intra-op Monitoring Plan: standard ASA monitors  Intra-op Monitoring Plan Comments:   Post Op Pain Control Plan:   Post Op Pain Control Plan Comments:     Induction:   IV  Beta Blocker:  Patient is not currently on a Beta-Blocker (No further documentation required).       Informed Consent: Patient understands risks and agrees with Anesthesia plan.  Questions answered. Anesthesia consent  signed with patient.  ASA Score: 2     Day of Surgery Review of History & Physical:    H&P update referred to the provider.         Ready For Surgery From Anesthesia Perspective.

## 2022-02-15 NOTE — ANESTHESIA POSTPROCEDURE EVALUATION
Anesthesia Post Evaluation    Patient: Nydia Stevens    Procedure(s) Performed: Procedure(s) (LRB):  COLONOSCOPY (N/A)    Final Anesthesia Type: general      Patient location during evaluation: PACU  Patient participation: Yes- Able to Participate  Level of consciousness: awake and alert and oriented  Pain management: adequate  Airway patency: patent    PONV status at discharge: No PONV  Anesthetic complications: no      Cardiovascular status: blood pressure returned to baseline and hemodynamically stable  Respiratory status: unassisted  Hydration status: euvolemic  Follow-up not needed.          Vitals Value Taken Time   /53 02/15/22 0903   Temp 36.4 °C (97.5 °F) 02/15/22 0833   Pulse 81 02/15/22 0903   Resp 20 02/15/22 0903   SpO2 97 % 02/15/22 0903         Event Time   Out of Recovery 09:31:00         Pain/Connie Score: Connie Score: 10 (2/15/2022  8:48 AM)

## 2022-02-16 ENCOUNTER — OFFICE VISIT (OUTPATIENT)
Dept: OPTOMETRY | Facility: CLINIC | Age: 87
End: 2022-02-16
Payer: MEDICARE

## 2022-02-16 DIAGNOSIS — Z98.42 S/P BILATERAL CATARACT EXTRACTION: ICD-10-CM

## 2022-02-16 DIAGNOSIS — H52.7 REFRACTIVE ERROR: ICD-10-CM

## 2022-02-16 DIAGNOSIS — Z96.1 PSEUDOPHAKIA OF BOTH EYES: ICD-10-CM

## 2022-02-16 DIAGNOSIS — Z98.41 S/P BILATERAL CATARACT EXTRACTION: ICD-10-CM

## 2022-02-16 DIAGNOSIS — H35.371 EPIRETINAL MEMBRANE (ERM) OF RIGHT EYE: Primary | ICD-10-CM

## 2022-02-16 DIAGNOSIS — H26.493 BILATERAL POSTERIOR CAPSULAR OPACIFICATION: ICD-10-CM

## 2022-02-16 PROCEDURE — 99999 PR PBB SHADOW E&M-EST. PATIENT-LVL III: CPT | Mod: PBBFAC,,, | Performed by: OPTOMETRIST

## 2022-02-16 PROCEDURE — 92014 PR EYE EXAM, EST PATIENT,COMPREHESV: ICD-10-PCS | Mod: S$PBB,,, | Performed by: OPTOMETRIST

## 2022-02-16 PROCEDURE — 99999 PR PBB SHADOW E&M-EST. PATIENT-LVL III: ICD-10-PCS | Mod: PBBFAC,,, | Performed by: OPTOMETRIST

## 2022-02-16 PROCEDURE — 99213 OFFICE O/P EST LOW 20 MIN: CPT | Mod: PBBFAC | Performed by: OPTOMETRIST

## 2022-02-16 PROCEDURE — 92015 DETERMINE REFRACTIVE STATE: CPT | Mod: ,,, | Performed by: OPTOMETRIST

## 2022-02-16 PROCEDURE — 92014 COMPRE OPH EXAM EST PT 1/>: CPT | Mod: S$PBB,,, | Performed by: OPTOMETRIST

## 2022-02-16 PROCEDURE — 92015 PR REFRACTION: ICD-10-PCS | Mod: ,,, | Performed by: OPTOMETRIST

## 2022-02-16 NOTE — PROGRESS NOTES
"HPI     eye examination       Additional comments: Overdue general eye examination and refraction.  Has lost/broken her last glasses.                Comments     Patient's age: 87 y.o. female  Occupation:  [employer].  Approximate date of last eye examination:  03/21/2019  Name of last eye doctor seen: Dr. Maxwell   City/State: [facility]  Wears glasses? Yes     If yes, wears  Full-time or part-time?  Glasses broke   Present glasses are: Bifocal, SV Distance, SV Reading?  Lined Bifocal  Approximate age of present glasses:  2014   Got new glasses following last exam, or subsequently?:  NO   Any problem with VA with glasses?  No  Wears CLs?:  No  Headaches?  No  Eye pain/discomfort?  No                                                                                     Flashes?  No  Floaters?  No  Diplopia/Double vision?  No  Patient's Ocular History:         Any eye surgeries? [facility] - Dr Llamas         Any eye injury?  No         Any treatment for eye disease?  No  Family history of eye disease?  No  Significant patient medical history:         1. Diabetes?  No   2. HBP?  Yes, controlled by medication and diet                ! OTC eyedrops currently using:  None   ! Prescription eye meds currently using:  None   ! Any history of allergy/adverse reaction to any eye meds used   previously?  No    ! Any history of allergy/adverse reaction to eyedrops used during prior   eye exam(s)? No    ! Any history of allergy/adverse reaction to Novacaine or similar meds?   No   ! Any history of allergy/adverse reaction to Epinephrine or similar meds?   No    ! Patient okay with use of anesthetic eyedrops to check eye pressure?    Yes        ! Patient okay with use of eyedrops to dilate pupils today?  Yes   !  Allergies/Medications/Medical History/Family History reviewed today?    Yes      PD =   69/65  Desired reading distance =  13.5"                                                                         Last " edited by Rick Maxwell, OD on 2/16/2022 10:08 AM. (History)            Assessment /Plan     For exam results, see Encounter Report.    1. Epiretinal membrane (ERM) of right eye     2. Bilateral posterior capsular opacification     3. S/P bilateral cataract extraction     4. Pseudophakia of both eyes     5. Refractive error                  Mild epiretinal membrane at posterior pole of the right eye, as noted previously Not impacting correctable VA. No need for treatment.        S/P cataract surgery in both eyes. Bilateral posterior chamber intraocular lens.  Paracentral posterior capsular opacification in each eye.  No need for YAG laser posterior capsulotomy in either eye at this time.    Residual refractive error in each eye, with very satisfactory visual acuity in each eye. .   New spectacle lens Rx issued for use as needed   Recheck in one year.

## 2022-02-17 ENCOUNTER — LAB VISIT (OUTPATIENT)
Dept: LAB | Facility: HOSPITAL | Age: 87
End: 2022-02-17
Attending: INTERNAL MEDICINE
Payer: MEDICARE

## 2022-02-17 ENCOUNTER — OFFICE VISIT (OUTPATIENT)
Dept: DERMATOLOGY | Facility: CLINIC | Age: 87
End: 2022-02-17
Payer: MEDICARE

## 2022-02-17 DIAGNOSIS — D22.9 BENIGN MOLE: ICD-10-CM

## 2022-02-17 DIAGNOSIS — C18.9 ADENOCARCINOMA OF COLON: ICD-10-CM

## 2022-02-17 DIAGNOSIS — B07.9 VIRAL WARTS, UNSPECIFIED TYPE: Primary | ICD-10-CM

## 2022-02-17 LAB
ALBUMIN SERPL BCP-MCNC: 4.3 G/DL (ref 3.5–5.2)
ALP SERPL-CCNC: 75 U/L (ref 55–135)
ALT SERPL W/O P-5'-P-CCNC: 13 U/L (ref 10–44)
ANION GAP SERPL CALC-SCNC: 11 MMOL/L (ref 8–16)
AST SERPL-CCNC: 18 U/L (ref 10–40)
BASOPHILS # BLD AUTO: 0.05 K/UL (ref 0–0.2)
BASOPHILS NFR BLD: 0.9 % (ref 0–1.9)
BILIRUB SERPL-MCNC: 0.4 MG/DL (ref 0.1–1)
BUN SERPL-MCNC: 12 MG/DL (ref 8–23)
CALCIUM SERPL-MCNC: 9.8 MG/DL (ref 8.7–10.5)
CEA SERPL-MCNC: 3.5 NG/ML (ref 0–5)
CHLORIDE SERPL-SCNC: 101 MMOL/L (ref 95–110)
CO2 SERPL-SCNC: 25 MMOL/L (ref 23–29)
CREAT SERPL-MCNC: 0.8 MG/DL (ref 0.5–1.4)
DIFFERENTIAL METHOD: ABNORMAL
EOSINOPHIL # BLD AUTO: 0.2 K/UL (ref 0–0.5)
EOSINOPHIL NFR BLD: 4.2 % (ref 0–8)
ERYTHROCYTE [DISTWIDTH] IN BLOOD BY AUTOMATED COUNT: 15.8 % (ref 11.5–14.5)
EST. GFR  (AFRICAN AMERICAN): >60 ML/MIN/1.73 M^2
EST. GFR  (NON AFRICAN AMERICAN): >60 ML/MIN/1.73 M^2
GLUCOSE SERPL-MCNC: 92 MG/DL (ref 70–110)
HCT VFR BLD AUTO: 37.2 % (ref 37–48.5)
HGB BLD-MCNC: 11.3 G/DL (ref 12–16)
IMM GRANULOCYTES # BLD AUTO: 0.03 K/UL (ref 0–0.04)
IMM GRANULOCYTES NFR BLD AUTO: 0.5 % (ref 0–0.5)
LYMPHOCYTES # BLD AUTO: 1.2 K/UL (ref 1–4.8)
LYMPHOCYTES NFR BLD: 22.7 % (ref 18–48)
MCH RBC QN AUTO: 29.1 PG (ref 27–31)
MCHC RBC AUTO-ENTMCNC: 30.4 G/DL (ref 32–36)
MCV RBC AUTO: 96 FL (ref 82–98)
MONOCYTES # BLD AUTO: 0.8 K/UL (ref 0.3–1)
MONOCYTES NFR BLD: 14.4 % (ref 4–15)
NEUTROPHILS # BLD AUTO: 3.1 K/UL (ref 1.8–7.7)
NEUTROPHILS NFR BLD: 57.3 % (ref 38–73)
NRBC BLD-RTO: 0 /100 WBC
PLATELET # BLD AUTO: 294 K/UL (ref 150–450)
PMV BLD AUTO: 9.9 FL (ref 9.2–12.9)
POTASSIUM SERPL-SCNC: 4.3 MMOL/L (ref 3.5–5.1)
PROT SERPL-MCNC: 7.9 G/DL (ref 6–8.4)
RBC # BLD AUTO: 3.88 M/UL (ref 4–5.4)
SODIUM SERPL-SCNC: 137 MMOL/L (ref 136–145)
WBC # BLD AUTO: 5.47 K/UL (ref 3.9–12.7)

## 2022-02-17 PROCEDURE — 99213 OFFICE O/P EST LOW 20 MIN: CPT | Mod: PBBFAC,PN | Performed by: DERMATOLOGY

## 2022-02-17 PROCEDURE — 82378 CARCINOEMBRYONIC ANTIGEN: CPT | Performed by: STUDENT IN AN ORGANIZED HEALTH CARE EDUCATION/TRAINING PROGRAM

## 2022-02-17 PROCEDURE — 36415 COLL VENOUS BLD VENIPUNCTURE: CPT | Mod: PN | Performed by: STUDENT IN AN ORGANIZED HEALTH CARE EDUCATION/TRAINING PROGRAM

## 2022-02-17 PROCEDURE — 80053 COMPREHEN METABOLIC PANEL: CPT | Performed by: STUDENT IN AN ORGANIZED HEALTH CARE EDUCATION/TRAINING PROGRAM

## 2022-02-17 PROCEDURE — 99499 NO LOS: ICD-10-PCS | Mod: S$PBB,,, | Performed by: DERMATOLOGY

## 2022-02-17 PROCEDURE — 17110 DESTRUCTION B9 LES UP TO 14: CPT | Mod: S$PBB,,, | Performed by: DERMATOLOGY

## 2022-02-17 PROCEDURE — 99999 PR PBB SHADOW E&M-EST. PATIENT-LVL III: CPT | Mod: PBBFAC,,, | Performed by: DERMATOLOGY

## 2022-02-17 PROCEDURE — 99999 PR PBB SHADOW E&M-EST. PATIENT-LVL III: ICD-10-PCS | Mod: PBBFAC,,, | Performed by: DERMATOLOGY

## 2022-02-17 PROCEDURE — 85025 COMPLETE CBC W/AUTO DIFF WBC: CPT | Performed by: STUDENT IN AN ORGANIZED HEALTH CARE EDUCATION/TRAINING PROGRAM

## 2022-02-17 PROCEDURE — 99499 UNLISTED E&M SERVICE: CPT | Mod: S$PBB,,, | Performed by: DERMATOLOGY

## 2022-02-17 PROCEDURE — 17110 PR DESTRUCTION BENIGN LESIONS UP TO 14: ICD-10-PCS | Mod: S$PBB,,, | Performed by: DERMATOLOGY

## 2022-02-17 PROCEDURE — 17110 DESTRUCTION B9 LES UP TO 14: CPT | Mod: PBBFAC,PN | Performed by: DERMATOLOGY

## 2022-02-17 NOTE — PATIENT INSTRUCTIONS

## 2022-02-17 NOTE — PROGRESS NOTES
Subjective:       Patient ID:  Nydia Stevens is a 87 y.o. female who presents for   Chief Complaint   Patient presents with    Growth     Growth - Initial  Affected locations: right cheek  Signs / symptoms: growing        Review of Systems   Constitutional: Negative.    HENT: Negative.    Respiratory: Negative.    Musculoskeletal: Negative.         Objective:    Physical Exam   Constitutional: She appears well-developed and well-nourished.   Neurological: She is alert and oriented to person, place, and time. She is not disoriented.   Psychiatric: She has a normal mood and affect.   Skin:   Areas Examined (abnormalities noted in diagram):   Head / Face Inspection Performed              Diagram Legend     Erythematous scaling macule/papule c/w actinic keratosis       Vascular papule c/w angioma      Pigmented verrucoid papule/plaque c/w seborrheic keratosis      Yellow umbilicated papule c/w sebaceous hyperplasia      Irregularly shaped tan macule c/w lentigo     1-2 mm smooth white papules consistent with Milia      Movable subcutaneous cyst with punctum c/w epidermal inclusion cyst      Subcutaneous movable cyst c/w pilar cyst      Firm pink to brown papule c/w dermatofibroma      Pedunculated fleshy papule(s) c/w skin tag(s)      Evenly pigmented macule c/w junctional nevus     Mildly variegated pigmented, slightly irregular-bordered macule c/w mildly atypical nevus      Flesh colored to evenly pigmented papule c/w intradermal nevus       Pink pearly papule/plaque c/w basal cell carcinoma      Erythematous hyperkeratotic cursted plaque c/w SCC      Surgical scar with no sign of skin cancer recurrence      Open and closed comedones      Inflammatory papules and pustules      Verrucoid papule consistent consistent with wart     Erythematous eczematous patches and plaques     Dystrophic onycholytic nail with subungual debris c/w onychomycosis     Umbilicated papule    Erythematous-base heme-crusted tan  verrucoid plaque consistent with inflamed seborrheic keratosis     Erythematous Silvery Scaling Plaque c/w Psoriasis     See annotation      Assessment / Plan:        Viral warts, unspecified type  Discussed with patient the etiology and pathogenesis of the disease or skin lesion(s) and possible treatments and aggravators.    Reviewed with patient different treatment options and associated risks.  Cryosurgery procedure note:    Verbal consent from the patient is obtained including, but not limited to, risk of hypopigmentation/hyperpigmentation, scar, recurrence of lesion. Liquid nitrogen cryosurgery is applied to 1 verruca with prior paring, as detailed in the physical exam, to produce a freeze injury. 3 consecutive freeze thaw cycles are applied to each verruca. The patient is aware that blisters (possibly blood blisters) may form.    Benign mole  -     Ambulatory referral/consult to Dermatology  None noted today.  Pt has no co such.           Follow up if symptoms worsen or fail to improve.

## 2022-02-21 ENCOUNTER — OFFICE VISIT (OUTPATIENT)
Dept: HEMATOLOGY/ONCOLOGY | Facility: CLINIC | Age: 87
End: 2022-02-21
Payer: MEDICARE

## 2022-02-21 VITALS
DIASTOLIC BLOOD PRESSURE: 68 MMHG | BODY MASS INDEX: 17.51 KG/M2 | HEART RATE: 102 BPM | WEIGHT: 111.56 LBS | OXYGEN SATURATION: 98 % | RESPIRATION RATE: 18 BRPM | SYSTOLIC BLOOD PRESSURE: 158 MMHG | TEMPERATURE: 99 F | HEIGHT: 67 IN

## 2022-02-21 DIAGNOSIS — D84.821 IMMUNODEFICIENCY SECONDARY TO CHEMOTHERAPY: ICD-10-CM

## 2022-02-21 DIAGNOSIS — C18.9 ADENOCARCINOMA OF COLON: Primary | ICD-10-CM

## 2022-02-21 DIAGNOSIS — D49.9 IMMUNODEFICIENCY SECONDARY TO NEOPLASM: ICD-10-CM

## 2022-02-21 DIAGNOSIS — Z79.899 IMMUNODEFICIENCY SECONDARY TO CHEMOTHERAPY: ICD-10-CM

## 2022-02-21 DIAGNOSIS — D84.81 IMMUNODEFICIENCY SECONDARY TO NEOPLASM: ICD-10-CM

## 2022-02-21 DIAGNOSIS — T45.1X5A IMMUNODEFICIENCY SECONDARY TO CHEMOTHERAPY: ICD-10-CM

## 2022-02-21 PROCEDURE — 99214 OFFICE O/P EST MOD 30 MIN: CPT | Mod: PBBFAC | Performed by: STUDENT IN AN ORGANIZED HEALTH CARE EDUCATION/TRAINING PROGRAM

## 2022-02-21 PROCEDURE — 99215 OFFICE O/P EST HI 40 MIN: CPT | Mod: S$PBB,GC,, | Performed by: STUDENT IN AN ORGANIZED HEALTH CARE EDUCATION/TRAINING PROGRAM

## 2022-02-21 PROCEDURE — 99215 PR OFFICE/OUTPT VISIT, EST, LEVL V, 40-54 MIN: ICD-10-PCS | Mod: S$PBB,GC,, | Performed by: STUDENT IN AN ORGANIZED HEALTH CARE EDUCATION/TRAINING PROGRAM

## 2022-02-21 PROCEDURE — 99999 PR PBB SHADOW E&M-EST. PATIENT-LVL IV: CPT | Mod: PBBFAC,GC,, | Performed by: STUDENT IN AN ORGANIZED HEALTH CARE EDUCATION/TRAINING PROGRAM

## 2022-02-21 PROCEDURE — 99999 PR PBB SHADOW E&M-EST. PATIENT-LVL IV: ICD-10-PCS | Mod: PBBFAC,GC,, | Performed by: STUDENT IN AN ORGANIZED HEALTH CARE EDUCATION/TRAINING PROGRAM

## 2022-02-21 NOTE — Clinical Note
Hello, Follow up : Please schedue labs (CBC, CMP, CEA) and CT chest abdomen pelvis at [facility] on 3/4 weeks and appt with me on 3/7.   Thanks - oneil

## 2022-02-21 NOTE — PROGRESS NOTES
PATIENT: Nydia Stevens  MRN: [id number]  DATE: 2/21/2022      Diagnosis:   1. Adenocarcinoma of colon    2. 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     She has a PMH of iron deficiency anemia.    A. 3/2/20 : Evaluated by her PCP, routine labs found a Hgb of 4.4. Transferred to ED for further evaluation. Underwent EGD+Colonoscopy which found a near obstructing colon mass + for adenocarcinoma.   B. 3/19/20 : Right hemicolectomy. Confirmed stage IIIC ([id number], ANDREW) adenocarcinoma. Refused adjuvant chemotherapy.  C. 3/23/21 : Colonoscopy found recurrence at site of anastomosis.   D. 4/15/21 : Ileocolic resection, 4.2cm tumor at anastomosis found. Moderately to poorly differentiated. Through muscularis to serosa. 0/14 LNs positive, negative margins. PET negative for metastatic disease.  E. 5/31/21 : Presents for consideration of systemic therapy but missed appt for IR port insertion. Subsequently placed on 6/23.  F. 719 - 1/10/21 : C1 - C12 adjuvant 5-FU/Leucovorin (C4 delayed due to Hurricane Zuleyma, C11 bolus dropped due to neutropenia).    Today, 02/21/2022, she presents for follow up. She missed her appointment for CT last week. Underwent colonoscopy last week, biopsy results are pending, unfortunately a mass was visualized at the site of anastomosis.     Imaging :  CT CAP 9/24/21 : No evidence of recurrent or residual disease.     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 ENDO (2ND FLR);  Service: Endoscopy;  Laterality: N/A;    COLONOSCOPY N/A 3/23/2021    Procedure: COLONOSCOPY;  Surgeon: AUTUMN Berg MD;  Location: [facility] ENDO (4TH FLR);  Service: Endoscopy;  Laterality: N/A;  covid test 3/20 [facility]    COLONOSCOPY N/A 2/15/2022    Procedure: COLONOSCOPY;  Surgeon: Doris Simpson MD;  Location: [facility] ENDO (4TH FLR);  Service: Endoscopy;  Laterality: N/A;  COVID test on 2/12/22 at [facility]  2/14/22 - Pt confirmed 0640 arrival, prep instructions reviewed, Pt verbalized understanding-[id number]    ESOPHAGOGASTRODUODENOSCOPY N/A 3/19/2020    Procedure: EGD (ESOPHAGOGASTRODUODENOSCOPY);  Surgeon: Real Mabry MD;  Location: [facility] ENDO (2ND FLR);  Service: Endoscopy;  Laterality: N/A;    FOOT SURGERY      left    HYSTERECTOMY  1962    ILEOCOLECTOMY N/A 4/15/2021    Procedure: ILEOCOLECTOMY;  Surgeon: AUTUMN Berg MD;  Location: [facility] OR 2ND FLR;  Service: Colon and Rectal;  Laterality: N/A;    INSERTION OF TUNNELED CENTRAL VENOUS CATHETER (CVC) WITH SUBCUTANEOUS PORT N/A 7/2/2021    Procedure: [id number];  Surgeon: Suresh Agrawal MD;  Location: [facility] OR 2ND FLR;  Service: Vascular;  Laterality: N/A;  Right IJ    LAPAROSCOPIC HEMICOLECTOMY Right 3/20/2020    Procedure: HEMICOLECTOMY, RIGHT;  Surgeon: AUTUMN Berg MD;  Location: [facility] OR 2ND FLR;  Service: Colon and Rectal;  Laterality: Right;    LYSIS OF ADHESIONS N/A 4/15/2021    Procedure: LYSIS, ADHESIONS;  Surgeon: AUTUMN Berg MD;  Location: [facility] OR 2ND FLR;  Service: Colon and Rectal;  Laterality: N/A;  Greater than 2 hours    MOBILIZATION OF SPLENIC FLEXURE N/A 4/15/2021    Procedure: MOBILIZATION,  SPLENIC FLEXURE;  Surgeon: AUTUMN Berg MD;  Location: [facility] OR [address] [place] [address];  Service: Colon and Rectal;  Laterality: N/A;       Family History:   Family History   Problem Relation Age of Onset    Heart attack Father     Cataracts Brother     Heart attack Brother     Diabetes Brother     No Known Problems Mother     Cataracts Sister     Stroke Sister     Diabetes Sister     Cataracts Sister     Stroke Sister     No Known Problems Maternal Aunt     No Known Problems Maternal Uncle     No Known Problems Paternal Aunt     No Known Problems Paternal Uncle     No Known Problems Maternal Grandmother     No Known Problems Maternal Grandfather     No Known Problems Paternal Grandmother     No Known Problems Paternal Grandfather     Amblyopia Neg Hx     Blindness Neg Hx     Cancer Neg Hx     Glaucoma Neg Hx     Hypertension Neg Hx     Macular degeneration Neg Hx     Retinal detachment Neg Hx     Strabismus Neg Hx     Thyroid disease Neg Hx     Colon cancer Neg Hx     Esophageal cancer Neg Hx     Irritable bowel syndrome Neg Hx     Rectal cancer Neg Hx     Stomach cancer Neg Hx     Ulcerative colitis Neg Hx     Crohn's disease Neg Hx     Celiac disease Neg Hx        Social History:  reports that she has never smoked. She has never used smokeless tobacco. She reports that she does not drink alcohol and does not use drugs.    Allergies:  Review of patient's allergies indicates:  No Known Allergies    Medications:  Current Outpatient Medications   Medication Sig Dispense Refill    alendronate (FOSAMAX) 70 MG tablet Take 1 tablet (70 mg total) by mouth every 7 days. (Patient taking differently: Take 70 mg by mouth every 7 days. Hold until after surgery) 12 tablet 3    amLODIPine (NORVASC) 10 MG tablet Take 1 tablet (10 mg total) by mouth once daily. 30 tablet 11    calcium carbonate (OS-JAILYN) 500 mg calcium (1,250 mg) tablet Take 1 tablet (500 mg total) by mouth once daily. (Patient taking  differently: Take 0.5 tablets by mouth 2 (two) times daily. Hold until after surgery)  0    cholecalciferol, vitamin D3, (VITAMIN D3) 25 mcg (1,000 unit) capsule Take 1,000 Units by mouth once daily. Hold until after surgery      cyanocobalamin (VITAMIN B-12) 1000 MCG tablet Take 1 tablet (1,000 mcg total) by mouth once daily. Hold until after surgery 30 tablet 11    FEROSUL 325 mg (65 mg iron) Tab tablet TAKE ONE TABLET BY MOUTH ON TUESDAY, THURSDAY, AND SATURDAY HOLD UNTIL AFTER SURGERY 30 tablet 0    furosemide (LASIX) 20 MG tablet Take 1 tablet (20 mg total) by mouth once daily. 90 tablet 3    LIDOcaine-prilocaine (EMLA) cream Apply topically as needed (apply over port before chemo). apply over port before chemo 30 g 3    losartan (COZAAR) 50 MG tablet Take 1 tablet (50 mg total) by mouth once daily. 90 tablet 3    lutein-zeaxanthin (OCUVITE LUTEIN 25) 25-5 mg Cap Take 1 tablet by mouth once daily. 30 capsule 0    ondansetron (ZOFRAN) 4 MG tablet Take 1 tablet (4 mg total) by mouth daily as needed for Nausea. 60 tablet 1    potassium chloride (KLOR-CON) 10 MEQ TbSR TAKE 1 TABLET BY MOUTH ONCE DAILY. (Patient taking differently: Hold the morning of surgery) 90 tablet 3    promethazine (PHENERGAN) 12.5 MG Tab Take 1 tablet (12.5 mg total) by mouth every 4 (four) hours as needed (second line for nausea). 60 tablet 1    rosuvastatin (CRESTOR) 40 MG Tab Take 1 tablet (40 mg total) by mouth once daily. (Patient taking differently: Take 40 mg by mouth every evening.) 90 tablet 3    VIT C/VIT E ACETATE/LUTEIN/MIN (OCUVITE LUTEIN ORAL) Take 1 tablet by mouth once daily. Hold until after surgery       No current facility-administered medications for this visit.     Facility-Administered Medications Ordered in Other Visits   Medication Dose Route Frequency Provider Last Rate Last Admin    gabapentin capsule 300 mg  300 mg Oral TID Amanda Gaspar NP   300 mg at 04/16/21 0814    LIDOcaine (PF) 10 mg/ml  "(1%) injection 10 mg  1 mL Intradermal On Call Procedure Amanda Gaspar NP           Review of Systems   Constitutional: Negative for activity change, appetite change, chills, fever and unexpected weight change.   HENT: Negative for congestion.    Eyes: Negative for visual disturbance.   Respiratory: Negative for chest tightness and shortness of breath.    Cardiovascular: Negative for chest pain.   Gastrointestinal: Negative for abdominal pain, blood in stool, diarrhea and rectal pain.   Endocrine: Negative for cold intolerance.   Genitourinary: Negative for hematuria and vaginal bleeding.   Neurological: Negative for weakness.   Hematological: Negative for adenopathy. Does not bruise/bleed easily.       ECOG Performance Status: 2   Objective:      Vitals:   Vitals:    02/21/22 1351   BP: (!) 158/68   Pulse: 102   Resp: 18   Temp: 98.6 °F (37 °C)   TempSrc: Oral   SpO2: 98%   Weight: 50.6 kg (111 lb 8.8 oz)   Height: 5' 7" (1.702 m)     BMI: Body mass index is 17.47 kg/m².    Physical Exam  Constitutional:       General: She is not in acute distress.     Appearance: She is not ill-appearing.   HENT:      Head: Normocephalic and atraumatic.      Mouth/Throat:      Mouth: Mucous membranes are moist.   Eyes:      General: No scleral icterus.  Cardiovascular:      Rate and Rhythm: Normal rate and regular rhythm.   Pulmonary:      Effort: Pulmonary effort is normal. No respiratory distress.   Abdominal:      General: Abdomen is flat. There is no distension.      Tenderness: There is no abdominal tenderness.   Musculoskeletal:         General: Normal range of motion.      Cervical back: Normal range of motion.   Lymphadenopathy:      Cervical: No cervical adenopathy.   Skin:     General: Skin is warm and dry.   Neurological:      General: No focal deficit present.      Mental Status: She is alert and oriented to person, place, and time. Mental status is at baseline.      Motor: No weakness.   Psychiatric:         " Mood and Affect: Mood normal.          Laboratory Data:  Lab Visit on 02/17/2022   Component Date Value Ref Range Status    WBC 02/17/2022 5.47  3.90 - 12.70 K/uL Final    RBC 02/17/2022 3.88 (A) 4.00 - 5.40 M/uL Final    Hemoglobin 02/17/2022 11.3 (A) 12.0 - 16.0 g/dL Final    Hematocrit 02/17/2022 37.2  37.0 - 48.5 % Final    MCV 02/17/2022 96  82 - 98 fL Final    MCH 02/17/2022 29.1  27.0 - 31.0 pg Final    MCHC 02/17/2022 30.4 (A) 32.0 - 36.0 g/dL Final    RDW 02/17/2022 15.8 (A) 11.5 - 14.5 % Final    Platelets 02/17/2022 294  150 - 450 K/uL Final    MPV 02/17/2022 9.9  9.2 - 12.9 fL Final    Immature Granulocytes 02/17/2022 0.5  0.0 - 0.5 % Final    Gran # (ANC) 02/17/2022 3.1  1.8 - 7.7 K/uL Final    Immature Grans (Abs) 02/17/2022 0.03  0.00 - 0.04 K/uL Final    Comment: Mild elevation in immature granulocytes is non specific and   can be seen in a variety of conditions including stress response,   acute inflammation, trauma and pregnancy. Correlation with other   laboratory and clinical findings is essential.      Lymph # 02/17/2022 1.2  1.0 - 4.8 K/uL Final    Mono # 02/17/2022 0.8  0.3 - 1.0 K/uL Final    Eos # 02/17/2022 0.2  0.0 - 0.5 K/uL Final    Baso # 02/17/2022 0.05  0.00 - 0.20 K/uL Final    nRBC 02/17/2022 0  0 /100 WBC Final    Gran % 02/17/2022 57.3  38.0 - 73.0 % Final    Lymph % 02/17/2022 22.7  18.0 - 48.0 % Final    Mono % 02/17/2022 14.4  4.0 - 15.0 % Final    Eosinophil % 02/17/2022 4.2  0.0 - 8.0 % Final    Basophil % 02/17/2022 0.9  0.0 - 1.9 % Final    Differential Method 02/17/2022 Automated   Final    Sodium 02/17/2022 137  136 - 145 mmol/L Final    Potassium 02/17/2022 4.3  3.5 - 5.1 mmol/L Final    Chloride 02/17/2022 101  95 - 110 mmol/L Final    CO2 02/17/2022 25  23 - 29 mmol/L Final    Glucose 02/17/2022 92  70 - 110 mg/dL Final    BUN 02/17/2022 12  8 - 23 mg/dL Final    Creatinine 02/17/2022 0.8  0.5 - 1.4 mg/dL Final    Calcium 02/17/2022 9.8   8.7 - 10.5 mg/dL Final    Total Protein 02/17/2022 7.9  6.0 - 8.4 g/dL Final    Albumin 02/17/2022 4.3  3.5 - 5.2 g/dL Final    Total Bilirubin 02/17/2022 0.4  0.1 - 1.0 mg/dL Final    Comment: For infants and newborns, interpretation of results should be based  on gestational age, weight and in agreement with clinical  observations.    Premature Infant recommended reference ranges:  Up to 24 hours.............<8.0 mg/dL  Up to 48 hours............<12.0 mg/dL  3-5 days..................<15.0 mg/dL  6-29 days.................<15.0 mg/dL      Alkaline Phosphatase 02/17/2022 75  55 - 135 U/L Final    AST 02/17/2022 18  10 - 40 U/L Final    ALT 02/17/2022 13  10 - 44 U/L Final    Anion Gap 02/17/2022 11  8 - 16 mmol/L Final    eGFR if African American 02/17/2022 >60.0  >60 mL/min/1.73 m^2 Final    eGFR if non African American 02/17/2022 >60.0  >60 mL/min/1.73 m^2 Final    Comment: Calculation used to obtain the estimated glomerular filtration  rate (eGFR) is the CKD-EPI equation.       CEA 02/17/2022 3.5  0.0 - 5.0 ng/mL Final    Comment: CEA Normal Range:  Non-Smokers: 0-3.0 ng/mL  Smokers:     0-5.0 ng/mL       Assessment:       1. Adenocarcinoma of colon    2. 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     [id number]  Status post adjuvant treatment of 5-FU bolus, leucovorin and 5FU infusion [id number] for 6months.   Colonsocopy does show concern for persistence of cancer, final path awaited.   I will await final pathology results and if positive for malignancy, discuss at Lower GI tumor board.     Follow up : Please schedue labs (CBC, CMP, CEA) and CT chest abdomen pelvis at [facility] on 3/4 weeks and appt with me on 3/7.       The patient was also interviewed and examined by the attending physician Dr. Nevarez.    Diane Darling MD  Clinical Fellow  Hematology and Medical Oncology.  02/21/2022

## 2022-02-22 LAB
FINAL PATHOLOGIC DIAGNOSIS: NORMAL
Lab: NORMAL

## 2022-02-24 ENCOUNTER — PATIENT OUTREACH (OUTPATIENT)
Dept: HOME HEALTH SERVICES | Facility: HOSPITAL | Age: 87
End: 2022-02-24
Payer: MEDICARE

## 2022-02-24 ENCOUNTER — EXTERNAL HOME HEALTH (OUTPATIENT)
Dept: HOME HEALTH SERVICES | Facility: HOSPITAL | Age: 87
End: 2022-02-24
Payer: MEDICARE

## 2022-03-07 ENCOUNTER — OFFICE VISIT (OUTPATIENT)
Dept: HEMATOLOGY/ONCOLOGY | Facility: CLINIC | Age: 87
End: 2022-03-07
Payer: MEDICARE

## 2022-03-07 VITALS
DIASTOLIC BLOOD PRESSURE: 77 MMHG | RESPIRATION RATE: 16 BRPM | WEIGHT: 112 LBS | SYSTOLIC BLOOD PRESSURE: 174 MMHG | OXYGEN SATURATION: 96 % | TEMPERATURE: 97 F | BODY MASS INDEX: 17.58 KG/M2 | HEART RATE: 101 BPM | HEIGHT: 67 IN

## 2022-03-07 DIAGNOSIS — Z79.899 IMMUNODEFICIENCY SECONDARY TO CHEMOTHERAPY: ICD-10-CM

## 2022-03-07 DIAGNOSIS — T45.1X5A IMMUNODEFICIENCY SECONDARY TO CHEMOTHERAPY: ICD-10-CM

## 2022-03-07 DIAGNOSIS — D84.81 IMMUNODEFICIENCY SECONDARY TO NEOPLASM: ICD-10-CM

## 2022-03-07 DIAGNOSIS — I10 ESSENTIAL HYPERTENSION: ICD-10-CM

## 2022-03-07 DIAGNOSIS — D84.821 IMMUNODEFICIENCY SECONDARY TO CHEMOTHERAPY: ICD-10-CM

## 2022-03-07 DIAGNOSIS — C18.9 ADENOCARCINOMA OF COLON: Primary | ICD-10-CM

## 2022-03-07 DIAGNOSIS — D49.9 IMMUNODEFICIENCY SECONDARY TO NEOPLASM: ICD-10-CM

## 2022-03-07 DIAGNOSIS — D50.9 IRON DEFICIENCY ANEMIA, UNSPECIFIED IRON DEFICIENCY ANEMIA TYPE: ICD-10-CM

## 2022-03-07 PROCEDURE — 99214 OFFICE O/P EST MOD 30 MIN: CPT | Mod: S$PBB,GC,, | Performed by: STUDENT IN AN ORGANIZED HEALTH CARE EDUCATION/TRAINING PROGRAM

## 2022-03-07 PROCEDURE — 99999 PR PBB SHADOW E&M-EST. PATIENT-LVL V: CPT | Mod: PBBFAC,GC,, | Performed by: STUDENT IN AN ORGANIZED HEALTH CARE EDUCATION/TRAINING PROGRAM

## 2022-03-07 PROCEDURE — 99999 PR PBB SHADOW E&M-EST. PATIENT-LVL V: ICD-10-PCS | Mod: PBBFAC,GC,, | Performed by: STUDENT IN AN ORGANIZED HEALTH CARE EDUCATION/TRAINING PROGRAM

## 2022-03-07 PROCEDURE — 99214 PR OFFICE/OUTPT VISIT, EST, LEVL IV, 30-39 MIN: ICD-10-PCS | Mod: S$PBB,GC,, | Performed by: STUDENT IN AN ORGANIZED HEALTH CARE EDUCATION/TRAINING PROGRAM

## 2022-03-07 PROCEDURE — 99215 OFFICE O/P EST HI 40 MIN: CPT | Mod: PBBFAC | Performed by: STUDENT IN AN ORGANIZED HEALTH CARE EDUCATION/TRAINING PROGRAM
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
## 2022-03-07 NOTE — Clinical Note
Hi Dr. Mancini, I waned to follow up and let you know that this encounter is still open. Please let me know if there is any way I can help close it out.   Thanks! Bruce

## 2022-03-16 ENCOUNTER — TELEPHONE (OUTPATIENT)
Dept: HEMATOLOGY/ONCOLOGY | Facility: CLINIC | Age: 87
End: 2022-03-16
Payer: MEDICARE

## 2022-03-16 NOTE — TELEPHONE ENCOUNTER
"----- Message from Jp Padilla sent at 3/16/2022  9:52 AM CDT -----  Consult/Advisory:          Name Of Caller: Self      Contact Preference?: [phone]         Provider Name: Darling      Does patient feel the need to be seen today? No      What is the nature of the call?: Inquiring if it's okay for her to consume crawfish.          Additional Notes:  "Thank you for all that you do for our patients"      "

## 2022-03-21 ENCOUNTER — OFFICE VISIT (OUTPATIENT)
Dept: PRIMARY CARE CLINIC | Facility: CLINIC | Age: 87
End: 2022-03-21
Payer: MEDICARE

## 2022-03-21 VITALS
BODY MASS INDEX: 17.65 KG/M2 | HEIGHT: 67 IN | HEART RATE: 97 BPM | DIASTOLIC BLOOD PRESSURE: 76 MMHG | OXYGEN SATURATION: 99 % | SYSTOLIC BLOOD PRESSURE: 138 MMHG | TEMPERATURE: 98 F | WEIGHT: 112.44 LBS

## 2022-03-21 DIAGNOSIS — I70.0 AORTIC ATHEROSCLEROSIS: ICD-10-CM

## 2022-03-21 DIAGNOSIS — C18.9 ADENOCARCINOMA OF COLON: ICD-10-CM

## 2022-03-21 DIAGNOSIS — F03.91 DEMENTIA WITH BEHAVIORAL DISTURBANCE, UNSPECIFIED DEMENTIA TYPE: ICD-10-CM

## 2022-03-21 DIAGNOSIS — I10 ESSENTIAL HYPERTENSION: Chronic | ICD-10-CM

## 2022-03-21 DIAGNOSIS — I50.42 CHRONIC COMBINED SYSTOLIC AND DIASTOLIC HEART FAILURE: Chronic | ICD-10-CM

## 2022-03-21 DIAGNOSIS — D22.9 BENIGN MOLE: ICD-10-CM

## 2022-03-21 DIAGNOSIS — Z00.00 HEALTHCARE MAINTENANCE: ICD-10-CM

## 2022-03-21 PROBLEM — E77.8 HYPOPROTEINEMIA: Status: RESOLVED | Noted: 2021-05-24 | Resolved: 2022-03-21

## 2022-03-21 PROCEDURE — 99214 OFFICE O/P EST MOD 30 MIN: CPT | Mod: S$GLB,,, | Performed by: INTERNAL MEDICINE

## 2022-03-21 PROCEDURE — 99214 PR OFFICE/OUTPT VISIT, EST, LEVL IV, 30-39 MIN: ICD-10-PCS | Mod: S$GLB,,, | Performed by: INTERNAL MEDICINE

## 2022-03-21 NOTE — ASSESSMENT & PLAN NOTE
Patient status post chemotherapy.  Followed by Oncology.  Recent colonoscopy with colorectal surgery with negative biopsy.  CT scan without concerning findings.  · Follow-up heme Onc in approximately 3 months.  CT scan to be done at that time.

## 2022-03-21 NOTE — ASSESSMENT & PLAN NOTE
Noted on admissions in the past.  Patient on daily Lasix.  · No recent exacerbation.  Continue Arb, Lasix

## 2022-03-21 NOTE — ASSESSMENT & PLAN NOTE
Followed by neuropsychology.  Recommended to take B12 2500 mcg per day.  Patient states she has been compliant.  Previous CT of the head ischemic changes.    Mood excellent today.  · Back to neuropsychiatry for evaluation approximately 3-4 months after chemotherapy has been completed.  Will schedule at next visit.

## 2022-03-21 NOTE — PROGRESS NOTES
This note has been generated using voice-recognition software. There may be typographical errors that have been missed during proof-reading.  Primary Care Provider Appointment    Subjective:      Patient ID: Nydia Stevens is a 87 y.o. female here for follow up.     Chief Complaint: Follow-up  HPI:  This is an 86-year-old female with hypertension, hyperlipidemia, congestive heart failure, colon cancer, iron deficiency anemia, carotid artery disease, prediabetes, osteoporosis here for f/u.  patient last seen 12/21/2021 12/27/2021 patient seen by [facility] colon cancer with chemotherapy follow-up  01/10/2022 patient seen by [facility] for colon cancer follow-up.  CT in 4 weeks, referred to Colorectal surgery.  CEA increasing.  01/26/2022 patient seen by Colorectal surgery.  With rising CEA, colonoscopy ordered.  02/15/2022 colonoscopy completed  02/16/2022 patient seen by Optometry  02/17/2022 patient seen by dermatology.  Cryo surgery for wart.  02/21/2022 patient seen by lyudmila sahu for adenocarcinoma of colon.  Persistence of cancer suspected on colonoscopy.  Pathology pending.  03/07/2022 patient seen by [facility] for adenocarcinoma of the colon status post CT scan.  Notes state colonoscopy and CT scan do not show evidence of persistent disease.  Patient to follow-up in 3 months.    Skin lesion removing and doing well.    Plan to remove port soon.    1lnb wt gain since last visit.    Pt tolerate chemo without any SE.  Doing well.      Patient Active Problem List   Diagnosis    Essential hypertension     Mixed hyperlipidemia    Iron deficiency anemia    Palliative care encounter    Closed fracture of left olecranon process    Debility    Aortic stenosis, mild    Osteoporosis    Thrombocytosis    Chronic combined systolic and diastolic heart failure    Adenocarcinoma of colon    Carotid stenosis, asymptomatic, bilateral    H/O: hysterectomy    Prediabetes    Colon cancer    Immunodeficiency secondary to  neoplasm    B12 deficiency    Healthcare maintenance    Aortic atherosclerosis    Dementia with behavioral disturbance    Immunodeficiency secondary to chemotherapy    Benign mole        Past Surgical History:   Procedure Laterality Date    CATARACT EXTRACTION W/  INTRAOCULAR LENS IMPLANT Left 8/11/14    [facility]    CATARACT EXTRACTION W/  INTRAOCULAR LENS IMPLANT Right 09/15/14    [facility]    COLONOSCOPY N/A 3/19/2020    Procedure: COLONOSCOPY;  Surgeon: Real Mabry MD;  Location: [facility] ENDO (2ND FLR);  Service: Endoscopy;  Laterality: N/A;    COLONOSCOPY N/A 3/23/2021    Procedure: COLONOSCOPY;  Surgeon: AUTUMN Berg MD;  Location: [facility] ENDO (4TH FLR);  Service: Endoscopy;  Laterality: N/A;  covid test 3/20 [facility]    COLONOSCOPY N/A 2/15/2022    Procedure: COLONOSCOPY;  Surgeon: Doris Simpson MD;  Location: [facility] ENDO (4TH FLR);  Service: Endoscopy;  Laterality: N/A;  COVID test on 2/12/22 at [facility]  2/14/22 - Pt confirmed 0640 arrival, prep instructions reviewed, Pt verbalized understanding-[id number]    ESOPHAGOGASTRODUODENOSCOPY N/A 3/19/2020    Procedure: EGD (ESOPHAGOGASTRODUODENOSCOPY);  Surgeon: Real Mabry MD;  Location: [facility] (2ND FLR);  Service: Endoscopy;  Laterality: N/A;    FOOT SURGERY      left    HYSTERECTOMY  1962    ILEOCOLECTOMY N/A 4/15/2021    Procedure: ILEOCOLECTOMY;  Surgeon: AUTUMN Berg MD;  Location: [facility] OR 2ND FLR;  Service: Colon and Rectal;  Laterality: N/A;    INSERTION OF TUNNELED CENTRAL VENOUS CATHETER (CVC) WITH SUBCUTANEOUS PORT N/A 7/2/2021    Procedure: [id number];  Surgeon: Suresh Agrawal MD;  Location: [facility] OR 2ND FLR;  Service: Vascular;  Laterality: N/A;  Right IJ    LAPAROSCOPIC HEMICOLECTOMY Right 3/20/2020    Procedure: HEMICOLECTOMY, RIGHT;  Surgeon: AUTUMN Berg MD;  Location: [facility] OR 2ND FLR;  Service: Colon and Rectal;  Laterality: Right;    LYSIS OF ADHESIONS N/A 4/15/2021    Procedure: LYSIS,  ADHESIONS;  Surgeon: AUTUMN Berg MD;  Location: [facility] OR 2ND FLR;  Service: Colon and Rectal;  Laterality: N/A;  Greater than 2 hours    MOBILIZATION OF SPLENIC FLEXURE N/A 4/15/2021    Procedure: MOBILIZATION, SPLENIC FLEXURE;  Surgeon: AUTUMN Berg MD;  Location: [facility] OR 2ND FLR;  Service: Colon and Rectal;  Laterality: N/A;       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   Tobacco Use    Smoking status: Never Smoker    Smokeless tobacco: Never Used   Substance and Sexual Activity    Alcohol use: No     Alcohol/week: 0.0 standard drinks    Drug use: No    Sexual activity: Never   Social History Narrative    Member of [employer] order here in [place], [place]..  Lives with about 40-50 sisters.  All sisters with her are retired.       Social Determinants of Health     Financial Resource Strain: Low Risk     Difficulty of Paying Living Expenses: Not hard at all   Food Insecurity: No Food Insecurity    Worried About Running Out of Food in the Last Year: Never true    Ran Out of Food in the Last Year: Never true   Physical Activity: Inactive    Days of Exercise per Week: 0 days    Minutes of Exercise per Session: 0 min   Stress: No Stress Concern Present    Feeling of Stress : Not at all   Social Connections: Moderately Integrated    Frequency of Communication with Friends and Family: More than three times a week    Frequency of Social Gatherings with Friends and Family: More than three times a week    Attends [employer] Services: More than 4 times per year    Active Member of Clubs  "or Organizations: Yes    Attends Club or Organization Meetings: More than 4 times per year    Marital Status: Never        Review of Systems    PHQ9 7/22/2021   Total Score 0        Checklist of Daily Activities:        Objective:   /76   Pulse 97   Temp 98.3 °F (36.8 °C) (Oral)   Ht 5' 7" (1.702 m)   Wt 51 kg (112 lb 7 oz)   SpO2 99%   BMI 17.61 kg/m²     Physical Exam  Constitutional:       General: She is not in acute distress.     Appearance: Normal appearance. She is not ill-appearing, toxic-appearing or diaphoretic.   HENT:      Head: Normocephalic and atraumatic.   Cardiovascular:      Rate and Rhythm: Normal rate and regular rhythm.      Heart sounds: Normal heart sounds.   Pulmonary:      Effort: Pulmonary effort is normal.      Breath sounds: Normal breath sounds.   Abdominal:      Palpations: Abdomen is soft.   Musculoskeletal:      Right lower leg: No edema.      Left lower leg: No edema.   Skin:            Comments: Side of removed wart on right cheek healing.   Neurological:      Mental Status: She is alert.             Lab Results   Component Value Date    WBC 5.04 03/04/2022    HGB 11.4 (L) 03/04/2022    HCT 37.7 03/04/2022     03/04/2022    CHOL 184 02/17/2021    TRIG 65 02/17/2021    HDL 39 (L) 02/17/2021    ALT 20 03/04/2022    AST 22 03/04/2022     03/04/2022    K 4.3 03/04/2022     03/04/2022    CREATININE 0.8 03/04/2022    BUN 12 03/04/2022    CO2 23 03/04/2022    TSH 2.916 02/16/2021    INR 0.9 03/17/2020    HGBA1C 5.7 (H) 02/17/2021       Current Outpatient Medications on File Prior to Visit   Medication Sig Dispense Refill    amLODIPine (NORVASC) 10 MG tablet Take 1 tablet (10 mg total) by mouth once daily. 30 tablet 11    cholecalciferol, vitamin D3, (VITAMIN D3) 25 mcg (1,000 unit) capsule Take 1,000 Units by mouth once daily. Hold until after surgery      cyanocobalamin (VITAMIN B-12) 1000 MCG tablet Take 1 tablet (1,000 mcg total) by mouth once " daily. Hold until after surgery 30 tablet 11    FEROSUL 325 mg (65 mg iron) Tab tablet TAKE ONE TABLET BY MOUTH ON TUESDAY, THURSDAY, AND SATURDAY HOLD UNTIL AFTER SURGERY 30 tablet 0    furosemide (LASIX) 20 MG tablet Take 1 tablet (20 mg total) by mouth once daily. 90 tablet 3    LIDOcaine-prilocaine (EMLA) cream Apply topically as needed (apply over port before chemo). apply over port before chemo 30 g 3    losartan (COZAAR) 50 MG tablet Take 1 tablet (50 mg total) by mouth once daily. 90 tablet 3    lutein-zeaxanthin (OCUVITE LUTEIN 25) 25-5 mg Cap Take 1 tablet by mouth once daily. 30 capsule 0    ondansetron (ZOFRAN) 4 MG tablet Take 1 tablet (4 mg total) by mouth daily as needed for Nausea. 60 tablet 1    potassium chloride (KLOR-CON) 10 MEQ TbSR TAKE 1 TABLET BY MOUTH ONCE DAILY. (Patient taking differently: Hold the morning of surgery) 90 tablet 3    promethazine (PHENERGAN) 12.5 MG Tab Take 1 tablet (12.5 mg total) by mouth every 4 (four) hours as needed (second line for nausea). 60 tablet 1    rosuvastatin (CRESTOR) 40 MG Tab Take 1 tablet (40 mg total) by mouth once daily. (Patient taking differently: Take 40 mg by mouth every evening.) 90 tablet 3    VIT C/VIT E ACETATE/LUTEIN/MIN (OCUVITE LUTEIN ORAL) Take 1 tablet by mouth once daily. Hold until after surgery      alendronate (FOSAMAX) 70 MG tablet Take 1 tablet (70 mg total) by mouth every 7 days. (Patient taking differently: Take 70 mg by mouth every 7 days. Hold until after surgery) 12 tablet 3    calcium carbonate (OS-JAILYN) 500 mg calcium (1,250 mg) tablet Take 1 tablet (500 mg total) by mouth once daily. (Patient taking differently: Take 0.5 tablets by mouth 2 (two) times daily. Hold until after surgery)  0     Current Facility-Administered Medications on File Prior to Visit   Medication Dose Route Frequency Provider Last Rate Last Admin    gabapentin capsule 300 mg  300 mg Oral TID Amanda Gaspar NP   300 mg at 04/16/21 0814     LIDOcaine (PF) 10 mg/ml (1%) injection 10 mg  1 mL Intradermal On Call Procedure Amanda Gaspar NP             Assessment:   87 y.o. female with multiple co-morbid illnesses here for continued follow up of medical problems.      Plan:     Problem List Items Addressed This Visit        Neuro    Dementia with behavioral disturbance     Followed by neuropsychology.  Recommended to take B12 2500 mcg per day.  Patient states she has been compliant.  Previous CT of the head ischemic changes.    Mood excellent today.  · Back to neuropsychiatry for evaluation approximately 3-4 months after chemotherapy has been completed.  Will schedule at next visit.              Cardiac/Vascular    Essential hypertension  (Chronic)     Blood pressure goal today  · Continue current medications.           Chronic combined systolic and diastolic heart failure (Chronic)     Noted on admissions in the past.  Patient on daily Lasix.  · No recent exacerbation.  Continue Arb, Lasix           Aortic atherosclerosis     Noted on CT chest abdomen pelvis 02/26/2021:  Advanced atherosclerosis along the abdominal aorta and iliac arteries   · Patient asymptomatic.  Patient on high-dose statin.  Tolerating well.  Will continue.              Oncology    Adenocarcinoma of colon     Patient status post chemotherapy.  Followed by Oncology.  Recent colonoscopy with colorectal surgery with negative biopsy.  CT scan without concerning findings.  · Follow-up heme Onc in approximately 3 months.  CT scan to be done at that time.           Benign mole     Removed by dermatology.  · Will follow.  Healing well.              Other    Healthcare maintenance     · [place] post reviewed 01/2022.  Patient given a copy of power-of- and living will to discussed with her order 1/2022  · Optometr pending                   Health Maintenance       Date Due Completion Date    COVID-19 Vaccine (4 - Booster) 02/21/2022 9/21/2021    DEXA Scan 02/04/2023 2/4/2021     Override on 2/15/2016: Declined    Colonoscopy 02/15/2025 2/15/2022    TETANUS VACCINE 02/15/2028 2/15/2018        Medications Reconciliation:   I have not reconciled the patient's home medications with the patient/family. I have updated all changes.  Refer to After-Visit Medication List.      Medication List          Accurate as of March 21, 2022 12:49 PM. If you have any questions, ask your nurse or doctor.            CHANGE how you take these medications    alendronate 70 MG tablet  Commonly known as: FOSAMAX  Take 1 tablet (70 mg total) by mouth every 7 days.  What changed: additional instructions     calcium carbonate 500 mg calcium (1,250 mg) tablet  Commonly known as: OS-JAILYN  Take 1 tablet (500 mg total) by mouth once daily.  What changed:   · how much to take  · when to take this  · additional instructions     potassium chloride 10 MEQ Tbsr  Commonly known as: KLOR-CON  TAKE 1 TABLET BY MOUTH ONCE DAILY.  What changed:   · how much to take  · how to take this  · when to take this  · additional instructions     rosuvastatin 40 MG Tab  Commonly known as: CRESTOR  Take 1 tablet (40 mg total) by mouth once daily.  What changed: when to take this        CONTINUE taking these medications    amLODIPine 10 MG tablet  Commonly known as: NORVASC  Take 1 tablet (10 mg total) by mouth once daily.     cholecalciferol (vitamin D3) 25 mcg (1,000 unit) capsule  Commonly known as: VITAMIN D3     cyanocobalamin 1000 MCG tablet  Commonly known as: VITAMIN B-12  Take 1 tablet (1,000 mcg total) by mouth once daily. Hold until after surgery     FeroSuL 325 mg (65 mg iron) Tab tablet  Generic drug: ferrous sulfate  TAKE ONE TABLET BY MOUTH ON TUESDAY, THURSDAY, AND SATURDAY HOLD UNTIL AFTER SURGERY     furosemide 20 MG tablet  Commonly known as: LASIX  Take 1 tablet (20 mg total) by mouth once daily.     LIDOcaine-prilocaine cream  Commonly known as: EMLA  Apply topically as needed (apply over port before chemo). apply over port  before chemo     losartan 50 MG tablet  Commonly known as: COZAAR  Take 1 tablet (50 mg total) by mouth once daily.     lutein-zeaxanthin 25-5 mg Cap  Commonly known as: OCUVITE LUTEIN 25  Take 1 tablet by mouth once daily.     OCUVITE LUTEIN ORAL     ondansetron 4 MG tablet  Commonly known as: ZOFRAN  Take 1 tablet (4 mg total) by mouth daily as needed for Nausea.     promethazine 12.5 MG Tab  Commonly known as: PHENERGAN  Take 1 tablet (12.5 mg total) by mouth every 4 (four) hours as needed (second line for nausea).               Follow up in about 2 months (around 5/21/2022). Total clinical care time was 25 min. The following issues were discussed:  Completion of chemotherapy, upcoming optometry appointment, removal of wart, plan for follow-up neuropsychiatry, blood pressure, results colonoscopy and CT scan     Brittney Travis MD  Internal Medicine  [facility]  [phone]

## 2022-03-21 NOTE — ASSESSMENT & PLAN NOTE
Noted on CT chest abdomen pelvis 02/26/2021:  Advanced atherosclerosis along the abdominal aorta and iliac arteries   · Patient asymptomatic.  Patient on high-dose statin.  Tolerating well.  Will continue.

## 2022-03-21 NOTE — ASSESSMENT & PLAN NOTE
· [place] post reviewed 01/2022.  Patient given a copy of power-of- and living will to discussed with her order 1/2022  · Optometr pending

## 2022-04-18 PROBLEM — Z00.00 HEALTHCARE MAINTENANCE: Status: RESOLVED | Noted: 2021-05-24 | Resolved: 2022-04-18

## 2022-04-18 NOTE — PATIENT INSTRUCTIONS
Mild epiretinal membrane at posterior pole of the right eye, as noted previously Not impacting correctable VA. No need for treatment.        S/P cataract surgery in both eyes. Bilateral posterior chamber intraocular lens.  Paracentral posterior capsular opacification in each eye.  No need for YAG laser posterior capsulotomy in either eye at this time.    Residual refractive error in each eye, with very satisfactory visual acuity in each eye. .   New spectacle lens Rx issued for use as needed   Recheck in one year.      noted

## 2022-05-23 ENCOUNTER — OFFICE VISIT (OUTPATIENT)
Dept: PRIMARY CARE CLINIC | Facility: CLINIC | Age: 87
End: 2022-05-23
Payer: MEDICARE

## 2022-05-23 VITALS
HEART RATE: 102 BPM | OXYGEN SATURATION: 99 % | TEMPERATURE: 98 F | HEIGHT: 67 IN | WEIGHT: 117.19 LBS | DIASTOLIC BLOOD PRESSURE: 84 MMHG | BODY MASS INDEX: 18.39 KG/M2 | SYSTOLIC BLOOD PRESSURE: 154 MMHG

## 2022-05-23 DIAGNOSIS — Z00.00 HEALTHCARE MAINTENANCE: ICD-10-CM

## 2022-05-23 DIAGNOSIS — F03.91 DEMENTIA WITH BEHAVIORAL DISTURBANCE, UNSPECIFIED DEMENTIA TYPE: ICD-10-CM

## 2022-05-23 PROCEDURE — 99214 PR OFFICE/OUTPT VISIT, EST, LEVL IV, 30-39 MIN: ICD-10-PCS | Mod: S$GLB,,, | Performed by: INTERNAL MEDICINE

## 2022-05-23 PROCEDURE — 99214 OFFICE O/P EST MOD 30 MIN: CPT | Mod: S$GLB,,, | Performed by: INTERNAL MEDICINE

## 2022-05-23 NOTE — PROGRESS NOTES
.  This note has been generated using voice-recognition software. There may be typographical errors that have been missed during proof-reading.     Primary Care Provider Appointment    Subjective:      Patient ID: Nydia Stevens is a 87 y.o. female here for follow up.     Chief Complaint: Follow-up    HPI:  This is an 87-year-old female with hypertension, hyperlipidemia, congestive heart failure, colon cancer, iron deficiency anemia, carotid artery disease, prediabetes, osteoporosis here for f/u.  patient last seen 03/21/2022    Pt does nto want to go back to [facility] for testing at this time.      Caregiver is here and states that the Superior general is her surrogate decision maker.  Assistant superior general is present today at visit.    Advance Care Planning     Date: 05/23/2022    Power of   I initiated the process of advance care planning today and explained the importance of this process to the patient.  I introduced the concept of advance directives to the patient, as well. Then the patient received detailed information about the importance of designating a Health Care Power of  (HCPOA). She was also instructed to communicate with this person about their wishes for future healthcare, should she become sick and lose decision-making capacity. The patient has not previously appointed a HCPOA. After our discussion, the patient has decided to complete a HCPOA and has appointed her Superior General, health care agent: Sister Saima Whelan & health care agent number: [phone] I spent a total time of 10 minutes discussing this issue with the patient.            Patient Active Problem List   Diagnosis    Essential hypertension     Mixed hyperlipidemia    Iron deficiency anemia    Palliative care encounter    Closed fracture of left olecranon process    Debility    Aortic stenosis, mild    Osteoporosis    Thrombocytosis    Chronic combined systolic and diastolic heart failure     Adenocarcinoma of colon    Carotid stenosis, asymptomatic, bilateral    H/O: hysterectomy    Prediabetes    Colon cancer    Immunodeficiency secondary to neoplasm    B12 deficiency    Healthcare maintenance    Aortic atherosclerosis    Dementia with behavioral disturbance    Immunodeficiency secondary to chemotherapy    Benign mole        Past Surgical History:   Procedure Laterality Date    CATARACT EXTRACTION W/  INTRAOCULAR LENS IMPLANT Left 8/11/14    [facility]    CATARACT EXTRACTION W/  INTRAOCULAR LENS IMPLANT Right 09/15/14    [facility]    COLONOSCOPY N/A 3/19/2020    Procedure: COLONOSCOPY;  Surgeon: Real Mabry MD;  Location: [facility] ENDO (2ND FLR);  Service: Endoscopy;  Laterality: N/A;    COLONOSCOPY N/A 3/23/2021    Procedure: COLONOSCOPY;  Surgeon: AUTUMN Berg MD;  Location: [facility] ENDO (4TH FLR);  Service: Endoscopy;  Laterality: N/A;  covid test 3/20 [facility]    COLONOSCOPY N/A 2/15/2022    Procedure: COLONOSCOPY;  Surgeon: Doris Simpson MD;  Location: [facility] (4TH FLR);  Service: Endoscopy;  Laterality: N/A;  COVID test on 2/12/22 at [facility]  2/14/22 - Pt confirmed 0640 arrival, prep instructions reviewed, Pt verbalized understanding-ERW@0912    ESOPHAGOGASTRODUODENOSCOPY N/A 3/19/2020    Procedure: EGD (ESOPHAGOGASTRODUODENOSCOPY);  Surgeon: Real Mabry MD;  Location: [facility] (2ND FLR);  Service: Endoscopy;  Laterality: N/A;    FOOT SURGERY      left    HYSTERECTOMY  1962    ILEOCOLECTOMY N/A 4/15/2021    Procedure: ILEOCOLECTOMY;  Surgeon: AUTUMN Berg MD;  [facility];  Service: Colon and Rectal;  Laterality: N/A;    INSERTION OF TUNNELED CENTRAL VENOUS CATHETER (CVC) WITH SUBCUTANEOUS PORT N/A 7/2/2021    Procedure: [id number];  Surgeon: Suresh Agrawal MD;  Location: [facility] OR 2ND FLR;  Service: Vascular;  Laterality: N/A;  Right IJ    LAPAROSCOPIC HEMICOLECTOMY Right 3/20/2020    Procedure: HEMICOLECTOMY, RIGHT;  Surgeon: AUTUMN  "Thierno Berg MD;  Location: [facility] OR 2ND FLR;  Service: Colon and Rectal;  Laterality: Right;    LYSIS OF ADHESIONS N/A 4/15/2021    Procedure: LYSIS, ADHESIONS;  Surgeon: AUTUMN eBrg MD;  Location: [facility] OR 2ND FLR;  Service: Colon and Rectal;  Laterality: N/A;  Greater than 2 hours    MOBILIZATION OF SPLENIC FLEXURE N/A 4/15/2021    Procedure: MOBILIZATION, SPLENIC FLEXURE;  Surgeon: AUTUMN Berg MD;  Location: [facility] OR 2ND FLR;  Service: Colon and Rectal;  Laterality: N/A;       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  [employer]   Tobacco Use    Smoking status: Never Smoker    Smokeless tobacco: Never Used   Substance and Sexual Activity    Alcohol use: No     Alcohol/week: 0.0 standard drinks    Drug use: No    Sexual activity: Never   Social History Narrative    Member of [employer] order here in [place], [place]..  Lives with about 40-50 sisters.  All sisters with her are retired.         Review of Systems    PHQ9 7/22/2021   Total Score 0        Checklist of Daily Activities:        Objective:   BP (!) 154/84 (BP Location: Left arm, Patient Position: Sitting, BP Method: Medium (Manual))   Pulse 102   Temp 98.3 °F (36.8 °C) (Oral)   Ht 5' 7" (1.702 m)   Wt 53.1 kg (117 lb 2.8 oz)   SpO2 99%   BMI 18.35 kg/m²     Physical Exam  Constitutional:       General: She is not in acute distress.     Appearance: Normal appearance. She is not ill-appearing, toxic-appearing or diaphoretic.   HENT:      Head: Normocephalic and atraumatic.   Cardiovascular:      Rate and Rhythm: Normal rate and " regular rhythm.      Heart sounds: Normal heart sounds.   Pulmonary:      Effort: Pulmonary effort is normal.      Breath sounds: Normal breath sounds.   Musculoskeletal:      Right lower leg: No edema.      Left lower leg: No edema.   Lymphadenopathy:      Cervical: No cervical adenopathy.   Neurological:      Mental Status: She is alert.             Lab Results   Component Value Date    WBC 5.04 03/04/2022    HGB 11.4 (L) 03/04/2022    HCT 37.7 03/04/2022     03/04/2022    CHOL 184 02/17/2021    TRIG 65 02/17/2021    HDL 39 (L) 02/17/2021    ALT 20 03/04/2022    AST 22 03/04/2022     03/04/2022    K 4.3 03/04/2022     03/04/2022    CREATININE 0.8 03/04/2022    BUN 12 03/04/2022    CO2 23 03/04/2022    TSH 2.916 02/16/2021    INR 0.9 03/17/2020    HGBA1C 5.7 (H) 02/17/2021       Current Outpatient Medications on File Prior to Visit   Medication Sig Dispense Refill    alendronate (FOSAMAX) 70 MG tablet TAKE ONE TABLET BY MOUTH EVERY 7 DAYS 12 tablet 3    amLODIPine (NORVASC) 10 MG tablet Take 1 tablet (10 mg total) by mouth once daily. 30 tablet 11    cholecalciferol, vitamin D3, (VITAMIN D3) 25 mcg (1,000 unit) capsule Take 1,000 Units by mouth once daily. Hold until after surgery      cyanocobalamin (VITAMIN B-12) 1000 MCG tablet Take 1 tablet (1,000 mcg total) by mouth once daily. Hold until after surgery 30 tablet 11    FEROSUL 325 mg (65 mg iron) Tab tablet TAKE ONE TABLET BY MOUTH ON TUESDAY, THURSDAY, AND SATURDAY HOLD UNTIL AFTER SURGERY 30 tablet 0    furosemide (LASIX) 20 MG tablet Take 1 tablet (20 mg total) by mouth once daily. 90 tablet 3    LIDOcaine-prilocaine (EMLA) cream Apply topically as needed (apply over port before chemo). apply over port before chemo 30 g 3    losartan (COZAAR) 50 MG tablet Take 1 tablet (50 mg total) by mouth once daily. 90 tablet 3    lutein-zeaxanthin (OCUVITE LUTEIN 25) 25-5 mg Cap Take 1 tablet by mouth once daily. 30 capsule 0    ondansetron  (ZOFRAN) 4 MG tablet Take 1 tablet (4 mg total) by mouth daily as needed for Nausea. 60 tablet 1    potassium chloride (KLOR-CON) 10 MEQ TbSR TAKE 1 TABLET BY MOUTH ONCE DAILY. (Patient taking differently: Hold the morning of surgery) 90 tablet 3    promethazine (PHENERGAN) 12.5 MG Tab Take 1 tablet (12.5 mg total) by mouth every 4 (four) hours as needed (second line for nausea). 60 tablet 1    rosuvastatin (CRESTOR) 40 MG Tab TAKE ONE TABLET BY MOUTH EVERY DAY FOR CHOLESTEROL 90 tablet 3    VIT C/VIT E ACETATE/LUTEIN/MIN (OCUVITE LUTEIN ORAL) Take 1 tablet by mouth once daily. Hold until after surgery      calcium carbonate (OS-JAILYN) 500 mg calcium (1,250 mg) tablet Take 1 tablet (500 mg total) by mouth once daily. (Patient taking differently: Take 0.5 tablets by mouth 2 (two) times daily. Hold until after surgery)  0     Current Facility-Administered Medications on File Prior to Visit   Medication Dose Route Frequency Provider Last Rate Last Admin    gabapentin capsule 300 mg  300 mg Oral TID Amanda Gaspar NP   300 mg at 04/16/21 0814    LIDOcaine (PF) 10 mg/ml (1%) injection 10 mg  1 mL Intradermal On Call Procedure Amanda Gaspar NP             Assessment:   87 y.o. female with multiple co-morbid illnesses here for continued follow up of medical problems.      Plan:     Problem List Items Addressed This Visit        Neuro    Dementia with behavioral disturbance     Patient followed by neuropsych.  Patient was to have follow-up 3-4 months after completing chemotherapy.  · Discussed with patient today-she is not interested in further evaluation.              Other    Healthcare maintenance     · [place] post reviewed today.  Power-of- completed today with patient's assistance [place] general                   Health Maintenance       Date Due Completion Date    COVID-19 Vaccine (4 - Booster) 12/21/2021 9/21/2021    DEXA Scan 02/04/2023 2/4/2021    Override on 2/15/2016: Declined     Colonoscopy 02/15/2025 2/15/2022    TETANUS VACCINE 02/15/2028 2/15/2018        Medications Reconciliation:   I have not reconciled the patient's home medications with the patient/family. I have updated all changes.  Refer to After-Visit Medication List.      Medication List          Accurate as of May 23, 2022 10:28 AM. If you have any questions, ask your nurse or doctor.            CHANGE how you take these medications    calcium carbonate 500 mg calcium (1,250 mg) tablet  Commonly known as: OS-JAILYN  Take 1 tablet (500 mg total) by mouth once daily.  What changed:   · how much to take  · when to take this  · additional instructions     potassium chloride 10 MEQ Tbsr  Commonly known as: KLOR-CON  TAKE 1 TABLET BY MOUTH ONCE DAILY.  What changed:   · how much to take  · how to take this  · when to take this  · additional instructions        CONTINUE taking these medications    alendronate 70 MG tablet  Commonly known as: FOSAMAX  TAKE ONE TABLET BY MOUTH EVERY 7 DAYS     amLODIPine 10 MG tablet  Commonly known as: NORVASC  Take 1 tablet (10 mg total) by mouth once daily.     cholecalciferol (vitamin D3) 25 mcg (1,000 unit) capsule  Commonly known as: VITAMIN D3     cyanocobalamin 1000 MCG tablet  Commonly known as: VITAMIN B-12  Take 1 tablet (1,000 mcg total) by mouth once daily. Hold until after surgery     FeroSuL 325 mg (65 mg iron) Tab tablet  Generic drug: ferrous sulfate  TAKE ONE TABLET BY MOUTH ON TUESDAY, THURSDAY, AND SATURDAY HOLD UNTIL AFTER SURGERY     furosemide 20 MG tablet  Commonly known as: LASIX  Take 1 tablet (20 mg total) by mouth once daily.     LIDOcaine-prilocaine cream  Commonly known as: EMLA  Apply topically as needed (apply over port before chemo). apply over port before chemo     losartan 50 MG tablet  Commonly known as: COZAAR  Take 1 tablet (50 mg total) by mouth once daily.     lutein-zeaxanthin 25-5 mg Cap  Commonly known as: OCUVITE LUTEIN 25  Take 1 tablet by mouth once daily.      OCUVITE LUTEIN ORAL     ondansetron 4 MG tablet  Commonly known as: ZOFRAN  Take 1 tablet (4 mg total) by mouth daily as needed for Nausea.     promethazine 12.5 MG Tab  Commonly known as: PHENERGAN  Take 1 tablet (12.5 mg total) by mouth every 4 (four) hours as needed (second line for nausea).     rosuvastatin 40 MG Tab  Commonly known as: CRESTOR  TAKE ONE TABLET BY MOUTH EVERY DAY FOR CHOLESTEROL                 Follow up in about 3 months (around 8/23/2022). Total clinical care time was 30 min. The following issues were discussed:  Discussed with assistance superior general sister Fernando harris surrogate paperwork, COVID booster, patient refusing neuropsychiatry follow-up     Brittney Travis MD  Internal Medicine  [facility]  [phone]

## 2022-05-23 NOTE — ASSESSMENT & PLAN NOTE
Patient followed by neuropsych.  Patient was to have follow-up 3-4 months after completing chemotherapy.  · Discussed with patient today-she is not interested in further evaluation.

## 2022-05-25 ENCOUNTER — OFFICE VISIT (OUTPATIENT)
Dept: SURGERY | Facility: CLINIC | Age: 87
End: 2022-05-25
Payer: MEDICARE

## 2022-05-25 VITALS
HEART RATE: 102 BPM | SYSTOLIC BLOOD PRESSURE: 145 MMHG | HEIGHT: 67 IN | WEIGHT: 118.19 LBS | DIASTOLIC BLOOD PRESSURE: 71 MMHG | BODY MASS INDEX: 18.55 KG/M2

## 2022-05-25 DIAGNOSIS — Z85.038 ENCOUNTER FOR FOLLOW-UP SURVEILLANCE OF COLON CANCER: Primary | ICD-10-CM

## 2022-05-25 DIAGNOSIS — Z08 ENCOUNTER FOR FOLLOW-UP SURVEILLANCE OF COLON CANCER: Primary | ICD-10-CM

## 2022-05-25 PROCEDURE — 99214 OFFICE O/P EST MOD 30 MIN: CPT | Mod: S$PBB,,, | Performed by: COLON & RECTAL SURGERY

## 2022-05-25 PROCEDURE — 99999 PR PBB SHADOW E&M-EST. PATIENT-LVL III: ICD-10-PCS | Mod: PBBFAC,,, | Performed by: COLON & RECTAL SURGERY

## 2022-05-25 PROCEDURE — 99213 OFFICE O/P EST LOW 20 MIN: CPT | Mod: PBBFAC | Performed by: COLON & RECTAL SURGERY

## 2022-05-25 PROCEDURE — 99214 PR OFFICE/OUTPT VISIT, EST, LEVL IV, 30-39 MIN: ICD-10-PCS | Mod: S$PBB,,, | Performed by: COLON & RECTAL SURGERY

## 2022-05-25 PROCEDURE — 99999 PR PBB SHADOW E&M-EST. PATIENT-LVL III: CPT | Mod: PBBFAC,,, | Performed by: COLON & RECTAL SURGERY

## 2022-05-25 NOTE — PROGRESS NOTES
HPI:  Nydia Stevens is a 87 y.o. female with history of a history of colon cancer, s/p right hemicolectomy, presents to clinic for discussion of anastomotic recurrence.  Pathology from previous surgery showed a pT4N0 lesion.  She was offered adjuvant chemotherapy but she declined.     Immunohistochemical studies for DNA mismatch repair proteins were performed   on this tumor (block 1H) and demonstrate intact staining in all 4 proteins   (MLH1, PMS2, MSH2, and MSH6).  These results indicate that this tumor is   microsatellite stable (ANDREW) and that Santillan syndrome (Hereditary Nonpolyposis   Colorectal Cancer, HNPCC) is unlikely.   Histologic sections highlighting visceral peritoneal involvement were   reviewed by Dr. Clare Plascencia and she concurs with the above impression.   COLON AND RECTUM: Resection, Including Transanal Disk Excision of Rectal   Neoplasms       Procedure         Right hemicolectomy     TUMOR       Tumor Site         Right (ascending) colon       Histologic Type         Adenocarcinoma       Histologic Grade         G2: Moderately differentiated       Tumor Size         Greatest dimension (Centimeters) 6.0 cm       Tumor Deposits         Not identified       Tumor Extension         Tumor invades the visceral peritoneum       Macroscopic Tumor Perforation         Not identified       Lymphovascular Invasion         Not identified       Perineural Invasion         Present       Treatment Effect         No known presurgical therapy           Margins Examined             Proximal             Distal             Radial or Mesenteric     LYMPH NODES       Regional Lymph Nodes           Number of Lymph Nodes Involved 0           Number of Lymph Nodes Examined 20       Primary Tumor (pT)         pT4a       Regional Lymph Nodes (pN)         pN0     ADDITIONAL FINDINGS       Additional Pathologic Findings         Diverticulosis     The patient recently underwent colonoscopy on 3/23 and was found to  have a lesion at her ileocolic anastomosis.  Biopsies of this lesion were obtained and were consistent with invasive colon cancer.  CEA from 2/12/21 was 12.0.     PET scan was performed today.  At the time of the patient visit, no official read was back from radiology, but the area of PET avidity appears to be isolated only to the previous anastomosis.     4- exp lap, ileocolectomy, ileal to descending colon anastomosis.       FINAL PATHOLOGIC DIAGNOSIS  [facility] DIAGNOSIS:   PROCEDURE: lleocolic resection (10 cm ileum, transverse colon and descending   colon, ileal descending colon anastomosis).   TUMOR SITE: Small intestine, not otherwise specified.   TUMOR SIZE: Greatest dimension: 4.2 cm   Additional dimension: 4.2 x 0.6 cm   LOCATION OF TUMOR: Tumor at previous ileocolic anastomosis.   MACROSCOPIC TUMOR PERFORATION: Not identified.   HISTOLOGIC TYPE: Adenocarcinoma, see comment.   HISTOLOGIC GRADE: Moderate to poorly differentiated.   TUMOR EXTENSION: Tumor invades through the muscularis propria and extends to   serosal surface.   MARGINS: All margins are uninvolved by invasive carcinoma, carcinoma in-situ   (high grade dysplasia) and adenoma (closest margin, 12.2 cm).   MARGINS EXAMINED: Proximal and distal.   LYMPHOVASCULAR INVASION: Not identified.   REGIONAL LYMPH NODES:   Number of lymph nodes involved 0   Number of lymph nodes examined: 14   PATHOLOGIC STAGING (pTNM): pT4 N0 MX   Comment:   MMR studies can be performed upon request.   Imani Cuevas MD   Report attached.   Performing site:   [address] [address]      2- colonoscopy  Findings:        The perianal and digital rectal examinations were normal.        The terminal ileum appeared normal.        There was evidence of a prior side-to-side ileo-colonic anastomosis        in the descending colon. This was patent and was characterized by        inflammation. The anastomosis was traversed.  "Biopsies were taken        with a cold forceps for histology. Verification of patient        identification for the specimen was done. Estimated blood loss was        minimal. To prevent bleeding after the biopsy, one hemostatic clip        was successfully placed. There was no bleeding at the end of the        procedure.        The exam was otherwise without abnormality on direct and        retroflexion views.     Collected: 02/15/22 0827   Result status: Final   Resulting lab: [facility]   Value: RELIAPA DIAGNOSIS:   SPECIMEN DESIGNATED "ILEOCOLONIC ANASTOMOSIS" BIOPSY:   Benign colonic mucosa with acute inflammation/granulation tissue.   Negative for dysplasia/carcinoma.   Yuliana Day M.D.   Report attached.   Performing site:   [place] [address]   "Disclaimer:  This case diagnosis was rendered completely by the outside   consultation pathologist and the case is electronically signed by an [facility]   pathologist listed below solely to release the report into the medical   record."    Comment: Interp By Krysta Bradley D.O., Signed on 02/22/2022 at 08:23       Interval hx:    Feels great.  No pain.  BM 3 per day.  No blood.  Continent.  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    Hyperlipidemia     Hypertension     Osteoporosis         Past Surgical History:   Procedure Laterality Date    CATARACT EXTRACTION W/  INTRAOCULAR LENS IMPLANT Left 8/11/14    [facility]    CATARACT EXTRACTION W/  INTRAOCULAR LENS IMPLANT Right 09/15/14    [facility]    COLONOSCOPY N/A 3/19/2020    Procedure: COLONOSCOPY;  Surgeon: Real Mabry MD;  Location: [address];  Service: Endoscopy;  Laterality: N/A;    COLONOSCOPY N/A 3/23/2021    " Procedure: COLONOSCOPY;  Surgeon: AUTUMN Berg MD;  Location: [facility] ENDO (4TH FLR);  Service: Endoscopy;  Laterality: N/A;  covid test 3/20 [facility]    COLONOSCOPY N/A 2/15/2022    Procedure: COLONOSCOPY;  Surgeon: Doris Simpson MD;  Location: [facility] ENDO (4TH FLR);  Service: Endoscopy;  Laterality: N/A;  COVID test on 2/12/22 at [facility]  2/14/22 - Pt confirmed 0640 arrival, prep instructions reviewed, Pt verbalized understanding-ERW@0927    ESOPHAGOGASTRODUODENOSCOPY N/A 3/19/2020    Procedure: EGD (ESOPHAGOGASTRODUODENOSCOPY);  Surgeon: Real Mabry MD;  Location: [facility] ENDO (2ND FLR);  Service: Endoscopy;  Laterality: N/A;    FOOT SURGERY      left    HYSTERECTOMY  1962    ILEOCOLECTOMY N/A 4/15/2021    Procedure: ILEOCOLECTOMY;  Surgeon: AUTUMN Berg MD;  Location: [facility] OR 2ND FLR;  Service: Colon and Rectal;  Laterality: N/A;    INSERTION OF TUNNELED CENTRAL VENOUS CATHETER (CVC) WITH SUBCUTANEOUS PORT N/A 7/2/2021    Procedure: [id number];  Surgeon: Suresh Agrawal MD;  Location: [facility] OR 2ND FLR;  Service: Vascular;  Laterality: N/A;  Right IJ    LAPAROSCOPIC HEMICOLECTOMY Right 3/20/2020    Procedure: HEMICOLECTOMY, RIGHT;  Surgeon: AUTUMN Berg MD;  Location: [facility] OR 2ND FLR;  Service: Colon and Rectal;  Laterality: Right;    LYSIS OF ADHESIONS N/A 4/15/2021    Procedure: LYSIS, ADHESIONS;  Surgeon: AUTUMN Berg MD;  [facility] F[facility];  Service: Colon and Rectal;  Laterality: N/A;  Greater than 2 hours    MOBILIZATION OF SPLENIC FLEXURE N/A 4/15/2021    Procedure: MOBILIZATION, SPLENIC FLEXURE;  Surgeon: AUTUMN Berg MD;  [facility] 2ND FLR;  Service: Colon and Rectal;  Laterality: N/A;       Review of patient's allergies indicates:  No Known Allergies    Family History   Problem Relation Age of Onset    Heart attack Father     Cataracts Brother     Heart attack Brother     Diabetes Brother     No Known Problems Mother      Cataracts Sister     Stroke Sister     Diabetes Sister     Cataracts Sister     Stroke Sister     No Known Problems Maternal Aunt     No Known Problems Maternal Uncle     No Known Problems Paternal Aunt     No Known Problems Paternal Uncle     No Known Problems Maternal Grandmother     No Known Problems Maternal Grandfather     No Known Problems Paternal Grandmother     No Known Problems Paternal Grandfather     Amblyopia Neg Hx     Blindness Neg Hx     Cancer Neg Hx     Glaucoma Neg Hx     Hypertension Neg Hx     Macular degeneration Neg Hx     Retinal detachment Neg Hx     Strabismus Neg Hx     Thyroid disease Neg Hx     Colon cancer Neg Hx     Esophageal cancer Neg Hx     Irritable bowel syndrome Neg Hx     Rectal cancer Neg Hx     Stomach cancer Neg Hx     Ulcerative colitis Neg Hx     Crohn's disease Neg Hx     Celiac disease Neg Hx        Social History     Socioeconomic History    Marital status: Single   Occupational History    Occupation: teaching retired    Occupation: Nun.  [employer]   Tobacco Use    Smoking status: Never Smoker    Smokeless tobacco: Never Used   Substance and Sexual Activity    Alcohol use: No     Alcohol/week: 0.0 standard drinks    Drug use: No    Sexual activity: Never   Social History Narrative    Member of [employer] order here in [place], [place]..  Lives with about 40-50 sisters.  All sisters with her are retired.         ROS:  GENERAL: No fever, chills, fatigability or weight loss.  Integument: No rashes, redness, icterus  CHEST: Denies DONOVAN, cyanosis, wheezing, cough and sputum production.  CARDIOVASCULAR: Denies chest pain, PND, orthopnea or reduced exercise tolerance.  GI: Denies abd pain, dysphagia, nausea, vomiting, no hematemesis   : Denies burning on urination, no hematuria, no bacteriuria  MSK: No deformities, swelling, joint pain swelling  Neurologic: No HAs, seizures, weakness, paresthesias, gait  "problems    PE:  General appearance well  BP (!) 145/71 (BP Location: Left arm, Patient Position: Sitting, BP Method: Medium (Automatic))   Pulse 102   Ht 5' 7" (1.702 m)   Wt 53.6 kg (118 lb 2.7 oz)   BMI 18.51 kg/m²     Sclera/ Skin anicteric  LN none palpable  AT NC EOMI  Neck supple trachea midline   Chest symmetric, nl excursion, no retractions, breathing comfortably  Abdomen  ND soft NT.  no masses, no organomegaly  EXT - no CCE  Neuro:  Mood/ affect nl, alert and oriented x 3, moves all ext's, gait nl        Assessment:  No evidence of recurrent colon CA  Good performance status    Plan:  RTC 3 months      "

## 2022-06-01 NOTE — TELEPHONE ENCOUNTER
No new care gaps identified.  [facility] Embedded Care Gaps. Reference number: [id number]. 6/01/2022   2:29:51 PM CDT

## 2022-06-02 RX ORDER — POTASSIUM CHLORIDE 750 MG/1
TABLET, EXTENDED RELEASE ORAL
Qty: 90 TABLET | Refills: 1 | Status: SHIPPED | OUTPATIENT
Start: 2022-06-02 | End: 2022-11-30

## 2022-06-02 NOTE — TELEPHONE ENCOUNTER
Refill Routing Note   Medication(s) are not appropriate for processing by [facility] for the following reason(s):      - Medication not previously prescribed by PCP    [facility] action(s):  Defer          Medication reconciliation completed: No     Appointments  past 12m or future 3m with PCP    Date Provider   Last Visit   5/23/2022 Brittney Travis MD   Next Visit   8/25/2022 Brittney Travis MD   ED visits in past 90 days: 0        Note composed:11:39 PM 06/01/2022

## 2022-06-06 ENCOUNTER — OFFICE VISIT (OUTPATIENT)
Dept: HEMATOLOGY/ONCOLOGY | Facility: CLINIC | Age: 87
End: 2022-06-06
Payer: MEDICARE

## 2022-06-06 ENCOUNTER — LAB VISIT (OUTPATIENT)
Dept: LAB | Facility: HOSPITAL | Age: 87
End: 2022-06-06
Attending: INTERNAL MEDICINE
Payer: MEDICARE

## 2022-06-06 VITALS
DIASTOLIC BLOOD PRESSURE: 75 MMHG | HEART RATE: 102 BPM | SYSTOLIC BLOOD PRESSURE: 175 MMHG | BODY MASS INDEX: 18.69 KG/M2 | OXYGEN SATURATION: 99 % | TEMPERATURE: 98 F | HEIGHT: 67 IN | WEIGHT: 119.06 LBS | RESPIRATION RATE: 18 BRPM

## 2022-06-06 DIAGNOSIS — C18.9 ADENOCARCINOMA OF COLON: Primary | ICD-10-CM

## 2022-06-06 DIAGNOSIS — D50.9 IRON DEFICIENCY ANEMIA, UNSPECIFIED IRON DEFICIENCY ANEMIA TYPE: ICD-10-CM

## 2022-06-06 DIAGNOSIS — I10 ESSENTIAL HYPERTENSION: ICD-10-CM

## 2022-06-06 DIAGNOSIS — C18.9 ADENOCARCINOMA OF COLON: ICD-10-CM

## 2022-06-06 LAB
ALBUMIN SERPL BCP-MCNC: 4.1 G/DL (ref 3.5–5.2)
ALP SERPL-CCNC: 66 U/L (ref 55–135)
ALT SERPL W/O P-5'-P-CCNC: 26 U/L (ref 10–44)
ANION GAP SERPL CALC-SCNC: 12 MMOL/L (ref 8–16)
AST SERPL-CCNC: 28 U/L (ref 10–40)
BASOPHILS # BLD AUTO: 0.03 K/UL (ref 0–0.2)
BASOPHILS NFR BLD: 0.5 % (ref 0–1.9)
BILIRUB SERPL-MCNC: 0.3 MG/DL (ref 0.1–1)
BUN SERPL-MCNC: 15 MG/DL (ref 8–23)
CALCIUM SERPL-MCNC: 9.9 MG/DL (ref 8.7–10.5)
CEA SERPL-MCNC: 4.1 NG/ML (ref 0–5)
CHLORIDE SERPL-SCNC: 100 MMOL/L (ref 95–110)
CO2 SERPL-SCNC: 23 MMOL/L (ref 23–29)
CREAT SERPL-MCNC: 0.7 MG/DL (ref 0.5–1.4)
DIFFERENTIAL METHOD: ABNORMAL
EOSINOPHIL # BLD AUTO: 0.1 K/UL (ref 0–0.5)
EOSINOPHIL NFR BLD: 1.5 % (ref 0–8)
ERYTHROCYTE [DISTWIDTH] IN BLOOD BY AUTOMATED COUNT: 14.7 % (ref 11.5–14.5)
EST. GFR  (AFRICAN AMERICAN): >60 ML/MIN/1.73 M^2
EST. GFR  (NON AFRICAN AMERICAN): >60 ML/MIN/1.73 M^2
GLUCOSE SERPL-MCNC: 90 MG/DL (ref 70–110)
HCT VFR BLD AUTO: 37.9 % (ref 37–48.5)
HGB BLD-MCNC: 11.9 G/DL (ref 12–16)
IMM GRANULOCYTES # BLD AUTO: 0.04 K/UL (ref 0–0.04)
IMM GRANULOCYTES NFR BLD AUTO: 0.7 % (ref 0–0.5)
LYMPHOCYTES # BLD AUTO: 1.4 K/UL (ref 1–4.8)
LYMPHOCYTES NFR BLD: 23.2 % (ref 18–48)
MCH RBC QN AUTO: 27.2 PG (ref 27–31)
MCHC RBC AUTO-ENTMCNC: 31.4 G/DL (ref 32–36)
MCV RBC AUTO: 87 FL (ref 82–98)
MONOCYTES # BLD AUTO: 0.9 K/UL (ref 0.3–1)
MONOCYTES NFR BLD: 14.6 % (ref 4–15)
NEUTROPHILS # BLD AUTO: 3.6 K/UL (ref 1.8–7.7)
NEUTROPHILS NFR BLD: 59.5 % (ref 38–73)
NRBC BLD-RTO: 0 /100 WBC
PLATELET # BLD AUTO: 241 K/UL (ref 150–450)
PMV BLD AUTO: 9.6 FL (ref 9.2–12.9)
POTASSIUM SERPL-SCNC: 4.4 MMOL/L (ref 3.5–5.1)
PROT SERPL-MCNC: 7.9 G/DL (ref 6–8.4)
RBC # BLD AUTO: 4.38 M/UL (ref 4–5.4)
SODIUM SERPL-SCNC: 135 MMOL/L (ref 136–145)
WBC # BLD AUTO: 6.03 K/UL (ref 3.9–12.7)

## 2022-06-06 PROCEDURE — 82378 CARCINOEMBRYONIC ANTIGEN: CPT | Performed by: STUDENT IN AN ORGANIZED HEALTH CARE EDUCATION/TRAINING PROGRAM

## 2022-06-06 PROCEDURE — 99214 OFFICE O/P EST MOD 30 MIN: CPT | Mod: PBBFAC | Performed by: STUDENT IN AN ORGANIZED HEALTH CARE EDUCATION/TRAINING PROGRAM

## 2022-06-06 PROCEDURE — 99999 PR PBB SHADOW E&M-EST. PATIENT-LVL IV: ICD-10-PCS | Mod: PBBFAC,GC,, | Performed by: STUDENT IN AN ORGANIZED HEALTH CARE EDUCATION/TRAINING PROGRAM

## 2022-06-06 PROCEDURE — 99215 OFFICE O/P EST HI 40 MIN: CPT | Mod: S$PBB,GC,, | Performed by: STUDENT IN AN ORGANIZED HEALTH CARE EDUCATION/TRAINING PROGRAM

## 2022-06-06 PROCEDURE — 36415 COLL VENOUS BLD VENIPUNCTURE: CPT | Performed by: STUDENT IN AN ORGANIZED HEALTH CARE EDUCATION/TRAINING PROGRAM

## 2022-06-06 PROCEDURE — 99999 PR PBB SHADOW E&M-EST. PATIENT-LVL IV: CPT | Mod: PBBFAC,GC,, | Performed by: STUDENT IN AN ORGANIZED HEALTH CARE EDUCATION/TRAINING PROGRAM

## 2022-06-06 PROCEDURE — 99215 PR OFFICE/OUTPT VISIT, EST, LEVL V, 40-54 MIN: ICD-10-PCS | Mod: S$PBB,GC,, | Performed by: STUDENT IN AN ORGANIZED HEALTH CARE EDUCATION/TRAINING PROGRAM

## 2022-06-06 PROCEDURE — 85025 COMPLETE CBC W/AUTO DIFF WBC: CPT | Performed by: STUDENT IN AN ORGANIZED HEALTH CARE EDUCATION/TRAINING PROGRAM

## 2022-06-06 PROCEDURE — 80053 COMPREHEN METABOLIC PANEL: CPT | Performed by: STUDENT IN AN ORGANIZED HEALTH CARE EDUCATION/TRAINING PROGRAM
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
## 2022-06-06 NOTE — Clinical Note
Hello, Follow up : Please schedue labs (CBC, CMP, CEA) in 3 months and appt with Dr. Garrett or Dr. Lockhart.

## 2022-06-15 DIAGNOSIS — I50.22 CHRONIC SYSTOLIC HEART FAILURE: ICD-10-CM

## 2022-06-15 DIAGNOSIS — I10 ESSENTIAL HYPERTENSION: ICD-10-CM

## 2022-06-15 RX ORDER — AMLODIPINE BESYLATE 10 MG/1
10 TABLET ORAL DAILY
Qty: 30 TABLET | Refills: 11 | Status: SHIPPED | OUTPATIENT
Start: 2022-06-15 | End: 2023-05-12 | Stop reason: SDUPTHER

## 2022-06-15 NOTE — TELEPHONE ENCOUNTER
No new care gaps identified.  [facility] Embedded Care Gaps. Reference number: [id number]. 6/15/2022   1:43:38 PM CDT

## 2022-06-15 NOTE — TELEPHONE ENCOUNTER
Refill Routing Note   Medication(s) are not appropriate for processing by [facility] for the following reason(s):      - Required vitals are abnormal    [facility] action(s):  Defer       Medication Therapy Plan: Last BP 06/06/22 (!) 175/75  Medication reconciliation completed: No     Appointments  past 12m or future 3m with PCP    Date Provider   Last Visit   5/23/2022 Brittney Travis MD   Next Visit   8/25/2022 Brittney Travis MD   ED visits in past 90 days: 0        Note composed:1:53 PM 06/15/2022

## 2022-06-16 RX ORDER — FUROSEMIDE 20 MG/1
TABLET ORAL
Qty: 90 TABLET | Refills: 1 | Status: SHIPPED | OUTPATIENT
Start: 2022-06-16 | End: 2022-12-21

## 2022-07-20 DIAGNOSIS — M81.0 AGE-RELATED OSTEOPOROSIS WITHOUT CURRENT PATHOLOGICAL FRACTURE: Primary | ICD-10-CM

## 2022-07-26 ENCOUNTER — INFUSION (OUTPATIENT)
Dept: INFECTIOUS DISEASES | Facility: HOSPITAL | Age: 87
End: 2022-07-26
Attending: INTERNAL MEDICINE
Payer: MEDICARE

## 2022-07-26 VITALS
HEART RATE: 88 BPM | BODY MASS INDEX: 18.68 KG/M2 | WEIGHT: 119.25 LBS | SYSTOLIC BLOOD PRESSURE: 179 MMHG | TEMPERATURE: 98 F | OXYGEN SATURATION: 96 % | RESPIRATION RATE: 18 BRPM | DIASTOLIC BLOOD PRESSURE: 77 MMHG

## 2022-07-26 DIAGNOSIS — M81.0 AGE-RELATED OSTEOPOROSIS WITHOUT CURRENT PATHOLOGICAL FRACTURE: Primary | ICD-10-CM

## 2022-07-26 PROCEDURE — 96372 THER/PROPH/DIAG INJ SC/IM: CPT

## 2022-07-26 PROCEDURE — 63600175 PHARM REV CODE 636 W HCPCS: Mod: JG | Performed by: PHYSICIAN ASSISTANT

## 2022-07-26 RX ADMIN — DENOSUMAB 60 MG: 60 INJECTION SUBCUTANEOUS at 09:07

## 2022-07-26 NOTE — PROGRESS NOTES
Patient arrived for her Prolia injection. Pt has Fosamax on her MAR, pt stated she takes this medicine every Sunday, ELIOT butts stated to tell pt to STOP taking fosamax and pt may have prolia injection today, Denies dental surgery <  3 months,  Tolerated well and left in NAD.    Patient is taking Vitamin D

## 2022-08-22 PROBLEM — Z00.00 HEALTHCARE MAINTENANCE: Status: RESOLVED | Noted: 2021-05-24 | Resolved: 2022-08-22

## 2022-08-25 ENCOUNTER — OFFICE VISIT (OUTPATIENT)
Dept: PRIMARY CARE CLINIC | Facility: CLINIC | Age: 87
End: 2022-08-25
Payer: MEDICARE

## 2022-08-25 VITALS
TEMPERATURE: 98 F | WEIGHT: 123.13 LBS | DIASTOLIC BLOOD PRESSURE: 82 MMHG | HEIGHT: 67 IN | OXYGEN SATURATION: 96 % | BODY MASS INDEX: 19.33 KG/M2 | SYSTOLIC BLOOD PRESSURE: 136 MMHG | HEART RATE: 85 BPM

## 2022-08-25 DIAGNOSIS — Z00.00 HEALTHCARE MAINTENANCE: ICD-10-CM

## 2022-08-25 DIAGNOSIS — I10 ESSENTIAL HYPERTENSION: Primary | ICD-10-CM

## 2022-08-25 DIAGNOSIS — M81.0 AGE-RELATED OSTEOPOROSIS WITHOUT CURRENT PATHOLOGICAL FRACTURE: ICD-10-CM

## 2022-08-25 PROBLEM — D84.81 IMMUNODEFICIENCY SECONDARY TO NEOPLASM: Status: RESOLVED | Noted: 2021-05-06 | Resolved: 2022-08-25

## 2022-08-25 PROBLEM — D49.9 IMMUNODEFICIENCY SECONDARY TO NEOPLASM: Status: RESOLVED | Noted: 2021-05-06 | Resolved: 2022-08-25

## 2022-08-25 PROCEDURE — 99214 OFFICE O/P EST MOD 30 MIN: CPT | Mod: S$GLB,,, | Performed by: INTERNAL MEDICINE

## 2022-08-25 PROCEDURE — 99214 PR OFFICE/OUTPT VISIT, EST, LEVL IV, 30-39 MIN: ICD-10-PCS | Mod: S$GLB,,, | Performed by: INTERNAL MEDICINE

## 2022-08-25 NOTE — ASSESSMENT & PLAN NOTE
· Power-of- completed 5/2022 with patient's assistance [facility]  · TSh for yearly labs today

## 2022-08-25 NOTE — PROGRESS NOTES
.  This note has been generated using voice-recognition software. There may be typographical errors that have been missed during proof-reading.     Primary Care Provider Appointment    Subjective:      Patient ID: Nydia Stevens is a 88 y.o. female here for follow up.     Chief Complaint: Follow-up    HPI:  This is an 87-year-old female with hypertension, hyperlipidemia, congestive heart failure, colon cancer, iron deficiency anemia, carotid artery disease, prediabetes, osteoporosis here for f/u.  patient last seen 05/23/2022 05/25/2022 patient seen by GI surgery.  History of colon cancer.  Follow-up 3 months.  BERT  06/06/2022 patient seen by heme Onc.  Repeat CT 03/2023 07/26/2022 patient given Prolia injection.      Pt with 11 lb wt gain.  No gi issues.  No limitations on activities.    she has stopped her weekly fosamax with prolia.   Did well with radiation.  S/p 5 sessions.         Patient Active Problem List   Diagnosis    Essential hypertension     Mixed hyperlipidemia    Iron deficiency anemia    Palliative care encounter    Closed fracture of left olecranon process    Debility    Aortic stenosis, mild    Osteoporosis    Thrombocytosis    Chronic combined systolic and diastolic heart failure    Adenocarcinoma of colon    Carotid stenosis, asymptomatic, bilateral    H/O: hysterectomy    Prediabetes    Colon cancer    B12 deficiency    Healthcare maintenance    Aortic atherosclerosis    Dementia with behavioral disturbance    Benign mole        Past Surgical History:   Procedure Laterality Date    CATARACT EXTRACTION W/  INTRAOCULAR LENS IMPLANT Left 8/11/14    gregor    CATARACT EXTRACTION W/  INTRAOCULAR LENS IMPLANT Right 09/15/14    gregor    COLONOSCOPY N/A 3/19/2020    Procedure: COLONOSCOPY;  Surgeon: Real Mabry MD;  Location: [facility] ([address] [place] [address]);  Service: Endoscopy;  Laterality: N/A;    COLONOSCOPY N/A 3/23/2021    Procedure: COLONOSCOPY;  Surgeon: AUTUMN Berg MD;  Location: [facility]  (4TH FLR);  Service: Endoscopy;  Laterality: N/A;  covid test 3/20 [facility]    COLONOSCOPY N/A 2/15/2022    Procedure: COLONOSCOPY;  Surgeon: Doris Simpson MD;  Location: [facility] ENDO (4TH FLR);  Service: Endoscopy;  Laterality: N/A;  COVID test on 2/12/22 at [facility]  2/14/22 - Pt confirmed 0640 arrival, prep instructions reviewed, Pt verbalized understanding-ERW@0927    ESOPHAGOGASTRODUODENOSCOPY N/A 3/19/2020    Procedure: EGD (ESOPHAGOGASTRODUODENOSCOPY);  Surgeon: Real Mabry MD;  Location: [facility] ENDO (2ND FLR);  Service: Endoscopy;  Laterality: N/A;    FOOT SURGERY      left    HYSTERECTOMY  1962    ILEOCOLECTOMY N/A 4/15/2021    Procedure: ILEOCOLECTOMY;  Surgeon: AUTUMN Berg MD;  Location: [facility] OR 2ND FLR;  Service: Colon and Rectal;  Laterality: N/A;    INSERTION OF TUNNELED CENTRAL VENOUS CATHETER (CVC) WITH SUBCUTANEOUS PORT N/A 7/2/2021    Procedure: [id number];  Surgeon: Suresh Agrawal MD;  Location: [facility] OR 2ND FLR;  Service: Vascular;  Laterality: N/A;  Right IJ    LAPAROSCOPIC HEMICOLECTOMY Right 3/20/2020    Procedure: HEMICOLECTOMY, RIGHT;  Surgeon: AUTUMN Berg MD;  Location: [facility] OR 2ND FLR;  Service: Colon and Rectal;  Laterality: Right;    LYSIS OF ADHESIONS N/A 4/15/2021    Procedure: LYSIS, ADHESIONS;  Surgeon: AUTUMN Berg MD;  Location: [facility] OR 2ND FLR;  Service: Colon and Rectal;  Laterality: N/A;  Greater than 2 hours    MOBILIZATION OF SPLENIC FLEXURE N/A 4/15/2021    Procedure: MOBILIZATION, SPLENIC FLEXURE;  Surgeon: AUTUMN Berg MD;  Location: [facility] OR 2ND FLR;  Service: Colon and Rectal;  Laterality: N/A;       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  [employer]   Tobacco Use    Smoking status: Never    Smokeless tobacco: Never   Substance and Sexual Activity    Alcohol use: No     Alcohol/week: 0.0 standard drinks    Drug use: No    Sexual activity: Never   Social History Narrative    Member of [employer] order here in [place], [place]..  Lives with about 40-50 sisters.  All sisters with her are retired.         Review of Systems    PHQ9 7/22/2021   Total Score 0        Checklist of Daily Activities:        Objective:   /82 (BP Location: Left arm, Patient Position: Sitting, BP Method: Medium (Manual))   Pulse 85   Temp 98 °F (36.7 °C) (Oral)   Ht 5' 7" (1.702 m)   Wt 55.8 kg (123 lb 2 oz)   SpO2 96%   BMI 19.28 kg/m²     Physical Exam  Constitutional:       General: She is not in acute distress.     Appearance: Normal appearance. She is not ill-appearing, toxic-appearing or diaphoretic.   HENT:      Head: Normocephalic and atraumatic.   Cardiovascular:      Rate and Rhythm: Normal rate and regular rhythm.      Heart sounds: Murmur heard.   Pulmonary:      Effort: Pulmonary effort is normal.      Breath sounds: Normal breath sounds.   Abdominal:      Palpations: Abdomen is soft.      Tenderness: There is no abdominal tenderness. There is no guarding or rebound.      Hernia: A hernia (ventral hernia) is present.   Musculoskeletal:      Right lower leg: No edema.      Left lower leg: No edema.   Lymphadenopathy:      Cervical: No cervical adenopathy.   Neurological:      Mental Status: She is alert.           Lab Results   Component Value Date    WBC 6.22 09/06/2022    HGB 11.9 (L) 09/06/2022    HCT 37.9 09/06/2022     09/06/2022    CHOL 184 02/17/2021    TRIG 65 02/17/2021    HDL 39 (L) 02/17/2021    ALT 20 12/01/2022    AST 25 12/01/2022     12/01/2022    K 4.8 12/01/2022     12/01/2022    CREATININE 0.8 12/01/2022    BUN 17 " 12/01/2022    CO2 25 12/01/2022    TSH 1.985 08/25/2022    INR 0.9 03/17/2020    HGBA1C 5.8 (H) 12/01/2022       Current Outpatient Medications on File Prior to Visit   Medication Sig Dispense Refill    amLODIPine (NORVASC) 10 MG tablet Take 1 tablet (10 mg total) by mouth once daily. 30 tablet 11    cholecalciferol, vitamin D3, (VITAMIN D3) 25 mcg (1,000 unit) capsule Take 1,000 Units by mouth once daily. Hold until after surgery      cyanocobalamin (VITAMIN B-12) 1000 MCG tablet Take 1 tablet (1,000 mcg total) by mouth once daily. Hold until after surgery 30 tablet 11    ferrous sulfate (FEOSOL) 325 mg (65 mg iron) Tab tablet TAKE ONE TABLET BY MOUTH ON TUESDAY, THURSDAY, AND SATURDAY HOLD UNTIL AFTER SURGERY 30 tablet 1    LIDOcaine-prilocaine (EMLA) cream Apply topically as needed (apply over port before chemo). apply over port before chemo 30 g 3    losartan (COZAAR) 50 MG tablet TAKE ONE TABLET BY MOUTH EVERY DAY 90 tablet 2    lutein-zeaxanthin (OCUVITE LUTEIN 25) 25-5 mg Cap Take 1 tablet by mouth once daily. 30 capsule 0    rosuvastatin (CRESTOR) 40 MG Tab TAKE ONE TABLET BY MOUTH EVERY DAY FOR CHOLESTEROL 90 tablet 3    VIT C/VIT E ACETATE/LUTEIN/MIN (OCUVITE LUTEIN ORAL) Take 1 tablet by mouth once daily. Hold until after surgery      calcium carbonate (OS-JAILYN) 500 mg calcium (1,250 mg) tablet Take 1 tablet (500 mg total) by mouth once daily. (Patient taking differently: Take 0.5 tablets by mouth 2 (two) times daily. Hold until after surgery)  0     Current Facility-Administered Medications on File Prior to Visit   Medication Dose Route Frequency Provider Last Rate Last Admin    gabapentin capsule 300 mg  300 mg Oral TID Amanda Gaspar NP   300 mg at 04/16/21 0814    LIDOcaine (PF) 10 mg/ml (1%) injection 10 mg  1 mL Intradermal On Call Procedure Amanda Gaspar NP             Assessment:   88 y.o. female with multiple co-morbid illnesses here for continued follow up of medical problems.       Plan:     Problem List Items Addressed This Visit          Cardiac/Vascular    Essential hypertension  - Primary (Chronic)     Blood pressure goal today  Continue current medications.         Relevant Orders    TSH (Completed)       Orthopedic    Osteoporosis     Reviewed, patient not taking Fosamax.  Tolerating Prolia without any problems.  Continue Prolia.            Other    Healthcare maintenance     Power-of- completed 5/2022 with patient's assistance superior general  TSh for yearly labs today            Health Maintenance         Date Due Completion Date    COVID-19 Vaccine (5 - Booster) 08/19/2022 4/19/2022    Influenza Vaccine (1) 09/01/2022 10/15/2021    DEXA Scan 02/04/2023 2/4/2021    Override on 2/15/2016: Declined    Colonoscopy 02/15/2025 2/15/2022    TETANUS VACCINE 02/15/2028 2/15/2018          Medications Reconciliation:   I have not reconciled the patient's home medications with the patient/family. I have updated all changes.  Refer to After-Visit Medication List.      Medication List            Accurate as of August 25, 2022 11:59 PM. If you have any questions, ask your nurse or doctor.                CHANGE how you take these medications      calcium carbonate 500 mg calcium (1,250 mg) tablet  Commonly known as: OS-JAILYN  Take 1 tablet (500 mg total) by mouth once daily.  What changed:   how much to take  when to take this  additional instructions            CONTINUE taking these medications      amLODIPine 10 MG tablet  Commonly known as: NORVASC  Take 1 tablet (10 mg total) by mouth once daily.     cholecalciferol (vitamin D3) 25 mcg (1,000 unit) capsule  Commonly known as: VITAMIN D3     cyanocobalamin 1000 MCG tablet  Commonly known as: VITAMIN B-12  Take 1 tablet (1,000 mcg total) by mouth once daily. Hold until after surgery     ferrous sulfate 325 mg (65 mg iron) Tab tablet  Commonly known as: FEOSOL  TAKE ONE TABLET BY MOUTH ON TUESDAY, THURSDAY, AND SATURDAY HOLD UNTIL AFTER  SURGERY     furosemide 20 MG tablet  Commonly known as: LASIX  TAKE ONE TABLET BY MOUTH EVERY DAY     LIDOcaine-prilocaine cream  Commonly known as: EMLA  Apply topically as needed (apply over port before chemo). apply over port before chemo     losartan 50 MG tablet  Commonly known as: COZAAR  TAKE ONE TABLET BY MOUTH EVERY DAY     lutein-zeaxanthin 25-5 mg Cap  Commonly known as: OCUVITE LUTEIN 25  Take 1 tablet by mouth once daily.     OCUVITE LUTEIN ORAL     potassium chloride 10 MEQ Tbsr  Commonly known as: KLOR-CON  TAKE 1 TABLET BY MOUTH ONCE DAILY.     rosuvastatin 40 MG Tab  Commonly known as: CRESTOR  TAKE ONE TABLET BY MOUTH EVERY DAY FOR CHOLESTEROL            STOP taking these medications      alendronate 70 MG tablet  Commonly known as: FOSAMAX  Stopped by: Brittney Travis MD     promethazine 12.5 MG Tab  Commonly known as: PHENERGAN  Stopped by: Brittney Travis MD                   Follow up in about 3 months (around 11/25/2022). Total clinical care time was 25 min. The following issues were discussed:  Blood pressure, heme Onc appointment, started Brittney Hogan MD  Internal Medicine  [facility]  [phone]

## 2022-08-31 ENCOUNTER — OFFICE VISIT (OUTPATIENT)
Dept: SURGERY | Facility: CLINIC | Age: 87
End: 2022-08-31
Payer: MEDICARE

## 2022-08-31 VITALS
HEART RATE: 89 BPM | WEIGHT: 120.69 LBS | BODY MASS INDEX: 18.94 KG/M2 | DIASTOLIC BLOOD PRESSURE: 63 MMHG | HEIGHT: 67 IN | SYSTOLIC BLOOD PRESSURE: 168 MMHG

## 2022-08-31 DIAGNOSIS — Z85.038 ENCOUNTER FOR FOLLOW-UP SURVEILLANCE OF COLON CANCER: Primary | ICD-10-CM

## 2022-08-31 DIAGNOSIS — Z08 ENCOUNTER FOR FOLLOW-UP SURVEILLANCE OF COLON CANCER: Primary | ICD-10-CM

## 2022-08-31 PROCEDURE — 99214 PR OFFICE/OUTPT VISIT, EST, LEVL IV, 30-39 MIN: ICD-10-PCS | Mod: S$PBB,,, | Performed by: COLON & RECTAL SURGERY

## 2022-08-31 PROCEDURE — 99214 OFFICE O/P EST MOD 30 MIN: CPT | Mod: S$PBB,,, | Performed by: COLON & RECTAL SURGERY

## 2022-08-31 PROCEDURE — 99999 PR PBB SHADOW E&M-EST. PATIENT-LVL III: ICD-10-PCS | Mod: PBBFAC,,, | Performed by: COLON & RECTAL SURGERY

## 2022-08-31 PROCEDURE — 99213 OFFICE O/P EST LOW 20 MIN: CPT | Mod: PBBFAC | Performed by: COLON & RECTAL SURGERY

## 2022-08-31 PROCEDURE — 99999 PR PBB SHADOW E&M-EST. PATIENT-LVL III: CPT | Mod: PBBFAC,,, | Performed by: COLON & RECTAL SURGERY

## 2022-08-31 NOTE — PROGRESS NOTES
HPI:  Nydia Stevens is a 87 y.o. female with history of a history of colon cancer, s/p right hemicolectomy, presents to clinic for discussion of anastomotic recurrence.  Pathology from previous surgery showed a pT4N0 lesion.  She was offered adjuvant chemotherapy but she declined.     Immunohistochemical studies for DNA mismatch repair proteins were performed   on this tumor (block 1H) and demonstrate intact staining in all 4 proteins   (MLH1, PMS2, MSH2, and MSH6).  These results indicate that this tumor is   microsatellite stable (ANDREW) and that Santillan syndrome (Hereditary Nonpolyposis   Colorectal Cancer, HNPCC) is unlikely.   Histologic sections highlighting visceral peritoneal involvement were   reviewed by Dr. Clare Plascencia and she concurs with the above impression.   COLON AND RECTUM: Resection, Including Transanal Disk Excision of Rectal   Neoplasms       Procedure         Right hemicolectomy     TUMOR       Tumor Site         Right (ascending) colon       Histologic Type         Adenocarcinoma       Histologic Grade         G2: Moderately differentiated       Tumor Size         Greatest dimension (Centimeters) 6.0 cm       Tumor Deposits         Not identified       Tumor Extension         Tumor invades the visceral peritoneum       Macroscopic Tumor Perforation         Not identified       Lymphovascular Invasion         Not identified       Perineural Invasion         Present       Treatment Effect         No known presurgical therapy           Margins Examined             Proximal             Distal             Radial or Mesenteric     LYMPH NODES       Regional Lymph Nodes           Number of Lymph Nodes Involved 0           Number of Lymph Nodes Examined 20       Primary Tumor (pT)         pT4a       Regional Lymph Nodes (pN)         pN0     ADDITIONAL FINDINGS       Additional Pathologic Findings         Diverticulosis     The patient recently underwent colonoscopy on 3/23 and was found to  have a lesion at her ileocolic anastomosis.  Biopsies of this lesion were obtained and were consistent with invasive colon cancer.  CEA from 2/12/21 was 12.0.     PET scan was performed today.  At the time of the patient visit, no official read was back from radiology, but the area of PET avidity appears to be isolated only to the previous anastomosis.     4- exp lap, ileocolectomy, ileal to descending colon anastomosis.       FINAL PATHOLOGIC DIAGNOSIS  [facility] DIAGNOSIS:   PROCEDURE: lleocolic resection (10 cm ileum, transverse colon and descending   colon, ileal descending colon anastomosis).   TUMOR SITE: Small intestine, not otherwise specified.   TUMOR SIZE: Greatest dimension: 4.2 cm   Additional dimension: 4.2 x 0.6 cm   LOCATION OF TUMOR: Tumor at previous ileocolic anastomosis.   MACROSCOPIC TUMOR PERFORATION: Not identified.   HISTOLOGIC TYPE: Adenocarcinoma, see comment.   HISTOLOGIC GRADE: Moderate to poorly differentiated.   TUMOR EXTENSION: Tumor invades through the muscularis propria and extends to   serosal surface.   MARGINS: All margins are uninvolved by invasive carcinoma, carcinoma in-situ   (high grade dysplasia) and adenoma (closest margin, 12.2 cm).   MARGINS EXAMINED: Proximal and distal.   LYMPHOVASCULAR INVASION: Not identified.   REGIONAL LYMPH NODES:   Number of lymph nodes involved 0   Number of lymph nodes examined: 14   PATHOLOGIC STAGING (pTNM): pT4 N0 MX   Comment:   MMR studies can be performed upon request.   Imani Cuevas MD   Report attached.   Performing site:   [address] [address]      2- colonoscopy  Findings:        The perianal and digital rectal examinations were normal.        The terminal ileum appeared normal.        There was evidence of a prior side-to-side ileo-colonic anastomosis        in the descending colon. This was patent and was characterized by        inflammation. The anastomosis was traversed.  "Biopsies were taken        with a cold forceps for histology. Verification of patient        identification for the specimen was done. Estimated blood loss was        minimal. To prevent bleeding after the biopsy, one hemostatic clip        was successfully placed. There was no bleeding at the end of the        procedure.        The exam was otherwise without abnormality on direct and        retroflexion views.      Collected: 02/15/22 0827   Result status: Final   Resulting lab: [facility]   Value: RELIAPATH DIAGNOSIS:   SPECIMEN DESIGNATED "ILEOCOLONIC ANASTOMOSIS" BIOPSY:   Benign colonic mucosa with acute inflammation/granulation tissue.   Negative for dysplasia/carcinoma.   Yuliana Day M.D.   Report attached.   Performing site:   [place] [address]   "Disclaimer:  This case diagnosis was rendered completely by the outside   consultation pathologist and the case is electronically signed by an [facility]   pathologist listed below solely to release the report into the medical   record."    Comment: Interp By Krysta Bradley D.O., Signed on 02/22/2022 at 08:23         8-  Interval hx:  Feels well.  No pain.  BMs 3 per day.  Continent.  No blood.    Appetite good, gaining wt.        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    Hyperlipidemia     Hypertension     Osteoporosis         Past Surgical History:   Procedure Laterality Date    CATARACT EXTRACTION W/  INTRAOCULAR LENS IMPLANT Left 8/11/14    gregor    CATARACT EXTRACTION W/  INTRAOCULAR LENS IMPLANT Right 09/15/14    gregor    COLONOSCOPY N/A 3/19/2020    Procedure: COLONOSCOPY;  Surgeon: Real Mabry MD;  Location: [address];  Service: Endoscopy;  Laterality: N/A;    COLONOSCOPY N/A 3/23/2021    " Procedure: COLONOSCOPY;  Surgeon: AUTUMN Berg MD;  Location: [facility] ENDO (4TH FLR);  Service: Endoscopy;  Laterality: N/A;  covid test 3/20 [facility]    COLONOSCOPY N/A 2/15/2022    Procedure: COLONOSCOPY;  Surgeon: Doris Simpson MD;  Location: [facility] ENDO (4TH FLR);  Service: Endoscopy;  Laterality: N/A;  COVID test on 2/12/22 at [facility]  2/14/22 - Pt confirmed 0640 arrival, prep instructions reviewed, Pt verbalized understanding-ERW@0904    ESOPHAGOGASTRODUODENOSCOPY N/A 3/19/2020    Procedure: EGD (ESOPHAGOGASTRODUODENOSCOPY);  Surgeon: Real Mabry MD;  Location: [facility] ENDO (2ND FLR);  Service: Endoscopy;  Laterality: N/A;    FOOT SURGERY      left    HYSTERECTOMY  1962    ILEOCOLECTOMY N/A 4/15/2021    Procedure: ILEOCOLECTOMY;  Surgeon: AUTUMN Berg MD;  Location: [facility] OR 2ND FLR;  Service: Colon and Rectal;  Laterality: N/A;    INSERTION OF TUNNELED CENTRAL VENOUS CATHETER (CVC) WITH SUBCUTANEOUS PORT N/A 7/2/2021    Procedure: [id number]-[id number];  Surgeon: Suresh Agrawal MD;  Location: [facility] OR 2ND FLR;  Service: Vascular;  Laterality: N/A;  Right IJ    LAPAROSCOPIC HEMICOLECTOMY Right 3/20/2020    Procedure: HEMICOLECTOMY, RIGHT;  Surgeon: AUTUMN Berg MD;  Location: [facility] OR 2ND FLR;  Service: Colon and Rectal;  Laterality: Right;    LYSIS OF ADHESIONS N/A 4/15/2021    Procedure: LYSIS, ADHESIONS;  Surgeon: AUTUMN Berg MD;  Location: [facility] OR 2ND FLR;  Service: Colon and Rectal;  Laterality: N/A;  Greater than 2 hours    MOBILIZATION OF SPLENIC FLEXURE N/A 4/15/2021    Procedure: MOBILIZATION, SPLENIC FLEXURE;  Surgeon: AUTUMN Berg MD;  Location: [facility] OR 2ND FLR;  Service: Colon and Rectal;  Laterality: N/A;       Review of patient's allergies indicates:  No Known Allergies    Family History   Problem Relation Age of Onset    Heart attack Father     Cataracts Brother     Heart attack Brother     Diabetes Brother     No Known Problems Mother     Cataracts Sister      Stroke Sister     Diabetes Sister     Cataracts Sister     Stroke Sister     No Known Problems Maternal Aunt     No Known Problems Maternal Uncle     No Known Problems Paternal Aunt     No Known Problems Paternal Uncle     No Known Problems Maternal Grandmother     No Known Problems Maternal Grandfather     No Known Problems Paternal Grandmother     No Known Problems Paternal Grandfather     Amblyopia Neg Hx     Blindness Neg Hx     Cancer Neg Hx     Glaucoma Neg Hx     Hypertension Neg Hx     Macular degeneration Neg Hx     Retinal detachment Neg Hx     Strabismus Neg Hx     Thyroid disease Neg Hx     Colon cancer Neg Hx     Esophageal cancer Neg Hx     Irritable bowel syndrome Neg Hx     Rectal cancer Neg Hx     Stomach cancer Neg Hx     Ulcerative colitis Neg Hx     Crohn's disease Neg Hx     Celiac disease Neg Hx        Social History     Socioeconomic History    Marital status: Single   Occupational History    Occupation: teaching retired    Occupation: Nun.  [employer]   Tobacco Use    Smoking status: Never    Smokeless tobacco: Never   Substance and Sexual Activity    Alcohol use: No     Alcohol/week: 0.0 standard drinks    Drug use: No    Sexual activity: Never   Social History Narrative    Member of [employer] order here in [place], [place]..  Lives with about 40-50 sisters.  All sisters with her are retired.         ROS:  GENERAL: No fever, chills, fatigability or weight loss.  Integument: No rashes, redness, icterus  CHEST: Denies DONOVAN, cyanosis, wheezing, cough and sputum production.  CARDIOVASCULAR: Denies chest pain, PND, orthopnea or reduced exercise tolerance.  GI: Denies abd pain, dysphagia, nausea, vomiting, no hematemesis   : Denies burning on urination, no hematuria, no bacteriuria  MSK: No deformities, swelling, joint pain swelling  Neurologic: No HAs, seizures, weakness, paresthesias, gait problems    PE:  General appearance healthy  BP (!) 168/63 (BP Location: Right  "arm, Patient Position: Sitting, BP Method: Small (Automatic))   Pulse 89   Ht 5' 7" (1.702 m)   Wt 54.8 kg (120 lb 11.2 oz)   BMI 18.90 kg/m²   Sclera/ Skin anicteric  LN none palpable  AT NC EOMI  Neck supple trachea midline   Chest symmetric, nl excursion, no retractions, breathing comfortably  Abdomen  ND soft NT.  no masses, no organomegaly  EXT - no CCE  Neuro:  Mood/ affect nl, alert and oriented x 3, moves all ext's, gait nl      Assessment:  No clinical evidence of recurrent colon cancer  Good performance status    Plan:  Imaging and CEA per medical oncology  RTC in 3 months.        "

## 2022-09-02 DIAGNOSIS — Z00.6 CLINICAL TRIAL PARTICIPANT: ICD-10-CM

## 2022-09-02 DIAGNOSIS — C18.9 COLON ADENOCARCINOMA: Primary | ICD-10-CM

## 2022-09-06 ENCOUNTER — OFFICE VISIT (OUTPATIENT)
Dept: HEMATOLOGY/ONCOLOGY | Facility: CLINIC | Age: 87
End: 2022-09-06
Payer: MEDICARE

## 2022-09-06 ENCOUNTER — LAB VISIT (OUTPATIENT)
Dept: LAB | Facility: HOSPITAL | Age: 87
End: 2022-09-06
Payer: MEDICARE

## 2022-09-06 ENCOUNTER — RESEARCH ENCOUNTER (OUTPATIENT)
Dept: RESEARCH | Facility: HOSPITAL | Age: 87
End: 2022-09-06
Payer: MEDICARE

## 2022-09-06 VITALS
RESPIRATION RATE: 18 BRPM | BODY MASS INDEX: 19.19 KG/M2 | WEIGHT: 122.25 LBS | SYSTOLIC BLOOD PRESSURE: 163 MMHG | HEIGHT: 67 IN | OXYGEN SATURATION: 99 % | HEART RATE: 99 BPM | DIASTOLIC BLOOD PRESSURE: 74 MMHG | TEMPERATURE: 99 F

## 2022-09-06 DIAGNOSIS — D50.9 IRON DEFICIENCY ANEMIA, UNSPECIFIED IRON DEFICIENCY ANEMIA TYPE: ICD-10-CM

## 2022-09-06 DIAGNOSIS — C18.9 COLON ADENOCARCINOMA: Primary | ICD-10-CM

## 2022-09-06 DIAGNOSIS — C18.9 COLON ADENOCARCINOMA: ICD-10-CM

## 2022-09-06 DIAGNOSIS — C18.9 ADENOCARCINOMA OF COLON: ICD-10-CM

## 2022-09-06 DIAGNOSIS — I10 ESSENTIAL HYPERTENSION: ICD-10-CM

## 2022-09-06 DIAGNOSIS — Z00.6 CLINICAL TRIAL PARTICIPANT: ICD-10-CM

## 2022-09-06 LAB
ALBUMIN SERPL BCP-MCNC: 4.3 G/DL (ref 3.5–5.2)
ALP SERPL-CCNC: 70 U/L (ref 55–135)
ALT SERPL W/O P-5'-P-CCNC: 14 U/L (ref 10–44)
ANION GAP SERPL CALC-SCNC: 9 MMOL/L (ref 8–16)
AST SERPL-CCNC: 19 U/L (ref 10–40)
BASOPHILS # BLD AUTO: 0.04 K/UL (ref 0–0.2)
BASOPHILS NFR BLD: 0.6 % (ref 0–1.9)
BILIRUB SERPL-MCNC: 0.4 MG/DL (ref 0.1–1)
BUN SERPL-MCNC: 17 MG/DL (ref 8–23)
CALCIUM SERPL-MCNC: 9.8 MG/DL (ref 8.7–10.5)
CEA SERPL-MCNC: 3.6 NG/ML (ref 0–5)
CHLORIDE SERPL-SCNC: 98 MMOL/L (ref 95–110)
CO2 SERPL-SCNC: 26 MMOL/L (ref 23–29)
CREAT SERPL-MCNC: 0.8 MG/DL (ref 0.5–1.4)
DIFFERENTIAL METHOD: ABNORMAL
EOSINOPHIL # BLD AUTO: 0.1 K/UL (ref 0–0.5)
EOSINOPHIL NFR BLD: 1.6 % (ref 0–8)
ERYTHROCYTE [DISTWIDTH] IN BLOOD BY AUTOMATED COUNT: 15.1 % (ref 11.5–14.5)
EST. GFR  (NO RACE VARIABLE): >60 ML/MIN/1.73 M^2
GLUCOSE SERPL-MCNC: 98 MG/DL (ref 70–110)
HCT VFR BLD AUTO: 37.9 % (ref 37–48.5)
HGB BLD-MCNC: 11.9 G/DL (ref 12–16)
IMM GRANULOCYTES # BLD AUTO: 0.06 K/UL (ref 0–0.04)
IMM GRANULOCYTES NFR BLD AUTO: 1 % (ref 0–0.5)
LYMPHOCYTES # BLD AUTO: 2 K/UL (ref 1–4.8)
LYMPHOCYTES NFR BLD: 32.2 % (ref 18–48)
MCH RBC QN AUTO: 27.2 PG (ref 27–31)
MCHC RBC AUTO-ENTMCNC: 31.4 G/DL (ref 32–36)
MCV RBC AUTO: 87 FL (ref 82–98)
MONOCYTES # BLD AUTO: 0.9 K/UL (ref 0.3–1)
MONOCYTES NFR BLD: 15.1 % (ref 4–15)
NEUTROPHILS # BLD AUTO: 3.1 K/UL (ref 1.8–7.7)
NEUTROPHILS NFR BLD: 49.5 % (ref 38–73)
NRBC BLD-RTO: 0 /100 WBC
PLATELET # BLD AUTO: 268 K/UL (ref 150–450)
PMV BLD AUTO: 9.1 FL (ref 9.2–12.9)
POTASSIUM SERPL-SCNC: 4 MMOL/L (ref 3.5–5.1)
PROT SERPL-MCNC: 7.7 G/DL (ref 6–8.4)
RBC # BLD AUTO: 4.38 M/UL (ref 4–5.4)
SODIUM SERPL-SCNC: 133 MMOL/L (ref 136–145)
WBC # BLD AUTO: 6.22 K/UL (ref 3.9–12.7)

## 2022-09-06 PROCEDURE — 99214 PR OFFICE/OUTPT VISIT, EST, LEVL IV, 30-39 MIN: ICD-10-PCS | Mod: S$PBB,,, | Performed by: INTERNAL MEDICINE

## 2022-09-06 PROCEDURE — 80053 COMPREHEN METABOLIC PANEL: CPT | Performed by: STUDENT IN AN ORGANIZED HEALTH CARE EDUCATION/TRAINING PROGRAM

## 2022-09-06 PROCEDURE — 85025 COMPLETE CBC W/AUTO DIFF WBC: CPT | Performed by: STUDENT IN AN ORGANIZED HEALTH CARE EDUCATION/TRAINING PROGRAM

## 2022-09-06 PROCEDURE — 99214 OFFICE O/P EST MOD 30 MIN: CPT | Mod: S$PBB,,, | Performed by: INTERNAL MEDICINE

## 2022-09-06 PROCEDURE — 82378 CARCINOEMBRYONIC ANTIGEN: CPT | Performed by: STUDENT IN AN ORGANIZED HEALTH CARE EDUCATION/TRAINING PROGRAM

## 2022-09-06 PROCEDURE — 36415 COLL VENOUS BLD VENIPUNCTURE: CPT | Performed by: STUDENT IN AN ORGANIZED HEALTH CARE EDUCATION/TRAINING PROGRAM

## 2022-09-06 PROCEDURE — 99999 PR PBB SHADOW E&M-EST. PATIENT-LVL IV: ICD-10-PCS | Mod: PBBFAC,,, | Performed by: INTERNAL MEDICINE

## 2022-09-06 PROCEDURE — 99999 PR PBB SHADOW E&M-EST. PATIENT-LVL IV: CPT | Mod: PBBFAC,,, | Performed by: INTERNAL MEDICINE

## 2022-09-06 PROCEDURE — 99214 OFFICE O/P EST MOD 30 MIN: CPT | Mod: PBBFAC | Performed by: INTERNAL MEDICINE

## 2022-09-06 NOTE — PROGRESS NOTES
Nydia Stevens  (Sister Nydia)    MRN [id number]  [id number] (SENTINEL)  IRB# [phone]  06 September 2022     INFORMED CONSENT AND BASELINE VISIT NOTE:     Meet and Greet Pt at clinic visit today to discuss participation in [place] trial after recommendation from her oncologists, Dr. Aman Baron and Dr. Rajesh Garrett. Through pre-screening, Pt appears to be an eligible match for [place] trial. We spoke about the trial and the pre-screening process. Sister Nydia states she is not interested in [place] Trial. She feel its too much going on for her and thanked us very much.I did mentioned to call me anytime if decided to enroll.
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
## 2022-09-29 ENCOUNTER — TELEPHONE (OUTPATIENT)
Dept: PRIMARY CARE CLINIC | Facility: CLINIC | Age: 87
End: 2022-09-29
Payer: MEDICARE

## 2022-09-29 NOTE — TELEPHONE ENCOUNTER
----- Message from Brittney Travis MD sent at 8/26/2022 10:03 AM CDT -----  Please call patient with results.  Normal thyroid function.

## 2022-10-15 NOTE — NURSING TRANSFER
Nursing Transfer Note      3/20/2020     Transfer To: 1001    Transfer via stretcher    Transfer with cardiac monitoring    Transported by PCT    Medicines sent: IVF, K Phos    Chart send with patient: Yes    Notified: friend    Patient reassessed at: 0161   4 = No assist / stand by assistance

## 2022-11-28 PROBLEM — Z00.00 HEALTHCARE MAINTENANCE: Status: RESOLVED | Noted: 2021-05-24 | Resolved: 2022-11-28

## 2022-11-30 ENCOUNTER — OFFICE VISIT (OUTPATIENT)
Dept: SURGERY | Facility: CLINIC | Age: 87
End: 2022-11-30
Payer: MEDICARE

## 2022-11-30 VITALS
DIASTOLIC BLOOD PRESSURE: 74 MMHG | HEIGHT: 67 IN | HEART RATE: 95 BPM | SYSTOLIC BLOOD PRESSURE: 158 MMHG | WEIGHT: 126.19 LBS | BODY MASS INDEX: 19.81 KG/M2

## 2022-11-30 DIAGNOSIS — Z85.038 ENCOUNTER FOR FOLLOW-UP SURVEILLANCE OF COLON CANCER: Primary | ICD-10-CM

## 2022-11-30 DIAGNOSIS — Z08 ENCOUNTER FOR FOLLOW-UP SURVEILLANCE OF COLON CANCER: Primary | ICD-10-CM

## 2022-11-30 PROCEDURE — 99999 PR PBB SHADOW E&M-EST. PATIENT-LVL III: ICD-10-PCS | Mod: PBBFAC,,, | Performed by: COLON & RECTAL SURGERY

## 2022-11-30 PROCEDURE — 99214 PR OFFICE/OUTPT VISIT, EST, LEVL IV, 30-39 MIN: ICD-10-PCS | Mod: S$PBB,,, | Performed by: COLON & RECTAL SURGERY

## 2022-11-30 PROCEDURE — 99999 PR PBB SHADOW E&M-EST. PATIENT-LVL III: CPT | Mod: PBBFAC,,, | Performed by: COLON & RECTAL SURGERY

## 2022-11-30 PROCEDURE — 99214 OFFICE O/P EST MOD 30 MIN: CPT | Mod: S$PBB,,, | Performed by: COLON & RECTAL SURGERY

## 2022-11-30 PROCEDURE — 99213 OFFICE O/P EST LOW 20 MIN: CPT | Mod: PBBFAC | Performed by: COLON & RECTAL SURGERY

## 2022-11-30 NOTE — PROGRESS NOTES
HPI:  Nydia Stevens is a 87 y.o. female with history of a history of colon cancer, s/p right hemicolectomy, presents to clinic for discussion of anastomotic recurrence.  Pathology from previous surgery showed a pT4N0 lesion.  She was offered adjuvant chemotherapy but she declined.     Immunohistochemical studies for DNA mismatch repair proteins were performed   on this tumor (block 1H) and demonstrate intact staining in all 4 proteins   (MLH1, PMS2, MSH2, and MSH6).  These results indicate that this tumor is   microsatellite stable (ANDREW) and that Santillan syndrome (Hereditary Nonpolyposis   Colorectal Cancer, HNPCC) is unlikely.   Histologic sections highlighting visceral peritoneal involvement were   reviewed by Dr. Clare Plascencia and she concurs with the above impression.   COLON AND RECTUM: Resection, Including Transanal Disk Excision of Rectal   Neoplasms       Procedure         Right hemicolectomy     TUMOR       Tumor Site         Right (ascending) colon       Histologic Type         Adenocarcinoma       Histologic Grade         G2: Moderately differentiated       Tumor Size         Greatest dimension (Centimeters) 6.0 cm       Tumor Deposits         Not identified       Tumor Extension         Tumor invades the visceral peritoneum       Macroscopic Tumor Perforation         Not identified       Lymphovascular Invasion         Not identified       Perineural Invasion         Present       Treatment Effect         No known presurgical therapy           Margins Examined             Proximal             Distal             Radial or Mesenteric     LYMPH NODES       Regional Lymph Nodes           Number of Lymph Nodes Involved 0           Number of Lymph Nodes Examined 20       Primary Tumor (pT)         pT4a       Regional Lymph Nodes (pN)         pN0     ADDITIONAL FINDINGS       Additional Pathologic Findings         Diverticulosis     The patient recently underwent colonoscopy on 3/23 and was found to  have a lesion at her ileocolic anastomosis.  Biopsies of this lesion were obtained and were consistent with invasive colon cancer.  CEA from 2/12/21 was 12.0.     PET scan was performed today.  At the time of the patient visit, no official read was back from radiology, but the area of PET avidity appears to be isolated only to the previous anastomosis.     4- exp lap, ileocolectomy, ileal to descending colon anastomosis.       FINAL PATHOLOGIC DIAGNOSIS  [facility] DIAGNOSIS:   PROCEDURE: lleocolic resection (10 cm ileum, transverse colon and descending   colon, ileal descending colon anastomosis).   TUMOR SITE: Small intestine, not otherwise specified.   TUMOR SIZE: Greatest dimension: 4.2 cm   Additional dimension: 4.2 x 0.6 cm   LOCATION OF TUMOR: Tumor at previous ileocolic anastomosis.   MACROSCOPIC TUMOR PERFORATION: Not identified.   HISTOLOGIC TYPE: Adenocarcinoma, see comment.   HISTOLOGIC GRADE: Moderate to poorly differentiated.   TUMOR EXTENSION: Tumor invades through the muscularis propria and extends to   serosal surface.   MARGINS: All margins are uninvolved by invasive carcinoma, carcinoma in-situ   (high grade dysplasia) and adenoma (closest margin, 12.2 cm).   MARGINS EXAMINED: Proximal and distal.   LYMPHOVASCULAR INVASION: Not identified.   REGIONAL LYMPH NODES:   Number of lymph nodes involved 0   Number of lymph nodes examined: 14   PATHOLOGIC STAGING (pTNM): pT4 N0 MX   Comment:   MMR studies can be performed upon request.   Imani Cuevas MD   Report attached.   Performing site:   [address] [address]      2- colonoscopy  Findings:        The perianal and digital rectal examinations were normal.        The terminal ileum appeared normal.        There was evidence of a prior side-to-side ileo-colonic anastomosis        in the descending colon. This was patent and was characterized by        inflammation. The anastomosis was traversed.  "Biopsies were taken        with a cold forceps for histology. Verification of patient        identification for the specimen was done. Estimated blood loss was        minimal. To prevent bleeding after the biopsy, one hemostatic clip        was successfully placed. There was no bleeding at the end of the        procedure.        The exam was otherwise without abnormality on direct and        retroflexion views.      Collected: 02/15/22 0827   Result status: Final   Resulting lab: [facility]   Value: RELIAPA DIAGNOSIS:   SPECIMEN DESIGNATED "ILEOCOLONIC ANASTOMOSIS" BIOPSY:   Benign colonic mucosa with acute inflammation/granulation tissue.   Negative for dysplasia/carcinoma.   Yuliana Dya M.D.   Report attached.   Performing site:   [place] [address]   "Disclaimer:  This case diagnosis was rendered completely by the outside   consultation pathologist and the case is electronically signed by an [facility]   pathologist listed below solely to release the report into the medical   record."    Comment: Interp By Krysta Bradley D.O., Signed on 02/22/2022 at 08:23         8-  Interval hx:  Feels well.  No pain.  BMs 3 per day.  Continent.  No blood.    Appetite good, gaining wt.      11-  Interval hx:   Declined CtDNA study enrollment per Dr Rico.  Feels well.  Tolerating diet.   3 BMs per day.  No blood.  Continent  No problems with energy levels or activity level.            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    Hyperlipidemia     Hypertension     Osteoporosis         Past Surgical History:   Procedure Laterality Date    CATARACT EXTRACTION W/  INTRAOCULAR LENS IMPLANT Left 8/11/14    gregor    CATARACT EXTRACTION W/  INTRAOCULAR LENS IMPLANT Right 09/15/14 "    gregor    COLONOSCOPY N/A 3/19/2020    Procedure: COLONOSCOPY;  Surgeon: Real Mabry MD;  Location: [facility] ENDO (2ND FLR);  Service: Endoscopy;  Laterality: N/A;    COLONOSCOPY N/A 3/23/2021    Procedure: COLONOSCOPY;  Surgeon: AUTUMN Berg MD;  Location: [facility] END[facility] (4TH FLR);  Service: Endoscopy;  Laterality: N/A;  covid test 3/20 [facility]    COLONOSCOPY N/A 2/15/2022    Procedure: COLONOSCOPY;  Surgeon: Doris Simpson MD;  Location: [facility] ENDO (4TH FLR);  Service: Endoscopy;  Laterality: N/A;  COVID test on 2/12/22 at [facility]  2/14/22 - Pt confirmed 0640 arrival, prep instructions reviewed, Pt verbalized understanding-ERW@[id number]    ESOPHAGOGASTRODUODENOSCOPY N/A 3/19/2020    Procedure: EGD (ESOPHAGOGASTRODUODENOSCOPY);  Surgeon: Real Mabry MD;  Location: [facility] END[facility] (2ND FLR);  Service: Endoscopy;  Laterality: N/A;    FOOT SURGERY      left    HYSTERECTOMY  1962    ILEOCOLECTOMY N/A 4/15/2021    Procedure: ILEOCOLECTOMY;  Surgeon: AUTUMN Berg MD;  Location: [facility] OR 2ND FLR;  Service: Colon and Rectal;  Laterality: N/A;    INSERTION OF TUNNELED CENTRAL VENOUS CATHETER (CVC) WITH SUBCUTANEOUS PORT N/A 7/2/2021    Procedure: [id number]-C-CATH;  Surgeon: Suresh Agrawal MD;  Location: [facility] OR 2ND FLR;  Service: Vascular;  Laterality: N/A;  Right IJ    LAPAROSCOPIC HEMICOLECTOMY Right 3/20/2020    Procedure: HEMICOLECTOMY, RIGHT;  Surgeon: AUTUMN Berg MD;  Location: [facility] OR 2ND FLR;  Service: Colon and Rectal;  Laterality: Right;    LYSIS OF ADHESIONS N/A 4/15/2021    Procedure: LYSIS, ADHESIONS;  Surgeon: AUTUMN Berg MD;  Location: [facility] OR 2ND FLR;  Service: Colon and Rectal;  Laterality: N/A;  Greater than 2 hours    MOBILIZATION OF SPLENIC FLEXURE N/A 4/15/2021    Procedure: MOBILIZATION, SPLENIC FLEXURE;  Surgeon: AUTUMN Berg MD;  Location: [facility] OR 2ND FLR;  Service: Colon and Rectal;  Laterality: N/A;       Review of patient's allergies indicates:  No Known  Allergies    Family History   Problem Relation Age of Onset    Heart attack Father     Cataracts Brother     Heart attack Brother     Diabetes Brother     No Known Problems Mother     Cataracts Sister     Stroke Sister     Diabetes Sister     Cataracts Sister     Stroke Sister     No Known Problems Maternal Aunt     No Known Problems Maternal Uncle     No Known Problems Paternal Aunt     No Known Problems Paternal Uncle     No Known Problems Maternal Grandmother     No Known Problems Maternal Grandfather     No Known Problems Paternal Grandmother     No Known Problems Paternal Grandfather     Amblyopia Neg Hx     Blindness Neg Hx     Cancer Neg Hx     Glaucoma Neg Hx     Hypertension Neg Hx     Macular degeneration Neg Hx     Retinal detachment Neg Hx     Strabismus Neg Hx     Thyroid disease Neg Hx     Colon cancer Neg Hx     Esophageal cancer Neg Hx     Irritable bowel syndrome Neg Hx     Rectal cancer Neg Hx     Stomach cancer Neg Hx     Ulcerative colitis Neg Hx     Crohn's disease Neg Hx     Celiac disease Neg Hx        Social History     Socioeconomic History    Marital status: Single   Occupational History    Occupation: teaching retired    Occupation: Nun.  [employer]   Tobacco Use    Smoking status: Never    Smokeless tobacco: Never   Substance and Sexual Activity    Alcohol use: No     Alcohol/week: 0.0 standard drinks    Drug use: No    Sexual activity: Never   Social History Narrative    Member of [employer] order here in [place], [place]..  Lives with about 40-50 sisters.  All sisters with her are retired.         ROS:  GENERAL: No fever, chills, fatigability or weight loss.  Integument: No rashes, redness, icterus  CHEST: Denies DONOVAN, cyanosis, wheezing, cough and sputum production.  CARDIOVASCULAR: Denies chest pain, PND, orthopnea or reduced exercise tolerance.  GI: Denies abd pain, dysphagia, nausea, vomiting, no hematemesis   : Denies burning on urination, no hematuria,  "no bacteriuria  MSK: No deformities, swelling, joint pain swelling  Neurologic: No HAs, seizures, weakness, paresthesias, gait problems    PE:  General appearance well  BP (!) 158/74 (BP Location: Left arm, Patient Position: Sitting)   Pulse 95   Ht 5' 7" (1.702 m)   Wt 57.2 kg (126 lb 3.2 oz)   BMI 19.77 kg/m²   Sclera/ Skin anicteric  LN none palpable  AT NC EOMI  Neck supple trachea midline   Chest symmetric, nl excursion, no retractions, breathing comfortably  Abdomen  ND soft NT.  no masses, no organomegaly  EXT - no CCE  Neuro:  Mood/ affect nl, alert and oriented x 3, moves all ext's, gait nl    Assessment:  Excellent performance status  No clinical evidence of recurrent colon CA    Plan:  Imaging and CEA per Medical Oncology  RTC 3 months      "

## 2022-12-01 ENCOUNTER — OFFICE VISIT (OUTPATIENT)
Dept: PRIMARY CARE CLINIC | Facility: CLINIC | Age: 87
End: 2022-12-01
Payer: MEDICARE

## 2022-12-01 VITALS
WEIGHT: 127.25 LBS | OXYGEN SATURATION: 99 % | HEIGHT: 67 IN | HEART RATE: 79 BPM | SYSTOLIC BLOOD PRESSURE: 128 MMHG | DIASTOLIC BLOOD PRESSURE: 84 MMHG | TEMPERATURE: 98 F | BODY MASS INDEX: 19.97 KG/M2

## 2022-12-01 DIAGNOSIS — I10 ESSENTIAL HYPERTENSION: Chronic | ICD-10-CM

## 2022-12-01 DIAGNOSIS — R73.03 PREDIABETES: Primary | ICD-10-CM

## 2022-12-01 DIAGNOSIS — Z00.00 HEALTHCARE MAINTENANCE: ICD-10-CM

## 2022-12-01 PROBLEM — Z79.899 IMMUNODEFICIENCY SECONDARY TO CHEMOTHERAPY: Status: RESOLVED | Noted: 2021-08-02 | Resolved: 2022-12-01

## 2022-12-01 PROBLEM — D84.821 IMMUNODEFICIENCY SECONDARY TO CHEMOTHERAPY: Status: RESOLVED | Noted: 2021-08-02 | Resolved: 2022-12-01

## 2022-12-01 PROBLEM — T45.1X5A IMMUNODEFICIENCY SECONDARY TO CHEMOTHERAPY: Status: RESOLVED | Noted: 2021-08-02 | Resolved: 2022-12-01

## 2022-12-01 PROBLEM — Z79.69 IMMUNODEFICIENCY SECONDARY TO CHEMOTHERAPY: Status: RESOLVED | Noted: 2021-08-02 | Resolved: 2022-12-01

## 2022-12-01 PROCEDURE — 99214 OFFICE O/P EST MOD 30 MIN: CPT | Mod: S$GLB,,, | Performed by: INTERNAL MEDICINE

## 2022-12-01 PROCEDURE — 99214 PR OFFICE/OUTPT VISIT, EST, LEVL IV, 30-39 MIN: ICD-10-PCS | Mod: S$GLB,,, | Performed by: INTERNAL MEDICINE

## 2022-12-01 NOTE — ASSESSMENT & PLAN NOTE
· Power-of- completed 5/2022 with patient's assistance [facility]  · yearly labs 8/2022

## 2022-12-01 NOTE — PROGRESS NOTES
.  This note has been generated using voice-recognition software. There may be typographical errors that have been missed during proof-reading.     Primary Care Provider Appointment    Subjective:      Patient ID: Nydia Stevens is a 88 y.o. female here for follow up.     Chief Complaint: Follow-up    HPI:  This is an 88-year-old female with hypertension, hyperlipidemia, congestive heart failure, colon cancer, iron deficiency anemia, carotid artery disease, prediabetes, osteoporosis here for f/u.  patient last seen 8/25/2022.    8/31/2022 pt seen by colon surgery. F/u in 3 months for imaging and labs.    9/6 pt seen by heme/onc.  NEC.  F/u 6 months.    11/30/2022 pt seen by colon surgery   1lb wt gain since last visit.    No falls.  Eating well.  No pain.  Drinking water well.  Nl BM without any diarrhea.        Patient Active Problem List   Diagnosis    Essential hypertension     Mixed hyperlipidemia    Iron deficiency anemia    Palliative care encounter    Closed fracture of left olecranon process    Debility    Aortic stenosis, mild    Osteoporosis    Thrombocytosis    Chronic combined systolic and diastolic heart failure    Adenocarcinoma of colon    Carotid stenosis, asymptomatic, bilateral    H/O: hysterectomy    Prediabetes    Colon cancer    B12 deficiency    Healthcare maintenance    Aortic atherosclerosis    Dementia with behavioral disturbance    Benign mole        Past Surgical History:   Procedure Laterality Date    CATARACT EXTRACTION W/  INTRAOCULAR LENS IMPLANT Left 8/11/14    gregor    CATARACT EXTRACTION W/  INTRAOCULAR LENS IMPLANT Right 09/15/14    gregor    COLONOSCOPY N/A 3/19/2020    Procedure: COLONOSCOPY;  Surgeon: Real Mabry MD;  [facility]  Service: Endoscopy;  Laterality: N/A;    COLONOSCOPY N/A 3/23/2021    Procedure: COLONOSCOPY;  Surgeon: AUTUMN Berg MD;  Location: [facility] (4TH FLR);  Service: Endoscopy;  Laterality: N/A;  covid test 3/20 [facility]     COLONOSCOPY N/A 2/15/2022    Procedure: COLONOSCOPY;  Surgeon: Doris Simpson MD;  Location: [facility] ENDO (4TH FLR);  Service: Endoscopy;  Laterality: N/A;  COVID test on 2/12/22 at [facility]  2/14/22 - Pt confirmed 0640 arrival, prep instructions reviewed, Pt verbalized understanding-ERW@0954    ESOPHAGOGASTRODUODENOSCOPY N/A 3/19/2020    Procedure: EGD (ESOPHAGOGASTRODUODENOSCOPY);  Surgeon: Real Mabry MD;  Location: [facility] ENDO (2ND FLR);  Service: Endoscopy;  Laterality: N/A;    FOOT SURGERY      left    HYSTERECTOMY  1962    ILEOCOLECTOMY N/A 4/15/2021    Procedure: ILEOCOLECTOMY;  Surgeon: AUTUMN Berg MD;  Location: [facility] OR 2ND FLR;  Service: Colon and Rectal;  Laterality: N/A;    INSERTION OF TUNNELED CENTRAL VENOUS CATHETER (CVC) WITH SUBCUTANEOUS PORT N/A 7/2/2021    Procedure: [id number];  Surgeon: Suresh Agrawal MD;  Location: [facility] OR 2ND FLR;  Service: Vascular;  Laterality: N/A;  Right IJ    LAPAROSCOPIC HEMICOLECTOMY Right 3/20/2020    Procedure: HEMICOLECTOMY, RIGHT;  Surgeon: AUTUMN Berg MD;  Location: [facility] OR 2ND FLR;  Service: Colon and Rectal;  Laterality: Right;    LYSIS OF ADHESIONS N/A 4/15/2021    Procedure: LYSIS, ADHESIONS;  Surgeon: AUTUMN Berg MD;  Location: [facility] OR 2ND FLR;  Service: Colon and Rectal;  Laterality: N/A;  Greater than 2 hours    MOBILIZATION OF SPLENIC FLEXURE N/A 4/15/2021    Procedure: MOBILIZATION, SPLENIC FLEXURE;  Surgeon: AUTUMN Berg MD;  Location: [facility] OR 2ND FLR;  Service: Colon and Rectal;  Laterality: N/A;       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    Hyperlipidemia     Hypertension     Osteoporosis        Social History     Socioeconomic History    Marital status: Single   Occupational History    Occupation: teaching retired    Occupation: Nun.  " [employer]   Tobacco Use    Smoking status: Never    Smokeless tobacco: Never   Substance and Sexual Activity    Alcohol use: No     Alcohol/week: 0.0 standard drinks    Drug use: No    Sexual activity: Never   Social History Narrative    Member of [employer] order here in [place], [place]..  Lives with about 40-50 sisters.  All sisters with her are retired.         Review of Systems    PHQ9 7/22/2021   Total Score 0        Checklist of Daily Activities:        Objective:   /84   Pulse 79   Temp 98.1 °F (36.7 °C) (Oral)   Ht 5' 7" (1.702 m)   Wt 57.7 kg (127 lb 3.6 oz)   SpO2 99%   BMI 19.93 kg/m²     Physical Exam  Constitutional:       General: She is not in acute distress.     Appearance: Normal appearance. She is not ill-appearing, toxic-appearing or diaphoretic.   HENT:      Head: Normocephalic and atraumatic.   Cardiovascular:      Rate and Rhythm: Normal rate and regular rhythm.      Heart sounds: Normal heart sounds.   Pulmonary:      Effort: Pulmonary effort is normal.      Breath sounds: Normal breath sounds.   Musculoskeletal:      Right lower leg: No edema.      Left lower leg: No edema.   Lymphadenopathy:      Cervical: No cervical adenopathy.   Neurological:      Mental Status: She is alert.           Lab Results   Component Value Date    WBC 6.22 09/06/2022    HGB 11.9 (L) 09/06/2022    HCT 37.9 09/06/2022     09/06/2022    CHOL 184 02/17/2021    TRIG 65 02/17/2021    HDL 39 (L) 02/17/2021    ALT 14 09/06/2022    AST 19 09/06/2022     (L) 09/06/2022    K 4.0 09/06/2022    CL 98 09/06/2022    CREATININE 0.8 09/06/2022    BUN 17 09/06/2022    CO2 26 09/06/2022    TSH 1.985 08/25/2022    INR 0.9 03/17/2020    HGBA1C 5.7 (H) 02/17/2021       Current Outpatient Medications on File Prior to Visit   Medication Sig Dispense Refill    amLODIPine (NORVASC) 10 MG tablet Take 1 tablet (10 mg total) by mouth once daily. 30 tablet 11    cholecalciferol, vitamin " D3, (VITAMIN D3) 25 mcg (1,000 unit) capsule Take 1,000 Units by mouth once daily. Hold until after surgery      cyanocobalamin (VITAMIN B-12) 1000 MCG tablet Take 1 tablet (1,000 mcg total) by mouth once daily. Hold until after surgery 30 tablet 11    ferrous sulfate (FEOSOL) 325 mg (65 mg iron) Tab tablet TAKE ONE TABLET BY MOUTH ON TUESDAY, THURSDAY, AND SATURDAY HOLD UNTIL AFTER SURGERY 30 tablet 1    furosemide (LASIX) 20 MG tablet TAKE ONE TABLET BY MOUTH EVERY DAY 90 tablet 1    LIDOcaine-prilocaine (EMLA) cream Apply topically as needed (apply over port before chemo). apply over port before chemo 30 g 3    losartan (COZAAR) 50 MG tablet TAKE ONE TABLET BY MOUTH EVERY DAY 90 tablet 2    lutein-zeaxanthin (OCUVITE LUTEIN 25) 25-5 mg Cap Take 1 tablet by mouth once daily. 30 capsule 0    potassium chloride (KLOR-CON) 10 MEQ TbSR TAKE 1 TABLET BY MOUTH ONCE DAILY. 90 tablet 2    rosuvastatin (CRESTOR) 40 MG Tab TAKE ONE TABLET BY MOUTH EVERY DAY FOR CHOLESTEROL 90 tablet 3    VIT C/VIT E ACETATE/LUTEIN/MIN (OCUVITE LUTEIN ORAL) Take 1 tablet by mouth once daily. Hold until after surgery      calcium carbonate (OS-JAILYN) 500 mg calcium (1,250 mg) tablet Take 1 tablet (500 mg total) by mouth once daily. (Patient taking differently: Take 0.5 tablets by mouth 2 (two) times daily. Hold until after surgery)  0     Current Facility-Administered Medications on File Prior to Visit   Medication Dose Route Frequency Provider Last Rate Last Admin    gabapentin capsule 300 mg  300 mg Oral TID Amanda Gaspar NP   300 mg at 04/16/21 0814    LIDOcaine (PF) 10 mg/ml (1%) injection 10 mg  1 mL Intradermal On Call Procedure Amanda Gaspar NP             Assessment:   88 y.o. female with multiple co-morbid illnesses here for continued follow up of medical problems.      Plan:     Problem List Items Addressed This Visit          Cardiac/Vascular    Essential hypertension  (Chronic)     Blood pressure goal today  Continue  current medications.            Endocrine    Prediabetes - Primary     Slightly elevated hemoglobin A1c noted.  Will follow yearly.  Labs today         Relevant Orders    Comprehensive Metabolic Panel    Hemoglobin A1C       Other    Healthcare maintenance     Power-of- completed 5/2022 with patient's assistance [facility]  yearly labs 8/2022            Health Maintenance         Date Due Completion Date    Hemoglobin A1c (Prediabetes) 02/17/2022 2/17/2021    COVID-19 Vaccine (5 - Booster) 06/14/2022 4/19/2022    DEXA Scan 02/04/2023 2/4/2021    Override on 2/15/2016: Declined    Colonoscopy 02/15/2025 2/15/2022    TETANUS VACCINE 02/15/2028 2/15/2018          Medications Reconciliation:   I have not reconciled the patient's home medications with the patient/family. I have updated all changes.  Refer to After-Visit Medication List.      Medication List            Accurate as of December 1, 2022  2:50 PM. If you have any questions, ask your nurse or doctor.                CHANGE how you take these medications      calcium carbonate 500 mg calcium (1,250 mg) tablet  Commonly known as: OS-JAILYN  Take 1 tablet (500 mg total) by mouth once daily.  What changed:   how much to take  when to take this  additional instructions            CONTINUE taking these medications      amLODIPine 10 MG tablet  Commonly known as: NORVASC  Take 1 tablet (10 mg total) by mouth once daily.     cholecalciferol (vitamin D3) 25 mcg (1,000 unit) capsule  Commonly known as: VITAMIN D3     cyanocobalamin 1000 MCG tablet  Commonly known as: VITAMIN B-12  Take 1 tablet (1,000 mcg total) by mouth once daily. Hold until after surgery     ferrous sulfate 325 mg (65 mg iron) Tab tablet  Commonly known as: FEOSOL  TAKE ONE TABLET BY MOUTH ON TUESDAY, THURSDAY, AND SATURDAY HOLD UNTIL AFTER SURGERY     furosemide 20 MG tablet  Commonly known as: LASIX  TAKE ONE TABLET BY MOUTH EVERY DAY     LIDOcaine-prilocaine cream  Commonly known as:  EMLA  Apply topically as needed (apply over port before chemo). apply over port before chemo     losartan 50 MG tablet  Commonly known as: COZAAR  TAKE ONE TABLET BY MOUTH EVERY DAY     lutein-zeaxanthin 25-5 mg Cap  Commonly known as: OCUVITE LUTEIN 25  Take 1 tablet by mouth once daily.     OCUVITE LUTEIN ORAL     potassium chloride 10 MEQ Tbsr  Commonly known as: KLOR-CON  TAKE 1 TABLET BY MOUTH ONCE DAILY.     rosuvastatin 40 MG Tab  Commonly known as: CRESTOR  TAKE ONE TABLET BY MOUTH EVERY DAY FOR CHOLESTEROL                   No follow-ups on file. Total clinical care time was 25 min. The following issues were discussed: recnet onc visits, BP, need for labs.       Brittney Travis MD  Internal Medicine  [facility]  [phone]

## 2022-12-08 ENCOUNTER — TELEPHONE (OUTPATIENT)
Dept: PRIMARY CARE CLINIC | Facility: CLINIC | Age: 87
End: 2022-12-08
Payer: MEDICARE

## 2022-12-08 NOTE — TELEPHONE ENCOUNTER
----- Message from Brittney Travis MD sent at 12/5/2022  3:59 PM CST -----  Please call patient with results.  Nl sodium levels.  Nl renal and liver numbers.

## 2022-12-16 ENCOUNTER — TELEPHONE (OUTPATIENT)
Dept: PRIMARY CARE CLINIC | Facility: CLINIC | Age: 87
End: 2022-12-16
Payer: MEDICARE

## 2022-12-24 NOTE — ASSESSMENT & PLAN NOTE
Reviewed, patient not taking Fosamax.  Tolerating Prolia without any problems.  · Continue Prolia.

## 2023-01-27 ENCOUNTER — INFUSION (OUTPATIENT)
Dept: INFECTIOUS DISEASES | Facility: HOSPITAL | Age: 88
End: 2023-01-27
Attending: INTERNAL MEDICINE
Payer: MEDICARE

## 2023-01-27 VITALS
BODY MASS INDEX: 20.44 KG/M2 | OXYGEN SATURATION: 99 % | WEIGHT: 130.5 LBS | RESPIRATION RATE: 18 BRPM | HEART RATE: 86 BPM | TEMPERATURE: 98 F | SYSTOLIC BLOOD PRESSURE: 166 MMHG | DIASTOLIC BLOOD PRESSURE: 69 MMHG

## 2023-01-27 DIAGNOSIS — M81.0 AGE-RELATED OSTEOPOROSIS WITHOUT CURRENT PATHOLOGICAL FRACTURE: Primary | ICD-10-CM

## 2023-01-27 PROCEDURE — 63600175 PHARM REV CODE 636 W HCPCS: Mod: JG | Performed by: PHYSICIAN ASSISTANT

## 2023-01-27 PROCEDURE — 96372 THER/PROPH/DIAG INJ SC/IM: CPT

## 2023-01-27 RX ADMIN — DENOSUMAB 60 MG: 60 INJECTION SUBCUTANEOUS at 09:01

## 2023-01-27 NOTE — PROGRESS NOTES
Pt received Prolia injection in right arm. Pt currently taking Vit D/Ca+; Pt denies any dental sx in last 3 months;

## 2023-02-07 ENCOUNTER — TELEPHONE (OUTPATIENT)
Dept: HEMATOLOGY/ONCOLOGY | Facility: CLINIC | Age: 88
End: 2023-02-07
Payer: MEDICARE

## 2023-02-07 NOTE — TELEPHONE ENCOUNTER
----- Message from Johnnie Castro sent at 2/7/2023  4:35 PM CST -----  Contact: [phone]  Pt is calling back from a missed call.  Pt would like a call back at your earliest convenience.  Pt can be reached at. [phone]

## 2023-02-07 NOTE — TELEPHONE ENCOUNTER
----- Message from Lily Mora sent at 2/7/2023  4:15 PM CST -----  Regarding: Missed Call  Contact: [phone]  Pt is stating she had a missed call from [place].  Please call.

## 2023-02-08 ENCOUNTER — TELEPHONE (OUTPATIENT)
Dept: HEMATOLOGY/ONCOLOGY | Facility: CLINIC | Age: 88
End: 2023-02-08
Payer: MEDICARE

## 2023-02-08 NOTE — TELEPHONE ENCOUNTER
----- Message from Marty Chaidez sent at 2/8/2023  1:10 PM CST -----  Regarding: advise; returning missed call  Contact: PT @ [phone]  Pt is returning a missed call from someone in the office. I did advise the pt that the office was calling to verify her r/s'd appts. Pt states that she is fine with the way her appts are scheduled and does not need a return call back. I wanted to send this message any ways, just in case. Thanks.

## 2023-03-02 ENCOUNTER — OFFICE VISIT (OUTPATIENT)
Dept: PRIMARY CARE CLINIC | Facility: CLINIC | Age: 88
End: 2023-03-02
Payer: MEDICARE

## 2023-03-02 VITALS
SYSTOLIC BLOOD PRESSURE: 132 MMHG | BODY MASS INDEX: 20.9 KG/M2 | TEMPERATURE: 99 F | HEIGHT: 67 IN | HEART RATE: 92 BPM | DIASTOLIC BLOOD PRESSURE: 76 MMHG | WEIGHT: 133.19 LBS | OXYGEN SATURATION: 99 %

## 2023-03-02 DIAGNOSIS — I10 ESSENTIAL HYPERTENSION: Chronic | ICD-10-CM

## 2023-03-02 DIAGNOSIS — I70.0 AORTIC ATHEROSCLEROSIS: ICD-10-CM

## 2023-03-02 DIAGNOSIS — R73.9 HYPERGLYCEMIA: ICD-10-CM

## 2023-03-02 DIAGNOSIS — I50.42 CHRONIC COMBINED SYSTOLIC AND DIASTOLIC HEART FAILURE: Chronic | ICD-10-CM

## 2023-03-02 DIAGNOSIS — Z00.00 HEALTHCARE MAINTENANCE: ICD-10-CM

## 2023-03-02 PROBLEM — G30.1 MILD LATE ONSET ALZHEIMER'S DEMENTIA: Status: ACTIVE | Noted: 2021-06-04

## 2023-03-02 PROBLEM — F02.A0 MILD LATE ONSET ALZHEIMER'S DEMENTIA: Status: ACTIVE | Noted: 2021-06-04

## 2023-03-02 PROBLEM — D75.839 THROMBOCYTOSIS: Status: RESOLVED | Noted: 2020-03-17 | Resolved: 2023-03-02

## 2023-03-02 PROCEDURE — 99214 OFFICE O/P EST MOD 30 MIN: CPT | Mod: S$GLB,,, | Performed by: INTERNAL MEDICINE

## 2023-03-02 PROCEDURE — 99214 PR OFFICE/OUTPT VISIT, EST, LEVL IV, 30-39 MIN: ICD-10-PCS | Mod: S$GLB,,, | Performed by: INTERNAL MEDICINE

## 2023-03-02 NOTE — ASSESSMENT & PLAN NOTE
Patient followed by neuropsych.  Patient was to have follow-up 3-4 months after completing chemotherapy.  She stated that she was not interested in further eval.  Appears much better today with improved mood.    · Will follow and send back to neuropsych with worsening.

## 2023-03-02 NOTE — PROGRESS NOTES
.  This note has been generated using voice-recognition software. There may be typographical errors that have been missed during proof-reading.     Primary Care Provider Appointment    Subjective:      Patient ID: Nydia Stevens is a 88 y.o. female here for follow up.     Chief Complaint: Follow-up    HPI:  This is an 88-year-old female with hypertension, hyperlipidemia, congestive heart failure, colon cancer, iron deficiency anemia, carotid artery disease, prediabetes, osteoporosis here for f/u.  patient last seen 12/01/2022 01/27/2023 patient given Prolia  3lb wt gain since last visit.    She is exercising as a group and on her own.    June 10th she will have her reception for 70 years.    Last BP was taken with coat on. Repeated in clinic today.      Pt denies any c/o.  Doing well.        Patient Active Problem List   Diagnosis    Essential hypertension     Mixed hyperlipidemia    Iron deficiency anemia    Palliative care encounter    Closed fracture of left olecranon process    Debility    Aortic stenosis, mild    Osteoporosis    Chronic combined systolic and diastolic heart failure    Adenocarcinoma of colon    Carotid stenosis, asymptomatic, bilateral    H/O: hysterectomy    Prediabetes    Colon cancer    B12 deficiency    Healthcare maintenance    Aortic atherosclerosis    Mild late onset Alzheimer's dementia without behavioral disturbance, psychotic disturbance, mood disturbance, or anxiety    Benign mole    Hyperglycemia        Past Surgical History:   Procedure Laterality Date    CATARACT EXTRACTION W/  INTRAOCULAR LENS IMPLANT Left 8/11/14    [facility]    CATARACT EXTRACTION W/  INTRAOCULAR LENS IMPLANT Right 09/15/14    [facility]    COLONOSCOPY N/A 3/19/2020    Procedure: COLONOSCOPY;  Surgeon: Real Mabry MD;  Location: [facility] (2ND FLR);  Service: Endoscopy;  Laterality: N/A;    COLONOSCOPY N/A 3/23/2021    Procedure: COLONOSCOPY;  Surgeon: AUTUMN Berg MD;  Location: [facility] (4TH FLR);   Service: Endoscopy;  Laterality: N/A;  covid test 3/20 [facility]    COLONOSCOPY N/A 2/15/2022    Procedure: COLONOSCOPY;  Surgeon: Doris Simpson MD;  Location: [facility] ENDO (4TH FLR);  Service: Endoscopy;  Laterality: N/A;  COVID test on 2/12/22 at [facility]  2/14/22 - Pt confirmed 0640 arrival, prep instructions reviewed, Pt verbalized [id number]    ESOPHAGOGASTRODUODENOSCOPY N/A 3/19/2020    Procedure: EGD (ESOPHAGOGASTRODUODENOSCOPY);  Surgeon: Real Mabry MD;  Location: [facility] ENDO (2ND FLR);  Service: Endoscopy;  Laterality: N/A;    FOOT SURGERY      left    HYSTERECTOMY  1962    ILEOCOLECTOMY N/A 4/15/2021    Procedure: ILEOCOLECTOMY;  Surgeon: AUTUMN Berg MD;  Location: [facility] OR 2ND FLR;  Service: Colon and Rectal;  Laterality: N/A;    INSERTION OF TUNNELED CENTRAL VENOUS CATHETER (CVC) WITH SUBCUTANEOUS PORT N/A 7/2/2021    Procedure: [id number];  Surgeon: Suresh Agrawal MD;  Location: [facility] OR 2ND FLR;  Service: Vascular;  Laterality: N/A;  Right IJ    LAPAROSCOPIC HEMICOLECTOMY Right 3/20/2020    Procedure: HEMICOLECTOMY, RIGHT;  Surgeon: AUTUMN Berg MD;  Location: [facility] OR 2ND FLR;  Service: Colon and Rectal;  Laterality: Right;    LYSIS OF ADHESIONS N/A 4/15/2021    Procedure: LYSIS, ADHESIONS;  Surgeon: AUTUMN Berg MD;  Location: [facility] OR 2ND FLR;  Service: Colon and Rectal;  Laterality: N/A;  Greater than 2 hours    MOBILIZATION OF SPLENIC FLEXURE N/A 4/15/2021    Procedure: MOBILIZATION, SPLENIC FLEXURE;  Surgeon: AUTUMN Berg MD;  Location: [facility] OR 2ND FLR;  Service: Colon and Rectal;  Laterality: N/A;       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    Hyperlipidemia     Hypertension     Osteoporosis        Social History     Socioeconomic History    Marital status: Single  "  Occupational History    Occupation: teaching retired    Occupation: Nun.  [employer]   Tobacco Use    Smoking status: Never    Smokeless tobacco: Never   Substance and Sexual Activity    Alcohol use: No     Alcohol/week: 0.0 standard drinks    Drug use: No    Sexual activity: Never   Social History Narrative    Member of [employer] order here in [place], [place]..  Lives with about 40-50 sisters.  All sisters with her are retired.         Review of Systems    PHQ9 7/22/2021   Total Score 0        Checklist of Daily Activities:        Objective:   /76   Pulse 92   Temp 98.6 °F (37 °C) (Oral)   Ht 5' 7" (1.702 m)   Wt 60.4 kg (133 lb 2.5 oz)   SpO2 99%   BMI 20.86 kg/m²     Physical Exam  Constitutional:       General: She is not in acute distress.     Appearance: Normal appearance. She is not ill-appearing, toxic-appearing or diaphoretic.   HENT:      Head: Normocephalic and atraumatic.   Cardiovascular:      Rate and Rhythm: Normal rate and regular rhythm.      Heart sounds: Normal heart sounds.   Pulmonary:      Effort: Pulmonary effort is normal.      Breath sounds: Normal breath sounds.   Abdominal:      Palpations: Abdomen is soft.      Tenderness: There is no abdominal tenderness. There is no guarding or rebound.   Musculoskeletal:      Right lower leg: No edema.      Left lower leg: No edema.   Lymphadenopathy:      Cervical: No cervical adenopathy.   Neurological:      Mental Status: She is alert.           Lab Results   Component Value Date    WBC 6.22 09/06/2022    HGB 11.9 (L) 09/06/2022    HCT 37.9 09/06/2022     09/06/2022    CHOL 184 02/17/2021    TRIG 65 02/17/2021    HDL 39 (L) 02/17/2021    ALT 20 12/01/2022    AST 25 12/01/2022     12/01/2022    K 4.8 12/01/2022     12/01/2022    CREATININE 0.8 12/01/2022    BUN 17 12/01/2022    CO2 25 12/01/2022    TSH 1.985 08/25/2022    INR 0.9 03/17/2020    HGBA1C 5.8 (H) 12/01/2022       Current " Outpatient Medications on File Prior to Visit   Medication Sig Dispense Refill    amLODIPine (NORVASC) 10 MG tablet Take 1 tablet (10 mg total) by mouth once daily. 30 tablet 11    cholecalciferol, vitamin D3, (VITAMIN D3) 25 mcg (1,000 unit) capsule Take 1,000 Units by mouth once daily. Hold until after surgery      cyanocobalamin (VITAMIN B-12) 1000 MCG tablet Take 1 tablet (1,000 mcg total) by mouth once daily. Hold until after surgery 30 tablet 11    ferrous sulfate (FEOSOL) 325 mg (65 mg iron) Tab tablet TAKE ONE TABLET BY MOUTH ON TUESDAY, THURSDAY, AND SATURDAY HOLD UNTIL AFTER SURGERY 30 tablet 1    furosemide (LASIX) 20 MG tablet TAKE ONE TABLET BY MOUTH EVERY DAY 90 tablet 3    LIDOcaine-prilocaine (EMLA) cream Apply topically as needed (apply over port before chemo). apply over port before chemo 30 g 3    losartan (COZAAR) 50 MG tablet TAKE ONE TABLET BY MOUTH EVERY DAY 90 tablet 2    lutein-zeaxanthin (OCUVITE LUTEIN 25) 25-5 mg Cap Take 1 tablet by mouth once daily. 30 capsule 0    potassium chloride (KLOR-CON) 10 MEQ TbSR TAKE 1 TABLET BY MOUTH ONCE DAILY. 90 tablet 2    rosuvastatin (CRESTOR) 40 MG Tab TAKE ONE TABLET BY MOUTH EVERY DAY FOR CHOLESTEROL 90 tablet 3    VIT C/VIT E ACETATE/LUTEIN/MIN (OCUVITE LUTEIN ORAL) Take 1 tablet by mouth once daily. Hold until after surgery      calcium carbonate (OS-JAILYN) 500 mg calcium (1,250 mg) tablet Take 1 tablet (500 mg total) by mouth once daily. (Patient taking differently: Take 0.5 tablets by mouth 2 (two) times daily. Hold until after surgery)  0     Current Facility-Administered Medications on File Prior to Visit   Medication Dose Route Frequency Provider Last Rate Last Admin    gabapentin capsule 300 mg  300 mg Oral TID Amanda Gaspar NP   300 mg at 04/16/21 0814    LIDOcaine (PF) 10 mg/ml (1%) injection 10 mg  1 mL Intradermal On Call Procedure Amanda Gaspar NP             Assessment:   88 y.o. female with multiple co-morbid illnesses  here for continued follow up of medical problems.      Plan:     Problem List Items Addressed This Visit          Cardiac/Vascular    Essential hypertension  (Chronic)     Blood pressure goal today  Continue current medications.         Chronic combined systolic and diastolic heart failure (Chronic)     Noted on admissions in the past.  Patient on daily Lasix.  euvolemic today  No recent exacerbation.  Continue Arb, Lasix         Aortic atherosclerosis     Noted on CT chest abdomen pelvis 02/26/2021:  Advanced atherosclerosis along the abdominal aorta and iliac arteries   Patient asymptomatic.  Patient on high-dose statin.  Tolerating well.  Will continue.            Endocrine    Hyperglycemia     Elevated hemoglobin A1c notedOn last labs   Will monitor yearly.            Other    Healthcare maintenance     Power-of- completed 5/2022 with patient's assistance superior [facility]  yearly labs 8/2022            Health Maintenance         Date Due Completion Date    Aspirin/Antiplatelet Therapy Never done ---    DEXA Scan 02/04/2023 2/4/2021    Override on 2/15/2016: Declined    Hemoglobin A1c (Prediabetes) 12/01/2023 12/1/2022    Colonoscopy 02/15/2025 2/15/2022    TETANUS VACCINE 02/15/2028 2/15/2018          Medications Reconciliation:   I have not reconciled the patient's home medications with the patient/family. I have updated all changes.  Refer to After-Visit Medication List.      Medication List            Accurate as of March 2, 2023  1:48 PM. If you have any questions, ask your nurse or doctor.                CHANGE how you take these medications      calcium carbonate 500 mg calcium (1,250 mg) tablet  Commonly known as: OS-JAILYN  Take 1 tablet (500 mg total) by mouth once daily.  What changed:   how much to take  when to take this  additional instructions            CONTINUE taking these medications      amLODIPine 10 MG tablet  Commonly known as: NORVASC  Take 1 tablet (10 mg total) by mouth once  daily.     cholecalciferol (vitamin D3) 25 mcg (1,000 unit) capsule  Commonly known as: VITAMIN D3     cyanocobalamin 1000 MCG tablet  Commonly known as: VITAMIN B-12  Take 1 tablet (1,000 mcg total) by mouth once daily. Hold until after surgery     ferrous sulfate 325 mg (65 mg iron) Tab tablet  Commonly known as: FEOSOL  TAKE ONE TABLET BY MOUTH ON TUESDAY, THURSDAY, AND SATURDAY HOLD UNTIL AFTER SURGERY     furosemide 20 MG tablet  Commonly known as: LASIX  TAKE ONE TABLET BY MOUTH EVERY DAY     LIDOcaine-prilocaine cream  Commonly known as: EMLA  Apply topically as needed (apply over port before chemo). apply over port before chemo     losartan 50 MG tablet  Commonly known as: COZAAR  TAKE ONE TABLET BY MOUTH EVERY DAY     lutein-zeaxanthin 25-5 mg Cap  Commonly known as: OCUVITE LUTEIN 25  Take 1 tablet by mouth once daily.     OCUVITE LUTEIN ORAL     potassium chloride 10 MEQ Tbsr  Commonly known as: KLOR-CON  TAKE 1 TABLET BY MOUTH ONCE DAILY.     rosuvastatin 40 MG Tab  Commonly known as: CRESTOR  TAKE ONE TABLET BY MOUTH EVERY DAY FOR CHOLESTEROL                   Follow up in about 4 months (around 7/2/2023). Total clinical care time was 25 min. The following issues were discussed: memory and testing, labs reviewed, BP, upcoming appts with onc.       Brittney Travis MD  Internal Medicine  [facility]  [phone]

## 2023-03-02 NOTE — ASSESSMENT & PLAN NOTE
Noted on admissions in the past.  Patient on daily Lasix.  euvolemic today  · No recent exacerbation.  Continue Arb, Lasix

## 2023-03-06 PROBLEM — Z00.00 HEALTHCARE MAINTENANCE: Status: RESOLVED | Noted: 2021-05-24 | Resolved: 2023-03-06

## 2023-03-20 ENCOUNTER — OFFICE VISIT (OUTPATIENT)
Dept: HEMATOLOGY/ONCOLOGY | Facility: CLINIC | Age: 88
End: 2023-03-20
Payer: MEDICARE

## 2023-03-20 VITALS
HEART RATE: 101 BPM | HEIGHT: 67 IN | SYSTOLIC BLOOD PRESSURE: 171 MMHG | OXYGEN SATURATION: 100 % | TEMPERATURE: 98 F | WEIGHT: 135.56 LBS | BODY MASS INDEX: 21.28 KG/M2 | RESPIRATION RATE: 18 BRPM | DIASTOLIC BLOOD PRESSURE: 76 MMHG

## 2023-03-20 DIAGNOSIS — I10 ESSENTIAL HYPERTENSION: ICD-10-CM

## 2023-03-20 DIAGNOSIS — D50.9 IRON DEFICIENCY ANEMIA, UNSPECIFIED IRON DEFICIENCY ANEMIA TYPE: ICD-10-CM

## 2023-03-20 DIAGNOSIS — C18.9 COLON ADENOCARCINOMA: Primary | ICD-10-CM

## 2023-03-20 PROCEDURE — 99214 OFFICE O/P EST MOD 30 MIN: CPT | Mod: S$PBB,,, | Performed by: REGISTERED NURSE

## 2023-03-20 PROCEDURE — 99214 OFFICE O/P EST MOD 30 MIN: CPT | Mod: PBBFAC | Performed by: REGISTERED NURSE

## 2023-03-20 PROCEDURE — 99999 PR PBB SHADOW E&M-EST. PATIENT-LVL IV: CPT | Mod: PBBFAC,,, | Performed by: REGISTERED NURSE

## 2023-03-20 PROCEDURE — 99214 PR OFFICE/OUTPT VISIT, EST, LEVL IV, 30-39 MIN: ICD-10-PCS | Mod: S$PBB,,, | Performed by: REGISTERED NURSE

## 2023-03-20 PROCEDURE — 99999 PR PBB SHADOW E&M-EST. PATIENT-LVL IV: ICD-10-PCS | Mod: PBBFAC,,, | Performed by: REGISTERED NURSE

## 2023-03-20 NOTE — PROGRESS NOTES
PATIENT: Nydia Stevens  MRN: [id number]  DATE: 3/20/2023    Diagnosis:   1. Colon adenocarcinoma    2. Iron deficiency anemia, unspecified iron deficiency anemia type    3. Essential hypertension       Chief Complaint: Colon adenocarcinoma    Oncologic History:     Nydia Stevens is a 87 year old woman who presents to clinic for follow-up of her colon adenocarcinoma. Doing very well overall. Her energy level remains stable. Appetite increased and staying hydrated. She is celebrating 70 years with the [employer] and has her jubilee coming up soon. Looking forward to eating, dancing, and celebrating with family who is coming into town from across the [place]. Denies fever/chills, SOB, CP, palpitations, N/V, C/D, changes in bowel movements, BRBPR, pain, blood in urine/stool, paresthesias. Ambulates without difficulty using her walker.     She has a PMH of iron deficiency anemia.    A. 3/2/20 : Evaluated by her PCP, routine labs found a Hgb of 4.4. Transferred to ED for further evaluation. Underwent EGD+Colonoscopy which found a near obstructing colon mass + for adenocarcinoma.   B. 3/19/20 : Right hemicolectomy. Confirmed [id number], ANDREW adenocarcinoma. Refused adjuvant chemotherapy.  C. 3/23/21 : Colonoscopy found recurrence at site of anastomosis.   D. 4/15/21 : Ileocolic resection, 4.2cm tumor at anastomosis found. Moderately to poorly differentiated. Through muscularis to serosa. 0/14 LNs positive, negative margins. PET negative for metastatic disease.  E. 5/31/21 : Presents for consideration of systemic therapy but missed appt for IR port insertion. Subsequently placed on 6/23.  F. 7/19 - 1/10/22 : C1 - C12 adjuvant 5-FU/Leucovorin (C4 delayed due to Hurricane Zuleyma, C11 bolus dropped due to neutropenia).  H. 2/15/22 : Colonoscopy, biopsy shows no evidence of carcinoma.     Imaging :  CT CAP 3/15/23 : No evidence of recurrent or residual disease.   CT CAP 3/14/22 : No  evidence of recurrent or residual disease.   CT  CAP 9/24/21 : No evidence of recurrent or residual disease.     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    Hyperlipidemia     Hypertension     Osteoporosis        Past Surgical HIstory:   Past Surgical History:   Procedure Laterality Date    CATARACT EXTRACTION W/  INTRAOCULAR LENS IMPLANT Left 8/11/14    [facility]    CATARACT EXTRACTION W/  INTRAOCULAR LENS IMPLANT Right 09/15/14    [facility]    COLONOSCOPY N/A 3/19/2020    Procedure: COLONOSCOPY;  Surgeon: Real Mabyr MD;  Location: [facility] ENDO (2ND FLR);  Service: Endoscopy;  Laterality: N/A;    COLONOSCOPY N/A 3/23/2021    Procedure: COLONOSCOPY;  Surgeon: AUTUMN Berg MD;  Location: [facility] (4TH FLR);  Service: Endoscopy;  Laterality: N/A;  covid test 3/20 [facility]    COLONOSCOPY N/A 2/15/2022    Procedure: COLONOSCOPY;  Surgeon: Doris Simpson MD;  Location: [facility] (4TH FLR);  Service: Endoscopy;  Laterality: N/A;  COVID test on 2/12/22 at [facility]  2/14/22 - Pt confirmed 0640 arrival, prep instructions reviewed, Pt verbalized understanding-[id number]    ESOPHAGOGASTRODUODENOSCOPY N/A 3/19/2020    Procedure: EGD (ESOPHAGOGASTRODUODENOSCOPY);  Surgeon: Real Mabry MD;  Location: [facility] (2ND FLR);  Service: Endoscopy;  Laterality: N/A;    FOOT SURGERY      left    HYSTERECTOMY  1962    ILEOCOLECTOMY N/A 4/15/2021    Procedure: ILEOCOLECTOMY;  Surgeon: AUTUMN Berg MD;  Location: [facility] OR 2ND FLR;  Service: Colon and Rectal;  Laterality: N/A;    INSERTION OF TUNNELED CENTRAL VENOUS CATHETER (CVC) WITH SUBCUTANEOUS PORT N/A 7/2/2021    Procedure: VHIMBOMIN-UNUY-K-CATH;  Surgeon: Suresh Agrawal MD;  Location: [facility] OR 2ND FLR;  Service: Vascular;  Laterality: N/A;  Right IJ    LAPAROSCOPIC HEMICOLECTOMY Right 3/20/2020    Procedure:  HEMICOLECTOMY, RIGHT;  Surgeon: AUTUMN Berg MD;  Location: [facility] OR 2ND FLR;  Service: Colon and Rectal;  Laterality: Right;    LYSIS OF ADHESIONS N/A 4/15/2021    Procedure: LYSIS, ADHESIONS;  Surgeon: AUTUMN Berg MD;  Location: [facility] OR 2ND FLR;  Service: Colon and Rectal;  Laterality: N/A;  Greater than 2 hours    MOBILIZATION OF SPLENIC FLEXURE N/A 4/15/2021    Procedure: MOBILIZATION, SPLENIC FLEXURE;  Surgeon: AUTUMN Berg MD;  Location: [facility] OR 2ND FLR;  Service: Colon and Rectal;  Laterality: N/A;       Family History:   Family History   Problem Relation Age of Onset    Heart attack Father     Cataracts Brother     Heart attack Brother     Diabetes Brother     No Known Problems Mother     Cataracts Sister     Stroke Sister     Diabetes Sister     Cataracts Sister     Stroke Sister     No Known Problems Maternal Aunt     No Known Problems Maternal Uncle     No Known Problems Paternal Aunt     No Known Problems Paternal Uncle     No Known Problems Maternal Grandmother     No Known Problems Maternal Grandfather     No Known Problems Paternal Grandmother     No Known Problems Paternal Grandfather     Amblyopia Neg Hx     Blindness Neg Hx     Cancer Neg Hx     Glaucoma Neg Hx     Hypertension Neg Hx     Macular degeneration Neg Hx     Retinal detachment Neg Hx     Strabismus Neg Hx     Thyroid disease Neg Hx     Colon cancer Neg Hx     Esophageal cancer Neg Hx     Irritable bowel syndrome Neg Hx     Rectal cancer Neg Hx     Stomach cancer Neg Hx     Ulcerative colitis Neg Hx     Crohn's disease Neg Hx     Celiac disease Neg Hx        Social History:  reports that she has never smoked. She has never used smokeless tobacco. She reports that she does not drink alcohol and does not use drugs.    Allergies:  Review of patient's allergies indicates:  No Known Allergies    Medications:  Current Outpatient Medications   Medication Sig Dispense Refill    amLODIPine (NORVASC) 10 MG tablet Take 1 tablet (10 mg  total) by mouth once daily. 30 tablet 11    calcium carbonate (OS-JAILYN) 500 mg calcium (1,250 mg) tablet Take 1 tablet (500 mg total) by mouth once daily. (Patient taking differently: Take 0.5 tablets by mouth 2 (two) times daily. Hold until after surgery)  0    cholecalciferol, vitamin D3, (VITAMIN D3) 25 mcg (1,000 unit) capsule Take 1,000 Units by mouth once daily. Hold until after surgery      cyanocobalamin (VITAMIN B-12) 1000 MCG tablet Take 1 tablet (1,000 mcg total) by mouth once daily. Hold until after surgery 30 tablet 11    ferrous sulfate (FEOSOL) 325 mg (65 mg iron) Tab tablet TAKE ONE TABLET BY MOUTH ON TUESDAY, THURSDAY, AND SATURDAY HOLD UNTIL AFTER SURGERY 30 tablet 1    furosemide (LASIX) 20 MG tablet TAKE ONE TABLET BY MOUTH EVERY DAY 90 tablet 3    LIDOcaine-prilocaine (EMLA) cream Apply topically as needed (apply over port before chemo). apply over port before chemo 30 g 3    losartan (COZAAR) 50 MG tablet TAKE ONE TABLET BY MOUTH EVERY DAY 90 tablet 2    lutein-zeaxanthin (OCUVITE LUTEIN 25) 25-5 mg Cap Take 1 tablet by mouth once daily. 30 capsule 0    potassium chloride (KLOR-CON) 10 MEQ TbSR TAKE 1 TABLET BY MOUTH ONCE DAILY. 90 tablet 2    rosuvastatin (CRESTOR) 40 MG Tab TAKE ONE TABLET BY MOUTH EVERY DAY FOR CHOLESTEROL 90 tablet 3    VIT C/VIT E ACETATE/LUTEIN/MIN (OCUVITE LUTEIN ORAL) Take 1 tablet by mouth once daily. Hold until after surgery       No current facility-administered medications for this visit.     Facility-Administered Medications Ordered in Other Visits   Medication Dose Route Frequency Provider Last Rate Last Admin    gabapentin capsule 300 mg  300 mg Oral TID Amanda Gaspar NP   300 mg at 04/16/21 0814    LIDOcaine (PF) 10 mg/ml (1%) injection 10 mg  1 mL Intradermal On Call Procedure Amanda Gaspar NP         Review of Systems   Constitutional:  Negative for activity change, appetite change, chills, diaphoresis, fever and unexpected weight change.   HENT:   "Negative for congestion, mouth sores, nosebleeds, trouble swallowing and voice change.    Eyes:  Negative for visual disturbance.   Respiratory:  Negative for chest tightness and shortness of breath.    Cardiovascular:  Negative for chest pain and palpitations.   Gastrointestinal:  Negative for abdominal distention, abdominal pain, blood in stool, constipation, diarrhea, nausea, rectal pain and vomiting.   Endocrine: Negative for cold intolerance.   Genitourinary:  Negative for difficulty urinating, hematuria and vaginal bleeding.   Musculoskeletal:  Negative for arthralgias, back pain and myalgias.   Skin:  Negative for rash and wound.   Neurological:  Negative for dizziness, facial asymmetry, speech difficulty, weakness, light-headedness and headaches.   Hematological:  Negative for adenopathy. Does not bruise/bleed easily.   Psychiatric/Behavioral:  Negative for agitation, behavioral problems, confusion, hallucinations and sleep disturbance.      ECOG Performance Status: 2   Objective:      Vitals:   Vitals:    03/20/23 1123   BP: (!) 171/76   BP Location: Left arm   Patient Position: Sitting   BP Method: Medium (Automatic)   Pulse: 101   Resp: 18   Temp: 98 °F (36.7 °C)   TempSrc: Oral   SpO2: 100%   Weight: 61.5 kg (135 lb 9.3 oz)   Height: 5' 7" (1.702 m)     BMI: Body mass index is 21.24 kg/m².    Physical Exam  Vitals reviewed.   Constitutional:       General: She is not in acute distress.     Appearance: Normal appearance. She is not ill-appearing, toxic-appearing or diaphoretic.   HENT:      Head: Normocephalic and atraumatic.      Right Ear: External ear normal.      Left Ear: External ear normal.      Mouth/Throat:      Mouth: Mucous membranes are moist.   Eyes:      General: No scleral icterus.     Conjunctiva/sclera: Conjunctivae normal.   Cardiovascular:      Rate and Rhythm: Normal rate and regular rhythm.   Pulmonary:      Effort: Pulmonary effort is normal. No respiratory distress.      Breath " sounds: No wheezing.   Abdominal:      General: Abdomen is flat. There is no distension.      Tenderness: There is no abdominal tenderness.   Musculoskeletal:         General: Normal range of motion.      Cervical back: Normal range of motion.   Lymphadenopathy:      Cervical: No cervical adenopathy.   Skin:     General: Skin is warm and dry.      Coloration: Skin is not jaundiced.      Findings: No bruising, erythema or rash.   Neurological:      General: No focal deficit present.      Mental Status: She is alert and oriented to person, place, and time. Mental status is at baseline.      Cranial Nerves: No cranial nerve deficit.      Sensory: No sensory deficit.      Motor: No weakness.      Gait: Abnormal gait: uses rollator.   Psychiatric:         Mood and Affect: Mood normal.         Behavior: Behavior normal.         Thought Content: Thought content normal.         Judgment: Judgment normal.        Laboratory Data:  Lab Visit on 03/15/2023   Component Date Value Ref Range Status    Sodium 03/15/2023 133 (L)  136 - 145 mmol/L Final    Potassium 03/15/2023 4.4  3.5 - 5.1 mmol/L Final    Chloride 03/15/2023 98  95 - 110 mmol/L Final    CO2 03/15/2023 24  23 - 29 mmol/L Final    Glucose 03/15/2023 96  70 - 110 mg/dL Final    BUN 03/15/2023 14  8 - 23 mg/dL Final    Creatinine 03/15/2023 0.8  0.5 - 1.4 mg/dL Final    Calcium 03/15/2023 9.8  8.7 - 10.5 mg/dL Final    Total Protein 03/15/2023 8.1  6.0 - 8.4 g/dL Final    Albumin 03/15/2023 4.0  3.5 - 5.2 g/dL Final    Total Bilirubin 03/15/2023 0.3  0.1 - 1.0 mg/dL Final    Comment: For infants and newborns, interpretation of results should be based  on gestational age, weight and in agreement with clinical  observations.    Premature Infant recommended reference ranges:  Up to 24 hours.............<8.0 mg/dL  Up to 48 hours............<12.0 mg/dL  3-5 days..................<15.0 mg/dL  6-29 days.................<15.0 mg/dL      Alkaline Phosphatase 03/15/2023 83  55  - 135 U/L Final    AST 03/15/2023 20  10 - 40 U/L Final    ALT 03/15/2023 15  10 - 44 U/L Final    Anion Gap 03/15/2023 11  8 - 16 mmol/L Final    eGFR 03/15/2023 >60.0  >60 mL/min/1.73 m^2 Final    CEA 03/15/2023 3.8  0.0 - 5.0 ng/mL Final    Comment: CEA Normal Range:  Non-Smokers: 0-3.0 ng/mL  Smokers:     0-5.0 ng/mL    The testing method is a chemiluminescent microparticle immunoassay   manufactured by [employer] and performed on the    or   [employer] system. Values obtained with different assay manufacturers   for   methods may be different and cannot be used interchangeably.      WBC 03/15/2023 5.79  3.90 - 12.70 K/uL Final    RBC 03/15/2023 4.21  4.00 - 5.40 M/uL Final    Hemoglobin 03/15/2023 11.7 (L)  12.0 - 16.0 g/dL Final    Hematocrit 03/15/2023 37.3  37.0 - 48.5 % Final    MCV 03/15/2023 89  82 - 98 fL Final    MCH 03/15/2023 27.8  27.0 - 31.0 pg Final    MCHC 03/15/2023 31.4 (L)  32.0 - 36.0 g/dL Final    RDW 03/15/2023 14.5  11.5 - 14.5 % Final    Platelets 03/15/2023 361  150 - 450 K/uL Final    MPV 03/15/2023 8.9 (L)  9.2 - 12.9 fL Final    Immature Granulocytes 03/15/2023 1.2 (H)  0.0 - 0.5 % Final    Gran # (ANC) 03/15/2023 3.2  1.8 - 7.7 K/uL Final    Immature Grans (Abs) 03/15/2023 0.07 (H)  0.00 - 0.04 K/uL Final    Comment: Mild elevation in immature granulocytes is non specific and   can be seen in a variety of conditions including stress response,   acute inflammation, trauma and pregnancy. Correlation with other   laboratory and clinical findings is essential.      Lymph # 03/15/2023 1.6  1.0 - 4.8 K/uL Final    Mono # 03/15/2023 0.7  0.3 - 1.0 K/uL Final    Eos # 03/15/2023 0.2  0.0 - 0.5 K/uL Final    Baso # 03/15/2023 0.04  0.00 - 0.20 K/uL Final    nRBC 03/15/2023 0  0 /100 WBC Final    Gran % 03/15/2023 55.4  38.0 - 73.0 % Final    Lymph % 03/15/2023 27.3  18.0 - 48.0 % Final    Mono % 03/15/2023 12.1  4.0 - 15.0 % Final    Eosinophil % 03/15/2023 3.3  0.0 - 8.0 %  Final    Basophil % 03/15/2023 0.7  0.0 - 1.9 % Final    Differential Method 03/15/2023 Automated   Final     Assessment:       1. Colon adenocarcinoma    2. Iron deficiency anemia, unspecified iron deficiency anemia type    3. Essential hypertension       Plan:     1. Colon Cancer     [id number], stage II  Status post adjuvant treatment of 5-FU bolus, leucovorin and 5FU infusion [id number] for 6months (completed January 2022).   Colonsocopy and CT after adjuvant therapy did not show evidence of persistent disease.   CEA today, 3/20/23 remains normal.   CT CAP on 3/15/23 without evidence of recurrent or metastatic disease.   RTC in 6 months with labs & imaging.  She opted not to enroll in ctDNA monitoring on STRATA [place] MRD study.    2. Iron Deficiency Anemia    Continue oral iron, well tolerated.   Parameters stable.     3. HTN    Above goal, asymptomatic.  Advised compliance with BP meds.   Encouraged to f/u with PCP re: further management of HTN regimen.     Patient is in agreement with the proposed treatment plan. All questions were answered to the patient's satisfaction. Pt knows to call clinic if anything is needed before the next clinic visit.    Patient discussed with collaborating physician, Dr. Garrett.    At least 30 minutes were spent today on this encounter including face to face time with the patient, data gathering/interpretation and documentation.       Starla Mcgovern, MSN, APRN, [facility]  Hematology and Medical Oncology  Clinical Nurse Specialist to Dr. Garrett, Dr. Mancini & Dr. Lockhart    Route Chart for Scheduling    Med Onc Chart Routing      Follow up with physician 6 months. RTC in 6 months with labs (CBC,CMP,CEA) and scan (CT CAP) to be done in [place] to see Dr. Garrett. Schedule labs and scans 1-2 days prior to clinic visit with Dr. Garrett to review results.   Follow up with [facility]    Infusion scheduling note    Injection scheduling note    Labs CBC, CMP and CEA   Scheduling:  Preferred  lab:  Lab interval: every 6 months  please schedule labs prior to scan.   Imaging CT chest abdomen pelvis   1-2 days prior to visit in 6 months.   Pharmacy appointment    Other referrals         Therapy Plan Information  denosumab (PROLIA) injection 60 mg  60 mg, Subcutaneous, Every visit

## 2023-04-19 ENCOUNTER — OFFICE VISIT (OUTPATIENT)
Dept: OPTOMETRY | Facility: CLINIC | Age: 88
End: 2023-04-19
Payer: MEDICARE

## 2023-04-19 DIAGNOSIS — Z98.42 S/P BILATERAL CATARACT EXTRACTION: ICD-10-CM

## 2023-04-19 DIAGNOSIS — H26.493 BILATERAL POSTERIOR CAPSULAR OPACIFICATION: ICD-10-CM

## 2023-04-19 DIAGNOSIS — Z96.1 PSEUDOPHAKIA OF BOTH EYES: ICD-10-CM

## 2023-04-19 DIAGNOSIS — Z98.41 S/P BILATERAL CATARACT EXTRACTION: ICD-10-CM

## 2023-04-19 DIAGNOSIS — H35.371 EPIRETINAL MEMBRANE (ERM) OF RIGHT EYE: Primary | ICD-10-CM

## 2023-04-19 PROCEDURE — 99999 PR PBB SHADOW E&M-EST. PATIENT-LVL III: CPT | Mod: PBBFAC,,, | Performed by: OPTOMETRIST

## 2023-04-19 PROCEDURE — 92014 PR EYE EXAM, EST PATIENT,COMPREHESV: ICD-10-PCS | Mod: S$PBB,,, | Performed by: OPTOMETRIST

## 2023-04-19 PROCEDURE — 99213 OFFICE O/P EST LOW 20 MIN: CPT | Mod: PBBFAC | Performed by: OPTOMETRIST

## 2023-04-19 PROCEDURE — 99999 PR PBB SHADOW E&M-EST. PATIENT-LVL III: ICD-10-PCS | Mod: PBBFAC,,, | Performed by: OPTOMETRIST

## 2023-04-19 PROCEDURE — 92014 COMPRE OPH EXAM EST PT 1/>: CPT | Mod: S$PBB,,, | Performed by: OPTOMETRIST

## 2023-04-19 NOTE — PROGRESS NOTES
"HPI     eye eam             Comments: Annual general eye exam and refraction.  Wears glasses full-time.  No acute ocular/vision problems           Comments    Patient's age: 88 y.o. female  Occupation:  [employer].  Approximate date of last eye examination:  02/16/2022  Name of last eye doctor seen: Dr. Maxwell   City/State: [facility]  Wears glasses? Yes     If yes, wears  Full-time or part-time?  Part-time   Present glasses are: Bifocal, SV Distance, SV Reading?  Lined Bifocal  Approximate age of present glasses:  1 year  Got new glasses following last exam, or subsequently?:  yes   Any problem with VA with glasses?  No  Wears CLs?:  No  Headaches?  No  Eye pain/discomfort?  No                                                                                     Flashes?  No  Floaters?  No  Diplopia/Double vision?  No  Patient's Ocular History:         Any eye surgeries? Phaco WIOL OU - Dr Llamas         Any eye injury?  No         Any treatment for eye disease?  No  Family history of eye disease?  No  Significant patient medical history:         1. Diabetes?  No   2. HBP?  Yes, controlled by medication and diet                ! OTC eyedrops currently using:  None   ! Prescription eye meds currently using:  None   ! Any history of allergy/adverse reaction to any eye meds used   previously?  No    ! Any history of allergy/adverse reaction to eyedrops used during prior   eye exam(s)? No    ! Any history of allergy/adverse reaction to Novacaine or similar meds?   No   ! Any history of allergy/adverse reaction to Epinephrine or similar meds?   No    ! Patient okay with use of anesthetic eyedrops to check eye pressure?    Yes        ! Patient okay with use of eyedrops to dilate pupils today?  Yes   !  Allergies/Medications/Medical History/Family History reviewed today?    Yes      PD =   69/65  Desired reading distance =  13.5"                                                                         Last edited " by Rcik Maxwell, OD on 4/19/2023 11:04 AM.            Assessment /Plan     For exam results, see Encounter Report.    1. Epiretinal membrane (ERM) of right eye        2. Bilateral posterior capsular opacification        3. S/P bilateral cataract extraction        4. Pseudophakia of both eyes                       Prior finding of epiretinal membrane at posterior pole of the right eye.  Not impacting correctable VA. No need for treatment.        S/P cataract surgery in both eyes. Bilateral posterior chamber intraocular lens.  Paracentral posterior capsular opacification in each eye.  No need for YAG laser posterior capsulotomy in either eye at this time.     Residual refractive error in each eye,  per refraction done at last visit.  Excellent distance VA and excellent near VA with present glasses.  No need to change spectacle lens Rx.     New (duplicate) spectacle lens Rx issued for use as needed   Recheck in one year.

## 2023-04-19 NOTE — PATIENT INSTRUCTIONS
Prior finding of epiretinal membrane at posterior pole of the right eye.  Not impacting correctable VA. No need for treatment.        S/P cataract surgery in both eyes. Bilateral posterior chamber intraocular lens.  Paracentral posterior capsular opacification in each eye.  No need for YAG laser posterior capsulotomy in either eye at this time.     Residual refractive error in each eye,  per refraction done at last visit.  Excellent distance VA and excellent near VA with present glasses.  No need to change spectacle lens Rx.     New (duplicate) spectacle lens Rx issued for use as needed   Recheck in one year.

## 2023-05-12 DIAGNOSIS — I10 ESSENTIAL HYPERTENSION: ICD-10-CM

## 2023-05-12 RX ORDER — AMLODIPINE BESYLATE 10 MG/1
10 TABLET ORAL DAILY
Qty: 30 TABLET | Refills: 11 | Status: SHIPPED | OUTPATIENT
Start: 2023-05-12 | End: 2024-05-11

## 2023-05-12 NOTE — TELEPHONE ENCOUNTER
----- Message from Leena Castaneda sent at 5/12/2023  9:24 AM CDT -----  Contact: [phone] Patient  Requesting an RX refill or new RX.  Is this a refill or new RX: new   RX name and strength (copy/paste from chart): amLODIPine (NORVASC) 10 MG tablet   Is this a 30 day or 90 day RX: 90  Pharmacy name and phone # (copy/paste from chart):    [facility] - [place], [place] - [address] [address] [facility]  [address] [facility] [facility] [address]  Phone: [phone] Fax: [phone]

## 2023-05-12 NOTE — TELEPHONE ENCOUNTER
Care Due:                  Date            Visit Type   Department     Provider  --------------------------------------------------------------------------------                                EP -                              PRIMARY      [facility]  Last Visit: 03-      CARE ([facility])   [facility]         Brittney Travis                              [facility]                              PRIMARY      [facility]  Next Visit: 07-      CARE ([facility])   [facility]  Thaddeus                                                            Last  Test          Frequency    Reason                     Performed    Due Date  --------------------------------------------------------------------------------    Lipid Panel.  12 months..  rosuvastatin.............  Not Found    Overdue    [employer] Embedded Care Due Messages. Reference number: [id number].   5/12/2023 9:59:09 AM CDT

## 2023-05-17 ENCOUNTER — OFFICE VISIT (OUTPATIENT)
Dept: PODIATRY | Facility: CLINIC | Age: 88
End: 2023-05-17
Payer: MEDICARE

## 2023-05-17 VITALS
DIASTOLIC BLOOD PRESSURE: 75 MMHG | HEART RATE: 94 BPM | SYSTOLIC BLOOD PRESSURE: 166 MMHG | WEIGHT: 137 LBS | BODY MASS INDEX: 21.5 KG/M2 | HEIGHT: 67 IN

## 2023-05-17 DIAGNOSIS — R73.03 PREDIABETES: ICD-10-CM

## 2023-05-17 DIAGNOSIS — M20.40 HAMMER TOE, UNSPECIFIED LATERALITY: ICD-10-CM

## 2023-05-17 DIAGNOSIS — L84 CORN OR CALLUS: ICD-10-CM

## 2023-05-17 DIAGNOSIS — B35.1 ONYCHOMYCOSIS DUE TO DERMATOPHYTE: Primary | ICD-10-CM

## 2023-05-17 DIAGNOSIS — R53.81 DEBILITY: ICD-10-CM

## 2023-05-17 PROCEDURE — 99203 OFFICE O/P NEW LOW 30 MIN: CPT | Mod: 25,S$PBB,, | Performed by: PODIATRIST

## 2023-05-17 PROCEDURE — 99213 OFFICE O/P EST LOW 20 MIN: CPT | Mod: 25,PBBFAC,PN | Performed by: PODIATRIST

## 2023-05-17 PROCEDURE — 11721 DEBRIDE NAIL 6 OR MORE: CPT | Mod: PBBFAC,PN | Performed by: PODIATRIST

## 2023-05-17 PROCEDURE — 11721 DEBRIDE NAIL 6 OR MORE: CPT | Mod: S$PBB,,, | Performed by: PODIATRIST

## 2023-05-17 PROCEDURE — 99203 PR OFFICE/OUTPT VISIT, NEW, LEVL III, 30-44 MIN: ICD-10-PCS | Mod: 25,S$PBB,, | Performed by: PODIATRIST

## 2023-05-17 PROCEDURE — 11721 PR DEBRIDEMENT OF NAILS, 6 OR MORE: ICD-10-PCS | Mod: S$PBB,,, | Performed by: PODIATRIST

## 2023-05-17 PROCEDURE — 99999 PR PBB SHADOW E&M-EST. PATIENT-LVL III: CPT | Mod: PBBFAC,,, | Performed by: PODIATRIST

## 2023-05-17 PROCEDURE — 99999 PR PBB SHADOW E&M-EST. PATIENT-LVL III: ICD-10-PCS | Mod: PBBFAC,,, | Performed by: PODIATRIST

## 2023-05-17 NOTE — PROGRESS NOTES
Subjective:      Patient ID: Nydia Stevens is a 88 y.o. female.    Chief Complaint:   Nail Care    Nydia is a 88 y.o. female who presents to the clinic for evaluation and treatment of high risk feet. 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 The patient's chief complaint is long, thick toenails.     Patient presents alone she had a nurse from the home bring her  She has seen Podiatry in the past for nail care.    She has no complaints      PCP: Brittney Travis MD    Date Last Seen by PCP: 3/2023    Current shoe gear:  Affected Foot: Casual shoes     Unaffected Foot: Casual shoes    Last encounter in this department: Visit date not found    Hemoglobin A1C   Date Value Ref Range Status   12/01/2022 5.8 (H) 4.0 - 5.6 % Final     Comment:     ADA Screening Guidelines:  5.7-6.4%  Consistent with prediabetes  >or=6.5%  Consistent with diabetes    High levels of fetal hemoglobin interfere with the HbA1C  assay. Heterozygous hemoglobin variants (HbS, HgC, etc)do  not significantly interfere with this assay.   However, presence of multiple variants may affect accuracy.     02/17/2021 5.7 (H) 4.0 - 5.6 % Final     Comment:     ADA Screening Guidelines:  5.7-6.4%  Consistent with prediabetes  >or=6.5%  Consistent with diabetes    High levels of fetal hemoglobin interfere with the HbA1C  assay. Heterozygous hemoglobin variants (HbS, HgC, etc)do  not significantly interfere with this assay.   However, presence of multiple variants may affect accuracy.     12/11/2015 5.8 4.5 - 6.2 % Final        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    Hyperlipidemia      Hypertension     Osteoporosis      Past Surgical History:   Procedure Laterality Date    CATARACT EXTRACTION W/  INTRAOCULAR LENS IMPLANT Left 8/11/14    [facility]    CATARACT EXTRACTION W/  INTRAOCULAR LENS IMPLANT Right 09/15/14    [facility]    COLONOSCOPY N/A 3/19/2020    Procedure: COLONOSCOPY;  Surgeon: Real Mabry MD;  Location: [facility] ENDO (2ND FLR);  Service: Endoscopy;  Laterality: N/A;    COLONOSCOPY N/A 3/23/2021    Procedure: COLONOSCOPY;  Surgeon: AUTUMN Berg MD;  Location: [facility] ENDO (4TH FLR);  Service: Endoscopy;  Laterality: N/A;  covid test 3/20 [facility]    COLONOSCOPY N/A 2/15/2022    Procedure: COLONOSCOPY;  Surgeon: Doris Simpson MD;  Location: [facility] ENDO (4TH FLR);  Service: Endoscopy;  Laterality: N/A;  COVID test on 2/12/22 at [facility]  2/14/22 - Pt confirmed 0640 arrival, prep instructions reviewed, Pt verbalized understanding-ERW@0927    ESOPHAGOGASTRODUODENOSCOPY N/A 3/19/2020    Procedure: EGD (ESOPHAGOGASTRODUODENOSCOPY);  Surgeon: Real Mabry MD;  Location: [facility] ENDO (2ND FLR);  Service: Endoscopy;  Laterality: N/A;    FOOT SURGERY      left    HYSTERECTOMY  1962    ILEOCOLECTOMY N/A 4/15/2021    Procedure: ILEOCOLECTOMY;  Surgeon: AUTUMN Berg MD;  Location: [facility] OR 2ND FLR;  Service: Colon and Rectal;  Laterality: N/A;    INSERTION OF TUNNELED CENTRAL VENOUS CATHETER (CVC) WITH SUBCUTANEOUS PORT N/A 7/2/2021    Procedure: [id number];  Surgeon: Suresh Agrawal MD;  [facility] 2[facility] F[facility];  Service: Vascular;  Laterality: N/A;  Right IJ    LAPAROSCOPIC HEMICOLECTOMY Right 3/20/2020    Procedure: HEMICOLECTOMY, RIGHT;  Surgeon: AUTUMN Berg MD;  [facility] 2ND FLR;  Service: Colon and Rectal;  Laterality: Right;    LYSIS OF ADHESIONS N/A 4/15/2021    Procedure: LYSIS, ADHESIONS;  Surgeon: AUTUMN Berg MD;  [facility] 2ND FLR;  Service: Colon and Rectal;  Laterality: N/A;  Greater than 2 hours    MOBILIZATION OF SPLENIC FLEXURE N/A  4/15/2021    Procedure: MOBILIZATION, SPLENIC FLEXURE;  Surgeon: AUTUMN Berg MD;  Location: [facility] OR [address] [place] [address];  Service: Colon and Rectal;  Laterality: N/A;     Current Outpatient Medications on File Prior to Visit   Medication Sig Dispense Refill    amLODIPine (NORVASC) 10 MG tablet Take 1 tablet (10 mg total) by mouth once daily. 30 tablet 11    calcium carbonate (OS-JAILYN) 500 mg calcium (1,250 mg) tablet Take 1 tablet (500 mg total) by mouth once daily. (Patient taking differently: Take 0.5 tablets by mouth 2 (two) times daily. Hold until after surgery)  0    cholecalciferol, vitamin D3, (VITAMIN D3) 25 mcg (1,000 unit) capsule Take 1,000 Units by mouth once daily. Hold until after surgery      cyanocobalamin (VITAMIN B-12) 1000 MCG tablet Take 1 tablet (1,000 mcg total) by mouth once daily. Hold until after surgery 30 tablet 11    ferrous sulfate (FEOSOL) 325 mg (65 mg iron) Tab tablet TAKE ONE TABLET BY MOUTH ON TUESDAY, THURSDAY, AND SATURDAY HOLD UNTIL AFTER SURGERY 30 tablet 1    furosemide (LASIX) 20 MG tablet TAKE ONE TABLET BY MOUTH EVERY DAY 90 tablet 3    LIDOcaine-prilocaine (EMLA) cream Apply topically as needed (apply over port before chemo). apply over port before chemo 30 g 3    losartan (COZAAR) 50 MG tablet TAKE ONE TABLET BY MOUTH EVERY DAY 90 tablet 2    lutein-zeaxanthin (OCUVITE LUTEIN 25) 25-5 mg Cap Take 1 tablet by mouth once daily. 30 capsule 0    potassium chloride (KLOR-CON) 10 MEQ TbSR TAKE 1 TABLET BY MOUTH ONCE DAILY. 90 tablet 2    VIT C/VIT E ACETATE/LUTEIN/MIN (OCUVITE LUTEIN ORAL) Take 1 tablet by mouth once daily. Hold until after surgery       Current Facility-Administered Medications on File Prior to Visit   Medication Dose Route Frequency Provider Last Rate Last Admin    gabapentin capsule 300 mg  300 mg Oral TID Amanda Gaspar NP   300 mg at 04/16/21 0814    LIDOcaine (PF) 10 mg/ml (1%) injection 10 mg  1 mL Intradermal On Call Procedure Amanda Gaspar NP      "    Review of patient's allergies indicates:  No Known Allergies    Review of Systems   Unable to perform ROS: Dementia (poor historian)   Skin:  Positive for dry skin and nail changes.         Objective:       Vitals:    23 1343   BP: (!) 166/75   Pulse: 94   Weight: 62.1 kg (137 lb)   Height: 5' 7" (1.702 m)   PainSc: 0-No pain   62.1 kg (137 lb)     Physical Exam  Vitals reviewed.   Constitutional:       General: She is not in acute distress.     Appearance: She is well-developed. She is not ill-appearing, toxic-appearing or diaphoretic.      Comments: Proper supportive shoegear      Cardiovascular:      Pulses:           Dorsalis pedis pulses are 1+ on the right side and 1+ on the left side.        Posterior tibial pulses are 1+ on the right side and 1+ on the left side.      Comments: Decreased hair growth to lower shins with evidence of chronic venous stasis dz.       Musculoskeletal:      Right lower le+ Edema present.      Left lower le+ Edema present.      Right ankle: Normal.      Right Achilles Tendon: Normal.      Left ankle: Normal.      Left Achilles Tendon: Normal.      Right foot: Decreased range of motion. Deformity, bunion and tenderness present. No bony tenderness.      Left foot: Decreased range of motion. Deformity, bunion and tenderness present. No bony tenderness.      Comments: Positive pain on palpation to distal aspect of digits as well as with nail care    Rigidid and  semi rigid 2 through 5   Feet:      Right foot:      Skin integrity: No ulcer, blister, skin breakdown, erythema, warmth, callus or dry skin.      Toenail Condition: Right toenails are normal.      Left foot:      Skin integrity: No ulcer, blister, skin breakdown, erythema, warmth, callus or dry skin.      Toenail Condition: Left toenails are normal.      Comments: Unable to assess [place] monofilament however gross sensation is intact    Mild interspace maceration    Atrophic skin    Toenails 1-5 " bilaterally are elongated by 6-7 mm, thickened by 3-4 mm, discolored/yellowed, dystrophic, brittle with subungual debris.   Focal hyperkeratotic lesion consisting entirely of hyperkeratotic tissue without open skin, drainage, pus, fluctuance, malodor, or signs of infection distal 2nd and 3rd digtis  Skin:     General: Skin is warm and dry.      Capillary Refill: Capillary refill takes 2 to 3 seconds.      Coloration: Skin is not pale.      Findings: No erythema or rash.   Neurological:      Mental Status: She is alert.      Gait: Gait abnormal.   Psychiatric:         Attention and Perception: Attention normal.         Mood and Affect: Mood normal.         Speech: Speech normal.         Behavior: Behavior normal. Behavior is cooperative.         Cognition and Memory: Memory is impaired.             Assessment:       Encounter Diagnoses   Name Primary?    Onychomycosis due to dermatophyte Yes    Corn or callus     Hammer toe, unspecified laterality     Prediabetes     Debility          Plan:       Nydia was seen today for nail care.    Diagnoses and all orders for this visit:    Onychomycosis due to dermatophyte    Corn or callus    Hammer toe, unspecified laterality    Prediabetes    Debility      I counseled the patient on her conditions, their implications and medical management.        - Shoe inspection. Patient instructed on proper foot hygeine. We discussed wearing proper shoe gear, daily foot inspections, never walking without protective shoe gear, never putting sharp instruments to feet, routine podiatric nail visits every 2-3 months.      - With patient's permission, nails were aggressively reduced and debrided x 10 to their soft tissue attachment mechanically and with electric , removing all offending nail and debris. Patient relates relief following the procedure. She will continue to monitor the areas daily, inspect her feet, wear protective shoe gear when ambulatory, moisturizer to maintain  skin integrity and follow in this office in approximately 2-3 months, sooner p.r.n.    - - With patient's permission, Utilizing a #15 scalpel, I trimmed the corns and calluses at the above mentioned location.      The patient will continue to monitor the areas daily, inspect the feet, wear protective shoe gear when ambulatory, and moisturizer to maintain skin integrity.     - continue to apply lotion and check feet well    Encourage frequent visits          Follow up in about 3 months (around 8/17/2023).

## 2023-05-24 DIAGNOSIS — I10 ESSENTIAL HYPERTENSION: ICD-10-CM

## 2023-05-24 NOTE — TELEPHONE ENCOUNTER
No care due was identified.  [facility] Embedded Care Due Messages. Reference number: [id number].   5/24/2023 1:41:04 PM CDT

## 2023-05-24 NOTE — TELEPHONE ENCOUNTER
Refill Routing Note   Medication(s) are not appropriate for processing by [facility] for the following reason(s):      Required vitals abnormal    [facility] action(s):  Defer None identified          Appointments  past 12m or future 3m with PCP    Date Provider   Last Visit   3/2/2023 Brittney Travis MD   Next Visit   7/6/2023 Brittney Travis MD   ED visits in past 90 days: 0        Note composed:1:48 PM 05/24/2023

## 2023-05-25 RX ORDER — LOSARTAN POTASSIUM 50 MG/1
TABLET ORAL
Qty: 90 TABLET | Refills: 3 | Status: SHIPPED | OUTPATIENT
Start: 2023-05-25 | End: 2024-02-26 | Stop reason: SDUPTHER

## 2023-05-29 ENCOUNTER — TELEPHONE (OUTPATIENT)
Dept: SURGERY | Facility: CLINIC | Age: 88
End: 2023-05-29
Payer: MEDICARE

## 2023-05-29 NOTE — TELEPHONE ENCOUNTER
Called pt. For appointment reminder on May 31st with Dr. Berg at the [facility] 2nd floor.

## 2023-05-31 ENCOUNTER — OFFICE VISIT (OUTPATIENT)
Dept: SURGERY | Facility: CLINIC | Age: 88
End: 2023-05-31
Payer: MEDICARE

## 2023-05-31 VITALS
HEIGHT: 67 IN | SYSTOLIC BLOOD PRESSURE: 159 MMHG | DIASTOLIC BLOOD PRESSURE: 70 MMHG | WEIGHT: 134.81 LBS | HEART RATE: 105 BPM | BODY MASS INDEX: 21.16 KG/M2

## 2023-05-31 DIAGNOSIS — Z08 ENCOUNTER FOR FOLLOW-UP SURVEILLANCE OF COLON CANCER: Primary | ICD-10-CM

## 2023-05-31 DIAGNOSIS — Z85.038 ENCOUNTER FOR FOLLOW-UP SURVEILLANCE OF COLON CANCER: Primary | ICD-10-CM

## 2023-05-31 PROCEDURE — 99214 PR OFFICE/OUTPT VISIT, EST, LEVL IV, 30-39 MIN: ICD-10-PCS | Mod: S$PBB,,, | Performed by: COLON & RECTAL SURGERY

## 2023-05-31 PROCEDURE — 99214 OFFICE O/P EST MOD 30 MIN: CPT | Mod: S$PBB,,, | Performed by: COLON & RECTAL SURGERY

## 2023-05-31 PROCEDURE — 99999 PR PBB SHADOW E&M-EST. PATIENT-LVL III: ICD-10-PCS | Mod: PBBFAC,,, | Performed by: COLON & RECTAL SURGERY

## 2023-05-31 PROCEDURE — 99999 PR PBB SHADOW E&M-EST. PATIENT-LVL III: CPT | Mod: PBBFAC,,, | Performed by: COLON & RECTAL SURGERY

## 2023-05-31 PROCEDURE — 99213 OFFICE O/P EST LOW 20 MIN: CPT | Mod: PBBFAC | Performed by: COLON & RECTAL SURGERY

## 2023-05-31 NOTE — PROGRESS NOTES
HPI:  Nydia Stevens is a 88 y.o. female with history of a history of colon cancer, s/p right hemicolectomy, presents to clinic for discussion of anastomotic recurrence.  Pathology from previous surgery showed a pT4N0 lesion.  She was offered adjuvant chemotherapy but she declined.     Immunohistochemical studies for DNA mismatch repair proteins were performed   on this tumor (block 1H) and demonstrate intact staining in all 4 proteins   (MLH1, PMS2, MSH2, and MSH6).  These results indicate that this tumor is   microsatellite stable (ANDREW) and that Santillan syndrome (Hereditary Nonpolyposis   Colorectal Cancer, HNPCC) is unlikely.   Histologic sections highlighting visceral peritoneal involvement were   reviewed by Dr. Clare Plascencia and she concurs with the above impression.   COLON AND RECTUM: Resection, Including Transanal Disk Excision of Rectal   Neoplasms       Procedure         Right hemicolectomy     TUMOR       Tumor Site         Right (ascending) colon       Histologic Type         Adenocarcinoma       Histologic Grade         G2: Moderately differentiated       Tumor Size         Greatest dimension (Centimeters) 6.0 cm       Tumor Deposits         Not identified       Tumor Extension         Tumor invades the visceral peritoneum       Macroscopic Tumor Perforation         Not identified       Lymphovascular Invasion         Not identified       Perineural Invasion         Present       Treatment Effect         No known presurgical therapy           Margins Examined             Proximal             Distal             Radial or Mesenteric     LYMPH NODES       Regional Lymph Nodes           Number of Lymph Nodes Involved 0           Number of Lymph Nodes Examined 20       Primary Tumor (pT)         pT4a       Regional Lymph Nodes (pN)         pN0     ADDITIONAL FINDINGS       Additional Pathologic Findings         Diverticulosis     The patient recently underwent colonoscopy on 3/23 and was found to  have a lesion at her ileocolic anastomosis.  Biopsies of this lesion were obtained and were consistent with invasive colon cancer.  CEA from 2/12/21 was 12.0.     PET scan was performed today.  At the time of the patient visit, no official read was back from radiology, but the area of PET avidity appears to be isolated only to the previous anastomosis.     4- exp lap, ileocolectomy, ileal to descending colon anastomosis.       FINAL PATHOLOGIC DIAGNOSIS  [facility] DIAGNOSIS:   PROCEDURE: lleocolic resection (10 cm ileum, transverse colon and descending   colon, ileal descending colon anastomosis).   TUMOR SITE: Small intestine, not otherwise specified.   TUMOR SIZE: Greatest dimension: 4.2 cm   Additional dimension: 4.2 x 0.6 cm   LOCATION OF TUMOR: Tumor at previous ileocolic anastomosis.   MACROSCOPIC TUMOR PERFORATION: Not identified.   HISTOLOGIC TYPE: Adenocarcinoma, see comment.   HISTOLOGIC GRADE: Moderate to poorly differentiated.   TUMOR EXTENSION: Tumor invades through the muscularis propria and extends to   serosal surface.   MARGINS: All margins are uninvolved by invasive carcinoma, carcinoma in-situ   (high grade dysplasia) and adenoma (closest margin, 12.2 cm).   MARGINS EXAMINED: Proximal and distal.   LYMPHOVASCULAR INVASION: Not identified.   REGIONAL LYMPH NODES:   Number of lymph nodes involved 0   Number of lymph nodes examined: 14   PATHOLOGIC STAGING (pTNM): pT4 N0 MX   Comment:   MMR studies can be performed upon request.   Imani Cuevas MD   Report attached.   Performing site:   [address] [address]      2- colonoscopy  Findings:        The perianal and digital rectal examinations were normal.        The terminal ileum appeared normal.        There was evidence of a prior side-to-side ileo-colonic anastomosis        in the descending colon. This was patent and was characterized by        inflammation. The anastomosis was traversed.  "Biopsies were taken        with a cold forceps for histology. Verification of patient        identification for the specimen was done. Estimated blood loss was        minimal. To prevent bleeding after the biopsy, one hemostatic clip        was successfully placed. There was no bleeding at the end of the        procedure.        The exam was otherwise without abnormality on direct and        retroflexion views.      Collected: 02/15/22 0827   Result status: Final   Resulting lab: [facility]   Value: RELIAPA DIAGNOSIS:   SPECIMEN DESIGNATED "ILEOCOLONIC ANASTOMOSIS" BIOPSY:   Benign colonic mucosa with acute inflammation/granulation tissue.   Negative for dysplasia/carcinoma.   Yuliana Day M.D.   Report attached.   Performing site:   [place] [address]   "Disclaimer:  This case diagnosis was rendered completely by the outside   consultation pathologist and the case is electronically signed by an [facility]   pathologist listed below solely to release the report into the medical   record."    Comment: Interp By Krysta Bradley D.O., Signed on 02/22/2022 at 08:23         8-  Interval hx:  Feels well.  No pain.  BMs 3 per day.  Continent.  No blood.    Appetite good, gaining wt.       11-  Interval hx:   Declined CtDNA study enrollment per Dr Rico.  Feels well.  Tolerating diet.   3 BMs per day.  No blood.  Continent  No problems with energy levels or activity level.       03/15/2023 CT scan  CT CHEST ABDOMEN PELVIS WITH CONTRAST (XPD)     CLINICAL HISTORY:  colon cancer surveillance; Malignant neoplasm of colon, unspecified     TECHNIQUE:  Low dose axial CT images obtained throughout the region of the chest, abdomen and pelvis after the administration of x mL of Omnipaque 350 intravenous contrast.  Axial, sagittal and coronal reconstructions were performed.     COMPARISON:  None     FINDINGS:  Soft tissue structures at the base of " the neck are unremarkable.     The trachea and bronchial airways are clear and fully patent.     The lungs are well expanded and free of confluent opacity.     No pleural fluid, pericardial fluid or mediastinal lymph node enlargement.     The heart and aorta appear within normal limits.     The liver, gallbladder, and bile ducts are unremarkable.     Spleen, pancreas, and adrenal glands appear within normal limits.     Kidneys appear normal. The ureters are decompressed. Urinary bladder unremarkable.     The stomach, small bowel and colon demonstrate no evidence of obstruction or focal inflammation.  Postoperative change RIGHT hemicolon.     No free air or free fluid identified within the abdomen or pelvis.     Aorta tapers normally throughout its course.     Osseous structures exhibit mild degenerative changes. No fracture or focal osseous destructive lesion.  Scoliosis and degenerative change.  ORIF with percutaneous pinning LEFT hip.  Chronic healed fracture LEFT inferior pubic ramus.     Impression:     No evidence for metastatic disease within the abdomen or pelvis.        Electronically signed by: Fernando Alvarado MD  Date:                                            03/15/2023  Time:                                           11:30    05/31/2023 interval history  Patient feels well.  Good appetite, gaining weight and doing all activities without any restriction.  Denies abdominal pain.  Denies nausea or vomiting.  Bowel movements are formed, regular and without blood.        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    Hyperlipidemia     Hypertension     Osteoporosis         Past Surgical History:   Procedure Laterality Date    CATARACT EXTRACTION W/  INTRAOCULAR LENS IMPLANT Left 8/11/14    [facility]    CATARACT EXTRACTION W/  INTRAOCULAR LENS  IMPLANT Right 09/15/14    [facility]    COLONOSCOPY N/A 3/19/2020    Procedure: COLONOSCOPY;  Surgeon: Real Mabry MD;  Location: [facility] ENDO (2ND FLR);  Service: Endoscopy;  Laterality: N/A;    COLONOSCOPY N/A 3/23/2021    Procedure: COLONOSCOPY;  Surgeon: AUTUMN Berg MD;  Location: [facility] ENDO (4TH FLR);  Service: Endoscopy;  Laterality: N/A;  covid test 3/20 [facility]    COLONOSCOPY N/A 2/15/2022    Procedure: COLONOSCOPY;  Surgeon: Doris Simpson MD;  Location: [facility] ENDO (4TH FLR);  Service: Endoscopy;  Laterality: N/A;  COVID test on 2/12/22 at [facility]  2/14/22 - Pt confirmed 0640 arrival, prep instructions reviewed, Pt verbalized understanding-ERW@0927    ESOPHAGOGASTRODUODENOSCOPY N/A 3/19/2020    Procedure: EGD (ESOPHAGOGASTRODUODENOSCOPY);  Surgeon: Real Mabry MD;  Location: [facility] ENDO (2ND FLR);  Service: Endoscopy;  Laterality: N/A;    FOOT SURGERY      left    HYSTERECTOMY  1962    ILEOCOLECTOMY N/A 4/15/2021    Procedure: ILEOCOLECTOMY;  Surgeon: AUTUMN Berg MD;  Location: [facility] OR 2ND FLR;  Service: Colon and Rectal;  Laterality: N/A;    INSERTION OF TUNNELED CENTRAL VENOUS CATHETER (CVC) WITH SUBCUTANEOUS PORT N/A 7/2/2021    Procedure: [id number];  Surgeon: Suresh Agrawal MD;  Location: [facility] OR 2ND FLR;  Service: Vascular;  Laterality: N/A;  Right IJ    LAPAROSCOPIC HEMICOLECTOMY Right 3/20/2020    Procedure: HEMICOLECTOMY, RIGHT;  Surgeon: AUTUMN Berg MD;  [facility] 2[facility] FLR;  Service: Colon and Rectal;  Laterality: Right;    LYSIS OF ADHESIONS N/A 4/15/2021    Procedure: LYSIS, ADHESIONS;  Surgeon: AUTUMN Berg MD;  [facility] 2ND FLR;  Service: Colon and Rectal;  Laterality: N/A;  Greater than 2 hours    MOBILIZATION OF SPLENIC FLEXURE N/A 4/15/2021    Procedure: MOBILIZATION, SPLENIC FLEXURE;  Surgeon: AUTUMN Berg MD;  Location: [facility] OR 2ND FLR;  Service: Colon and Rectal;  Laterality: N/A;       Review of patient's allergies  indicates:  No Known Allergies    Family History   Problem Relation Age of Onset    Heart attack Father     Cataracts Brother     Heart attack Brother     Diabetes Brother     No Known Problems Mother     Cataracts Sister     Stroke Sister     Diabetes Sister     Cataracts Sister     Stroke Sister     No Known Problems Maternal Aunt     No Known Problems Maternal Uncle     No Known Problems Paternal Aunt     No Known Problems Paternal Uncle     No Known Problems Maternal Grandmother     No Known Problems Maternal Grandfather     No Known Problems Paternal Grandmother     No Known Problems Paternal Grandfather     Amblyopia Neg Hx     Blindness Neg Hx     Cancer Neg Hx     Glaucoma Neg Hx     Hypertension Neg Hx     Macular degeneration Neg Hx     Retinal detachment Neg Hx     Strabismus Neg Hx     Thyroid disease Neg Hx     Colon cancer Neg Hx     Esophageal cancer Neg Hx     Irritable bowel syndrome Neg Hx     Rectal cancer Neg Hx     Stomach cancer Neg Hx     Ulcerative colitis Neg Hx     Crohn's disease Neg Hx     Celiac disease Neg Hx        Social History     Socioeconomic History    Marital status: Single   Occupational History    Occupation: teaching retired    Occupation: Nun.  [employer]   Tobacco Use    Smoking status: Never    Smokeless tobacco: Never   Substance and Sexual Activity    Alcohol use: No     Alcohol/week: 0.0 standard drinks    Drug use: No    Sexual activity: Never   Social History Narrative    Member of [employer] order here in [place], [place]..  Lives with about 40-50 sisters.  All sisters with her are retired.         ROS:  GENERAL: No fever, chills, fatigability or weight loss.  Integument: No rashes, redness, icterus  CHEST: Denies DONOVAN, cyanosis, wheezing, cough and sputum production.  CARDIOVASCULAR: Denies chest pain, PND, orthopnea or reduced exercise tolerance.  GI: Denies abd pain, dysphagia, nausea, vomiting, no hematemesis   : Denies burning on  "urination, no hematuria, no bacteriuria  MSK: No deformities, swelling, joint pain swelling  Neurologic: No HAs, seizures, weakness, paresthesias, gait problems    PE:  General appearance healthy-appearing  BP (!) 159/70 (BP Location: Left arm, Patient Position: Sitting, BP Method: Large (Automatic))   Pulse 105   Ht 5' 7" (1.702 m)   Wt 61.1 kg (134 lb 12.8 oz)   BMI 21.11 kg/m²     Sclera/ Skin anicteric  LN none palpable  AT NC EOMI  Neck supple trachea midline   Chest symmetric, nl excursion, no retractions, breathing comfortably  Abdomen  ND soft NT.  no masses, no organomegaly  EXT - no CCE  Neuro:  Mood/ affect nl, alert and oriented x 3, moves all ext's, gait nl      Assessment:  Good performance status  No evidence of recurrent colon cancer    Plan:  Returned to clinic 6 months  Surveillance colonoscopy in 2 years      "

## 2023-06-20 DIAGNOSIS — M81.0 AGE-RELATED OSTEOPOROSIS WITHOUT CURRENT PATHOLOGICAL FRACTURE: Primary | ICD-10-CM

## 2023-07-05 RX ORDER — FERROUS SULFATE TAB 325 MG (65 MG ELEMENTAL FE) 325 (65 FE) MG
TAB ORAL
Qty: 12 TABLET | Refills: 4 | Status: SHIPPED | OUTPATIENT
Start: 2023-07-05

## 2023-07-06 ENCOUNTER — IMMUNIZATION (OUTPATIENT)
Dept: INTERNAL MEDICINE | Facility: CLINIC | Age: 88
End: 2023-07-06
Payer: MEDICARE

## 2023-07-06 ENCOUNTER — LAB VISIT (OUTPATIENT)
Dept: LAB | Facility: HOSPITAL | Age: 88
End: 2023-07-06
Payer: MEDICARE

## 2023-07-06 ENCOUNTER — OFFICE VISIT (OUTPATIENT)
Dept: PRIMARY CARE CLINIC | Facility: CLINIC | Age: 88
End: 2023-07-06
Payer: MEDICARE

## 2023-07-06 VITALS
SYSTOLIC BLOOD PRESSURE: 138 MMHG | TEMPERATURE: 98 F | WEIGHT: 135.25 LBS | OXYGEN SATURATION: 99 % | DIASTOLIC BLOOD PRESSURE: 84 MMHG | HEIGHT: 67 IN | HEART RATE: 85 BPM | BODY MASS INDEX: 21.23 KG/M2

## 2023-07-06 DIAGNOSIS — Z00.00 HEALTH CARE MAINTENANCE: ICD-10-CM

## 2023-07-06 DIAGNOSIS — Z23 NEED FOR VACCINATION: Primary | ICD-10-CM

## 2023-07-06 DIAGNOSIS — M81.0 AGE-RELATED OSTEOPOROSIS WITHOUT CURRENT PATHOLOGICAL FRACTURE: ICD-10-CM

## 2023-07-06 DIAGNOSIS — I10 ESSENTIAL HYPERTENSION: Chronic | ICD-10-CM

## 2023-07-06 PROBLEM — G30.1 MILD LATE ONSET ALZHEIMER'S DEMENTIA WITHOUT BEHAVIORAL DISTURBANCE, PSYCHOTIC DISTURBANCE, MOOD DISTURBANCE, OR ANXIETY: Status: RESOLVED | Noted: 2021-06-04 | Resolved: 2023-07-06

## 2023-07-06 PROBLEM — F02.A0 MILD LATE ONSET ALZHEIMER'S DEMENTIA WITHOUT BEHAVIORAL DISTURBANCE, PSYCHOTIC DISTURBANCE, MOOD DISTURBANCE, OR ANXIETY: Status: RESOLVED | Noted: 2021-06-04 | Resolved: 2023-07-06

## 2023-07-06 LAB
25(OH)D3+25(OH)D2 SERPL-MCNC: 44 NG/ML (ref 30–96)
ALBUMIN SERPL BCP-MCNC: 4 G/DL (ref 3.5–5.2)
ALP SERPL-CCNC: 73 U/L (ref 55–135)
ALT SERPL W/O P-5'-P-CCNC: 11 U/L (ref 10–44)
ANION GAP SERPL CALC-SCNC: 7 MMOL/L (ref 8–16)
AST SERPL-CCNC: 18 U/L (ref 10–40)
BILIRUB SERPL-MCNC: 0.2 MG/DL (ref 0.1–1)
BUN SERPL-MCNC: 14 MG/DL (ref 8–23)
CALCIUM SERPL-MCNC: 9.3 MG/DL (ref 8.7–10.5)
CHLORIDE SERPL-SCNC: 102 MMOL/L (ref 95–110)
CO2 SERPL-SCNC: 27 MMOL/L (ref 23–29)
CREAT SERPL-MCNC: 0.7 MG/DL (ref 0.5–1.4)
EST. GFR  (NO RACE VARIABLE): >60 ML/MIN/1.73 M^2
GLUCOSE SERPL-MCNC: 85 MG/DL (ref 70–110)
POTASSIUM SERPL-SCNC: 4.7 MMOL/L (ref 3.5–5.1)
PROT SERPL-MCNC: 7.6 G/DL (ref 6–8.4)
SODIUM SERPL-SCNC: 136 MMOL/L (ref 136–145)

## 2023-07-06 PROCEDURE — 91312 COVID-19, MRNA, LNP-S, BIVALENT BOOSTER, PF, 30 MCG/0.3 ML DOSE: CPT | Mod: PBBFAC

## 2023-07-06 PROCEDURE — 99213 OFFICE O/P EST LOW 20 MIN: CPT | Mod: S$GLB,,, | Performed by: INTERNAL MEDICINE

## 2023-07-06 PROCEDURE — 99213 PR OFFICE/OUTPT VISIT, EST, LEVL III, 20-29 MIN: ICD-10-PCS | Mod: S$GLB,,, | Performed by: INTERNAL MEDICINE

## 2023-07-06 PROCEDURE — 36415 COLL VENOUS BLD VENIPUNCTURE: CPT | Performed by: PHYSICIAN ASSISTANT

## 2023-07-06 PROCEDURE — 0124A COVID-19, MRNA, LNP-S, BIVALENT BOOSTER, PF, 30 MCG/0.3 ML DOSE: CPT | Mod: PBBFAC,CV19

## 2023-07-06 PROCEDURE — 80053 COMPREHEN METABOLIC PANEL: CPT | Performed by: PHYSICIAN ASSISTANT

## 2023-07-06 PROCEDURE — 82306 VITAMIN D 25 HYDROXY: CPT | Performed by: PHYSICIAN ASSISTANT

## 2023-07-06 NOTE — ASSESSMENT & PLAN NOTE
· Power-of- completed 5/2022 with patient's assistance superior general  · yearly labs at next visit.  Pt had labs for another provider already today  · covid vaccine today

## 2023-07-06 NOTE — PROGRESS NOTES
.  This note has been generated using voice-recognition software. There may be typographical errors that have been missed during proof-reading.     Primary Care Provider Appointment    Subjective:      Patient ID: Nydia Stevens is a 88 y.o. female here for follow up.     Chief Complaint: Follow-up    HPI:  This is an 88-year-old female with hypertension, hyperlipidemia, congestive heart failure, colon cancer, iron deficiency anemia, carotid artery disease, prediabetes, osteoporosis here for f/u.  patient last seen 03/02/2023 03/20/2023 patient seen by heme Onc for adenocarcinoma of the colon.  NAD.  Follow-up 6 months.  04/19/2023 patient seen by Optometry  05/17/2023 patient seen by podiatry  05/31/2023 patient seen by Colorectal surgery with history of colon cancer.  Colonoscopy every 2 years.  Follow-up 6 months.    No falls.  Exercising in class once weekly.  Does walk daily.    1lb wt gain since last visit.    No Gi symptoms.    Pt had labs done prior to today's visit.  Will not check labs for me today.  Will check at next visit  No e/o memory issues off chemo.      Patient Active Problem List   Diagnosis    Essential hypertension     Mixed hyperlipidemia    Iron deficiency anemia    Palliative care encounter    Closed fracture of left olecranon process    Debility    Aortic stenosis, mild    Osteoporosis    Chronic combined systolic and diastolic heart failure    Adenocarcinoma of colon    Carotid stenosis, asymptomatic, bilateral    H/O: hysterectomy    Prediabetes    Colon cancer    B12 deficiency    Aortic atherosclerosis    Health care maintenance    Benign mole    Hyperglycemia        Past Surgical History:   Procedure Laterality Date    CATARACT EXTRACTION W/  INTRAOCULAR LENS IMPLANT Left 8/11/14    [facility]    CATARACT EXTRACTION W/  INTRAOCULAR LENS IMPLANT Right 09/15/14    greg[facility]    COLONOSCOPY N/A 3/19/2020    Procedure: COLONOSCOPY;  Surgeon: Real Mabry MD;  Location: [facility] ([address] [place] [address]);   Service: Endoscopy;  Laterality: N/A;    COLONOSCOPY N/A 3/23/2021    Procedure: COLONOSCOPY;  Surgeon: AUTUMN Berg MD;  Location: [facility] ENDO (4TH FLR);  Service: Endoscopy;  Laterality: N/A;  covid test 3/20 [facility]    COLONOSCOPY N/A 2/15/2022    Procedure: COLONOSCOPY;  Surgeon: Doris Simpson MD;  Location: [facility] ENDO (4TH FLR);  Service: Endoscopy;  Laterality: N/A;  COVID test on 2/12/22 at [facility]  2/14/22 - Pt confirmed 0640 arrival, prep instructions reviewed, Pt verbalized [id number]    ESOPHAGOGASTRODUODENOSCOPY N/A 3/19/2020    Procedure: EGD (ESOPHAGOGASTRODUODENOSCOPY);  Surgeon: Real Mabry MD;  Location: [facility] ENDO (2ND FLR);  Service: Endoscopy;  Laterality: N/A;    FOOT SURGERY      left    HYSTERECTOMY  1962    ILEOCOLECTOMY N/A 4/15/2021    Procedure: ILEOCOLECTOMY;  Surgeon: AUTUMN Berg MD;  Location: [facility] OR 2ND FLR;  Service: Colon and Rectal;  Laterality: N/A;    INSERTION OF TUNNELED CENTRAL VENOUS CATHETER (CVC) WITH SUBCUTANEOUS PORT N/A 7/2/2021    Procedure: [id number];  Surgeon: Suresh Agrawal MD;  Location: [facility] OR 2ND FLR;  Service: Vascular;  Laterality: N/A;  Right IJ    LAPAROSCOPIC HEMICOLECTOMY Right 3/20/2020    Procedure: HEMICOLECTOMY, RIGHT;  Surgeon: AUTUMN Berg MD;  Location: [facility] OR 2ND FLR;  Service: Colon and Rectal;  Laterality: Right;    LYSIS OF ADHESIONS N/A 4/15/2021    Procedure: LYSIS, ADHESIONS;  Surgeon: AUTUMN Berg MD;  Location: [facility] OR 2ND FLR;  Service: Colon and Rectal;  Laterality: N/A;  Greater than 2 hours    MOBILIZATION OF SPLENIC FLEXURE N/A 4/15/2021    Procedure: MOBILIZATION, SPLENIC FLEXURE;  Surgeon: AUTUMN Berg MD;  Location: [facility] OR 2ND FLR;  Service: Colon and Rectal;  Laterality: N/A;       Past Medical History:   Diagnosis Date    Allergy     Cachexia 3/2/2020    Cancer     colon    Cataract     Dementia with behavioral disturbance     Encounter for blood transfusion      "Hemolytic anemia 3/18/2020    Hyperlipidemia     Hypertension     Osteoporosis        Social History     Socioeconomic History    Marital status: Single   Occupational History    Occupation: teaching retired    Occupation: Nun.  [employer]   Tobacco Use    Smoking status: Never    Smokeless tobacco: Never   Substance and Sexual Activity    Alcohol use: No     Alcohol/week: 0.0 standard drinks    Drug use: No    Sexual activity: Never   Social History Narrative    Member of [employer] order here in [place], [place]..  Lives with about 40-50 sisters.  All sisters with her are retired.         Review of Systems    PHQ9 3/2/2023   Total Score 0        Checklist of Daily Activities:        Objective:   /84 (BP Location: Left arm, Patient Position: Sitting, BP Method: Medium (Manual))   Pulse 85   Temp 97.6 °F (36.4 °C) (Oral)   Ht 5' 7" (1.702 m)   Wt 61.4 kg (135 lb 4 oz)   SpO2 99%   BMI 21.18 kg/m²     Physical Exam  Constitutional:       General: She is not in acute distress.     Appearance: Normal appearance. She is not ill-appearing, toxic-appearing or diaphoretic.   HENT:      Head: Normocephalic and atraumatic.   Cardiovascular:      Rate and Rhythm: Normal rate and regular rhythm.      Heart sounds: Murmur heard.   Pulmonary:      Effort: Pulmonary effort is normal.      Breath sounds: Normal breath sounds.   Abdominal:      Palpations: Abdomen is soft.      Tenderness: There is no abdominal tenderness. There is no guarding or rebound.   Musculoskeletal:      Right lower leg: No edema.      Left lower leg: No edema.   Lymphadenopathy:      Cervical: No cervical adenopathy.   Neurological:      Mental Status: She is alert.           Lab Results   Component Value Date    WBC 5.79 03/15/2023    HGB 11.7 (L) 03/15/2023    HCT 37.3 03/15/2023     03/15/2023    CHOL 184 02/17/2021    TRIG 65 02/17/2021    HDL 39 (L) 02/17/2021    ALT 15 03/15/2023    AST 20 03/15/2023    NA " 133 (L) 03/15/2023    K 4.4 03/15/2023    CL 98 03/15/2023    CREATININE 0.8 03/15/2023    BUN 14 03/15/2023    CO2 24 03/15/2023    TSH 1.985 08/25/2022    INR 0.9 03/17/2020    HGBA1C 5.8 (H) 12/01/2022       Current Outpatient Medications on File Prior to Visit   Medication Sig Dispense Refill    amLODIPine (NORVASC) 10 MG tablet Take 1 tablet (10 mg total) by mouth once daily. 30 tablet 11    cholecalciferol, vitamin D3, (VITAMIN D3) 25 mcg (1,000 unit) capsule Take 1,000 Units by mouth once daily. Hold until after surgery      cyanocobalamin (VITAMIN B-12) 1000 MCG tablet Take 1 tablet (1,000 mcg total) by mouth once daily. Hold until after surgery 30 tablet 11    FEROSUL 325 mg (65 mg iron) Tab tablet TAKE ONE TABLET BY MOUTH ON TUESDAY, THURSDAY, AND SATURDAY 12 tablet 4    furosemide (LASIX) 20 MG tablet TAKE ONE TABLET BY MOUTH EVERY DAY 90 tablet 3    LIDOcaine-prilocaine (EMLA) cream Apply topically as needed (apply over port before chemo). apply over port before chemo 30 g 3    losartan (COZAAR) 50 MG tablet TAKE ONE TABLET BY MOUTH EVERY DAY 90 tablet 3    lutein-zeaxanthin (OCUVITE LUTEIN 25) 25-5 mg Cap Take 1 tablet by mouth once daily. 30 capsule 0    potassium chloride (KLOR-CON) 10 MEQ TbSR TAKE 1 TABLET BY MOUTH ONCE DAILY. 90 tablet 2    rosuvastatin (CRESTOR) 40 MG Tab TAKE ONE TABLET BY MOUTH EVERY DAY FOR CHOLESTEROL 90 tablet 3    VIT C/VIT E ACETATE/LUTEIN/MIN (OCUVITE LUTEIN ORAL) Take 1 tablet by mouth once daily. Hold until after surgery      calcium carbonate (OS-JAILYN) 500 mg calcium (1,250 mg) tablet Take 1 tablet (500 mg total) by mouth once daily. (Patient taking differently: Take 0.5 tablets by mouth 2 (two) times daily. Hold until after surgery)  0     Current Facility-Administered Medications on File Prior to Visit   Medication Dose Route Frequency Provider Last Rate Last Admin    gabapentin capsule 300 mg  300 mg Oral TID Amanda Gaspar NP   300 mg at 04/16/21 0814     LIDOcaine (PF) 10 mg/ml (1%) injection 10 mg  1 mL Intradermal On Call Procedure Amanda Gaspar NP             Assessment:   88 y.o. female with multiple co-morbid illnesses here for continued follow up of medical problems.      Plan:     Problem List Items Addressed This Visit          Cardiac/Vascular    Essential hypertension  (Chronic)     Blood pressure goal today  Continue current medications.            Orthopedic    Osteoporosis     Tolerating Prolia without any problems.  Labs and repeat dexa ordered by endocrine  Continue Prolia.            Other    Health care maintenance     Power-of- completed 5/2022 with patient's assistance superior general  yearly labs at next visit.  Pt had labs for another provider already today  covid vaccine today            Health Maintenance         Date Due Completion Date    DEXA Scan 02/04/2023 2/4/2021    Override on 2/15/2016: Declined    Influenza Vaccine (1) 09/01/2023 9/8/2022    Hemoglobin A1c (Prediabetes) 12/01/2023 12/1/2022    Colonoscopy 02/15/2025 2/15/2022    TETANUS VACCINE 02/15/2028 2/15/2018          Medications Reconciliation:   I have not reconciled the patient's home medications with the patient/family. I have updated all changes.  Refer to After-Visit Medication List.      Medication List            Accurate as of July 6, 2023  1:15 PM. If you have any questions, ask your nurse or doctor.                CHANGE how you take these medications      calcium carbonate 500 mg calcium (1,250 mg) tablet  Commonly known as: OS-JAILYN  Take 1 tablet (500 mg total) by mouth once daily.  What changed:   how much to take  when to take this  additional instructions            CONTINUE taking these medications      amLODIPine 10 MG tablet  Commonly known as: NORVASC  Take 1 tablet (10 mg total) by mouth once daily.     cholecalciferol (vitamin D3) 25 mcg (1,000 unit) capsule  Commonly known as: VITAMIN D3     cyanocobalamin 1000 MCG tablet  Commonly known  as: VITAMIN B-12  Take 1 tablet (1,000 mcg total) by mouth once daily. Hold until after surgery     FeroSuL 325 mg (65 mg iron) Tab tablet  Generic drug: ferrous sulfate  TAKE ONE TABLET BY MOUTH ON TUESDAY, THURSDAY, AND SATURDAY     furosemide 20 MG tablet  Commonly known as: LASIX  TAKE ONE TABLET BY MOUTH EVERY DAY     LIDOcaine-prilocaine cream  Commonly known as: EMLA  Apply topically as needed (apply over port before chemo). apply over port before chemo     losartan 50 MG tablet  Commonly known as: COZAAR  TAKE ONE TABLET BY MOUTH EVERY DAY     lutein-zeaxanthin 25-5 mg Cap  Commonly known as: OCUVITE LUTEIN 25  Take 1 tablet by mouth once daily.     OCUVITE LUTEIN ORAL     potassium chloride 10 MEQ Tbsr  Commonly known as: KLOR-CON  TAKE 1 TABLET BY MOUTH ONCE DAILY.     rosuvastatin 40 MG Tab  Commonly known as: CRESTOR  TAKE ONE TABLET BY MOUTH EVERY DAY FOR CHOLESTEROL                   No follow-ups on file. Total clinical care time was 20 min. The following issues were discussed: covid vaccine.  Need for dexa and yearly labs at next visit.       Brittney Travis MD  Internal Medicine  [facility]  [phone]

## 2023-07-06 NOTE — ASSESSMENT & PLAN NOTE
Tolerating Prolia without any problems.  Labs and repeat dexa ordered by endocrine  · Continue Prolia.

## 2023-07-26 ENCOUNTER — HOSPITAL ENCOUNTER (OUTPATIENT)
Dept: RADIOLOGY | Facility: CLINIC | Age: 88
Discharge: HOME | End: 2023-07-26
Attending: PHYSICIAN ASSISTANT
Payer: MEDICARE

## 2023-07-26 DIAGNOSIS — M81.0 AGE-RELATED OSTEOPOROSIS WITHOUT CURRENT PATHOLOGICAL FRACTURE: ICD-10-CM

## 2023-07-26 PROCEDURE — 77080 DXA BONE DENSITY AXIAL: CPT | Mod: 26,,, | Performed by: INTERNAL MEDICINE

## 2023-07-26 PROCEDURE — 77080 DXA BONE DENSITY AXIAL: CPT | Mod: TC

## 2023-07-26 PROCEDURE — 77080 DXA BONE DENSITY AXIAL SKELETON 1 OR MORE SITES: ICD-10-PCS | Mod: 26,,, | Performed by: INTERNAL MEDICINE

## 2023-07-28 ENCOUNTER — INFUSION (OUTPATIENT)
Dept: INFECTIOUS DISEASES | Facility: HOSPITAL | Age: 88
End: 2023-07-28
Payer: MEDICARE

## 2023-07-28 VITALS
RESPIRATION RATE: 16 BRPM | HEIGHT: 67 IN | TEMPERATURE: 99 F | SYSTOLIC BLOOD PRESSURE: 169 MMHG | DIASTOLIC BLOOD PRESSURE: 74 MMHG | OXYGEN SATURATION: 100 % | BODY MASS INDEX: 21.25 KG/M2 | WEIGHT: 135.38 LBS

## 2023-07-28 DIAGNOSIS — M81.0 AGE-RELATED OSTEOPOROSIS WITHOUT CURRENT PATHOLOGICAL FRACTURE: Primary | ICD-10-CM

## 2023-07-28 PROCEDURE — 96372 THER/PROPH/DIAG INJ SC/IM: CPT

## 2023-07-28 PROCEDURE — 63600175 PHARM REV CODE 636 W HCPCS: Mod: JZ,JG | Performed by: PHYSICIAN ASSISTANT

## 2023-07-28 RX ADMIN — DENOSUMAB 60 MG: 60 INJECTION SUBCUTANEOUS at 09:07

## 2023-07-28 NOTE — PROGRESS NOTES
Pt received Prolia injection in left arm. Pt currently taking Vit D/Ca+; Pt denies any dental sx in last 3 months; left unit in NAD.

## 2023-09-15 ENCOUNTER — TELEPHONE (OUTPATIENT)
Dept: HEMATOLOGY/ONCOLOGY | Facility: CLINIC | Age: 88
End: 2023-09-15
Payer: MEDICARE

## 2023-09-18 ENCOUNTER — OFFICE VISIT (OUTPATIENT)
Dept: HEMATOLOGY/ONCOLOGY | Facility: CLINIC | Age: 88
End: 2023-09-18
Payer: MEDICARE

## 2023-09-18 VITALS
DIASTOLIC BLOOD PRESSURE: 78 MMHG | HEIGHT: 67 IN | TEMPERATURE: 97 F | HEART RATE: 96 BPM | BODY MASS INDEX: 21.05 KG/M2 | WEIGHT: 134.13 LBS | RESPIRATION RATE: 18 BRPM | OXYGEN SATURATION: 98 % | SYSTOLIC BLOOD PRESSURE: 186 MMHG

## 2023-09-18 DIAGNOSIS — I10 ESSENTIAL HYPERTENSION: ICD-10-CM

## 2023-09-18 DIAGNOSIS — D50.9 IRON DEFICIENCY ANEMIA, UNSPECIFIED IRON DEFICIENCY ANEMIA TYPE: ICD-10-CM

## 2023-09-18 DIAGNOSIS — C18.9 COLON ADENOCARCINOMA: Primary | ICD-10-CM

## 2023-09-18 PROCEDURE — 99214 PR OFFICE/OUTPT VISIT, EST, LEVL IV, 30-39 MIN: ICD-10-PCS | Mod: S$PBB,,, | Performed by: INTERNAL MEDICINE

## 2023-09-18 PROCEDURE — 99999 PR PBB SHADOW E&M-EST. PATIENT-LVL IV: ICD-10-PCS | Mod: PBBFAC,,, | Performed by: INTERNAL MEDICINE

## 2023-09-18 PROCEDURE — 99214 OFFICE O/P EST MOD 30 MIN: CPT | Mod: S$PBB,,, | Performed by: INTERNAL MEDICINE

## 2023-09-18 PROCEDURE — 99999 PR PBB SHADOW E&M-EST. PATIENT-LVL IV: CPT | Mod: PBBFAC,,, | Performed by: INTERNAL MEDICINE

## 2023-09-18 PROCEDURE — 99214 OFFICE O/P EST MOD 30 MIN: CPT | Mod: PBBFAC | Performed by: INTERNAL MEDICINE

## 2023-09-18 NOTE — PROGRESS NOTES
PATIENT: Nydia Stevens  MRN: [id number]  DATE: 9/18/2023    Diagnosis:   1. Colon adenocarcinoma    2. Iron deficiency anemia, unspecified iron deficiency anemia type    3. Essential hypertension       Chief Complaint: Colon adenocarcinoma    Oncologic History:     Nydia Stevens is a 88 year old woman who presents to clinic for follow-up of her colon adenocarcinoma. Continues to do well overall. Remains active in the [employer]. Had a great time celebrating her 70 year anniversary with her [employer] celebration. States she is exercising and doing mental exercises such as coloring and puzzles. Denies fever/chills, SOB, CP, palpitations, N/V, C/D, changes in bowel movements, BRBPR, pain, blood in urine/stool, paresthesias. Ambulates without difficulty using her walker.         She has a PMH of iron deficiency anemia.    A. 3/2/20 : Evaluated by her PCP, routine labs found a Hgb of 4.4. Transferred to ED for further evaluation. Underwent EGD+Colonoscopy which found a near obstructing colon mass + for adenocarcinoma.   B. 3/19/20 : Right hemicolectomy. Confirmed [id number], ANDREW adenocarcinoma. Refused adjuvant chemotherapy.  C. 3/23/21 : Colonoscopy found recurrence at site of anastomosis.   D. 4/15/21 : Ileocolic resection, 4.2cm tumor at anastomosis found. Moderately to poorly differentiated. Through muscularis to serosa. 0/14 LNs positive, negative margins. PET negative for metastatic disease.  E. 5/31/21 : Presents for consideration of systemic therapy but missed appt for IR port insertion. Subsequently placed on 6/23.  F. 7/19 - 1/10/22 : C1 - C12 adjuvant 5-FU/Leucovorin (C4 delayed due to Hurricane Zuleyma, C11 bolus dropped due to neutropenia).  H. 2/15/22 : Colonoscopy, biopsy shows no evidence of carcinoma.     Imaging :  CT CAP 9/15/23: No evidence of recurrent or residual disease.   CT CAP 3/15/23 : No evidence of recurrent or residual disease.   CT CAP 3/14/22 : No  evidence of recurrent or residual  disease.   CT CAP 9/24/21 : No evidence of recurrent or residual disease.     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    Hyperlipidemia     Hypertension     Osteoporosis        Past Surgical HIstory:   Past Surgical History:   Procedure Laterality Date    CATARACT EXTRACTION W/  INTRAOCULAR LENS IMPLANT Left 8/11/14    [facility]    CATARACT EXTRACTION W/  INTRAOCULAR LENS IMPLANT Right 09/15/14    [facility]    COLONOSCOPY N/A 3/19/2020    Procedure: COLONOSCOPY;  Surgeon: Real Mabry MD;  Location: [facility] ENDO (2ND FLR);  Service: Endoscopy;  Laterality: N/A;    COLONOSCOPY N/A 3/23/2021    Procedure: COLONOSCOPY;  Surgeon: AUTUMN Berg MD;  Location: [facility] (4TH FLR);  Service: Endoscopy;  Laterality: N/A;  covid test 3/20 [facility]    COLONOSCOPY N/A 2/15/2022    Procedure: COLONOSCOPY;  Surgeon: Doris Simpson MD;  Location: [facility] (4TH FLR);  Service: Endoscopy;  Laterality: N/A;  COVID test on 2/12/22 at [facility]  2/14/22 - Pt confirmed 0640 arrival, prep instructions reviewed, Pt verbalized understanding-[id number]    ESOPHAGOGASTRODUODENOSCOPY N/A 3/19/2020    Procedure: EGD (ESOPHAGOGASTRODUODENOSCOPY);  Surgeon: Real Mabry MD;  Location: [facility] (2ND FLR);  Service: Endoscopy;  Laterality: N/A;    FOOT SURGERY      left    HYSTERECTOMY  1962    ILEOCOLECTOMY N/A 4/15/2021    Procedure: ILEOCOLECTOMY;  Surgeon: AUTUMN Berg MD;  [facility];  Service: Colon and Rectal;  Laterality: N/A;    INSERTION OF TUNNELED CENTRAL VENOUS CATHETER (CVC) WITH SUBCUTANEOUS PORT N/A 7/2/2021    Procedure: [id number];  Surgeon: Suresh Agrawal MD;  Location: [facility] OR 2ND FLR;  Service: Vascular;  Laterality: N/A;  Right IJ    LAPAROSCOPIC HEMICOLECTOMY Right 3/20/2020    Procedure:  HEMICOLECTOMY, RIGHT;  Surgeon: AUTUMN Berg MD;  Location: [facility] OR 2ND FLR;  Service: Colon and Rectal;  Laterality: Right;    LYSIS OF ADHESIONS N/A 4/15/2021    Procedure: LYSIS, ADHESIONS;  Surgeon: AUTUMN Berg MD;  Location: [facility] OR 2ND FLR;  Service: Colon and Rectal;  Laterality: N/A;  Greater than 2 hours    MOBILIZATION OF SPLENIC FLEXURE N/A 4/15/2021    Procedure: MOBILIZATION, SPLENIC FLEXURE;  Surgeon: AUTUMN Berg MD;  Location: [facility] OR 2ND FLR;  Service: Colon and Rectal;  Laterality: N/A;       Family History:   Family History   Problem Relation Age of Onset    Heart attack Father     Cataracts Brother     Heart attack Brother     Diabetes Brother     No Known Problems Mother     Cataracts Sister     Stroke Sister     Diabetes Sister     Cataracts Sister     Stroke Sister     No Known Problems Maternal Aunt     No Known Problems Maternal Uncle     No Known Problems Paternal Aunt     No Known Problems Paternal Uncle     No Known Problems Maternal Grandmother     No Known Problems Maternal Grandfather     No Known Problems Paternal Grandmother     No Known Problems Paternal Grandfather     Amblyopia Neg Hx     Blindness Neg Hx     Cancer Neg Hx     Glaucoma Neg Hx     Hypertension Neg Hx     Macular degeneration Neg Hx     Retinal detachment Neg Hx     Strabismus Neg Hx     Thyroid disease Neg Hx     Colon cancer Neg Hx     Esophageal cancer Neg Hx     Irritable bowel syndrome Neg Hx     Rectal cancer Neg Hx     Stomach cancer Neg Hx     Ulcerative colitis Neg Hx     Crohn's disease Neg Hx     Celiac disease Neg Hx        Social History:  reports that she has never smoked. She has never used smokeless tobacco. She reports that she does not drink alcohol and does not use drugs.    Allergies:  Review of patient's allergies indicates:  No Known Allergies    Medications:  Current Outpatient Medications   Medication Sig Dispense Refill    amLODIPine (NORVASC) 10 MG tablet Take 1 tablet (10 mg  total) by mouth once daily. 30 tablet 11    calcium carbonate (OS-JAILYN) 500 mg calcium (1,250 mg) tablet Take 1 tablet (500 mg total) by mouth once daily. (Patient taking differently: Take 0.5 tablets by mouth 2 (two) times daily. Hold until after surgery)  0    cholecalciferol, vitamin D3, (VITAMIN D3) 25 mcg (1,000 unit) capsule Take 1,000 Units by mouth once daily. Hold until after surgery      cyanocobalamin (VITAMIN B-12) 1000 MCG tablet Take 1 tablet (1,000 mcg total) by mouth once daily. Hold until after surgery 30 tablet 11    FEROSUL 325 mg (65 mg iron) Tab tablet TAKE ONE TABLET BY MOUTH ON TUESDAY, THURSDAY, AND SATURDAY 12 tablet 4    furosemide (LASIX) 20 MG tablet TAKE ONE TABLET BY MOUTH EVERY DAY 90 tablet 3    LIDOcaine-prilocaine (EMLA) cream Apply topically as needed (apply over port before chemo). apply over port before chemo 30 g 3    losartan (COZAAR) 50 MG tablet TAKE ONE TABLET BY MOUTH EVERY DAY 90 tablet 3    lutein-zeaxanthin (OCUVITE LUTEIN 25) 25-5 mg Cap Take 1 tablet by mouth once daily. 30 capsule 0    potassium chloride (KLOR-CON) 10 MEQ TbSR TAKE 1 TABLET BY MOUTH ONCE DAILY. 90 tablet 2    rosuvastatin (CRESTOR) 40 MG Tab TAKE ONE TABLET BY MOUTH EVERY DAY FOR CHOLESTEROL 90 tablet 3    VIT C/VIT E ACETATE/LUTEIN/MIN (OCUVITE LUTEIN ORAL) Take 1 tablet by mouth once daily. Hold until after surgery       No current facility-administered medications for this visit.     Facility-Administered Medications Ordered in Other Visits   Medication Dose Route Frequency Provider Last Rate Last Admin    gabapentin capsule 300 mg  300 mg Oral TID Amanda Gaspar NP   300 mg at 04/16/21 0814    LIDOcaine (PF) 10 mg/ml (1%) injection 10 mg  1 mL Intradermal On Call Procedure Amanda Gaspar NP         Review of Systems   Constitutional:  Negative for activity change, appetite change, chills, diaphoresis, fever and unexpected weight change.   HENT:  Negative for congestion, mouth sores,  "nosebleeds, trouble swallowing and voice change.    Eyes:  Negative for visual disturbance.   Respiratory:  Negative for chest tightness and shortness of breath.    Cardiovascular:  Negative for chest pain and palpitations.   Gastrointestinal:  Negative for abdominal distention, abdominal pain, blood in stool, constipation, diarrhea, nausea, rectal pain and vomiting.   Endocrine: Negative for cold intolerance.   Genitourinary:  Negative for difficulty urinating, hematuria and vaginal bleeding.   Musculoskeletal:  Negative for arthralgias, back pain and myalgias.   Skin:  Negative for rash and wound.   Neurological:  Negative for dizziness, facial asymmetry, speech difficulty, weakness, light-headedness and headaches.   Hematological:  Negative for adenopathy. Does not bruise/bleed easily.   Psychiatric/Behavioral:  Negative for agitation, behavioral problems, confusion, hallucinations and sleep disturbance.      ECOG Performance Status: 2   Objective:      Vitals:   Vitals:    09/18/23 1419 09/18/23 1421   BP: (!) 199/79 (!) 186/78   BP Location: Right arm Left arm   Patient Position: Sitting Sitting   BP Method: Medium (Automatic) Medium (Automatic)   Pulse: 100 96   Resp: 18    Temp: 97.3 °F (36.3 °C)    TempSrc: Oral    SpO2: 98%    Weight: 60.9 kg (134 lb 2.4 oz)    Height: 5' 7" (1.702 m)      BMI: Body mass index is 21.01 kg/m².    Physical Exam  Vitals reviewed.   Constitutional:       General: She is not in acute distress.     Appearance: Normal appearance. She is not ill-appearing, toxic-appearing or diaphoretic.   HENT:      Head: Normocephalic and atraumatic.      Right Ear: External ear normal.      Left Ear: External ear normal.      Mouth/Throat:      Mouth: Mucous membranes are moist.   Eyes:      General: No scleral icterus.     Conjunctiva/sclera: Conjunctivae normal.   Cardiovascular:      Rate and Rhythm: Normal rate and regular rhythm.   Pulmonary:      Effort: Pulmonary effort is normal. No " respiratory distress.      Breath sounds: No wheezing.   Abdominal:      General: Abdomen is flat. There is no distension.      Tenderness: There is no abdominal tenderness.   Musculoskeletal:         General: Normal range of motion.      Cervical back: Normal range of motion.   Lymphadenopathy:      Cervical: No cervical adenopathy.   Skin:     General: Skin is warm and dry.      Coloration: Skin is not jaundiced.      Findings: No bruising, erythema or rash.   Neurological:      General: No focal deficit present.      Mental Status: She is alert and oriented to person, place, and time. Mental status is at baseline.      Cranial Nerves: No cranial nerve deficit.      Sensory: No sensory deficit.      Motor: No weakness.      Gait: Abnormal gait: uses rollator.   Psychiatric:         Mood and Affect: Mood normal.         Behavior: Behavior normal.         Thought Content: Thought content normal.         Judgment: Judgment normal.        Laboratory Data:  Lab Visit on 09/15/2023   Component Date Value Ref Range Status    Creatinine 09/15/2023 0.8  0.5 - 1.4 mg/dL Final    eGFR 09/15/2023 >60.0  >60 mL/min/1.73 m^2 Final   Lab Visit on 09/15/2023   Component Date Value Ref Range Status    CEA 09/15/2023 4.5  0.0 - 5.0 ng/mL Final    Comment: CEA Normal Range:  Non-Smokers: 0-3.0 ng/mL  Smokers:     0-5.0 ng/mL    The testing method is a chemiluminescent microparticle immunoassay   manufactured by [employer] and performed on the [employer]   or   [employer] system. Values obtained with different assay manufacturers   for   methods may be different and cannot be used interchangeably.      Sodium 09/15/2023 131 (L)  136 - 145 mmol/L Final    Potassium 09/15/2023 4.5  3.5 - 5.1 mmol/L Final    Chloride 09/15/2023 95  95 - 110 mmol/L Final    CO2 09/15/2023 24  23 - 29 mmol/L Final    Glucose 09/15/2023 99  70 - 110 mg/dL Final    BUN 09/15/2023 9  8 - 23 mg/dL Final    Creatinine 09/15/2023 0.8  0.5 - 1.4 mg/dL  Final    Calcium 09/15/2023 9.2  8.7 - 10.5 mg/dL Final    Total Protein 09/15/2023 7.8  6.0 - 8.4 g/dL Final    Albumin 09/15/2023 4.2  3.5 - 5.2 g/dL Final    Total Bilirubin 09/15/2023 0.4  0.1 - 1.0 mg/dL Final    Comment: For infants and newborns, interpretation of results should be based  on gestational age, weight and in agreement with clinical  observations.    Premature Infant recommended reference ranges:  Up to 24 hours.............<8.0 mg/dL  Up to 48 hours............<12.0 mg/dL  3-5 days..................<15.0 mg/dL  6-29 days.................<15.0 mg/dL      Alkaline Phosphatase 09/15/2023 80  55 - 135 U/L Final    AST 09/15/2023 18  10 - 40 U/L Final    ALT 09/15/2023 14  10 - 44 U/L Final    eGFR 09/15/2023 >60.0  >60 mL/min/1.73 m^2 Final    Anion Gap 09/15/2023 12  8 - 16 mmol/L Final    WBC 09/15/2023 6.08  3.90 - 12.70 K/uL Final    RBC 09/15/2023 4.18  4.00 - 5.40 M/uL Final    Hemoglobin 09/15/2023 11.6 (L)  12.0 - 16.0 g/dL Final    Hematocrit 09/15/2023 36.7 (L)  37.0 - 48.5 % Final    MCV 09/15/2023 88  82 - 98 fL Final    MCH 09/15/2023 27.8  27.0 - 31.0 pg Final    MCHC 09/15/2023 31.6 (L)  32.0 - 36.0 g/dL Final    RDW 09/15/2023 14.9 (H)  11.5 - 14.5 % Final    Platelets 09/15/2023 296  150 - 450 K/uL Final    MPV 09/15/2023 8.8 (L)  9.2 - 12.9 fL Final    Immature Granulocytes 09/15/2023 1.6 (H)  0.0 - 0.5 % Final    Gran # (ANC) 09/15/2023 3.2  1.8 - 7.7 K/uL Final    Immature Grans (Abs) 09/15/2023 0.10 (H)  0.00 - 0.04 K/uL Final    Comment: Mild elevation in immature granulocytes is non specific and   can be seen in a variety of conditions including stress response,   acute inflammation, trauma and pregnancy. Correlation with other   laboratory and clinical findings is essential.      Lymph # 09/15/2023 1.6  1.0 - 4.8 K/uL Final    Mono # 09/15/2023 1.0  0.3 - 1.0 K/uL Final    Eos # 09/15/2023 0.2  0.0 - 0.5 K/uL Final    Baso # 09/15/2023 0.04  0.00 - 0.20 K/uL Final    nRBC  09/15/2023 0  0 /100 WBC Final    Gran % 09/15/2023 52.1  38.0 - 73.0 % Final    Lymph % 09/15/2023 26.2  18.0 - 48.0 % Final    Mono % 09/15/2023 16.1 (H)  4.0 - 15.0 % Final    Eosinophil % 09/15/2023 3.3  0.0 - 8.0 % Final    Basophil % 09/15/2023 0.7  0.0 - 1.9 % Final    Differential Method 09/15/2023 Automated   Final     Assessment:       1. Colon adenocarcinoma    2. Iron deficiency anemia, unspecified iron deficiency anemia type    3. Essential hypertension       Plan:     1. Colon Cancer     [id number], stage II  Hemicolectomy done on 03/19/2020.   Status post adjuvant treatment of 5-FU bolus, leucovorin and 5FU infusion [id number] for 6months (completed January 2022).   Colonsocopy and CT after adjuvant therapy did not show evidence of persistent disease.   CEA today, 3/20/23 remains normal.   CT CAP on 3/15/23 without evidence of recurrent or metastatic disease.   CT CAP on 9/15/23 again without evidence of recurrent or metastatic disease.   She opted not to enroll in ctDNA monitoring on [employer] [place] MRD study.    She continues to do well overall with no new symptoms.   CEA remains normal.   Will start to spread out her visits going forward given she is ~3.5 years from surgery. She is agreeable.   RTC in 1 year with labs & imaging.    2. Iron Deficiency Anemia    Continue oral iron, well tolerated.   Parameters stable.     3. HTN    Above goal, asymptomatic.  Advised compliance with BP meds.   Encouraged to f/u with PCP re: further management of HTN regimen.     Patient is in agreement with the proposed treatment plan. All questions were answered to the patient's satisfaction. Pt knows to call clinic if anything is needed before the next clinic visit.    Patient discussed with and seen in conjunction with collaborating physician, Dr. Garrett.    At least 30 minutes were spent today on this encounter including face to face time with the patient, data gathering/interpretation and documentation.       Starla PINA  [place], MSN, APRN, [facility]  Hematology and Medical Oncology  Clinical Nurse Specialist to Dr. Garrett, Dr. Mancini & Dr. Lockhart    Route Chart for Scheduling    Med Onc Chart Routing      Follow up with physician 1 year. rtc in 1 year with labs and scans to see Dr. Garrett to review results   Follow up with [facility]    Infusion scheduling note    Injection scheduling note    Labs CBC, CMP and CEA   Scheduling:  Preferred lab:  Lab interval: every 12 months     Imaging CT chest abdomen pelvis   1-2 days prior to visit in 1 year   Pharmacy appointment    Other referrals            Therapy Plan Information  denosumab (PROLIA) injection 60 mg  60 mg, Subcutaneous, Every visit

## 2023-10-09 PROBLEM — Z00.00 HEALTH CARE MAINTENANCE: Status: RESOLVED | Noted: 2021-09-21 | Resolved: 2023-10-09

## 2023-10-11 PROBLEM — E27.9 ADRENAL NODULE: Status: ACTIVE | Noted: 2023-10-11

## 2023-10-11 PROBLEM — E27.8 ADRENAL NODULE: Status: ACTIVE | Noted: 2023-10-11

## 2023-10-12 ENCOUNTER — OFFICE VISIT (OUTPATIENT)
Dept: PRIMARY CARE CLINIC | Facility: CLINIC | Age: 88
End: 2023-10-12
Payer: MEDICARE

## 2023-10-12 VITALS
OXYGEN SATURATION: 99 % | WEIGHT: 133.69 LBS | HEART RATE: 95 BPM | DIASTOLIC BLOOD PRESSURE: 72 MMHG | TEMPERATURE: 99 F | BODY MASS INDEX: 20.98 KG/M2 | SYSTOLIC BLOOD PRESSURE: 138 MMHG | HEIGHT: 67 IN

## 2023-10-12 DIAGNOSIS — E27.8 ADRENAL NODULE: ICD-10-CM

## 2023-10-12 DIAGNOSIS — Z00.00 HEALTHCARE MAINTENANCE: ICD-10-CM

## 2023-10-12 DIAGNOSIS — I10 ESSENTIAL HYPERTENSION: Chronic | ICD-10-CM

## 2023-10-12 PROCEDURE — 99214 PR OFFICE/OUTPT VISIT, EST, LEVL IV, 30-39 MIN: ICD-10-PCS | Mod: S$GLB,,, | Performed by: INTERNAL MEDICINE

## 2023-10-12 PROCEDURE — 99214 OFFICE O/P EST MOD 30 MIN: CPT | Mod: S$GLB,,, | Performed by: INTERNAL MEDICINE

## 2023-10-12 NOTE — PROGRESS NOTES
.  This note has been generated using voice-recognition software. There may be typographical errors that have been missed during proof-reading.     Primary Care Provider Appointment    Subjective:      Patient ID: Nydia Stevens is a 88 y.o. female here for follow up.     Chief Complaint: Follow-up    HPI:  This is an 88-year-old female with hypertension, hyperlipidemia, congestive heart failure, colon cancer, iron deficiency anemia, carotid artery disease, prediabetes, osteoporosis here for f/u.  patient last seen 07/06/2023 07/28/2023 patient received Prolia  09/18/2023 patient seen by Heme-Onc  2lb wt loss since last visit.     Pt denies any cardiac surgery.  Prosthetic valve noted on CT with onc.    Refusing labs today.      Has flu vaccine 2 weeks and 1 week ago new covid vaccine.  Occurred at the [facility].  Confirmed with pt that she did not have a valve replacement and contacted radiology to review and they confirmed it was only calcified annulus.      Patient Active Problem List   Diagnosis    Essential hypertension     Mixed hyperlipidemia    Iron deficiency anemia    Palliative care encounter    Closed fracture of left olecranon process    Debility    Aortic stenosis, mild    Osteoporosis    Chronic combined systolic and diastolic heart failure    Adenocarcinoma of colon    Carotid stenosis, asymptomatic, bilateral    H/O: hysterectomy    Prediabetes    Colon cancer    B12 deficiency    Healthcare maintenance    Aortic atherosclerosis    Benign mole    Hyperglycemia    Adrenal nodule        Past Surgical History:   Procedure Laterality Date    CATARACT EXTRACTION W/  INTRAOCULAR LENS IMPLANT Left 8/11/14    [facility]    CATARACT EXTRACTION W/  INTRAOCULAR LENS IMPLANT Right 09/15/14    [facility]    COLONOSCOPY N/A 3/19/2020    Procedure: COLONOSCOPY;  Surgeon: Real Mabry MD;  Location: [address];  Service: Endoscopy;  Laterality: N/A;    COLONOSCOPY N/A 3/23/2021    Procedure: COLONOSCOPY;   Surgeon: AUTUMN Berg MD;  Location: [facility] ENDO (4TH FLR);  Service: Endoscopy;  Laterality: N/A;  covid test 3/20 [facility]    COLONOSCOPY N/A 2/15/2022    Procedure: COLONOSCOPY;  Surgeon: Doris Simpson MD;  Location: [facility] ENDO (4TH FLR);  Service: Endoscopy;  Laterality: N/A;  COVID test on 2/12/22 at [facility]  2/14/22 - Pt confirmed 0640 arrival, prep instructions reviewed, Pt verbalized understanding-[id number]    ESOPHAGOGASTRODUODENOSCOPY N/A 3/19/2020    Procedure: EGD (ESOPHAGOGASTRODUODENOSCOPY);  Surgeon: Real Mabry MD;  Location: [facility] ENDO (2ND FLR);  Service: Endoscopy;  Laterality: N/A;    FOOT SURGERY      left    HYSTERECTOMY  1962    ILEOCOLECTOMY N/A 4/15/2021    Procedure: ILEOCOLECTOMY;  Surgeon: AUTUMN Berg MD;  Location: [facility] OR 2ND FLR;  Service: Colon and Rectal;  Laterality: N/A;    INSERTION OF TUNNELED CENTRAL VENOUS CATHETER (CVC) WITH SUBCUTANEOUS PORT N/A 7/2/2021    Procedure: [id number];  Surgeon: Suresh Agrawal MD;  Location: [facility] OR 2ND FLR;  Service: Vascular;  Laterality: N/A;  Right IJ    LAPAROSCOPIC HEMICOLECTOMY Right 3/20/2020    Procedure: HEMICOLECTOMY, RIGHT;  Surgeon: AUTUMN Berg MD;  Location: [facility] OR 2ND FLR;  Service: Colon and Rectal;  Laterality: Right;    LYSIS OF ADHESIONS N/A 4/15/2021    Procedure: LYSIS, ADHESIONS;  Surgeon: AUTUMN Berg MD;  Location: [facility] OR 2ND FLR;  Service: Colon and Rectal;  Laterality: N/A;  Greater than 2 hours    MOBILIZATION OF SPLENIC FLEXURE N/A 4/15/2021    Procedure: MOBILIZATION, SPLENIC FLEXURE;  Surgeon: AUTUMN Berg MD;  Location: [facility] OR 2ND FLR;  Service: Colon and Rectal;  Laterality: N/A;       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    Hyperlipidemia     Hypertension     Osteoporosis        Social  History     Socioeconomic History    Marital status: Single   Occupational History    Occupation: teaching retired    Occupation: Nun.  [employer]   Tobacco Use    Smoking status: Never    Smokeless tobacco: Never   Substance and Sexual Activity    Alcohol use: No     Alcohol/week: 0.0 standard drinks of alcohol    Drug use: No    Sexual activity: Never   Social History Narrative    Member of [employer] order here in [place], [place]..  Lives with about 40-50 sisters.  All sisters with her are retired.       Social Determinants of Health     Financial Resource Strain: Low Risk  (5/20/2021)    Overall Financial Resource Strain (CARDIA)     Difficulty of Paying Living Expenses: Not hard at all   Food Insecurity: No Food Insecurity (5/20/2021)    Hunger Vital Sign     Worried About Running Out of Food in the Last Year: Never true     Ran Out of Food in the Last Year: Never true   Physical Activity: Inactive (5/20/2021)    Exercise Vital Sign     Days of Exercise per Week: 0 days     Minutes of Exercise per Session: 0 min   Stress: No Stress Concern Present (5/20/2021)    [employer] - Occupational Stress Questionnaire     Feeling of Stress : Not at all   Social Connections: Moderately Integrated (5/20/2021)    Social Connection and Isolation Panel [NHANES]     Frequency of Communication with Friends and Family: More than three times a week     Frequency of Social Gatherings with Friends and Family: More than three times a week     Attends [employer] Services: More than 4 times per year     Active Member of Clubs or Organizations: Yes     Attends Club or Organization Meetings: More than 4 times per year     Marital Status: Never        Review of Systems         No data to display                 Checklist of Daily Activities:        Objective:   /72 (BP Location: Left arm, Patient Position: Sitting, BP Method: Medium (Manual))   Pulse 95   Temp 98.7 °F  "(37.1 °C) (Oral)   Ht 5' 7" (1.702 m)   Wt 60.6 kg (133 lb 11.3 oz)   SpO2 99%   BMI 20.94 kg/m²     Physical Exam  Constitutional:       General: She is not in acute distress.     Appearance: Normal appearance. She is not ill-appearing, toxic-appearing or diaphoretic.   HENT:      Head: Normocephalic and atraumatic.   Cardiovascular:      Rate and Rhythm: Normal rate and regular rhythm.      Heart sounds: Murmur heard.   Pulmonary:      Effort: Pulmonary effort is normal.      Breath sounds: Normal breath sounds.   Musculoskeletal:      Right lower leg: No edema.      Left lower leg: No edema.   Lymphadenopathy:      Cervical: No cervical adenopathy.   Skin:     Comments: Port in place without any concerning findings   Neurological:      Mental Status: She is alert.             Lab Results   Component Value Date    WBC 6.08 09/15/2023    HGB 11.6 (L) 09/15/2023    HCT 36.7 (L) 09/15/2023     09/15/2023    CHOL 184 02/17/2021    TRIG 65 02/17/2021    HDL 39 (L) 02/17/2021    ALT 14 09/15/2023    AST 18 09/15/2023     (L) 09/15/2023    K 4.5 09/15/2023    CL 95 09/15/2023    CREATININE 0.8 09/15/2023    CREATININE 0.8 09/15/2023    BUN 9 09/15/2023    CO2 24 09/15/2023    TSH 1.985 08/25/2022    INR 0.9 03/17/2020    HGBA1C 5.8 (H) 12/01/2022       Current Outpatient Medications on File Prior to Visit   Medication Sig Dispense Refill    amLODIPine (NORVASC) 10 MG tablet Take 1 tablet (10 mg total) by mouth once daily. 30 tablet 11    cholecalciferol, vitamin D3, (VITAMIN D3) 25 mcg (1,000 unit) capsule Take 1,000 Units by mouth once daily. Hold until after surgery      cyanocobalamin (VITAMIN B-12) 1000 MCG tablet Take 1 tablet (1,000 mcg total) by mouth once daily. Hold until after surgery 30 tablet 11    FEROSUL 325 mg (65 mg iron) Tab tablet TAKE ONE TABLET BY MOUTH ON TUESDAY, THURSDAY, AND SATURDAY 12 tablet 4    furosemide (LASIX) 20 MG tablet TAKE ONE TABLET BY MOUTH EVERY DAY 90 tablet 3    " LIDOcaine-prilocaine (EMLA) cream Apply topically as needed (apply over port before chemo). apply over port before chemo 30 g 3    losartan (COZAAR) 50 MG tablet TAKE ONE TABLET BY MOUTH EVERY DAY 90 tablet 3    lutein-zeaxanthin (OCUVITE LUTEIN 25) 25-5 mg Cap Take 1 tablet by mouth once daily. 30 capsule 0    potassium chloride (KLOR-CON) 10 MEQ TbSR TAKE 1 TABLET BY MOUTH ONCE DAILY. 90 tablet 2    rosuvastatin (CRESTOR) 40 MG Tab TAKE ONE TABLET BY MOUTH EVERY DAY FOR CHOLESTEROL 90 tablet 3    VIT C/VIT E ACETATE/LUTEIN/MIN (OCUVITE LUTEIN ORAL) Take 1 tablet by mouth once daily. Hold until after surgery      calcium carbonate (OS-JAILYN) 500 mg calcium (1,250 mg) tablet Take 1 tablet (500 mg total) by mouth once daily. (Patient taking differently: Take 0.5 tablets by mouth 2 (two) times daily. Hold until after surgery)  0     Current Facility-Administered Medications on File Prior to Visit   Medication Dose Route Frequency Provider Last Rate Last Admin    gabapentin capsule 300 mg  300 mg Oral TID Amanda Gaspar NP   300 mg at 04/16/21 0814    LIDOcaine (PF) 10 mg/ml (1%) injection 10 mg  1 mL Intradermal On Call Procedure Amanda Gaspar NP             Assessment:   88 y.o. female with multiple co-morbid illnesses here for continued follow up of medical problems.      Plan:     Problem List Items Addressed This Visit          Cardiac/Vascular    Essential hypertension  (Chronic)     Blood pressure goal today  Continue current medications.            Endocrine    Adrenal nodule     Noted on CT chest abdomen pelvis 03/15/2023: Note of 1.5 cm right adrenal hypodense nodule stable   Noted to be stable.  No indication for workup at this time.  Will follow.            Other    Healthcare maintenance     Power-of- completed 5/2022 with patient's assistance superior general  yearly labs refused today.  Will check at next visit  Recent flu and covid.  RSV in the future.              Health  Maintenance         Date Due Completion Date    Hemoglobin A1c (Prediabetes) 12/01/2023 12/1/2022    COVID-19 Vaccine (9 - 2023-24 season) 12/04/2023 10/9/2023    Colonoscopy 02/15/2025 2/15/2022    DEXA Scan 07/26/2025 7/26/2023    Override on 2/15/2016: Declined    TETANUS VACCINE 02/15/2028 2/15/2018          Medications Reconciliation:   I have not reconciled the patient's home medications with the patient/family. I have updated all changes.  Refer to After-Visit Medication List.      Medication List            Accurate as of October 12, 2023 12:18 PM. If you have any questions, ask your nurse or doctor.                CHANGE how you take these medications      calcium carbonate 500 mg calcium (1,250 mg) tablet  Commonly known as: OS-JAILYN  Take 1 tablet (500 mg total) by mouth once daily.  What changed:   how much to take  when to take this  additional instructions            CONTINUE taking these medications      amLODIPine 10 MG tablet  Commonly known as: NORVASC  Take 1 tablet (10 mg total) by mouth once daily.     cholecalciferol (vitamin D3) 25 mcg (1,000 unit) capsule  Commonly known as: VITAMIN D3     cyanocobalamin 1000 MCG tablet  Commonly known as: VITAMIN B-12  Take 1 tablet (1,000 mcg total) by mouth once daily. Hold until after surgery     FeroSuL 325 mg (65 mg iron) Tab tablet  Generic drug: ferrous sulfate  TAKE ONE TABLET BY MOUTH ON TUESDAY, THURSDAY, AND SATURDAY     furosemide 20 MG tablet  Commonly known as: LASIX  TAKE ONE TABLET BY MOUTH EVERY DAY     LIDOcaine-prilocaine cream  Commonly known as: EMLA  Apply topically as needed (apply over port before chemo). apply over port before chemo     losartan 50 MG tablet  Commonly known as: COZAAR  TAKE ONE TABLET BY MOUTH EVERY DAY     lutein-zeaxanthin 25-5 mg Cap  Commonly known as: OCUVITE LUTEIN 25  Take 1 tablet by mouth once daily.     OCUVITE LUTEIN ORAL     potassium chloride 10 MEQ Tbsr  Commonly known as: KLOR-CON  TAKE 1 TABLET BY MOUTH  ONCE DAILY.     rosuvastatin 40 MG Tab  Commonly known as: CRESTOR  TAKE ONE TABLET BY MOUTH EVERY DAY FOR CHOLESTEROL                   Follow up in about 8 weeks (around 12/7/2023). Total clinical care time was 25 min. The following issues were discussed: radiology contacted, vaccines, recent CT results, need for labs--refused.       Brittney Travis MD  Internal Medicine  [facility]  [phone]

## 2023-10-12 NOTE — ASSESSMENT & PLAN NOTE
Noted on CT chest abdomen pelvis 03/15/2023: Note of 1.5 cm right adrenal hypodense nodule stable   · Noted to be stable.  No indication for workup at this time.  Will follow.

## 2023-10-12 NOTE — ASSESSMENT & PLAN NOTE
· Power-of- completed 5/2022 with patient's assistance superior general  · yearly labs refused today.  Will check at next visit  · Recent flu and covid.  RSV in the future.

## 2023-10-12 NOTE — PATIENT INSTRUCTIONS
Thank you so much for coming in to see me today.  Please do not hesitate to call with any questions or concerns or if you feel that you need to be seen.         Please consider having the RSV vaccine here or with your drugstore.

## 2023-11-29 ENCOUNTER — OFFICE VISIT (OUTPATIENT)
Dept: SURGERY | Facility: CLINIC | Age: 88
End: 2023-11-29
Payer: MEDICARE

## 2023-11-29 VITALS — RESPIRATION RATE: 18 BRPM | HEIGHT: 67 IN | BODY MASS INDEX: 20.9 KG/M2 | WEIGHT: 133.19 LBS

## 2023-11-29 DIAGNOSIS — Z85.038 ENCOUNTER FOR FOLLOW-UP SURVEILLANCE OF COLON CANCER: Primary | ICD-10-CM

## 2023-11-29 DIAGNOSIS — Z08 ENCOUNTER FOR FOLLOW-UP SURVEILLANCE OF COLON CANCER: Primary | ICD-10-CM

## 2023-11-29 PROCEDURE — 99214 PR OFFICE/OUTPT VISIT, EST, LEVL IV, 30-39 MIN: ICD-10-PCS | Mod: S$PBB,,, | Performed by: COLON & RECTAL SURGERY

## 2023-11-29 PROCEDURE — 99214 OFFICE O/P EST MOD 30 MIN: CPT | Mod: S$PBB,,, | Performed by: COLON & RECTAL SURGERY

## 2023-11-29 PROCEDURE — 99999 PR PBB SHADOW E&M-EST. PATIENT-LVL III: ICD-10-PCS | Mod: PBBFAC,,, | Performed by: COLON & RECTAL SURGERY

## 2023-11-29 PROCEDURE — 99213 OFFICE O/P EST LOW 20 MIN: CPT | Mod: PBBFAC | Performed by: COLON & RECTAL SURGERY

## 2023-11-29 PROCEDURE — 99999 PR PBB SHADOW E&M-EST. PATIENT-LVL III: CPT | Mod: PBBFAC,,, | Performed by: COLON & RECTAL SURGERY

## 2023-11-29 NOTE — PROGRESS NOTES
HPI:  Nydia Stevens is a 88 y.o. female with history of a history of colon cancer, s/p right hemicolectomy, presents to clinic for discussion of anastomotic recurrence.  Pathology from previous surgery showed a pT4N0 lesion.  She was offered adjuvant chemotherapy but she declined.     Immunohistochemical studies for DNA mismatch repair proteins were performed   on this tumor (block 1H) and demonstrate intact staining in all 4 proteins   (MLH1, PMS2, MSH2, and MSH6).  These results indicate that this tumor is   microsatellite stable (ANDREW) and that Santillan syndrome (Hereditary Nonpolyposis   Colorectal Cancer, HNPCC) is unlikely.   Histologic sections highlighting visceral peritoneal involvement were   reviewed by Dr. Clare Plascencia and she concurs with the above impression.   COLON AND RECTUM: Resection, Including Transanal Disk Excision of Rectal   Neoplasms       Procedure         Right hemicolectomy     TUMOR       Tumor Site         Right (ascending) colon       Histologic Type         Adenocarcinoma       Histologic Grade         G2: Moderately differentiated       Tumor Size         Greatest dimension (Centimeters) 6.0 cm       Tumor Deposits         Not identified       Tumor Extension         Tumor invades the visceral peritoneum       Macroscopic Tumor Perforation         Not identified       Lymphovascular Invasion         Not identified       Perineural Invasion         Present       Treatment Effect         No known presurgical therapy           Margins Examined             Proximal             Distal             Radial or Mesenteric     LYMPH NODES       Regional Lymph Nodes           Number of Lymph Nodes Involved 0           Number of Lymph Nodes Examined 20       Primary Tumor (pT)         pT4a       Regional Lymph Nodes (pN)         pN0     ADDITIONAL FINDINGS       Additional Pathologic Findings         Diverticulosis     The patient recently underwent colonoscopy on 3/23 and was found to  have a lesion at her ileocolic anastomosis.  Biopsies of this lesion were obtained and were consistent with invasive colon cancer.  CEA from 2/12/21 was 12.0.     PET scan was performed today.  At the time of the patient visit, no official read was back from radiology, but the area of PET avidity appears to be isolated only to the previous anastomosis.     4- exp lap, ileocolectomy, ileal to descending colon anastomosis.       FINAL PATHOLOGIC DIAGNOSIS  [facility] DIAGNOSIS:   PROCEDURE: lleocolic resection (10 cm ileum, transverse colon and descending   colon, ileal descending colon anastomosis).   TUMOR SITE: Small intestine, not otherwise specified.   TUMOR SIZE: Greatest dimension: 4.2 cm   Additional dimension: 4.2 x 0.6 cm   LOCATION OF TUMOR: Tumor at previous ileocolic anastomosis.   MACROSCOPIC TUMOR PERFORATION: Not identified.   HISTOLOGIC TYPE: Adenocarcinoma, see comment.   HISTOLOGIC GRADE: Moderate to poorly differentiated.   TUMOR EXTENSION: Tumor invades through the muscularis propria and extends to   serosal surface.   MARGINS: All margins are uninvolved by invasive carcinoma, carcinoma in-situ   (high grade dysplasia) and adenoma (closest margin, 12.2 cm).   MARGINS EXAMINED: Proximal and distal.   LYMPHOVASCULAR INVASION: Not identified.   REGIONAL LYMPH NODES:   Number of lymph nodes involved 0   Number of lymph nodes examined: 14   PATHOLOGIC STAGING (pTNM): pT4 N0 MX   Comment:   MMR studies can be performed upon request.   Imani Cuevas MD   Report attached.   Performing site:   [address] [address]      2- colonoscopy  Findings:        The perianal and digital rectal examinations were normal.        The terminal ileum appeared normal.        There was evidence of a prior side-to-side ileo-colonic anastomosis        in the descending colon. This was patent and was characterized by        inflammation. The anastomosis was traversed.  "Biopsies were taken        with a cold forceps for histology. Verification of patient        identification for the specimen was done. Estimated blood loss was        minimal. To prevent bleeding after the biopsy, one hemostatic clip        was successfully placed. There was no bleeding at the end of the        procedure.        The exam was otherwise without abnormality on direct and        retroflexion views.      Collected: 02/15/22 0827   Result status: Final   Resulting lab: [facility]   Value: RELIAPA DIAGNOSIS:   SPECIMEN DESIGNATED "ILEOCOLONIC ANASTOMOSIS" BIOPSY:   Benign colonic mucosa with acute inflammation/granulation tissue.   Negative for dysplasia/carcinoma.   Yuliana Day M.D.   Report attached.   Performing site:   [place] [address]   "Disclaimer:  This case diagnosis was rendered completely by the outside   consultation pathologist and the case is electronically signed by an [facility]   pathologist listed below solely to release the report into the medical   record."    Comment: Interp By Krysta Bradley D.O., Signed on 02/22/2022 at 08:23         8-  Interval hx:  Feels well.  No pain.  BMs 3 per day.  Continent.  No blood.    Appetite good, gaining wt.       11-  Interval hx:   Declined CtDNA study enrollment per Dr Rico.  Feels well.  Tolerating diet.   3 BMs per day.  No blood.  Continent  No problems with energy levels or activity level.       03/15/2023 CT scan  CT CHEST ABDOMEN PELVIS WITH CONTRAST (XPD)     CLINICAL HISTORY:  colon cancer surveillance; Malignant neoplasm of colon, unspecified     TECHNIQUE:  Low dose axial CT images obtained throughout the region of the chest, abdomen and pelvis after the administration of x mL of Omnipaque 350 intravenous contrast.  Axial, sagittal and coronal reconstructions were performed.     COMPARISON:  None     FINDINGS:  Soft tissue structures at the base of " the neck are unremarkable.     The trachea and bronchial airways are clear and fully patent.     The lungs are well expanded and free of confluent opacity.     No pleural fluid, pericardial fluid or mediastinal lymph node enlargement.     The heart and aorta appear within normal limits.     The liver, gallbladder, and bile ducts are unremarkable.     Spleen, pancreas, and adrenal glands appear within normal limits.     Kidneys appear normal. The ureters are decompressed. Urinary bladder unremarkable.     The stomach, small bowel and colon demonstrate no evidence of obstruction or focal inflammation.  Postoperative change RIGHT hemicolon.     No free air or free fluid identified within the abdomen or pelvis.     Aorta tapers normally throughout its course.     Osseous structures exhibit mild degenerative changes. No fracture or focal osseous destructive lesion.  Scoliosis and degenerative change.  ORIF with percutaneous pinning LEFT hip.  Chronic healed fracture LEFT inferior pubic ramus.     Impression:     No evidence for metastatic disease within the abdomen or pelvis.        Electronically signed by: Fernando Alvarado MD  Date:                                            03/15/2023  Time:                                           11:30 05/31/2023 interval history  Patient feels well.  Good appetite, gaining weight and doing all activities without any restriction.  Denies abdominal pain.  Denies nausea or vomiting.  Bowel movements are formed, regular and without blood.         11-  Interval hx  Feels well.  Denies any pain.  Appetite and energy levels are good.  Gaining weight.  No change in activity levels.  Doing everything she needs to or wants to do.  Bowel movements are regular, formed, no incontinence, no bleeding.    Past Medical History:   Diagnosis Date    Allergy     Cachexia 3/2/2020    Cancer     colon    Cataract     Dementia with behavioral disturbance     Encounter for blood transfusion      Hemolytic anemia 3/18/2020    Hyperlipidemia     Hypertension     Osteoporosis         Past Surgical History:   Procedure Laterality Date    CATARACT EXTRACTION W/  INTRAOCULAR LENS IMPLANT Left 8/11/14    [facility]    CATARACT EXTRACTION W/  INTRAOCULAR LENS IMPLANT Right 09/15/14    [facility]    COLONOSCOPY N/A 3/19/2020    Procedure: COLONOSCOPY;  Surgeon: Real Mabry MD;  Location: [facility] ENDO (2ND FLR);  Service: Endoscopy;  Laterality: N/A;    COLONOSCOPY N/A 3/23/2021    Procedure: COLONOSCOPY;  Surgeon: AUTUMN Berg MD;  Location: [facility] ENDO (4TH FLR);  Service: Endoscopy;  Laterality: N/A;  covid test 3/20 [facility]    COLONOSCOPY N/A 2/15/2022    Procedure: COLONOSCOPY;  Surgeon: Doris Simpson MD;  Location: [facility] ENDO (4TH FLR);  Service: Endoscopy;  Laterality: N/A;  COVID test on 2/12/22 at [facility]  2/14/22 - Pt confirmed 0640 arrival, prep instructions reviewed, Pt verbalized understanding-[id number]    ESOPHAGOGASTRODUODENOSCOPY N/A 3/19/2020    Procedure: EGD (ESOPHAGOGASTRODUODENOSCOPY);  Surgeon: Real Mabry MD;  Location: [facility] ENDO (2ND FLR);  Service: Endoscopy;  Laterality: N/A;    FOOT SURGERY      left    HYSTERECTOMY  1962    ILEOCOLECTOMY N/A 4/15/2021    Procedure: ILEOCOLECTOMY;  Surgeon: AUTUMN Berg MD;  Location: [facility] OR 2ND FLR;  Service: Colon and Rectal;  Laterality: N/A;    INSERTION OF TUNNELED CENTRAL VENOUS CATHETER (CVC) WITH SUBCUTANEOUS PORT N/A 7/2/2021    Procedure: [id number];  Surgeon: Suresh Agrawal MD;  Location: [facility] OR 2ND FLR;  Service: Vascular;  Laterality: N/A;  Right IJ    LAPAROSCOPIC HEMICOLECTOMY Right 3/20/2020    Procedure: HEMICOLECTOMY, RIGHT;  Surgeon: AUTUMN Berg MD;  Location: [facility] OR 2ND FLR;  Service: Colon and Rectal;  Laterality: Right;    LYSIS OF ADHESIONS N/A 4/15/2021    Procedure: LYSIS, ADHESIONS;  Surgeon: AUTUMN Berg MD;  Location: [facility] OR 2ND FLR;  Service: Colon and Rectal;  Laterality: N/A;  Greater  than 2 hours    MOBILIZATION OF SPLENIC FLEXURE N/A 4/15/2021    Procedure: MOBILIZATION, SPLENIC FLEXURE;  Surgeon: AUTUMN Berg MD;  Location: [facility] OR [address] [place] [address];  Service: Colon and Rectal;  Laterality: N/A;       Review of patient's allergies indicates:  No Known Allergies    Family History   Problem Relation Age of Onset    Heart attack Father     Cataracts Brother     Heart attack Brother     Diabetes Brother     No Known Problems Mother     Cataracts Sister     Stroke Sister     Diabetes Sister     Cataracts Sister     Stroke Sister     No Known Problems Maternal Aunt     No Known Problems Maternal Uncle     No Known Problems Paternal Aunt     No Known Problems Paternal Uncle     No Known Problems Maternal Grandmother     No Known Problems Maternal Grandfather     No Known Problems Paternal Grandmother     No Known Problems Paternal Grandfather     Amblyopia Neg Hx     Blindness Neg Hx     Cancer Neg Hx     Glaucoma Neg Hx     Hypertension Neg Hx     Macular degeneration Neg Hx     Retinal detachment Neg Hx     Strabismus Neg Hx     Thyroid disease Neg Hx     Colon cancer Neg Hx     Esophageal cancer Neg Hx     Irritable bowel syndrome Neg Hx     Rectal cancer Neg Hx     Stomach cancer Neg Hx     Ulcerative colitis Neg Hx     Crohn's disease Neg Hx     Celiac disease Neg Hx        Social History     Socioeconomic History    Marital status: Single   Occupational History    Occupation: teaching retired    Occupation: Nun.  [employer]   Tobacco Use    Smoking status: Never    Smokeless tobacco: Never   Substance and Sexual Activity    Alcohol use: No     Alcohol/week: 0.0 standard drinks of alcohol    Drug use: No    Sexual activity: Never   Social History Narrative    Member of [employer] order here in [place], [place]..  Lives with about 40-50 sisters.  All sisters with her are retired.       Social Determinants of Health     Financial Resource Strain: Low Risk  (5/20/2021)     "Overall Financial Resource Strain (CARDIA)     Difficulty of Paying Living Expenses: Not hard at all   Food Insecurity: No Food Insecurity (5/20/2021)    Hunger Vital Sign     Worried About Running Out of Food in the Last Year: Never true     Ran Out of Food in the Last Year: Never true   Physical Activity: Inactive (5/20/2021)    Exercise Vital Sign     Days of Exercise per Week: 0 days     Minutes of Exercise per Session: 0 min   Stress: No Stress Concern Present (5/20/2021)    [employer] - Occupational Stress Questionnaire     Feeling of Stress : Not at all   Social Connections: Moderately Integrated (5/20/2021)    Social Connection and Isolation Panel [NHANES]     Frequency of Communication with Friends and Family: More than three times a week     Frequency of Social Gatherings with Friends and Family: More than three times a week     Attends Buddhism Services: More than 4 times per year     Active Member of Clubs or Organizations: Yes     Attends Club or Organization Meetings: More than 4 times per year     Marital Status: Never        ROS:  GENERAL: No fever, chills, fatigability or weight loss.  Integument: No rashes, redness, icterus  CHEST: Denies DONOVAN, cyanosis, wheezing, cough and sputum production.  CARDIOVASCULAR: Denies chest pain, PND, orthopnea or reduced exercise tolerance.  GI: Denies abd pain, dysphagia, nausea, vomiting, no hematemesis   : Denies burning on urination, no hematuria, no bacteriuria  MSK: No deformities, swelling, joint pain swelling  Neurologic: No HAs, seizures, weakness, paresthesias, gait problems    PE:  General appearance healthy  .Resp 18   Ht 5' 7" (1.702 m)   Wt 60.4 kg (133 lb 2.5 oz)   BMI 20.86 kg/m²     Sclera/ Skin anicteric  LN none palpable  AT NC EOMI  Neck supple trachea midline   Chest symmetric, nl excursion, no retractions, breathing comfortably  Abdomen  ND soft NT.  no masses, no organomegaly  EXT - no CCE  Neuro:  " Mood/ affect nl, alert and oriented x 3, moves all ext's, gait nl    Assessment:  No evidence recurrent colon cancer  Good performance status    Plan:  Return to clinic 6 months for surveillance.  CEA imaging per Medical Oncology.  Colonoscopy 2025

## 2023-12-19 ENCOUNTER — OFFICE VISIT (OUTPATIENT)
Dept: PRIMARY CARE CLINIC | Facility: CLINIC | Age: 88
End: 2023-12-19
Payer: MEDICARE

## 2023-12-19 VITALS
HEART RATE: 89 BPM | BODY MASS INDEX: 21 KG/M2 | HEIGHT: 67 IN | OXYGEN SATURATION: 99 % | TEMPERATURE: 97 F | SYSTOLIC BLOOD PRESSURE: 136 MMHG | DIASTOLIC BLOOD PRESSURE: 86 MMHG | WEIGHT: 133.81 LBS

## 2023-12-19 DIAGNOSIS — R73.9 HYPERGLYCEMIA: ICD-10-CM

## 2023-12-19 DIAGNOSIS — I10 ESSENTIAL HYPERTENSION: Primary | ICD-10-CM

## 2023-12-19 DIAGNOSIS — R73.03 PREDIABETES: ICD-10-CM

## 2023-12-19 DIAGNOSIS — Z00.00 HEALTHCARE MAINTENANCE: ICD-10-CM

## 2023-12-19 PROCEDURE — 99213 OFFICE O/P EST LOW 20 MIN: CPT | Mod: S$GLB,,, | Performed by: INTERNAL MEDICINE

## 2023-12-19 PROCEDURE — 99213 PR OFFICE/OUTPT VISIT, EST, LEVL III, 20-29 MIN: ICD-10-PCS | Mod: S$GLB,,, | Performed by: INTERNAL MEDICINE

## 2023-12-19 NOTE — ASSESSMENT & PLAN NOTE
Blood pressure better on repeat.  Would like to see a little bit better.  Repeat blood pressure in 2 months.  Patient will watch salt intake.

## 2023-12-19 NOTE — PROGRESS NOTES
.  This note has been generated using voice-recognition software. There may be typographical errors that have been missed during proof-reading.     Primary Care Provider Appointment    Subjective:      Patient ID: Nydia Stevens is a 89 y.o. female here for follow up.     Chief Complaint: Follow-up  HPI:  This is an 89-year-old female with hypertension, hyperlipidemia, congestive heart failure, colon cancer, iron deficiency anemia, carotid artery disease, prediabetes, osteoporosis here for f/u.  patient last seen 10/12/2023  Wt stable.  Patient without complaints today.  Has been ambulating with walker.  Eating well.  No GI symptoms.  Advance Care Planning     Date: 12/19/2023    Power of   I initiated the process of voluntary advance care planning today and explained the importance of this process to the patient.  I introduced the concept of advance directives to the patient, as well. Then the patient received detailed information about the importance of designating a Health Care Power of  (HCPOA). She was also instructed to communicate with this person about their wishes for future healthcare, should she become sick and lose decision-making capacity. The patient has previously appointed a HCPOA. After our discussion, the patient has not decided to complete a HCPOA and has appointed her  other , health care agent:  Sister Branden  & health care agent number:  see chart  I spent a total time of 5 minutes discussing this issue with the patient.    Advance Care Planning    Reviewed LW and POA and pt does nto wish to make any changes                 Patient Active Problem List   Diagnosis    Essential hypertension     Mixed hyperlipidemia    Palliative care encounter    Closed fracture of left olecranon process    Debility    Aortic stenosis, mild    Osteoporosis    Chronic combined systolic and diastolic heart failure    Adenocarcinoma of colon    Carotid stenosis, asymptomatic, bilateral    H/O:  hysterectomy    Prediabetes    Colon cancer    B12 deficiency    Healthcare maintenance    Aortic atherosclerosis    Benign mole    Hyperglycemia    Adrenal nodule        Past Surgical History:   Procedure Laterality Date    CATARACT EXTRACTION W/  INTRAOCULAR LENS IMPLANT Left 8/11/14    [facility]    CATARACT EXTRACTION W/  INTRAOCULAR LENS IMPLANT Right 09/15/14    [facility]    COLONOSCOPY N/A 3/19/2020    Procedure: COLONOSCOPY;  Surgeon: Real Mabry MD;  Location: [facility] ENDO (2ND FLR);  Service: Endoscopy;  Laterality: N/A;    COLONOSCOPY N/A 3/23/2021    Procedure: COLONOSCOPY;  Surgeon: AUTUMN Berg MD;  Location: [facility] ENDO (4TH FLR);  Service: Endoscopy;  Laterality: N/A;  covid test 3/20 [facility]    COLONOSCOPY N/A 2/15/2022    Procedure: COLONOSCOPY;  Surgeon: Doris Simpson MD;  Location: [facility] ENDO (4TH FLR);  Service: Endoscopy;  Laterality: N/A;  COVID test on 2/12/22 at [facility]  2/14/22 - Pt confirmed 0640 arrival, prep instructions reviewed, Pt verbalized understanding-[id number]    ESOPHAGOGASTRODUODENOSCOPY N/A 3/19/2020    Procedure: EGD (ESOPHAGOGASTRODUODENOSCOPY);  Surgeon: Real Mabry MD;  Location: [facility] (2ND FLR);  Service: Endoscopy;  Laterality: N/A;    FOOT SURGERY      left    HYSTERECTOMY  1962    ILEOCOLECTOMY N/A 4/15/2021    Procedure: ILEOCOLECTOMY;  Surgeon: AUTUMN Berg MD;  Location: [facility] OR 2ND FLR;  Service: Colon and Rectal;  Laterality: N/A;    INSERTION OF TUNNELED CENTRAL VENOUS CATHETER (CVC) WITH SUBCUTANEOUS PORT N/A 7/2/2021    Procedure: [id number];  Surgeon: Suresh Agrawal MD;  Location: [facility] OR 2ND FLR;  Service: Vascular;  Laterality: N/A;  Right IJ    LAPAROSCOPIC HEMICOLECTOMY Right 3/20/2020    Procedure: HEMICOLECTOMY, RIGHT;  Surgeon: AUTUMN Berg MD;  Location: [facility] OR 2ND FLR;  Service: Colon and Rectal;  Laterality: Right;    LYSIS OF ADHESIONS N/A 4/15/2021    Procedure: LYSIS, ADHESIONS;  Surgeon: AUTUMN Berg  MD;  Location: [facility] OR 2ND FLR;  Service: Colon and Rectal;  Laterality: N/A;  Greater than 2 hours    MOBILIZATION OF SPLENIC FLEXURE N/A 4/15/2021    Procedure: MOBILIZATION, SPLENIC FLEXURE;  Surgeon: AUTUMN Berg MD;  Location: [facility] OR 2ND FLR;  Service: Colon and Rectal;  Laterality: N/A;       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    Hyperlipidemia     Hypertension     Osteoporosis        Social History     Socioeconomic History    Marital status: Single   Occupational History    Occupation: teaching retired    Occupation: Nun.  [employer]   Tobacco Use    Smoking status: Never    Smokeless tobacco: Never   Substance and Sexual Activity    Alcohol use: No     Alcohol/week: 0.0 standard drinks of alcohol    Drug use: No    Sexual activity: Never   Social History Narrative    Member of [employer] order here in [place], [place]..  Lives with about 40-50 sisters.  All sisters with her are retired.       Social Determinants of Health     Financial Resource Strain: Low Risk  (5/20/2021)    Overall Financial Resource Strain (CARDIA)     Difficulty of Paying Living Expenses: Not hard at all   Food Insecurity: No Food Insecurity (5/20/2021)    Hunger Vital Sign     Worried About Running Out of Food in the Last Year: Never true     Ran Out of Food in the Last Year: Never true   Physical Activity: Inactive (5/20/2021)    Exercise Vital Sign     Days of Exercise per Week: 0 days     Minutes of Exercise per Session: 0 min   Stress: No Stress Concern Present (5/20/2021)    [employer] - Occupational Stress Questionnaire     Feeling of Stress : Not at all   Social Connections: Moderately Integrated (5/20/2021)    Social Connection and Isolation Panel [NHANES]     Frequency of Communication with  "Friends and Family: More than three times a week     Frequency of Social Gatherings with Friends and Family: More than three times a week     Attends Adventism Services: More than 4 times per year     Active Member of Clubs or Organizations: Yes     Attends Club or Organization Meetings: More than 4 times per year     Marital Status: Never        Review of Systems         No data to display                 Checklist of Daily Activities:        Objective:   /86   Pulse 89   Temp 97.3 °F (36.3 °C) (Oral)   Ht 5' 7" (1.702 m)   Wt 60.7 kg (133 lb 13.1 oz)   SpO2 99%   BMI 20.96 kg/m²     Physical Exam  Constitutional:       General: She is not in acute distress.     Appearance: Normal appearance. She is not ill-appearing, toxic-appearing or diaphoretic.   HENT:      Head: Normocephalic and atraumatic.   Cardiovascular:      Rate and Rhythm: Normal rate and regular rhythm.      Heart sounds: Normal heart sounds.   Pulmonary:      Effort: Pulmonary effort is normal.      Breath sounds: Normal breath sounds.   Abdominal:      Palpations: Abdomen is soft.      Tenderness: There is abdominal tenderness. There is guarding and rebound.   Musculoskeletal:      Right lower leg: No edema.      Left lower leg: No edema.   Lymphadenopathy:      Cervical: No cervical adenopathy.   Neurological:      Mental Status: She is alert.             Lab Results   Component Value Date    WBC 6.08 09/15/2023    HGB 11.6 (L) 09/15/2023    HCT 36.7 (L) 09/15/2023     09/15/2023    CHOL 184 02/17/2021    TRIG 65 02/17/2021    HDL 39 (L) 02/17/2021    ALT 14 09/15/2023    AST 18 09/15/2023     (L) 09/15/2023    K 4.5 09/15/2023    CL 95 09/15/2023    CREATININE 0.8 09/15/2023    CREATININE 0.8 09/15/2023    BUN 9 09/15/2023    CO2 24 09/15/2023    TSH 1.985 08/25/2022    INR 0.9 03/17/2020    HGBA1C 5.8 (H) 12/01/2022       Current Outpatient Medications on File Prior to Visit   Medication Sig Dispense Refill    " amLODIPine (NORVASC) 10 MG tablet Take 1 tablet (10 mg total) by mouth once daily. 30 tablet 11    cholecalciferol, vitamin D3, (VITAMIN D3) 25 mcg (1,000 unit) capsule Take 1,000 Units by mouth once daily. Hold until after surgery      cyanocobalamin (VITAMIN B-12) 1000 MCG tablet Take 1 tablet (1,000 mcg total) by mouth once daily. Hold until after surgery 30 tablet 11    FEROSUL 325 mg (65 mg iron) Tab tablet TAKE ONE TABLET BY MOUTH ON TUESDAY, THURSDAY, AND SATURDAY 12 tablet 4    furosemide (LASIX) 20 MG tablet TAKE ONE TABLET BY MOUTH EVERY DAY 90 tablet 3    LIDOcaine-prilocaine (EMLA) cream Apply topically as needed (apply over port before chemo). apply over port before chemo 30 g 3    losartan (COZAAR) 50 MG tablet TAKE ONE TABLET BY MOUTH EVERY DAY 90 tablet 3    lutein-zeaxanthin (OCUVITE LUTEIN 25) 25-5 mg Cap Take 1 tablet by mouth once daily. 30 capsule 0    potassium chloride (KLOR-CON) 10 MEQ TbSR TAKE 1 TABLET BY MOUTH ONCE DAILY. 90 tablet 3    rosuvastatin (CRESTOR) 40 MG Tab TAKE ONE TABLET BY MOUTH EVERY DAY FOR CHOLESTEROL 90 tablet 3    VIT C/VIT E ACETATE/LUTEIN/MIN (OCUVITE LUTEIN ORAL) Take 1 tablet by mouth once daily. Hold until after surgery      calcium carbonate (OS-JAILYN) 500 mg calcium (1,250 mg) tablet Take 1 tablet (500 mg total) by mouth once daily. (Patient taking differently: Take 0.5 tablets by mouth 2 (two) times daily. Hold until after surgery)  0     Current Facility-Administered Medications on File Prior to Visit   Medication Dose Route Frequency Provider Last Rate Last Admin    gabapentin capsule 300 mg  300 mg Oral TID Amanda Gaspar NP   300 mg at 04/16/21 0814    LIDOcaine (PF) 10 mg/ml (1%) injection 10 mg  1 mL Intradermal On Call Procedure Amanda Gaspra NP             Assessment:   89 y.o. female with multiple co-morbid illnesses here for continued follow up of medical problems.      Plan:     Problem List Items Addressed This Visit           Cardiac/Vascular    Essential hypertension  - Primary (Chronic)     Blood pressure better on repeat.  Would like to see a little bit better.  Repeat blood pressure in 2 months.  Patient will watch salt intake.         Relevant Orders    Lipid Panel    TSH    Comprehensive Metabolic Panel    Hemoglobin A1C       Endocrine    Prediabetes     Slightly elevated hemoglobin A1c in the past.  Yearly labs today for evaluation.         Hyperglycemia    Relevant Orders    Hemoglobin A1C       Other    Healthcare maintenance     Power-of- completed 5/2022 with patient's assistance [facility].  Reviewed living will and power-of- again today, no changes made.  yearly labs today  RSV vaccine today            Health Maintenance         Date Due Completion Date    RSV Vaccine (Age 60+ and Pregnant patients) (1 - 1-dose 60+ series) Never done ---    Hemoglobin A1c (Prediabetes) 12/01/2023 12/1/2022    COVID-19 Vaccine (9 - 2023-24 season) 12/04/2023 10/9/2023    Colonoscopy 02/15/2025 2/15/2022    DEXA Scan 07/26/2025 7/26/2023    Override on 2/15/2016: Declined    TETANUS VACCINE 02/15/2028 2/15/2018          Medications Reconciliation:   I have not reconciled the patient's home medications with the patient/family. I have updated all changes.  Refer to After-Visit Medication List.      Medication List            Accurate as of December 19, 2023  2:32 PM. If you have any questions, ask your nurse or doctor.                CHANGE how you take these medications      calcium carbonate 500 mg calcium (1,250 mg) tablet  Commonly known as: OS-JAILYN  Take 1 tablet (500 mg total) by mouth once daily.  What changed:   how much to take  when to take this  additional instructions            CONTINUE taking these medications      amLODIPine 10 MG tablet  Commonly known as: NORVASC  Take 1 tablet (10 mg total) by mouth once daily.     cholecalciferol (vitamin D3) 25 mcg (1,000 unit) capsule  Commonly known as: VITAMIN D3      cyanocobalamin 1000 MCG tablet  Commonly known as: VITAMIN B-12  Take 1 tablet (1,000 mcg total) by mouth once daily. Hold until after surgery     FeroSuL 325 mg (65 mg iron) Tab tablet  Generic drug: ferrous sulfate  TAKE ONE TABLET BY MOUTH ON TUESDAY, THURSDAY, AND SATURDAY     furosemide 20 MG tablet  Commonly known as: LASIX  TAKE ONE TABLET BY MOUTH EVERY DAY     LIDOcaine-prilocaine cream  Commonly known as: EMLA  Apply topically as needed (apply over port before chemo). apply over port before chemo     losartan 50 MG tablet  Commonly known as: COZAAR  TAKE ONE TABLET BY MOUTH EVERY DAY     lutein-zeaxanthin 25-5 mg Cap  Commonly known as: OCUVITE LUTEIN 25  Take 1 tablet by mouth once daily.     OCUVITE LUTEIN ORAL     potassium chloride 10 MEQ Tbsr  Commonly known as: KLOR-CON  TAKE 1 TABLET BY MOUTH ONCE DAILY.     rosuvastatin 40 MG Tab  Commonly known as: CRESTOR  TAKE ONE TABLET BY MOUTH EVERY DAY FOR CHOLESTEROL                   Follow up in about 2 months (around 2/19/2024). Total clinical care time was 20 min. The following issues were discussed:  Need for yearly labs, RSV vaccine, review of living will and power-of-, blood pressure     Brittney Travis MD  Internal Medicine  [facility]  [phone]

## 2023-12-19 NOTE — ASSESSMENT & PLAN NOTE
Power-of- completed 5/2022 with patient's assistance [facility].  Reviewed living will and power-of- again today, no changes made.  yearly labs today  RSV vaccine today

## 2023-12-20 DIAGNOSIS — I50.22 CHRONIC SYSTOLIC HEART FAILURE: ICD-10-CM

## 2023-12-20 NOTE — TELEPHONE ENCOUNTER
No care due was identified.  [facility] Embedded Care Due Messages. Reference number: [id number].   12/20/2023 1:07:09 PM CST

## 2023-12-21 ENCOUNTER — HOSPITAL ENCOUNTER (EMERGENCY)
Facility: HOSPITAL | Age: 88
Discharge: HOME | End: 2023-12-22
Attending: EMERGENCY MEDICINE
Payer: MEDICARE

## 2023-12-21 DIAGNOSIS — S09.90XA CLOSED HEAD INJURY, INITIAL ENCOUNTER: ICD-10-CM
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
## 2023-12-21 PROCEDURE — 99284 EMERGENCY DEPT VISIT MOD MDM: CPT | Mod: 25

## 2023-12-21 RX ORDER — FUROSEMIDE 20 MG/1
TABLET ORAL
Qty: 90 TABLET | Refills: 3 | Status: SHIPPED | OUTPATIENT
Start: 2023-12-21

## 2023-12-21 NOTE — TELEPHONE ENCOUNTER
Refill Decision Note   Nydia Stevens  is requesting a refill authorization.  Brief Assessment and Rationale for Refill:  Approve     Medication Therapy Plan:         Comments:     Note composed:8:54 AM 12/21/2023             Patient returning call to nurse.    Patient states the form was already filled out but it needs to be signed by physician vs the nurse which was why it was sent back.    Please call patient. Ok to leave a detailed message.

## 2023-12-22 VITALS
RESPIRATION RATE: 18 BRPM | HEART RATE: 80 BPM | BODY MASS INDEX: 20.88 KG/M2 | WEIGHT: 133 LBS | HEIGHT: 67 IN | OXYGEN SATURATION: 99 % | SYSTOLIC BLOOD PRESSURE: 152 MMHG | TEMPERATURE: 98 F | DIASTOLIC BLOOD PRESSURE: 74 MMHG

## 2023-12-22 NOTE — ED TRIAGE NOTES
Nydia Stevens, a 89 y.o. female presents to the ED w/ complaint of slip and fall when getting of the shower and striking her head. Pt c/o pain where she hit her head but denies any other complaints.     Triage note:  Chief Complaint   Patient presents with    Fall     Getting out of shower, slipped, hit her head. Hematoma to top of head. No LOC or blood thinners. AOX4, GCS 15     Review of patient's allergies indicates:  No Known Allergies  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    Hyperlipidemia     Hypertension     Osteoporosis

## 2023-12-22 NOTE — ED PROVIDER NOTES
Encounter Date: 12/21/2023       History     Chief Complaint   Patient presents with    Fall     Getting out of shower, slipped, hit her head. Hematoma to top of head. No LOC or blood thinners. AOX4, GCS 15     89-year-old female with a past medical history of HLD, HTN, CAD presents the ED via EMS status post mechanical fall with the patient was exiting the shower.  She denies loss of consciousness or blood thinner use.  She denies pain anywhere else.  She denies any prodromal symptoms.  No other complaints at this time.    The history is provided by the patient and the EMS personnel.     Review of patient's allergies indicates:  No Known Allergies  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    Hyperlipidemia     Hypertension     Osteoporosis      Past Surgical History:   Procedure Laterality Date    CATARACT EXTRACTION W/  INTRAOCULAR LENS IMPLANT Left 8/11/14    [facility]    CATARACT EXTRACTION W/  INTRAOCULAR LENS IMPLANT Right 09/15/14    [facility]    COLONOSCOPY N/A 3/19/2020    Procedure: COLONOSCOPY;  Surgeon: Real Mabry MD;  Location: [facility] (2ND FLR);  Service: Endoscopy;  Laterality: N/A;    COLONOSCOPY N/A 3/23/2021    Procedure: COLONOSCOPY;  Surgeon: AUTUMN Berg MD;  Location: [facility] (4TH FLR);  Service: Endoscopy;  Laterality: N/A;  covid test 3/20 [facility]    COLONOSCOPY N/A 2/15/2022    Procedure: COLONOSCOPY;  Surgeon: Doris Simpson MD;  Location: [facility] (4TH FLR);  Service: Endoscopy;  Laterality: N/A;  COVID test on 2/12/22 at [facility]  2/14/22 - Pt confirmed 0640 arrival, prep instructions reviewed, Pt verbalized understanding-[email or web address]@[id number]    ESOPHAGOGASTRODUODENOSCOPY N/A 3/19/2020    Procedure: EGD (ESOPHAGOGASTRODUODENOSCOPY);  Surgeon: Real Mabry MD;  Location: [facility] (2ND FLR);  Service:  Endoscopy;  Laterality: N/A;    FOOT SURGERY      left    HYSTERECTOMY  1962    ILEOCOLECTOMY N/A 4/15/2021    Procedure: ILEOCOLECTOMY;  Surgeon: AUTUMN Berg MD;  Location: [facility] OR 2ND FLR;  Service: Colon and Rectal;  Laterality: N/A;    INSERTION OF TUNNELED CENTRAL VENOUS CATHETER (CVC) WITH SUBCUTANEOUS PORT N/A 7/2/2021    Procedure: [id number];  Surgeon: Suresh Agrawal MD;  Location: [facility] OR 2ND FLR;  Service: Vascular;  Laterality: N/A;  Right IJ    LAPAROSCOPIC HEMICOLECTOMY Right 3/20/2020    Procedure: HEMICOLECTOMY, RIGHT;  Surgeon: AUTUMN Berg MD;  Location: [facility] OR 2ND FLR;  Service: Colon and Rectal;  Laterality: Right;    LYSIS OF ADHESIONS N/A 4/15/2021    Procedure: LYSIS, ADHESIONS;  Surgeon: AUTUMN Berg MD;  Location: [facility] OR 2ND FLR;  Service: Colon and Rectal;  Laterality: N/A;  Greater than 2 hours    MOBILIZATION OF SPLENIC FLEXURE N/A 4/15/2021    Procedure: MOBILIZATION, SPLENIC FLEXURE;  Surgeon: AUTUMN Berg MD;  Location: [facility] OR 2ND FLR;  Service: Colon and Rectal;  Laterality: N/A;     Family History   Problem Relation Age of Onset    Heart attack Father     Cataracts Brother     Heart attack Brother     Diabetes Brother     No Known Problems Mother     Cataracts Sister     Stroke Sister     Diabetes Sister     Cataracts Sister     Stroke Sister     No Known Problems Maternal Aunt     No Known Problems Maternal Uncle     No Known Problems Paternal Aunt     No Known Problems Paternal Uncle     No Known Problems Maternal Grandmother     No Known Problems Maternal Grandfather     No Known Problems Paternal Grandmother     No Known Problems Paternal Grandfather     Amblyopia Neg Hx     Blindness Neg Hx     Cancer Neg Hx     Glaucoma Neg Hx     Hypertension Neg Hx     Macular degeneration Neg Hx     Retinal detachment Neg Hx     Strabismus Neg Hx     Thyroid disease Neg Hx     Colon cancer Neg Hx     Esophageal cancer Neg Hx     Irritable bowel syndrome Neg Hx      Rectal cancer Neg Hx     Stomach cancer Neg Hx     Ulcerative colitis Neg Hx     Crohn's disease Neg Hx     Celiac disease Neg Hx      Social History     Tobacco Use    Smoking status: Never    Smokeless tobacco: Never   Substance Use Topics    Alcohol use: No     Alcohol/week: 0.0 standard drinks of alcohol    Drug use: No     Review of Systems    Physical Exam     Initial Vitals [12/21/23 1938]   BP Pulse Resp Temp SpO2   112/82 87 16 98.3 °F (36.8 °C) 100 %      MAP       --         Physical Exam    Nursing note and vitals reviewed.  Constitutional: Vital signs are normal. She appears well-developed and well-nourished. She is not diaphoretic. No distress.   HENT:   Head: Normocephalic and atraumatic.   Right Ear: External ear normal.   Left Ear: External ear normal.   Medium sized hematoma noted to the crown of the head.  Not actively bleeding.   Eyes: EOM are normal. Right eye exhibits no discharge. Left eye exhibits no discharge.   Neck: Trachea normal. Neck supple. No thyroid mass present.   No midline C-spine tenderness to palpation.   Cardiovascular:  Normal rate, regular rhythm, normal heart sounds and intact distal pulses.     Exam reveals no gallop and no friction rub.       No murmur heard.  Pulmonary/Chest: Breath sounds normal. No respiratory distress. She has no wheezes. She has no rhonchi. She has no rales.   Abdominal: Abdomen is soft. Bowel sounds are normal. She exhibits no distension. There is no abdominal tenderness. There is no rebound and no guarding.   Musculoskeletal:         General: No tenderness or edema. Normal range of motion.      Cervical back: Neck supple.      Comments: Normal range of motion of all 4 extremities.     Neurological: She is alert and oriented to person, place, and time. She has normal strength. No cranial nerve deficit or sensory deficit. GCS score is 15. GCS eye subscore is 4. GCS verbal subscore is 5. GCS motor subscore is 6.   5/5 strength in the bilateral  upper and lower extremities.  No pronator drift.   Skin: Skin is warm and dry. Capillary refill takes less than 2 seconds. No rash noted.   Psychiatric: She has a normal mood and affect.         ED Course   Procedures  Labs Reviewed   HIV 1 / 2 ANTIBODY   HEPATITIS C ANTIBODY          Imaging Results    None          Medications - No data to display  Medical Decision Making  89-year-old female who appears to be in NAD presents the ED via EMS status post mechanical fall.  ABC's intact.  Initial triage vital signs are within normal limits.    Differential diagnosis includes but is not limited to intracranial hemorrhage, fracture, dislocation, bone contusion, unlikely vasovagal syncope.    Amount and/or Complexity of Data Reviewed  Radiology: ordered.               ED Course as of 12/21/23 2154   Thu Dec 21, 2023   2150 Patient likely suffered a mechanical fall as she was exiting the shower.  She denies loss of consciousness or blood thinner use.  She remembers the entire event as well as yelling for help as she went to the ground.  Small to medium sized hematoma noted to the crown of the head without any active bleeding.  Able to range all 4 extremities without discomfort.  No pelvis tenderness.  No abdominal tenderness.  No chest wall tenderness.  No spinal tenderness.  Due to patient's age and mechanism of fall will obtain a CT head and CT cervical spine to rule out any acute abnormality.  I will sign out patient to oncoming provider pending CT head and CT C-spine results.  I anticipate patient will likely be discharged but I will leave ultimate disposition up to oncoming team. [BG]      ED Course User Index  [BG] Susanne Rockwell MD                           Clinical Impression:  Final diagnoses:  [W19.XXXA] Fall, initial encounter (Primary)                 Susanne Rockwell MD  Resident  12/21/23 2154

## 2023-12-22 NOTE — DISCHARGE INSTRUCTIONS
Thank you for coming to our Emergency Department today! It is important to remember that some problems or medical conditions are difficult to diagnose and may not be found or addressed during your Emergency Department visit.     Diagnosis: Fall  -your imaging was negative today  -return to the emergency department for weakness, inability to walk, visual problems, vomiting, excessive sleepiness    Follow-Up Plan:  - Follow-up with primary care doctor within 3 - 5 days to discuss your recent ER visit and any additional concerns that you may have.  - Additional testing and/or evaluation as directed by your primary doctor    Return to the Emergency Department for symptoms including but not limited to: persistence or worsening of symptoms, shortness of breath or chest pain, inability to drink without vomiting, passing out/fainting/ loss of consciousness, or if you have other concerns.

## 2023-12-22 NOTE — ED NOTES
The patient is resting quietly in bedside chair and is AAOx4 at this time. Respirations are even and unlabored, with no distress noted. The patient is aware of POC and all questions and concerns addressed at this time. The patient was offered restroom assistance and denies need to void. The patient denies further needs and has no complaints at this time. SR raised x2, bed locked and in low position with brake engaged. Call bell within reach and the patient verbalized she would call for assistance if needed. Personal belongings are at bedside within reach.

## 2023-12-22 NOTE — ED NOTES
"Pt does not have a way to get past the facilities alarm. Pt does not have her phone or numbers to call. I contacted the facility nurse and she gave me the number to " Rosalie" which is in charge of the alarm system. I called sister rosalie and she stated she will be waiting near the door for sister Nydia to return to let her in.  "

## 2023-12-22 NOTE — PROVIDER PROGRESS NOTES - EMERGENCY DEPT.
"ED Resident HAND-OFF NOTE:  12/22/2023 Received Sign Out    Chief Complaint   Patient presents with    Fall     Getting out of shower, slipped, hit her head. Hematoma to top of head. No LOC or blood thinners. AOX4, GCS 15     Nydia Stevens is a 89 y.o. female who presented to the ED on 12/22/2023 with chief complaint of fall. I assumed care of patient from off-going ED physician team pending imaging.    On my evaluation, Nydia Stevens appears well, hemodynamically stable and in NAD. Thus far, Nydia Stevens has received:  Medications - No data to display    Vital Signs:  BP (!) 159/70   Pulse 87   Temp 98.3 °F (36.8 °C) (Oral)   Resp 18   Ht 5' 7" (1.702 m)   Wt 60.3 kg (133 lb)   SpO2 99%   Breastfeeding No   BMI 20.83 kg/m²     Laboratory Studies:  Labs Reviewed   HIV 1 / 2 ANTIBODY   HEPATITIS C ANTIBODY       MDM:  CT head and C-spine negative.  Patient was at her baseline ambulation status which she usually depends on a walker.  Patient able to ambulate with minimal assistance and no unsteadiness.  Patient discharged home    Discussed results, diagnosis, and treatment plan with patient; advised close follow-up with PCP. Reviewed strict return precautions. Patient confirms understanding and ability to comply. Patient was given the opportunity to ask questions prior to discharge and all questions answered.      ED Disposition Condition    Discharge Stable          ED Prescriptions    None       Follow-up Information       Follow up With Specialties Details Why Contact Info    Brittney Travis MD Internal Medicine   [address]  [place] [address]  [phone]      [facility] - Emergency Dept Emergency Medicine Go to  As needed, If symptoms worsen [address] [facility] [id number] [phone]          Caden Winchester MD  Emergency Medicine Resident  12/22/2023  "

## 2023-12-22 NOTE — ED NOTES
Call sister Tanisha when pt is picked up by transport: [phone]    Pt refusing to stay in bed and wants to sit in a wheelchair. Pt changed into her clothes and assisted in to a wheelchair.

## 2023-12-22 NOTE — ED NOTES
The patient is resting quietly in bedside chair and is AAOx4 at this time. Respirations are even and unlabored, with no distress noted. The patient is aware of POC and all questions and concerns addressed at this time. The patient was offered restroom assistance and denies need to void. The patient denies further needs and has no complaints at this time. SR raised x2, bed locked and in low position with brake engaged. Call bell within reach and the patient verbalized she would call for assistance if needed. Personal belongings are at bedside within reach. Pt was given word searches.

## 2023-12-26 ENCOUNTER — TELEPHONE (OUTPATIENT)
Dept: PRIMARY CARE CLINIC | Facility: CLINIC | Age: 88
End: 2023-12-26
Payer: MEDICARE

## 2023-12-26 NOTE — TELEPHONE ENCOUNTER
Called and spoke to Nurse Frye to provide patient's lab results, will relay results to patient. She reports that patient is taking Rosuvastatin daily as prescribed.

## 2023-12-26 NOTE — TELEPHONE ENCOUNTER
----- Message from Brittney Travis MD sent at 12/21/2023  5:44 PM CST -----  Please call patient with results.  Slightly increased A1C.  Not DM.  Very increased LDL.  Please confirm she is taking rosuvastatin.  Nl renal and liver function.  Nl thyroid function

## 2024-01-15 PROBLEM — Z00.00 HEALTHCARE MAINTENANCE: Status: RESOLVED | Noted: 2021-05-24 | Resolved: 2024-01-15

## 2024-01-30 ENCOUNTER — INFUSION (OUTPATIENT)
Dept: INFECTIOUS DISEASES | Facility: HOSPITAL | Age: 89
End: 2024-01-30
Payer: MEDICARE

## 2024-01-30 VITALS
SYSTOLIC BLOOD PRESSURE: 177 MMHG | WEIGHT: 132.19 LBS | HEART RATE: 89 BPM | HEIGHT: 67 IN | BODY MASS INDEX: 20.75 KG/M2 | OXYGEN SATURATION: 94 % | DIASTOLIC BLOOD PRESSURE: 84 MMHG | TEMPERATURE: 99 F | RESPIRATION RATE: 20 BRPM

## 2024-01-30 DIAGNOSIS — M81.0 AGE-RELATED OSTEOPOROSIS WITHOUT CURRENT PATHOLOGICAL FRACTURE: Primary | ICD-10-CM

## 2024-01-30 PROCEDURE — 96372 THER/PROPH/DIAG INJ SC/IM: CPT

## 2024-01-30 PROCEDURE — 63600175 PHARM REV CODE 636 W HCPCS: Mod: JZ,JG | Performed by: PHYSICIAN ASSISTANT

## 2024-01-30 RX ADMIN — DENOSUMAB 60 MG: 60 INJECTION SUBCUTANEOUS at 09:01

## 2024-01-30 NOTE — PROGRESS NOTES
Pt received Prolia injection in left arm. Pt currently taking Vit D/Ca+; Pt denies any dental sx in last 3 months.    Limited head to toe assessment completed.

## 2024-02-18 NOTE — PLAN OF CARE
Pt AAOx4 w/ VSS for patient on rm air w/ unlabored breathing. Midline incision intact w/ DB. Pt tolerated low fiber/low residue diet w/ no complaints of n/v. Pt denied pain. Ambulated in dominguez x2 w/ walker and stand by assist. Pt remains free from falls or injury w/ no acute distress noted. Bed locked in lowest position, call light in reach, side rails raised x2.   Tdap; 19-Nov-2019 16:31; Christine Skaggs (RN); [employer]; [id number] (Exp. Date: 17-Sep-2021); IntraMuscular; Deltoid Left.; 0.5 milliLiter(s); VIS (VIS Published: 09-May-2013, VIS Presented: 19-Nov-2019);   Tdap; 07-Feb-2024 10:35; Daria Mcgrath (ADDI); [employer]; [id number] (Exp. Date: 20-Jul-2025); IntraMuscular; Deltoid Left.; 0.5 milliLiter(s); VIS (VIS Published: 09-May-2013, VIS Presented: 07-Feb-2024);

## 2024-02-26 ENCOUNTER — OFFICE VISIT (OUTPATIENT)
Dept: PRIMARY CARE CLINIC | Facility: CLINIC | Age: 89
End: 2024-02-26
Payer: MEDICARE

## 2024-02-26 VITALS
HEART RATE: 98 BPM | DIASTOLIC BLOOD PRESSURE: 70 MMHG | WEIGHT: 132.94 LBS | SYSTOLIC BLOOD PRESSURE: 138 MMHG | BODY MASS INDEX: 20.87 KG/M2 | HEIGHT: 67 IN | TEMPERATURE: 98 F | OXYGEN SATURATION: 99 %

## 2024-02-26 DIAGNOSIS — I50.42 CHRONIC COMBINED SYSTOLIC AND DIASTOLIC HEART FAILURE: Chronic | ICD-10-CM

## 2024-02-26 DIAGNOSIS — E27.8 ADRENAL NODULE: ICD-10-CM

## 2024-02-26 DIAGNOSIS — I70.0 AORTIC ATHEROSCLEROSIS: ICD-10-CM

## 2024-02-26 DIAGNOSIS — E78.2 MIXED HYPERLIPIDEMIA: Chronic | ICD-10-CM

## 2024-02-26 DIAGNOSIS — G31.9 CEREBRAL ATROPHY, MILD: Primary | ICD-10-CM

## 2024-02-26 DIAGNOSIS — Z00.00 HEALTHCARE MAINTENANCE: ICD-10-CM

## 2024-02-26 DIAGNOSIS — R73.03 PREDIABETES: ICD-10-CM

## 2024-02-26 DIAGNOSIS — I10 ESSENTIAL HYPERTENSION: ICD-10-CM

## 2024-02-26 PROBLEM — R73.9 HYPERGLYCEMIA: Status: RESOLVED | Noted: 2023-03-02 | Resolved: 2024-02-26

## 2024-02-26 PROCEDURE — 99213 OFFICE O/P EST LOW 20 MIN: CPT | Mod: S$GLB,,, | Performed by: INTERNAL MEDICINE

## 2024-02-26 RX ORDER — LOSARTAN POTASSIUM 100 MG/1
100 TABLET ORAL DAILY
Qty: 30 TABLET | Refills: 5 | Status: SHIPPED | OUTPATIENT
Start: 2024-02-26

## 2024-02-26 NOTE — ASSESSMENT & PLAN NOTE
"Noted on CT head 12/21/2023:"Mild generalized cerebral volume loss with compensatory prominence of the ventricles and sulci "  Improved memory since completing chemo a few years ago.  Stable symptoms.  Will follow for any worsening.    "

## 2024-02-26 NOTE — ASSESSMENT & PLAN NOTE
Carotid artery disease.  On high dose statin.  Increased LDL last check.  Pt states that she is taking daily  Will follow, daily milena

## 2024-02-26 NOTE — PROGRESS NOTES
.  This note has been generated using voice-recognition software. There may be typographical errors that have been missed during proof-reading.     Primary Care Provider Appointment    Subjective:      Patient ID: Nydia Stevens is a 89 y.o. female here for follow up.     Chief Complaint: Follow-up    HPI:  This is an 89-year-old female with hypertension, hyperlipidemia, congestive heart failure, colon cancer, iron deficiency anemia, carotid artery disease, prediabetes, osteoporosis here for f/u.  patient last seen 12/19/2024.    12/21/2024 pt seen in the ED for a fall.    12/27/2023 pt seen by outside podiatrist  Wt stable  No c/o today.    Her shower does have bars where she fell.  No pain at all.  No dizziness/hearing chges/vision changes.    Pt denies missing any crestor with increase in LDL.    Took meds this AM    Patient Active Problem List   Diagnosis    Essential hypertension     Mixed hyperlipidemia    Palliative care encounter    Closed fracture of left olecranon process    Debility    Aortic stenosis, mild    Osteoporosis    Chronic combined systolic and diastolic heart failure    Adenocarcinoma of colon    Carotid stenosis, asymptomatic, bilateral    H/O: hysterectomy    Prediabetes    Colon cancer    B12 deficiency    Healthcare maintenance    Aortic atherosclerosis    Benign mole    Adrenal nodule    Cerebral atrophy, mild        Past Surgical History:   Procedure Laterality Date    CATARACT EXTRACTION W/  INTRAOCULAR LENS IMPLANT Left 8/11/14    [facility]    CATARACT EXTRACTION W/  INTRAOCULAR LENS IMPLANT Right 09/15/14    [facility]    COLONOSCOPY N/A 3/19/2020    Procedure: COLONOSCOPY;  Surgeon: Real Mabry MD;  Location: [facility] (2ND FLR);  Service: Endoscopy;  Laterality: N/A;    COLONOSCOPY N/A 3/23/2021    Procedure: COLONOSCOPY;  Surgeon: AUTUMN Berg MD;  Location: [facility] (4TH FLR);  Service: Endoscopy;  Laterality: N/A;  covid test 3/20 [facility]    COLONOSCOPY N/A 2/15/2022     Procedure: COLONOSCOPY;  Surgeon: Doris Simpson MD;  Location: [facility] (4TH FLR);  Service: Endoscopy;  Laterality: N/A;  COVID test on 2/12/22 at [facility]  2/14/22 - Pt confirmed 0640 arrival, prep instructions reviewed, Pt verbalized [id number]    ESOPHAGOGASTRODUODENOSCOPY N/A 3/19/2020    Procedure: EGD (ESOPHAGOGASTRODUODENOSCOPY);  Surgeon: Real Mabry MD;  Location: [facility] (2ND FLR);  Service: Endoscopy;  Laterality: N/A;    FOOT SURGERY      left    HYSTERECTOMY  1962    ILEOCOLECTOMY N/A 4/15/2021    Procedure: ILEOCOLECTOMY;  Surgeon: AUTUMN Berg MD;  Location: [facility] OR 2ND FLR;  Service: Colon and Rectal;  Laterality: N/A;    INSERTION OF TUNNELED CENTRAL VENOUS CATHETER (CVC) WITH SUBCUTANEOUS PORT N/A 7/2/2021    Procedure: [id number];  Surgeon: Suresh Agrawal MD;  Location: [facility] OR 2ND FLR;  Service: Vascular;  Laterality: N/A;  Right IJ    LAPAROSCOPIC HEMICOLECTOMY Right 3/20/2020    Procedure: HEMICOLECTOMY, RIGHT;  Surgeon: AUTUMN Berg MD;  Location: [facility] OR 2ND FLR;  Service: Colon and Rectal;  Laterality: Right;    LYSIS OF ADHESIONS N/A 4/15/2021    Procedure: LYSIS, ADHESIONS;  Surgeon: AUTUMN Berg MD;  Location: [facility] OR 2ND FLR;  Service: Colon and Rectal;  Laterality: N/A;  Greater than 2 hours    MOBILIZATION OF SPLENIC FLEXURE N/A 4/15/2021    Procedure: MOBILIZATION, SPLENIC FLEXURE;  Surgeon: AUTUMN Berg MD;  Location: [facility] OR 2ND FLR;  Service: Colon and Rectal;  Laterality: N/A;       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    Hyperlipidemia     Hypertension     Osteoporosis        Social History     Socioeconomic History    Marital status: Single   Occupational History    Occupation: teaching retired    Occupation: Nun.  [employer]  "  Tobacco Use    Smoking status: Never    Smokeless tobacco: Never   Substance and Sexual Activity    Alcohol use: No     Alcohol/week: 0.0 standard drinks of alcohol    Drug use: No    Sexual activity: Never   Social History Narrative    Member of [employer] order here in [place], [place]..  Lives with about 40-50 sisters.  All sisters with her are retired.       Social Determinants of Health     Financial Resource Strain: Low Risk  (5/20/2021)    Overall Financial Resource Strain (CARDIA)     Difficulty of Paying Living Expenses: Not hard at all   Food Insecurity: No Food Insecurity (5/20/2021)    Hunger Vital Sign     Worried About Running Out of Food in the Last Year: Never true     Ran Out of Food in the Last Year: Never true   Physical Activity: Inactive (5/20/2021)    Exercise Vital Sign     Days of Exercise per Week: 0 days     Minutes of Exercise per Session: 0 min   Stress: No Stress Concern Present (5/20/2021)    [employer] - Occupational Stress Questionnaire     Feeling of Stress : Not at all   Social Connections: Moderately Integrated (5/20/2021)    Social Connection and Isolation Panel [NHANES]     Frequency of Communication with Friends and Family: More than three times a week     Frequency of Social Gatherings with Friends and Family: More than three times a week     Attends Church Services: More than 4 times per year     Active Member of Clubs or Organizations: Yes     Attends Club or Organization Meetings: More than 4 times per year     Marital Status: Never        Review of Systems         No data to display                 Checklist of Daily Activities:        Objective:   /70   Pulse 98   Temp 98 °F (36.7 °C) (Oral)   Ht 5' 7" (1.702 m)   Wt 60.3 kg (132 lb 15 oz)   SpO2 99%   BMI 20.82 kg/m²     Physical Exam        Lab Results   Component Value Date    WBC 6.08 09/15/2023    HGB 11.6 (L) 09/15/2023    HCT 36.7 (L) 09/15/2023    "  09/15/2023    CHOL 288 (H) 12/19/2023    TRIG 89 12/19/2023    HDL 63 12/19/2023    ALT 10 12/19/2023    AST 20 12/19/2023     12/19/2023    K 4.0 12/19/2023     12/19/2023    CREATININE 0.9 12/19/2023    BUN 12 12/19/2023    CO2 24 12/19/2023    TSH 2.358 12/19/2023    INR 0.9 03/17/2020    HGBA1C 5.7 (H) 12/19/2023       Current Outpatient Medications on File Prior to Visit   Medication Sig Dispense Refill    amLODIPine (NORVASC) 10 MG tablet Take 1 tablet (10 mg total) by mouth once daily. 30 tablet 11    cholecalciferol, vitamin D3, (VITAMIN D3) 25 mcg (1,000 unit) capsule Take 1,000 Units by mouth once daily. Hold until after surgery      cyanocobalamin (VITAMIN B-12) 1000 MCG tablet Take 1 tablet (1,000 mcg total) by mouth once daily. Hold until after surgery 30 tablet 11    FEROSUL 325 mg (65 mg iron) Tab tablet TAKE ONE TABLET BY MOUTH ON TUESDAY, THURSDAY, AND SATURDAY 12 tablet 4    furosemide (LASIX) 20 MG tablet TAKE ONE TABLET BY MOUTH EVERY DAY 90 tablet 3    LIDOcaine-prilocaine (EMLA) cream Apply topically as needed (apply over port before chemo). apply over port before chemo 30 g 3    lutein-zeaxanthin (OCUVITE LUTEIN 25) 25-5 mg Cap Take 1 tablet by mouth once daily. 30 capsule 0    potassium chloride (KLOR-CON) 10 MEQ TbSR TAKE 1 TABLET BY MOUTH ONCE DAILY. 90 tablet 3    rosuvastatin (CRESTOR) 40 MG Tab TAKE ONE TABLET BY MOUTH EVERY DAY FOR CHOLESTEROL 90 tablet 3    VIT C/VIT E ACETATE/LUTEIN/MIN (OCUVITE LUTEIN ORAL) Take 1 tablet by mouth once daily. Hold until after surgery      [DISCONTINUED] losartan (COZAAR) 50 MG tablet TAKE ONE TABLET BY MOUTH EVERY DAY 90 tablet 3    calcium carbonate (OS-JAILYN) 500 mg calcium (1,250 mg) tablet Take 1 tablet (500 mg total) by mouth once daily. (Patient taking differently: Take 0.5 tablets by mouth 2 (two) times daily. Hold until after surgery)  0     Current Facility-Administered Medications on File Prior to Visit   Medication Dose  "Route Frequency Provider Last Rate Last Admin    gabapentin capsule 300 mg  300 mg Oral TID Amanda Gaspar, NP   300 mg at 04/16/21 0814    LIDOcaine (PF) 10 mg/ml (1%) injection 10 mg  1 mL Intradermal On Call Procedure Amanda Gaspar NP             Assessment:   89 y.o. female with multiple co-morbid illnesses here for continued follow up of medical problems.      Plan:     Problem List Items Addressed This Visit          Neuro    Cerebral atrophy, mild - Primary     Noted on CT head 12/21/2023:"Mild generalized cerebral volume loss with compensatory prominence of the ventricles and sulci "  Improved memory since completing chemo a few years ago.  Stable symptoms.  Will follow for any worsening.              Cardiac/Vascular    Essential hypertension  (Chronic)     Increased BP.    Increase losartan to 100 and BP check in 1 week         Relevant Medications    losartan (COZAAR) 100 MG tablet    Mixed hyperlipidemia (Chronic)     Carotid artery disease.  On high dose statin.  Increased LDL last check.  Pt states that she is taking daily  Will follow, daily crestor         Chronic combined systolic and diastolic heart failure (Chronic)     Daily lasix.  BP not at goal.  Appears euvolemic  Increase ARB and BP check in 1 week         Aortic atherosclerosis     Noted on CT chest abdomen pelvis 02/26/2021:  Advanced atherosclerosis along the abdominal aorta and iliac arteries   Patient asymptomatic.  Patient on high-dose statin.  Tolerating well.  lLast LDL increased, pt states that he is taking daily.              Endocrine    Prediabetes     Increased A1C on labs.  No DM.  Podiatry notes indicated DM, but pt will explain that she does not have.    Yearly A1C checks         Adrenal nodule     Noted on CT chest abdomen pelvis 03/15/2023: Note of 1.5 cm right adrenal hypodense nodule stable   Noted to be stable.  No indication for workup at this time.  Will follow.            Other    Healthcare " maintenance     Power-of- completed 5/2022 with patient's assistance [facility].  Reviewed living will and power-of- 12/2023  yearly labs done 12/2023              Health Maintenance         Date Due Completion Date    COVID-19 Vaccine (9 - 2023-24 season) 12/04/2023 10/9/2023    Hemoglobin A1c (Prediabetes) 12/19/2024 12/19/2023    Colonoscopy 02/15/2025 2/15/2022    DEXA Scan 07/26/2025 7/26/2023    Override on 2/15/2016: Declined    TETANUS VACCINE 02/15/2028 2/15/2018          Medications Reconciliation:   I have not reconciled the patient's home medications with the patient/family. I have updated all changes.  Refer to After-Visit Medication List.      Medication List            Accurate as of February 26, 2024 10:24 AM. If you have any questions, ask your nurse or doctor.                CHANGE how you take these medications      calcium carbonate 500 mg calcium (1,250 mg) tablet  Commonly known as: OS-JAILYN  Take 1 tablet (500 mg total) by mouth once daily.  What changed:   how much to take  when to take this  additional instructions     losartan 100 MG tablet  Commonly known as: COZAAR  Take 1 tablet (100 mg total) by mouth once daily.  What changed:   medication strength  how much to take  when to take this  Changed by: Brittney Travis MD            CONTINUE taking these medications      amLODIPine 10 MG tablet  Commonly known as: NORVASC  Take 1 tablet (10 mg total) by mouth once daily.     cholecalciferol (vitamin D3) 25 mcg (1,000 unit) capsule  Commonly known as: VITAMIN D3     cyanocobalamin 1000 MCG tablet  Commonly known as: VITAMIN B-12  Take 1 tablet (1,000 mcg total) by mouth once daily. Hold until after surgery     FeroSuL 325 mg (65 mg iron) Tab tablet  Generic drug: ferrous sulfate  TAKE ONE TABLET BY MOUTH ON TUESDAY, THURSDAY, AND SATURDAY     furosemide 20 MG tablet  Commonly known as: LASIX  TAKE ONE TABLET BY MOUTH EVERY DAY     LIDOcaine-prilocaine cream  Commonly known  as: EMLA  Apply topically as needed (apply over port before chemo). apply over port before chemo     lutein-zeaxanthin 25-5 mg Cap  Commonly known as: OCUVITE LUTEIN 25  Take 1 tablet by mouth once daily.     OCUVITE LUTEIN ORAL     potassium chloride 10 MEQ Tbsr  Commonly known as: KLOR-CON  TAKE 1 TABLET BY MOUTH ONCE DAILY.     rosuvastatin 40 MG Tab  Commonly known as: CRESTOR  TAKE ONE TABLET BY MOUTH EVERY DAY FOR CHOLESTEROL               Where to Get Your Medications        These medications were sent to [facility] [place], [place] - [address] [address]  [address] [address] [address]      Phone: [phone]   losartan 100 MG tablet              Follow up in about 3 months (around 5/26/2024). Total clinical care time was 25 min. The following issues were discussed: labs reviewed, DM dx by podiatry but really pre-DM, recent fall and results of her tests.       Brittney Travis MD  Internal Medicine  [facility]  [phone]

## 2024-02-26 NOTE — ASSESSMENT & PLAN NOTE
Increased A1C on labs.  No DM.  Podiatry notes indicated DM, but pt will explain that she does not have.    Yearly A1C checks

## 2024-02-26 NOTE — PATIENT INSTRUCTIONS
Chief complaint:   Chief Complaint   Patient presents with   • Contraception       Vitals:  Visit Vitals  /78 (BP Location: [facility], Patient Position: Sitting, Cuff Size: Regular)   Pulse 61   Temp 97.7 °F (36.5 °C) (Oral)   Resp 12   Ht 5' 7\" (1.702 m)   Wt 61 kg   LMP 01/24/2018   SpO2 100%   BMI 21.07 kg/m²       HISTORY OF PRESENT ILLNESS     HPI  Here for birth control refills. Doing well on birth control. Last physical was November 2016. Pap smear done at that time and normal. Menstrual cycles regular. First date last menstrual cycle January 24, 2018.    Teaching multiple spin classes a week. Has noted left chest wall, left thoracic back pain. Working with chiropractor twice per week with relief of back pain still having some tenderness to the left pectoralis muscles and sometimes gets numbness that goes in the thoracic distribution to underneath her breast.  Other significant problems:  Patient Active Problem List    Diagnosis Date Noted   • Other acne 06/11/2013     Priority: Low   • Irritable bowel syndrome 05/24/2013     Priority: Low       PAST MEDICAL, FAMILY AND SOCIAL HISTORY     Medications:  Current Outpatient Prescriptions   Medication   • MICROGESTIN FE 1/20 1-20 MG-MCG per tablet   • dicyclomine (BENTYL) 20 MG tablet   • valACYclovir (VALTREX) 500 MG tablet     No current facility-administered medications for this visit.        Allergies:  ALLERGIES:  No Known Allergies    Past Medical  History/Surgeries:  Past Medical History:   Diagnosis Date   • Abdominal pain    • Anxiety state, unspecified 6/11/2013   • Anxiety state, unspecified 6/11/2013   • HSV-1 (herpes simplex virus 1) infection    • Ovarian cyst        Past Surgical History:   Procedure Laterality Date   • ANES TONSILLECTOMY PRIM/SECNDRY; UNDER A         Family History:  Family History   Problem Relation Age of Onset   • High blood pressure Father    • Cancer, Breast Paternal Grandmother        Social History:  Social History  Thank you so much for coming in to see me today.  Please do not hesitate to call with any questions or concerns or if you feel that you need to be seen.        Substance Use Topics   • Smoking status: Never Smoker   • Smokeless tobacco: Never Used   • Alcohol use 1.8 oz/week     3 Standard drinks or equivalent per week      Comment: occ       REVIEW OF SYSTEMS     Review of Systems  As above  PHYSICAL EXAM     Physical Exam  Constitutional:  Well developed, well nourished. No acute distress, non-toxic appearance.  Blood pressure and weight stable.  Chest wall: Tenderness palpation over the pectoralis, left lateral thoracic region and suprascapular region  ASSESSMENT/PLAN     Lala was seen today for contraception.    Diagnoses and all orders for this visit:    Encounter for contraceptive management, unspecified type    Chest wall pain  -     SERVICE TO PHYSICAL THERAPY    Left-sided thoracic back pain, unspecified chronicity  -     SERVICE TO PHYSICAL THERAPY      Refill microgestin FE 1/20  PT order.  Physical 11/18    Supervision  Dr Shepherd

## 2024-02-26 NOTE — ASSESSMENT & PLAN NOTE
Noted on CT chest abdomen pelvis 02/26/2021:  Advanced atherosclerosis along the abdominal aorta and iliac arteries   Patient asymptomatic.  Patient on high-dose statin.  Tolerating well.  lLast LDL increased, pt states that he is taking daily.

## 2024-02-26 NOTE — ASSESSMENT & PLAN NOTE
Noted on CT chest abdomen pelvis 03/15/2023: Note of 1.5 cm right adrenal hypodense nodule stable   Noted to be stable.  No indication for workup at this time.  Will follow.

## 2024-02-26 NOTE — ASSESSMENT & PLAN NOTE
Power-of- completed 5/2022 with patient's assistance [facility].  Reviewed living will and power-of- 12/2023  yearly labs done 12/2023

## 2024-05-15 RX ORDER — FERROUS SULFATE TAB 325 MG (65 MG ELEMENTAL FE) 325 (65 FE) MG
TAB ORAL
Qty: 12 TABLET | Refills: 5 | Status: SHIPPED | OUTPATIENT
Start: 2024-05-15

## 2024-05-16 DIAGNOSIS — E78.5 HYPERLIPIDEMIA, UNSPECIFIED HYPERLIPIDEMIA TYPE: ICD-10-CM

## 2024-05-16 RX ORDER — ROSUVASTATIN CALCIUM 40 MG/1
TABLET, COATED ORAL
Qty: 90 TABLET | Refills: 3 | Status: SHIPPED | OUTPATIENT
Start: 2024-05-16

## 2024-05-22 DIAGNOSIS — I10 ESSENTIAL HYPERTENSION: ICD-10-CM

## 2024-05-22 RX ORDER — AMLODIPINE BESYLATE 10 MG/1
10 TABLET ORAL DAILY
Qty: 90 TABLET | Refills: 3 | Status: SHIPPED | OUTPATIENT
Start: 2024-05-22 | End: 2025-05-22

## 2024-05-27 PROBLEM — Z00.00 HEALTHCARE MAINTENANCE: Status: RESOLVED | Noted: 2021-05-24 | Resolved: 2024-05-27

## 2024-05-29 ENCOUNTER — OFFICE VISIT (OUTPATIENT)
Dept: SURGERY | Facility: CLINIC | Age: 89
End: 2024-05-29
Payer: MEDICARE

## 2024-05-29 VITALS — DIASTOLIC BLOOD PRESSURE: 77 MMHG | OXYGEN SATURATION: 98 % | HEART RATE: 92 BPM | SYSTOLIC BLOOD PRESSURE: 149 MMHG

## 2024-05-29 DIAGNOSIS — Z85.038 ENCOUNTER FOR FOLLOW-UP SURVEILLANCE OF COLON CANCER: Primary | ICD-10-CM

## 2024-05-29 DIAGNOSIS — Z08 ENCOUNTER FOR FOLLOW-UP SURVEILLANCE OF COLON CANCER: Primary | ICD-10-CM

## 2024-05-29 PROCEDURE — 99999 PR PBB SHADOW E&M-EST. PATIENT-LVL III: CPT | Mod: PBBFAC,,, | Performed by: COLON & RECTAL SURGERY

## 2024-05-29 PROCEDURE — 99214 OFFICE O/P EST MOD 30 MIN: CPT | Mod: S$PBB,,, | Performed by: COLON & RECTAL SURGERY

## 2024-05-29 PROCEDURE — 99213 OFFICE O/P EST LOW 20 MIN: CPT | Mod: PBBFAC | Performed by: COLON & RECTAL SURGERY

## 2024-05-29 NOTE — PROGRESS NOTES
HPI:  Nydia Stevens is a 89 y.o. female with history of a history of colon cancer, s/p right hemicolectomy, presents to clinic for discussion of anastomotic recurrence.  Pathology from previous surgery showed a pT4N0 lesion.  She was offered adjuvant chemotherapy but she declined.     Immunohistochemical studies for DNA mismatch repair proteins were performed   on this tumor (block 1H) and demonstrate intact staining in all 4 proteins   (MLH1, PMS2, MSH2, and MSH6).  These results indicate that this tumor is   microsatellite stable (ANDREW) and that Santillan syndrome (Hereditary Nonpolyposis   Colorectal Cancer, HNPCC) is unlikely.   Histologic sections highlighting visceral peritoneal involvement were   reviewed by Dr. Clare Plascencia and she concurs with the above impression.   COLON AND RECTUM: Resection, Including Transanal Disk Excision of Rectal   Neoplasms       Procedure         Right hemicolectomy     TUMOR       Tumor Site         Right (ascending) colon       Histologic Type         Adenocarcinoma       Histologic Grade         G2: Moderately differentiated       Tumor Size         Greatest dimension (Centimeters) 6.0 cm       Tumor Deposits         Not identified       Tumor Extension         Tumor invades the visceral peritoneum       Macroscopic Tumor Perforation         Not identified       Lymphovascular Invasion         Not identified       Perineural Invasion         Present       Treatment Effect         No known presurgical therapy           Margins Examined             Proximal             Distal             Radial or Mesenteric     LYMPH NODES       Regional Lymph Nodes           Number of Lymph Nodes Involved 0           Number of Lymph Nodes Examined 20       Primary Tumor (pT)         pT4a       Regional Lymph Nodes (pN)         pN0     ADDITIONAL FINDINGS       Additional Pathologic Findings         Diverticulosis     The patient recently underwent colonoscopy on 3/23 and was found to  have a lesion at her ileocolic anastomosis.  Biopsies of this lesion were obtained and were consistent with invasive colon cancer.  CEA from 2/12/21 was 12.0.     PET scan was performed today.  At the time of the patient visit, no official read was back from radiology, but the area of PET avidity appears to be isolated only to the previous anastomosis.     4- exp lap, ileocolectomy, ileal to descending colon anastomosis.       FINAL PATHOLOGIC DIAGNOSIS  [facility] DIAGNOSIS:   PROCEDURE: lleocolic resection (10 cm ileum, transverse colon and descending   colon, ileal descending colon anastomosis).   TUMOR SITE: Small intestine, not otherwise specified.   TUMOR SIZE: Greatest dimension: 4.2 cm   Additional dimension: 4.2 x 0.6 cm   LOCATION OF TUMOR: Tumor at previous ileocolic anastomosis.   MACROSCOPIC TUMOR PERFORATION: Not identified.   HISTOLOGIC TYPE: Adenocarcinoma, see comment.   HISTOLOGIC GRADE: Moderate to poorly differentiated.   TUMOR EXTENSION: Tumor invades through the muscularis propria and extends to   serosal surface.   MARGINS: All margins are uninvolved by invasive carcinoma, carcinoma in-situ   (high grade dysplasia) and adenoma (closest margin, 12.2 cm).   MARGINS EXAMINED: Proximal and distal.   LYMPHOVASCULAR INVASION: Not identified.   REGIONAL LYMPH NODES:   Number of lymph nodes involved 0   Number of lymph nodes examined: 14   PATHOLOGIC STAGING (pTNM): pT4 N0 MX   Comment:   MMR studies can be performed upon request.   Imani Cuevas MD   Report attached.   Performing site:   [address] [address]      2- colonoscopy  Findings:        The perianal and digital rectal examinations were normal.        The terminal ileum appeared normal.        There was evidence of a prior side-to-side ileo-colonic anastomosis        in the descending colon. This was patent and was characterized by        inflammation. The anastomosis was traversed.  "Biopsies were taken        with a cold forceps for histology. Verification of patient        identification for the specimen was done. Estimated blood loss was        minimal. To prevent bleeding after the biopsy, one hemostatic clip        was successfully placed. There was no bleeding at the end of the        procedure.        The exam was otherwise without abnormality on direct and        retroflexion views.      Collected: 02/15/22 0827   Result status: Final   Resulting lab: [facility]   Value: RELIAPA DIAGNOSIS:   SPECIMEN DESIGNATED "ILEOCOLONIC ANASTOMOSIS" BIOPSY:   Benign colonic mucosa with acute inflammation/granulation tissue.   Negative for dysplasia/carcinoma.   Yuliana Day M.D.   Report attached.   Performing site:   [place] [address]   "Disclaimer:  This case diagnosis was rendered completely by the outside   consultation pathologist and the case is electronically signed by an [facility]   pathologist listed below solely to release the report into the medical   record."    Comment: Interp By Krysta Bradley D.O., Signed on 02/22/2022 at 08:23         8-  Interval hx:  Feels well.  No pain.  BMs 3 per day.  Continent.  No blood.    Appetite good, gaining wt.       11-  Interval hx:   Declined CtDNA study enrollment per Dr Rico.  Feels well.  Tolerating diet.   3 BMs per day.  No blood.  Continent  No problems with energy levels or activity level.       03/15/2023 CT scan  CT CHEST ABDOMEN PELVIS WITH CONTRAST (XPD)     CLINICAL HISTORY:  colon cancer surveillance; Malignant neoplasm of colon, unspecified     TECHNIQUE:  Low dose axial CT images obtained throughout the region of the chest, abdomen and pelvis after the administration of x mL of Omnipaque 350 intravenous contrast.  Axial, sagittal and coronal reconstructions were performed.     COMPARISON:  None     FINDINGS:  Soft tissue structures at the base of " the neck are unremarkable.     The trachea and bronchial airways are clear and fully patent.     The lungs are well expanded and free of confluent opacity.     No pleural fluid, pericardial fluid or mediastinal lymph node enlargement.     The heart and aorta appear within normal limits.     The liver, gallbladder, and bile ducts are unremarkable.     Spleen, pancreas, and adrenal glands appear within normal limits.     Kidneys appear normal. The ureters are decompressed. Urinary bladder unremarkable.     The stomach, small bowel and colon demonstrate no evidence of obstruction or focal inflammation.  Postoperative change RIGHT hemicolon.     No free air or free fluid identified within the abdomen or pelvis.     Aorta tapers normally throughout its course.     Osseous structures exhibit mild degenerative changes. No fracture or focal osseous destructive lesion.  Scoliosis and degenerative change.  ORIF with percutaneous pinning LEFT hip.  Chronic healed fracture LEFT inferior pubic ramus.     Impression:     No evidence for metastatic disease within the abdomen or pelvis.        Electronically signed by: Fernando Alvarado MD  Date:                                            03/15/2023  Time:                                           11:30 05/31/2023 interval history  Patient feels well.  Good appetite, gaining weight and doing all activities without any restriction.  Denies abdominal pain.  Denies nausea or vomiting.  Bowel movements are formed, regular and without blood.          11-  Interval hx  Feels well.  Denies any pain.  Appetite and energy levels are good.  Gaining weight.  No change in activity levels.  Doing everything she needs to or wants to do.  Bowel movements are regular, formed, no incontinence, no bleeding.  Patient has seen a follow up of her colon cancer. She is turning 89 soon. She feels well. She denies any abdominal pain. Appetite and energy levels are good. She has one to two bowel  movements per day without blood. She reports good control. She had a colonoscopy in 2022, which were negative.    5-  Interval hx  Feels great!  No pain.  Tolerating diet.  BMs formed.  Continent.  No blood  Activity normal.      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    Hyperlipidemia     Hypertension     Osteoporosis         Past Surgical History:   Procedure Laterality Date    CATARACT EXTRACTION W/  INTRAOCULAR LENS IMPLANT Left 8/11/14    [facility]    CATARACT EXTRACTION W/  INTRAOCULAR LENS IMPLANT Right 09/15/14    [facility]    COLONOSCOPY N/A 3/19/2020    Procedure: COLONOSCOPY;  Surgeon: Real Mabry MD;  Location: [facility] (2ND FLR);  Service: Endoscopy;  Laterality: N/A;    COLONOSCOPY N/A 3/23/2021    Procedure: COLONOSCOPY;  Surgeon: AUTUMN Berg MD;  Location: [facility] (4TH FLR);  Service: Endoscopy;  Laterality: N/A;  covid test 3/20 [facility]    COLONOSCOPY N/A 2/15/2022    Procedure: COLONOSCOPY;  Surgeon: Doris Simpson MD;  Location: [facility] (4TH FLR);  Service: Endoscopy;  Laterality: N/A;  COVID test on 2/12/22 at [facility]  2/14/22 - Pt confirmed 0640 arrival, prep instructions reviewed, Pt verbalized [id number]    ESOPHAGOGASTRODUODENOSCOPY N/A 3/19/2020    Procedure: EGD (ESOPHAGOGASTRODUODENOSCOPY);  Surgeon: Real Mabry MD;  Location: [facility] (2ND FLR);  Service: Endoscopy;  Laterality: N/A;    FOOT SURGERY      left    HYSTERECTOMY  1962    ILEOCOLECTOMY N/A 4/15/2021    Procedure: ILEOCOLECTOMY;  Surgeon: AUTUMN Berg MD;  Location: [facility] OR 2ND FLR;  Service: Colon and Rectal;  Laterality: N/A;    INSERTION OF TUNNELED CENTRAL VENOUS CATHETER (CVC) WITH SUBCUTANEOUS PORT N/A 7/2/2021    Procedure: [id number];  Surgeon: Suresh Agrawal MD;  Location: [facility] OR [facility]  FLR;  Service: Vascular;  Laterality: N/A;  Right IJ    LAPAROSCOPIC HEMICOLECTOMY Right 3/20/2020    Procedure: HEMICOLECTOMY, RIGHT;  Surgeon: AUTUMN Berg MD;  Location: [facility] OR 2ND FLR;  Service: Colon and Rectal;  Laterality: Right;    LYSIS OF ADHESIONS N/A 4/15/2021    Procedure: LYSIS, ADHESIONS;  Surgeon: AUTUMN Berg MD;  Location: [facility] OR 2ND FLR;  Service: Colon and Rectal;  Laterality: N/A;  Greater than 2 hours    MOBILIZATION OF SPLENIC FLEXURE N/A 4/15/2021    Procedure: MOBILIZATION, SPLENIC FLEXURE;  Surgeon: AUTUMN Berg MD;  Location: [facility] OR 2ND FLR;  Service: Colon and Rectal;  Laterality: N/A;       Review of patient's allergies indicates:  No Known Allergies    Family History   Problem Relation Name Age of Onset    Heart attack Father      Cataracts Brother      Heart attack Brother      Diabetes Brother      No Known Problems Mother      Cataracts Sister      Stroke Sister      Diabetes Sister      Cataracts Sister      Stroke Sister      No Known Problems Maternal Aunt      No Known Problems Maternal Uncle      No Known Problems Paternal Aunt      No Known Problems Paternal Uncle      No Known Problems Maternal Grandmother      No Known Problems Maternal Grandfather      No Known Problems Paternal Grandmother      No Known Problems Paternal Grandfather      Amblyopia Neg Hx      Blindness Neg Hx      Cancer Neg Hx      Glaucoma Neg Hx      Hypertension Neg Hx      Macular degeneration Neg Hx      Retinal detachment Neg Hx      Strabismus Neg Hx      Thyroid disease Neg Hx      Colon cancer Neg Hx      Esophageal cancer Neg Hx      Irritable bowel syndrome Neg Hx      Rectal cancer Neg Hx      Stomach cancer Neg Hx      Ulcerative colitis Neg Hx      Crohn's disease Neg Hx      Celiac disease Neg Hx         Social History     Socioeconomic History    Marital status: Single   Occupational History    Occupation: teaching retired    Occupation: Nun.  [employer]   Tobacco  Use    Smoking status: Never    Smokeless tobacco: Never   Substance and Sexual Activity    Alcohol use: No     Alcohol/week: 0.0 standard drinks of alcohol    Drug use: No    Sexual activity: Never   Social History Narrative    Member of [employer] order here in [place], [place]..  Lives with about 40-50 sisters.  All sisters with her are retired.       Social Determinants of Health     Financial Resource Strain: Low Risk  (5/20/2021)    Overall Financial Resource Strain (CARDIA)     Difficulty of Paying Living Expenses: Not hard at all   Food Insecurity: No Food Insecurity (5/20/2021)    Hunger Vital Sign     Worried About Running Out of Food in the Last Year: Never true     Ran Out of Food in the Last Year: Never true   Physical Activity: Inactive (5/20/2021)    Exercise Vital Sign     Days of Exercise per Week: 0 days     Minutes of Exercise per Session: 0 min   Stress: No Stress Concern Present (5/20/2021)    [employer] [place] [place] [employer] - Occupational Stress Questionnaire     Feeling of Stress : Not at all       ROS:  GENERAL: No fever, chills, fatigability or weight loss.  Integument: No rashes, redness, icterus  CHEST: Denies DONOVAN, cyanosis, wheezing, cough and sputum production.  CARDIOVASCULAR: Denies chest pain, PND, orthopnea or reduced exercise tolerance.  GI: Denies abd pain, dysphagia, nausea, vomiting, no hematemesis   : Denies burning on urination, no hematuria, no bacteriuria  MSK: No deformities, swelling, joint pain swelling  Neurologic: No HAs, seizures, weakness, paresthesias, gait problems    PE:  General appearance well  BP (!) 149/77   Pulse 92   SpO2 98%   Sclera/ Skin anicteric  LN none palpable  AT NC EOMI  Neck supple trachea midline   Chest symmetric, nl excursion, no retractions, breathing comfortably  Abdomen  ND soft NT.  no masses, no organomegaly  EXT - no CCE  Neuro:  Mood/ affect nl, alert and oriented x 3, moves all ext's, gait nl    Assessment:  No  evidence of recurrent colon CA  Good performance status    Plan:  RTC in 6 months  CEA and imaging per Dr Lockhart

## 2024-05-30 ENCOUNTER — LAB VISIT (OUTPATIENT)
Dept: LAB | Facility: HOSPITAL | Age: 89
End: 2024-05-30
Attending: INTERNAL MEDICINE
Payer: MEDICARE

## 2024-05-30 ENCOUNTER — OFFICE VISIT (OUTPATIENT)
Dept: PRIMARY CARE CLINIC | Facility: CLINIC | Age: 89
End: 2024-05-30
Payer: MEDICARE

## 2024-05-30 VITALS
DIASTOLIC BLOOD PRESSURE: 74 MMHG | HEIGHT: 67 IN | HEART RATE: 83 BPM | WEIGHT: 127.88 LBS | TEMPERATURE: 98 F | OXYGEN SATURATION: 99 % | SYSTOLIC BLOOD PRESSURE: 128 MMHG | BODY MASS INDEX: 20.07 KG/M2

## 2024-05-30 DIAGNOSIS — I10 ESSENTIAL HYPERTENSION: ICD-10-CM

## 2024-05-30 DIAGNOSIS — E78.2 MIXED HYPERLIPIDEMIA: Chronic | ICD-10-CM

## 2024-05-30 DIAGNOSIS — Z85.038 HISTORY OF COLON CANCER: ICD-10-CM

## 2024-05-30 DIAGNOSIS — I10 ESSENTIAL HYPERTENSION: Primary | ICD-10-CM

## 2024-05-30 DIAGNOSIS — Z00.00 HEALTHCARE MAINTENANCE: ICD-10-CM

## 2024-05-30 PROBLEM — C18.9 COLON CANCER: Status: RESOLVED | Noted: 2021-04-15 | Resolved: 2024-05-30

## 2024-05-30 LAB
ALBUMIN SERPL BCP-MCNC: 4.3 G/DL (ref 3.5–5.2)
ALP SERPL-CCNC: 72 U/L (ref 55–135)
ALT SERPL W/O P-5'-P-CCNC: 11 U/L (ref 10–44)
ANION GAP SERPL CALC-SCNC: 14 MMOL/L (ref 8–16)
AST SERPL-CCNC: 18 U/L (ref 10–40)
BASOPHILS # BLD AUTO: 0.04 K/UL (ref 0–0.2)
BASOPHILS NFR BLD: 0.6 % (ref 0–1.9)
BILIRUB SERPL-MCNC: 0.3 MG/DL (ref 0.1–1)
BUN SERPL-MCNC: 18 MG/DL (ref 8–23)
CALCIUM SERPL-MCNC: 10.3 MG/DL (ref 8.7–10.5)
CHLORIDE SERPL-SCNC: 99 MMOL/L (ref 95–110)
CHOLEST SERPL-MCNC: 287 MG/DL (ref 120–199)
CHOLEST/HDLC SERPL: 4.7 {RATIO} (ref 2–5)
CO2 SERPL-SCNC: 22 MMOL/L (ref 23–29)
CREAT SERPL-MCNC: 0.9 MG/DL (ref 0.5–1.4)
DIFFERENTIAL METHOD BLD: ABNORMAL
EOSINOPHIL # BLD AUTO: 0.1 K/UL (ref 0–0.5)
EOSINOPHIL NFR BLD: 1.7 % (ref 0–8)
ERYTHROCYTE [DISTWIDTH] IN BLOOD BY AUTOMATED COUNT: 14.6 % (ref 11.5–14.5)
EST. GFR  (NO RACE VARIABLE): >60 ML/MIN/1.73 M^2
GLUCOSE SERPL-MCNC: 94 MG/DL (ref 70–110)
HCT VFR BLD AUTO: 35.1 % (ref 37–48.5)
HDLC SERPL-MCNC: 61 MG/DL (ref 40–75)
HDLC SERPL: 21.3 % (ref 20–50)
HGB BLD-MCNC: 11 G/DL (ref 12–16)
IMM GRANULOCYTES # BLD AUTO: 0.05 K/UL (ref 0–0.04)
IMM GRANULOCYTES NFR BLD AUTO: 0.8 % (ref 0–0.5)
LDLC SERPL CALC-MCNC: 208.2 MG/DL (ref 63–159)
LYMPHOCYTES # BLD AUTO: 1.7 K/UL (ref 1–4.8)
LYMPHOCYTES NFR BLD: 26.3 % (ref 18–48)
MCH RBC QN AUTO: 28.2 PG (ref 27–31)
MCHC RBC AUTO-ENTMCNC: 31.3 G/DL (ref 32–36)
MCV RBC AUTO: 90 FL (ref 82–98)
MONOCYTES # BLD AUTO: 1.1 K/UL (ref 0.3–1)
MONOCYTES NFR BLD: 16.2 % (ref 4–15)
NEUTROPHILS # BLD AUTO: 3.6 K/UL (ref 1.8–7.7)
NEUTROPHILS NFR BLD: 54.4 % (ref 38–73)
NONHDLC SERPL-MCNC: 226 MG/DL
NRBC BLD-RTO: 0 /100 WBC
PLATELET # BLD AUTO: 345 K/UL (ref 150–450)
PMV BLD AUTO: 9.8 FL (ref 9.2–12.9)
POTASSIUM SERPL-SCNC: 4.2 MMOL/L (ref 3.5–5.1)
PROT SERPL-MCNC: 7.9 G/DL (ref 6–8.4)
RBC # BLD AUTO: 3.9 M/UL (ref 4–5.4)
SODIUM SERPL-SCNC: 135 MMOL/L (ref 136–145)
TRIGL SERPL-MCNC: 89 MG/DL (ref 30–150)
WBC # BLD AUTO: 6.54 K/UL (ref 3.9–12.7)

## 2024-05-30 PROCEDURE — 99213 OFFICE O/P EST LOW 20 MIN: CPT | Mod: S$GLB,,, | Performed by: INTERNAL MEDICINE

## 2024-05-30 PROCEDURE — 80053 COMPREHEN METABOLIC PANEL: CPT | Performed by: INTERNAL MEDICINE

## 2024-05-30 PROCEDURE — 85025 COMPLETE CBC W/AUTO DIFF WBC: CPT | Performed by: INTERNAL MEDICINE

## 2024-05-30 PROCEDURE — 36415 COLL VENOUS BLD VENIPUNCTURE: CPT | Performed by: INTERNAL MEDICINE

## 2024-05-30 PROCEDURE — 80061 LIPID PANEL: CPT | Performed by: INTERNAL MEDICINE

## 2024-05-30 NOTE — ASSESSMENT & PLAN NOTE
Pt states f/u colon in 8 months.  Followed by CR surgery.  No c/o at this time  Call with any SE and f/u for colon

## 2024-05-30 NOTE — ASSESSMENT & PLAN NOTE
Power-of- completed 5/2022 with patient's assistance superior general.  Reviewed shari and poa today  yearly labs done 12/2023

## 2024-05-30 NOTE — PROGRESS NOTES
.  This note has been generated using voice-recognition software. There may be typographical errors that have been missed during proof-reading.     Primary Care Provider Appointment    Subjective:      Patient ID: Nydia Stevens is a 89 y.o. female here for follow up.     Chief Complaint: Follow-up  HPI:  This is an 89-year-old female with hypertension, hyperlipidemia, congestive heart failure, colon cancer, iron deficiency anemia, carotid artery disease, prediabetes, osteoporosis here for f/u.  patient last seen  02/26/2024 02/28/2024 patient seen by podiatry   05/29/2024 patient seen by Colorectal surgery  No recent falls.  Using walker at all times.    On higher dose of losartan.    Plan for colon in 8 months  Still getting prolia.      Advance Care Planning     Date: 05/30/2024    Power of   I initiated the process of voluntary advance care planning today and explained the importance of this process to the patient.  I introduced the concept of advance directives to the patient, as well. Then the patient received detailed information about the importance of designating a Health Care Power of  (HCPOA). She was also instructed to communicate with this person about their wishes for future healthcare, should she become sick and lose decision-making capacity. The patient has previously appointed a HCPOA. After our discussion, the patient has decided to complete a HCPOA and has appointed her  Sister in her order , health care agent:  Dr Abdullahi  & health care agent number:  see chart  I spent a total time of 5 minutes discussing this issue with the patient.           Advance Care Planning    Reviewed Lapost and POA with pt and no changes made.        Patient Active Problem List   Diagnosis    Essential hypertension     Mixed hyperlipidemia    Palliative care encounter    Closed fracture of left olecranon process    Debility    Aortic stenosis, mild    Osteoporosis    Chronic combined systolic and  diastolic heart failure    History of colon cancer    Carotid stenosis, asymptomatic, bilateral    H/O: hysterectomy    Prediabetes    B12 deficiency    Healthcare maintenance    Aortic atherosclerosis    Benign mole    Adrenal nodule    Cerebral atrophy, mild        Past Surgical History:   Procedure Laterality Date    CATARACT EXTRACTION W/  INTRAOCULAR LENS IMPLANT Left 8/11/14    [facility]    CATARACT EXTRACTION W/  INTRAOCULAR LENS IMPLANT Right 09/15/14    [facility]    COLONOSCOPY N/A 3/19/2020    Procedure: COLONOSCOPY;  Surgeon: Real Mabry MD;  Location: [facility] ENDO (2ND FLR);  Service: Endoscopy;  Laterality: N/A;    COLONOSCOPY N/A 3/23/2021    Procedure: COLONOSCOPY;  Surgeon: AUTUMN Berg MD;  Location: [facility] ENDO (4TH FLR);  Service: Endoscopy;  Laterality: N/A;  covid test 3/20 [facility]    COLONOSCOPY N/A 2/15/2022    Procedure: COLONOSCOPY;  Surgeon: Doris Simpson MD;  Location: [facility] ENDO (4TH FLR);  Service: Endoscopy;  Laterality: N/A;  COVID test on 2/12/22 at [facility]  2/14/22 - Pt confirmed 0640 arrival, prep instructions reviewed, Pt verbalized understanding-[id number]    ESOPHAGOGASTRODUODENOSCOPY N/A 3/19/2020    Procedure: EGD (ESOPHAGOGASTRODUODENOSCOPY);  Surgeon: Real Mabry MD;  Location: [facility] ENDO (2ND FLR);  Service: Endoscopy;  Laterality: N/A;    FOOT SURGERY      left    HYSTERECTOMY  1962    ILEOCOLECTOMY N/A 4/15/2021    Procedure: ILEOCOLECTOMY;  Surgeon: AUTUNM Berg MD;  Location: [facility] OR 2ND FLR;  Service: Colon and Rectal;  Laterality: N/A;    INSERTION OF TUNNELED CENTRAL VENOUS CATHETER (CVC) WITH SUBCUTANEOUS PORT N/A 7/2/2021    Procedure: [id number]-X-CATH;  Surgeon: Suresh Agrawal MD;  Location: [facility] OR 2ND FLR;  Service: Vascular;  Laterality: N/A;  Right IJ    LAPAROSCOPIC HEMICOLECTOMY Right 3/20/2020    Procedure: HEMICOLECTOMY, RIGHT;  Surgeon: AUTUMN Berg MD;  Location: [facility] OR 2ND FLR;  Service: Colon and Rectal;  Laterality: Right;     LYSIS OF ADHESIONS N/A 4/15/2021    Procedure: LYSIS, ADHESIONS;  Surgeon: AUTUMN Berg MD;  Location: [facility] OR 2ND FLR;  Service: Colon and Rectal;  Laterality: N/A;  Greater than 2 hours    MOBILIZATION OF SPLENIC FLEXURE N/A 4/15/2021    Procedure: MOBILIZATION, SPLENIC FLEXURE;  Surgeon: AUTUMN Berg MD;  Location: [facility] OR 2ND FLR;  Service: Colon and Rectal;  Laterality: N/A;       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    Hyperlipidemia     Hypertension     Osteoporosis        Social History     Socioeconomic History    Marital status: Single   Occupational History    Occupation: teaching retired    Occupation: [employer]un.  [employer]   Tobacco Use    Smoking status: Never    Smokeless tobacco: Never   Substance and Sexual Activity    Alcohol use: No     Alcohol/week: 0.0 standard drinks of alcohol    Drug use: No    Sexual activity: Never   Social History Narrative    Member of [employer] order here in [place], [place]..  Lives with about 40-50 sisters.  All sisters with her are retired.       Social Determinants of Health     Financial Resource Strain: Low Risk  (5/20/2021)    Overall Financial Resource Strain (CARDIA)     Difficulty of Paying Living Expenses: Not hard at all   Food Insecurity: No Food Insecurity (5/20/2021)    Hunger Vital Sign     Worried About Running Out of Food in the Last Year: Never true     Ran Out of Food in the Last Year: Never true   Physical Activity: Inactive (5/20/2021)    Exercise Vital Sign     Days of Exercise per Week: 0 days     Minutes of Exercise per Session: 0 min   Stress: No Stress Concern Present (5/20/2021)    [employer] - Occupational Stress Questionnaire     Feeling of Stress : Not at all       Review of Systems         No data to  "display                 Checklist of Daily Activities:        Objective:   /74   Pulse 83   Temp 98.1 °F (36.7 °C) (Oral)   Ht 5' 7" (1.702 m)   Wt 58 kg (127 lb 13.9 oz)   SpO2 99%   BMI 20.03 kg/m²     Physical Exam  Constitutional:       General: She is not in acute distress.     Appearance: Normal appearance. She is not ill-appearing, toxic-appearing or diaphoretic.   HENT:      Head: Normocephalic and atraumatic.   Cardiovascular:      Rate and Rhythm: Normal rate and regular rhythm.      Heart sounds: Normal heart sounds.   Pulmonary:      Effort: Pulmonary effort is normal.      Breath sounds: Normal breath sounds.   Musculoskeletal:      Right lower leg: No edema.      Left lower leg: No edema.   Lymphadenopathy:      Cervical: No cervical adenopathy.   Neurological:      Mental Status: She is alert.             Lab Results   Component Value Date    WBC 6.08 09/15/2023    HGB 11.6 (L) 09/15/2023    HCT 36.7 (L) 09/15/2023     09/15/2023    CHOL 288 (H) 12/19/2023    TRIG 89 12/19/2023    HDL 63 12/19/2023    ALT 10 12/19/2023    AST 20 12/19/2023     12/19/2023    K 4.0 12/19/2023     12/19/2023    CREATININE 0.9 12/19/2023    BUN 12 12/19/2023    CO2 24 12/19/2023    TSH 2.358 12/19/2023    INR 0.9 03/17/2020    HGBA1C 5.7 (H) 12/19/2023       Current Outpatient Medications on File Prior to Visit   Medication Sig Dispense Refill    amLODIPine (NORVASC) 10 MG tablet Take 1 tablet (10 mg total) by mouth once daily. 90 tablet 3    cholecalciferol, vitamin D3, (VITAMIN D3) 25 mcg (1,000 unit) capsule Take 1,000 Units by mouth once daily. Hold until after surgery      cyanocobalamin (VITAMIN B-12) 1000 MCG tablet Take 1 tablet (1,000 mcg total) by mouth once daily. Hold until after surgery 30 tablet 11    FEROSUL 325 mg (65 mg iron) Tab tablet TAKE ONE TABLET BY MOUTH ON TUESDAY, THURSDAY, AND SATURDAY 12 tablet 5    furosemide (LASIX) 20 MG tablet TAKE ONE TABLET BY MOUTH EVERY " DAY 90 tablet 3    LIDOcaine-prilocaine (EMLA) cream Apply topically as needed (apply over port before chemo). apply over port before chemo 30 g 3    losartan (COZAAR) 100 MG tablet Take 1 tablet (100 mg total) by mouth once daily. 30 tablet 5    lutein-zeaxanthin (OCUVITE LUTEIN 25) 25-5 mg Cap Take 1 tablet by mouth once daily. 30 capsule 0    potassium chloride (KLOR-CON) 10 MEQ TbSR TAKE 1 TABLET BY MOUTH ONCE DAILY. 90 tablet 3    rosuvastatin (CRESTOR) 40 MG Tab TAKE ONE TABLET BY MOUTH EVERY DAY FOR CHOLESTEROL 90 tablet 3    VIT C/VIT E ACETATE/LUTEIN/MIN (OCUVITE LUTEIN ORAL) Take 1 tablet by mouth once daily. Hold until after surgery      calcium carbonate (OS-JAILYN) 500 mg calcium (1,250 mg) tablet Take 1 tablet (500 mg total) by mouth once daily. (Patient taking differently: Take 0.5 tablets by mouth 2 (two) times daily. Hold until after surgery)  0     Current Facility-Administered Medications on File Prior to Visit   Medication Dose Route Frequency Provider Last Rate Last Admin    gabapentin capsule 300 mg  300 mg Oral TID Amanda Gaspar NP   300 mg at 04/16/21 0814    LIDOcaine (PF) 10 mg/ml (1%) injection 10 mg  1 mL Intradermal On Call Procedure Amanda Gaspar NP             Assessment:   89 y.o. female with multiple co-morbid illnesses here for continued follow up of medical problems.      Plan:     Problem List Items Addressed This Visit          Cardiac/Vascular    Essential hypertension  - Primary (Chronic)     BP at goal  Continue higher dose losartan and labs today for eval         Relevant Orders    CBC Auto Differential    Lipid Panel    Comprehensive Metabolic Panel    Mixed hyperlipidemia (Chronic)     Carotid artery disease.  On high dose statin.  Increased LDL last check.  Pt states that she is taking daily  daily crestor, labs today            Oncology    History of colon cancer     Pt states f/u colon in 8 months.  Followed by CR surgery.  No c/o at this time  Call with any  SE and f/u for colon            Other    Healthcare maintenance     Power-of- completed 5/2022 with patient's assistance [facility].  Reviewed lapost and poa today  yearly labs done 12/2023            Health Maintenance         Date Due Completion Date    COVID-19 Vaccine (8 - 2023-24 season) 12/04/2023 10/9/2023    Hemoglobin A1c (Prediabetes) 12/19/2024 12/19/2023    Colonoscopy 02/15/2025 2/15/2022    DEXA Scan 07/26/2025 7/26/2023    Override on 2/15/2016: Declined    TETANUS VACCINE 02/15/2028 2/15/2018          Medications Reconciliation:   I have not reconciled the patient's home medications with the patient/family. I have updated all changes.  Refer to After-Visit Medication List.      Medication List            Accurate as of May 30, 2024 12:49 PM. If you have any questions, ask your nurse or doctor.                CHANGE how you take these medications      calcium carbonate 500 mg calcium (1,250 mg) tablet  Commonly known as: OS-JAILYN  Take 1 tablet (500 mg total) by mouth once daily.  What changed:   how much to take  when to take this  additional instructions            CONTINUE taking these medications      amLODIPine 10 MG tablet  Commonly known as: NORVASC  Take 1 tablet (10 mg total) by mouth once daily.     cholecalciferol (vitamin D3) 25 mcg (1,000 unit) capsule  Commonly known as: VITAMIN D3     cyanocobalamin 1000 MCG tablet  Commonly known as: VITAMIN B-12  Take 1 tablet (1,000 mcg total) by mouth once daily. Hold until after surgery     FeroSuL 325 mg (65 mg iron) Tab tablet  Generic drug: ferrous sulfate  TAKE ONE TABLET BY MOUTH ON TUESDAY, THURSDAY, AND SATURDAY     furosemide 20 MG tablet  Commonly known as: LASIX  TAKE ONE TABLET BY MOUTH EVERY DAY     LIDOcaine-prilocaine cream  Commonly known as: EMLA  Apply topically as needed (apply over port before chemo). apply over port before chemo     losartan 100 MG tablet  Commonly known as: COZAAR  Take 1 tablet (100 mg total) by  mouth once daily.     lutein-zeaxanthin 25-5 mg Cap  Commonly known as: OCUVITE LUTEIN 25  Take 1 tablet by mouth once daily.     OCUVITE LUTEIN ORAL     potassium chloride 10 MEQ Tbsr  Commonly known as: KLOR-CON  TAKE 1 TABLET BY MOUTH ONCE DAILY.     rosuvastatin 40 MG Tab  Commonly known as: CRESTOR  TAKE ONE TABLET BY MOUTH EVERY DAY FOR CHOLESTEROL                   Follow up in about 3 months (around 8/30/2024). Total clinical care time was 20 min. The following issues were discussed: BP, labs needed, further falls, HLP     Brittney Travis MD  Internal Medicine  [facility]  [phone]

## 2024-05-30 NOTE — ASSESSMENT & PLAN NOTE
Carotid artery disease.  On high dose statin.  Increased LDL last check.  Pt states that she is taking daily  daily crestor, labs today

## 2024-05-31 ENCOUNTER — TELEPHONE (OUTPATIENT)
Dept: PRIMARY CARE CLINIC | Facility: CLINIC | Age: 89
End: 2024-05-31
Payer: MEDICARE

## 2024-05-31 NOTE — TELEPHONE ENCOUNTER
----- Message from Brittney Travis MD sent at 5/31/2024 10:22 AM CDT -----  Please call patient with results.  LDL continues to be very elevated.  Has been controlled in the past.  Please call her pharmacy to verify she is getting refills.  Very slightly decreased sodium noted.  Will follow.  Normal liver and kidney function.  Anemia continues to be present.  Slightly worsened on this repeat.  Colonoscopy due in approximately 8 months per patient.

## 2024-05-31 NOTE — TELEPHONE ENCOUNTER
Call made to [facility] pharmacy, pt last filled the rosuvastatin on 5/16/2024 and has been filling for a year.

## 2024-06-12 ENCOUNTER — OFFICE VISIT (OUTPATIENT)
Dept: OPTOMETRY | Facility: CLINIC | Age: 89
End: 2024-06-12
Payer: MEDICARE

## 2024-06-12 DIAGNOSIS — Z98.41 S/P BILATERAL CATARACT EXTRACTION: ICD-10-CM

## 2024-06-12 DIAGNOSIS — H35.371 EPIRETINAL MEMBRANE (ERM) OF RIGHT EYE: Primary | ICD-10-CM

## 2024-06-12 DIAGNOSIS — Z98.42 S/P BILATERAL CATARACT EXTRACTION: ICD-10-CM

## 2024-06-12 DIAGNOSIS — H52.7 REFRACTIVE ERRORS: ICD-10-CM

## 2024-06-12 DIAGNOSIS — Z96.1 PSEUDOPHAKIA OF BOTH EYES: ICD-10-CM

## 2024-06-12 DIAGNOSIS — H26.493 BILATERAL POSTERIOR CAPSULAR OPACIFICATION: ICD-10-CM

## 2024-06-12 PROCEDURE — 99999 PR PBB SHADOW E&M-EST. PATIENT-LVL III: CPT | Mod: PBBFAC,,, | Performed by: OPTOMETRIST

## 2024-06-12 PROCEDURE — 99213 OFFICE O/P EST LOW 20 MIN: CPT | Mod: PBBFAC | Performed by: OPTOMETRIST

## 2024-06-12 PROCEDURE — 92014 COMPRE OPH EXAM EST PT 1/>: CPT | Mod: S$PBB,,, | Performed by: OPTOMETRIST

## 2024-06-12 NOTE — PATIENT INSTRUCTIONS
Prior finding of epiretinal membrane at posterior pole of the right eye.  Still only minimal impact on correctable VA. No need for treatment.        S/P cataract surgery in both eyes. Bilateral posterior chamber intraocular lens.  Paracentral posterior capsular opacification in each eye.  No need for YAG laser posterior capsulotomy in either eye at this time.     Residual refractive error in each eye,  per refraction done on previous visit.  Stable distance VA and excellent near VA with present glasses.  No need to change spectacle lens Rx.      New (duplicate) spectacle lens Rx issued for use as needed   Recheck in one year.

## 2024-06-12 NOTE — PROGRESS NOTES
"HPI    Patient's age: 89 y.o. female  Occupation:  [employer].  Approximate date of last eye examination:  04/19/2023  Name of last eye doctor seen: Dr. Maxwell   City/State: [facility]  Wears glasses? Yes     If yes, wears  Full-time or part-time?  Part-time   Present glasses are: Bifocal, SV Distance, SV Reading?  Lined Bifocal  Approximate age of present glasses:  Not sure   Got new glasses following last exam, or subsequently?:  No   Any problem with VA with glasses?  No  Wears CLs?:  No  Headaches?  No  Eye pain/discomfort?  No                                                                                     Flashes?  No  Floaters?  No  Diplopia/Double vision?  No  Patient's Ocular History:         Any eye surgeries? [facility] - Dr Llamas         Any eye injury?  No         Any treatment for eye disease?  No  Family history of eye disease?  No  Significant patient medical history:         1. Diabetes?  No   2. HBP?  Yes, controlled by medication and diet                ! OTC eyedrops currently using:  None   ! Prescription eye meds currently using:  None   ! Any history of allergy/adverse reaction to any eye meds used   previously?  No    ! Any history of allergy/adverse reaction to eyedrops used during prior   eye exam(s)? No    ! Any history of allergy/adverse reaction to Novacaine or similar meds?   No   ! Any history of allergy/adverse reaction to Epinephrine or similar meds?   No    ! Patient okay with use of anesthetic eyedrops to check eye pressure?    Yes        ! Patient okay with use of eyedrops to dilate pupils today?  Yes   !  Allergies/Medications/Medical History/Family History reviewed today?    Yes      PD =   69/65  Desired reading distance =  13.5"                                                                 Last edited by Goldie Steven on 6/12/2024 11:23 AM.            Assessment /Plan     For exam results, see Encounter Report.    1. Epiretinal membrane (ERM) of right eye "        2. Bilateral posterior capsular opacification        3. S/P bilateral cataract extraction        4. Pseudophakia of both eyes        5. Refractive errors                         Prior finding of epiretinal membrane at posterior pole of the right eye.  Still only minimal impact on correctable VA. No need for treatment.        S/P cataract surgery in both eyes. Bilateral posterior chamber intraocular lens.  Paracentral posterior capsular opacification in each eye.  No need for YAG laser posterior capsulotomy in either eye at this time.     Residual refractive error in each eye,  per refraction done on previous visit.  Stable distance VA and excellent near VA with present glasses.  No need to change spectacle lens Rx.      New (duplicate) spectacle lens Rx issued for use as needed   Recheck in one year.

## 2024-06-20 NOTE — TELEPHONE ENCOUNTER
----- Message from Doris Negrete sent at 4/30/2020 11:32 AM CDT -----  Contact: Pt-- [phone]  Type:  Needs Medical Advice    Who Called:  Pt    Would the patient rather a call back or a response via [facility]? Call    Best Call Back Number:  [phone]    Additional Information:  Pt wants to know if she is allowed to have boiled crawfish. She is requesting a call back.   
Spoke to Dr Carlin and he said that would be fine just as long as GI did not have any issues with it and it was not listed on your pw    
None

## 2024-07-09 ENCOUNTER — HOSPITAL ENCOUNTER (EMERGENCY)
Facility: HOSPITAL | Age: 89
Discharge: HOME | End: 2024-07-09
Attending: EMERGENCY MEDICINE
Payer: MEDICARE

## 2024-07-09 VITALS
RESPIRATION RATE: 18 BRPM | WEIGHT: 135 LBS | TEMPERATURE: 99 F | BODY MASS INDEX: 21.19 KG/M2 | HEIGHT: 67 IN | HEART RATE: 78 BPM | DIASTOLIC BLOOD PRESSURE: 68 MMHG | SYSTOLIC BLOOD PRESSURE: 153 MMHG | OXYGEN SATURATION: 100 %

## 2024-07-09 DIAGNOSIS — R31.29 MICROSCOPIC HEMATURIA: ICD-10-CM

## 2024-07-09 DIAGNOSIS — R31.9 PAINLESS HEMATURIA: Primary | ICD-10-CM

## 2024-07-09 LAB
ALBUMIN SERPL BCP-MCNC: 4 G/DL (ref 3.5–5.2)
ALP SERPL-CCNC: 66 U/L (ref 55–135)
ALT SERPL W/O P-5'-P-CCNC: 9 U/L (ref 10–44)
ANION GAP SERPL CALC-SCNC: 11 MMOL/L (ref 8–16)
AST SERPL-CCNC: 16 U/L (ref 10–40)
BASOPHILS # BLD AUTO: 0.04 K/UL (ref 0–0.2)
BASOPHILS NFR BLD: 0.4 % (ref 0–1.9)
BILIRUB SERPL-MCNC: 0.3 MG/DL (ref 0.1–1)
BILIRUB UR QL STRIP: NEGATIVE
BUN SERPL-MCNC: 19 MG/DL (ref 8–23)
CALCIUM SERPL-MCNC: 9.3 MG/DL (ref 8.7–10.5)
CHLORIDE SERPL-SCNC: 99 MMOL/L (ref 95–110)
CLARITY UR REFRACT.AUTO: CLEAR
CO2 SERPL-SCNC: 22 MMOL/L (ref 23–29)
COLOR UR AUTO: YELLOW
CREAT SERPL-MCNC: 0.8 MG/DL (ref 0.5–1.4)
DIFFERENTIAL METHOD BLD: ABNORMAL
EOSINOPHIL # BLD AUTO: 0.1 K/UL (ref 0–0.5)
EOSINOPHIL NFR BLD: 0.7 % (ref 0–8)
ERYTHROCYTE [DISTWIDTH] IN BLOOD BY AUTOMATED COUNT: 14.2 % (ref 11.5–14.5)
EST. GFR  (NO RACE VARIABLE): >60 ML/MIN/1.73 M^2
GLUCOSE SERPL-MCNC: 111 MG/DL (ref 70–110)
GLUCOSE UR QL STRIP: NEGATIVE
HCT VFR BLD AUTO: 30.4 % (ref 37–48.5)
HCV AB SERPL QL IA: NORMAL
HGB BLD-MCNC: 9.7 G/DL (ref 12–16)
HGB UR QL STRIP: ABNORMAL
HIV 1+2 AB+HIV1 P24 AG SERPL QL IA: NORMAL
IMM GRANULOCYTES # BLD AUTO: 0.12 K/UL (ref 0–0.04)
IMM GRANULOCYTES NFR BLD AUTO: 1.3 % (ref 0–0.5)
KETONES UR QL STRIP: ABNORMAL
LEUKOCYTE ESTERASE UR QL STRIP: ABNORMAL
LYMPHOCYTES # BLD AUTO: 1.7 K/UL (ref 1–4.8)
LYMPHOCYTES NFR BLD: 18.4 % (ref 18–48)
MCH RBC QN AUTO: 27.9 PG (ref 27–31)
MCHC RBC AUTO-ENTMCNC: 31.9 G/DL (ref 32–36)
MCV RBC AUTO: 87 FL (ref 82–98)
MICROSCOPIC COMMENT: NORMAL
MONOCYTES # BLD AUTO: 1 K/UL (ref 0.3–1)
MONOCYTES NFR BLD: 10.7 % (ref 4–15)
NEUTROPHILS # BLD AUTO: 6.2 K/UL (ref 1.8–7.7)
NEUTROPHILS NFR BLD: 68.5 % (ref 38–73)
NITRITE UR QL STRIP: NEGATIVE
NRBC BLD-RTO: 0 /100 WBC
OHS QRS DURATION: 78 MS
OHS QTC CALCULATION: 430 MS
PH UR STRIP: 7 [PH] (ref 5–8)
PLATELET # BLD AUTO: 382 K/UL (ref 150–450)
PMV BLD AUTO: 9.6 FL (ref 9.2–12.9)
POTASSIUM SERPL-SCNC: 4.3 MMOL/L (ref 3.5–5.1)
PROT SERPL-MCNC: 7.1 G/DL (ref 6–8.4)
PROT UR QL STRIP: NEGATIVE
RBC # BLD AUTO: 3.48 M/UL (ref 4–5.4)
RBC #/AREA URNS AUTO: 1 /HPF (ref 0–4)
SODIUM SERPL-SCNC: 132 MMOL/L (ref 136–145)
SP GR UR STRIP: 1.02 (ref 1–1.03)
SQUAMOUS #/AREA URNS AUTO: 0 /HPF
URN SPEC COLLECT METH UR: ABNORMAL
WBC # BLD AUTO: 9.09 K/UL (ref 3.9–12.7)
WBC #/AREA URNS AUTO: 0 /HPF (ref 0–5)

## 2024-07-09 PROCEDURE — 87389 HIV-1 AG W/HIV-1&-2 AB AG IA: CPT | Performed by: PHYSICIAN ASSISTANT

## 2024-07-09 PROCEDURE — 81001 URINALYSIS AUTO W/SCOPE: CPT | Performed by: EMERGENCY MEDICINE

## 2024-07-09 PROCEDURE — 99284 EMERGENCY DEPT VISIT MOD MDM: CPT | Mod: 25

## 2024-07-09 PROCEDURE — 86803 HEPATITIS C AB TEST: CPT | Performed by: PHYSICIAN ASSISTANT

## 2024-07-09 PROCEDURE — 93005 ELECTROCARDIOGRAM TRACING: CPT

## 2024-07-09 PROCEDURE — 80053 COMPREHEN METABOLIC PANEL: CPT | Performed by: EMERGENCY MEDICINE

## 2024-07-09 PROCEDURE — 85025 COMPLETE CBC W/AUTO DIFF WBC: CPT | Performed by: EMERGENCY MEDICINE

## 2024-07-09 PROCEDURE — 93010 ELECTROCARDIOGRAM REPORT: CPT | Mod: ,,, | Performed by: INTERNAL MEDICINE

## 2024-07-09 NOTE — ED PROVIDER NOTES
Encounter Date: 7/9/2024       History     Chief Complaint   Patient presents with    Hematuria     Blood in urine this morning, also feeling more weak      HPI  89yoF with prior hysterectomy and PMH of hemolytic anemia and colon cancer presents with a singular episode of painless hematuria. She states she noticed dark red in the toilet bowl and dark red when she wiped. She has had episodes of hematuria in the past after her hysterectomy in 1968, but none since. She urinated before she came to the hospital and it was normal with no blood. She denies any pain or changes in frequency/hesitancy with urination. No diet changes, trauma, chest/abdominal pain.           Review of patient's allergies indicates:  No Known Allergies  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    Hyperlipidemia     Hypertension     Osteoporosis      Past Surgical History:   Procedure Laterality Date    CATARACT EXTRACTION W/  INTRAOCULAR LENS IMPLANT Left 8/11/14    [facility]    CATARACT EXTRACTION W/  INTRAOCULAR LENS IMPLANT Right 09/15/14    [facility]    COLONOSCOPY N/A 3/19/2020    Procedure: COLONOSCOPY;  Surgeon: Real Mabry MD;  [facility]  Service: Endoscopy;  Laterality: N/A;    COLONOSCOPY N/A 3/23/2021    Procedure: COLONOSCOPY;  Surgeon: AUTUMN Berg MD;  Location: [facility] (4TH FLR);  Service: Endoscopy;  Laterality: N/A;  covid test 3/20 [facility]    COLONOSCOPY N/A 2/15/2022    Procedure: COLONOSCOPY;  Surgeon: Doris Simpson MD;  Location: [facility] (4TH FLR);  Service: Endoscopy;  Laterality: N/A;  COVID test on 2/12/22 at [facility]  2/14/22 - Pt confirmed 0640 arrival, prep instructions reviewed, Pt verbalized [id number]    ESOPHAGOGASTRODUODENOSCOPY N/A 3/19/2020    Procedure: EGD (ESOPHAGOGASTRODUODENOSCOPY);   Surgeon: Real Mabry MD;  Location: [facility] ENDO (2ND FLR);  Service: Endoscopy;  Laterality: N/A;    FOOT SURGERY      left    HYSTERECTOMY  1962    ILEOCOLECTOMY N/A 4/15/2021    Procedure: ILEOCOLECTOMY;  Surgeon: AUTUMN Berg MD;  Location: [facility] OR 2ND FLR;  Service: Colon and Rectal;  Laterality: N/A;    INSERTION OF TUNNELED CENTRAL VENOUS CATHETER (CVC) WITH SUBCUTANEOUS PORT N/A 7/2/2021    Procedure: [id number]-[id number];  Surgeon: Suresh Agrawal MD;  Location: [facility] OR 2ND FLR;  Service: Vascular;  Laterality: N/A;  Right IJ    LAPAROSCOPIC HEMICOLECTOMY Right 3/20/2020    Procedure: HEMICOLECTOMY, RIGHT;  Surgeon: AUTUMN Berg MD;  Location: [facility] OR 2ND FLR;  Service: Colon and Rectal;  Laterality: Right;    LYSIS OF ADHESIONS N/A 4/15/2021    Procedure: LYSIS, ADHESIONS;  Surgeon: AUTUMN Berg MD;  Location: [facility] OR 2ND FLR;  Service: Colon and Rectal;  Laterality: N/A;  Greater than 2 hours    MOBILIZATION OF SPLENIC FLEXURE N/A 4/15/2021    Procedure: MOBILIZATION, SPLENIC FLEXURE;  Surgeon: AUTUMN Berg MD;  Location: [facility] OR 2ND FLR;  Service: Colon and Rectal;  Laterality: N/A;     Family History   Problem Relation Name Age of Onset    Heart attack Father      Cataracts Brother      Heart attack Brother      Diabetes Brother      No Known Problems Mother      Cataracts Sister      Stroke Sister      Diabetes Sister      Cataracts Sister      Stroke Sister      No Known Problems Maternal Aunt      No Known Problems Maternal Uncle      No Known Problems Paternal Aunt      No Known Problems Paternal Uncle      No Known Problems Maternal Grandmother      No Known Problems Maternal Grandfather      No Known Problems Paternal Grandmother      No Known Problems Paternal Grandfather      Amblyopia Neg Hx      Blindness Neg Hx      Cancer Neg Hx      Glaucoma Neg Hx      Hypertension Neg Hx      Macular degeneration Neg Hx      Retinal detachment Neg Hx      Strabismus Neg Hx      Thyroid  disease Neg Hx      Colon cancer Neg Hx      Esophageal cancer Neg Hx      Irritable bowel syndrome Neg Hx      Rectal cancer Neg Hx      Stomach cancer Neg Hx      Ulcerative colitis Neg Hx      Crohn's disease Neg Hx      Celiac disease Neg Hx       Social History     Tobacco Use    Smoking status: Never    Smokeless tobacco: Never   Substance Use Topics    Alcohol use: No     Alcohol/week: 0.0 standard drinks of alcohol    Drug use: No     Review of Systems   Constitutional:  Negative for appetite change, chills, fever and unexpected weight change.   Respiratory:  Negative for cough, chest tightness and shortness of breath.    Cardiovascular:  Negative for chest pain and leg swelling.   Gastrointestinal:  Negative for abdominal pain, constipation, diarrhea, rectal pain and vomiting.   Genitourinary:  Positive for hematuria. Negative for difficulty urinating, dysuria, pelvic pain and urgency.   Musculoskeletal:  Negative for myalgias.       Physical Exam     Initial Vitals [07/09/24 0806]   BP Pulse Resp Temp SpO2   (!) 150/76 105 16 97.6 °F (36.4 °C) 100 %      MAP       --         Vitals:    07/09/24 0901 07/09/24 1002 07/09/24 1102 07/09/24 1202   BP: (!) 152/66 (!) 147/64 (!) 151/69 (!) 153/68   BP Location: Right arm Right arm Right arm Right arm   Patient Position: Lying Lying Lying Lying   Pulse: 87 76 86 78   Resp: 18 18 16 18   Temp:       TempSrc:       SpO2: 100% 100% 100% 100%   Weight:       Height:           Physical Exam    Constitutional: She appears well-developed and well-nourished.   HENT:   Head: Normocephalic and atraumatic.   Eyes: Pupils are equal, round, and reactive to light.   Cardiovascular:  Normal rate and regular rhythm.     Exam reveals no gallop and no friction rub.       Murmur heard.  Pulmonary/Chest: Breath sounds normal.   Abdominal: Abdomen is soft. Bowel sounds are normal. She exhibits no distension. There is no abdominal tenderness. There is no rebound and no guarding.      Neurological: She is oriented to person, place, and time. A sensory deficit (mild hearing impairment) is present.   Skin: Skin is warm. Capillary refill takes 2 to 3 seconds.   Psychiatric: She has a normal mood and affect.         ED Course   Procedures  Labs Reviewed   COMPREHENSIVE METABOLIC PANEL - Abnormal; Notable for the following components:       Result Value    Sodium 132 (*)     CO2 22 (*)     Glucose 111 (*)     ALT 9 (*)     All other components within normal limits   CBC W/ AUTO DIFFERENTIAL - Abnormal; Notable for the following components:    RBC 3.48 (*)     Hemoglobin 9.7 (*)     Hematocrit 30.4 (*)     MCHC 31.9 (*)     Immature Granulocytes 1.3 (*)     Immature Grans (Abs) 0.12 (*)     All other components within normal limits   URINALYSIS, REFLEX TO URINE CULTURE - Abnormal; Notable for the following components:    Ketones, UA 1+ (*)     Occult Blood UA 3+ (*)     Leukocytes, UA Trace (*)     All other components within normal limits    Narrative:     Specimen Source->Urine   HIV 1 / 2 ANTIBODY    Narrative:     Release to patient->Immediate   HEPATITIS C ANTIBODY    Narrative:     Release to patient->Immediate   URINALYSIS MICROSCOPIC    Narrative:     Specimen Source->Urine        ECG Results              EKG 12-lead (Final result)        Collection Time Result Time QRS Duration OHS QTC Calculation    07/09/24 08:13:32 07/09/24 09:36:58 78 430                     Final result by Interface, Lab In [facility] (07/09/24 09:37:01)                   Narrative:    Test Reason : R53.1,    Vent. Rate : 105 BPM     Atrial Rate : 105 BPM     P-R Int : 160 ms          QRS Dur : 078 ms      QT Int : 326 ms       P-R-T Axes : 072 023 046 degrees     QTc Int : 430 ms    Sinus tachycardia  Low septal forces ; Abnormal R wave progression in the precordial leads  ;Cannot exclude Anterior infarct ,age undetermined  Abnormal ECG  When compared with ECG of 16-APR-2021 10:35,  Anterior infarct is now  possible  Confirmed by Jalen PRUETT MD ([id number]) on 7/9/2024 9:36:54 AM    Referred By: [email or web address]   SELF           Confirmed By:Jalen PRUETT MD                                  Imaging Results    None          Medications - No data to display  Medical Decision Making  Afebrile, well appearing F w/painless hematuria. ABD benign. No UTI warranting ABX. Outpt Uro F/U .  ____________________  Marvin Silva MD, FAAEM  Emergency Medicine Staff      Amount and/or Complexity of Data Reviewed  Labs: ordered.                                      Clinical Impression:  Final diagnoses:  [R31.29] Microscopic hematuria  [R31.9] Painless hematuria (Primary)          ED Disposition Condition    Discharge Stable          ED Prescriptions    None       Follow-up Information       Follow up With Specialties Details Why Contact Info    [facility] - Emergency Dept Emergency Medicine  If symptoms worsen [address]  [facility] [address]  [phone]             Steven Silva MD  07/12/24 6426

## 2024-07-09 NOTE — ED TRIAGE NOTES
Nydia Stevens, a 89 y.o. female presents to the ED w/ complaint of generalized weakness and blood in urine, states that she noticed it this morning x1 episode, reports no other symptoms besides feeling weak, no blood in urine on last urination.     Triage note:  Chief Complaint   Patient presents with    Hematuria     Blood in urine this morning, also feeling more weak      Review of patient's allergies indicates:  No Known Allergies  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    Hyperlipidemia     Hypertension     Osteoporosis

## 2024-07-10 ENCOUNTER — PATIENT OUTREACH (OUTPATIENT)
Dept: EMERGENCY MEDICINE | Facility: HOSPITAL | Age: 89
End: 2024-07-10
Payer: MEDICARE

## 2024-07-18 ENCOUNTER — LAB VISIT (OUTPATIENT)
Dept: LAB | Facility: HOSPITAL | Age: 89
End: 2024-07-18
Attending: INTERNAL MEDICINE
Payer: MEDICARE

## 2024-07-18 ENCOUNTER — OFFICE VISIT (OUTPATIENT)
Dept: PRIMARY CARE CLINIC | Facility: CLINIC | Age: 89
End: 2024-07-18
Attending: INTERNAL MEDICINE
Payer: MEDICARE

## 2024-07-18 VITALS
WEIGHT: 126.56 LBS | DIASTOLIC BLOOD PRESSURE: 72 MMHG | BODY MASS INDEX: 19.87 KG/M2 | OXYGEN SATURATION: 99 % | HEIGHT: 67 IN | TEMPERATURE: 98 F | SYSTOLIC BLOOD PRESSURE: 160 MMHG | HEART RATE: 83 BPM

## 2024-07-18 DIAGNOSIS — R31.9 PAINLESS HEMATURIA: ICD-10-CM

## 2024-07-18 DIAGNOSIS — D64.9 ANEMIA, UNSPECIFIED TYPE: Primary | ICD-10-CM

## 2024-07-18 DIAGNOSIS — E87.1 HYPONATREMIA: ICD-10-CM

## 2024-07-18 DIAGNOSIS — D64.9 ANEMIA, UNSPECIFIED TYPE: ICD-10-CM

## 2024-07-18 LAB
ALBUMIN SERPL BCP-MCNC: 4 G/DL (ref 3.5–5.2)
ALP SERPL-CCNC: 72 U/L (ref 55–135)
ALT SERPL W/O P-5'-P-CCNC: 8 U/L (ref 10–44)
ANION GAP SERPL CALC-SCNC: 10 MMOL/L (ref 8–16)
AST SERPL-CCNC: 17 U/L (ref 10–40)
BASOPHILS # BLD AUTO: 0.04 K/UL (ref 0–0.2)
BASOPHILS NFR BLD: 0.5 % (ref 0–1.9)
BILIRUB SERPL-MCNC: 0.3 MG/DL (ref 0.1–1)
BUN SERPL-MCNC: 11 MG/DL (ref 8–23)
CALCIUM SERPL-MCNC: 9.2 MG/DL (ref 8.7–10.5)
CHLORIDE SERPL-SCNC: 92 MMOL/L (ref 95–110)
CO2 SERPL-SCNC: 25 MMOL/L (ref 23–29)
CREAT SERPL-MCNC: 0.8 MG/DL (ref 0.5–1.4)
DIFFERENTIAL METHOD BLD: ABNORMAL
EOSINOPHIL # BLD AUTO: 0.1 K/UL (ref 0–0.5)
EOSINOPHIL NFR BLD: 1.8 % (ref 0–8)
ERYTHROCYTE [DISTWIDTH] IN BLOOD BY AUTOMATED COUNT: 14.6 % (ref 11.5–14.5)
EST. GFR  (NO RACE VARIABLE): >60 ML/MIN/1.73 M^2
GLUCOSE SERPL-MCNC: 100 MG/DL (ref 70–110)
HCT VFR BLD AUTO: 27.8 % (ref 37–48.5)
HGB BLD-MCNC: 8.9 G/DL (ref 12–16)
IMM GRANULOCYTES # BLD AUTO: 0.1 K/UL (ref 0–0.04)
IMM GRANULOCYTES NFR BLD AUTO: 1.4 % (ref 0–0.5)
LYMPHOCYTES # BLD AUTO: 1.4 K/UL (ref 1–4.8)
LYMPHOCYTES NFR BLD: 19.7 % (ref 18–48)
MCH RBC QN AUTO: 28.3 PG (ref 27–31)
MCHC RBC AUTO-ENTMCNC: 32 G/DL (ref 32–36)
MCV RBC AUTO: 88 FL (ref 82–98)
MONOCYTES # BLD AUTO: 1 K/UL (ref 0.3–1)
MONOCYTES NFR BLD: 13.8 % (ref 4–15)
NEUTROPHILS # BLD AUTO: 4.6 K/UL (ref 1.8–7.7)
NEUTROPHILS NFR BLD: 62.8 % (ref 38–73)
NRBC BLD-RTO: 0 /100 WBC
PLATELET # BLD AUTO: 385 K/UL (ref 150–450)
PMV BLD AUTO: 9.7 FL (ref 9.2–12.9)
POTASSIUM SERPL-SCNC: 4.8 MMOL/L (ref 3.5–5.1)
PROT SERPL-MCNC: 7.3 G/DL (ref 6–8.4)
RBC # BLD AUTO: 3.15 M/UL (ref 4–5.4)
SODIUM SERPL-SCNC: 127 MMOL/L (ref 136–145)
WBC # BLD AUTO: 7.3 K/UL (ref 3.9–12.7)

## 2024-07-18 PROCEDURE — 36415 COLL VENOUS BLD VENIPUNCTURE: CPT | Performed by: INTERNAL MEDICINE

## 2024-07-18 PROCEDURE — 85025 COMPLETE CBC W/AUTO DIFF WBC: CPT | Performed by: INTERNAL MEDICINE

## 2024-07-18 PROCEDURE — 80053 COMPREHEN METABOLIC PANEL: CPT | Performed by: INTERNAL MEDICINE

## 2024-07-18 PROCEDURE — 99214 OFFICE O/P EST MOD 30 MIN: CPT | Mod: S$GLB,,, | Performed by: INTERNAL MEDICINE

## 2024-07-18 PROCEDURE — G2211 COMPLEX E/M VISIT ADD ON: HCPCS | Mod: S$GLB,,, | Performed by: INTERNAL MEDICINE

## 2024-07-18 NOTE — PROGRESS NOTES
.  This note has been generated using voice-recognition software. There may be typographical errors that have been missed during proof-reading.     Primary Care Provider Appointment    Subjective:      Patient ID: Nydia Stevens is a 89 y.o. female here for follow up.     Chief Complaint: Follow-up  HPI:  This is an 89-year-old female with hypertension, hyperlipidemia, congestive heart failure, h/o colon cancer, iron deficiency anemia, carotid artery disease, prediabetes, osteoporosis here for f/u.  patient last seen  05/30/2024 06/12/2024 patient seen by Ophthalmology   07/09/2024 patient to the emergency room for hematuria.    Pt very anxious about the hematuria, naman as we discussed possible cause could be cancer.    Patient Active Problem List   Diagnosis    Essential hypertension     Mixed hyperlipidemia    Palliative care encounter    Closed fracture of left olecranon process    Debility    Aortic stenosis, mild    Osteoporosis    Chronic combined systolic and diastolic heart failure    History of colon cancer    Carotid stenosis, asymptomatic, bilateral    H/O: hysterectomy    Prediabetes    Hyponatremia    B12 deficiency    Healthcare maintenance    Aortic atherosclerosis    Benign mole    Adrenal nodule    Cerebral atrophy, mild    Painless hematuria        Past Surgical History:   Procedure Laterality Date    CATARACT EXTRACTION W/  INTRAOCULAR LENS IMPLANT Left 8/11/14    [facility]    CATARACT EXTRACTION W/  INTRAOCULAR LENS IMPLANT Right 09/15/14    [facility]    COLONOSCOPY N/A 3/19/2020    Procedure: COLONOSCOPY;  Surgeon: Real Mabry MD;  Location: [facility] (2ND FLR);  Service: Endoscopy;  Laterality: N/A;    COLONOSCOPY N/A 3/23/2021    Procedure: COLONOSCOPY;  Surgeon: AUTUMN Berg MD;  Location: [facility] (4TH FLR);  Service: Endoscopy;  Laterality: N/A;  covid test 3/20 [facility]    COLONOSCOPY N/A 2/15/2022    Procedure: COLONOSCOPY;  Surgeon: Doris Simpson MD;  Location: [facility]  (4TH FLR);  Service: Endoscopy;  Laterality: N/A;  COVID test on 2/12/22 at [facility]  2/14/22 - Pt confirmed 0640 arrival, prep instructions reviewed, Pt verbalized understanding-[id number]    ESOPHAGOGASTRODUODENOSCOPY N/A 3/19/2020    Procedure: EGD (ESOPHAGOGASTRODUODENOSCOPY);  Surgeon: Real Mabry MD;  Location: [facility] ENDO (2ND FLR);  Service: Endoscopy;  Laterality: N/A;    FOOT SURGERY      left    HYSTERECTOMY  1962    ILEOCOLECTOMY N/A 4/15/2021    Procedure: ILEOCOLECTOMY;  Surgeon: AUTUMN Berg MD;  Location: [facility] OR 2ND FLR;  Service: Colon and Rectal;  Laterality: N/A;    INSERTION OF TUNNELED CENTRAL VENOUS CATHETER (CVC) WITH SUBCUTANEOUS PORT N/A 7/2/2021    Procedure: [id number]-[id number];  Surgeon: Suresh Agrawal MD;  Location: [facility] OR 2ND FLR;  Service: Vascular;  Laterality: N/A;  Right IJ    LAPAROSCOPIC HEMICOLECTOMY Right 3/20/2020    Procedure: HEMICOLECTOMY, RIGHT;  Surgeon: AUTUMN Berg MD;  Location: [facility] OR 2ND FLR;  Service: Colon and Rectal;  Laterality: Right;    LYSIS OF ADHESIONS N/A 4/15/2021    Procedure: LYSIS, ADHESIONS;  Surgeon: AUTUMN Berg MD;  Location: [facility] OR 2ND FLR;  Service: Colon and Rectal;  Laterality: N/A;  Greater than 2 hours    MOBILIZATION OF SPLENIC FLEXURE N/A 4/15/2021    Procedure: MOBILIZATION, SPLENIC FLEXURE;  Surgeon: AUTUMN Berg MD;  Location: [facility] OR 2ND FLR;  Service: Colon and Rectal;  Laterality: N/A;       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    Hyperlipidemia     Hypertension     Osteoporosis        Social History     Socioeconomic History    Marital status: Single   Occupational History    Occupation: teaching retired    Occupation: Nun.  [employer]   Tobacco Use    Smoking status: Never    Smokeless tobacco: Never   Substance and  "Sexual Activity    Alcohol use: No     Alcohol/week: 0.0 standard drinks of alcohol    Drug use: No    Sexual activity: Never   Social History Narrative    Member of [employer] order here in [place], [place]..  Lives with about 40-50 sisters.  All sisters with her are retired.       Social Determinants of Health     Financial Resource Strain: Low Risk  (5/20/2021)    Overall Financial Resource Strain (CARDIA)     Difficulty of Paying Living Expenses: Not hard at all   Food Insecurity: No Food Insecurity (5/20/2021)    Hunger Vital Sign     Worried About Running Out of Food in the Last Year: Never true     Ran Out of Food in the Last Year: Never true   Physical Activity: Inactive (5/20/2021)    Exercise Vital Sign     Days of Exercise per Week: 0 days     Minutes of Exercise per Session: 0 min   Stress: No Stress Concern Present (5/20/2021)    [employer] [place] [employer] - Occupational Stress Questionnaire     Feeling of Stress : Not at all       Review of Systems         No data to display                 Checklist of Daily Activities:        Objective:   BP (!) 160/72 (BP Location: Left arm, Patient Position: Sitting, BP Method: Medium (Manual))   Pulse 83   Temp 97.8 °F (36.6 °C) (Oral)   Ht 5' 7" (1.702 m)   Wt 57.4 kg (126 lb 8.7 oz)   SpO2 99%   BMI 19.82 kg/m²     Physical Exam  Constitutional:       General: She is not in acute distress.     Appearance: Normal appearance. She is not ill-appearing, toxic-appearing or diaphoretic.   HENT:      Head: Normocephalic and atraumatic.   Cardiovascular:      Rate and Rhythm: Normal rate and regular rhythm.      Heart sounds: Murmur heard.   Pulmonary:      Effort: Pulmonary effort is normal.      Breath sounds: Normal breath sounds.   Musculoskeletal:      Right lower leg: No edema.      Left lower leg: No edema.   Lymphadenopathy:      Cervical: No cervical adenopathy.   Neurological:      Mental Status: She is alert.             Lab " Results   Component Value Date    WBC 9.09 07/09/2024    HGB 9.7 (L) 07/09/2024    HCT 30.4 (L) 07/09/2024     07/09/2024    CHOL 287 (H) 05/30/2024    TRIG 89 05/30/2024    HDL 61 05/30/2024    ALT 9 (L) 07/09/2024    AST 16 07/09/2024     (L) 07/09/2024    K 4.3 07/09/2024    CL 99 07/09/2024    CREATININE 0.8 07/09/2024    BUN 19 07/09/2024    CO2 22 (L) 07/09/2024    TSH 2.358 12/19/2023    INR 0.9 03/17/2020    HGBA1C 5.7 (H) 12/19/2023       Current Outpatient Medications on File Prior to Visit   Medication Sig Dispense Refill    amLODIPine (NORVASC) 10 MG tablet Take 1 tablet (10 mg total) by mouth once daily. 90 tablet 3    cholecalciferol, vitamin D3, (VITAMIN D3) 25 mcg (1,000 unit) capsule Take 1,000 Units by mouth once daily. Hold until after surgery      cyanocobalamin (VITAMIN B-12) 1000 MCG tablet Take 1 tablet (1,000 mcg total) by mouth once daily. Hold until after surgery 30 tablet 11    FEROSUL 325 mg (65 mg iron) Tab tablet TAKE ONE TABLET BY MOUTH ON TUESDAY, THURSDAY, AND SATURDAY 12 tablet 5    furosemide (LASIX) 20 MG tablet TAKE ONE TABLET BY MOUTH EVERY DAY 90 tablet 3    LIDOcaine-prilocaine (EMLA) cream Apply topically as needed (apply over port before chemo). apply over port before chemo 30 g 3    losartan (COZAAR) 100 MG tablet Take 1 tablet (100 mg total) by mouth once daily. 30 tablet 5    lutein-zeaxanthin (OCUVITE LUTEIN 25) 25-5 mg Cap Take 1 tablet by mouth once daily. 30 capsule 0    potassium chloride (KLOR-CON) 10 MEQ TbSR TAKE 1 TABLET BY MOUTH ONCE DAILY. 90 tablet 3    rosuvastatin (CRESTOR) 40 MG Tab TAKE ONE TABLET BY MOUTH EVERY DAY FOR CHOLESTEROL 90 tablet 3    VIT C/VIT E ACETATE/LUTEIN/MIN (OCUVITE LUTEIN ORAL) Take 1 tablet by mouth once daily. Hold until after surgery      calcium carbonate (OS-JAILYN) 500 mg calcium (1,250 mg) tablet Take 1 tablet (500 mg total) by mouth once daily. (Patient taking differently: Take 0.5 tablets by mouth 2 (two) times  daily. Hold until after surgery)  0     Current Facility-Administered Medications on File Prior to Visit   Medication Dose Route Frequency Provider Last Rate Last Admin    gabapentin capsule 300 mg  300 mg Oral TID Amanda Gaspar NP   300 mg at 04/16/21 0814    LIDOcaine (PF) 10 mg/ml (1%) injection 10 mg  1 mL Intradermal On Call Procedure Amanda Gaspar NP             Assessment:   89 y.o. female with multiple co-morbid illnesses here for continued follow up of medical problems.      Plan:     Problem List Items Addressed This Visit          Renal/    Painless hematuria     Single episode.  No pain.  Pt believes was urinary.  Does have a hyst.    To urology.  Possible causes and w/u discussed.    Repeat CBC with noted worsening anemia            Endocrine    Hyponatremia     Decreased Na noted on ED labs.    Labs today for repeat.            Other Visit Diagnoses       Anemia, unspecified type    -  Primary    Relevant Orders    CBC Auto Differential    Comprehensive Metabolic Panel            Health Maintenance         Date Due Completion Date    COVID-19 Vaccine (8 - 2023-24 season) 12/04/2023 10/9/2023    Influenza Vaccine (1) 09/01/2024 9/8/2023    Hemoglobin A1c (Prediabetes) 12/19/2024 12/19/2023    Colonoscopy 02/15/2025 2/15/2022    DEXA Scan 07/26/2025 7/26/2023    Override on 2/15/2016: Declined    TETANUS VACCINE 02/15/2028 2/15/2018          Medications Reconciliation:   I have not reconciled the patient's home medications with the patient/family. I have updated all changes.  Refer to After-Visit Medication List.      Medication List            Accurate as of July 18, 2024  2:50 PM. If you have any questions, ask your nurse or doctor.                CHANGE how you take these medications      calcium carbonate 500 mg calcium (1,250 mg) tablet  Commonly known as: OS-JAILYN  Take 1 tablet (500 mg total) by mouth once daily.  What changed:   how much to take  when to take this  additional  instructions            CONTINUE taking these medications      amLODIPine 10 MG tablet  Commonly known as: NORVASC  Take 1 tablet (10 mg total) by mouth once daily.     cholecalciferol (vitamin D3) 25 mcg (1,000 unit) capsule  Commonly known as: VITAMIN D3     cyanocobalamin 1000 MCG tablet  Commonly known as: VITAMIN B-12  Take 1 tablet (1,000 mcg total) by mouth once daily. Hold until after surgery     FeroSuL 325 mg (65 mg iron) Tab tablet  Generic drug: ferrous sulfate  TAKE ONE TABLET BY MOUTH ON TUESDAY, THURSDAY, AND SATURDAY     furosemide 20 MG tablet  Commonly known as: LASIX  TAKE ONE TABLET BY MOUTH EVERY DAY     LIDOcaine-prilocaine cream  Commonly known as: EMLA  Apply topically as needed (apply over port before chemo). apply over port before chemo     losartan 100 MG tablet  Commonly known as: COZAAR  Take 1 tablet (100 mg total) by mouth once daily.     lutein-zeaxanthin 25-5 mg Cap  Commonly known as: OCUVITE LUTEIN 25  Take 1 tablet by mouth once daily.     OCUVITE LUTEIN ORAL     potassium chloride 10 MEQ Tbsr  Commonly known as: KLOR-CON  TAKE 1 TABLET BY MOUTH ONCE DAILY.     rosuvastatin 40 MG Tab  Commonly known as: CRESTOR  TAKE ONE TABLET BY MOUTH EVERY DAY FOR CHOLESTEROL                   No follow-ups on file. Total clinical care time was 232 min. The following issues were discussed: hematuria, appropriate ED use, anemia and hyponatremia.       Brittney Travis MD  Internal Medicine  [facility]  [phone]

## 2024-07-18 NOTE — PROGRESS NOTES
.  This note has been generated using voice-recognition software. There may be typographical errors that have been missed during proof-reading.     Primary Care Provider Appointment    Subjective:      Patient ID: Nydia Stevens is a 89 y.o. female here for follow up.     Chief Complaint: Follow-up  HPI:  This is an 89-year-old female with hypertension, hyperlipidemia, congestive heart failure, colon cancer, iron deficiency anemia, carotid artery disease, prediabetes, osteoporosis here for f/u.  patient last seen  05/30/2024 06/12/2024 patient seen by Ophthalmology   07/09/2024 patient to the emergency room for hematuria.    No further blood in urine.  Only 1 episode.  No pain.  No h/o nephrolithiasis.  Drinking lots of water.        Patient Active Problem List   Diagnosis    Essential hypertension     Mixed hyperlipidemia    Palliative care encounter    Closed fracture of left olecranon process    Debility    Aortic stenosis, mild    Osteoporosis    Chronic combined systolic and diastolic heart failure    History of colon cancer    Carotid stenosis, asymptomatic, bilateral    H/O: hysterectomy    Prediabetes    Hyponatremia    B12 deficiency    Healthcare maintenance    Aortic atherosclerosis    Benign mole    Adrenal nodule    Cerebral atrophy, mild    Painless hematuria        Past Surgical History:   Procedure Laterality Date    CATARACT EXTRACTION W/  INTRAOCULAR LENS IMPLANT Left 8/11/14    [facility]    CATARACT EXTRACTION W/  INTRAOCULAR LENS IMPLANT Right 09/15/14    [facility]    COLONOSCOPY N/A 3/19/2020    Procedure: COLONOSCOPY;  Surgeon: Real Mabry MD;  Location: [facility] ([address] [place] [address]);  Service: Endoscopy;  Laterality: N/A;    COLONOSCOPY N/A 3/23/2021    Procedure: COLONOSCOPY;  Surgeon: AUTUMN Berg MD;  Location: [facility] (4TH FLR);  Service: Endoscopy;  Laterality: N/A;  covid test 3/20 [facility]    COLONOSCOPY N/A 2/15/2022    Procedure: COLONOSCOPY;  Surgeon: Doris Simpson MD;   Location: [facility] ENDO (4TH FLR);  Service: Endoscopy;  Laterality: N/A;  COVID test on 2/12/22 at [facility]  2/14/22 - Pt confirmed 0640 arrival, prep instructions reviewed, Pt verbalized [id number]    ESOPHAGOGASTRODUODENOSCOPY N/A 3/19/2020    Procedure: EGD (ESOPHAGOGASTRODUODENOSCOPY);  Surgeon: Real Mabry MD;  Location: [facility] ENDO (2ND FLR);  Service: Endoscopy;  Laterality: N/A;    FOOT SURGERY      left    HYSTERECTOMY  1962    ILEOCOLECTOMY N/A 4/15/2021    Procedure: ILEOCOLECTOMY;  Surgeon: AUTUMN Berg MD;  Location: [facility] OR 2ND FLR;  Service: Colon and Rectal;  Laterality: N/A;    INSERTION OF TUNNELED CENTRAL VENOUS CATHETER (CVC) WITH SUBCUTANEOUS PORT N/A 7/2/2021    Procedure: [id number];  Surgeon: Suresh Agrawal MD;  Location: [facility] OR 2ND FLR;  Service: Vascular;  Laterality: N/A;  Right IJ    LAPAROSCOPIC HEMICOLECTOMY Right 3/20/2020    Procedure: HEMICOLECTOMY, RIGHT;  Surgeon: AUTUMN Berg MD;  Location: [facility] OR 2ND FLR;  Service: Colon and Rectal;  Laterality: Right;    LYSIS OF ADHESIONS N/A 4/15/2021    Procedure: LYSIS, ADHESIONS;  Surgeon: AUTUMN Berg MD;  Location: [facility] OR 2ND FLR;  Service: Colon and Rectal;  Laterality: N/A;  Greater than 2 hours    MOBILIZATION OF SPLENIC FLEXURE N/A 4/15/2021    Procedure: MOBILIZATION, SPLENIC FLEXURE;  Surgeon: AUTUMN Berg MD;  Location: [facility] OR 2ND FLR;  Service: Colon and Rectal;  Laterality: N/A;       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    Hyperlipidemia     Hypertension     Osteoporosis        Social History     Socioeconomic History    Marital status: Single   Occupational History    Occupation: teaching retired    Occupation: Nun.  [employer]   Tobacco Use    Smoking status: Never    Smokeless tobacco: Never  "  Substance and Sexual Activity    Alcohol use: No     Alcohol/week: 0.0 standard drinks of alcohol    Drug use: No    Sexual activity: Never   Social History Narrative    Member of [employer] order here in [place], [place]..  Lives with about 40-50 sisters.  All sisters with her are retired.       Social Determinants of Health     Financial Resource Strain: Low Risk  (5/20/2021)    Overall Financial Resource Strain (CARDIA)     Difficulty of Paying Living Expenses: Not hard at all   Food Insecurity: No Food Insecurity (5/20/2021)    Hunger Vital Sign     Worried About Running Out of Food in the Last Year: Never true     Ran Out of Food in the Last Year: Never true   Physical Activity: Inactive (5/20/2021)    Exercise Vital Sign     Days of Exercise per Week: 0 days     Minutes of Exercise per Session: 0 min   Stress: No Stress Concern Present (5/20/2021)    [employer] - Occupational Stress Questionnaire     Feeling of Stress : Not at all       Review of Systems         No data to display                 Checklist of Daily Activities:        Objective:   BP (!) 160/72 (BP Location: Left arm, Patient Position: Sitting, BP Method: Medium (Manual))   Pulse 83   Temp 97.8 °F (36.6 °C) (Oral)   Ht 5' 7" (1.702 m)   Wt 57.4 kg (126 lb 8.7 oz)   SpO2 99%   BMI 19.82 kg/m²     Physical Exam  Constitutional:       General: She is not in acute distress.     Appearance: Normal appearance. She is not ill-appearing, toxic-appearing or diaphoretic.   HENT:      Head: Normocephalic and atraumatic.   Cardiovascular:      Rate and Rhythm: Normal rate and regular rhythm.      Heart sounds: Murmur heard.   Pulmonary:      Effort: Pulmonary effort is normal.      Breath sounds: Normal breath sounds.   Abdominal:      Palpations: Abdomen is soft.      Tenderness: There is no abdominal tenderness. There is no guarding or rebound.   Musculoskeletal:      Right lower leg: No edema.      " Left lower leg: No edema.   Lymphadenopathy:      Cervical: No cervical adenopathy.   Neurological:      Mental Status: She is alert.             Lab Results   Component Value Date    WBC 9.09 07/09/2024    HGB 9.7 (L) 07/09/2024    HCT 30.4 (L) 07/09/2024     07/09/2024    CHOL 287 (H) 05/30/2024    TRIG 89 05/30/2024    HDL 61 05/30/2024    ALT 9 (L) 07/09/2024    AST 16 07/09/2024     (L) 07/09/2024    K 4.3 07/09/2024    CL 99 07/09/2024    CREATININE 0.8 07/09/2024    BUN 19 07/09/2024    CO2 22 (L) 07/09/2024    TSH 2.358 12/19/2023    INR 0.9 03/17/2020    HGBA1C 5.7 (H) 12/19/2023       Current Outpatient Medications on File Prior to Visit   Medication Sig Dispense Refill    amLODIPine (NORVASC) 10 MG tablet Take 1 tablet (10 mg total) by mouth once daily. 90 tablet 3    cholecalciferol, vitamin D3, (VITAMIN D3) 25 mcg (1,000 unit) capsule Take 1,000 Units by mouth once daily. Hold until after surgery      cyanocobalamin (VITAMIN B-12) 1000 MCG tablet Take 1 tablet (1,000 mcg total) by mouth once daily. Hold until after surgery 30 tablet 11    FEROSUL 325 mg (65 mg iron) Tab tablet TAKE ONE TABLET BY MOUTH ON TUESDAY, THURSDAY, AND SATURDAY 12 tablet 5    furosemide (LASIX) 20 MG tablet TAKE ONE TABLET BY MOUTH EVERY DAY 90 tablet 3    LIDOcaine-prilocaine (EMLA) cream Apply topically as needed (apply over port before chemo). apply over port before chemo 30 g 3    losartan (COZAAR) 100 MG tablet Take 1 tablet (100 mg total) by mouth once daily. 30 tablet 5    lutein-zeaxanthin (OCUVITE LUTEIN 25) 25-5 mg Cap Take 1 tablet by mouth once daily. 30 capsule 0    potassium chloride (KLOR-CON) 10 MEQ TbSR TAKE 1 TABLET BY MOUTH ONCE DAILY. 90 tablet 3    rosuvastatin (CRESTOR) 40 MG Tab TAKE ONE TABLET BY MOUTH EVERY DAY FOR CHOLESTEROL 90 tablet 3    VIT C/VIT E ACETATE/LUTEIN/MIN (OCUVITE LUTEIN ORAL) Take 1 tablet by mouth once daily. Hold until after surgery      calcium carbonate (OS-JAILYN) 500 mg  calcium (1,250 mg) tablet Take 1 tablet (500 mg total) by mouth once daily. (Patient taking differently: Take 0.5 tablets by mouth 2 (two) times daily. Hold until after surgery)  0     Current Facility-Administered Medications on File Prior to Visit   Medication Dose Route Frequency Provider Last Rate Last Admin    gabapentin capsule 300 mg  300 mg Oral TID Amanda Gaspar NP   300 mg at 04/16/21 0814    LIDOcaine (PF) 10 mg/ml (1%) injection 10 mg  1 mL Intradermal On Call Procedure Amanda Gaspar NP             Assessment:   89 y.o. female with multiple co-morbid illnesses here for continued follow up of medical problems.      Plan:     Problem List Items Addressed This Visit          Renal/    Painless hematuria     Single episode.  No pain.  Pt believes was urinary.  Does have a hyst.    To urology.  Possible causes and w/u discussed.    Repeat CBC with noted worsening anemia            Endocrine    Hyponatremia     Decreased Na noted on ED labs.    Labs today for repeat.            Other Visit Diagnoses       Anemia, unspecified type    -  Primary    Relevant Orders    CBC Auto Differential    Comprehensive Metabolic Panel            Health Maintenance         Date Due Completion Date    COVID-19 Vaccine (8 - 2023-24 season) 12/04/2023 10/9/2023    Influenza Vaccine (1) 09/01/2024 9/8/2023    Hemoglobin A1c (Prediabetes) 12/19/2024 12/19/2023    Colonoscopy 02/15/2025 2/15/2022    DEXA Scan 07/26/2025 7/26/2023    Override on 2/15/2016: Declined    TETANUS VACCINE 02/15/2028 2/15/2018          Medications Reconciliation:   I have not reconciled the patient's home medications with the patient/family. I have updated all changes.  Refer to After-Visit Medication List.      Medication List            Accurate as of July 18, 2024  2:51 PM. If you have any questions, ask your nurse or doctor.                CHANGE how you take these medications      calcium carbonate 500 mg calcium (1,250 mg)  tablet  Commonly known as: OS-JAILYN  Take 1 tablet (500 mg total) by mouth once daily.  What changed:   how much to take  when to take this  additional instructions            CONTINUE taking these medications      amLODIPine 10 MG tablet  Commonly known as: NORVASC  Take 1 tablet (10 mg total) by mouth once daily.     cholecalciferol (vitamin D3) 25 mcg (1,000 unit) capsule  Commonly known as: VITAMIN D3     cyanocobalamin 1000 MCG tablet  Commonly known as: VITAMIN B-12  Take 1 tablet (1,000 mcg total) by mouth once daily. Hold until after surgery     FeroSuL 325 mg (65 mg iron) Tab tablet  Generic drug: ferrous sulfate  TAKE ONE TABLET BY MOUTH ON TUESDAY, THURSDAY, AND SATURDAY     furosemide 20 MG tablet  Commonly known as: LASIX  TAKE ONE TABLET BY MOUTH EVERY DAY     LIDOcaine-prilocaine cream  Commonly known as: EMLA  Apply topically as needed (apply over port before chemo). apply over port before chemo     losartan 100 MG tablet  Commonly known as: COZAAR  Take 1 tablet (100 mg total) by mouth once daily.     lutein-zeaxanthin 25-5 mg Cap  Commonly known as: OCUVITE LUTEIN 25  Take 1 tablet by mouth once daily.     OCUVITE LUTEIN ORAL     potassium chloride 10 MEQ Tbsr  Commonly known as: KLOR-CON  TAKE 1 TABLET BY MOUTH ONCE DAILY.     rosuvastatin 40 MG Tab  Commonly known as: CRESTOR  TAKE ONE TABLET BY MOUTH EVERY DAY FOR CHOLESTEROL                   No follow-ups on file. Total clinical care time was 32 min. The following issues were discussed: hematuria and appropriate ED use, anemia and hyponatremia     Brittney Travis MD  Internal Medicine  [facility]  [phone]

## 2024-07-18 NOTE — ASSESSMENT & PLAN NOTE
Single episode.  No pain.  Pt believes was urinary.  Does have a hyst.    To urology.  Possible causes and w/u discussed.    Repeat CBC with noted worsening anemia

## 2024-07-20 DIAGNOSIS — E87.1 HYPONATREMIA: ICD-10-CM

## 2024-07-20 DIAGNOSIS — D64.9 ANEMIA, UNSPECIFIED TYPE: Primary | ICD-10-CM

## 2024-07-22 ENCOUNTER — TELEPHONE (OUTPATIENT)
Dept: PRIMARY CARE CLINIC | Facility: CLINIC | Age: 89
End: 2024-07-22
Payer: MEDICARE

## 2024-07-22 ENCOUNTER — LAB VISIT (OUTPATIENT)
Dept: LAB | Facility: HOSPITAL | Age: 89
End: 2024-07-22
Attending: INTERNAL MEDICINE
Payer: MEDICARE

## 2024-07-22 DIAGNOSIS — D64.9 ANEMIA, UNSPECIFIED TYPE: ICD-10-CM

## 2024-07-22 DIAGNOSIS — E87.1 HYPONATREMIA: ICD-10-CM

## 2024-07-22 LAB
ANION GAP SERPL CALC-SCNC: 10 MMOL/L (ref 8–16)
BUN SERPL-MCNC: 10 MG/DL (ref 8–23)
CALCIUM SERPL-MCNC: 9.9 MG/DL (ref 8.7–10.5)
CHLORIDE SERPL-SCNC: 96 MMOL/L (ref 95–110)
CO2 SERPL-SCNC: 26 MMOL/L (ref 23–29)
CREAT SERPL-MCNC: 0.9 MG/DL (ref 0.5–1.4)
EST. GFR  (NO RACE VARIABLE): >60 ML/MIN/1.73 M^2
FERRITIN SERPL-MCNC: 47 NG/ML (ref 20–300)
GLUCOSE SERPL-MCNC: 95 MG/DL (ref 70–110)
IRON SERPL-MCNC: 31 UG/DL (ref 30–160)
POTASSIUM SERPL-SCNC: 4.9 MMOL/L (ref 3.5–5.1)
SATURATED IRON: 7 % (ref 20–50)
SODIUM SERPL-SCNC: 132 MMOL/L (ref 136–145)
TOTAL IRON BINDING CAPACITY: 447 UG/DL (ref 250–450)
TRANSFERRIN SERPL-MCNC: 302 MG/DL (ref 200–375)

## 2024-07-22 PROCEDURE — 36415 COLL VENOUS BLD VENIPUNCTURE: CPT | Performed by: INTERNAL MEDICINE

## 2024-07-22 PROCEDURE — 80048 BASIC METABOLIC PNL TOTAL CA: CPT | Performed by: INTERNAL MEDICINE

## 2024-07-22 PROCEDURE — 83540 ASSAY OF IRON: CPT | Performed by: INTERNAL MEDICINE

## 2024-07-22 PROCEDURE — 82728 ASSAY OF FERRITIN: CPT | Performed by: INTERNAL MEDICINE

## 2024-07-22 NOTE — TELEPHONE ENCOUNTER
----- Message from Brittney Travis MD sent at 7/20/2024  1:46 PM CDT -----   Nurse Titi called with lab results.  Instructed to discontinue furosemide.  Decrease free water.  Patient noted to drink entire bottle of water in clinic Thursday.  Limit water to 64 oz in a day.  Water preferred would be electrolyte water or Gatorade 50%.  Repeat labs on Monday.  Slight worsening of anemia noted.  No further hematuria.   No concerning findings or complaints at patient visit. Normocytic.  Labs for evaluation on Monday.

## 2024-07-23 DIAGNOSIS — E87.1 HYPONATREMIA: Primary | ICD-10-CM

## 2024-07-24 ENCOUNTER — TELEPHONE (OUTPATIENT)
Dept: PRIMARY CARE CLINIC | Facility: CLINIC | Age: 89
End: 2024-07-24
Payer: MEDICARE

## 2024-07-24 NOTE — TELEPHONE ENCOUNTER
Left voicemail for call back at [phone] to schedule labs and also called Ms Friedman [phone]

## 2024-07-24 NOTE — TELEPHONE ENCOUNTER
----- Message from Brittney Travis MD sent at 7/23/2024  6:44 PM CDT -----  Please call patient with results.    Decreased iron saturation.  Likely low iron.    Will follow.Decreased sodium improved  With Lasix held.  Would like to repeat in 1 week.  Normal renal function.

## 2024-07-25 ENCOUNTER — TELEPHONE (OUTPATIENT)
Dept: PRIMARY CARE CLINIC | Facility: CLINIC | Age: 89
End: 2024-07-25
Payer: MEDICARE

## 2024-08-01 ENCOUNTER — INFUSION (OUTPATIENT)
Dept: INFECTIOUS DISEASES | Facility: HOSPITAL | Age: 89
End: 2024-08-01
Payer: MEDICARE

## 2024-08-01 VITALS
BODY MASS INDEX: 19.46 KG/M2 | RESPIRATION RATE: 20 BRPM | HEIGHT: 67 IN | HEART RATE: 90 BPM | WEIGHT: 124 LBS | DIASTOLIC BLOOD PRESSURE: 61 MMHG | SYSTOLIC BLOOD PRESSURE: 132 MMHG | TEMPERATURE: 98 F | OXYGEN SATURATION: 99 %

## 2024-08-01 DIAGNOSIS — M81.0 AGE-RELATED OSTEOPOROSIS WITHOUT CURRENT PATHOLOGICAL FRACTURE: Primary | ICD-10-CM

## 2024-08-01 PROCEDURE — 63600175 PHARM REV CODE 636 W HCPCS: Mod: JZ,JG | Performed by: PHYSICIAN ASSISTANT

## 2024-08-01 PROCEDURE — 96372 THER/PROPH/DIAG INJ SC/IM: CPT

## 2024-08-01 RX ADMIN — DENOSUMAB 60 MG: 60 INJECTION SUBCUTANEOUS at 10:08

## 2024-08-01 NOTE — PROGRESS NOTES
Pt here for Prolia. Confirms use of Vitamin D/Calcium supplement. Denies dental procedures < 3 months. Administered per guidelines.     Next appointment scheduled.     Limited head-to-toe assessment due to privacy issues and visit reason though the opportunity was given for patient to express any concerns

## 2024-08-02 ENCOUNTER — TELEPHONE (OUTPATIENT)
Dept: PRIMARY CARE CLINIC | Facility: CLINIC | Age: 89
End: 2024-08-02
Payer: MEDICARE

## 2024-08-02 NOTE — TELEPHONE ENCOUNTER
Call made to pt, per Dr Travis: yes and she can drink up to 40 ounces of water a day and she can go back to regular water and we will check her labs at her visit.

## 2024-08-02 NOTE — TELEPHONE ENCOUNTER
Spoke with pt, discussed your message. Instructed about 8/9/2024 @ 1100 appt, pt verbalized understanding.     Pt asked about drinking hydrate electrolyte water. Pt asked if she needs to continue drinking this water?    Please advise.

## 2024-08-02 NOTE — TELEPHONE ENCOUNTER
----- Message from Brittney Travis MD sent at 8/1/2024  4:58 PM CDT -----  Please call patient with results.  Sodium is now normal.  She needs to call with any swelling in the leg or SOB as we might need to restart the lasix.  Please schedule her for eval on Friday the 9th at 11.

## 2024-08-05 ENCOUNTER — TELEPHONE (OUTPATIENT)
Dept: UROLOGY | Facility: CLINIC | Age: 89
End: 2024-08-05
Payer: MEDICARE

## 2024-08-06 ENCOUNTER — OFFICE VISIT (OUTPATIENT)
Dept: UROLOGY | Facility: CLINIC | Age: 89
End: 2024-08-06
Payer: MEDICARE

## 2024-08-06 VITALS
WEIGHT: 123.44 LBS | SYSTOLIC BLOOD PRESSURE: 147 MMHG | HEIGHT: 67 IN | BODY MASS INDEX: 19.37 KG/M2 | DIASTOLIC BLOOD PRESSURE: 76 MMHG | HEART RATE: 101 BPM

## 2024-08-06 DIAGNOSIS — Z85.038 HISTORY OF COLON CANCER: Primary | ICD-10-CM

## 2024-08-06 DIAGNOSIS — R31.9 PAINLESS HEMATURIA: ICD-10-CM

## 2024-08-06 DIAGNOSIS — R31.29 MICROSCOPIC HEMATURIA: ICD-10-CM

## 2024-08-06 PROCEDURE — 99213 OFFICE O/P EST LOW 20 MIN: CPT | Mod: PBBFAC | Performed by: UROLOGY

## 2024-08-06 PROCEDURE — 99999 PR PBB SHADOW E&M-EST. PATIENT-LVL III: CPT | Mod: PBBFAC,,, | Performed by: UROLOGY

## 2024-08-09 ENCOUNTER — OFFICE VISIT (OUTPATIENT)
Dept: PRIMARY CARE CLINIC | Facility: CLINIC | Age: 89
End: 2024-08-09
Payer: MEDICARE

## 2024-08-09 ENCOUNTER — LAB VISIT (OUTPATIENT)
Dept: LAB | Facility: HOSPITAL | Age: 89
End: 2024-08-09
Attending: INTERNAL MEDICINE
Payer: MEDICARE

## 2024-08-09 VITALS
DIASTOLIC BLOOD PRESSURE: 72 MMHG | HEIGHT: 67 IN | OXYGEN SATURATION: 100 % | BODY MASS INDEX: 19.48 KG/M2 | SYSTOLIC BLOOD PRESSURE: 138 MMHG | WEIGHT: 124.13 LBS | TEMPERATURE: 97 F | HEART RATE: 85 BPM

## 2024-08-09 DIAGNOSIS — E87.1 HYPONATREMIA: Primary | ICD-10-CM

## 2024-08-09 DIAGNOSIS — D64.9 ANEMIA, UNSPECIFIED TYPE: ICD-10-CM

## 2024-08-09 DIAGNOSIS — E87.1 HYPONATREMIA: ICD-10-CM

## 2024-08-09 LAB
ANION GAP SERPL CALC-SCNC: 10 MMOL/L (ref 8–16)
BUN SERPL-MCNC: 11 MG/DL (ref 8–23)
CALCIUM SERPL-MCNC: 10.1 MG/DL (ref 8.7–10.5)
CHLORIDE SERPL-SCNC: 101 MMOL/L (ref 95–110)
CO2 SERPL-SCNC: 23 MMOL/L (ref 23–29)
CREAT SERPL-MCNC: 0.8 MG/DL (ref 0.5–1.4)
EST. GFR  (NO RACE VARIABLE): >60 ML/MIN/1.73 M^2
GLUCOSE SERPL-MCNC: 94 MG/DL (ref 70–110)
POTASSIUM SERPL-SCNC: 4.4 MMOL/L (ref 3.5–5.1)
SODIUM SERPL-SCNC: 134 MMOL/L (ref 136–145)

## 2024-08-09 PROCEDURE — 80048 BASIC METABOLIC PNL TOTAL CA: CPT | Performed by: INTERNAL MEDICINE

## 2024-08-09 PROCEDURE — 36415 COLL VENOUS BLD VENIPUNCTURE: CPT | Performed by: INTERNAL MEDICINE

## 2024-08-13 ENCOUNTER — TELEPHONE (OUTPATIENT)
Dept: PRIMARY CARE CLINIC | Facility: CLINIC | Age: 89
End: 2024-08-13
Payer: MEDICARE

## 2024-08-13 NOTE — TELEPHONE ENCOUNTER
LM for pt re: lab results.      ----- Message from Brittney Travis MD sent at 8/13/2024 12:09 PM CDT -----  Please call patient with results.  Very slightly decreased sodium.  Repeat labs as scheduled at visit in 3 weeks

## 2024-08-15 DIAGNOSIS — I10 ESSENTIAL HYPERTENSION: ICD-10-CM

## 2024-08-15 RX ORDER — LOSARTAN POTASSIUM 100 MG/1
100 TABLET ORAL DAILY
Qty: 30 TABLET | Refills: 5 | Status: SHIPPED | OUTPATIENT
Start: 2024-08-15

## 2024-08-16 ENCOUNTER — LAB VISIT (OUTPATIENT)
Dept: LAB | Facility: HOSPITAL | Age: 89
End: 2024-08-16
Attending: INTERNAL MEDICINE
Payer: MEDICARE

## 2024-08-16 DIAGNOSIS — E87.1 HYPONATREMIA: ICD-10-CM

## 2024-08-16 DIAGNOSIS — D64.9 ANEMIA, UNSPECIFIED TYPE: ICD-10-CM

## 2024-08-16 LAB
ANION GAP SERPL CALC-SCNC: 9 MMOL/L (ref 8–16)
BASOPHILS # BLD AUTO: 0.06 K/UL (ref 0–0.2)
BASOPHILS NFR BLD: 0.9 % (ref 0–1.9)
BUN SERPL-MCNC: 10 MG/DL (ref 8–23)
CALCIUM SERPL-MCNC: 9.6 MG/DL (ref 8.7–10.5)
CHLORIDE SERPL-SCNC: 97 MMOL/L (ref 95–110)
CO2 SERPL-SCNC: 25 MMOL/L (ref 23–29)
CREAT SERPL-MCNC: 0.8 MG/DL (ref 0.5–1.4)
DIFFERENTIAL METHOD BLD: ABNORMAL
EOSINOPHIL # BLD AUTO: 0.1 K/UL (ref 0–0.5)
EOSINOPHIL NFR BLD: 2.2 % (ref 0–8)
ERYTHROCYTE [DISTWIDTH] IN BLOOD BY AUTOMATED COUNT: 14.8 % (ref 11.5–14.5)
EST. GFR  (NO RACE VARIABLE): >60 ML/MIN/1.73 M^2
GLUCOSE SERPL-MCNC: 98 MG/DL (ref 70–110)
HCT VFR BLD AUTO: 33.6 % (ref 37–48.5)
HGB BLD-MCNC: 10.1 G/DL (ref 12–16)
IMM GRANULOCYTES # BLD AUTO: 0.04 K/UL (ref 0–0.04)
IMM GRANULOCYTES NFR BLD AUTO: 0.6 % (ref 0–0.5)
LYMPHOCYTES # BLD AUTO: 1.7 K/UL (ref 1–4.8)
LYMPHOCYTES NFR BLD: 26.3 % (ref 18–48)
MCH RBC QN AUTO: 26.4 PG (ref 27–31)
MCHC RBC AUTO-ENTMCNC: 30.1 G/DL (ref 32–36)
MCV RBC AUTO: 88 FL (ref 82–98)
MONOCYTES # BLD AUTO: 0.8 K/UL (ref 0.3–1)
MONOCYTES NFR BLD: 12.8 % (ref 4–15)
NEUTROPHILS # BLD AUTO: 3.7 K/UL (ref 1.8–7.7)
NEUTROPHILS NFR BLD: 57.2 % (ref 38–73)
NRBC BLD-RTO: 0 /100 WBC
PLATELET # BLD AUTO: 412 K/UL (ref 150–450)
PMV BLD AUTO: 9.9 FL (ref 9.2–12.9)
POTASSIUM SERPL-SCNC: 4.4 MMOL/L (ref 3.5–5.1)
RBC # BLD AUTO: 3.82 M/UL (ref 4–5.4)
SODIUM SERPL-SCNC: 131 MMOL/L (ref 136–145)
WBC # BLD AUTO: 6.42 K/UL (ref 3.9–12.7)

## 2024-08-16 PROCEDURE — 85025 COMPLETE CBC W/AUTO DIFF WBC: CPT | Performed by: INTERNAL MEDICINE

## 2024-08-16 PROCEDURE — 36415 COLL VENOUS BLD VENIPUNCTURE: CPT | Performed by: INTERNAL MEDICINE

## 2024-08-16 PROCEDURE — 80048 BASIC METABOLIC PNL TOTAL CA: CPT | Performed by: INTERNAL MEDICINE

## 2024-08-22 ENCOUNTER — TELEPHONE (OUTPATIENT)
Dept: PRIMARY CARE CLINIC | Facility: CLINIC | Age: 89
End: 2024-08-22
Payer: MEDICARE

## 2024-08-22 ENCOUNTER — LAB VISIT (OUTPATIENT)
Dept: LAB | Facility: HOSPITAL | Age: 89
End: 2024-08-22
Attending: INTERNAL MEDICINE
Payer: MEDICARE

## 2024-08-22 DIAGNOSIS — R31.9 PAINLESS HEMATURIA: Primary | ICD-10-CM

## 2024-08-22 DIAGNOSIS — R31.9 PAINLESS HEMATURIA: ICD-10-CM

## 2024-08-22 LAB
BILIRUB UR QL STRIP: NEGATIVE
CLARITY UR REFRACT.AUTO: CLEAR
COLOR UR AUTO: YELLOW
GLUCOSE UR QL STRIP: NEGATIVE
HGB UR QL STRIP: NEGATIVE
KETONES UR QL STRIP: NEGATIVE
LEUKOCYTE ESTERASE UR QL STRIP: ABNORMAL
MICROSCOPIC COMMENT: NORMAL
NITRITE UR QL STRIP: NEGATIVE
PH UR STRIP: 7 [PH] (ref 5–8)
PROT UR QL STRIP: NEGATIVE
RBC #/AREA URNS AUTO: 1 /HPF (ref 0–4)
SP GR UR STRIP: 1.01 (ref 1–1.03)
SQUAMOUS #/AREA URNS AUTO: 0 /HPF
URN SPEC COLLECT METH UR: ABNORMAL
WBC #/AREA URNS AUTO: 4 /HPF (ref 0–5)

## 2024-08-22 PROCEDURE — 81001 URINALYSIS AUTO W/SCOPE: CPT | Performed by: INTERNAL MEDICINE

## 2024-08-22 NOTE — TELEPHONE ENCOUNTER
----- Message from Brittney Travis MD sent at 8/22/2024  9:43 AM CDT -----  Can we ask her to come in for a urinalysis (not dipstick) per urology.  TY  ----- Message -----  From: Makenzie Grigsby MD  Sent: 8/21/2024  10:14 AM CDT  To: MD Brittney Jaeger Hi. I realize I never responded. When she came to see me, she said it was just a urine test and denied gross hematuria. When I go back now - I realize she went to ER with one episode - but wasn't sure if it was vaginal or urinary. Her UA showed only 1 red cell which is normal. I would suspect if she had gross hematuria at that time, we would of seen it on that urine - however - anything is possible.   She had a CT CAP last year and has another one scheduled in September of this year - it's not a ct urogram - but my suspicion is low on her.     Could you send just a urinalysis micro - (not a urinalysis dip) - that way we can double check it.   I could always do a cysto and would have - if I thought she had true micro or gross hematuria.   Let me know what you think.     Makenzie (Also you can save my cell - [phone])  ----- Message -----  From: Brittney Travis MD  Sent: 7/18/2024   1:37 PM CDT  To: MD Luis Fischer.    My patient is coming to see you in a few weeks for painless hematuria.  Only 1 episode.  She has a history of colon cancer and is very anxious.  I reviewed the likely workup that you would suggest.  I also explained that she could refuse if she chose and to discuss this with you.  She is worried about having another cancer and this is causing increased anxiety.  I explained that she has not reason to be concerned at this time and that everything it likely fine.  Please do not hesitate to contact me with any questions or concerns, Brittney

## 2024-08-22 NOTE — TELEPHONE ENCOUNTER
Nurse Titi is out on leave, Ms Friedman is taking her place for now. I contacted Ms Friedman at [phone]. [facility] cups [facility]. She will drop off a urine sample to the lab at [facility]. Can you please place ua order as home collect.

## 2024-08-23 ENCOUNTER — TELEPHONE (OUTPATIENT)
Dept: PRIMARY CARE CLINIC | Facility: CLINIC | Age: 89
End: 2024-08-23
Payer: MEDICARE

## 2024-08-23 NOTE — TELEPHONE ENCOUNTER
----- Message from Brittney Travis MD sent at 8/23/2024  2:06 PM CDT -----  Please call patient with results.   Sodium is slightly decreased compared to last.  We will need to follow and repeat at her appointment in another week.  Urinalysis was forwarded to urology.  No blood noted in urine.  Anemia is improving.

## 2024-09-02 PROBLEM — Z00.00 HEALTHCARE MAINTENANCE: Status: RESOLVED | Noted: 2021-05-24 | Resolved: 2024-09-02

## 2024-09-05 ENCOUNTER — OFFICE VISIT (OUTPATIENT)
Dept: PRIMARY CARE CLINIC | Facility: CLINIC | Age: 89
End: 2024-09-05
Attending: INTERNAL MEDICINE
Payer: MEDICARE

## 2024-09-05 ENCOUNTER — LAB VISIT (OUTPATIENT)
Dept: LAB | Facility: HOSPITAL | Age: 89
End: 2024-09-05
Attending: INTERNAL MEDICINE
Payer: MEDICARE

## 2024-09-05 VITALS
OXYGEN SATURATION: 98 % | HEIGHT: 67 IN | HEART RATE: 91 BPM | DIASTOLIC BLOOD PRESSURE: 60 MMHG | BODY MASS INDEX: 19.37 KG/M2 | TEMPERATURE: 97 F | WEIGHT: 123.44 LBS | SYSTOLIC BLOOD PRESSURE: 138 MMHG

## 2024-09-05 DIAGNOSIS — E87.1 HYPONATREMIA: ICD-10-CM

## 2024-09-05 DIAGNOSIS — R31.9 PAINLESS HEMATURIA: ICD-10-CM

## 2024-09-05 DIAGNOSIS — R31.9 PAINLESS HEMATURIA: Primary | ICD-10-CM

## 2024-09-05 DIAGNOSIS — Z00.00 HEALTHCARE MAINTENANCE: ICD-10-CM

## 2024-09-05 DIAGNOSIS — D50.0 IRON DEFICIENCY ANEMIA DUE TO CHRONIC BLOOD LOSS: ICD-10-CM

## 2024-09-05 LAB
BASOPHILS # BLD AUTO: 0.06 K/UL (ref 0–0.2)
BASOPHILS NFR BLD: 1 % (ref 0–1.9)
DIFFERENTIAL METHOD BLD: ABNORMAL
EOSINOPHIL # BLD AUTO: 0.2 K/UL (ref 0–0.5)
EOSINOPHIL NFR BLD: 2.4 % (ref 0–8)
ERYTHROCYTE [DISTWIDTH] IN BLOOD BY AUTOMATED COUNT: 15.1 % (ref 11.5–14.5)
HCT VFR BLD AUTO: 36.2 % (ref 37–48.5)
HGB BLD-MCNC: 11.1 G/DL (ref 12–16)
IMM GRANULOCYTES # BLD AUTO: 0.06 K/UL (ref 0–0.04)
IMM GRANULOCYTES NFR BLD AUTO: 1 % (ref 0–0.5)
LYMPHOCYTES # BLD AUTO: 1.5 K/UL (ref 1–4.8)
LYMPHOCYTES NFR BLD: 24.9 % (ref 18–48)
MCH RBC QN AUTO: 26.2 PG (ref 27–31)
MCHC RBC AUTO-ENTMCNC: 30.7 G/DL (ref 32–36)
MCV RBC AUTO: 85 FL (ref 82–98)
MONOCYTES # BLD AUTO: 1 K/UL (ref 0.3–1)
MONOCYTES NFR BLD: 16.3 % (ref 4–15)
NEUTROPHILS # BLD AUTO: 3.4 K/UL (ref 1.8–7.7)
NEUTROPHILS NFR BLD: 54.4 % (ref 38–73)
NRBC BLD-RTO: 0 /100 WBC
PLATELET # BLD AUTO: 371 K/UL (ref 150–450)
PMV BLD AUTO: 10.3 FL (ref 9.2–12.9)
RBC # BLD AUTO: 4.24 M/UL (ref 4–5.4)
WBC # BLD AUTO: 6.15 K/UL (ref 3.9–12.7)

## 2024-09-05 PROCEDURE — 99214 OFFICE O/P EST MOD 30 MIN: CPT | Mod: S$GLB,,, | Performed by: INTERNAL MEDICINE

## 2024-09-05 PROCEDURE — 85025 COMPLETE CBC W/AUTO DIFF WBC: CPT | Performed by: INTERNAL MEDICINE

## 2024-09-05 PROCEDURE — 36415 COLL VENOUS BLD VENIPUNCTURE: CPT | Performed by: INTERNAL MEDICINE

## 2024-09-05 PROCEDURE — G2211 COMPLEX E/M VISIT ADD ON: HCPCS | Mod: S$GLB,,, | Performed by: INTERNAL MEDICINE

## 2024-09-05 NOTE — ASSESSMENT & PLAN NOTE
Power-of- completed 5/2022 with patient's assistance [facility].  Reviewed lapnga and poa 5/2024  yearly labs done 12/2023  Flu vaccine today

## 2024-09-05 NOTE — PROGRESS NOTES
.  This note has been generated using voice-recognition software. There may be typographical errors that have been missed during proof-reading.     Primary Care Provider Appointment    Subjective:      Patient ID: Nydia Stevens is a 89 y.o. female here for follow up.     Chief Complaint: Follow-up  HPI:  This is an 89-year-old female with hypertension, hyperlipidemia, congestive heart failure, h/o colon cancer, iron deficiency anemia, carotid artery disease, prediabetes, osteoporosis here for f/u.  patient last seen  08/09/2024    Not taking lasix.  Still only drinking 3 bottles of water a day.    Discussed hematuria w/u.          Patient Active Problem List   Diagnosis    Essential hypertension     Mixed hyperlipidemia    Palliative care encounter    Closed fracture of left olecranon process    Debility    Aortic stenosis, mild    Osteoporosis    Anemia    Chronic combined systolic and diastolic heart failure    History of colon cancer    Carotid stenosis, asymptomatic, bilateral    H/O: hysterectomy    Prediabetes    Hyponatremia    B12 deficiency    Healthcare maintenance    Aortic atherosclerosis    Benign mole    Adrenal nodule    Cerebral atrophy, mild    Painless hematuria        Past Surgical History:   Procedure Laterality Date    CATARACT EXTRACTION W/  INTRAOCULAR LENS IMPLANT Left 8/11/14    [facility]    CATARACT EXTRACTION W/  INTRAOCULAR LENS IMPLANT Right 09/15/14    [facility]    COLONOSCOPY N/A 3/19/2020    Procedure: COLONOSCOPY;  Surgeon: Real Mabry MD;  Location: [facility] (2ND FLR);  Service: Endoscopy;  Laterality: N/A;    COLONOSCOPY N/A 3/23/2021    Procedure: COLONOSCOPY;  Surgeon: AUTUMN Berg MD;  Location: [facility] (4TH FLR);  Service: Endoscopy;  Laterality: N/A;  covid test 3/20 [facility]    COLONOSCOPY N/A 2/15/2022    Procedure: COLONOSCOPY;  Surgeon: Doris Simpson MD;  Location: [facility] (4TH FLR);  Service: Endoscopy;  Laterality: N/A;  COVID test on 2/12/22 at [facility]  [facility]  2/14/22 - Pt confirmed 0640 arrival, prep instructions reviewed, Pt verbalized [id number]    ESOPHAGOGASTRODUODENOSCOPY N/A 3/19/2020    Procedure: EGD (ESOPHAGOGASTRODUODENOSCOPY);  Surgeon: Real Mabry MD;  Location: [facility] ENDO (2ND FLR);  Service: Endoscopy;  Laterality: N/A;    FOOT SURGERY      left    HYSTERECTOMY  1962    ILEOCOLECTOMY N/A 4/15/2021    Procedure: ILEOCOLECTOMY;  Surgeon: AUTUMN Berg MD;  Location: [facility] OR 2ND FLR;  Service: Colon and Rectal;  Laterality: N/A;    INSERTION OF TUNNELED CENTRAL VENOUS CATHETER (CVC) WITH SUBCUTANEOUS PORT N/A 7/2/2021    Procedure: [id number]-X-[id number];  Surgeon: Suresh Agrawal MD;  Location: [facility] OR 2ND FLR;  Service: Vascular;  Laterality: N/A;  Right IJ    LAPAROSCOPIC HEMICOLECTOMY Right 3/20/2020    Procedure: HEMICOLECTOMY, RIGHT;  Surgeon: AUTUMN Berg MD;  Location: [facility] OR 2ND FLR;  Service: Colon and Rectal;  Laterality: Right;    LYSIS OF ADHESIONS N/A 4/15/2021    Procedure: LYSIS, ADHESIONS;  Surgeon: AUTUMN Berg MD;  Location: [facility] OR 2ND FLR;  Service: Colon and Rectal;  Laterality: N/A;  Greater than 2 hours    MOBILIZATION OF SPLENIC FLEXURE N/A 4/15/2021    Procedure: MOBILIZATION, SPLENIC FLEXURE;  Surgeon: AUTUMN Berg MD;  Location: [facility] OR 2ND FLR;  Service: Colon and Rectal;  Laterality: N/A;       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    Hyperlipidemia     Hypertension     Osteoporosis        Social History     Socioeconomic History    Marital status: Single   Occupational History    Occupation: teaching retired    Occupation: Nun.  [employer]   Tobacco Use    Smoking status: Never    Smokeless tobacco: Never   Substance and Sexual Activity    Alcohol use: No     Alcohol/week: 0.0 standard drinks of alcohol  "   Drug use: No    Sexual activity: Never   Social History Narrative    Member of [employer] order here in [place], [place]..  Lives with about 40-50 sisters.  All sisters with her are retired.       Social Determinants of Health     Financial Resource Strain: Low Risk  (5/20/2021)    Overall Financial Resource Strain (CARDIA)     Difficulty of Paying Living Expenses: Not hard at all   Food Insecurity: No Food Insecurity (5/20/2021)    Hunger Vital Sign     Worried About Running Out of Food in the Last Year: Never true     Ran Out of Food in the Last Year: Never true   Physical Activity: Inactive (5/20/2021)    Exercise Vital Sign     Days of Exercise per Week: 0 days     Minutes of Exercise per Session: 0 min   Stress: No Stress Concern Present (5/20/2021)    [place] [employer] - Occupational Stress Questionnaire     Feeling of Stress : Not at all       Review of Systems         No data to display                 Checklist of Daily Activities:        Objective:   /60 (BP Location: Left arm, Patient Position: Sitting, BP Method: Medium (Manual))   Pulse 91   Temp 97.4 °F (36.3 °C) (Oral)   Ht 5' 7" (1.702 m)   Wt 56 kg (123 lb 7.3 oz)   SpO2 98%   BMI 19.34 kg/m²     Physical Exam  Constitutional:       General: She is not in acute distress.     Appearance: Normal appearance. She is not ill-appearing, toxic-appearing or diaphoretic.   HENT:      Head: Normocephalic and atraumatic.   Cardiovascular:      Rate and Rhythm: Normal rate and regular rhythm.      Heart sounds: Murmur heard.   Pulmonary:      Effort: Pulmonary effort is normal.      Breath sounds: Normal breath sounds.   Abdominal:      Palpations: Abdomen is soft.      Tenderness: There is no abdominal tenderness. There is no guarding or rebound.   Musculoskeletal:      Right lower leg: No edema.      Left lower leg: No edema.   Lymphadenopathy:      Cervical: No cervical adenopathy.   Neurological:      " Mental Status: She is alert.             Lab Results   Component Value Date    WBC 6.42 08/16/2024    HGB 10.1 (L) 08/16/2024    HCT 33.6 (L) 08/16/2024     08/16/2024    CHOL 287 (H) 05/30/2024    TRIG 89 05/30/2024    HDL 61 05/30/2024    ALT 8 (L) 07/18/2024    AST 17 07/18/2024     (L) 08/16/2024    K 4.4 08/16/2024    CL 97 08/16/2024    CREATININE 0.8 08/16/2024    BUN 10 08/16/2024    CO2 25 08/16/2024    TSH 2.358 12/19/2023    INR 0.9 03/17/2020    HGBA1C 5.7 (H) 12/19/2023       Current Outpatient Medications on File Prior to Visit   Medication Sig Dispense Refill    amLODIPine (NORVASC) 10 MG tablet Take 1 tablet (10 mg total) by mouth once daily. 90 tablet 3    cholecalciferol, vitamin D3, (VITAMIN D3) 25 mcg (1,000 unit) capsule Take 1,000 Units by mouth once daily. Hold until after surgery      cyanocobalamin (VITAMIN B-12) 1000 MCG tablet Take 1 tablet (1,000 mcg total) by mouth once daily. Hold until after surgery 30 tablet 11    FEROSUL 325 mg (65 mg iron) Tab tablet TAKE ONE TABLET BY MOUTH ON TUESDAY, THURSDAY, AND SATURDAY 12 tablet 5    LIDOcaine-prilocaine (EMLA) cream Apply topically as needed (apply over port before chemo). apply over port before chemo 30 g 3    losartan (COZAAR) 100 MG tablet Take 1 tablet (100 mg total) by mouth once daily. 30 tablet 5    lutein-zeaxanthin (OCUVITE LUTEIN 25) 25-5 mg Cap Take 1 tablet by mouth once daily. 30 capsule 0    potassium chloride (KLOR-CON) 10 MEQ TbSR TAKE 1 TABLET BY MOUTH ONCE DAILY. 90 tablet 3    rosuvastatin (CRESTOR) 40 MG Tab TAKE ONE TABLET BY MOUTH EVERY DAY FOR CHOLESTEROL 90 tablet 3    VIT C/VIT E ACETATE/LUTEIN/MIN (OCUVITE LUTEIN ORAL) Take 1 tablet by mouth once daily. Hold until after surgery      [DISCONTINUED] furosemide (LASIX) 20 MG tablet TAKE ONE TABLET BY MOUTH EVERY DAY 90 tablet 3    calcium carbonate (OS-JAILYN) 500 mg calcium (1,250 mg) tablet Take 1 tablet (500 mg total) by mouth once daily. (Patient taking  differently: Take 0.5 tablets by mouth 2 (two) times daily. Hold until after surgery)  0     Current Facility-Administered Medications on File Prior to Visit   Medication Dose Route Frequency Provider Last Rate Last Admin    gabapentin capsule 300 mg  300 mg Oral TID Amanda Gaspar NP   300 mg at 04/16/21 0814    LIDOcaine (PF) 10 mg/ml (1%) injection 10 mg  1 mL Intradermal On Call Procedure Amanda Gaspar NP             Assessment:   89 y.o. female with multiple co-morbid illnesses here for continued follow up of medical problems.      Plan:     Problem List Items Addressed This Visit          Renal/    Painless hematuria - Primary      Patient with episode of hematuria.  Urinalysis   With some microscopic blood noted..  Patient seen by Urology and no concerning findings.  Per  urology request, urinalysis repeated.  No hematuria noted, report sent to urology.    Per Urology, no further workup needed.         Relevant Orders    BASIC METABOLIC PANEL    CBC Auto Differential       Oncology    Anemia     Drop in H/H with hematuria.    Reeapt labs today to see if back at baseline.              Endocrine    Hyponatremia     Still restricting fluid to 3 bottles of water a day.  No longer on lasix.  Sodium decreased last check  Repeat today.  To nephrology with onging issues.              Other    Healthcare maintenance     Power-of- completed 5/2022 with patient's assistance superior general.  Reviewed lapnga and poa 5/2024  yearly labs done 12/2023  Flu vaccine today            Health Maintenance         Date Due Completion Date    Influenza Vaccine (1) 09/01/2024 9/8/2023    COVID-19 Vaccine (8 - 2023-24 season) 09/01/2024 10/9/2023    Hemoglobin A1c (Prediabetes) 12/19/2024 12/19/2023    Colonoscopy 02/15/2025 2/15/2022    DEXA Scan 07/26/2025 7/26/2023    Override on 2/15/2016: Declined    TETANUS VACCINE 02/15/2028 2/15/2018          Medications Reconciliation:   I have not reconciled the  patient's home medications with the patient/family. I have updated all changes.  Refer to After-Visit Medication List.      Medication List            Accurate as of September 5, 2024  2:56 PM. If you have any questions, ask your nurse or doctor.                START taking these medications      FLUAD TRIV 2024-25(65Y UP)(PF) 45 mcg (15 mcg x 3)/0.5 mL vaccine  Generic drug: influenza  Inject 0.5 mLs into the muscle once. for 1 dose            CHANGE how you take these medications      calcium carbonate 500 mg calcium (1,250 mg) tablet  Commonly known as: OS-JAILYN  Take 1 tablet (500 mg total) by mouth once daily.  What changed:   how much to take  when to take this  additional instructions            CONTINUE taking these medications      amLODIPine 10 MG tablet  Commonly known as: NORVASC  Take 1 tablet (10 mg total) by mouth once daily.     cholecalciferol (vitamin D3) 25 mcg (1,000 unit) capsule  Commonly known as: VITAMIN D3     cyanocobalamin 1000 MCG tablet  Commonly known as: VITAMIN B-12  Take 1 tablet (1,000 mcg total) by mouth once daily. Hold until after surgery     FeroSuL 325 mg (65 mg iron) Tab tablet  Generic drug: ferrous sulfate  TAKE ONE TABLET BY MOUTH ON TUESDAY, THURSDAY, AND SATURDAY     LIDOcaine-prilocaine cream  Commonly known as: EMLA  Apply topically as needed (apply over port before chemo). apply over port before chemo     losartan 100 MG tablet  Commonly known as: COZAAR  Take 1 tablet (100 mg total) by mouth once daily.     lutein-zeaxanthin 25-5 mg Cap  Commonly known as: OCUVITE LUTEIN 25  Take 1 tablet by mouth once daily.     OCUVITE LUTEIN ORAL     potassium chloride 10 MEQ Tbsr  Commonly known as: KLOR-CON  TAKE 1 TABLET BY MOUTH ONCE DAILY.     rosuvastatin 40 MG Tab  Commonly known as: CRESTOR  TAKE ONE TABLET BY MOUTH EVERY DAY FOR CHOLESTEROL            STOP taking these medications      furosemide 20 MG tablet  Commonly known as: LASIX  Stopped by: Brittney Travis MD                Where to Get Your Medications        These medications were sent to [facility]  [address] [facility] [address]      Hours: Mon-Fri, 8a-5:30p Phone: [phone]   FLUAD TRIV 2024-25(65Y UP)(PF) 45 mcg (15 mcg x 3)/0.5 mL vaccine              No follow-ups on file. Total clinical care time was 33 min. The following issues were discussed: flu vaccine, hypotatremia and hematuria     Brittney Travis MD  Internal Medicine  [facility]  [phone]

## 2024-09-05 NOTE — ASSESSMENT & PLAN NOTE
Patient with episode of hematuria.  Urinalysis   With some microscopic blood noted..  Patient seen by Urology and no concerning findings.  Per  urology request, urinalysis repeated.  No hematuria noted, report sent to urology.    Per Urology, no further workup needed.

## 2024-09-05 NOTE — ASSESSMENT & PLAN NOTE
Still restricting fluid to 3 bottles of water a day.  No longer on lasix.  Sodium decreased last check  Repeat today.  To nephrology with onging issues.

## 2024-09-05 NOTE — PATIENT INSTRUCTIONS
Thank you so much for coming in to see me today.  Please do not hesitate to call with any questions or concerns or if you feel that you need to be seen.      Surgeon: Dr. Aviles

## 2024-09-06 ENCOUNTER — TELEPHONE (OUTPATIENT)
Dept: PRIMARY CARE CLINIC | Facility: CLINIC | Age: 89
End: 2024-09-06
Payer: MEDICAID

## 2024-09-06 ENCOUNTER — LAB VISIT (OUTPATIENT)
Dept: LAB | Facility: HOSPITAL | Age: 89
End: 2024-09-06
Payer: MEDICARE

## 2024-09-06 DIAGNOSIS — E87.1 HYPONATREMIA: Primary | ICD-10-CM

## 2024-09-06 DIAGNOSIS — R31.9 PAINLESS HEMATURIA: ICD-10-CM

## 2024-09-06 DIAGNOSIS — E87.1 HYPONATREMIA: ICD-10-CM

## 2024-09-06 LAB
ANION GAP SERPL CALC-SCNC: 10 MMOL/L (ref 8–16)
BUN SERPL-MCNC: 12 MG/DL (ref 8–23)
CALCIUM SERPL-MCNC: 9.6 MG/DL (ref 8.7–10.5)
CHLORIDE SERPL-SCNC: 98 MMOL/L (ref 95–110)
CO2 SERPL-SCNC: 25 MMOL/L (ref 23–29)
CREAT SERPL-MCNC: 0.8 MG/DL (ref 0.5–1.4)
EST. GFR  (NO RACE VARIABLE): >60 ML/MIN/1.73 M^2
GLUCOSE SERPL-MCNC: 94 MG/DL (ref 70–110)
POTASSIUM SERPL-SCNC: 4.2 MMOL/L (ref 3.5–5.1)
SODIUM SERPL-SCNC: 133 MMOL/L (ref 136–145)

## 2024-09-06 PROCEDURE — 36415 COLL VENOUS BLD VENIPUNCTURE: CPT | Performed by: INTERNAL MEDICINE

## 2024-09-06 PROCEDURE — 80048 BASIC METABOLIC PNL TOTAL CA: CPT | Performed by: INTERNAL MEDICINE

## 2024-09-06 NOTE — TELEPHONE ENCOUNTER
RN spoke to pt and explained that there was an issue with her lab test and it needs to be redrawn.  Pt will call back once she knows if she can get .

## 2024-09-06 NOTE — TELEPHONE ENCOUNTER
----- Message from Brittney Travis MD sent at 9/6/2024 10:46 AM CDT -----  Please call patient with results.  Anemia is back to baseline

## 2024-09-06 NOTE — TELEPHONE ENCOUNTER
----- Message from Brittney Travis MD sent at 9/6/2024 10:36 AM CDT -----  Regarding: RE: Client Services  Contact: [phone]  Unfortunately, we need to have the BMP and cannot wait until the end of the month.  Please call and let her know, thank you!  ----- Message -----  From: Alicia Frank RN  Sent: 9/6/2024   7:07 AM CDT  To: Brittney Travis MD  Subject: FW: Client Services                              So, the issue is they left the BMP unspun for 4 hours..it needs to be redrawn.  Do you want us to call her back in to have it redrawn?  She also has lab orders in [facility] on 9/26.  (Only a CBC).    M  ----- Message -----  From: Calvin Yun  Sent: 9/6/2024   2:18 AM CDT  To: Brittney Travis MD; [facility] Staff  Subject: Client Services                                  Good Morning,     My name is Calvin Jama, I work in the Lab Client Services. We had a problem with some lab work on this patient. If someone from your office could call us at [phone] or [phone] that would be great. Anyone in my department can help.      Thank you,

## 2024-09-06 NOTE — TELEPHONE ENCOUNTER
RN spoke to lab and per lab the BMP needs to be redrawn, RN forwarded message to Dr. Travis with this information.    M    ----- Message from Calvin Yun sent at 9/6/2024  2:17 AM CDT -----  Regarding: Client Services  Contact: [phone]  Good Morning,     My name is Calvin Yun, I work in the Lab Client Services. We had a problem with some lab work on this patient. If someone from your office could call us at [phone] or [phone] that would be great. Anyone in my department can help.      Thank you,

## 2024-09-26 ENCOUNTER — HOSPITAL ENCOUNTER (OUTPATIENT)
Dept: RADIOLOGY | Facility: HOSPITAL | Age: 89
Discharge: HOME | End: 2024-09-26
Attending: REGISTERED NURSE
Payer: MEDICARE

## 2024-09-26 DIAGNOSIS — C18.9 COLON ADENOCARCINOMA: ICD-10-CM

## 2024-09-26 PROCEDURE — A9698 NON-RAD CONTRAST MATERIALNOC: HCPCS | Performed by: REGISTERED NURSE

## 2024-09-26 PROCEDURE — 74177 CT ABD & PELVIS W/CONTRAST: CPT | Mod: TC

## 2024-09-26 PROCEDURE — 74177 CT ABD & PELVIS W/CONTRAST: CPT | Mod: 26,,, | Performed by: STUDENT IN AN ORGANIZED HEALTH CARE EDUCATION/TRAINING PROGRAM

## 2024-09-26 PROCEDURE — 25500020 PHARM REV CODE 255: Performed by: REGISTERED NURSE

## 2024-09-26 PROCEDURE — 71260 CT THORAX DX C+: CPT | Mod: 26,,, | Performed by: STUDENT IN AN ORGANIZED HEALTH CARE EDUCATION/TRAINING PROGRAM

## 2024-09-26 RX ADMIN — IOHEXOL 100 ML: 350 INJECTION, SOLUTION INTRAVENOUS at 09:09

## 2024-09-26 RX ADMIN — BARIUM SULFATE 450 ML: 20 SUSPENSION ORAL at 08:09

## 2024-09-30 ENCOUNTER — OFFICE VISIT (OUTPATIENT)
Dept: HEMATOLOGY/ONCOLOGY | Facility: CLINIC | Age: 89
End: 2024-09-30
Payer: MEDICARE

## 2024-09-30 VITALS
TEMPERATURE: 99 F | OXYGEN SATURATION: 98 % | DIASTOLIC BLOOD PRESSURE: 76 MMHG | WEIGHT: 123.81 LBS | HEIGHT: 67 IN | BODY MASS INDEX: 19.43 KG/M2 | HEART RATE: 98 BPM | SYSTOLIC BLOOD PRESSURE: 138 MMHG

## 2024-09-30 DIAGNOSIS — C18.9 COLON ADENOCARCINOMA: Primary | ICD-10-CM

## 2024-09-30 DIAGNOSIS — I10 ESSENTIAL HYPERTENSION: ICD-10-CM

## 2024-09-30 DIAGNOSIS — D50.9 IRON DEFICIENCY ANEMIA, UNSPECIFIED IRON DEFICIENCY ANEMIA TYPE: ICD-10-CM

## 2024-09-30 PROCEDURE — 99999 PR PBB SHADOW E&M-EST. PATIENT-LVL III: CPT | Mod: PBBFAC,,, | Performed by: INTERNAL MEDICINE

## 2024-09-30 PROCEDURE — 99213 OFFICE O/P EST LOW 20 MIN: CPT | Mod: PBBFAC | Performed by: INTERNAL MEDICINE

## 2024-09-30 PROCEDURE — 99214 OFFICE O/P EST MOD 30 MIN: CPT | Mod: S$PBB,,, | Performed by: INTERNAL MEDICINE

## 2024-09-30 NOTE — PROGRESS NOTES
PATIENT: Nydia Stevens  MRN: [id number]  DATE: 9/30/2024    Diagnosis:   1. Colon adenocarcinoma    2. Iron deficiency anemia, unspecified iron deficiency anemia type    3. Essential hypertension         Chief Complaint: Colon adenocarcinoma    Oncologic History:     Nydia Stevens is a 89 year old woman who presents to clinic for follow-up of her colon adenocarcinoma. She is feeling very well.  She dresses and bathes independently.  She ambulates with a walker.    She has a PMH of iron deficiency anemia.    A. 3/2/20 : Evaluated by her PCP, routine labs found a Hgb of 4.4. Transferred to ED for further evaluation. Underwent EGD+Colonoscopy which found a near obstructing colon mass + for adenocarcinoma.   B. 3/19/20 : Right hemicolectomy. Confirmed [id number], ANDREW adenocarcinoma. Refused adjuvant chemotherapy.  C. 3/23/21 : Colonoscopy found recurrence at site of anastomosis.   D. 4/15/21 : Ileocolic resection, 4.2cm tumor at anastomosis found. Moderately to poorly differentiated. Through muscularis to serosa. 0/14 LNs positive, negative margins. PET negative for metastatic disease.  E. 5/31/21 : Presents for consideration of systemic therapy but missed appt for IR port insertion. Subsequently placed on 6/23.  F. 7/19 - 1/10/22 : C1 - C12 adjuvant 5-FU/Leucovorin (C4 delayed due to Hurricane Zuleyma, C11 bolus dropped due to neutropenia).  H. 2/15/22 : Colonoscopy, biopsy shows no evidence of carcinoma.     Imaging :  CT CAP 9/26/24: No evidence of recurrent or residual disease.   CT CAP 9/15/23: No evidence of recurrent or residual disease.   CT CAP 3/15/23 : No evidence of recurrent or residual disease.   CT CAP 3/14/22 : No  evidence of recurrent or residual disease.   CT CAP 9/24/21 : No evidence of recurrent or residual disease.     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    Hyperlipidemia     Hypertension     Osteoporosis        Past Surgical HIstory:   Past Surgical History:   Procedure Laterality Date    CATARACT EXTRACTION W/  INTRAOCULAR LENS IMPLANT Left 8/11/14    [facility]    CATARACT EXTRACTION W/  INTRAOCULAR LENS IMPLANT Right 09/15/14    [facility]    COLONOSCOPY N/A 3/19/2020    Procedure: COLONOSCOPY;  Surgeon: Real Mabry MD;  Location: [facility] ENDO (2ND FLR);  Service: Endoscopy;  Laterality: N/A;    COLONOSCOPY N/A 3/23/2021    Procedure: COLONOSCOPY;  Surgeon: AUTUMN Berg MD;  Location: [facility] ENDO (4TH FLR);  Service: Endoscopy;  Laterality: N/A;  covid test 3/20 [facility]    COLONOSCOPY N/A 2/15/2022    Procedure: COLONOSCOPY;  Surgeon: Doris Simpson MD;  Location: [facility] ENDO (4TH FLR);  Service: Endoscopy;  Laterality: N/A;  COVID test on 2/12/22 at [facility]  2/14/22 - Pt confirmed 0640 arrival, prep instructions reviewed, Pt verbalized [id number]    ESOPHAGOGASTRODUODENOSCOPY N/A 3/19/2020    Procedure: EGD (ESOPHAGOGASTRODUODENOSCOPY);  Surgeon: Real Mabry MD;  Location: [facility] (2ND FLR);  Service: Endoscopy;  Laterality: N/A;    FOOT SURGERY      left    HYSTERECTOMY  1962    ILEOCOLECTOMY N/A 4/15/2021    Procedure: ILEOCOLECTOMY;  Surgeon: AUTUMN Berg MD;  [facility]  Service: Colon and Rectal;  Laterality: N/A;    INSERTION OF TUNNELED CENTRAL VENOUS CATHETER (CVC) WITH SUBCUTANEOUS PORT N/A 7/2/2021    Procedure: [id number]-[id number];  Surgeon: Suresh Agrawal MD;  Location: [facility] OR 2ND FLR;  Service: Vascular;  Laterality: N/A;  Right IJ    LAPAROSCOPIC HEMICOLECTOMY Right 3/20/2020    Procedure: HEMICOLECTOMY, RIGHT;  Surgeon: AUTUMN Berg MD;  [facility];  Service: Colon and Rectal;  Laterality: Right;    LYSIS OF ADHESIONS N/A 4/15/2021    Procedure: LYSIS, ADHESIONS;  Surgeon: AUTUMN Berg MD;  Location: [facility] OR [facility]  FLR;  Service: Colon and Rectal;  Laterality: N/A;  Greater than 2 hours    MOBILIZATION OF SPLENIC FLEXURE N/A 4/15/2021    Procedure: MOBILIZATION, SPLENIC FLEXURE;  Surgeon: AUTUMN Berg MD;  Location: [facility] OR 2ND FLR;  Service: Colon and Rectal;  Laterality: N/A;       Family History:   Family History   Problem Relation Name Age of Onset    Heart attack Father      Cataracts Brother      Heart attack Brother      Diabetes Brother      No Known Problems Mother      Cataracts Sister      Stroke Sister      Diabetes Sister      Cataracts Sister      Stroke Sister      No Known Problems Maternal Aunt      No Known Problems Maternal Uncle      No Known Problems Paternal Aunt      No Known Problems Paternal Uncle      No Known Problems Maternal Grandmother      No Known Problems Maternal Grandfather      No Known Problems Paternal Grandmother      No Known Problems Paternal Grandfather      Amblyopia Neg Hx      Blindness Neg Hx      Cancer Neg Hx      Glaucoma Neg Hx      Hypertension Neg Hx      Macular degeneration Neg Hx      Retinal detachment Neg Hx      Strabismus Neg Hx      Thyroid disease Neg Hx      Colon cancer Neg Hx      Esophageal cancer Neg Hx      Irritable bowel syndrome Neg Hx      Rectal cancer Neg Hx      Stomach cancer Neg Hx      Ulcerative colitis Neg Hx      Crohn's disease Neg Hx      Celiac disease Neg Hx         Social History:  reports that she has never smoked. She has never used smokeless tobacco. She reports that she does not drink alcohol and does not use drugs.    Allergies:  Review of patient's allergies indicates:  No Known Allergies    Medications:  Current Outpatient Medications   Medication Sig Dispense Refill    amLODIPine (NORVASC) 10 MG tablet Take 1 tablet (10 mg total) by mouth once daily. 90 tablet 3    cholecalciferol, vitamin D3, (VITAMIN D3) 25 mcg (1,000 unit) capsule Take 1,000 Units by mouth once daily. Hold until after surgery      cyanocobalamin (VITAMIN B-12)  1000 MCG tablet Take 1 tablet (1,000 mcg total) by mouth once daily. Hold until after surgery 30 tablet 11    FEROSUL 325 mg (65 mg iron) Tab tablet TAKE ONE TABLET BY MOUTH ON TUESDAY, THURSDAY, AND SATURDAY 12 tablet 5    LIDOcaine-prilocaine (EMLA) cream Apply topically as needed (apply over port before chemo). apply over port before chemo 30 g 3    losartan (COZAAR) 100 MG tablet Take 1 tablet (100 mg total) by mouth once daily. 30 tablet 5    lutein-zeaxanthin (OCUVITE LUTEIN 25) 25-5 mg Cap Take 1 tablet by mouth once daily. 30 capsule 0    potassium chloride (KLOR-CON) 10 MEQ TbSR TAKE 1 TABLET BY MOUTH ONCE DAILY. 90 tablet 3    rosuvastatin (CRESTOR) 40 MG Tab TAKE ONE TABLET BY MOUTH EVERY DAY FOR CHOLESTEROL 90 tablet 3    VIT C/VIT E ACETATE/LUTEIN/MIN (OCUVITE LUTEIN ORAL) Take 1 tablet by mouth once daily. Hold until after surgery      calcium carbonate (OS-JAILYN) 500 mg calcium (1,250 mg) tablet Take 1 tablet (500 mg total) by mouth once daily. (Patient taking differently: Take 0.5 tablets by mouth 2 (two) times daily. Hold until after surgery)  0     No current facility-administered medications for this visit.     Facility-Administered Medications Ordered in Other Visits   Medication Dose Route Frequency Provider Last Rate Last Admin    gabapentin capsule 300 mg  300 mg Oral TID Amanda Gaspar NP   300 mg at 04/16/21 0814    LIDOcaine (PF) 10 mg/ml (1%) injection 10 mg  1 mL Intradermal On Call Procedure Amanda Gaspar NP         Review of Systems   Constitutional:  Negative for activity change, appetite change, chills, diaphoresis, fever and unexpected weight change.   HENT:  Negative for congestion, mouth sores, nosebleeds, trouble swallowing and voice change.    Eyes:  Negative for visual disturbance.   Respiratory:  Negative for chest tightness and shortness of breath.    Cardiovascular:  Negative for chest pain and palpitations.   Gastrointestinal:  Negative for abdominal distention,  "abdominal pain, blood in stool, constipation, diarrhea, nausea, rectal pain and vomiting.   Endocrine: Negative for cold intolerance.   Genitourinary:  Negative for difficulty urinating, hematuria and vaginal bleeding.   Musculoskeletal:  Negative for arthralgias, back pain and myalgias.   Skin:  Negative for rash and wound.   Neurological:  Negative for dizziness, facial asymmetry, speech difficulty, weakness, light-headedness and headaches.   Hematological:  Negative for adenopathy. Does not bruise/bleed easily.   Psychiatric/Behavioral:  Negative for agitation, behavioral problems, confusion, hallucinations and sleep disturbance.      ECOG Performance Status: 2   Objective:      Vitals:   Vitals:    09/30/24 1319   BP: 138/76   BP Location: Left arm   Patient Position: Sitting   Pulse: 98   Temp: 98.8 °F (37.1 °C)   TempSrc: Temporal   SpO2: 98%   Weight: 56.1 kg (123 lb 12.6 oz)   Height: 5' 7" (1.702 m)     BMI: Body mass index is 19.39 kg/m².    Physical Exam  Vitals reviewed.   Constitutional:       General: She is not in acute distress.     Appearance: Normal appearance. She is not ill-appearing, toxic-appearing or diaphoretic.   HENT:      Head: Normocephalic and atraumatic.      Right Ear: External ear normal.      Left Ear: External ear normal.      Mouth/Throat:      Mouth: Mucous membranes are moist.   Eyes:      General: No scleral icterus.     Conjunctiva/sclera: Conjunctivae normal.   Cardiovascular:      Rate and Rhythm: Normal rate and regular rhythm.   Pulmonary:      Effort: Pulmonary effort is normal. No respiratory distress.      Breath sounds: No wheezing.   Abdominal:      General: Abdomen is flat. There is no distension.      Tenderness: There is no abdominal tenderness.   Musculoskeletal:         General: Normal range of motion.      Cervical back: Normal range of motion.   Lymphadenopathy:      Cervical: No cervical adenopathy.   Skin:     General: Skin is warm and dry.      Coloration: " Skin is not jaundiced.      Findings: No bruising, erythema or rash.   Neurological:      General: No focal deficit present.      Mental Status: She is alert and oriented to person, place, and time. Mental status is at baseline.      Cranial Nerves: No cranial nerve deficit.      Sensory: No sensory deficit.      Motor: No weakness.      Gait: Abnormal gait: uses rollator.   Psychiatric:         Mood and Affect: Mood normal.         Behavior: Behavior normal.         Thought Content: Thought content normal.         Judgment: Judgment normal.        Laboratory Data:  Lab Visit on 09/26/2024   Component Date Value Ref Range Status    CEA 09/26/2024 10.9 (H)  0.0 - 5.0 ng/mL Final    Comment: CEA Normal Range:  Non-Smokers: 0-3.0 ng/mL  Smokers:     0-5.0 ng/mL    The testing method is a chemiluminescent microparticle immunoassay   manufactured by [employer] and performed on the [employer]   or   [employer] system. Values obtained with different assay manufacturers   for   methods may be different and cannot be used interchangeably.      Sodium 09/26/2024 135 (L)  136 - 145 mmol/L Final    Potassium 09/26/2024 3.9  3.5 - 5.1 mmol/L Final    Chloride 09/26/2024 104  95 - 110 mmol/L Final    CO2 09/26/2024 24  23 - 29 mmol/L Final    Glucose 09/26/2024 94  70 - 110 mg/dL Final    BUN 09/26/2024 14  8 - 23 mg/dL Final    Creatinine 09/26/2024 0.8  0.5 - 1.4 mg/dL Final    Calcium 09/26/2024 9.2  8.7 - 10.5 mg/dL Final    Total Protein 09/26/2024 7.3  6.0 - 8.4 g/dL Final    Albumin 09/26/2024 4.0  3.5 - 5.2 g/dL Final    Total Bilirubin 09/26/2024 0.3  0.1 - 1.0 mg/dL Final    Comment: For infants and newborns, interpretation of results should be based  on gestational age, weight and in agreement with clinical  observations.    Premature Infant recommended reference ranges:  Up to 24 hours.............<8.0 mg/dL  Up to 48 hours............<12.0 mg/dL  3-5 days..................<15.0 mg/dL  6-29  days.................<15.0 mg/dL      Alkaline Phosphatase 09/26/2024 64  55 - 135 U/L Final    AST 09/26/2024 15  10 - 40 U/L Final    ALT 09/26/2024 8 (L)  10 - 44 U/L Final    eGFR 09/26/2024 >60.0  >60 mL/min/1.73 m^2 Final    Anion Gap 09/26/2024 7 (L)  8 - 16 mmol/L Final    WBC 09/26/2024 4.71  3.90 - 12.70 K/uL Final    RBC 09/26/2024 4.09  4.00 - 5.40 M/uL Final    Hemoglobin 09/26/2024 10.4 (L)  12.0 - 16.0 g/dL Final    Hematocrit 09/26/2024 34.5 (L)  37.0 - 48.5 % Final    MCV 09/26/2024 84  82 - 98 fL Final    MCH 09/26/2024 25.4 (L)  27.0 - 31.0 pg Final    MCHC 09/26/2024 30.1 (L)  32.0 - 36.0 g/dL Final    RDW 09/26/2024 15.4 (H)  11.5 - 14.5 % Final    Platelets 09/26/2024 265  150 - 450 K/uL Final    MPV 09/26/2024 9.2  9.2 - 12.9 fL Final    Gran # (ANC) 09/26/2024 2.2  1.8 - 7.7 K/uL Final    Comment: The ANC is based on a white cell differential from an   automated cell counter. It has not been microscopically   reviewed for the presence of abnormal cells. Clinical   correlation is required.      Immature Grans (Abs) 09/26/2024 0.04  0.00 - 0.04 K/uL Final    Comment: Mild elevation in immature granulocytes is non specific and   can be seen in a variety of conditions including stress response,   acute inflammation, trauma and pregnancy. Correlation with other   laboratory and clinical findings is essential.       Assessment:       1. Colon adenocarcinoma    2. Iron deficiency anemia, unspecified iron deficiency anemia type    3. Essential hypertension         Plan:     1. Colon Cancer     [id number], stage II  Hemicolectomy done on 03/19/2020. Had anastomotic recurrence s/p resection 4/15/21.  Status post adjuvant treatment of 5-FU bolus, leucovorin and 5FU infusion [id number] for 6months (completed January 2022).   Colonsocopy and CT after adjuvant therapy did not show evidence of persistent disease.   CT CAP on 3/15/23 without evidence of recurrent or metastatic disease.   CT CAP on 9/15/23 again  without evidence of recurrent or metastatic disease.   CT CAP on 9/26/24 shows BERT.    She opted not to enroll in ctDNA monitoring on [employer] MRD study.    She continues to do well overall with no new symptoms.   CEA is elevated today but no evidence of disease on imaging.   RTC in 1 year with labs & imaging.    2. Iron Deficiency Anemia    Continue oral iron, well tolerated.   Parameters stable.     3. HTN    At goal today.  Advised compliance with BP meds.   Encouraged to f/u with PCP re: further management of HTN regimen.     Patient is in agreement with the proposed treatment plan. All questions were answered to the patient's satisfaction. Pt knows to call clinic if anything is needed before the next clinic visit.    Rajesh Garrett MD  Hematology/Oncology  [facility]      Route Chart for Scheduling    Med Onc Chart Routing      Follow up with physician 1 year.   Follow up with REY    Infusion scheduling note    Injection scheduling note    Labs CBC, CMP and CEA   Scheduling:  Preferred lab:  Lab interval:     Imaging    Pharmacy appointment    Other referrals                Therapy Plan Information  INF PROLIA for Osteoporosis, noted on 2/8/2017  denosumab (PROLIA) injection 60 mg  60 mg, Subcutaneous, Every visit      No therapy plan of the specified type found.    No therapy plan of the specified type found.

## 2024-11-07 ENCOUNTER — LAB VISIT (OUTPATIENT)
Dept: LAB | Facility: HOSPITAL | Age: 89
End: 2024-11-07
Attending: INTERNAL MEDICINE
Payer: MEDICARE

## 2024-11-07 ENCOUNTER — OFFICE VISIT (OUTPATIENT)
Dept: PRIMARY CARE CLINIC | Facility: CLINIC | Age: 89
End: 2024-11-07
Payer: MEDICARE

## 2024-11-07 ENCOUNTER — TELEPHONE (OUTPATIENT)
Dept: PRIMARY CARE CLINIC | Facility: CLINIC | Age: 89
End: 2024-11-07
Payer: MEDICARE

## 2024-11-07 VITALS
HEIGHT: 67 IN | HEART RATE: 80 BPM | TEMPERATURE: 98 F | SYSTOLIC BLOOD PRESSURE: 154 MMHG | BODY MASS INDEX: 19.03 KG/M2 | DIASTOLIC BLOOD PRESSURE: 84 MMHG | WEIGHT: 121.25 LBS | OXYGEN SATURATION: 99 %

## 2024-11-07 DIAGNOSIS — R73.03 PREDIABETES: ICD-10-CM

## 2024-11-07 DIAGNOSIS — D50.0 IRON DEFICIENCY ANEMIA DUE TO CHRONIC BLOOD LOSS: ICD-10-CM

## 2024-11-07 DIAGNOSIS — I10 ESSENTIAL HYPERTENSION: Primary | Chronic | ICD-10-CM

## 2024-11-07 DIAGNOSIS — R53.81 DEBILITY: ICD-10-CM

## 2024-11-07 DIAGNOSIS — Z00.00 HEALTHCARE MAINTENANCE: ICD-10-CM

## 2024-11-07 DIAGNOSIS — E87.1 HYPONATREMIA: ICD-10-CM

## 2024-11-07 DIAGNOSIS — E87.1 HYPONATREMIA: Primary | ICD-10-CM

## 2024-11-07 DIAGNOSIS — Z87.448 HISTORY OF HEMATURIA: ICD-10-CM

## 2024-11-07 DIAGNOSIS — I10 ESSENTIAL HYPERTENSION: Chronic | ICD-10-CM

## 2024-11-07 LAB
ALBUMIN SERPL BCP-MCNC: 4.5 G/DL (ref 3.5–5.2)
ALP SERPL-CCNC: 67 U/L (ref 40–150)
ALT SERPL W/O P-5'-P-CCNC: 9 U/L (ref 10–44)
ANION GAP SERPL CALC-SCNC: 12 MMOL/L (ref 8–16)
AST SERPL-CCNC: 23 U/L (ref 10–40)
BASOPHILS # BLD AUTO: 0.07 K/UL (ref 0–0.2)
BASOPHILS NFR BLD: 1.1 % (ref 0–1.9)
BILIRUB SERPL-MCNC: 0.3 MG/DL (ref 0.1–1)
BUN SERPL-MCNC: 12 MG/DL (ref 8–23)
CALCIUM SERPL-MCNC: 10.6 MG/DL (ref 8.7–10.5)
CHLORIDE SERPL-SCNC: 95 MMOL/L (ref 95–110)
CHOLEST SERPL-MCNC: 270 MG/DL (ref 120–199)
CHOLEST/HDLC SERPL: 3.6 {RATIO} (ref 2–5)
CO2 SERPL-SCNC: 23 MMOL/L (ref 23–29)
CREAT SERPL-MCNC: 0.9 MG/DL (ref 0.5–1.4)
DIFFERENTIAL METHOD BLD: ABNORMAL
EOSINOPHIL # BLD AUTO: 0.2 K/UL (ref 0–0.5)
EOSINOPHIL NFR BLD: 2.3 % (ref 0–8)
ERYTHROCYTE [DISTWIDTH] IN BLOOD BY AUTOMATED COUNT: 19 % (ref 11.5–14.5)
EST. GFR  (NO RACE VARIABLE): >60 ML/MIN/1.73 M^2
ESTIMATED AVG GLUCOSE: 117 MG/DL (ref 68–131)
GLUCOSE SERPL-MCNC: 90 MG/DL (ref 70–110)
HBA1C MFR BLD: 5.7 % (ref 4–5.6)
HCT VFR BLD AUTO: 35.1 % (ref 37–48.5)
HDLC SERPL-MCNC: 76 MG/DL (ref 40–75)
HDLC SERPL: 28.1 % (ref 20–50)
HGB BLD-MCNC: 11.8 G/DL (ref 12–16)
IMM GRANULOCYTES # BLD AUTO: 0.03 K/UL (ref 0–0.04)
IMM GRANULOCYTES NFR BLD AUTO: 0.5 % (ref 0–0.5)
LDLC SERPL CALC-MCNC: 177.2 MG/DL (ref 63–159)
LYMPHOCYTES # BLD AUTO: 1.5 K/UL (ref 1–4.8)
LYMPHOCYTES NFR BLD: 23.1 % (ref 18–48)
MCH RBC QN AUTO: 28.6 PG (ref 27–31)
MCHC RBC AUTO-ENTMCNC: 33.6 G/DL (ref 32–36)
MCV RBC AUTO: 85 FL (ref 82–98)
MONOCYTES # BLD AUTO: 1 K/UL (ref 0.3–1)
MONOCYTES NFR BLD: 14.8 % (ref 4–15)
NEUTROPHILS # BLD AUTO: 3.9 K/UL (ref 1.8–7.7)
NEUTROPHILS NFR BLD: 58.2 % (ref 38–73)
NONHDLC SERPL-MCNC: 194 MG/DL
NRBC BLD-RTO: 0 /100 WBC
PLATELET # BLD AUTO: 239 K/UL (ref 150–450)
PMV BLD AUTO: 9.9 FL (ref 9.2–12.9)
POTASSIUM SERPL-SCNC: 4.2 MMOL/L (ref 3.5–5.1)
PROT SERPL-MCNC: 7.7 G/DL (ref 6–8.4)
RBC # BLD AUTO: 4.12 M/UL (ref 4–5.4)
SODIUM SERPL-SCNC: 130 MMOL/L (ref 136–145)
TRIGL SERPL-MCNC: 84 MG/DL (ref 30–150)
TSH SERPL DL<=0.005 MIU/L-ACNC: 2.4 UIU/ML (ref 0.4–4)
WBC # BLD AUTO: 6.63 K/UL (ref 3.9–12.7)

## 2024-11-07 PROCEDURE — 80053 COMPREHEN METABOLIC PANEL: CPT | Performed by: INTERNAL MEDICINE

## 2024-11-07 PROCEDURE — 83036 HEMOGLOBIN GLYCOSYLATED A1C: CPT | Performed by: INTERNAL MEDICINE

## 2024-11-07 PROCEDURE — 85025 COMPLETE CBC W/AUTO DIFF WBC: CPT | Performed by: INTERNAL MEDICINE

## 2024-11-07 PROCEDURE — 80061 LIPID PANEL: CPT | Performed by: INTERNAL MEDICINE

## 2024-11-07 PROCEDURE — 84443 ASSAY THYROID STIM HORMONE: CPT | Performed by: INTERNAL MEDICINE

## 2024-11-07 NOTE — ASSESSMENT & PLAN NOTE
Blood pressure slightly elevated today.    Present on repeat, continue current blood pressure medicines, will follow.

## 2024-11-07 NOTE — ASSESSMENT & PLAN NOTE
Patient appears somewhat more forgetful today.  Has walker.  Denies recent falls.    Will continue to monitor.

## 2024-11-07 NOTE — PROGRESS NOTES
.  This note has been generated using voice-recognition software. There may be typographical errors that have been missed during proof-reading.     Primary Care Provider Appointment    Subjective:      Patient ID: Nydia Stevens is a 89 y.o. female here for follow up.     Chief Complaint: Follow-up    HPI:  This is an 89-year-old female with hypertension, hyperlipidemia, congestive heart failure, h/o colon cancer, iron deficiency anemia, carotid artery disease, prediabetes, osteoporosis here for f/u.  patient last seen 9/5/2024.    09/30/2024 patient seen by Heme-Onc for follow-up history of adenocarcinoma of the colon.  Follow-up 1 year with labs and imaging.  Noted to have elevated CEA.    She has her 89th BD upcoming.    [facility]  Prlia every 3 momnths.    Mp SOB/edema/PND/orthopnea.      Patient Active Problem List   Diagnosis    Essential hypertension     Mixed hyperlipidemia    Palliative care encounter    Closed fracture of left olecranon process    Debility    Aortic stenosis, mild    Osteoporosis    Iron deficiency anemia due to chronic blood loss    Chronic combined systolic and diastolic heart failure    History of colon cancer    Carotid stenosis, asymptomatic, bilateral    H/O: hysterectomy    Prediabetes    Hyponatremia    B12 deficiency    Healthcare maintenance    Aortic atherosclerosis    Benign mole    Adrenal nodule    Cerebral atrophy, mild    History of hematuria        Past Surgical History:   Procedure Laterality Date    CATARACT EXTRACTION W/  INTRAOCULAR LENS IMPLANT Left 8/11/14    gregor    CATARACT EXTRACTION W/  INTRAOCULAR LENS IMPLANT Right 09/15/14    gregor    COLONOSCOPY N/A 3/19/2020    Procedure: COLONOSCOPY;  Surgeon: Real Mabry MD;  Location: [facility] (2ND FLR);  Service: Endoscopy;  Laterality: N/A;    COLONOSCOPY N/A 3/23/2021    Procedure: COLONOSCOPY;  Surgeon: AUTUMN Berg MD;  Location: [facility] ENDO (4TH FLR);  Service: Endoscopy;   Laterality: N/A;  covid test 3/20 [facility]    COLONOSCOPY N/A 2/15/2022    Procedure: COLONOSCOPY;  Surgeon: Doris Simpson MD;  Location: [facility] ENDO (4TH FLR);  Service: Endoscopy;  Laterality: N/A;  COVID test on 2/12/22 at [facility]  2/14/22 - Pt confirmed 0640 arrival, prep instructions reviewed, Pt verbalized [id number]    ESOPHAGOGASTRODUODENOSCOPY N/A 3/19/2020    Procedure: EGD (ESOPHAGOGASTRODUODENOSCOPY);  Surgeon: Real Mabry MD;  Location: [facility] ENDO (2ND FLR);  Service: Endoscopy;  Laterality: N/A;    FOOT SURGERY      left    HYSTERECTOMY  1962    ILEOCOLECTOMY N/A 4/15/2021    Procedure: ILEOCOLECTOMY;  Surgeon: AUTUMN Berg MD;  Location: [facility] OR 2ND FLR;  Service: Colon and Rectal;  Laterality: N/A;    INSERTION OF TUNNELED CENTRAL VENOUS CATHETER (CVC) WITH SUBCUTANEOUS PORT N/A 7/2/2021    Procedure: [id number];  Surgeon: Suresh Agrawal MD;  Location: [facility] OR 2ND FLR;  Service: Vascular;  Laterality: N/A;  Right IJ    LAPAROSCOPIC HEMICOLECTOMY Right 3/20/2020    Procedure: HEMICOLECTOMY, RIGHT;  Surgeon: AUTUMN Berg MD;  Location: [facility] OR 2ND FLR;  Service: Colon and Rectal;  Laterality: Right;    LYSIS OF ADHESIONS N/A 4/15/2021    Procedure: LYSIS, ADHESIONS;  Surgeon: AUTUMN Berg MD;  Location: [facility] OR 2ND FLR;  Service: Colon and Rectal;  Laterality: N/A;  Greater than 2 hours    MOBILIZATION OF SPLENIC FLEXURE N/A 4/15/2021    Procedure: MOBILIZATION, SPLENIC FLEXURE;  Surgeon: AUTUMN Berg MD;  Location: [facility] OR 2ND FLR;  Service: Colon and Rectal;  Laterality: N/A;       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    Hyperlipidemia     Hypertension     Osteoporosis        Social History     Socioeconomic History    Marital status: Single   Occupational History     "Occupation: teaching retired    Occupation: Nun.  [employer]   Tobacco Use    Smoking status: Never    Smokeless tobacco: Never   Substance and Sexual Activity    Alcohol use: No     Alcohol/week: 0.0 standard drinks of alcohol    Drug use: No    Sexual activity: Never   Social History Narrative    Member of [employer] order here in [place], [place]..  Lives with about 40-50 sisters.  All sisters with her are retired.       Social Drivers of Health     Financial Resource Strain: Low Risk  (5/20/2021)    Overall Financial Resource Strain (CARDIA)     Difficulty of Paying Living Expenses: Not hard at all   Food Insecurity: No Food Insecurity (5/20/2021)    Hunger Vital Sign     Worried About Running Out of Food in the Last Year: Never true     Ran Out of Food in the Last Year: Never true   Physical Activity: Inactive (5/20/2021)    Exercise Vital Sign     Days of Exercise per Week: 0 days     Minutes of Exercise per Session: 0 min   Stress: No Stress Concern Present (5/20/2021)    [employer] - Occupational Stress Questionnaire     Feeling of Stress : Not at all       Review of Systems         No data to display                 Checklist of Daily Activities:        Objective:   BP (!) 154/84 (BP Location: Left arm, Patient Position: Sitting)   Pulse 80   Temp 97.5 °F (36.4 °C) (Oral)   Ht 5' 7" (1.702 m)   Wt 55 kg (121 lb 4.1 oz)   SpO2 99%   BMI 18.99 kg/m²     Physical Exam  Constitutional:       General: She is not in acute distress.     Appearance: Normal appearance. She is not ill-appearing, toxic-appearing or diaphoretic.   HENT:      Head: Normocephalic and atraumatic.   Cardiovascular:      Rate and Rhythm: Normal rate and regular rhythm.      Heart sounds: Murmur heard.   Pulmonary:      Effort: Pulmonary effort is normal.      Breath sounds: Normal breath sounds.   Musculoskeletal:      Right lower leg: No edema.      Left lower leg: No edema. "   Lymphadenopathy:      Cervical: No cervical adenopathy.   Neurological:      Mental Status: She is alert.             Lab Results   Component Value Date    WBC 4.71 09/26/2024    HGB 10.4 (L) 09/26/2024    HCT 34.5 (L) 09/26/2024     09/26/2024    CHOL 287 (H) 05/30/2024    TRIG 89 05/30/2024    HDL 61 05/30/2024    ALT 8 (L) 09/26/2024    AST 15 09/26/2024     (L) 09/26/2024    K 3.9 09/26/2024     09/26/2024    CREATININE 0.8 09/26/2024    BUN 14 09/26/2024    CO2 24 09/26/2024    TSH 2.358 12/19/2023    INR 0.9 03/17/2020    HGBA1C 5.7 (H) 12/19/2023       Current Outpatient Medications on File Prior to Visit   Medication Sig Dispense Refill    amLODIPine (NORVASC) 10 MG tablet Take 1 tablet (10 mg total) by mouth once daily. 90 tablet 3    calcium carbonate (OS-JAILYN) 500 mg calcium (1,250 mg) tablet Take 1 tablet (500 mg total) by mouth once daily.  0    cholecalciferol, vitamin D3, (VITAMIN D3) 25 mcg (1,000 unit) capsule Take 1,000 Units by mouth once daily. Hold until after surgery      cyanocobalamin (VITAMIN B-12) 1000 MCG tablet Take 1 tablet (1,000 mcg total) by mouth once daily. Hold until after surgery 30 tablet 11    FEROSUL 325 mg (65 mg iron) Tab tablet TAKE ONE TABLET BY MOUTH ON TUESDAY, THURSDAY, AND SATURDAY 12 tablet 5    LIDOcaine-prilocaine (EMLA) cream Apply topically as needed (apply over port before chemo). apply over port before chemo 30 g 3    losartan (COZAAR) 100 MG tablet Take 1 tablet (100 mg total) by mouth once daily. 30 tablet 5    lutein-zeaxanthin (OCUVITE LUTEIN 25) 25-5 mg Cap Take 1 tablet by mouth once daily. 30 capsule 0    potassium chloride (KLOR-CON) 10 MEQ TbSR TAKE 1 TABLET BY MOUTH ONCE DAILY. 90 tablet 3    rosuvastatin (CRESTOR) 40 MG Tab TAKE ONE TABLET BY MOUTH EVERY DAY FOR CHOLESTEROL 90 tablet 3    VIT C/VIT E ACETATE/LUTEIN/MIN (OCUVITE LUTEIN ORAL) Take 1 tablet by mouth once daily. Hold until after surgery       Current  Facility-Administered Medications on File Prior to Visit   Medication Dose Route Frequency Provider Last Rate Last Admin    gabapentin capsule 300 mg  300 mg Oral TID Amanda Gaspar NP   300 mg at 04/16/21 0814    LIDOcaine (PF) 10 mg/ml (1%) injection 10 mg  1 mL Intradermal On Call Procedure Amanda Gaspar NP             Assessment:   89 y.o. female with multiple co-morbid illnesses here for continued follow up of medical problems.      Plan:     Problem List Items Addressed This Visit          Cardiac/Vascular    Essential hypertension  - Primary (Chronic)      Blood pressure slightly elevated today.    Present on repeat, continue current blood pressure medicines, will follow.         Relevant Orders    CBC Auto Differential    Comprehensive Metabolic Panel    TSH    Lipid Panel       Renal/    History of hematuria     Pt denies any further events            Oncology    Iron deficiency anemia due to chronic blood loss     Stable.  ANANDA.  Drop with hematuria  Labs today for eval            Endocrine    Prediabetes     Increased A1C on labs.  No DM.     Yearly A1C checks due today         Relevant Orders    Hemoglobin A1C    Hyponatremia     Mild and asymptomatic.  Improved since prior.  No meds to cause at this time.    Repeat today            Other    Debility       Patient appears somewhat more forgetful today.  Has walker.  Denies recent falls.    Will continue to monitor.         Healthcare maintenance     Power-of- completed 5/2022 with patient's assistance [facility].  Reviewed lapost and poa 5/2024  yearly labs today              Health Maintenance         Date Due Completion Date    COVID-19 Vaccine (9 - 2024-25 season) 11/12/2024 9/17/2024    Hemoglobin A1c (Prediabetes) 12/19/2024 12/19/2023    Colonoscopy 02/15/2025 2/15/2022    DEXA Scan 07/26/2025 7/26/2023    Override on 2/15/2016: Declined    TETANUS VACCINE 02/15/2028 2/15/2018          Medications Reconciliation:   I  have not reconciled the patient's home medications with the patient/family. I have updated all changes.  Refer to After-Visit Medication List.      Medication List            Accurate as of November 7, 2024  2:11 PM. If you have any questions, ask your nurse or doctor.                CONTINUE taking these medications      amLODIPine 10 MG tablet  Commonly known as: NORVASC  Take 1 tablet (10 mg total) by mouth once daily.     calcium carbonate 500 mg calcium (1,250 mg) tablet  Commonly known as: OS-JAILYN  Take 1 tablet (500 mg total) by mouth once daily.     cholecalciferol (vitamin D3) 25 mcg (1,000 unit) capsule  Commonly known as: VITAMIN D3     cyanocobalamin 1000 MCG tablet  Commonly known as: VITAMIN B-12  Take 1 tablet (1,000 mcg total) by mouth once daily. Hold until after surgery     FeroSuL 325 mg (65 mg iron) Tab tablet  Generic drug: ferrous sulfate  TAKE ONE TABLET BY MOUTH ON TUESDAY, THURSDAY, AND SATURDAY     LIDOcaine-prilocaine cream  Commonly known as: EMLA  Apply topically as needed (apply over port before chemo). apply over port before chemo     losartan 100 MG tablet  Commonly known as: COZAAR  Take 1 tablet (100 mg total) by mouth once daily.     lutein-zeaxanthin 25-5 mg Cap  Commonly known as: OCUVITE LUTEIN 25  Take 1 tablet by mouth once daily.     OCUVITE LUTEIN ORAL     potassium chloride 10 MEQ Tbsr  Commonly known as: KLOR-CON  TAKE 1 TABLET BY MOUTH ONCE DAILY.     rosuvastatin 40 MG Tab  Commonly known as: CRESTOR  TAKE ONE TABLET BY MOUTH EVERY DAY FOR CHOLESTEROL                   Follow up in about 2 months (around 1/7/2025). Total clinical care time was   Twenty-four min. The following issues were discussed:  recent oncology appointment, results of labs, iron deficiency anemia, upcoming 89th birthday     Brittney Travis MD  Internal Medicine  [facility]  [phone]

## 2024-11-07 NOTE — ASSESSMENT & PLAN NOTE
Power-of- completed 5/2022 with patient's assistance [facility].  Reviewed shari and poa 5/2024  yearly labs today

## 2024-11-07 NOTE — TELEPHONE ENCOUNTER
RN attempted to call pt.      RN spoke to SN Samson.  Per Ms Jyotsna Stafford told  her appointment was rescheduled.  She is currently on her way here.

## 2024-11-08 ENCOUNTER — TELEPHONE (OUTPATIENT)
Dept: PRIMARY CARE CLINIC | Facility: CLINIC | Age: 89
End: 2024-11-08
Payer: MEDICARE

## 2024-11-08 NOTE — TELEPHONE ENCOUNTER
Called returned by Nurse Walls, results given. Labs scheduled at [facility] for fri 11/15/24 at 11am for repeat labs. Advised on water max intake per Dr Travis's recommendation and message will be sent to Nephrology staff to reach out to schedule patient, requesting Nereyda Mendoza NP.

## 2024-11-08 NOTE — TELEPHONE ENCOUNTER
Good afternoon, could you please reach out to Nurse Walls at [phone] to schedule appt with nephrology, requesting appt with Nereyda Mendoza NP. Referral in place, thank you.

## 2024-11-08 NOTE — TELEPHONE ENCOUNTER
Spoke to Sister about results but asked that I call Nurse Titi also with results, left voicemail for call back.

## 2024-11-08 NOTE — TELEPHONE ENCOUNTER
----- Message from Brittney Travis MD sent at 11/7/2024  5:05 PM CST -----  Please call patient with results.    LDL continues to be elevated.  Please confirm she is taking her rosuvastatin.  Previously controlled.  Sodium is significantly lower.  Would like her to see Nephrology.  She needs to begin electrolyte water again and decrease her water to 48 oz maximum.  Would like to repeat her lab in 1 week.  Normal renal function.  Anemia significantly improved.  Will continue to monitor.  Slightly elevated hemoglobin A1c indicates an increased risk of developing diabetes.  Normal thyroid function.

## 2024-11-11 ENCOUNTER — TELEPHONE (OUTPATIENT)
Dept: NEPHROLOGY | Facility: CLINIC | Age: 89
End: 2024-11-11
Payer: MEDICARE

## 2024-11-15 ENCOUNTER — LAB VISIT (OUTPATIENT)
Dept: LAB | Facility: HOSPITAL | Age: 89
End: 2024-11-15
Attending: INTERNAL MEDICINE
Payer: MEDICARE

## 2024-11-15 DIAGNOSIS — R73.03 PREDIABETES: ICD-10-CM

## 2024-11-15 LAB
ANION GAP SERPL CALC-SCNC: 11 MMOL/L (ref 8–16)
BUN SERPL-MCNC: 18 MG/DL (ref 8–23)
CALCIUM SERPL-MCNC: 9.7 MG/DL (ref 8.7–10.5)
CHLORIDE SERPL-SCNC: 99 MMOL/L (ref 95–110)
CO2 SERPL-SCNC: 24 MMOL/L (ref 23–29)
CREAT SERPL-MCNC: 0.9 MG/DL (ref 0.5–1.4)
EST. GFR  (NO RACE VARIABLE): >60 ML/MIN/1.73 M^2
GLUCOSE SERPL-MCNC: 89 MG/DL (ref 70–110)
POTASSIUM SERPL-SCNC: 3.8 MMOL/L (ref 3.5–5.1)
SODIUM SERPL-SCNC: 134 MMOL/L (ref 136–145)

## 2024-11-15 PROCEDURE — 36415 COLL VENOUS BLD VENIPUNCTURE: CPT | Mod: PN | Performed by: INTERNAL MEDICINE

## 2024-11-15 PROCEDURE — 80048 BASIC METABOLIC PNL TOTAL CA: CPT | Performed by: INTERNAL MEDICINE

## 2024-11-21 ENCOUNTER — TELEPHONE (OUTPATIENT)
Dept: PRIMARY CARE CLINIC | Facility: CLINIC | Age: 89
End: 2024-11-21
Payer: MEDICARE

## 2024-11-21 DIAGNOSIS — I10 ESSENTIAL HYPERTENSION: Primary | Chronic | ICD-10-CM

## 2024-11-21 RX ORDER — POTASSIUM CHLORIDE 750 MG/1
TABLET, EXTENDED RELEASE ORAL
Qty: 90 TABLET | Refills: 0 | OUTPATIENT
Start: 2024-11-21

## 2024-11-21 NOTE — TELEPHONE ENCOUNTER
----- Message from Brittney Travis MD sent at 11/21/2024  9:42 AM CST -----  Please call patient with results.  Improved sodium level.  Will hold potasium and repeat lab in 2 weeks

## 2024-11-22 ENCOUNTER — TELEPHONE (OUTPATIENT)
Dept: PRIMARY CARE CLINIC | Facility: CLINIC | Age: 89
End: 2024-11-22
Payer: MEDICARE

## 2024-11-25 ENCOUNTER — TELEPHONE (OUTPATIENT)
Dept: PRIMARY CARE CLINIC | Facility: CLINIC | Age: 89
End: 2024-11-25
Payer: MEDICARE

## 2024-11-25 NOTE — TELEPHONE ENCOUNTER
----- Message from Jaci sent at 11/25/2024  8:07 AM CST -----  Contact: Sherry   Patient is returning a phone call.     Who left a message for the patient: Sherry     Does patient know what this is regarding:  results     Would you like a call back, or a response through your [facility] portal?:   call back     Comments:

## 2024-12-07 ENCOUNTER — HOSPITAL ENCOUNTER (INPATIENT)
Facility: HOSPITAL | Age: 89
LOS: 2 days | Discharge: HOME | DRG: 378 | End: 2024-12-10
Attending: EMERGENCY MEDICINE | Admitting: STUDENT IN AN ORGANIZED HEALTH CARE EDUCATION/TRAINING PROGRAM
Payer: MEDICARE

## 2024-12-07 DIAGNOSIS — R07.9 CHEST PAIN: ICD-10-CM

## 2024-12-07 DIAGNOSIS — K92.2 GASTROINTESTINAL HEMORRHAGE, UNSPECIFIED GASTROINTESTINAL HEMORRHAGE TYPE: Primary | ICD-10-CM

## 2024-12-07 PROBLEM — K62.5 RECTAL BLEEDING: Status: ACTIVE | Noted: 2024-12-07

## 2024-12-07 LAB
ABO + RH BLD: NORMAL
ALBUMIN SERPL BCP-MCNC: 3.6 G/DL (ref 3.5–5.2)
ALP SERPL-CCNC: 59 U/L (ref 40–150)
ALT SERPL W/O P-5'-P-CCNC: 12 U/L (ref 10–44)
ANION GAP SERPL CALC-SCNC: 8 MMOL/L (ref 8–16)
APTT PPP: 29.5 SEC (ref 21–32)
AST SERPL-CCNC: 14 U/L (ref 10–40)
BACTERIA #/AREA URNS AUTO: NORMAL /HPF
BASOPHILS # BLD AUTO: 0.04 K/UL (ref 0–0.2)
BASOPHILS NFR BLD: 0.7 % (ref 0–1.9)
BILIRUB SERPL-MCNC: 0.3 MG/DL (ref 0.1–1)
BILIRUB UR QL STRIP: NEGATIVE
BLD GP AB SCN CELLS X3 SERPL QL: NORMAL
BUN SERPL-MCNC: 18 MG/DL (ref 8–23)
CALCIUM SERPL-MCNC: 9.4 MG/DL (ref 8.7–10.5)
CHLORIDE SERPL-SCNC: 101 MMOL/L (ref 95–110)
CLARITY UR REFRACT.AUTO: CLEAR
CO2 SERPL-SCNC: 24 MMOL/L (ref 23–29)
COLOR UR AUTO: YELLOW
CREAT SERPL-MCNC: 0.8 MG/DL (ref 0.5–1.4)
DIFFERENTIAL METHOD BLD: ABNORMAL
EOSINOPHIL # BLD AUTO: 0.1 K/UL (ref 0–0.5)
EOSINOPHIL NFR BLD: 0.9 % (ref 0–8)
ERYTHROCYTE [DISTWIDTH] IN BLOOD BY AUTOMATED COUNT: 16.5 % (ref 11.5–14.5)
EST. GFR  (NO RACE VARIABLE): >60 ML/MIN/1.73 M^2
GLUCOSE SERPL-MCNC: 97 MG/DL (ref 70–110)
GLUCOSE UR QL STRIP: NEGATIVE
HCT VFR BLD AUTO: 28.7 % (ref 37–48.5)
HGB BLD-MCNC: 8.9 G/DL (ref 12–16)
HGB UR QL STRIP: ABNORMAL
IMM GRANULOCYTES # BLD AUTO: 0.05 K/UL (ref 0–0.04)
IMM GRANULOCYTES NFR BLD AUTO: 0.9 % (ref 0–0.5)
INR PPP: 1 (ref 0.8–1.2)
KETONES UR QL STRIP: NEGATIVE
LEUKOCYTE ESTERASE UR QL STRIP: NEGATIVE
LYMPHOCYTES # BLD AUTO: 0.8 K/UL (ref 1–4.8)
LYMPHOCYTES NFR BLD: 15.5 % (ref 18–48)
MCH RBC QN AUTO: 26.6 PG (ref 27–31)
MCHC RBC AUTO-ENTMCNC: 31 G/DL (ref 32–36)
MCV RBC AUTO: 86 FL (ref 82–98)
MICROSCOPIC COMMENT: NORMAL
MONOCYTES # BLD AUTO: 1.1 K/UL (ref 0.3–1)
MONOCYTES NFR BLD: 19.9 % (ref 4–15)
NEUTROPHILS # BLD AUTO: 3.4 K/UL (ref 1.8–7.7)
NEUTROPHILS NFR BLD: 62.1 % (ref 38–73)
NITRITE UR QL STRIP: NEGATIVE
NRBC BLD-RTO: 0 /100 WBC
PH UR STRIP: 7 [PH] (ref 5–8)
PLATELET # BLD AUTO: 237 K/UL (ref 150–450)
PMV BLD AUTO: 9.9 FL (ref 9.2–12.9)
POTASSIUM SERPL-SCNC: 4 MMOL/L (ref 3.5–5.1)
PROT SERPL-MCNC: 6.5 G/DL (ref 6–8.4)
PROT UR QL STRIP: NEGATIVE
PROTHROMBIN TIME: 11.4 SEC (ref 9–12.5)
RBC # BLD AUTO: 3.34 M/UL (ref 4–5.4)
RBC #/AREA URNS AUTO: 1 /HPF (ref 0–4)
SODIUM SERPL-SCNC: 133 MMOL/L (ref 136–145)
SP GR UR STRIP: 1.02 (ref 1–1.03)
SPECIMEN OUTDATE: NORMAL
SQUAMOUS #/AREA URNS AUTO: 0 /HPF
URN SPEC COLLECT METH UR: ABNORMAL
WBC # BLD AUTO: 5.42 K/UL (ref 3.9–12.7)
WBC #/AREA URNS AUTO: 1 /HPF (ref 0–5)

## 2024-12-07 PROCEDURE — 99285 EMERGENCY DEPT VISIT HI MDM: CPT | Mod: 25

## 2024-12-07 PROCEDURE — G0378 HOSPITAL OBSERVATION PER HR: HCPCS

## 2024-12-07 PROCEDURE — 86901 BLOOD TYPING SEROLOGIC RH(D): CPT | Performed by: EMERGENCY MEDICINE

## 2024-12-07 PROCEDURE — 85610 PROTHROMBIN TIME: CPT | Performed by: EMERGENCY MEDICINE

## 2024-12-07 PROCEDURE — 81001 URINALYSIS AUTO W/SCOPE: CPT | Performed by: EMERGENCY MEDICINE

## 2024-12-07 PROCEDURE — 85025 COMPLETE CBC W/AUTO DIFF WBC: CPT | Performed by: EMERGENCY MEDICINE

## 2024-12-07 PROCEDURE — 80053 COMPREHEN METABOLIC PANEL: CPT | Performed by: EMERGENCY MEDICINE

## 2024-12-07 PROCEDURE — 63600175 PHARM REV CODE 636 W HCPCS: Performed by: HOSPITALIST

## 2024-12-07 PROCEDURE — 25500020 PHARM REV CODE 255: Performed by: EMERGENCY MEDICINE

## 2024-12-07 PROCEDURE — 85730 THROMBOPLASTIN TIME PARTIAL: CPT | Performed by: EMERGENCY MEDICINE

## 2024-12-07 PROCEDURE — 63600175 PHARM REV CODE 636 W HCPCS

## 2024-12-07 PROCEDURE — 96376 TX/PRO/DX INJ SAME DRUG ADON: CPT

## 2024-12-07 PROCEDURE — 96374 THER/PROPH/DIAG INJ IV PUSH: CPT

## 2024-12-07 RX ORDER — ACETAMINOPHEN 325 MG/1
650 TABLET ORAL EVERY 6 HOURS PRN
Status: DISCONTINUED | OUTPATIENT
Start: 2024-12-07 | End: 2024-12-10 | Stop reason: HOSPADM

## 2024-12-07 RX ORDER — PANTOPRAZOLE SODIUM 40 MG/10ML
40 INJECTION, POWDER, LYOPHILIZED, FOR SOLUTION INTRAVENOUS
Status: COMPLETED | OUTPATIENT
Start: 2024-12-07 | End: 2024-12-07

## 2024-12-07 RX ORDER — PANTOPRAZOLE SODIUM 40 MG/10ML
40 INJECTION, POWDER, LYOPHILIZED, FOR SOLUTION INTRAVENOUS 2 TIMES DAILY
Status: DISCONTINUED | OUTPATIENT
Start: 2024-12-07 | End: 2024-12-10 | Stop reason: HOSPADM

## 2024-12-07 RX ADMIN — IOHEXOL 100 ML: 350 INJECTION, SOLUTION INTRAVENOUS at 04:12

## 2024-12-07 RX ADMIN — PANTOPRAZOLE SODIUM 40 MG: 40 INJECTION, POWDER, LYOPHILIZED, FOR SOLUTION INTRAVENOUS at 10:12

## 2024-12-07 RX ADMIN — PANTOPRAZOLE SODIUM 40 MG: 40 INJECTION, POWDER, LYOPHILIZED, FOR SOLUTION INTRAVENOUS at 02:12

## 2024-12-07 NOTE — ED PROVIDER NOTES
Encounter Date: 12/7/2024       History     Chief Complaint   Patient presents with    Rectal Bleeding     Bright red blood, hx of colon cancer. Denies dizziness, chest pain or SOB     HPI    Nydia Stevens is a 89 y.o. female significant for colon cancer s/p ileocolectomy in remission, HTN, CHF, carotid stenosis who presented to the ED today for concerns of rectal bleeding as well as possible hematuria.  Patient states that she noticed jeffrey blood in the toilet this morning after using the restroom.  She is unsure if the bleeding came from her rectum or was in her urine.  Patient states that her bowel movements and urination have been normal up until the day.  Patient denies anticoagulant use, f/c, lightheadedness/dizziness, syncope, CP, SOB, abdominal pain, N/V/D, dysuria, pain w/ defecation, vaginal bleeding/discharge, lower extremity edema/erythema, or recent trauma.      Review of patient's allergies indicates:  No Known Allergies  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    Hyperlipidemia     Hypertension     Osteoporosis      Past Surgical History:   Procedure Laterality Date    CATARACT EXTRACTION W/  INTRAOCULAR LENS IMPLANT Left 8/11/14    [facility]    CATARACT EXTRACTION W/  INTRAOCULAR LENS IMPLANT Right 09/15/14    [facility]    COLONOSCOPY N/A 3/19/2020    Procedure: COLONOSCOPY;  Surgeon: Real Mabry MD;  Location: [facility] (2ND FLR);  Service: Endoscopy;  Laterality: N/A;    COLONOSCOPY N/A 3/23/2021    Procedure: COLONOSCOPY;  Surgeon: AUTUMN Berg MD;  Location: [facility] (4TH FLR);  Service: Endoscopy;  Laterality: N/A;  covid test 3/20 [facility]    COLONOSCOPY N/A 2/15/2022    Procedure: COLONOSCOPY;  Surgeon: Doris Simpson MD;  Location: [facility] (4TH FLR);  Service: Endoscopy;  Laterality: N/A;  COVID test on  2/12/22 at [facility]  2/14/22 - Pt confirmed 0640 arrival, prep instructions reviewed, Pt verbalized [id number]    ESOPHAGOGASTRODUODENOSCOPY N/A 3/19/2020    Procedure: EGD (ESOPHAGOGASTRODUODENOSCOPY);  Surgeon: Real Mabry MD;  Location: [facility] ENDO (2ND FLR);  Service: Endoscopy;  Laterality: N/A;    FOOT SURGERY      left    HYSTERECTOMY  1962    ILEOCOLECTOMY N/A 4/15/2021    Procedure: ILEOCOLECTOMY;  Surgeon: AUTUMN Berg MD;  Location: [facility] OR 2ND FLR;  Service: Colon and Rectal;  Laterality: N/A;    INSERTION OF TUNNELED CENTRAL VENOUS CATHETER (CVC) WITH SUBCUTANEOUS PORT N/A 7/2/2021    Procedure: [id number];  Surgeon: Suresh Agrawal MD;  Location: [facility] OR 2ND FLR;  Service: Vascular;  Laterality: N/A;  Right IJ    LAPAROSCOPIC HEMICOLECTOMY Right 3/20/2020    Procedure: HEMICOLECTOMY, RIGHT;  Surgeon: AUTUMN Berg MD;  Location: [facility] OR 2ND FLR;  Service: Colon and Rectal;  Laterality: Right;    LYSIS OF ADHESIONS N/A 4/15/2021    Procedure: LYSIS, ADHESIONS;  Surgeon: AUTUMN Berg MD;  Location: [facility] OR 2ND FLR;  Service: Colon and Rectal;  Laterality: N/A;  Greater than 2 hours    MOBILIZATION OF SPLENIC FLEXURE N/A 4/15/2021    Procedure: MOBILIZATION, SPLENIC FLEXURE;  Surgeon: AUTUMN Berg MD;  Location: [facility] OR 2ND FLR;  Service: Colon and Rectal;  Laterality: N/A;     Family History   Problem Relation Name Age of Onset    Heart attack Father      Cataracts Brother      Heart attack Brother      Diabetes Brother      No Known Problems Mother      Cataracts Sister      Stroke Sister      Diabetes Sister      Cataracts Sister      Stroke Sister      No Known Problems Maternal Aunt      No Known Problems Maternal Uncle      No Known Problems Paternal Aunt      No Known Problems Paternal Uncle      No Known Problems Maternal Grandmother      No Known Problems Maternal Grandfather      No Known Problems Paternal Grandmother      No Known Problems  Paternal Grandfather      Amblyopia Neg Hx      Blindness Neg Hx      Cancer Neg Hx      Glaucoma Neg Hx      Hypertension Neg Hx      Macular degeneration Neg Hx      Retinal detachment Neg Hx      Strabismus Neg Hx      Thyroid disease Neg Hx      Colon cancer Neg Hx      Esophageal cancer Neg Hx      Irritable bowel syndrome Neg Hx      Rectal cancer Neg Hx      Stomach cancer Neg Hx      Ulcerative colitis Neg Hx      Crohn's disease Neg Hx      Celiac disease Neg Hx       Social History     Tobacco Use    Smoking status: Never    Smokeless tobacco: Never   Substance Use Topics    Alcohol use: No     Alcohol/week: 0.0 standard drinks of alcohol    Drug use: No     Review of Systems    Physical Exam     Initial Vitals [12/07/24 1150]   BP Pulse Resp Temp SpO2   (!) 144/77 108 18 97.7 °F (36.5 °C) 100 %      MAP       --         Physical Exam    Nursing note and vitals reviewed.  Constitutional: She appears well-developed and well-nourished. No distress.   HENT:   Head: Normocephalic and atraumatic.   Nose: Nose normal.   Eyes: Conjunctivae and EOM are normal.   Neck: Neck supple. No JVD present.   Normal range of motion.  Cardiovascular:  Normal rate, normal heart sounds and intact distal pulses.           Pulmonary/Chest: Breath sounds normal. No respiratory distress.   Abdominal: Abdomen is soft. She exhibits no distension and no mass. There is no abdominal tenderness.   Genitourinary:    Vagina and rectum normal.   Rectum:      Guaiac result positive.      No rectal mass, anal fissure, external hemorrhoid, internal hemorrhoid or abnormal anal tone.   Guaiac positive stool. : Acceptable.   Pelvic exam was performed with patient in the knee-chest position.   There is no rash, tenderness, lesion or injury on the right labia. There is no rash, tenderness, lesion or injury on the left labia.    No vaginal tenderness or bleeding.   No tenderness or bleeding in the vagina.   Musculoskeletal:          General: No edema. Normal range of motion.      Cervical back: Normal range of motion and neck supple.     Neurological: She is alert and oriented to person, place, and time. No sensory deficit.   Skin: Skin is warm and dry.   Psychiatric: She has a normal mood and affect. Her behavior is normal.         ED Course   Procedures  Labs Reviewed   COMPREHENSIVE METABOLIC PANEL - Abnormal       Result Value    Sodium 133 (*)     Potassium 4.0      Chloride 101      CO2 24      Glucose 97      BUN 18      Creatinine 0.8      Calcium 9.4      Total Protein 6.5      Albumin 3.6      Total Bilirubin 0.3      Alkaline Phosphatase 59      AST 14      ALT 12      eGFR >60.0      Anion Gap 8     CBC W/ AUTO DIFFERENTIAL - Abnormal    WBC 5.42      RBC 3.34 (*)     Hemoglobin 8.9 (*)     Hematocrit 28.7 (*)     MCV 86      MCH 26.6 (*)     MCHC 31.0 (*)     RDW 16.5 (*)     Platelets 237      MPV 9.9      Immature Granulocytes 0.9 (*)     Gran # (ANC) 3.4      Immature Grans (Abs) 0.05 (*)     Lymph # 0.8 (*)     Mono # 1.1 (*)     Eos # 0.1      Baso # 0.04      nRBC 0      Gran % 62.1      Lymph % 15.5 (*)     Mono % 19.9 (*)     Eosinophil % 0.9      Basophil % 0.7      Differential Method Automated     URINALYSIS, REFLEX TO URINE CULTURE - Abnormal    Specimen UA Urine, Clean Catch      Color, UA Yellow      Appearance, UA Clear      pH, UA 7.0      Specific Gravity, UA 1.020      Protein, UA Negative      Glucose, UA Negative      Ketones, UA Negative      Bilirubin (UA) Negative      Occult Blood UA 3+ (*)     Nitrite, UA Negative      Leukocytes, UA Negative      Narrative:     Specimen Source->Urine   PROTIME-INR    Prothrombin Time 11.4      INR 1.0     APTT    aPTT 29.5     URINALYSIS MICROSCOPIC    RBC, UA 1      WBC, UA 1      Bacteria Rare      Squam Epithel, UA 0      Microscopic Comment SEE COMMENT      Narrative:     Specimen Source->Urine   TYPE & SCREEN    Group & Rh A POS      Indirect Rakel NEG      Specimen  Outdate 12/10/2024 23:59       EKG Readings: (Independently Interpreted)   NSR, no concerning ST or T wave abnormalities, no STEMI       Imaging Results              CTA Acute GI [place], Abdomen and Pelvis (In process)                      Medications   pantoprazole injection 40 mg (40 mg Intravenous Given 12/7/24 1445)   iohexoL (OMNIPAQUE 350) injection 100 mL (100 mLs Intravenous Given 12/7/24 1622)     Medical Decision Making  Nydia Stevens is a 89 y.o. female who presents to the emergency department for concern of rectal bleeding and possible hematuria. On initial evaluation, patient is slightly hypertensive w/ a heart rate of 108 but otherwise hemodynamically stable.  EKG shows normal sinus rhythm.  On initial exam, patient w/Hemoccult-positive stool on rectal exam. No obvious vaginal bleeding or lesions.  Due to concerns for GI bleeding, patient given Protonix 40 mg.  CBC shows anemia w/ a hemoglobin of 8.9 which is downtrending from previous values 1 month ago and consistent w/ acute blood loss.  No leukocytosis, reducing concern for acute infectious process.  Urinalysis w/us blood, but no evidence of UTI.  CMP WNL.  CTA acute GI bleed abdomen and pelvis ordered, but pending at time of handoff.  Patient admitted to Hospital Medicine at this time.    Ddx including, but not limited to: Colorectal cancer/polyps, diverticulitis/diverticulosis, gastric ulceration v. duodenal ulceration v. angiodysplasia v. IBD v. infectious colitis v. mesenteric ischemia v. hemorrhoids v. anal fissure     Most likely Dx:  This time, I have high suspicion for lower GI bleed, but no clear etiology at this time.  I do think that there is a chance that patient has vascular ectasia given multiple surgical procedures in the past.  It is also possible that patient's GI bleed is 2/2 cancer/mass/polyps, though I have lower suspicion for this given recent imaging/colonoscopies.     Disposition:   Patient will be admitted to Hospital  Medicine. Discussed reason for admission and plan with patient and/or caregiver who expressed understanding and agreement.    Please see HPI, physical exam, ED course for additional details.    Amount and/or Complexity of Data Reviewed  Labs: ordered.  Radiology: ordered.    Risk  Prescription drug management.              Attending Attestation:   Physician Attestation Statement for Resident:  As the supervising MD   Physician Attestation Statement: I have personally seen and examined this patient.   I agree with the above history.  -:   As the supervising MD I agree with the above PE.     As the supervising MD I agree with the above treatment, course, plan, and disposition.   -: Pt hemodynamically stable, painless BRBPR, seems likely lower GI bleed at this time.  Not obviously large volume at this time.  No blood thinners listed on med list. No indication for emergency transfusion at this time.  Will cover with protonix in case upper component.  Will admit for further eval and management of GI bleed.   I have reviewed and agree with the residents interpretation of the following: lab data.                                        Clinical Impression:  Final diagnoses:  [K92.2] Gastrointestinal hemorrhage, unspecified gastrointestinal hemorrhage type (Primary)                 Fernando Bain MD  Resident  12/07/24 1630       Kentrell Belle MD  12/09/24 1430     No

## 2024-12-08 ENCOUNTER — ANESTHESIA EVENT (OUTPATIENT)
Dept: ENDOSCOPY | Facility: HOSPITAL | Age: 89
DRG: 378 | End: 2024-12-08
Payer: MEDICARE

## 2024-12-08 LAB
ALBUMIN SERPL BCP-MCNC: 3 G/DL (ref 3.5–5.2)
ALP SERPL-CCNC: 44 U/L (ref 40–150)
ALT SERPL W/O P-5'-P-CCNC: 8 U/L (ref 10–44)
ANION GAP SERPL CALC-SCNC: 8 MMOL/L (ref 8–16)
AST SERPL-CCNC: 12 U/L (ref 10–40)
BASOPHILS # BLD AUTO: 0.03 K/UL (ref 0–0.2)
BASOPHILS NFR BLD: 0.4 % (ref 0–1.9)
BASOPHILS NFR BLD: 0.5 % (ref 0–1.9)
BASOPHILS NFR BLD: 0.6 % (ref 0–1.9)
BILIRUB SERPL-MCNC: 0.2 MG/DL (ref 0.1–1)
BLD PROD TYP BPU: NORMAL
BLOOD UNIT EXPIRATION DATE: NORMAL
BLOOD UNIT TYPE CODE: 6200
BLOOD UNIT TYPE: NORMAL
BUN SERPL-MCNC: 22 MG/DL (ref 8–23)
CALCIUM SERPL-MCNC: 8.2 MG/DL (ref 8.7–10.5)
CHLORIDE SERPL-SCNC: 103 MMOL/L (ref 95–110)
CO2 SERPL-SCNC: 21 MMOL/L (ref 23–29)
CODING SYSTEM: NORMAL
CREAT SERPL-MCNC: 0.8 MG/DL (ref 0.5–1.4)
CROSSMATCH INTERPRETATION: NORMAL
DIFFERENTIAL METHOD BLD: ABNORMAL
DISPENSE STATUS: NORMAL
EOSINOPHIL # BLD AUTO: 0 K/UL (ref 0–0.5)
EOSINOPHIL NFR BLD: 0.2 % (ref 0–8)
EOSINOPHIL NFR BLD: 0.4 % (ref 0–8)
EOSINOPHIL NFR BLD: 0.5 % (ref 0–8)
ERYTHROCYTE [DISTWIDTH] IN BLOOD BY AUTOMATED COUNT: 15.7 % (ref 11.5–14.5)
ERYTHROCYTE [DISTWIDTH] IN BLOOD BY AUTOMATED COUNT: 16.6 % (ref 11.5–14.5)
ERYTHROCYTE [DISTWIDTH] IN BLOOD BY AUTOMATED COUNT: 16.9 % (ref 11.5–14.5)
EST. GFR  (NO RACE VARIABLE): >60 ML/MIN/1.73 M^2
FERRITIN SERPL-MCNC: 66 NG/ML (ref 20–300)
GLUCOSE SERPL-MCNC: 97 MG/DL (ref 70–110)
HCT VFR BLD AUTO: 19.7 % (ref 37–48.5)
HCT VFR BLD AUTO: 20.2 % (ref 37–48.5)
HCT VFR BLD AUTO: 22.4 % (ref 37–48.5)
HGB BLD-MCNC: 6.2 G/DL (ref 12–16)
HGB BLD-MCNC: 6.4 G/DL (ref 12–16)
HGB BLD-MCNC: 7.3 G/DL (ref 12–16)
IMM GRANULOCYTES # BLD AUTO: 0.07 K/UL (ref 0–0.04)
IMM GRANULOCYTES # BLD AUTO: 0.08 K/UL (ref 0–0.04)
IMM GRANULOCYTES # BLD AUTO: 0.11 K/UL (ref 0–0.04)
IMM GRANULOCYTES NFR BLD AUTO: 1.4 % (ref 0–0.5)
IMM GRANULOCYTES NFR BLD AUTO: 1.4 % (ref 0–0.5)
IMM GRANULOCYTES NFR BLD AUTO: 1.6 % (ref 0–0.5)
IRON SERPL-MCNC: 23 UG/DL (ref 30–160)
LYMPHOCYTES # BLD AUTO: 0.8 K/UL (ref 1–4.8)
LYMPHOCYTES # BLD AUTO: 0.9 K/UL (ref 1–4.8)
LYMPHOCYTES # BLD AUTO: 1.5 K/UL (ref 1–4.8)
LYMPHOCYTES NFR BLD: 14.8 % (ref 18–48)
LYMPHOCYTES NFR BLD: 15.5 % (ref 18–48)
LYMPHOCYTES NFR BLD: 21 % (ref 18–48)
MCH RBC QN AUTO: 26.6 PG (ref 27–31)
MCH RBC QN AUTO: 26.8 PG (ref 27–31)
MCH RBC QN AUTO: 27.2 PG (ref 27–31)
MCHC RBC AUTO-ENTMCNC: 31.5 G/DL (ref 32–36)
MCHC RBC AUTO-ENTMCNC: 31.7 G/DL (ref 32–36)
MCHC RBC AUTO-ENTMCNC: 32.6 G/DL (ref 32–36)
MCV RBC AUTO: 84 FL (ref 82–98)
MCV RBC AUTO: 85 FL (ref 82–98)
MCV RBC AUTO: 85 FL (ref 82–98)
MONOCYTES # BLD AUTO: 0.7 K/UL (ref 0.3–1)
MONOCYTES # BLD AUTO: 0.8 K/UL (ref 0.3–1)
MONOCYTES # BLD AUTO: 0.9 K/UL (ref 0.3–1)
MONOCYTES NFR BLD: 12.4 % (ref 4–15)
MONOCYTES NFR BLD: 13.6 % (ref 4–15)
MONOCYTES NFR BLD: 14.6 % (ref 4–15)
NEUTROPHILS # BLD AUTO: 3.5 K/UL (ref 1.8–7.7)
NEUTROPHILS # BLD AUTO: 3.9 K/UL (ref 1.8–7.7)
NEUTROPHILS # BLD AUTO: 4.5 K/UL (ref 1.8–7.7)
NEUTROPHILS NFR BLD: 64.2 % (ref 38–73)
NEUTROPHILS NFR BLD: 67.5 % (ref 38–73)
NEUTROPHILS NFR BLD: 69.4 % (ref 38–73)
NRBC BLD-RTO: 0 /100 WBC
NUM UNITS TRANS PACKED RBC: NORMAL
OHS QRS DURATION: 76 MS
OHS QTC CALCULATION: 425 MS
PLATELET # BLD AUTO: 186 K/UL (ref 150–450)
PLATELET # BLD AUTO: 214 K/UL (ref 150–450)
PLATELET # BLD AUTO: 243 K/UL (ref 150–450)
PMV BLD AUTO: 10.1 FL (ref 9.2–12.9)
PMV BLD AUTO: 10.2 FL (ref 9.2–12.9)
PMV BLD AUTO: 9.7 FL (ref 9.2–12.9)
POTASSIUM SERPL-SCNC: 4.2 MMOL/L (ref 3.5–5.1)
PROT SERPL-MCNC: 5.1 G/DL (ref 6–8.4)
RBC # BLD AUTO: 2.33 M/UL (ref 4–5.4)
RBC # BLD AUTO: 2.39 M/UL (ref 4–5.4)
RBC # BLD AUTO: 2.68 M/UL (ref 4–5.4)
SATURATED IRON: 8 % (ref 20–50)
SODIUM SERPL-SCNC: 132 MMOL/L (ref 136–145)
TOTAL IRON BINDING CAPACITY: 303 UG/DL (ref 250–450)
TRANSFERRIN SERPL-MCNC: 205 MG/DL (ref 200–375)
WBC # BLD AUTO: 5.07 K/UL (ref 3.9–12.7)
WBC # BLD AUTO: 5.74 K/UL (ref 3.9–12.7)
WBC # BLD AUTO: 7.01 K/UL (ref 3.9–12.7)

## 2024-12-08 PROCEDURE — 21400001 HC TELEMETRY ROOM

## 2024-12-08 PROCEDURE — 82728 ASSAY OF FERRITIN: CPT | Performed by: HOSPITALIST

## 2024-12-08 PROCEDURE — 25000003 PHARM REV CODE 250

## 2024-12-08 PROCEDURE — 85025 COMPLETE CBC W/AUTO DIFF WBC: CPT | Mod: 91 | Performed by: STUDENT IN AN ORGANIZED HEALTH CARE EDUCATION/TRAINING PROGRAM

## 2024-12-08 PROCEDURE — P9016 RBC LEUKOCYTES REDUCED: HCPCS

## 2024-12-08 PROCEDURE — 80053 COMPREHEN METABOLIC PANEL: CPT | Performed by: HOSPITALIST

## 2024-12-08 PROCEDURE — 30233N1 TRANSFUSION OF NONAUTOLOGOUS RED BLOOD CELLS INTO PERIPHERAL VEIN, PERCUTANEOUS APPROACH: ICD-10-PCS | Performed by: STUDENT IN AN ORGANIZED HEALTH CARE EDUCATION/TRAINING PROGRAM

## 2024-12-08 PROCEDURE — 85025 COMPLETE CBC W/AUTO DIFF WBC: CPT | Mod: 91 | Performed by: HOSPITALIST

## 2024-12-08 PROCEDURE — 36415 COLL VENOUS BLD VENIPUNCTURE: CPT | Performed by: HOSPITALIST

## 2024-12-08 PROCEDURE — P9016 RBC LEUKOCYTES REDUCED: HCPCS | Performed by: NURSE PRACTITIONER

## 2024-12-08 PROCEDURE — 86920 COMPATIBILITY TEST SPIN: CPT | Performed by: NURSE PRACTITIONER

## 2024-12-08 PROCEDURE — 85025 COMPLETE CBC W/AUTO DIFF WBC: CPT | Performed by: PHYSICIAN ASSISTANT

## 2024-12-08 PROCEDURE — 86920 COMPATIBILITY TEST SPIN: CPT

## 2024-12-08 PROCEDURE — 25000003 PHARM REV CODE 250: Performed by: HOSPITALIST

## 2024-12-08 PROCEDURE — 84466 ASSAY OF TRANSFERRIN: CPT | Performed by: HOSPITALIST

## 2024-12-08 PROCEDURE — 63600175 PHARM REV CODE 636 W HCPCS: Performed by: HOSPITALIST

## 2024-12-08 PROCEDURE — 99222 1ST HOSP IP/OBS MODERATE 55: CPT | Mod: GC,,, | Performed by: STUDENT IN AN ORGANIZED HEALTH CARE EDUCATION/TRAINING PROGRAM

## 2024-12-08 RX ORDER — TALC
6 POWDER (GRAM) TOPICAL NIGHTLY PRN
Status: DISCONTINUED | OUTPATIENT
Start: 2024-12-08 | End: 2024-12-10 | Stop reason: HOSPADM

## 2024-12-08 RX ORDER — PROCHLORPERAZINE EDISYLATE 5 MG/ML
5 INJECTION INTRAMUSCULAR; INTRAVENOUS EVERY 6 HOURS PRN
Status: DISCONTINUED | OUTPATIENT
Start: 2024-12-08 | End: 2024-12-10 | Stop reason: HOSPADM

## 2024-12-08 RX ORDER — IBUPROFEN 200 MG
24 TABLET ORAL
Status: DISCONTINUED | OUTPATIENT
Start: 2024-12-08 | End: 2024-12-10 | Stop reason: HOSPADM

## 2024-12-08 RX ORDER — LANOLIN ALCOHOL/MO/W.PET/CERES
1 CREAM (GRAM) TOPICAL DAILY
Status: DISCONTINUED | OUTPATIENT
Start: 2024-12-08 | End: 2024-12-10 | Stop reason: HOSPADM

## 2024-12-08 RX ORDER — ATORVASTATIN CALCIUM 40 MG/1
40 TABLET, FILM COATED ORAL DAILY
Status: DISCONTINUED | OUTPATIENT
Start: 2024-12-08 | End: 2024-12-10 | Stop reason: HOSPADM

## 2024-12-08 RX ORDER — NALOXONE HCL 0.4 MG/ML
0.02 VIAL (ML) INJECTION
Status: DISCONTINUED | OUTPATIENT
Start: 2024-12-08 | End: 2024-12-10 | Stop reason: HOSPADM

## 2024-12-08 RX ORDER — ONDANSETRON 8 MG/1
8 TABLET, ORALLY DISINTEGRATING ORAL EVERY 8 HOURS PRN
Status: DISCONTINUED | OUTPATIENT
Start: 2024-12-08 | End: 2024-12-10 | Stop reason: HOSPADM

## 2024-12-08 RX ORDER — IBUPROFEN 200 MG
16 TABLET ORAL
Status: DISCONTINUED | OUTPATIENT
Start: 2024-12-08 | End: 2024-12-10 | Stop reason: HOSPADM

## 2024-12-08 RX ORDER — HYDROCODONE BITARTRATE AND ACETAMINOPHEN 500; 5 MG/1; MG/1
TABLET ORAL
Status: DISCONTINUED | OUTPATIENT
Start: 2024-12-08 | End: 2024-12-10 | Stop reason: HOSPADM

## 2024-12-08 RX ORDER — AMLODIPINE BESYLATE 10 MG/1
10 TABLET ORAL DAILY
Status: DISCONTINUED | OUTPATIENT
Start: 2024-12-08 | End: 2024-12-10 | Stop reason: HOSPADM

## 2024-12-08 RX ORDER — GLUCAGON 1 MG
1 KIT INJECTION
Status: DISCONTINUED | OUTPATIENT
Start: 2024-12-08 | End: 2024-12-10 | Stop reason: HOSPADM

## 2024-12-08 RX ORDER — POLYETHYLENE GLYCOL 3350, SODIUM SULFATE ANHYDROUS, SODIUM BICARBONATE, SODIUM CHLORIDE, POTASSIUM CHLORIDE 236; 22.74; 6.74; 5.86; 2.97 G/4L; G/4L; G/4L; G/4L; G/4L
4000 POWDER, FOR SOLUTION ORAL ONCE
Status: COMPLETED | OUTPATIENT
Start: 2024-12-08 | End: 2024-12-08

## 2024-12-08 RX ORDER — POLYETHYLENE GLYCOL 3350 17 G/17G
17 POWDER, FOR SOLUTION ORAL DAILY PRN
Status: DISCONTINUED | OUTPATIENT
Start: 2024-12-08 | End: 2024-12-10 | Stop reason: HOSPADM

## 2024-12-08 RX ADMIN — SODIUM CHLORIDE 1000 ML: 9 INJECTION, SOLUTION INTRAVENOUS at 06:12

## 2024-12-08 RX ADMIN — PANTOPRAZOLE SODIUM 40 MG: 40 INJECTION, POWDER, LYOPHILIZED, FOR SOLUTION INTRAVENOUS at 10:12

## 2024-12-08 RX ADMIN — PANTOPRAZOLE SODIUM 40 MG: 40 INJECTION, POWDER, LYOPHILIZED, FOR SOLUTION INTRAVENOUS at 09:12

## 2024-12-08 RX ADMIN — FERROUS SULFATE TAB EC 325 MG (65 MG FE EQUIVALENT) 1 EACH: 325 (65 FE) TABLET DELAYED RESPONSE at 10:12

## 2024-12-08 RX ADMIN — ATORVASTATIN CALCIUM 40 MG: 40 TABLET, FILM COATED ORAL at 10:12

## 2024-12-08 RX ADMIN — POLYETHYLENE GLYCOL 3350, SODIUM SULFATE ANHYDROUS, SODIUM BICARBONATE, SODIUM CHLORIDE, POTASSIUM CHLORIDE 4000 ML: 236; 22.74; 6.74; 5.86; 2.97 POWDER, FOR SOLUTION ORAL at 05:12

## 2024-12-08 NOTE — HPI
"89 yo F with PMHx HTN, HLD, and colon cancer s/p right hemicolectomy 3/20/20 who presents to the ED complaining of blood in the toilet noted today. Pt. Reports she woke up this morning and noted blood in the toilet after she used the restoom. Pt. Initally uncertain if bleeding came from urine or stool. After urine sample in ED collected and noted to be without blood, pt. Believes the blood may have come from her stool. Pt. Has not had any further episodes since the initial episode this mroning and denies noting any hematochezia or melena prior to today. She has no other symptoms, specifically denies abdominal pain, lightheadedness, fatigue, nausea, vomiting/hematemesis.    Pt. Has hx of colon cancer and resection. She has had iron deificency anemia since thought to be 2/2 GI losses. Last colonoscopy 2022 showed "Patent side-to-side ileo-colonic anastomosis, characterized by inflammation. Biopsied. Clip was placed." Rest  of exam normal. Biopsy showed  "Benign colonic mucosa with acute inflammation/granulation tissue."  "

## 2024-12-08 NOTE — ASSESSMENT & PLAN NOTE
"-Pt. Presents with BRBPR, known hx of colon Ca s/p resection. CTA show no active bleeding but "lobular mucosal enhancement involving the rectal anal region, similar in appearance to previous exams"  -Hgb 8.9, downtrending from ~11 baseline. Otherwise Hemodynamically stable  -GI pathway ordered with 2 large bore IVs placed, GI consult, IV protonix  -Continue to trend Hgb and Transfuse for Hgb <7  -Clear liquid diet for now    "

## 2024-12-08 NOTE — H&P
"  [facility] Observation [address] [place] [address] Medicine  History & Physical    Patient Name: Nydia Stevens  MRN: [id number]  Patient Class: OP- Observation  Admission Date: 12/7/2024  Attending Physician: Domenico Hanson MD   Primary Care Provider: Brittney Travis MD         Patient information was obtained from patient, past medical records, and ER records.     Subjective:     Principal Problem:Rectal bleeding    Chief Complaint:   Chief Complaint   Patient presents with    Rectal Bleeding     Bright red blood, hx of colon cancer. Denies dizziness, chest pain or SOB        HPI: 89 yo F with PMHx HTN, HLD, and colon cancer s/p right hemicolectomy 3/20/20 who presents to the ED complaining of blood in the toilet noted today. Pt. Reports she woke up this morning and noted blood in the toilet after she used the restoom. Pt. Initally uncertain if bleeding came from urine or stool. After urine sample in ED collected and noted to be without blood, pt. Believes the blood may have come from her stool. Pt. Has not had any further episodes since the initial episode this mroning and denies noting any hematochezia or melena prior to today. She has no other symptoms, specifically denies abdominal pain, lightheadedness, fatigue, nausea, vomiting/hematemesis.    Pt. Has hx of colon cancer and resection. She has had iron deificency anemia since thought to be 2/2 GI losses. Last colonoscopy 2022 showed "Patent side-to-side ileo-colonic anastomosis, characterized by inflammation. Biopsied. Clip was placed." Rest  of exam normal. Biopsy showed  "Benign colonic mucosa with acute inflammation/granulation tissue."    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    Hyperlipidemia     Hypertension     Osteoporosis        Past Surgical History:   Procedure Laterality Date    " CATARACT EXTRACTION W/  INTRAOCULAR LENS IMPLANT Left 8/11/14    [facility]    CATARACT EXTRACTION W/  INTRAOCULAR LENS IMPLANT Right 09/15/14    [facility]    COLONOSCOPY N/A 3/19/2020    Procedure: COLONOSCOPY;  Surgeon: Real Mabry MD;  Location: [facility] ENDO (2ND FLR);  Service: Endoscopy;  Laterality: N/A;    COLONOSCOPY N/A 3/23/2021    Procedure: COLONOSCOPY;  Surgeon: AUTUMN Berg MD;  Location: [facility] ENDO (4TH FLR);  Service: Endoscopy;  Laterality: N/A;  covid test 3/20 [facility]    COLONOSCOPY N/A 2/15/2022    Procedure: COLONOSCOPY;  Surgeon: Doris Simpson MD;  Location: [facility] ENDO (4TH FLR);  Service: Endoscopy;  Laterality: N/A;  COVID test on 2/12/22 at [facility]  2/14/22 - Pt confirmed 0640 arrival, prep instructions reviewed, Pt verbalized [id number]    ESOPHAGOGASTRODUODENOSCOPY N/A 3/19/2020    Procedure: EGD (ESOPHAGOGASTRODUODENOSCOPY);  Surgeon: Real Mabry MD;  Location: [facility] ENDO (2ND FLR);  Service: Endoscopy;  Laterality: N/A;    FOOT SURGERY      left    HYSTERECTOMY  1962    ILEOCOLECTOMY N/A 4/15/2021    Procedure: ILEOCOLECTOMY;  Surgeon: AUTUMN Berg MD;  Location: [facility] OR 2ND FLR;  Service: Colon and Rectal;  Laterality: N/A;    INSERTION OF TUNNELED CENTRAL VENOUS CATHETER (CVC) WITH SUBCUTANEOUS PORT N/A 7/2/2021    Procedure: [id number];  Surgeon: Suresh Agrawal MD;  Location: [facility] OR 2ND FLR;  Service: Vascular;  Laterality: N/A;  Right IJ    LAPAROSCOPIC HEMICOLECTOMY Right 3/20/2020    Procedure: HEMICOLECTOMY, RIGHT;  Surgeon: AUTUMN Berg MD;  Location: [facility] OR 2ND FLR;  Service: Colon and Rectal;  Laterality: Right;    LYSIS OF ADHESIONS N/A 4/15/2021    Procedure: LYSIS, ADHESIONS;  Surgeon: AUTUMN Berg MD;  Location: [facility] OR 2ND FLR;  Service: Colon and Rectal;  Laterality: N/A;  Greater than 2 hours    MOBILIZATION OF SPLENIC FLEXURE N/A 4/15/2021    Procedure: MOBILIZATION, SPLENIC FLEXURE;  Surgeon: AUTUMN Berg MD;   Location: [facility] OR [address] [place] [address];  Service: Colon and Rectal;  Laterality: N/A;       Review of patient's allergies indicates:  No Known Allergies    Current Facility-Administered Medications on File Prior to Encounter   Medication    gabapentin capsule 300 mg    LIDOcaine (PF) 10 mg/ml (1%) injection 10 mg     Current Outpatient Medications on File Prior to Encounter   Medication Sig    amLODIPine (NORVASC) 10 MG tablet Take 1 tablet (10 mg total) by mouth once daily.    calcium carbonate (OS-JAILYN) 500 mg calcium (1,250 mg) tablet Take 1 tablet (500 mg total) by mouth once daily.    cholecalciferol, vitamin D3, (VITAMIN D3) 25 mcg (1,000 unit) capsule Take 1,000 Units by mouth once daily. Hold until after surgery    cyanocobalamin (VITAMIN B-12) 1000 MCG tablet Take 1 tablet (1,000 mcg total) by mouth once daily. Hold until after surgery    FEROSUL 325 mg (65 mg iron) Tab tablet TAKE ONE TABLET BY MOUTH ON TUESDAY, THURSDAY, AND SATURDAY    LIDOcaine-prilocaine (EMLA) cream Apply topically as needed (apply over port before chemo). apply over port before chemo    losartan (COZAAR) 100 MG tablet Take 1 tablet (100 mg total) by mouth once daily.    lutein-zeaxanthin (OCUVITE LUTEIN 25) 25-5 mg Cap Take 1 tablet by mouth once daily.    potassium chloride (KLOR-CON) 10 MEQ TbSR TAKE 1 TABLET BY MOUTH ONCE DAILY.    rosuvastatin (CRESTOR) 40 MG Tab TAKE ONE TABLET BY MOUTH EVERY DAY FOR CHOLESTEROL    VIT C/VIT E ACETATE/LUTEIN/MIN (OCUVITE LUTEIN ORAL) Take 1 tablet by mouth once daily. Hold until after surgery     Family History       Problem Relation (Age of Onset)    Cataracts Brother, Sister, Sister    Diabetes Brother, Sister    Heart attack Father, Brother    No Known Problems Mother, Maternal Aunt, Maternal Uncle, Paternal Aunt, Paternal Uncle, Maternal Grandmother, Maternal Grandfather, Paternal Grandmother, Paternal Grandfather    Stroke Sister, Sister          Tobacco Use    Smoking status: Never    Smokeless tobacco:  Never   Substance and Sexual Activity    Alcohol use: No     Alcohol/week: 0.0 standard drinks of alcohol    Drug use: No    Sexual activity: Never     Review of Systems   Constitutional:  Negative for activity change, appetite change, chills, fever and unexpected weight change.   HENT:  Negative for congestion and sore throat.    Respiratory:  Negative for cough and shortness of breath.    Cardiovascular:  Negative for chest pain, palpitations and leg swelling.   Gastrointestinal:  Positive for blood in stool. Negative for abdominal distention, abdominal pain, constipation, diarrhea, nausea and vomiting.   Genitourinary:  Negative for difficulty urinating, dysuria and hematuria.   Musculoskeletal:  Negative for arthralgias and myalgias.   Skin:  Negative for color change and rash.   Neurological:  Negative for dizziness, tremors and seizures.     Objective:     Vital Signs (Most Recent):  Temp: 97.7 °F (36.5 °C) (12/07/24 1150)  Pulse: 90 (12/07/24 1917)  Resp: 20 (12/07/24 1815)  BP: 131/62 (12/07/24 1917)  SpO2: 99 % (12/07/24 1917) Vital Signs (24h Range):  Temp:  [97.7 °F (36.5 °C)] 97.7 °F (36.5 °C)  Pulse:  [] 90  Resp:  [14-20] 20  SpO2:  [99 %-100 %] 99 %  BP: (122-166)/(60-77) 131/62     Weight: 49.9 kg (110 lb)  Body mass index is 17.23 kg/m².     Physical Exam  Vitals reviewed.   Constitutional:       General: She is not in acute distress.     Appearance: She is well-developed.   HENT:      Head: Normocephalic and atraumatic.   Eyes:      Extraocular Movements: Extraocular movements intact.      Pupils: Pupils are equal, round, and reactive to light.   Neck:      Vascular: No JVD.      Trachea: No tracheal deviation.   Cardiovascular:      Rate and Rhythm: Normal rate and regular rhythm.      Heart sounds: No murmur heard.     No friction rub. No gallop.   Pulmonary:      Effort: No respiratory distress.      Breath sounds: Normal breath sounds. No wheezing or rales.   Abdominal:      General:  "Bowel sounds are normal. There is no distension.      Palpations: Abdomen is soft. There is no mass.      Tenderness: There is no abdominal tenderness.   Musculoskeletal:         General: No deformity.      Cervical back: Neck supple.   Lymphadenopathy:      Cervical: No cervical adenopathy.   Skin:     General: Skin is warm and dry.      Findings: No rash.   Neurological:      Mental Status: She is alert and oriented to person, place, and time.              CRANIAL NERVES     CN III, IV, VI   Pupils are equal, round, and reactive to light.       Significant Labs: All pertinent labs within the past 24 hours have been reviewed.    Significant Imaging: I have reviewed all pertinent imaging results/findings within the past 24 hours.  Assessment/Plan:     * Rectal bleeding  -Pt. Presents with BRBPR, known hx of colon Ca s/p resection. CTA show no active bleeding but "lobular mucosal enhancement involving the rectal anal region, similar in appearance to previous exams"  -Hgb 8.9, downtrending from ~11 baseline. Otherwise Hemodynamically stable  -GI pathway ordered with 2 large bore IVs placed, GI consult, IV protonix  -Continue to trend Hgb and Transfuse for Hgb <7  -Clear liquid diet for now      History of colon cancer  -Pt. S/p chemo and resection (Hemicolectomy done on 03/19/2020. Had anastomotic recurrence s/p resection 4/15/21.)  -Now in remission but with chronic ANANDA thought to be related to GI losses from disease. Treat GI bleed as above, otherwise continue to f/u with oncology for regular surveillance    Mixed hyperlipidemia  -Continue home crestor      HTN (hypertension)  -BP WNL, will hold home amlodipine and losartan for now in setting of GI bleed. Plan to resume if pt. BP increases/bleeding remains otherwise stable      VTE Risk Mitigation (From admission, onward)           Ordered     IP VTE HIGH RISK PATIENT  Once         12/07/24 1906     Place sequential compression device  Until discontinued         " 12/07/24 1906                       On 12/08/2024, patient should be placed in hospital observation services under my care.             Lucas Castle MD  Department of Hospital Medicine  [facility] - Observation 11H

## 2024-12-08 NOTE — ASSESSMENT & PLAN NOTE
-BP WNL, will hold home amlodipine and losartan for now in setting of GI bleed. Plan to resume if pt. BP increases/bleeding remains otherwise stable

## 2024-12-08 NOTE — HOSPITAL COURSE
Ms. Nydia Stevens was admitted for evaluation of BRBPR. Pt HDS and afebrile, found to have asymptomatic anemia w/ hgb 6.2 on admission. She was placed GIB pathway and given IV protonix, IVF resuscitation and transfused 1 unit prbc. GI consulted. Pt notably had a significant event of a large bloody BM followed by asymptomatic hypotension. Hypotension resolved w/ the admin of IVF and blood transfusion. GI performed C scope 12/9 > no evidence of recent bleeding was seen. Stool in colon was brown in appearance. Anastamosis site was not reached due to limited visualization due to stool in colon. Hgb stable following C scope. Pt medically ready for discharge home. Pt med rec'd and medications were delivered to bedside prior to discharge.Pt educated on hospital course and plan, verbally agrees and understands. All questions answered.     Physical Exam  Gen: in NAD, appears stated age  Neuro: AAOx3, motor, sensory, and strength grossly intact BL  HEENT: EOMI, PERRLA; no JVD appreciated  CVS: RRR, no m/r/g  Resp: lungs CTAB, no w/r/r; no belabored breathing or accessory muscle use appreciated   Abd: NTND, soft to palpation  Extrem: no UE or LE edema BL

## 2024-12-08 NOTE — SUBJECTIVE & OBJECTIVE
Interval History: Pt seen and evaluated at bedside this am. Pt had a large bloody BM today and was quickly fluid resuscitated and transfused w/ prbc. Pt asx throughout episode of hypotension however she did appear pale on my exam. Pt is in good spirits. I consulted GI who is tentatively planning for scope tomorrow. CLD and bowel prep tonight.    Review of Systems   Constitutional:  Negative for activity change, appetite change, chills, fever and unexpected weight change.   HENT:  Negative for congestion and sore throat.    Respiratory:  Negative for cough and shortness of breath.    Cardiovascular:  Negative for chest pain, palpitations and leg swelling.   Gastrointestinal:  Positive for blood in stool. Negative for abdominal distention, abdominal pain, constipation, diarrhea, nausea and vomiting.   Genitourinary:  Negative for difficulty urinating, dysuria and hematuria.   Musculoskeletal:  Negative for arthralgias and myalgias.   Skin:  Negative for color change and rash.   Neurological:  Negative for dizziness, tremors and seizures.     Objective:     Vital Signs (Most Recent):  Temp: 98 °F (36.7 °C) (12/08/24 0953)  Pulse: 82 (12/08/24 1040)  Resp: 16 (12/08/24 0953)  BP: (!) 118/56 (12/08/24 0953)  SpO2: 99 % (12/08/24 0953) Vital Signs (24h Range):  Temp:  [97.4 °F (36.3 °C)-98.3 °F (36.8 °C)] 98 °F (36.7 °C)  Pulse:  [] 82  Resp:  [14-20] 16  SpO2:  [92 %-100 %] 99 %  BP: ()/(39-77) 118/56     Weight: 53.7 kg (118 lb 6.2 oz)  Body mass index is 18.54 kg/m².    Intake/Output Summary (Last 24 hours) at 12/8/2024 1314  Last data filed at 12/8/2024 0953  Gross per 24 hour   Intake 401.67 ml   Output --   Net 401.67 ml         Physical Exam  Vitals reviewed.   Constitutional:       General: She is not in acute distress.     Appearance: She is well-developed.   HENT:      Head: Normocephalic and atraumatic.      Mouth/Throat:      Mouth: Mucous membranes are moist.   Eyes:      Extraocular Movements:  Extraocular movements intact.      Pupils: Pupils are equal, round, and reactive to light.   Neck:      Vascular: No JVD.      Trachea: No tracheal deviation.   Cardiovascular:      Rate and Rhythm: Normal rate and regular rhythm.      Heart sounds: No murmur heard.     No friction rub. No gallop.   Pulmonary:      Effort: No respiratory distress.      Breath sounds: Normal breath sounds. No wheezing or rales.   Abdominal:      General: Bowel sounds are normal. There is no distension.      Palpations: Abdomen is soft. There is no mass.      Tenderness: There is no abdominal tenderness.   Musculoskeletal:         General: No deformity.      Cervical back: Neck supple.   Lymphadenopathy:      Cervical: No cervical adenopathy.   Skin:     General: Skin is warm and dry.      Capillary Refill: Capillary refill takes 2 to 3 seconds.      Coloration: Skin is pale.      Findings: No rash.   Neurological:      Mental Status: She is alert and oriented to person, place, and time.             Significant Labs: All pertinent labs within the past 24 hours have been reviewed.    Significant Imaging: I have reviewed all pertinent imaging results/findings within the past 24 hours.

## 2024-12-08 NOTE — ASSESSMENT & PLAN NOTE
-Pt. S/p chemo and resection (Hemicolectomy done on 03/19/2020. Had anastomotic recurrence s/p resection 4/15/21.)  -Now in remission but with chronic ANANDA thought to be related to GI losses from disease. Treat GI bleed as above, otherwise continue to f/u with oncology for regular surveillance

## 2024-12-08 NOTE — PLAN OF CARE
Problem: Adult Inpatient Plan of Care  Goal: Plan of Care Review  Outcome: Progressing  Goal: Patient-Specific Goal (Individualized)  Outcome: Progressing  Goal: Absence of Hospital-Acquired Illness or Injury  Outcome: Progressing  Goal: Optimal Comfort and Wellbeing  Outcome: Progressing  Goal: Readiness for Transition of Care  Outcome: Progressing     Problem: Gastrointestinal Bleeding  Goal: Optimal Coping with Acute Illness  Outcome: Progressing  Goal: Hemostasis  Outcome: Progressing     Problem: Infection  Goal: Absence of Infection Signs and Symptoms  Outcome: Progressing     Problem: Fall Injury Risk  Goal: Absence of Fall and Fall-Related Injury  Outcome: Progressing     Problem: Skin Injury Risk Increased  Goal: Skin Health and Integrity  Outcome: Progressing

## 2024-12-08 NOTE — PROGRESS NOTES
"[facility] [address] [place] [address] Medicine  Progress Note    Patient Name: Nydia Stevens  MRN: [id number]  Patient Class: OP- Observation   Admission Date: 12/7/2024  Length of Stay: 0 days  Attending Physician: Domenico Hanson MD  Primary Care Provider: Brittney Travis MD        Subjective     Principal Problem:Rectal bleeding        HPI:  89 yo F with PMHx HTN, HLD, and colon cancer s/p right hemicolectomy 3/20/20 who presents to the ED complaining of blood in the toilet noted today. Pt. Reports she woke up this morning and noted blood in the toilet after she used the restoom. Pt. Initally uncertain if bleeding came from urine or stool. After urine sample in ED collected and noted to be without blood, pt. Believes the blood may have come from her stool. Pt. Has not had any further episodes since the initial episode this mroning and denies noting any hematochezia or melena prior to today. She has no other symptoms, specifically denies abdominal pain, lightheadedness, fatigue, nausea, vomiting/hematemesis.    Pt. Has hx of colon cancer and resection. She has had iron deificency anemia since thought to be 2/2 GI losses. Last colonoscopy 2022 showed "Patent side-to-side ileo-colonic anastomosis, characterized by inflammation. Biopsied. Clip was placed." Rest  of exam normal. Biopsy showed  "Benign colonic mucosa with acute inflammation/granulation tissue."    Overview/Hospital Course:  Ms. Nydia Stevens was admitted for evaluation of BRBPR. Pt HDS and afebrile, found to have asymptomatic anemia w/ hgb 6.2 on admission. She was placed GIB pathway and given IV protonix, IVF resuscitation and transfused 1 unit prbc. GI consulted. Pt notably had a significant event of a large bloody BM followed by asymptomatic hypotension. Hypotension resolved w/ the admin of IVF and blood transfusion.     Interval History: Pt seen and evaluated at bedside this am. Pt had a large bloody BM today and was quickly fluid " resuscitated and transfused w/ prbc. Pt asx throughout episode of hypotension however she did appear pale on my exam. Pt is in good spirits. I consulted GI who is tentatively planning for scope tomorrow. CLD and bowel prep tonight.    Review of Systems   Constitutional:  Negative for activity change, appetite change, chills, fever and unexpected weight change.   HENT:  Negative for congestion and sore throat.    Respiratory:  Negative for cough and shortness of breath.    Cardiovascular:  Negative for chest pain, palpitations and leg swelling.   Gastrointestinal:  Positive for blood in stool. Negative for abdominal distention, abdominal pain, constipation, diarrhea, nausea and vomiting.   Genitourinary:  Negative for difficulty urinating, dysuria and hematuria.   Musculoskeletal:  Negative for arthralgias and myalgias.   Skin:  Negative for color change and rash.   Neurological:  Negative for dizziness, tremors and seizures.     Objective:     Vital Signs (Most Recent):  Temp: 98 °F (36.7 °C) (12/08/24 0953)  Pulse: 82 (12/08/24 1040)  Resp: 16 (12/08/24 0953)  BP: (!) 118/56 (12/08/24 0953)  SpO2: 99 % (12/08/24 0953) Vital Signs (24h Range):  Temp:  [97.4 °F (36.3 °C)-98.3 °F (36.8 °C)] 98 °F (36.7 °C)  Pulse:  [] 82  Resp:  [14-20] 16  SpO2:  [92 %-100 %] 99 %  BP: ()/(39-77) 118/56     Weight: 53.7 kg (118 lb 6.2 oz)  Body mass index is 18.54 kg/m².    Intake/Output Summary (Last 24 hours) at 12/8/2024 1314  Last data filed at 12/8/2024 0953  Gross per 24 hour   Intake 401.67 ml   Output --   Net 401.67 ml         Physical Exam  Vitals reviewed.   Constitutional:       General: She is not in acute distress.     Appearance: She is well-developed.   HENT:      Head: Normocephalic and atraumatic.      Mouth/Throat:      Mouth: Mucous membranes are moist.   Eyes:      Extraocular Movements: Extraocular movements intact.      Pupils: Pupils are equal, round, and reactive to light.   Neck:      Vascular:  "No JVD.      Trachea: No tracheal deviation.   Cardiovascular:      Rate and Rhythm: Normal rate and regular rhythm.      Heart sounds: No murmur heard.     No friction rub. No gallop.   Pulmonary:      Effort: No respiratory distress.      Breath sounds: Normal breath sounds. No wheezing or rales.   Abdominal:      General: Bowel sounds are normal. There is no distension.      Palpations: Abdomen is soft. There is no mass.      Tenderness: There is no abdominal tenderness.   Musculoskeletal:         General: No deformity.      Cervical back: Neck supple.   Lymphadenopathy:      Cervical: No cervical adenopathy.   Skin:     General: Skin is warm and dry.      Capillary Refill: Capillary refill takes 2 to 3 seconds.      Coloration: Skin is pale.      Findings: No rash.   Neurological:      Mental Status: She is alert and oriented to person, place, and time.             Significant Labs: All pertinent labs within the past 24 hours have been reviewed.    Significant Imaging: I have reviewed all pertinent imaging results/findings within the past 24 hours.    Assessment and Plan     * Rectal bleeding  -Pt. Presents with BRBPR, known hx of colon Ca s/p resection. CTA show no active bleeding but "lobular mucosal enhancement involving the rectal anal region, similar in appearance to previous exams"  -Hgb 8.9, downtrending from ~11 baseline. Otherwise Hemodynamically stable  -GI pathway ordered with 2 large bore IVs placed, GI consult, IV protonix  -Continue to trend Hgb and Transfuse for Hgb <7  -Clear liquid diet for now  -GI consult   - tentative scope tomorrow.    History of colon cancer  -Pt. S/p chemo and resection (Hemicolectomy done on 03/19/2020. Had anastomotic recurrence s/p resection 4/15/21.)  -Now in remission but with chronic ANANDA thought to be related to GI losses from disease. Treat GI bleed as above, otherwise continue to f/u with oncology for regular surveillance    Mixed hyperlipidemia  -Continue home " crestor    HTN (hypertension)  -BP WNL, will hold home amlodipine and losartan for now in setting of GI bleed. Plan to resume if pt. BP increases/bleeding remains otherwise stable      VTE Risk Mitigation (From admission, onward)           Ordered     IP VTE LOW RISK PATIENT  Once         12/08/24 0803     Place sequential compression device  Until discontinued         12/07/24 1906                    Discharge Planning   EUGENIO: 12/9/2024     Code Status: Full Code   Medical Readiness for Discharge Date:            Critical care time spent on the evaluation and treatment of severe organ dysfunction, review of pertinent labs and imaging studies, discussions with consulting providers and discussions with patient/family: 35 minutes.                 Erik Yun PA-C  Department of Hospital Medicine   [facility] 11H

## 2024-12-08 NOTE — ED NOTES
Assumed care of patient and have received report from nurse.    Nydia Stevens, a 89 y.o. female presents to the ED w/ complaint of rectal bleeding     Triage note:  Chief Complaint   Patient presents with    Rectal Bleeding     Bright red blood, hx of colon cancer. 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    LOC: The patient is awake, alert and aware of environment with an appropriate affect, the patient is oriented x 4 and speaking appropriately.    APPEARANCE: Patient resting comfortably and in no acute distress, patient is clean and well groomed, patient's clothing is properly fastened.    SKIN: The skin is warm and dry, color consistent with ethnicity, patient has normal skin turgor and moist mucus membranes, skin intact, no breakdown or bruising noted.    MUSCULOSKELETAL: Patient moving all extremities well, no obvious swelling or deformities noted.    RESPIRATORY: Airway is open and patent, respirations are spontaneous and even, patient has a normal effort and rate.    CARDIAC: Patient has a normal rate and rhythm, no periphreal edema noted, capillary refill < 3 seconds. Normal +2 pedal pulses present.    ABDOMEN: Soft and non tender to palpation, no distention noted. Patient denies any nausea, vomiting, diarrhea, or constipation.     NEUROLOGIC: Eyes open spontaneously, PERRL, behavior appropriate to situation, follows commands, facial expression symmetrical, bilateral hand grasp equal and even, purposeful motor response noted, normal sensation in all extremities.     HEENT: No abnormalities noted. White sclera and  pupils equal round and reactive to light. Denies headache, dizziness.     : Pt voids independently, denies dysuria, hematuria, frequency.

## 2024-12-08 NOTE — CONSULTS
[facility]  Gastroenterology  Consult Note    Patient Name: Nydia Stevens  MRN: [id number]  Admission Date: 12/7/2024  Hospital Length of Stay: 0 days  Code Status: Full Code   Attending Provider: Dr. Virgen  Consulting Provider: Doris Matthews MD  Primary Care Physician: Brittney Travis MD  Principal Problem:Rectal bleeding    Inpatient consult to Gastroenterology  Consult performed by: Doris Matthews MD  Consult ordered by: Erik Yun PA-C        Subjective:     HPI: Nydia Stevens is a 89 y.o. female with history of HTN, HLD, and colon cancer s/p right hemicolectomy 3/20/20 who presents to the ED complaining of bright red blood per rectum x1 day. She had multiple bloody bowel movements, all painless. Her hb was 8.9 on presentation down from baseline of about 11. She had another large bm with clots in it this AM and repeat hb down to 6.2. she received 1 unit prbc with appropriate response. No more episodes since this AM. She had a R hemicloectomy on 3/20/20 then declined adjuvent chemotherapy. Surveillance colonoscopy 3/23/21 showed lesion at ileocolic anastomosis and she underwent ileocecectomy on 4/15/21. She had another surveillance colonoscopy on 2/15/22 and nothing was found. She has had ongoing ANANDA. This is her first bleeding incident, however.    She feels well overall, naman after blood transfusion. We discussed GOC briefly but she is more than willing to undergo repeat colonoscopy and we will do EGD at the same time given that her last was in 2020. She is not on any blood thinners, doesn't take NSAIDs. No smoking or alcohol use.       ROS     Objective:     Vitals:    12/08/24 1453   BP:    Pulse: 85   Resp:    Temp:          Constitutional:  not in acute distress and well developed  HENT: Head: Normal, normocephalic, atraumatic.  Eyes: conjunctiva clear and sclera nonicteric  GI: soft, non-tender, without masses or organomegaly  Skin: normal  color  Neurological: alert, oriented x3    Significant Labs:  Reviewed the following pertinent laboratory tests: cbc, cmp. Notable for anemia.    Significant Imaging:  See HPI.  CTA pending.    Assessment/Plan:     Nydia Stevens is a 89 y.o. female with history of HTN, HLD, and colon cancer s/p right hemicolectomy 3/20/20 who presents to the ED complaining of bright red blood per rectum x1 day. GI consulted for same. Repeat blood bm this AM. Borderline blood pressures. Transfused 1 unit prbc for hb 6.4.     Problem List:  Hematochezia  Hx colon cancer s/p right hemicolectomy 2020, recurrence s/p ileocecectomy 2021  Acute blood loss anemia       Recommendations:  - discussed risks and benefits of egd/colonoscopy given age, patient wishes to proceed  - clear liquids today  - prep tonight  - egd/ colonoscopy tomorrow   - npo midnight  - patient wishes to have Sister Brenda Price as surrogate decision maker should anything arise ; [phone]    Thank you for involving us in the care of Nydia Stevens. Please call with any additional questions, concerns or changes in the patient's clinical status. We will continue to follow.     Doris Matthews MD  Gastroenterology and Hepatology Fellow, PGY-4

## 2024-12-08 NOTE — ASSESSMENT & PLAN NOTE
"-Pt. Presents with BRBPR, known hx of colon Ca s/p resection. CTA show no active bleeding but "lobular mucosal enhancement involving the rectal anal region, similar in appearance to previous exams"  -Hgb 8.9, downtrending from ~11 baseline. Otherwise Hemodynamically stable  -GI pathway ordered with 2 large bore IVs placed, GI consult, IV protonix  -Continue to trend Hgb and Transfuse for Hgb <7  -Clear liquid diet for now  -GI consult   - tentative scope tomorrow.  "

## 2024-12-08 NOTE — SUBJECTIVE & OBJECTIVE
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    Hyperlipidemia     Hypertension     Osteoporosis        Past Surgical History:   Procedure Laterality Date    CATARACT EXTRACTION W/  INTRAOCULAR LENS IMPLANT Left 8/11/14    [facility]    CATARACT EXTRACTION W/  INTRAOCULAR LENS IMPLANT Right 09/15/14    [facility]    COLONOSCOPY N/A 3/19/2020    Procedure: COLONOSCOPY;  Surgeon: Real Mabry MD;  Location: [facility] ENDO (2ND FLR);  Service: Endoscopy;  Laterality: N/A;    COLONOSCOPY N/A 3/23/2021    Procedure: COLONOSCOPY;  Surgeon: AUTUMN Berg MD;  Location: [facility] ENDO (4TH FLR);  Service: Endoscopy;  Laterality: N/A;  covid test 3/20 [facility]    COLONOSCOPY N/A 2/15/2022    Procedure: COLONOSCOPY;  Surgeon: Doris Simpson MD;  Location: [facility] ENDO (4TH FLR);  Service: Endoscopy;  Laterality: N/A;  COVID test on 2/12/22 at [facility]  2/14/22 - Pt confirmed 0640 arrival, prep instructions reviewed, Pt verbalized understanding-[id number]    ESOPHAGOGASTRODUODENOSCOPY N/A 3/19/2020    Procedure: EGD (ESOPHAGOGASTRODUODENOSCOPY);  Surgeon: Real Mabry MD;  Location: [facility] (2ND FLR);  Service: Endoscopy;  Laterality: N/A;    FOOT SURGERY      left    HYSTERECTOMY  1962    ILEOCOLECTOMY N/A 4/15/2021    Procedure: ILEOCOLECTOMY;  Surgeon: AUTUNM Berg MD;  Location: [facility] OR 2ND FLR;  Service: Colon and Rectal;  Laterality: N/A;    INSERTION OF TUNNELED CENTRAL VENOUS CATHETER (CVC) WITH SUBCUTANEOUS PORT N/A 7/2/2021    Procedure: [id number]-G-CATH;  Surgeon: Suresh Agrawal MD;  Location: [facility] OR 2ND FLR;  Service: Vascular;  Laterality: N/A;  Right IJ    LAPAROSCOPIC HEMICOLECTOMY Right 3/20/2020    Procedure: HEMICOLECTOMY, RIGHT;  Surgeon: AUTUMN Berg MD;  Location: [facility] OR 2ND FLR;  Service: Colon and Rectal;  Laterality:  Right;    LYSIS OF ADHESIONS N/A 4/15/2021    Procedure: LYSIS, ADHESIONS;  Surgeon: AUTUMN Berg MD;  Location: [facility] OR 2ND FLR;  Service: Colon and Rectal;  Laterality: N/A;  Greater than 2 hours    MOBILIZATION OF SPLENIC FLEXURE N/A 4/15/2021    Procedure: MOBILIZATION, SPLENIC FLEXURE;  Surgeon: AUTUMN Berg MD;  Location: [facility] OR 2ND FLR;  Service: Colon and Rectal;  Laterality: N/A;       Review of patient's allergies indicates:  No Known Allergies    Current Facility-Administered Medications on File Prior to Encounter   Medication    gabapentin capsule 300 mg    LIDOcaine (PF) 10 mg/ml (1%) injection 10 mg     Current Outpatient Medications on File Prior to Encounter   Medication Sig    amLODIPine (NORVASC) 10 MG tablet Take 1 tablet (10 mg total) by mouth once daily.    calcium carbonate (OS-JAILYN) 500 mg calcium (1,250 mg) tablet Take 1 tablet (500 mg total) by mouth once daily.    cholecalciferol, vitamin D3, (VITAMIN D3) 25 mcg (1,000 unit) capsule Take 1,000 Units by mouth once daily. Hold until after surgery    cyanocobalamin (VITAMIN B-12) 1000 MCG tablet Take 1 tablet (1,000 mcg total) by mouth once daily. Hold until after surgery    FEROSUL 325 mg (65 mg iron) Tab tablet TAKE ONE TABLET BY MOUTH ON TUESDAY, THURSDAY, AND SATURDAY    LIDOcaine-prilocaine (EMLA) cream Apply topically as needed (apply over port before chemo). apply over port before chemo    losartan (COZAAR) 100 MG tablet Take 1 tablet (100 mg total) by mouth once daily.    lutein-zeaxanthin (OCUVITE LUTEIN 25) 25-5 mg Cap Take 1 tablet by mouth once daily.    potassium chloride (KLOR-CON) 10 MEQ TbSR TAKE 1 TABLET BY MOUTH ONCE DAILY.    rosuvastatin (CRESTOR) 40 MG Tab TAKE ONE TABLET BY MOUTH EVERY DAY FOR CHOLESTEROL    VIT C/VIT E ACETATE/LUTEIN/MIN (OCUVITE LUTEIN ORAL) Take 1 tablet by mouth once daily. Hold until after surgery     Family History       Problem Relation (Age of Onset)    Cataracts Brother, Sister, Sister     Diabetes Brother, Sister    Heart attack Father, Brother    No Known Problems Mother, Maternal Aunt, Maternal Uncle, Paternal Aunt, Paternal Uncle, Maternal Grandmother, Maternal Grandfather, Paternal Grandmother, Paternal Grandfather    Stroke Sister, Sister          Tobacco Use    Smoking status: Never    Smokeless tobacco: Never   Substance and Sexual Activity    Alcohol use: No     Alcohol/week: 0.0 standard drinks of alcohol    Drug use: No    Sexual activity: Never     Review of Systems   Constitutional:  Negative for activity change, appetite change, chills, fever and unexpected weight change.   HENT:  Negative for congestion and sore throat.    Respiratory:  Negative for cough and shortness of breath.    Cardiovascular:  Negative for chest pain, palpitations and leg swelling.   Gastrointestinal:  Positive for blood in stool. Negative for abdominal distention, abdominal pain, constipation, diarrhea, nausea and vomiting.   Genitourinary:  Negative for difficulty urinating, dysuria and hematuria.   Musculoskeletal:  Negative for arthralgias and myalgias.   Skin:  Negative for color change and rash.   Neurological:  Negative for dizziness, tremors and seizures.     Objective:     Vital Signs (Most Recent):  Temp: 97.7 °F (36.5 °C) (12/07/24 1150)  Pulse: 90 (12/07/24 1917)  Resp: 20 (12/07/24 1815)  BP: 131/62 (12/07/24 1917)  SpO2: 99 % (12/07/24 1917) Vital Signs (24h Range):  Temp:  [97.7 °F (36.5 °C)] 97.7 °F (36.5 °C)  Pulse:  [] 90  Resp:  [14-20] 20  SpO2:  [99 %-100 %] 99 %  BP: (122-166)/(60-77) 131/62     Weight: 49.9 kg (110 lb)  Body mass index is 17.23 kg/m².     Physical Exam  Vitals reviewed.   Constitutional:       General: She is not in acute distress.     Appearance: She is well-developed.   HENT:      Head: Normocephalic and atraumatic.   Eyes:      Extraocular Movements: Extraocular movements intact.      Pupils: Pupils are equal, round, and reactive to light.   Neck:      Vascular: No  JVD.      Trachea: No tracheal deviation.   Cardiovascular:      Rate and Rhythm: Normal rate and regular rhythm.      Heart sounds: No murmur heard.     No friction rub. No gallop.   Pulmonary:      Effort: No respiratory distress.      Breath sounds: Normal breath sounds. No wheezing or rales.   Abdominal:      General: Bowel sounds are normal. There is no distension.      Palpations: Abdomen is soft. There is no mass.      Tenderness: There is no abdominal tenderness.   Musculoskeletal:         General: No deformity.      Cervical back: Neck supple.   Lymphadenopathy:      Cervical: No cervical adenopathy.   Skin:     General: Skin is warm and dry.      Findings: No rash.   Neurological:      Mental Status: She is alert and oriented to person, place, and time.              CRANIAL NERVES     CN III, IV, VI   Pupils are equal, round, and reactive to light.       Significant Labs: All pertinent labs within the past 24 hours have been reviewed.    Significant Imaging: I have reviewed all pertinent imaging results/findings within the past 24 hours.

## 2024-12-08 NOTE — SIGNIFICANT EVENT
Called to bedside emergently for large volume GIB. Vitals 120/58 HR 85 Sat 92% RA. Seen at bedside, she is pale with large amount of bright blood on her bed sheets with many clots. Has postural lightheadedness. CBC was collected prior to this episode and has not yet resulted. Repeat CBC ordered for now after this blled. A type and screen was already done. Blood conc sent was not obtained in ED or on admission, blood consented patient at bedside. Ordered 1L IVF. I discussed with patient that since she is having active bleed we can try another CTA to catch bleed and possibly stop it if ongoing though this would be getting more contrast which places her kidneys at risk of injury. She is aware of risk and would like to get CTA to see if there is active bleed. I discussed this with my supervising phsycian, Dr. Tejada, as well, who is in agreement with plan. One unit pRBC ordered empirically as I suspect her repeat hbg will have down trended significantly.     Trisha Pantoja, PA-C  [facility] Medicine

## 2024-12-09 ENCOUNTER — ANESTHESIA (OUTPATIENT)
Dept: ENDOSCOPY | Facility: HOSPITAL | Age: 89
DRG: 378 | End: 2024-12-09
Payer: MEDICARE

## 2024-12-09 ENCOUNTER — TELEPHONE (OUTPATIENT)
Dept: PRIMARY CARE CLINIC | Facility: CLINIC | Age: 89
End: 2024-12-09
Payer: MEDICARE

## 2024-12-09 LAB
ALBUMIN SERPL BCP-MCNC: 3.1 G/DL (ref 3.5–5.2)
ALP SERPL-CCNC: 47 U/L (ref 40–150)
ALT SERPL W/O P-5'-P-CCNC: 10 U/L (ref 10–44)
ANION GAP SERPL CALC-SCNC: 9 MMOL/L (ref 8–16)
AST SERPL-CCNC: 26 U/L (ref 10–40)
BASOPHILS # BLD AUTO: 0.05 K/UL (ref 0–0.2)
BASOPHILS NFR BLD: 0.6 % (ref 0–1.9)
BILIRUB SERPL-MCNC: 1.6 MG/DL (ref 0.1–1)
BLD PROD TYP BPU: NORMAL
BLD PROD TYP BPU: NORMAL
BLOOD UNIT EXPIRATION DATE: NORMAL
BLOOD UNIT EXPIRATION DATE: NORMAL
BLOOD UNIT TYPE CODE: 6200
BLOOD UNIT TYPE CODE: 6200
BLOOD UNIT TYPE: NORMAL
BLOOD UNIT TYPE: NORMAL
BUN SERPL-MCNC: 11 MG/DL (ref 8–23)
CALCIUM SERPL-MCNC: 7.8 MG/DL (ref 8.7–10.5)
CHLORIDE SERPL-SCNC: 103 MMOL/L (ref 95–110)
CO2 SERPL-SCNC: 20 MMOL/L (ref 23–29)
CODING SYSTEM: NORMAL
CODING SYSTEM: NORMAL
CREAT SERPL-MCNC: 0.7 MG/DL (ref 0.5–1.4)
CROSSMATCH INTERPRETATION: NORMAL
CROSSMATCH INTERPRETATION: NORMAL
DIFFERENTIAL METHOD BLD: ABNORMAL
DISPENSE STATUS: NORMAL
DISPENSE STATUS: NORMAL
EOSINOPHIL # BLD AUTO: 0.1 K/UL (ref 0–0.5)
EOSINOPHIL NFR BLD: 1.4 % (ref 0–8)
ERYTHROCYTE [DISTWIDTH] IN BLOOD BY AUTOMATED COUNT: 16 % (ref 11.5–14.5)
EST. GFR  (NO RACE VARIABLE): >60 ML/MIN/1.73 M^2
GLUCOSE SERPL-MCNC: 94 MG/DL (ref 70–110)
HCT VFR BLD AUTO: 31.9 % (ref 37–48.5)
HGB BLD-MCNC: 10.7 G/DL (ref 12–16)
IMM GRANULOCYTES # BLD AUTO: 0.16 K/UL (ref 0–0.04)
IMM GRANULOCYTES NFR BLD AUTO: 1.8 % (ref 0–0.5)
LYMPHOCYTES # BLD AUTO: 0.9 K/UL (ref 1–4.8)
LYMPHOCYTES NFR BLD: 9.9 % (ref 18–48)
MAGNESIUM SERPL-MCNC: 1.6 MG/DL (ref 1.6–2.6)
MCH RBC QN AUTO: 27.5 PG (ref 27–31)
MCHC RBC AUTO-ENTMCNC: 33.5 G/DL (ref 32–36)
MCV RBC AUTO: 82 FL (ref 82–98)
MONOCYTES # BLD AUTO: 1.2 K/UL (ref 0.3–1)
MONOCYTES NFR BLD: 14 % (ref 4–15)
NEUTROPHILS # BLD AUTO: 6.3 K/UL (ref 1.8–7.7)
NEUTROPHILS NFR BLD: 72.3 % (ref 38–73)
NRBC BLD-RTO: 0 /100 WBC
NUM UNITS TRANS PACKED RBC: NORMAL
NUM UNITS TRANS PACKED RBC: NORMAL
PLATELET # BLD AUTO: 182 K/UL (ref 150–450)
PMV BLD AUTO: 9.4 FL (ref 9.2–12.9)
POTASSIUM SERPL-SCNC: 3.6 MMOL/L (ref 3.5–5.1)
PROT SERPL-MCNC: 5.4 G/DL (ref 6–8.4)
RBC # BLD AUTO: 3.89 M/UL (ref 4–5.4)
SODIUM SERPL-SCNC: 132 MMOL/L (ref 136–145)
WBC # BLD AUTO: 8.71 K/UL (ref 3.9–12.7)

## 2024-12-09 PROCEDURE — P9016 RBC LEUKOCYTES REDUCED: HCPCS | Performed by: NURSE PRACTITIONER

## 2024-12-09 PROCEDURE — 0DJD8ZZ INSPECTION OF LOWER INTESTINAL TRACT, VIA NATURAL OR ARTIFICIAL OPENING ENDOSCOPIC: ICD-10-PCS | Performed by: STUDENT IN AN ORGANIZED HEALTH CARE EDUCATION/TRAINING PROGRAM

## 2024-12-09 PROCEDURE — 0DJ08ZZ INSPECTION OF UPPER INTESTINAL TRACT, VIA NATURAL OR ARTIFICIAL OPENING ENDOSCOPIC: ICD-10-PCS | Performed by: STUDENT IN AN ORGANIZED HEALTH CARE EDUCATION/TRAINING PROGRAM

## 2024-12-09 PROCEDURE — 25000003 PHARM REV CODE 250: Performed by: HOSPITALIST

## 2024-12-09 PROCEDURE — 45378 DIAGNOSTIC COLONOSCOPY: CPT | Mod: 74 | Performed by: STUDENT IN AN ORGANIZED HEALTH CARE EDUCATION/TRAINING PROGRAM

## 2024-12-09 PROCEDURE — 63600175 PHARM REV CODE 636 W HCPCS: Performed by: HOSPITALIST

## 2024-12-09 PROCEDURE — 43235 EGD DIAGNOSTIC BRUSH WASH: CPT | Mod: ,,, | Performed by: STUDENT IN AN ORGANIZED HEALTH CARE EDUCATION/TRAINING PROGRAM

## 2024-12-09 PROCEDURE — 83735 ASSAY OF MAGNESIUM: CPT | Performed by: NURSE PRACTITIONER

## 2024-12-09 PROCEDURE — 85025 COMPLETE CBC W/AUTO DIFF WBC: CPT | Performed by: HOSPITALIST

## 2024-12-09 PROCEDURE — 63600175 PHARM REV CODE 636 W HCPCS: Performed by: NURSE ANESTHETIST, CERTIFIED REGISTERED

## 2024-12-09 PROCEDURE — 37000009 HC ANESTHESIA EA ADD 15 MINS: Performed by: STUDENT IN AN ORGANIZED HEALTH CARE EDUCATION/TRAINING PROGRAM

## 2024-12-09 PROCEDURE — 80053 COMPREHEN METABOLIC PANEL: CPT | Performed by: NURSE PRACTITIONER

## 2024-12-09 PROCEDURE — 21400001 HC TELEMETRY ROOM

## 2024-12-09 PROCEDURE — 45378 DIAGNOSTIC COLONOSCOPY: CPT | Mod: 53,,, | Performed by: STUDENT IN AN ORGANIZED HEALTH CARE EDUCATION/TRAINING PROGRAM

## 2024-12-09 PROCEDURE — 43235 EGD DIAGNOSTIC BRUSH WASH: CPT | Performed by: STUDENT IN AN ORGANIZED HEALTH CARE EDUCATION/TRAINING PROGRAM

## 2024-12-09 PROCEDURE — 63600175 PHARM REV CODE 636 W HCPCS: Performed by: PHYSICIAN ASSISTANT

## 2024-12-09 PROCEDURE — 86677 HELICOBACTER PYLORI ANTIBODY: CPT

## 2024-12-09 PROCEDURE — D9220A PRA ANESTHESIA: Mod: ANES,,, | Performed by: ANESTHESIOLOGY

## 2024-12-09 PROCEDURE — D9220A PRA ANESTHESIA: Mod: CRNA,,, | Performed by: NURSE ANESTHETIST, CERTIFIED REGISTERED

## 2024-12-09 PROCEDURE — 37000008 HC ANESTHESIA 1ST 15 MINUTES: Performed by: STUDENT IN AN ORGANIZED HEALTH CARE EDUCATION/TRAINING PROGRAM

## 2024-12-09 RX ORDER — HYDROMORPHONE HYDROCHLORIDE 1 MG/ML
0.2 INJECTION, SOLUTION INTRAMUSCULAR; INTRAVENOUS; SUBCUTANEOUS EVERY 5 MIN PRN
Status: DISCONTINUED | OUTPATIENT
Start: 2024-12-09 | End: 2024-12-09 | Stop reason: HOSPADM

## 2024-12-09 RX ORDER — ONDANSETRON HYDROCHLORIDE 2 MG/ML
4 INJECTION, SOLUTION INTRAVENOUS ONCE AS NEEDED
Status: DISCONTINUED | OUTPATIENT
Start: 2024-12-09 | End: 2024-12-09 | Stop reason: HOSPADM

## 2024-12-09 RX ORDER — HALOPERIDOL 5 MG/ML
0.5 INJECTION INTRAMUSCULAR EVERY 10 MIN PRN
Status: DISCONTINUED | OUTPATIENT
Start: 2024-12-09 | End: 2024-12-09 | Stop reason: HOSPADM

## 2024-12-09 RX ORDER — LIDOCAINE HYDROCHLORIDE 20 MG/ML
INJECTION, SOLUTION EPIDURAL; INFILTRATION; INTRACAUDAL; PERINEURAL
Status: DISCONTINUED | OUTPATIENT
Start: 2024-12-09 | End: 2024-12-09

## 2024-12-09 RX ORDER — PROPOFOL 10 MG/ML
VIAL (ML) INTRAVENOUS
Status: DISCONTINUED | OUTPATIENT
Start: 2024-12-09 | End: 2024-12-09

## 2024-12-09 RX ORDER — SODIUM CHLORIDE 0.9 % (FLUSH) 0.9 %
3 SYRINGE (ML) INJECTION
Status: DISCONTINUED | OUTPATIENT
Start: 2024-12-09 | End: 2024-12-09 | Stop reason: HOSPADM

## 2024-12-09 RX ORDER — SODIUM CHLORIDE 9 MG/ML
INJECTION, SOLUTION INTRAVENOUS CONTINUOUS
Status: DISCONTINUED | OUTPATIENT
Start: 2024-12-09 | End: 2024-12-09

## 2024-12-09 RX ORDER — PHENYLEPHRINE HYDROCHLORIDE 10 MG/ML
INJECTION INTRAVENOUS
Status: DISCONTINUED | OUTPATIENT
Start: 2024-12-09 | End: 2024-12-09

## 2024-12-09 RX ORDER — MAGNESIUM SULFATE HEPTAHYDRATE 40 MG/ML
2 INJECTION, SOLUTION INTRAVENOUS ONCE
Status: COMPLETED | OUTPATIENT
Start: 2024-12-09 | End: 2024-12-09

## 2024-12-09 RX ORDER — GLUCAGON 1 MG
1 KIT INJECTION
Status: DISCONTINUED | OUTPATIENT
Start: 2024-12-09 | End: 2024-12-09 | Stop reason: HOSPADM

## 2024-12-09 RX ADMIN — PANTOPRAZOLE SODIUM 40 MG: 40 INJECTION, POWDER, LYOPHILIZED, FOR SOLUTION INTRAVENOUS at 08:12

## 2024-12-09 RX ADMIN — AMLODIPINE BESYLATE 10 MG: 10 TABLET ORAL at 09:12

## 2024-12-09 RX ADMIN — PANTOPRAZOLE SODIUM 40 MG: 40 INJECTION, POWDER, LYOPHILIZED, FOR SOLUTION INTRAVENOUS at 09:12

## 2024-12-09 RX ADMIN — PROPOFOL 150 MCG/KG/MIN: 10 INJECTION, EMULSION INTRAVENOUS at 01:12

## 2024-12-09 RX ADMIN — PROPOFOL 50 MG: 10 INJECTION, EMULSION INTRAVENOUS at 01:12

## 2024-12-09 RX ADMIN — LIDOCAINE HYDROCHLORIDE 60 MG: 20 INJECTION, SOLUTION EPIDURAL; INFILTRATION; INTRACAUDAL; PERINEURAL at 01:12

## 2024-12-09 RX ADMIN — PHENYLEPHRINE HYDROCHLORIDE 200 MCG: 10 INJECTION INTRAVENOUS at 01:12

## 2024-12-09 RX ADMIN — MAGNESIUM SULFATE HEPTAHYDRATE 2 G: 40 INJECTION, SOLUTION INTRAVENOUS at 09:12

## 2024-12-09 RX ADMIN — FERROUS SULFATE TAB EC 325 MG (65 MG FE EQUIVALENT) 1 EACH: 325 (65 FE) TABLET DELAYED RESPONSE at 09:12

## 2024-12-09 RX ADMIN — ATORVASTATIN CALCIUM 40 MG: 40 TABLET, FILM COATED ORAL at 09:12

## 2024-12-09 NOTE — PROGRESS NOTES
"[facility] [address] [place] [address] Medicine  Progress Note    Patient Name: Nydia Stevens  MRN: [id number]  Patient Class: IP- Inpatient   Admission Date: 12/7/2024  Length of Stay: 1 days  Attending Physician: Domenico Hanson MD  Primary Care Provider: Brittney Travis MD        Subjective     Principal Problem:Rectal bleeding        HPI:  89 yo F with PMHx HTN, HLD, and colon cancer s/p right hemicolectomy 3/20/20 who presents to the ED complaining of blood in the toilet noted today. Pt. Reports she woke up this morning and noted blood in the toilet after she used the restoom. Pt. Initally uncertain if bleeding came from urine or stool. After urine sample in ED collected and noted to be without blood, pt. Believes the blood may have come from her stool. Pt. Has not had any further episodes since the initial episode this mroning and denies noting any hematochezia or melena prior to today. She has no other symptoms, specifically denies abdominal pain, lightheadedness, fatigue, nausea, vomiting/hematemesis.    Pt. Has hx of colon cancer and resection. She has had iron deificency anemia since thought to be 2/2 GI losses. Last colonoscopy 2022 showed "Patent side-to-side ileo-colonic anastomosis, characterized by inflammation. Biopsied. Clip was placed." Rest  of exam normal. Biopsy showed  "Benign colonic mucosa with acute inflammation/granulation tissue."    Overview/Hospital Course:  Ms. Nydia Stevens was admitted for evaluation of BRBPR. Pt HDS and afebrile, found to have asymptomatic anemia w/ hgb 6.2 on admission. She was placed GIB pathway and given IV protonix, IVF resuscitation and transfused 1 unit prbc. GI consulted. Pt notably had a significant event of a large bloody BM followed by asymptomatic hypotension. Hypotension resolved w/ the admin of IVF and blood transfusion. GI performed C scope 12/9 > no evidence of recent bleeding was seen. Stool in colon was brown in appearance. Anastamosis " site was not reached due to limited visualization due to stool in colon.     Interval History: Large bloody BM overnight. Transfused prbc. C scope today, CLD following and h/h trending per GI.     Review of Systems   Constitutional:  Negative for activity change, appetite change, chills, fever and unexpected weight change.   HENT:  Negative for congestion and sore throat.    Respiratory:  Negative for cough and shortness of breath.    Cardiovascular:  Negative for chest pain, palpitations and leg swelling.   Gastrointestinal:  Positive for blood in stool. Negative for abdominal distention, abdominal pain, constipation, diarrhea, nausea and vomiting.   Genitourinary:  Negative for difficulty urinating, dysuria and hematuria.   Musculoskeletal:  Negative for arthralgias and myalgias.   Skin:  Negative for color change and rash.   Neurological:  Negative for dizziness, tremors and seizures.     Objective:     Vital Signs (Most Recent):  Temp: 98 °F (36.7 °C) (12/09/24 1511)  Pulse: 79 (12/09/24 1511)  Resp: 18 (12/09/24 1511)  BP: (!) 157/68 (12/09/24 1511)  SpO2: 95 % (12/09/24 1511) Vital Signs (24h Range):  Temp:  [97.4 °F (36.3 °C)-98.3 °F (36.8 °C)] 98 °F (36.7 °C)  Pulse:  [76-97] 79  Resp:  [13-20] 18  SpO2:  [94 %-99 %] 95 %  BP: ()/(50-71) 157/68     Weight: 53.7 kg (118 lb 6.2 oz)  Body mass index is 18.54 kg/m².    Intake/Output Summary (Last 24 hours) at 12/9/2024 1541  Last data filed at 12/9/2024 0411  Gross per 24 hour   Intake 827.08 ml   Output 350 ml   Net 477.08 ml         Physical Exam  Vitals reviewed.   Constitutional:       General: She is not in acute distress.     Appearance: She is well-developed.   HENT:      Head: Normocephalic and atraumatic.      Mouth/Throat:      Mouth: Mucous membranes are moist.   Eyes:      Extraocular Movements: Extraocular movements intact.      Pupils: Pupils are equal, round, and reactive to light.   Neck:      Vascular: No JVD.      Trachea: No tracheal  "deviation.   Cardiovascular:      Rate and Rhythm: Normal rate and regular rhythm.      Heart sounds: No murmur heard.     No friction rub. No gallop.   Pulmonary:      Effort: No respiratory distress.      Breath sounds: Normal breath sounds. No wheezing or rales.   Abdominal:      General: Bowel sounds are normal. There is no distension.      Palpations: Abdomen is soft. There is no mass.      Tenderness: There is no abdominal tenderness.   Musculoskeletal:         General: No deformity.      Cervical back: Neck supple.   Lymphadenopathy:      Cervical: No cervical adenopathy.   Skin:     General: Skin is warm and dry.      Capillary Refill: Capillary refill takes 2 to 3 seconds.      Coloration: Skin is pale.      Findings: No rash.   Neurological:      Mental Status: She is alert and oriented to person, place, and time.             Significant Labs: All pertinent labs within the past 24 hours have been reviewed.    Significant Imaging: I have reviewed all pertinent imaging results/findings within the past 24 hours.    Assessment and Plan     * Rectal bleeding  -Pt. Presents with BRBPR, known hx of colon Ca s/p resection. CTA show no active bleeding but "lobular mucosal enhancement involving the rectal anal region, similar in appearance to previous exams"  -Hgb 8.9, downtrending from ~11 baseline. Otherwise Hemodynamically stable  -GI pathway ordered with 2 large bore IVs placed, GI consult, IV protonix  -Continue to trend Hgb and Transfuse for Hgb <7  -Clear liquid diet for now  -GI consult   -C scope 12/9 > no evidence of recent bleeding was seen. Stool in colon was brown in appearance. Anastamosis site was not reached due to limited visualization due to stool in colon.     History of colon cancer  -Pt. S/p chemo and resection (Hemicolectomy done on 03/19/2020. Had anastomotic recurrence s/p resection 4/15/21.)  -Now in remission but with chronic ANANDA thought to be related to GI losses from disease. Treat GI " bleed as above, otherwise continue to f/u with oncology for regular surveillance    Mixed hyperlipidemia  -Continue home crestor    HTN (hypertension)  -BP WNL, will hold home amlodipine and losartan for now in setting of GI bleed. Plan to resume if pt. BP increases/bleeding remains otherwise stable      VTE Risk Mitigation (From admission, onward)           Ordered     IP VTE LOW RISK PATIENT  Once         12/08/24 0803     Place sequential compression device  Until discontinued         12/07/24 1906                    Discharge Planning   EUGENIO: 12/9/2024     Code Status: Full Code   Medical Readiness for Discharge Date:   Discharge Plan A: Home                        Erik Yun PA-C  Department of Hospital Medicine   [facility]

## 2024-12-09 NOTE — TREATMENT PLAN
GI Treatment Plan    EGD 12/9  Impression:              - Normal esophagus.                          - Erosive gastropathy with no stigmata of recent                          bleeding.                          - Normal examined duodenum.                          - No specimens collected.   Recommendation:          - Daily PPI                          - Checking h. pylori serology and treat with 14                          days of quadruple therapy if positive.                          - Proceed with colonoscopy       Colonoscopy 12/9  Impression:              - Preparation of the colon was inadequate.                          - Non-bleeding internal hemorrhoids.                          - Stool in the transverse colon.                          - No specimens collected.   Recommendation:          - No evidence of recent bleeding was seen. Stool                          in colon was brown in appearance. Anastamosis site                          was not reached due to limited visualization due                          to stool in colon.                          - Continue to trend H/H and monitor for any signs                          of bleeding                          - Clear liquid diet                          - If there is evidence of repeat bleeding then                          repeat colonoscopy can be considered but will need                          to give some more prep to ensure adequate                          visualization.       We will watch her to see if further bleeding occurs. If so, will need additional prep so we can safely attempt full colonoscopy to evaluate anastomotic site. Clear liquids while we monitor.     Doris Matthews MD  Gastroenterology and Hepatology Fellow, PGY-4

## 2024-12-09 NOTE — ANESTHESIA PREPROCEDURE EVALUATION
[facility]  Anesthesia Pre-Operative Evaluation        Patient Name: Nydia Stevens  YOB: 1934  MRN: [id number]    SUBJECTIVE:     Pre-operative Evaluation for Procedure(s) (LRB):  EGD (ESOPHAGOGASTRODUODENOSCOPY) (N/A)  COLONOSCOPY (N/A)     12/08/2024    Nydia Stevens is a 89 y.o. female with a PMHx significant for HTN, HLD, and colon cancer s/p right hemicolectomy 3/20/20 who presents to the ED complaining of bright red blood per rectum x1 day.     She now presents for the above procedure(s).    Previous Airway (4/15/21):   Intubation:     Induction:  Intravenous    Intubated:  Postinduction    Mask Ventilation:  Easy with oral airway    Attempts:  1    Attempted By:  Resident anesthesiologist    Method of Intubation:  Direct    Blade:  Tatiana 3    Laryngeal View Grade: Grade I - full view of chords      Difficult Airway Encountered?: No      Complications:  None    Airway Device:  Oral endotracheal tube    Airway Device Size:  7.0    Style/Cuff Inflation:  Cuffed (inflated to minimal occlusive pressure)    Tube secured:  21    Secured at:  The lips    Placement Verified By:  Capnometry    Complicating Factors:  None    Findings Post-Intubation:  BS equal bilateral and atraumatic/condition of teeth unchanged      TTE:  Results for orders placed during the hospital encounter of 02/16/21    Echo Color Flow Doppler? Yes    Interpretation Summary  · The left ventricle is normal in size with hyperdynamic systolic function. The estimated ejection fraction is 75%  · Indeterminate left ventricular diastolic function.  · Normal right ventricular size with normal right ventricular systolic function.  · There is a very late LVOT flow acceleration to 3.0m/s ( 36mmHg) due to hyperdynamic LV function and cavity obliteration at the end of systole.  · Mild left atrial enlargement.  · Mild tricuspid regurgitation.  · The estimated PA systolic pressure is 33 mmHg.  · Normal central  venous pressure (3 mmHg).  · There is a right pleural effusion.      LDA:   Port A Cath Single Lumen 07/02/21 0837 right internal jugular (Active)   Site Assessment No drainage;No redness;No swelling;No warmth 12/08/24 0800   Dressing Type Central line dressing 12/08/24 0800   Dressing Status Clean;Dry;Intact 12/08/24 0800   Dressing Intervention Integrity maintained 12/08/24 0800   Patency/Care flushed w/o difficulty;infusing 12/08/24 0800   Status Accessed 12/08/24 0800   Accessed by: lois COWAN 12/07/24 1425   Needle Insertion Date 12/07/24 12/07/24 1425   Needle Insertion Time 1425 12/07/24 1425   Type of Needle PowerHuber 12/07/24 1425   Gauge 20 12/07/24 1425   Needle Length 3/4 in 12/07/24 1425   Needle Status Left in place 12/07/24 1425   Flush Performed Yes 12/08/24 0009   Number of days: 1255            Peripheral IV - Single Lumen 12/08/24 1509 20 G 1 3/4 in Yes Right Antecubital (Active)   Site Assessment Clean;Dry;Intact;No redness;No swelling 12/08/24 1520   Line Securement Device Secured with sutureless device 12/08/24 1520   Extremity Assessment Distal to IV No abnormal discoloration 12/08/24 1520   Line Status Blood return noted;Flushed;Saline locked 12/08/24 1520   Dressing Status Clean;Dry;Intact 12/08/24 1520   Dressing Intervention First dressing 12/08/24 1520   Site Change Due 12/12/24 12/08/24 1520   Number of days: 0       Female External Urinary Catheter w/ Suction 12/08/24 1738 (Active)   Output (mL) 350 mL 12/08/24 1739   Number of days: 0         Patient Active Problem List   Diagnosis    HTN (hypertension)    Mixed hyperlipidemia    Palliative care encounter    Closed fracture of left olecranon process    Debility    Aortic stenosis, mild    Osteoporosis    Iron deficiency anemia due to chronic blood loss    Chronic combined systolic and diastolic heart failure    History of colon cancer    Carotid stenosis, asymptomatic, bilateral    H/O: hysterectomy    Prediabetes    Hyponatremia     B12 deficiency    Healthcare maintenance    Aortic atherosclerosis    Benign mole    Adrenal nodule    Cerebral atrophy, mild    History of hematuria    Rectal bleeding       Review of patient's allergies indicates:  No Known Allergies    Current Outpatient Medications   Medication Instructions    amLODIPine (NORVASC) 10 mg, Oral, Daily    calcium carbonate (OS-JAILYN) 500 mg, Oral, Daily    cholecalciferol (vitamin D3) (VITAMIN D3) 1,000 Units, Daily    cyanocobalamin (VITAMIN B-12) 1,000 mcg, Oral, Daily, Hold until after surgery    FEROSUL 325 mg (65 mg iron) Tab tablet TAKE ONE TABLET BY MOUTH ON TUESDAY, THURSDAY, AND SATURDAY    LIDOcaine-prilocaine (EMLA) cream Topical (Top), As needed (PRN), apply over port before chemo    losartan (COZAAR) 100 mg, Oral, Daily    lutein-zeaxanthin (OCUVITE LUTEIN 25) 25-5 mg Cap 1 tablet, Oral, Daily    potassium chloride (KLOR-CON) 10 MEQ TbSR TAKE 1 TABLET BY MOUTH ONCE DAILY.    rosuvastatin (CRESTOR) 40 MG Tab TAKE ONE TABLET BY MOUTH EVERY DAY FOR CHOLESTEROL    VIT C/VIT E ACETATE/LUTEIN/MIN (OCUVITE LUTEIN ORAL) 1 tablet, Daily       Past Surgical History:   Procedure Laterality Date    CATARACT EXTRACTION W/  INTRAOCULAR LENS IMPLANT Left 8/11/14    [facility]    CATARACT EXTRACTION W/  INTRAOCULAR LENS IMPLANT Right 09/15/14    [facility]    COLONOSCOPY N/A 3/19/2020    Procedure: COLONOSCOPY;  Surgeon: Real Mabry MD;  Location: [facility] (2ND FLR);  Service: Endoscopy;  Laterality: N/A;    COLONOSCOPY N/A 3/23/2021    Procedure: COLONOSCOPY;  Surgeon: AUTUMN Berg MD;  Location: [facility] (4TH FLR);  Service: Endoscopy;  Laterality: N/A;  covid test 3/20 [facility]    COLONOSCOPY N/A 2/15/2022    Procedure: COLONOSCOPY;  Surgeon: Doris Simpson MD;  Location: [facility] (4TH FLR);  Service: Endoscopy;  Laterality: N/A;  COVID test on 2/12/22 at [facility]  2/14/22 - Pt confirmed 0640 arrival, prep instructions reviewed, Pt verbalized understanding-[id number]     ESOPHAGOGASTRODUODENOSCOPY N/A 3/19/2020    Procedure: EGD (ESOPHAGOGASTRODUODENOSCOPY);  Surgeon: Real Mabry MD;  Location: [facility] ENDO (2ND FLR);  Service: Endoscopy;  Laterality: N/A;    FOOT SURGERY      left    HYSTERECTOMY  1962    ILEOCOLECTOMY N/A 4/15/2021    Procedure: ILEOCOLECTOMY;  Surgeon: AUTUMN Berg MD;  Location: [facility] OR 2ND FLR;  Service: Colon and Rectal;  Laterality: N/A;    INSERTION OF TUNNELED CENTRAL VENOUS CATHETER (CVC) WITH SUBCUTANEOUS PORT N/A 7/2/2021    Procedure: [id number];  Surgeon: Suresh Agrawal MD;  Location: [facility] OR 2ND FLR;  Service: Vascular;  Laterality: N/A;  Right IJ    LAPAROSCOPIC HEMICOLECTOMY Right 3/20/2020    Procedure: HEMICOLECTOMY, RIGHT;  Surgeon: AUTUMN Berg MD;  Location: [facility] OR 2ND FLR;  Service: Colon and Rectal;  Laterality: Right;    LYSIS OF ADHESIONS N/A 4/15/2021    Procedure: LYSIS, ADHESIONS;  Surgeon: AUTUMN Berg MD;  Location: [facility] OR 2ND FLR;  Service: Colon and Rectal;  Laterality: N/A;  Greater than 2 hours    MOBILIZATION OF SPLENIC FLEXURE N/A 4/15/2021    Procedure: MOBILIZATION, SPLENIC FLEXURE;  Surgeon: AUTUMN Berg MD;  Location: [facility] OR 2ND FLR;  Service: Colon and Rectal;  Laterality: N/A;       Social History     Substance and Sexual Activity   Drug Use No     Alcohol Use: Not At Risk (5/20/2021)    AUDIT-C     Frequency of Alcohol Consumption: Never     Average Number of Drinks: Not on file     Frequency of Binge Drinking: Not on file     Tobacco Use: Low Risk  (12/7/2024)    Patient History     Smoking Tobacco Use: Never     Smokeless Tobacco Use: Never     Passive Exposure: Not on file       OBJECTIVE:     Vital Signs Range (Last 24H):  Temp:  [36.3 °C (97.4 °F)-36.8 °C (98.3 °F)]   Pulse:  []   Resp:  [15-20]   BP: ()/(39-62)   SpO2:  [92 %-100 %]       Significant Labs    Heme Profile  Lab Results   Component Value Date    WBC 5.74 12/08/2024    HGB 7.3 (L) 12/08/2024    HCT 22.4 (L)  12/08/2024     12/08/2024       Coagulation Studies  Lab Results   Component Value Date    LABPROT 11.4 12/07/2024    INR 1.0 12/07/2024    APTT 29.5 12/07/2024       BMP  Lab Results   Component Value Date     (L) 12/08/2024    K 4.2 12/08/2024     12/08/2024    CO2 21 (L) 12/08/2024    BUN 22 12/08/2024    CREATININE 0.8 12/08/2024    MG 1.5 (L) 05/06/2021    PHOS 4.1 02/17/2021       Liver Function Tests  Lab Results   Component Value Date    AST 12 12/08/2024    ALT 8 (L) 12/08/2024    ALKPHOS 44 12/08/2024    BILITOT 0.2 12/08/2024    PROT 5.1 (L) 12/08/2024    ALBUMIN 3.0 (L) 12/08/2024       Lipid Profile  Lab Results   Component Value Date    CHOL 270 (H) 11/07/2024    HDL 76 (H) 11/07/2024    TRIG 84 11/07/2024       Endocrine Profile  Lab Results   Component Value Date    HGBA1C 5.7 (H) 11/07/2024    TSH 2.396 11/07/2024         Cardiac Studies    EKG:   Results for orders placed or performed in visit on 12/07/24   EKG 12-lead    Collection Time: 12/07/24  1:21 PM   Result Value Ref Range    QRS Duration 76 ms    [facility] QTC Calculation 425 ms    Narrative    Test Reason :    Vent. Rate :  89 BPM     Atrial Rate :  89 BPM     P-R Int : 158 ms          QRS Dur :  76 ms      QT Int : 350 ms       P-R-T Axes :  65  53  41 degrees    QTcB Int : 425 ms    Normal sinus rhythm  Septal infarct (cited on or before 09-Jul-2024)  Abnormal ECG  When compared with ECG of 09-Jul-2024 08:13,  No significant change was found  Confirmed by Brittany Alcala ([id number]) on 12/8/2024 2:52:28 PM    Referred By: AAAREFERRAL SELF           Confirmed By: Brittany Alcala       TTE:  Results for orders placed during the hospital encounter of 02/16/21    Echo Color Flow Doppler? Yes    Interpretation Summary  · The left ventricle is normal in size with hyperdynamic systolic function. The estimated ejection fraction is 75%  · Indeterminate left ventricular diastolic function.  · Normal right ventricular size with normal  right ventricular systolic function.  · There is a very late LVOT flow acceleration to 3.0m/s ( 36mmHg) due to hyperdynamic LV function and cavity obliteration at the end of systole.  · Mild left atrial enlargement.  · Mild tricuspid regurgitation.  · The estimated PA systolic pressure is 33 mmHg.  · Normal central venous pressure (3 mmHg).  · There is a right pleural effusion.    ASSESSMENT/PLAN:             I have reviewed the Patient Summary Reports.     I have reviewed the Nursing Notes. I have reviewed the NPO Status.   I have reviewed the Medications.     Review of Systems  Anesthesia Hx:  No problems with previous Anesthesia               Denies Personal Hx of Anesthesia complications.                    Social:  Non-Smoker       Hematology/Oncology:       -- Anemia:                                  Cardiovascular:     Hypertension       CHF    hyperlipidemia                               Pulmonary:  Pulmonary Normal                       Psych:  Psychiatric History                  Physical Exam  General: Well nourished, Cooperative and Alert    Airway:  Mallampati: II   Mouth Opening: Normal  TM Distance: Normal  Tongue: Normal  Neck ROM: Normal ROM    Dental:  Intact        Anesthesia Plan  Type of Anesthesia, risks & benefits discussed:    Anesthesia Type: Gen ETT, Gen Natural Airway, MAC  Intra-op Monitoring Plan: Standard ASA Monitors  Post Op Pain Control Plan: multimodal analgesia and IV/PO Opioids PRN  Induction:  IV  Airway Plan: Direct, Post-Induction  Informed Consent: Informed consent signed with the Patient and all parties understand the risks and agree with anesthesia plan.  All questions answered.   ASA Score: 3  Day of Surgery Review of History & Physical: H&P Update referred to the surgeon/provider.    Ready For Surgery From Anesthesia Perspective.     .

## 2024-12-09 NOTE — ASSESSMENT & PLAN NOTE
"-Pt. Presents with BRBPR, known hx of colon Ca s/p resection. CTA show no active bleeding but "lobular mucosal enhancement involving the rectal anal region, similar in appearance to previous exams"  -Hgb 8.9, downtrending from ~11 baseline. Otherwise Hemodynamically stable  -GI pathway ordered with 2 large bore IVs placed, GI consult, IV protonix  -Continue to trend Hgb and Transfuse for Hgb <7  -Clear liquid diet for now  -GI consult   -C scope 12/9 > no evidence of recent bleeding was seen. Stool in colon was brown in appearance. Anastamosis site was not reached due to limited visualization due to stool in colon.   "

## 2024-12-09 NOTE — [employer]ATION PATIENT INSTRUCTIONS
Discharge Summary/Instructions after an Endoscopic Procedure  Patient Name: Nydia Stevens  Patient MRN: [id number]  Patient YOB: 1934 Monday, December 9, 2024  Vitaly Virgen MD  Dear patient,  As a result of recent federal legislation (The Federal Cures Act), you may   receive lab or pathology results from your procedure in your [facility]   account before your physician is able to contact you. Your physician or   their representative will relay the results to you with their   recommendations at their soonest availability.  Thank you,  RESTRICTIONS:  During your procedure today, you received medications for sedation.  These   medications may affect your judgment, balance and coordination.  Therefore,   for 24 hours, you have the following restrictions:   - DO NOT drive a car, operate machinery, make legal/financial decisions,   sign important papers or drink alcohol.    ACTIVITY:  Today: no heavy lifting, straining or running due to procedural   sedation/anesthesia.  The following day: return to full activity including work.  DIET:  Eat and drink normally unless instructed otherwise.     TREATMENT FOR COMMON SIDE EFFECTS:  - Mild abdominal pain, nausea, belching, bloating or excessive gas:  rest,   eat lightly and use a heating pad.  - Sore Throat: treat with throat lozenges and/or gargle with warm salt   water.  - Because air was used during the procedure, expelling large amounts of air   from your rectum or belching is normal.  - If a bowel prep was taken, you may not have a bowel movement for 1-3 days.    This is normal.  SYMPTOMS TO WATCH FOR AND REPORT TO YOUR PHYSICIAN:  1. Abdominal pain or bloating, other than gas cramps.  2. Chest pain.  3. Back pain.  4. Signs of infection such as: chills or fever occurring within 24 hours   after the procedure.  5. Rectal bleeding, which would show as bright red, maroon, or black stools.   (A tablespoon of blood from the rectum is not serious, especially if    hemorrhoids are present.)  6. Vomiting.  7. Weakness or dizziness.  GO DIRECTLY TO THE NEAREST EMERGENCY ROOM IF YOU HAVE ANY OF THE FOLLOWING:      Difficulty breathing              Chills and/or fever over 101 F   Persistent vomiting and/or vomiting blood   Severe abdominal pain   Severe chest pain   Black, tarry stools   Bleeding- more than one tablespoon   Any other symptom or condition that you feel may need urgent attention  Your doctor recommends these additional instructions:  If any biopsies were taken, your doctors clinic will contact you in 1 to 2   weeks with any results.  - No evidence of recent bleeding was seen. Stool in colon was brown in   appearance. Anastamosis site was not reached due to limited visualization   due to stool in colon.  - Continue to trend H/H and monitor for any signs of bleeding  - Clear liquid diet  - If there is evidence of repeat bleeding then repeat colonoscopy can be   considered but will need to give some more prep to ensure adequate   visualization.  For questions, problems or results please call your physician - Vitaly Virgen MD at Work:  [phone].  [facility], EMERGENCY ROOM PHONE NUMBER: [phone]  IF A COMPLICATION OR EMERGENCY SITUATION ARISES AND YOU ARE UNABLE TO REACH   YOUR PHYSICIAN - GO DIRECTLY TO THE EMERGENCY ROOM.  MD Vitaly Benites MD  12/9/2024 2:22:53 PM  This report has been verified and signed electronically.  Dear patient,  As a result of recent federal legislation (The Federal Cures Act), you may   receive lab or pathology results from your procedure in your [facility]   account before your physician is able to contact you. Your physician or   their representative will relay the results to you with their   recommendations at their soonest availability.  Thank you,  PROVATION

## 2024-12-09 NOTE — NURSING TRANSFER
Nursing Transfer Note      Reason patient is being transferred: post procedure    Transfer To: 727    Transfer via stretcher    Transfer with cardiac monitoring    Transported by PCT    Transfer Vital Signs:See flowsheet    Telemetry: box no 1071    Order for Tele Monitor? Yes    Medicines sent: None    Any special needs or follow-up needed: Routine    Patient belongings transferred with patient:  None    Chart send with patient: Yes    Notified: None available    Patient reassessed at: 12/9/2024 9912

## 2024-12-09 NOTE — CARE UPDATE
"RAPID RESPONSE NURSE CHART REVIEW        Chart Reviewed: 12/09/2024    MRN: [id number]  Bed: 727/727 A    Dx: Rectal bleeding    Nydia Stevens has a past medical history of Allergy, Cachexia, Cancer, Cataract, Dementia with behavioral disturbance, Encounter for blood transfusion, Hemolytic anemia, Hyperlipidemia, Hypertension, and Osteoporosis.    Last VS: BP (!) 148/67   Pulse 92   Temp 97.6 °F (36.4 °C)   Resp 16   Ht 5' 7" (1.702 m)   Wt 53.7 kg (118 lb 6.2 oz)   SpO2 96%   BMI 18.54 kg/m²     24H Vital Sign Range:  Temp:  [97.4 °F (36.3 °C)-98.3 °F (36.8 °C)]   Pulse:  []   Resp:  [15-20]   BP: ()/(39-67)   SpO2:  [92 %-100 %]     Level of Consciousness (AVPU): alert    Recent Labs     12/08/24  0512 12/08/24  0619 12/08/24  1420   WBC 5.07 7.01 5.74   HGB 6.4* 6.2* 7.3*   HCT 20.2* 19.7* 22.4*    243 186       Recent Labs     12/07/24  1426 12/08/24  0512   * 132*   K 4.0 4.2    103   CO2 24 21*   BUN 18 22   CREATININE 0.8 0.8   GLU 97 97        No results for input(s): "PH", "PCO2", "PO2", "HCO3", "POCSATURATED", "BE" in the last 72 hours.     OXYGEN:             MEWS score: 1    Rounding completed with charge RN Kerry reports pt had large BM with BRB and clots this am and has received 2u PRBCs today. Hx of colon cancer and scheduled for RGD in AM. Pt in no acute distress and all VSS. No additional concerns verbalized at this time. Instructed to call [phone] for further concerns or assistance.    Raoul Carlson RN       "

## 2024-12-09 NOTE — SUBJECTIVE & OBJECTIVE
Interval History: Large bloody BM overnight. Transfused prbc. C scope today, CLD following and h/h trending per GI.     Review of Systems   Constitutional:  Negative for activity change, appetite change, chills, fever and unexpected weight change.   HENT:  Negative for congestion and sore throat.    Respiratory:  Negative for cough and shortness of breath.    Cardiovascular:  Negative for chest pain, palpitations and leg swelling.   Gastrointestinal:  Positive for blood in stool. Negative for abdominal distention, abdominal pain, constipation, diarrhea, nausea and vomiting.   Genitourinary:  Negative for difficulty urinating, dysuria and hematuria.   Musculoskeletal:  Negative for arthralgias and myalgias.   Skin:  Negative for color change and rash.   Neurological:  Negative for dizziness, tremors and seizures.     Objective:     Vital Signs (Most Recent):  Temp: 98 °F (36.7 °C) (12/09/24 1511)  Pulse: 79 (12/09/24 1511)  Resp: 18 (12/09/24 1511)  BP: (!) 157/68 (12/09/24 1511)  SpO2: 95 % (12/09/24 1511) Vital Signs (24h Range):  Temp:  [97.4 °F (36.3 °C)-98.3 °F (36.8 °C)] 98 °F (36.7 °C)  Pulse:  [76-97] 79  Resp:  [13-20] 18  SpO2:  [94 %-99 %] 95 %  BP: ()/(50-71) 157/68     Weight: 53.7 kg (118 lb 6.2 oz)  Body mass index is 18.54 kg/m².    Intake/Output Summary (Last 24 hours) at 12/9/2024 1541  Last data filed at 12/9/2024 0411  Gross per 24 hour   Intake 827.08 ml   Output 350 ml   Net 477.08 ml         Physical Exam  Vitals reviewed.   Constitutional:       General: She is not in acute distress.     Appearance: She is well-developed.   HENT:      Head: Normocephalic and atraumatic.      Mouth/Throat:      Mouth: Mucous membranes are moist.   Eyes:      Extraocular Movements: Extraocular movements intact.      Pupils: Pupils are equal, round, and reactive to light.   Neck:      Vascular: No JVD.      Trachea: No tracheal deviation.   Cardiovascular:      Rate and Rhythm: Normal rate and regular  rhythm.      Heart sounds: No murmur heard.     No friction rub. No gallop.   Pulmonary:      Effort: No respiratory distress.      Breath sounds: Normal breath sounds. No wheezing or rales.   Abdominal:      General: Bowel sounds are normal. There is no distension.      Palpations: Abdomen is soft. There is no mass.      Tenderness: There is no abdominal tenderness.   Musculoskeletal:         General: No deformity.      Cervical back: Neck supple.   Lymphadenopathy:      Cervical: No cervical adenopathy.   Skin:     General: Skin is warm and dry.      Capillary Refill: Capillary refill takes 2 to 3 seconds.      Coloration: Skin is pale.      Findings: No rash.   Neurological:      Mental Status: She is alert and oriented to person, place, and time.             Significant Labs: All pertinent labs within the past 24 hours have been reviewed.    Significant Imaging: I have reviewed all pertinent imaging results/findings within the past 24 hours.

## 2024-12-09 NOTE — TRANSFER OF CARE
"Anesthesia Transfer of Care Note    Patient: Nydia Stevens    Procedure(s) Performed: Procedure(s) (LRB):  EGD (ESOPHAGOGASTRODUODENOSCOPY) (N/A)  COLONOSCOPY (N/A)    Patient location: PACU    Anesthesia Type: general    Transport from OR: Transported from OR on 2-3 L/min O2 by NC with adequate spontaneous ventilation    Post pain: adequate analgesia    Post assessment: no apparent anesthetic complications and tolerated procedure well    Post vital signs: stable    Level of consciousness: awake, alert and responds to stimulation    Nausea/Vomiting: no nausea/vomiting    Complications: none    Transfer of care protocol was followedComments: Report given to recovery RN.       Last vitals: Visit Vitals  BP (!) 142/63 (Patient Position: Lying)   Pulse 91   Temp 36.6 °C (97.9 °F) (Temporal)   Resp 16   Ht 5' 7" (1.702 m)   Wt 53.7 kg (118 lb 6.2 oz)   SpO2 97%   Breastfeeding No   BMI 18.54 kg/m²     "

## 2024-12-09 NOTE — TELEPHONE ENCOUNTER
RN returned Nurse Jyotsna call in regard to Sr. Stafford.  Pt is in the hospital for rectal bleeding.    ----- Message from Paula sent at 12/9/2024  8:01 AM CST -----  Contact: Melva/[phone]  Melva called, would like to get a call back from nurse. Did not specify.

## 2024-12-09 NOTE — PLAN OF CARE
[facility]  Initial Discharge Assessment       Primary Care Provider: Brittney Travis MD    Admission Diagnosis: Gastrointestinal hemorrhage, unspecified gastrointestinal hemorrhage type [K92.2]    Admission Date: 12/7/2024  Expected Discharge Date: 12/9/2024    Transition of Care Barriers: None    Payor: MEDICARE / Plan: MEDICARE PART A & B / Product Type: Government /     Extended Emergency Contact Information  Primary Emergency Contact: Brenda Price Phone: [phone]  Relation: Friend  Preferred language: English   needed? No    Discharge Plan A: Home  Discharge Plan B: Home    [facility] - [place], [place] - [address]  [address]  [address] [facility] [place] [address]  Phone: [phone] Fax: [phone]    [facility] - [place], [place] - [address]  [address]  [address] [facility] [address]  Phone: [phone] Fax: [phone]    [facility]  [address] [facility] [address]  Phone: [phone] Fax: [phone]    Initial Assessment (most recent)       Adult Discharge Assessment - 12/08/24 1110          Discharge Assessment    Assessment Type Discharge Planning Assessment     Confirmed/corrected address, phone number and insurance Yes     Confirmed Demographics Correct on Facesheet     Source of Information patient;other (see comments)   Sister from convent    Communicated EUGENIO with patient/caregiver Yes     Reason For Admission Rectal bleeding     Do you expect to return to your current living situation? Yes     Do you have help at home or someone to help you manage your care at home? Yes     Who are your caregiver(s) and their phone number(s)? Brenda Price [phone]     Prior to hospitilization cognitive status: Unable to Assess     Current cognitive status: Alert/Oriented     Walking or Climbing Stairs Difficulty yes     Walking or Climbing Stairs ambulation difficulty, requires  equipment     Mobility Management Rolling walker     Home Layout Other (see comments)   Has elevator    Equipment Currently Used at Home walker, rolling     Readmission within 30 days? No     Patient currently being followed by outpatient case management? No     Do you currently have service(s) that help you manage your care at home? No     Do you take prescription medications? Yes     Do you have prescription coverage? Yes     Coverage Medicare A&B and Medicaid of [place]     Do you have any problems affording any of your prescribed medications? No     Is the patient taking medications as prescribed? yes     Who is going to help you get home at discharge? Sister from convent     How do you get to doctors appointments? other (see comments)     Are you on dialysis? No     Do you take coumadin? No     Discharge Plan A Home     Discharge Plan B Home     DME Needed Upon Discharge  none     Discharge Plan discussed with: Patient;Caregiver     Name([facility]) and Number(s) [facility]     Transition of Care Barriers None                 Discharge Plan A and Plan B have been determined by review of patient's clinical status, future medical and therapeutic needs, and coverage/benefits for post-acute care in coordination with multidisciplinary team members.    Shani Granda RN  Weekend  - [facility]

## 2024-12-09 NOTE — PROVATION PATIENT INSTRUCTIONS
Discharge Summary/Instructions after an Endoscopic Procedure  Patient Name: Nydia Stevens  Patient MRN: [id number]  Patient YOB: 1934 Monday, December 9, 2024  Vitaly Virgen MD  Dear patient,  As a result of recent federal legislation (The Federal Cures Act), you may   receive lab or pathology results from your procedure in your [facility]   account before your physician is able to contact you. Your physician or   their representative will relay the results to you with their   recommendations at their soonest availability.  Thank you,  RESTRICTIONS:  During your procedure today, you received medications for sedation.  These   medications may affect your judgment, balance and coordination.  Therefore,   for 24 hours, you have the following restrictions:   - DO NOT drive a car, operate machinery, make legal/financial decisions,   sign important papers or drink alcohol.    ACTIVITY:  Today: no heavy lifting, straining or running due to procedural   sedation/anesthesia.  The following day: return to full activity including work.  DIET:  Eat and drink normally unless instructed otherwise.     TREATMENT FOR COMMON SIDE EFFECTS:  - Mild abdominal pain, nausea, belching, bloating or excessive gas:  rest,   eat lightly and use a heating pad.  - Sore Throat: treat with throat lozenges and/or gargle with warm salt   water.  - Because air was used during the procedure, expelling large amounts of air   from your rectum or belching is normal.  - If a bowel prep was taken, you may not have a bowel movement for 1-3 days.    This is normal.  SYMPTOMS TO WATCH FOR AND REPORT TO YOUR PHYSICIAN:  1. Abdominal pain or bloating, other than gas cramps.  2. Chest pain.  3. Back pain.  4. Signs of infection such as: chills or fever occurring within 24 hours   after the procedure.  5. Rectal bleeding, which would show as bright red, maroon, or black stools.   (A tablespoon of blood from the rectum is not serious, especially if    hemorrhoids are present.)  6. Vomiting.  7. Weakness or dizziness.  GO DIRECTLY TO THE NEAREST EMERGENCY ROOM IF YOU HAVE ANY OF THE FOLLOWING:      Difficulty breathing              Chills and/or fever over 101 F   Persistent vomiting and/or vomiting blood   Severe abdominal pain   Severe chest pain   Black, tarry stools   Bleeding- more than one tablespoon   Any other symptom or condition that you feel may need urgent attention  Your doctor recommends these additional instructions:  If any biopsies were taken, your doctors clinic will contact you in 1 to 2   weeks with any results.  - Daily PPI  - Checking h. pylori serology and treat with 14 days of quadruple therapy if   positive.  - Proceed with colonoscopy  For questions, problems or results please call your physician - Vitaly Virgen MD at Work:  [phone].  [facility] EMERGENCY ROOM PHONE NUMBER: [phone]  IF A COMPLICATION OR EMERGENCY SITUATION ARISES AND YOU ARE UNABLE TO REACH   YOUR PHYSICIAN - GO DIRECTLY TO THE EMERGENCY ROOM.  MD Vitaly Benites MD  12/9/2024 2:13:29 PM  This report has been verified and signed electronically.  Dear patient,  As a result of recent federal legislation (The Federal Cures Act), you may   receive lab or pathology results from your procedure in your [facility]   account before your physician is able to contact you. Your physician or   their representative will relay the results to you with their   recommendations at their soonest availability.  Thank you,  PROVATION

## 2024-12-10 VITALS
TEMPERATURE: 98 F | HEIGHT: 67 IN | DIASTOLIC BLOOD PRESSURE: 71 MMHG | RESPIRATION RATE: 18 BRPM | WEIGHT: 118.38 LBS | BODY MASS INDEX: 18.58 KG/M2 | SYSTOLIC BLOOD PRESSURE: 136 MMHG | HEART RATE: 89 BPM | OXYGEN SATURATION: 94 %

## 2024-12-10 LAB
ALBUMIN SERPL BCP-MCNC: 3.2 G/DL (ref 3.5–5.2)
ALP SERPL-CCNC: 59 U/L (ref 40–150)
ALT SERPL W/O P-5'-P-CCNC: 11 U/L (ref 10–44)
ANION GAP SERPL CALC-SCNC: 11 MMOL/L (ref 8–16)
AST SERPL-CCNC: 30 U/L (ref 10–40)
BASOPHILS # BLD AUTO: 0.04 K/UL (ref 0–0.2)
BASOPHILS NFR BLD: 0.4 % (ref 0–1.9)
BILIRUB SERPL-MCNC: 0.8 MG/DL (ref 0.1–1)
BUN SERPL-MCNC: 7 MG/DL (ref 8–23)
CALCIUM SERPL-MCNC: 7.7 MG/DL (ref 8.7–10.5)
CHLORIDE SERPL-SCNC: 102 MMOL/L (ref 95–110)
CO2 SERPL-SCNC: 22 MMOL/L (ref 23–29)
CREAT SERPL-MCNC: 0.7 MG/DL (ref 0.5–1.4)
DIFFERENTIAL METHOD BLD: ABNORMAL
EOSINOPHIL # BLD AUTO: 0.1 K/UL (ref 0–0.5)
EOSINOPHIL NFR BLD: 1.5 % (ref 0–8)
ERYTHROCYTE [DISTWIDTH] IN BLOOD BY AUTOMATED COUNT: 16.6 % (ref 11.5–14.5)
EST. GFR  (NO RACE VARIABLE): >60 ML/MIN/1.73 M^2
GLUCOSE SERPL-MCNC: 83 MG/DL (ref 70–110)
H PYLORI IGG SERPL QL IA: NEGATIVE
H PYLORI IGG SERPL QL IA: NEGATIVE
HCT VFR BLD AUTO: 33.3 % (ref 37–48.5)
HGB BLD-MCNC: 11.2 G/DL (ref 12–16)
IMM GRANULOCYTES # BLD AUTO: 0.11 K/UL (ref 0–0.04)
IMM GRANULOCYTES NFR BLD AUTO: 1.1 % (ref 0–0.5)
LYMPHOCYTES # BLD AUTO: 0.9 K/UL (ref 1–4.8)
LYMPHOCYTES NFR BLD: 8.8 % (ref 18–48)
MCH RBC QN AUTO: 27.5 PG (ref 27–31)
MCHC RBC AUTO-ENTMCNC: 33.6 G/DL (ref 32–36)
MCV RBC AUTO: 82 FL (ref 82–98)
MONOCYTES # BLD AUTO: 1.3 K/UL (ref 0.3–1)
MONOCYTES NFR BLD: 13.3 % (ref 4–15)
NEUTROPHILS # BLD AUTO: 7.2 K/UL (ref 1.8–7.7)
NEUTROPHILS NFR BLD: 74.9 % (ref 38–73)
NRBC BLD-RTO: 0 /100 WBC
PLATELET # BLD AUTO: 190 K/UL (ref 150–450)
PMV BLD AUTO: 9.7 FL (ref 9.2–12.9)
POTASSIUM SERPL-SCNC: 3.4 MMOL/L (ref 3.5–5.1)
PROT SERPL-MCNC: 5.7 G/DL (ref 6–8.4)
RBC # BLD AUTO: 4.07 M/UL (ref 4–5.4)
SODIUM SERPL-SCNC: 135 MMOL/L (ref 136–145)
WBC # BLD AUTO: 9.62 K/UL (ref 3.9–12.7)

## 2024-12-10 PROCEDURE — 85025 COMPLETE CBC W/AUTO DIFF WBC: CPT | Performed by: HOSPITALIST

## 2024-12-10 PROCEDURE — 99900035 HC TECH TIME PER 15 MIN (STAT)

## 2024-12-10 PROCEDURE — 94799 UNLISTED PULMONARY SVC/PX: CPT

## 2024-12-10 PROCEDURE — 94761 N-INVAS EAR/PLS OXIMETRY MLT: CPT

## 2024-12-10 PROCEDURE — 25000003 PHARM REV CODE 250: Performed by: PHYSICIAN ASSISTANT

## 2024-12-10 PROCEDURE — 86677 HELICOBACTER PYLORI ANTIBODY: CPT | Performed by: STUDENT IN AN ORGANIZED HEALTH CARE EDUCATION/TRAINING PROGRAM

## 2024-12-10 PROCEDURE — 63600175 PHARM REV CODE 636 W HCPCS: Performed by: HOSPITALIST

## 2024-12-10 PROCEDURE — 25000003 PHARM REV CODE 250: Performed by: HOSPITALIST

## 2024-12-10 PROCEDURE — 63600175 PHARM REV CODE 636 W HCPCS: Performed by: PHYSICIAN ASSISTANT

## 2024-12-10 PROCEDURE — 80053 COMPREHEN METABOLIC PANEL: CPT | Performed by: NURSE PRACTITIONER

## 2024-12-10 RX ORDER — CALCIUM CARBONATE 200(500)MG
1000 TABLET,CHEWABLE ORAL ONCE
Status: COMPLETED | OUTPATIENT
Start: 2024-12-10 | End: 2024-12-10

## 2024-12-10 RX ORDER — MAGNESIUM SULFATE 1 G/100ML
1 INJECTION INTRAVENOUS ONCE
Status: COMPLETED | OUTPATIENT
Start: 2024-12-10 | End: 2024-12-10

## 2024-12-10 RX ORDER — POTASSIUM CHLORIDE 20 MEQ/1
40 TABLET, EXTENDED RELEASE ORAL ONCE
Status: COMPLETED | OUTPATIENT
Start: 2024-12-10 | End: 2024-12-10

## 2024-12-10 RX ADMIN — CALCIUM CARBONATE (ANTACID) CHEW TAB 500 MG 1000 MG: 500 CHEW TAB at 08:12

## 2024-12-10 RX ADMIN — ATORVASTATIN CALCIUM 40 MG: 40 TABLET, FILM COATED ORAL at 08:12

## 2024-12-10 RX ADMIN — POTASSIUM CHLORIDE 40 MEQ: 1500 TABLET, EXTENDED RELEASE ORAL at 08:12

## 2024-12-10 RX ADMIN — MAGNESIUM SULFATE HEPTAHYDRATE 1 G: 500 INJECTION, SOLUTION INTRAMUSCULAR; INTRAVENOUS at 10:12

## 2024-12-10 RX ADMIN — FERROUS SULFATE TAB EC 325 MG (65 MG FE EQUIVALENT) 1 EACH: 325 (65 FE) TABLET DELAYED RESPONSE at 08:12

## 2024-12-10 RX ADMIN — AMLODIPINE BESYLATE 10 MG: 10 TABLET ORAL at 08:12

## 2024-12-10 RX ADMIN — PANTOPRAZOLE SODIUM 40 MG: 40 INJECTION, POWDER, LYOPHILIZED, FOR SOLUTION INTRAVENOUS at 08:12

## 2024-12-10 NOTE — PLAN OF CARE
Problem: Adult Inpatient Plan of Care  Goal: Plan of Care Review  Outcome: Progressing  Goal: Patient-Specific Goal (Individualized)  Outcome: Progressing  Goal: Absence of Hospital-Acquired Illness or Injury  Outcome: Progressing  Goal: Optimal Comfort and Wellbeing  Outcome: Progressing  Goal: Readiness for Transition of Care  Outcome: Progressing     Problem: Gastrointestinal Bleeding  Goal: Optimal Coping with Acute Illness  Outcome: Progressing  Goal: Hemostasis  Outcome: Progressing     Problem: Infection  Goal: Absence of Infection Signs and Symptoms  Outcome: Progressing     Problem: Fall Injury Risk  Goal: Absence of Fall and Fall-Related Injury  Outcome: Progressing     Problem: Skin Injury Risk Increased  Goal: Skin Health and Integrity  Outcome: Progressing   Pt progressing toward goals. No distress noted. No falls or injuries during shift. Pt bed in lowest position. Side rails x2. Bed alarm activated. Call bell and personal belongs within reach. Telemetry maintained. Safety precautions maintained.

## 2024-12-10 NOTE — TREATMENT PLAN
GI Treatment Plan    EGD 12/10  Impression:              - Normal esophagus.                          - Erosive gastropathy with no stigmata of recent                          bleeding.                          - Normal examined duodenum.                          - No specimens collected.   Recommendation:          - Daily PPI                          - Checking h. pylori serology and treat with 14                          days of quadruple therapy if positive.                          - Proceed with colonoscopy       Colonoscopy 12/10  Impression:              - Preparation of the colon was inadequate.                          - Non-bleeding internal hemorrhoids.                          - Stool in the transverse colon.                          - No specimens collected.   Recommendation:          - No evidence of recent bleeding was seen. Stool                          in colon was brown in appearance. Anastamosis site                          was not reached due to limited visualization due                          to stool in colon.                          - Continue to trend H/H and monitor for any signs                          of bleeding                          - Clear liquid diet                          - If there is evidence of repeat bleeding then                          repeat colonoscopy can be considered but will need                          to give some more prep to ensure adequate                          visualization.     She will be seen in GI clinic to discuss if further eval is needed, given the risk with age. Please order CBC for 3 weeks from discharge.    GI will sign off.     Doris Matthews MD  Gastroenterology and Hepatology Fellow, PGY-4

## 2024-12-10 NOTE — PLAN OF CARE
Problem: Adult Inpatient Plan of Care  Goal: Plan of Care Review  Outcome: Met  Goal: Patient-Specific Goal (Individualized)  Outcome: Met  Goal: Absence of Hospital-Acquired Illness or Injury  Outcome: Met  Goal: Optimal Comfort and Wellbeing  Outcome: Met  Goal: Readiness for Transition of Care  Outcome: Met     Problem: Gastrointestinal Bleeding  Goal: Optimal Coping with Acute Illness  Outcome: Met  Goal: Hemostasis  Outcome: Met     Problem: Infection  Goal: Absence of Infection Signs and Symptoms  Outcome: Met     Problem: Fall Injury Risk  Goal: Absence of Fall and Fall-Related Injury  Outcome: Met     Problem: Skin Injury Risk Increased  Goal: Skin Health and Integrity  Outcome: Met

## 2024-12-10 NOTE — PLAN OF CARE
CHW met with patient/family at bedside. Patient experience rounding completed and reviewed the following.     Do you know your discharge plan? Yes,Home Health     Have you discussed your needs and preferences with your SW/CM? Yes    If you are discharging home, do you have help at home? Yes     Do you think you will need help additional at home at discharge? Yes     Do you currently have difficulty keeping up with bills, affording medicine or buying food? No    Assigned SW/CM notified of any patient/family needs or concerns. Appropriate resources provided to address patient's needs.  Natalie hough ASHANTI  Case Management   [phone]

## 2024-12-11 ENCOUNTER — TELEPHONE (OUTPATIENT)
Dept: PRIMARY CARE CLINIC | Facility: CLINIC | Age: 89
End: 2024-12-11
Payer: MEDICARE

## 2024-12-11 NOTE — TELEPHONE ENCOUNTER
----- Message from CAMRYN Nuñez sent at 12/11/2024  8:48 AM CST -----  Regarding: dig med referral  The Post-Acute UM team has identified this patient as a potential candidate for Digital Medicine due to HTN    Please consider placing a referal to Digital Medicine for further assistance.    Respectfully,    Savannah Emerson RN  [facility]

## 2024-12-11 NOTE — DISCHARGE SUMMARY
"[facility] [address] [place] [address] Medicine  Discharge Summary      Patient Name: Nydia Stevens  MRN: [id number]  SHANTANU: [id number]  Patient Class: IP- Inpatient  Admission Date: 12/7/2024  Hospital Length of Stay: 2 days  Discharge Date and Time: 12/10/2024  5:40 PM  Attending Physician: Domenico Hanson MD  Discharging Provider: Erik Yun PA-C  Primary Care Provider: Brittney Travis MD  [facility] Medicine Team: [facility]OS[facility] MED E Erik Yun PA-C  Primary Care Team: [facility] HOSP MED E    HPI:   89 yo F with PMHx HTN, HLD, and colon cancer s/p right hemicolectomy 3/20/20 who presents to the ED complaining of blood in the toilet noted today. Pt. Reports she woke up this morning and noted blood in the toilet after she used the restoom. Pt. Initally uncertain if bleeding came from urine or stool. After urine sample in ED collected and noted to be without blood, pt. Believes the blood may have come from her stool. Pt. Has not had any further episodes since the initial episode this mroning and denies noting any hematochezia or melena prior to today. She has no other symptoms, specifically denies abdominal pain, lightheadedness, fatigue, nausea, vomiting/hematemesis.    Pt. Has hx of colon cancer and resection. She has had iron deificency anemia since thought to be 2/2 GI losses. Last colonoscopy 2022 showed "Patent side-to-side ileo-colonic anastomosis, characterized by inflammation. Biopsied. Clip was placed." Rest  of exam normal. Biopsy showed  "Benign colonic mucosa with acute inflammation/granulation tissue."    Procedure(s) (LRB):  EGD (ESOPHAGOGASTRODUODENOSCOPY) (N/A)  COLONOSCOPY (N/A)      Hospital Course:   Ms. Nydia Stevens was admitted for evaluation of BRBPR. Pt HDS and afebrile, found to have asymptomatic anemia w/ hgb 6.2 on admission. She was placed GIB pathway and given IV protonix, IVF resuscitation and transfused 1 unit prbc. GI consulted. Pt notably had a significant event of a " "large bloody BM followed by asymptomatic hypotension. Hypotension resolved w/ the admin of IVF and blood transfusion. GI performed C scope 12/9 > no evidence of recent bleeding was seen. Stool in colon was brown in appearance. Anastamosis site was not reached due to limited visualization due to stool in colon. Hgb stable following C scope. Pt medically ready for discharge home. Pt med rec'd and medications were delivered to bedside prior to discharge.Pt educated on hospital course and plan, verbally agrees and understands. All questions answered.     Physical Exam  Gen: in NAD, appears stated age  Neuro: AAOx3, motor, sensory, and strength grossly intact BL  HEENT: EOMI, PERRLA; no JVD appreciated  CVS: RRR, no m/r/g  Resp: lungs CTAB, no w/r/r; no belabored breathing or accessory muscle use appreciated   Abd: NTND, soft to palpation  Extrem: no UE or LE edema BL      Goals of Care Treatment Preferences:  Code Status: Full Code    Health care agent: Dr Abdullahi  Health care agent number: see chart    Living Will: Yes              SDOH Screening:  The patient was screened for utility difficulties, food insecurity, transport difficulties, housing insecurity, and interpersonal safety and there were no concerns identified this admission.     Consults:   Consults (From admission, onward)          Status Ordering Provider     Inpatient consult to PICC team (NIAS)  Once        Provider:  (Not yet assigned)    Completed GANESH ALVARADO     Inpatient consult to Gastroenterology  Once        Provider:  (Not yet assigned)    Completed SUSHMA CHENG            * Rectal bleeding  -Pt. Presents with BRBPR, known hx of colon Ca s/p resection. CTA show no active bleeding but "lobular mucosal enhancement involving the rectal anal region, similar in appearance to previous exams"  -Hgb 8.9, downtrending from ~11 baseline. Otherwise Hemodynamically stable  -GI pathway ordered with 2 large bore IVs placed, GI consult, IV " protonix  -Continue to trend Hgb and Transfuse for Hgb <7  -Clear liquid diet for now  -GI consult   -C scope 12/9 > no evidence of recent bleeding was seen. Stool in colon was brown in appearance. Anastamosis site was not reached due to limited visualization due to stool in colon.     History of colon cancer  -Pt. S/p chemo and resection (Hemicolectomy done on 03/19/2020. Had anastomotic recurrence s/p resection 4/15/21.)  -Now in remission but with chronic ANANDA thought to be related to GI losses from disease. Treat GI bleed as above, otherwise continue to f/u with oncology for regular surveillance    Mixed hyperlipidemia  -Continue home crestor    HTN (hypertension)  -BP WNL, will hold home amlodipine and losartan for now in setting of GI bleed. Plan to resume if pt. BP increases/bleeding remains otherwise stable      Final Active Diagnoses:    Diagnosis Date Noted POA    PRINCIPAL PROBLEM:  Rectal bleeding [K62.5] 12/07/2024 Yes    History of colon cancer [Z85.038] 06/11/2020 Yes    HTN (hypertension) [I10] 01/22/2013 Yes    Mixed hyperlipidemia [E78.2] 01/22/2013 Yes     Chronic      Problems Resolved During this Admission:       Discharged Condition: good    Disposition: Home or Self Care    Follow Up:    Patient Instructions:      CBC auto differential   Standing Status: Future Standing Exp. Date: 03/10/26     Order Specific Question Answer Comments   Send normal result to authorizing provider's In Basket if patient is active on MyChart: Yes      Ambulatory referral/consult to Outpatient Case Management   Referral Priority: Routine Referral Type: Consultation   Referral Reason: Specialty Services Required   Number of Visits Requested: 1     Diet Adult Regular     Notify your health care provider if you experience any of the following:  temperature >100.4     Notify your health care provider if you experience any of the following:  persistent nausea and vomiting or diarrhea     Notify your health care provider  if you experience any of the following:  severe uncontrolled pain     Notify your health care provider if you experience any of the following:  persistent dizziness, light-headedness, or visual disturbances     Notify your health care provider if you experience any of the following:  increased confusion or weakness     Notify your health care provider if you experience any of the following:  difficulty breathing or increased cough     Activity as tolerated       Significant Diagnostic Studies: Labs: All labs within the past 24 hours have been reviewed    Pending Diagnostic Studies:       None           Medications:  Reconciled Home Medications:      Medication List        CONTINUE taking these medications      amLODIPine 10 MG tablet  Commonly known as: NORVASC  Take 1 tablet (10 mg total) by mouth once daily.     calcium carbonate 500 mg calcium (1,250 mg) tablet  Commonly known as: OS-JAILYN  Take 1 tablet (500 mg total) by mouth once daily.     cholecalciferol (vitamin D3) 25 mcg (1,000 unit) capsule  Commonly known as: VITAMIN D3  Take 1,000 Units by mouth once daily. Hold until after surgery     cyanocobalamin 1000 MCG tablet  Commonly known as: VITAMIN B-12  Take 1 tablet (1,000 mcg total) by mouth once daily. Hold until after surgery     FeroSuL 325 mg (65 mg iron) Tab tablet  Generic drug: ferrous sulfate  TAKE ONE TABLET BY MOUTH ON TUESDAY, THURSDAY, AND SATURDAY     LIDOcaine-prilocaine cream  Commonly known as: EMLA  Apply topically as needed (apply over port before chemo). apply over port before chemo     losartan 100 MG tablet  Commonly known as: COZAAR  Take 1 tablet (100 mg total) by mouth once daily.     lutein-zeaxanthin 25-5 mg Cap  Commonly known as: OCUVITE LUTEIN 25  Take 1 tablet by mouth once daily.     OCUVITE LUTEIN ORAL  Take 1 tablet by mouth once daily. Hold until after surgery     potassium chloride 10 MEQ Tbsr  Commonly known as: KLOR-CON  TAKE 1 TABLET BY MOUTH ONCE DAILY.      rosuvastatin 40 MG Tab  Commonly known as: CRESTOR  TAKE ONE TABLET BY MOUTH EVERY DAY FOR CHOLESTEROL              Indwelling Lines/Drains at time of discharge:   Lines/Drains/Airways       Central Venous Catheter Line  Duration             Port A Cath Single Lumen 07/02/21 0837 right internal jugular 1258 days                    Time spent on the discharge of patient: 36 minutes         Erik Yun PA-C  Department of Hospital Medicine  [facility]

## 2024-12-11 NOTE — PLAN OF CARE
[facility]  Discharge Final Note    Primary Care Provider: Brittney Travis MD    Expected Discharge Date: 12/10/2024    Patient discharged to home via personal transportation.     Patient's bedside nurse and patient/friend notified of the above.    Discharge Plan A and Plan B have been determined by review of patient's clinical status, future medical and therapeutic needs, and coverage/benefits for post-acute care in coordination with multidisciplinary team members.        Final Discharge Note (most recent)       Final Note - 12/11/24 0859          Final Note    Assessment Type Final Discharge Note (P)      Anticipated Discharge Disposition Home or Self Care (P)         Post-Acute Status    Post-Acute Authorization Other (P)      Other Status No Post-Acute Service Needs (P)                      Important Message from Medicare  Important Message from Medicare regarding Discharge Appeal Rights: Given to patient/caregiver, Explained to patient/caregiver, Signed/date by patient/caregiver     Date IMM was signed: 12/10/24  Time IMM was signed: 1508        Future Appointments   Date Time Provider Department Center   12/17/2024  1:15 PM Brandon Silva MD [facility] IM [facility]   1/16/2025 12:20 PM Brittney rTavis MD [facility] MED CLN [facility]   2/4/2025  9:15 AM INJECTION NOM AMB INF [facility]   2/5/2025  1:40 PM Doris Matthews MD [facility]        scheduled post-discharge follow-up appointment and information added to AVS.     Natalie Montiel LMSW  [facility]  Ext. [phone]

## 2024-12-12 ENCOUNTER — PATIENT OUTREACH (OUTPATIENT)
Dept: ADMINISTRATIVE | Facility: CLINIC | Age: 89
End: 2024-12-12
Payer: MEDICARE

## 2024-12-12 NOTE — PROGRESS NOTES
C3 nurse spoke with Nydia Stevens for a TCC post hospital discharge follow up call. The patient has a scheduled HOSFU appointment with Brittney Travis MD on 12/17/24 @ [id number].

## 2024-12-16 ENCOUNTER — OUTPATIENT CASE MANAGEMENT (OUTPATIENT)
Dept: ADMINISTRATIVE | Facility: OTHER | Age: 89
End: 2024-12-16
Payer: MEDICARE

## 2024-12-16 NOTE — LETTER
December 16, 2024             Dear Nydia,    Welcome to [facility] Complex Care Management Program.  It was a pleasure talking with you today.  My name is Sue Styles, and I look forward to being your Care Manager.  My goal is to help you function at the healthiest and highest level possible.  You can contact me directly at [phone].    As an [facility] patient, some of the services we may be able to provide include:     Development of an individualized care plan with a Registered Nurse   Connection with a   Connection with available resources and services    Coordinate communication among your care team members   Provide coaching and education   Help you understand your doctors treatment plan  Help you obtain information about your insurance coverage.     All services provided by [facility] Complex Care Managers and other care team members are coordinated with and communicated to your primary care team.      As part of your enrollment, you will be receiving education materials and more information about these services in your [facility] account, by phone or through the mail.  If you do not wish to participate or receive information, please contact our office at [phone].      Sincerely,        Sue Styles, RN  [facility]   Out-patient RN Complex Care Manager

## 2024-12-16 NOTE — ANESTHESIA POSTPROCEDURE EVALUATION
Anesthesia Post Evaluation    Patient: Nydia Stevens    Procedure(s) Performed: Procedure(s) (LRB):  EGD (ESOPHAGOGASTRODUODENOSCOPY) (N/A)  COLONOSCOPY (N/A)    Final Anesthesia Type: general      Patient location during evaluation: PACU  Patient participation: Yes- Able to Participate  Level of consciousness: awake and alert and oriented  Post-procedure vital signs: reviewed and stable  Pain management: adequate  Airway patency: patent    PONV status at discharge: No PONV  Anesthetic complications: no      Cardiovascular status: hemodynamically stable  Respiratory status: unassisted, spontaneous ventilation and room air  Hydration status: euvolemic  Follow-up not needed.          Vital signs stable, no issues at this time.  Event Time   Out of Recovery 14:45:41         Pain/Connie Score: No data recorded

## 2024-12-16 NOTE — PROGRESS NOTES
Outpatient Care Management  Initial Patient Assessment    Patient: Nydia Stevens  MRN: [id number]  Date of Service: 12/16/2024  Completed by: Sue Styles RN  Referral Date: 12/09/2024  Date of Eligibility: 12/9/2024  Program:   High Risk  Status: Ongoing  Effective Dates: 12/16/2024 - present  Responsible Staff: Sue Styles, RN        Reason for Visit   Patient presents with    OPCM Enrollment Call     12/16/2024    Nursing Assessment     12/16/2024       Brief Summary:  Nydia Stevens was referred by Dr. Domenico Hanson, hospital provider,  for gastrointestinal hemorrhage. Patient qualifies for program based on high risk.   Active problem list, medical, surgical and social history reviewed. Active comorbidities include hyperlipidemia, osteoporosis, heart failure, hypertension, rectal bleeding, h/o colon cancer, h/o hemicolectomy. Areas of need identified by patient include education on chronic conditions of heart failure and hypertension. Education on fall prevention techniques.   Next steps: educate patient on chronic conditions of heart failure and hypertension, educate patient on ways to be safe to prevent falls.     Disability Status  Is the patient alert and oriented (person, place, time, and situation)?: Alert and oriented x 4  Hearing Difficulty or Deaf: no  Visual Difficulty or Blind: yes  Visual and Hearing Needs Conclusion: Patient wears prescription glasses, she does not wear hearing aids and no hearing difficulties.  Vision Management: Other  Difficulty Concentrating, Remembering or Making Decisions: no  Communication Difficulty: no  Eating/Swallowing Difficulty: no  Walking or Climbing Stairs Difficulty: yes  Walking or Climbing Stairs: ambulation difficulty, requires equipment (Patient uses a rollator)  Mobility Management: Patient uses a rollator  Dressing/Bathing Difficulty: no  Toileting : Independent  Continence : Continence - Not a problem  Difficulty Managing Errands Independently:  yes  Errands Management: Patient does not drive. (A  takes patient to her appointments.)  Equipment Currently Used at Home: rollator  ADL Conclusion Statement: Patient is independent with Adl's  Change in Functional Status Since Onset of Current Illness/Injury: no        Spiritual Beliefs  Spiritual, Cultural Beliefs, Sikhism Practices, Values that Affect Care: no (Gnosticism)      Social History     Socioeconomic History    Marital status: Single   Occupational History    Occupation: teaching retired    Occupation: Nun.  [employer]   Tobacco Use    Smoking status: Never    Smokeless tobacco: Never   Substance and Sexual Activity    Alcohol use: No     Alcohol/week: 0.0 standard drinks of alcohol    Drug use: No    Sexual activity: Never   Social History Narrative    Member of [employer] order here in [place], [place]..  Lives with about 40-50 sisters.  All sisters with her are retired.       Social Drivers of Health     Financial Resource Strain: Low Risk  (12/10/2024)    Overall Financial Resource Strain (CARDIA)     Difficulty of Paying Living Expenses: Not very hard   Food Insecurity: No Food Insecurity (12/10/2024)    Hunger Vital Sign     Worried About Running Out of Food in the Last Year: Never true     Ran Out of Food in the Last Year: Never true   Transportation Needs: No Transportation Needs (12/10/2024)    TRANSPORTATION NEEDS     Transportation : No   Physical Activity: Inactive (5/20/2021)    Exercise Vital Sign     Days of Exercise per Week: 0 days     Minutes of Exercise per Session: 0 min   Stress: No Stress Concern Present (12/10/2024)    [employer] - Occupational Stress Questionnaire     Feeling of Stress : Not at all   Housing Stability: Low Risk  (12/10/2024)    Housing Stability Vital Sign     Unable to Pay for Housing in the Last Year: No     Homeless in the Last Year: No       Roles and Relationships  Primary Source of  Support/Comfort: nonrelative caregiver  Name of Support/Comfort Primary Source: Nurse Titi ([phone])  Secondary Source of Support/Comfort: nonrelative caregiver  Name of Support/Comfort Secondary Source: Sister Brenda Price      Advance Directives (For Healthcare)  Advance Directive  (If Adv Dir status is received, view document under Adv Dir in header or Chart Review Media tab): Advance Directive currently in Epic.        Patient Reported Insurance  Verified current insurance plan:: Medicare; Medicaid            12/16/2024     3:33 PM 8/1/2024    10:42 AM 1/30/2024     9:45 AM 3/2/2023     3:15 PM 1/27/2023     9:08 AM 7/26/2022     9:54 AM 1/25/2022     9:26 AM   Depression Patient Health Questionnaire   Over the last two weeks how often have you been bothered by little interest or pleasure in doing things Not at all Not at all Not at all Not at all Not at all Not at all Not at all   Over the last two weeks how often have you been bothered by feeling down, depressed or hopeless Not at all Not at all Not at all Not at all Not at all Not at all Not at all   PHQ-2 Total Score 0 0 0 0 0 0 0   Over the last two weeks how often have you been bothered by trouble falling or staying asleep, or sleeping too much    Not at all      Over the last two weeks how often have you been bothered by feeling tired or having little energy    Not at all      Over the last two weeks how often have you been bothered by a poor appetite or overeating    Not at all      Over the last two weeks how often have you been bothered by feeling bad about yourself - or that you are a failure or have let yourself or your family down    Not at all      Over the last two weeks how often have you been bothered by trouble concentrating on things, such as reading the newspaper or watching television    Not at all      Over the last two weeks how often have you been bothered by moving or speaking so slowly that other people could have noticed. Or the  opposite - being so fidgety or restless that you have been moving around a lot more than usual.    Not at all      Over the last two weeks how often have you been bothered by thoughts that you would be better off dead, or of hurting yourself    Not at all      If you checked off any problems, how difficult have these problems made it for you to do your work, take care of things at home or get along with other people?    Not difficult at all      PHQ-9 Score    0      PHQ-9 Interpretation    Minimal or None          Learning Assessment       12/16/2024 1559 [facility] (12/16/2024 - Present)   Created by Sue Styles, RN -  (Nurse) Status: Complete                 PRIMARY LEARNER     Primary Learner Name:  Sister Nydia Angela  - 12/16/2024 1559    Relationship:  Patient  - 12/16/2024 1559    Does the primary learner have any barriers to learning?:  No Barriers  - 12/16/2024 1559    What is the preferred language of the primary learner?:  English  - 12/16/2024 1559    Is an  required?:  No  - 12/16/2024 1559    How does the primary learner prefer to learn new concepts?:  Listening  - 12/16/2024 1559    How often do you need to have someone help you read instructions, pamphlets, or written material from your doctor or pharmacy?:  Sometimes  - 12/16/2024 1559        CO-LEARNER #1     No question answered        CO-LEARNER #2     No question answered        SPECIAL TOPICS     No question answered        ANSWERED BY:     No question answered        Comments         Edit History       Sue Styles, RN -  (Nurse)   12/16/2024 1559

## 2024-12-17 ENCOUNTER — PATIENT OUTREACH (OUTPATIENT)
Dept: ADMINISTRATIVE | Facility: OTHER | Age: 89
End: 2024-12-17
Payer: MEDICARE

## 2024-12-17 ENCOUNTER — OFFICE VISIT (OUTPATIENT)
Dept: PRIMARY CARE CLINIC | Facility: CLINIC | Age: 89
End: 2024-12-17
Payer: MEDICARE

## 2024-12-17 ENCOUNTER — LAB VISIT (OUTPATIENT)
Dept: LAB | Facility: HOSPITAL | Age: 89
End: 2024-12-17
Attending: INTERNAL MEDICINE
Payer: MEDICARE

## 2024-12-17 VITALS
TEMPERATURE: 98 F | SYSTOLIC BLOOD PRESSURE: 138 MMHG | HEIGHT: 67 IN | OXYGEN SATURATION: 99 % | BODY MASS INDEX: 19.01 KG/M2 | HEART RATE: 83 BPM | DIASTOLIC BLOOD PRESSURE: 82 MMHG | WEIGHT: 121.13 LBS

## 2024-12-17 DIAGNOSIS — Z00.00 HEALTHCARE MAINTENANCE: ICD-10-CM

## 2024-12-17 DIAGNOSIS — E87.1 HYPONATREMIA: ICD-10-CM

## 2024-12-17 DIAGNOSIS — E87.1 HYPONATREMIA: Primary | ICD-10-CM

## 2024-12-17 DIAGNOSIS — D50.0 IRON DEFICIENCY ANEMIA DUE TO CHRONIC BLOOD LOSS: ICD-10-CM

## 2024-12-17 DIAGNOSIS — I10 PRIMARY HYPERTENSION: ICD-10-CM

## 2024-12-17 DIAGNOSIS — Z87.19 HISTORY OF GI BLEED: ICD-10-CM

## 2024-12-17 LAB
ALBUMIN SERPL BCP-MCNC: 3.9 G/DL (ref 3.5–5.2)
ALP SERPL-CCNC: 65 U/L (ref 40–150)
ALT SERPL W/O P-5'-P-CCNC: 10 U/L (ref 10–44)
ANION GAP SERPL CALC-SCNC: 9 MMOL/L (ref 8–16)
AST SERPL-CCNC: 28 U/L (ref 10–40)
BASOPHILS # BLD AUTO: 0.04 K/UL (ref 0–0.2)
BASOPHILS NFR BLD: 0.9 % (ref 0–1.9)
BILIRUB SERPL-MCNC: 1 MG/DL (ref 0.1–1)
BUN SERPL-MCNC: 10 MG/DL (ref 8–23)
CALCIUM SERPL-MCNC: 9.7 MG/DL (ref 8.7–10.5)
CHLORIDE SERPL-SCNC: 98 MMOL/L (ref 95–110)
CO2 SERPL-SCNC: 26 MMOL/L (ref 23–29)
CREAT SERPL-MCNC: 0.8 MG/DL (ref 0.5–1.4)
DIFFERENTIAL METHOD BLD: ABNORMAL
EOSINOPHIL # BLD AUTO: 0.1 K/UL (ref 0–0.5)
EOSINOPHIL NFR BLD: 1.1 % (ref 0–8)
ERYTHROCYTE [DISTWIDTH] IN BLOOD BY AUTOMATED COUNT: 16.6 % (ref 11.5–14.5)
EST. GFR  (NO RACE VARIABLE): >60 ML/MIN/1.73 M^2
GLUCOSE SERPL-MCNC: 90 MG/DL (ref 70–110)
HCT VFR BLD AUTO: 38.1 % (ref 37–48.5)
HGB BLD-MCNC: 11.8 G/DL (ref 12–16)
IMM GRANULOCYTES # BLD AUTO: 0.17 K/UL (ref 0–0.04)
IMM GRANULOCYTES NFR BLD AUTO: 3.7 % (ref 0–0.5)
LYMPHOCYTES # BLD AUTO: 0.9 K/UL (ref 1–4.8)
LYMPHOCYTES NFR BLD: 18.5 % (ref 18–48)
MCH RBC QN AUTO: 27.6 PG (ref 27–31)
MCHC RBC AUTO-ENTMCNC: 31 G/DL (ref 32–36)
MCV RBC AUTO: 89 FL (ref 82–98)
MONOCYTES # BLD AUTO: 1.2 K/UL (ref 0.3–1)
MONOCYTES NFR BLD: 26.1 % (ref 4–15)
NEUTROPHILS # BLD AUTO: 2.3 K/UL (ref 1.8–7.7)
NEUTROPHILS NFR BLD: 49.7 % (ref 38–73)
NRBC BLD-RTO: 0 /100 WBC
PLATELET # BLD AUTO: 264 K/UL (ref 150–450)
PMV BLD AUTO: 10.2 FL (ref 9.2–12.9)
POTASSIUM SERPL-SCNC: 4.3 MMOL/L (ref 3.5–5.1)
PROT SERPL-MCNC: 7.2 G/DL (ref 6–8.4)
RBC # BLD AUTO: 4.27 M/UL (ref 4–5.4)
SODIUM SERPL-SCNC: 133 MMOL/L (ref 136–145)
WBC # BLD AUTO: 4.59 K/UL (ref 3.9–12.7)

## 2024-12-17 PROCEDURE — 99495 TRANSJ CARE MGMT MOD F2F 14D: CPT | Mod: S$GLB,,, | Performed by: INTERNAL MEDICINE

## 2024-12-17 PROCEDURE — 80053 COMPREHEN METABOLIC PANEL: CPT | Performed by: INTERNAL MEDICINE

## 2024-12-17 PROCEDURE — 36415 COLL VENOUS BLD VENIPUNCTURE: CPT | Performed by: INTERNAL MEDICINE

## 2024-12-17 PROCEDURE — 85025 COMPLETE CBC W/AUTO DIFF WBC: CPT | Performed by: INTERNAL MEDICINE

## 2024-12-17 NOTE — ASSESSMENT & PLAN NOTE
With hypo kalemia.  Patient states that she is taking potassium at home.  Unclear if that is the case.    Labs today for evaluation.

## 2024-12-17 NOTE — PATIENT INSTRUCTIONS
Thank you so much for coming in to see me today.  Please do not hesitate to call with any questions or concerns or if you feel that you need to be seen.         Are you taking potassium?    No over the counter pain medicine except tylenol.  No aspirin!  No medication that increase bleeding risk.

## 2024-12-17 NOTE — PROGRESS NOTES
Community Health Worker attempted to contact patient via telephone to assist with SDOH. Sister Nydia answer and once I informed her where I was calling from she mumbled and stated she has an appointment and what sounded like she wont be gong then hung up. I have called her back and the call was forwarded. Will attempt again at a later date.

## 2024-12-17 NOTE — PROGRESS NOTES
.  This note has been generated using voice-recognition software. There may be typographical errors that have been missed during proof-reading.     Primary Care Provider Appointment    Subjective:      Patient ID: Nydia Stevens is a 89 y.o. female here for follow up.     Chief Complaint: Follow-up    HPI:  This is an 89-year-old female with hypertension, hyperlipidemia, congestive heart failure, h/o colon cancer, iron deficiency anemia, carotid artery disease, prediabetes, osteoporosis here for f/u.  patient last seen  11/07/2024 12/07/2024 patient admitted for GI bleed.  Hemoglobin 6.2 on admission.  Given IV Protonix.  IV fluid resuscitation.  Transfuse 1 unit packed RBCs.  C scope 12/9 negative.  Anastomosis site not reach.  Hyponatremia   And hypokalemia on last labs.    Pt states that she had no symptoms.  Not feeling bad at this time.   Nl BM since last visit.     Slight edema of ankles and resolves at night.      Transitional Care Note    Family and/or Caretaker present at visit?  No.  Diagnostic tests reviewed/disposition: I have reviewed all completed as well as pending diagnostic tests at the time of discharge.  Disease/illness education: meds and expected source of GIB  Home health/community services discussion/referrals: Patient does not have home health established from hospital visit.  They do not need home health.  If needed, we will set up home health for the patient.   Establishment or re-establishment of referral orders for community resources: No other necessary community resources.   Discussion with other health care providers: No discussion with other health care providers necessary.          Patient Active Problem List   Diagnosis    Primary hypertension    Mixed hyperlipidemia    Palliative care encounter    Closed fracture of left olecranon process    Debility    Aortic stenosis, mild    Osteoporosis    Iron deficiency anemia due to chronic blood loss    Chronic combined systolic and  diastolic heart failure    History of colon cancer    Carotid stenosis, asymptomatic, bilateral    H/O: hysterectomy    Prediabetes    Hyponatremia    B12 deficiency    Healthcare maintenance    Aortic atherosclerosis    Benign mole    Adrenal nodule    Cerebral atrophy, mild    History of hematuria    Rectal bleeding    History of GI bleed        Past Surgical History:   Procedure Laterality Date    CATARACT EXTRACTION W/  INTRAOCULAR LENS IMPLANT Left 8/11/14    [facility]    CATARACT EXTRACTION W/  INTRAOCULAR LENS IMPLANT Right 09/15/14    [facility]    COLONOSCOPY N/A 3/19/2020    Procedure: COLONOSCOPY;  Surgeon: Real Mabry MD;  Location: [facility] ENDO (2ND FLR);  Service: Endoscopy;  Laterality: N/A;    COLONOSCOPY N/A 3/23/2021    Procedure: COLONOSCOPY;  Surgeon: AUTUMN Berg MD;  Location: [facility] ENDO (4TH FLR);  Service: Endoscopy;  Laterality: N/A;  covid test 3/20 [facility]    COLONOSCOPY N/A 2/15/2022    Procedure: COLONOSCOPY;  Surgeon: Doris Simpson MD;  Location: [facility] ENDO (4TH FLR);  Service: Endoscopy;  Laterality: N/A;  COVID test on 2/12/22 at [facility]  2/14/22 - Pt confirmed 0640 arrival, prep instructions reviewed, Pt verbalized understanding-[id number]    COLONOSCOPY N/A 12/9/2024    Procedure: COLONOSCOPY;  Surgeon: Vitaly Virgen MD;  Location: [facility] ENDO (2ND FLR);  Service: Gastroenterology;  Laterality: N/A;    ESOPHAGOGASTRODUODENOSCOPY N/A 3/19/2020    Procedure: EGD (ESOPHAGOGASTRODUODENOSCOPY);  Surgeon: Real Mabry MD;  Location: [facility] ENDO (2ND FLR);  Service: Endoscopy;  Laterality: N/A;    ESOPHAGOGASTRODUODENOSCOPY N/A 12/9/2024    Procedure: EGD (ESOPHAGOGASTRODUODENOSCOPY);  Surgeon: Vitaly Virgen MD;  Location: [facility] ENDO (2ND FLR);  Service: Gastroenterology;  Laterality: N/A;    FOOT SURGERY      left    HYSTERECTOMY  1962    ILEOCOLECTOMY N/A 4/15/2021    Procedure: ILEOCOLECTOMY;  Surgeon: AUTUMN Berg MD;  Location: [facility] OR 2ND FLR;  Service: Colon and Rectal;   Laterality: N/A;    INSERTION OF TUNNELED CENTRAL VENOUS CATHETER (CVC) WITH SUBCUTANEOUS PORT N/A 7/2/2021    Procedure: [id number];  Surgeon: Suresh Agrawal MD;  Location: [facility] OR 2ND FLR;  Service: Vascular;  Laterality: N/A;  Right IJ    LAPAROSCOPIC HEMICOLECTOMY Right 3/20/2020    Procedure: HEMICOLECTOMY, RIGHT;  Surgeon: AUTUMN Berg MD;  Location: [facility] OR 2ND FLR;  Service: Colon and Rectal;  Laterality: Right;    LYSIS OF ADHESIONS N/A 4/15/2021    Procedure: LYSIS, ADHESIONS;  Surgeon: AUTUMN Berg MD;  Location: [facility] OR 2ND FLR;  Service: Colon and Rectal;  Laterality: N/A;  Greater than 2 hours    MOBILIZATION OF SPLENIC FLEXURE N/A 4/15/2021    Procedure: MOBILIZATION, SPLENIC FLEXURE;  Surgeon: AUTUMN eBrg MD;  Location: [facility] OR 2ND FLR;  Service: Colon and Rectal;  Laterality: N/A;       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    Hyperlipidemia     Hypertension     Osteoporosis        Social History     Socioeconomic History    Marital status: Single   Occupational History    Occupation: teaching retired    Occupation: Nun.  [employer]   Tobacco Use    Smoking status: Never    Smokeless tobacco: Never   Substance and Sexual Activity    Alcohol use: No     Alcohol/week: 0.0 standard drinks of alcohol    Drug use: No    Sexual activity: Never   Social History Narrative    Member of [employer] order here in [place], [place]..  Lives with about 40-50 sisters.  All sisters with her are retired.       Social Drivers of Health     Financial Resource Strain: Low Risk  (12/10/2024)    Overall Financial Resource Strain (CARDIA)     Difficulty of Paying Living Expenses: Not very hard   Food Insecurity: No Food Insecurity (12/10/2024)    Hunger Vital Sign     Worried About Running Out of Food  "in the Last Year: Never true     Ran Out of Food in the Last Year: Never true   Transportation Needs: No Transportation Needs (12/10/2024)    TRANSPORTATION NEEDS     Transportation : No   Physical Activity: Inactive (5/20/2021)    Exercise Vital Sign     Days of Exercise per Week: 0 days     Minutes of Exercise per Session: 0 min   Stress: No Stress Concern Present (12/10/2024)    [employer] - Occupational Stress Questionnaire     Feeling of Stress : Not at all   Housing Stability: Low Risk  (12/10/2024)    Housing Stability Vital Sign     Unable to Pay for Housing in the Last Year: No     Homeless in the Last Year: No       Review of Systems         No data to display                 Checklist of Daily Activities:        Objective:   /82 (BP Location: Left arm, Patient Position: Sitting)   Pulse 83   Temp 98.4 °F (36.9 °C) (Oral)   Ht 5' 7" (1.702 m)   Wt 54.9 kg (121 lb 2.3 oz)   SpO2 99%   BMI 18.97 kg/m²     Physical Exam  Constitutional:       General: She is not in acute distress.     Appearance: Normal appearance. She is not ill-appearing, toxic-appearing or diaphoretic.   HENT:      Head: Normocephalic and atraumatic.   Cardiovascular:      Rate and Rhythm: Normal rate and regular rhythm.      Heart sounds: Murmur heard.   Pulmonary:      Effort: Pulmonary effort is normal.      Breath sounds: Normal breath sounds.   Abdominal:      Palpations: Abdomen is soft.      Tenderness: There is no abdominal tenderness. There is no guarding or rebound.   Musculoskeletal:      Right lower leg: No edema.      Left lower leg: No edema.   Lymphadenopathy:      Cervical: No cervical adenopathy.   Neurological:      Mental Status: She is alert.             Lab Results   Component Value Date    WBC 9.62 12/10/2024    HGB 11.2 (L) 12/10/2024    HCT 33.3 (L) 12/10/2024     12/10/2024    CHOL 270 (H) 11/07/2024    TRIG 84 11/07/2024    HDL 76 (H) 11/07/2024    ALT 11 12/10/2024 "    AST 30 12/10/2024     (L) 12/10/2024    K 3.4 (L) 12/10/2024     12/10/2024    CREATININE 0.7 12/10/2024    BUN 7 (L) 12/10/2024    CO2 22 (L) 12/10/2024    TSH 2.396 11/07/2024    INR 1.0 12/07/2024    HGBA1C 5.7 (H) 11/07/2024       Current Outpatient Medications on File Prior to Visit   Medication Sig Dispense Refill    amLODIPine (NORVASC) 10 MG tablet Take 1 tablet (10 mg total) by mouth once daily. 90 tablet 3    cholecalciferol, vitamin D3, (VITAMIN D3) 25 mcg (1,000 unit) capsule Take 1,000 Units by mouth once daily. Hold until after surgery      cyanocobalamin (VITAMIN B-12) 1000 MCG tablet Take 1 tablet (1,000 mcg total) by mouth once daily. Hold until after surgery 30 tablet 11    FEROSUL 325 mg (65 mg iron) Tab tablet TAKE ONE TABLET BY MOUTH ON TUESDAY, THURSDAY, AND SATURDAY 12 tablet 5    losartan (COZAAR) 100 MG tablet Take 1 tablet (100 mg total) by mouth once daily. 30 tablet 5    potassium chloride (KLOR-CON) 10 MEQ TbSR TAKE 1 TABLET BY MOUTH ONCE DAILY. 90 tablet 3    rosuvastatin (CRESTOR) 40 MG Tab TAKE ONE TABLET BY MOUTH EVERY DAY FOR CHOLESTEROL 90 tablet 3    VIT C/VIT E ACETATE/LUTEIN/MIN (OCUVITE LUTEIN ORAL) Take 1 tablet by mouth once daily. Hold until after surgery      [DISCONTINUED] LIDOcaine-prilocaine (EMLA) cream Apply topically as needed (apply over port before chemo). apply over port before chemo 30 g 3    [DISCONTINUED] lutein-zeaxanthin (OCUVITE LUTEIN 25) 25-5 mg Cap Take 1 tablet by mouth once daily. 30 capsule 0    calcium carbonate (OS-JAILYN) 500 mg calcium (1,250 mg) tablet Take 1 tablet (500 mg total) by mouth once daily.  0     Current Facility-Administered Medications on File Prior to Visit   Medication Dose Route Frequency Provider Last Rate Last Admin    gabapentin capsule 300 mg  300 mg Oral TID Amanda Gaspar NP   300 mg at 04/16/21 0814    [DISCONTINUED] LIDOcaine (PF) 10 mg/ml (1%) injection 10 mg  1 mL Intradermal On Call Procedure Chasity  Amanda MANCILLA NP             Assessment:   89 y.o. female with multiple co-morbid illnesses here for continued follow up of medical problems.      Plan:     Problem List Items Addressed This Visit          Cardiac/Vascular    Primary hypertension     BP at goal.    Continue current BP meds            Oncology    Iron deficiency anemia due to chronic blood loss         Exacerbated by recent GI bleed.    Labs today for evaluation. Avoid all NSAIDs and aspirin.         Relevant Orders    CBC Auto Differential       Endocrine    Hyponatremia - Primary      With hypo kalemia.  Patient states that she is taking potassium at home.  Unclear if that is the case.    Labs today for evaluation.         Relevant Orders    Comprehensive Metabolic Panel       GI    History of GI bleed     S/p transfusion and hospitalizations   12/2024.  Bleeding appears to be associated with anastomosis.  Colonoscopy could not visualize area.  No further bleeding since last visit.  Patient completely asymptomatic without any pain with bleeding.    No further anti-inflammatories for pain.  Tylenol only for pain.  No aspirin.    CBC for evaluation.             Other    Healthcare maintenance     Power-of- completed 5/2022 with patient's assistance [facility].  Reviewed lapost and poa 5/2024  yearly labs   11/2024              Health Maintenance         Date Due Completion Date    COVID-19 Vaccine (9 - 2024-25 season) 11/12/2024 9/17/2024    DEXA Scan 07/26/2025 7/26/2023    Override on 2/15/2016: Declined    Hemoglobin A1c (Prediabetes) 11/07/2025 11/7/2024    Colonoscopy 12/09/2027 12/9/2024    TETANUS VACCINE 02/15/2028 2/15/2018          Medications Reconciliation:   I have reconciled the patient's home medications with the patient/family. I have updated all changes.  Refer to After-Visit Medication List.      Medication List            Accurate as of December 17, 2024  5:09 PM. If you have any questions, ask your nurse or doctor.                 CONTINUE taking these medications      amLODIPine 10 MG tablet  Commonly known as: NORVASC  Take 1 tablet (10 mg total) by mouth once daily.     calcium carbonate 500 mg calcium (1,250 mg) tablet  Commonly known as: OS-JAILYN  Take 1 tablet (500 mg total) by mouth once daily.     cholecalciferol (vitamin D3) 25 mcg (1,000 unit) capsule  Commonly known as: VITAMIN D3     cyanocobalamin 1000 MCG tablet  Commonly known as: VITAMIN B-12  Take 1 tablet (1,000 mcg total) by mouth once daily. Hold until after surgery     FeroSuL 325 mg (65 mg iron) Tab tablet  Generic drug: ferrous sulfate  TAKE ONE TABLET BY MOUTH ON TUESDAY, THURSDAY, AND SATURDAY     losartan 100 MG tablet  Commonly known as: COZAAR  Take 1 tablet (100 mg total) by mouth once daily.     OCUVITE LUTEIN ORAL     potassium chloride 10 MEQ Tbsr  Commonly known as: KLOR-CON  TAKE 1 TABLET BY MOUTH ONCE DAILY.     rosuvastatin 40 MG Tab  Commonly known as: CRESTOR  TAKE ONE TABLET BY MOUTH EVERY DAY FOR CHOLESTEROL            STOP taking these medications      LIDOcaine-prilocaine cream  Commonly known as: EMLA  Stopped by: Brittney Travis MD     lutein-zeaxanthin 25-5 mg Cap  Commonly known as: OCUVITE LUTEIN 25  Stopped by: Brittney Travis MD                   Follow up in about 6 weeks (around 1/28/2025). Total clinical care time was  26 min. The following issues were discussed:  review of hospitalization and tests including CT scan, colonoscopy, labs.  Anemia discussed.  Blood pressure.  Hyponatremia and hypokalemia     Brittney Travis MD  Internal Medicine  [facility]  [phone]

## 2024-12-17 NOTE — ASSESSMENT & PLAN NOTE
S/p transfusion and hospitalizations   12/2024.  Bleeding appears to be associated with anastomosis.  Colonoscopy could not visualize area.  No further bleeding since last visit.  Patient completely asymptomatic without any pain with bleeding.    No further anti-inflammatories for pain.  Tylenol only for pain.  No aspirin.    CBC for evaluation.

## 2024-12-17 NOTE — ASSESSMENT & PLAN NOTE
Power-of- completed 5/2022 with patient's assistance [facility].  Reviewed lapost and poa 5/2024  yearly labs   11/2024

## 2024-12-20 ENCOUNTER — TELEPHONE (OUTPATIENT)
Dept: PRIMARY CARE CLINIC | Facility: CLINIC | Age: 89
End: 2024-12-20
Payer: MEDICARE

## 2024-12-20 ENCOUNTER — PATIENT OUTREACH (OUTPATIENT)
Dept: ADMINISTRATIVE | Facility: OTHER | Age: 89
End: 2024-12-20
Payer: MEDICARE

## 2024-12-20 NOTE — TELEPHONE ENCOUNTER
----- Message from Brittney Travis MD sent at 12/20/2024  3:04 PM CST -----  Please call patient with results.   Sodium is slightly decreased.  Will follow.  Previously abnormal calcium, protein, albumin, potassium have all normalized.  Anemia has improved, and  almost resolved.

## 2024-12-23 ENCOUNTER — OUTPATIENT CASE MANAGEMENT (OUTPATIENT)
Dept: ADMINISTRATIVE | Facility: OTHER | Age: 89
End: 2024-12-23
Payer: MEDICARE

## 2024-12-24 ENCOUNTER — LAB VISIT (OUTPATIENT)
Dept: LAB | Facility: HOSPITAL | Age: 89
End: 2024-12-24
Attending: PHYSICIAN ASSISTANT
Payer: MEDICARE

## 2024-12-24 DIAGNOSIS — I10 ESSENTIAL HYPERTENSION: Chronic | ICD-10-CM

## 2024-12-24 DIAGNOSIS — K92.2 GASTROINTESTINAL HEMORRHAGE, UNSPECIFIED GASTROINTESTINAL HEMORRHAGE TYPE: ICD-10-CM

## 2024-12-24 LAB
ANION GAP SERPL CALC-SCNC: 12 MMOL/L (ref 8–16)
BASOPHILS # BLD AUTO: 0.04 K/UL (ref 0–0.2)
BASOPHILS NFR BLD: 0.5 % (ref 0–1.9)
BUN SERPL-MCNC: 11 MG/DL (ref 8–23)
CALCIUM SERPL-MCNC: 9.1 MG/DL (ref 8.7–10.5)
CHLORIDE SERPL-SCNC: 99 MMOL/L (ref 95–110)
CO2 SERPL-SCNC: 24 MMOL/L (ref 23–29)
CREAT SERPL-MCNC: 0.8 MG/DL (ref 0.5–1.4)
DIFFERENTIAL METHOD BLD: ABNORMAL
EOSINOPHIL # BLD AUTO: 0.1 K/UL (ref 0–0.5)
EOSINOPHIL NFR BLD: 1.3 % (ref 0–8)
ERYTHROCYTE [DISTWIDTH] IN BLOOD BY AUTOMATED COUNT: 18.4 % (ref 11.5–14.5)
EST. GFR  (NO RACE VARIABLE): >60 ML/MIN/1.73 M^2
GLUCOSE SERPL-MCNC: 98 MG/DL (ref 70–110)
HCT VFR BLD AUTO: 30 % (ref 37–48.5)
HGB BLD-MCNC: 8.9 G/DL (ref 12–16)
IMM GRANULOCYTES # BLD AUTO: 0.12 K/UL (ref 0–0.04)
IMM GRANULOCYTES NFR BLD AUTO: 1.4 % (ref 0–0.5)
LYMPHOCYTES # BLD AUTO: 1.6 K/UL (ref 1–4.8)
LYMPHOCYTES NFR BLD: 18.7 % (ref 18–48)
MCH RBC QN AUTO: 28 PG (ref 27–31)
MCHC RBC AUTO-ENTMCNC: 29.7 G/DL (ref 32–36)
MCV RBC AUTO: 94 FL (ref 82–98)
MONOCYTES # BLD AUTO: 1.5 K/UL (ref 0.3–1)
MONOCYTES NFR BLD: 18 % (ref 4–15)
NEUTROPHILS # BLD AUTO: 5 K/UL (ref 1.8–7.7)
NEUTROPHILS NFR BLD: 60.1 % (ref 38–73)
NRBC BLD-RTO: 0 /100 WBC
PLATELET # BLD AUTO: 269 K/UL (ref 150–450)
PMV BLD AUTO: 10.4 FL (ref 9.2–12.9)
POTASSIUM SERPL-SCNC: 4 MMOL/L (ref 3.5–5.1)
RBC # BLD AUTO: 3.18 M/UL (ref 4–5.4)
SODIUM SERPL-SCNC: 135 MMOL/L (ref 136–145)
WBC # BLD AUTO: 8.38 K/UL (ref 3.9–12.7)

## 2024-12-24 PROCEDURE — 36415 COLL VENOUS BLD VENIPUNCTURE: CPT | Mod: PN | Performed by: INTERNAL MEDICINE

## 2024-12-24 PROCEDURE — 80048 BASIC METABOLIC PNL TOTAL CA: CPT | Performed by: INTERNAL MEDICINE

## 2024-12-24 PROCEDURE — 85025 COMPLETE CBC W/AUTO DIFF WBC: CPT | Performed by: PHYSICIAN ASSISTANT

## 2024-12-27 ENCOUNTER — TELEPHONE (OUTPATIENT)
Dept: PRIMARY CARE CLINIC | Facility: CLINIC | Age: 89
End: 2024-12-27
Payer: MEDICARE

## 2024-12-27 NOTE — TELEPHONE ENCOUNTER
----- Message from Brittney Travis MD sent at 12/27/2024 12:14 PM CST -----  Please call patient with results. Nl renal function and slight decreased sodium is still present but almost normal.  Will follow

## 2025-01-13 DIAGNOSIS — Z00.00 ENCOUNTER FOR MEDICARE ANNUAL WELLNESS EXAM: ICD-10-CM

## 2025-01-16 ENCOUNTER — LAB VISIT (OUTPATIENT)
Dept: LAB | Facility: HOSPITAL | Age: OVER 89
End: 2025-01-16
Attending: INTERNAL MEDICINE
Payer: MEDICARE

## 2025-01-16 ENCOUNTER — OFFICE VISIT (OUTPATIENT)
Dept: PRIMARY CARE CLINIC | Facility: CLINIC | Age: OVER 89
End: 2025-01-16
Payer: MEDICARE

## 2025-01-16 VITALS
TEMPERATURE: 98 F | WEIGHT: 122.38 LBS | OXYGEN SATURATION: 99 % | SYSTOLIC BLOOD PRESSURE: 136 MMHG | HEART RATE: 88 BPM | BODY MASS INDEX: 19.21 KG/M2 | HEIGHT: 67 IN | DIASTOLIC BLOOD PRESSURE: 78 MMHG

## 2025-01-16 DIAGNOSIS — D50.0 IRON DEFICIENCY ANEMIA DUE TO CHRONIC BLOOD LOSS: Primary | ICD-10-CM

## 2025-01-16 DIAGNOSIS — E87.1 HYPONATREMIA: ICD-10-CM

## 2025-01-16 DIAGNOSIS — D50.0 IRON DEFICIENCY ANEMIA DUE TO CHRONIC BLOOD LOSS: ICD-10-CM

## 2025-01-16 DIAGNOSIS — Z00.00 HEALTHCARE MAINTENANCE: ICD-10-CM

## 2025-01-16 DIAGNOSIS — I50.42 CHRONIC COMBINED SYSTOLIC AND DIASTOLIC HEART FAILURE: Chronic | ICD-10-CM

## 2025-01-16 DIAGNOSIS — E27.9 ADRENAL NODULE: ICD-10-CM

## 2025-01-16 DIAGNOSIS — I70.0 AORTIC ATHEROSCLEROSIS: ICD-10-CM

## 2025-01-16 DIAGNOSIS — I10 PRIMARY HYPERTENSION: ICD-10-CM

## 2025-01-16 DIAGNOSIS — E78.2 MIXED HYPERLIPIDEMIA: Chronic | ICD-10-CM

## 2025-01-16 DIAGNOSIS — G31.9 CEREBRAL ATROPHY, MILD: ICD-10-CM

## 2025-01-16 LAB
ANION GAP SERPL CALC-SCNC: 10 MMOL/L (ref 8–16)
BASOPHILS # BLD AUTO: 0.04 K/UL (ref 0–0.2)
BASOPHILS NFR BLD: 1 % (ref 0–1.9)
BUN SERPL-MCNC: 12 MG/DL (ref 8–23)
CALCIUM SERPL-MCNC: 9.8 MG/DL (ref 8.7–10.5)
CHLORIDE SERPL-SCNC: 100 MMOL/L (ref 95–110)
CO2 SERPL-SCNC: 24 MMOL/L (ref 23–29)
CREAT SERPL-MCNC: 0.7 MG/DL (ref 0.5–1.4)
DIFFERENTIAL METHOD BLD: ABNORMAL
EOSINOPHIL # BLD AUTO: 0.1 K/UL (ref 0–0.5)
EOSINOPHIL NFR BLD: 2.5 % (ref 0–8)
ERYTHROCYTE [DISTWIDTH] IN BLOOD BY AUTOMATED COUNT: 16.3 % (ref 11.5–14.5)
EST. GFR  (NO RACE VARIABLE): >60 ML/MIN/1.73 M^2
GLUCOSE SERPL-MCNC: 91 MG/DL (ref 70–110)
HCT VFR BLD AUTO: 32.7 % (ref 37–48.5)
HGB BLD-MCNC: 10.2 G/DL (ref 12–16)
IMM GRANULOCYTES # BLD AUTO: 0.02 K/UL (ref 0–0.04)
IMM GRANULOCYTES NFR BLD AUTO: 0.5 % (ref 0–0.5)
LYMPHOCYTES # BLD AUTO: 1 K/UL (ref 1–4.8)
LYMPHOCYTES NFR BLD: 23.4 % (ref 18–48)
MCH RBC QN AUTO: 30.3 PG (ref 27–31)
MCHC RBC AUTO-ENTMCNC: 31.2 G/DL (ref 32–36)
MCV RBC AUTO: 97 FL (ref 82–98)
MONOCYTES # BLD AUTO: 0.6 K/UL (ref 0.3–1)
MONOCYTES NFR BLD: 13.5 % (ref 4–15)
NEUTROPHILS # BLD AUTO: 2.4 K/UL (ref 1.8–7.7)
NEUTROPHILS NFR BLD: 59.1 % (ref 38–73)
NRBC BLD-RTO: 0 /100 WBC
PLATELET # BLD AUTO: 224 K/UL (ref 150–450)
PMV BLD AUTO: 9.9 FL (ref 9.2–12.9)
POTASSIUM SERPL-SCNC: 4 MMOL/L (ref 3.5–5.1)
RBC # BLD AUTO: 3.37 M/UL (ref 4–5.4)
SODIUM SERPL-SCNC: 134 MMOL/L (ref 136–145)
WBC # BLD AUTO: 4.06 K/UL (ref 3.9–12.7)

## 2025-01-16 PROCEDURE — 85025 COMPLETE CBC W/AUTO DIFF WBC: CPT | Performed by: INTERNAL MEDICINE

## 2025-01-16 PROCEDURE — 36415 COLL VENOUS BLD VENIPUNCTURE: CPT | Performed by: INTERNAL MEDICINE

## 2025-01-16 PROCEDURE — 99213 OFFICE O/P EST LOW 20 MIN: CPT | Mod: S$GLB,,, | Performed by: INTERNAL MEDICINE

## 2025-01-16 PROCEDURE — 80048 BASIC METABOLIC PNL TOTAL CA: CPT | Performed by: INTERNAL MEDICINE

## 2025-01-16 PROCEDURE — G2211 COMPLEX E/M VISIT ADD ON: HCPCS | Mod: S$GLB,,, | Performed by: INTERNAL MEDICINE

## 2025-01-16 NOTE — ASSESSMENT & PLAN NOTE
Power-of- completed 5/2022 with patient's assistance [facility].  Reviewed lapost and poa 5/2024  yearly labs 11/2024

## 2025-01-16 NOTE — ASSESSMENT & PLAN NOTE
Lasix discontinued.  On review, it appears patient is no longer on potassium.  Euvolemic today.  1 lb weight gain since last visit.    Continue current medications.  Will monitor with need for Lasix in the past.

## 2025-01-16 NOTE — ASSESSMENT & PLAN NOTE
Noted on CT chest abdomen pelvis 03/15/2023: Note of 1.5 cm right adrenal hypodense nodule stable   Noted to be stable.  No indication for workup at this time  With age another  medical issues.

## 2025-01-16 NOTE — ASSESSMENT & PLAN NOTE
Noted on CT chest abdomen pelvis 02/26/2021:  Advanced atherosclerosis along the abdominal aorta and iliac arteries   Patient asymptomatic.  Patient on high-dose statin.  Tolerating well , no side effects.  Will check LDL with yearly labs.

## 2025-01-16 NOTE — ASSESSMENT & PLAN NOTE
Hypokalemia resolved.  Hyponatremia almost within normal limits.  Patient states she is no longer on potassium.    Labs today for evaluation.

## 2025-01-16 NOTE — ASSESSMENT & PLAN NOTE
On daily iron.  Recent GI bleed.  Hospitalized 12/2024.  C scope without cause noted, unable to reach anastomosis of colon cancer resection.    Avoid  NSAIDs and aspirin.    Continue iron.  Labs today for evaluation.

## 2025-01-17 ENCOUNTER — TELEPHONE (OUTPATIENT)
Dept: PRIMARY CARE CLINIC | Facility: CLINIC | Age: OVER 89
End: 2025-01-17
Payer: MEDICARE

## 2025-01-17 DIAGNOSIS — I10 PRIMARY HYPERTENSION: Primary | ICD-10-CM

## 2025-01-17 NOTE — TELEPHONE ENCOUNTER
Lab results given to Ms Friedman at [phone]. Confirmed meds. She is taking:     Amlodipine 10mg once daily  Calcium carbonate 500mg once daily  Vit D3 25mcg once daily  Vit B12 1000 mcg once daily  Iron 65mg 3 times weekly  Losartan 100mg once daily  Potassium 10meq once daily  -(not on current med list in chart)  Rosuvastatin 40mg once daily  Lutein 25mg once daily

## 2025-01-17 NOTE — TELEPHONE ENCOUNTER
----- Message from Brittney Travis MD sent at 1/17/2025 10:51 AM CST -----  Please call patient with results.  Anemia is much better.  Sodium continues to be slightly decreased.  Nl renal fx.  Please call to confirm her meds.  TY

## 2025-01-27 NOTE — TELEPHONE ENCOUNTER
Spoke to Nurse Titi again, she states patient is not taking potassium, do you still want to do blood work anyway?

## 2025-02-04 ENCOUNTER — INFUSION (OUTPATIENT)
Dept: INFECTIOUS DISEASES | Facility: HOSPITAL | Age: OVER 89
End: 2025-02-04
Payer: MEDICARE

## 2025-02-04 VITALS
DIASTOLIC BLOOD PRESSURE: 77 MMHG | WEIGHT: 119.63 LBS | TEMPERATURE: 98 F | HEIGHT: 67 IN | RESPIRATION RATE: 20 BRPM | HEART RATE: 69 BPM | BODY MASS INDEX: 18.78 KG/M2 | SYSTOLIC BLOOD PRESSURE: 169 MMHG | OXYGEN SATURATION: 98 %

## 2025-02-04 DIAGNOSIS — M81.0 AGE-RELATED OSTEOPOROSIS WITHOUT CURRENT PATHOLOGICAL FRACTURE: Primary | ICD-10-CM

## 2025-02-04 PROCEDURE — 63600175 PHARM REV CODE 636 W HCPCS: Mod: JZ,TB | Performed by: PHYSICIAN ASSISTANT

## 2025-02-04 PROCEDURE — 96372 THER/PROPH/DIAG INJ SC/IM: CPT

## 2025-02-04 RX ADMIN — DENOSUMAB 60 MG: 60 INJECTION SUBCUTANEOUS at 09:02

## 2025-02-05 ENCOUNTER — OFFICE VISIT (OUTPATIENT)
Dept: GASTROENTEROLOGY | Facility: CLINIC | Age: OVER 89
End: 2025-02-05
Payer: MEDICARE

## 2025-02-05 ENCOUNTER — TELEPHONE (OUTPATIENT)
Dept: ENDOSCOPY | Facility: HOSPITAL | Age: OVER 89
End: 2025-02-05
Payer: MEDICARE

## 2025-02-05 VITALS
HEIGHT: 67 IN | WEIGHT: 108.25 LBS | DIASTOLIC BLOOD PRESSURE: 78 MMHG | HEART RATE: 101 BPM | BODY MASS INDEX: 16.99 KG/M2 | SYSTOLIC BLOOD PRESSURE: 149 MMHG

## 2025-02-05 DIAGNOSIS — K62.5 RECTAL BLEEDING: Primary | ICD-10-CM

## 2025-02-05 DIAGNOSIS — Z12.11 COLON CANCER SCREENING: Primary | ICD-10-CM

## 2025-02-05 DIAGNOSIS — Z85.038 HISTORY OF COLON CANCER: Primary | ICD-10-CM

## 2025-02-05 PROCEDURE — 99999 PR PBB SHADOW E&M-EST. PATIENT-LVL III: CPT | Mod: PBBFAC,GC,,

## 2025-02-05 PROCEDURE — 99214 OFFICE O/P EST MOD 30 MIN: CPT | Mod: S$PBB,GC,,

## 2025-02-05 PROCEDURE — 99213 OFFICE O/P EST LOW 20 MIN: CPT | Mod: PBBFAC

## 2025-02-05 RX ORDER — SODIUM, POTASSIUM,MAG SULFATES 17.5-3.13G
2 SOLUTION, RECONSTITUTED, ORAL ORAL DAILY
Qty: 2 KIT | Refills: 0 | Status: ON HOLD | OUTPATIENT
Start: 2025-02-05 | End: 2025-02-11 | Stop reason: HOSPADM

## 2025-02-05 NOTE — TELEPHONE ENCOUNTER
Colonoscopy Procedure Prep Instructions        Date of procedure: 3/10/25 Arrive at: 6:00 am    Location of Department:   [facility] [address] [place] [address]  Take the [facility] Elevators to 2nd Floor Outpatient Surgery      As soon as possible:   your prep from pharmacy and over the counter DULCOLAX LAXATIVE TABLETS     What You Can do:  You may have clear liquids ONLY-see below for list.     Liquids That Are OK to Drink:   Water  Sports drinks (Gatorade, Power-Aid)  Coffee or tea (no cream or nondairy creamer)  Clear juices without pulp (apple, white grape)  Gelatin desserts (no fruit or toppings)  Clear soda (sprite, coke, ginger ale)  Chicken broth (until 12 midnight the night before procedure)    What You CANNOT do:   Do not EAT solid food, drink milk or anything   colored red.  Do not drink alcohol.  Do not take oral medications within 1 hour of starting each dose of SUPREP.  No gum chewing or candy morning of procedure.    Note:   You will be picking up (2- KITS) from the pharmacy.   Please disregard the insert instructions from pharmacy).  SUPREP Bowel Prep Kit is indicated for cleansing of the colon as a preparation for colonoscopy in adults.   Be sure to tell your doctor about all the medicines you take, including prescription and non-prescription medicines, vitamins, and herbal supplements. SUPREP Bowel Prep Kit may affect how other medicines work.  Medication taken by mouth may not be absorbed properly when taken within 1 hour before the start of each dose of SUPREP Bowel Prep Kit.  It is not uncommon to experience some abdominal cramping, nausea and/or vomiting when taking the prep. If you have nausea and/or vomiting while taking the prep, stop drinking for 20 to 30 minutes then continue.    Two (2)-SUPREP Bowel Prep Kit is a (4-dose) prep.   ALL 6-ounce bottles are required for a complete preparation for colonoscopy. Dilute the solution concentrate as directed  prior to use. You must drink water with each dose of SUPREP, and additional water after each dose.    How to take prep:    DOSE 1-- 2 Days Before Colonoscopy 3/8/25     Drink at least 6 to 8 glasses of clear liquids from time you wake up until you begin your prep and then continue until bedtime to avoid dehydration.     12:00 pm (NOON) Take four (4) Dulcolax (Bisacodyl) tablets with at least 8 oz. or more of clear liquids.    At 6:00 pm:  You must complete Steps 1 through 4 using one (1)   6-ounce bottle before going to bed as shown below:    Step 1-Pour ONE (1) 6-ounce bottle of SUPREP liquid into the mixing container  Step 2-Add cool drinking water to the 16-ounce line on the container and mix  Step 3-Drink ALL the liquid in the container.  Step 4-You must drink two (2) more 16-ounce containers of water over the next 1 hour    DOSE 2--Day Before Colonoscopy 3/9/25     Drink at least 6 to 8 glasses of clear liquids from time you wake up until you begin your prep and then continue until bedtime to avoid dehydration.      8:00 am:  For this dose, repeat Steps 1 through 4 shown above using one (1) 6-ounce bottle.        6:00 pm:  For this dose, repeat Steps 1 through 4 shown above using one (1) 6-ounce bottle.     IMPORTANT: If you experience preparation-related symptoms (for example, nausea, bloating, or cramping), stop, or slow the rate of drinking the additional water until your symptoms decrease.    DOSE 3--Day of the Colonoscopy 3/10/25  at 2-3 AM.    For this dose, repeat Steps 1 through 4 shown above using one (1) 6-ounce bottle.     You may continue drinking water/clear liquids until   4 hours before your colonoscopy or as directed by the scheduling nurse  5:00 am      For information about your procedure, two (2) things to view prior to colonoscopy:  Please watch this informational video. It is important to watch this animated consent video prior to your arrival. If you haven't watched the video prior to  arriving, you are required to watch it during admission which can causes delays.    Options for viewing:   Using a keyboard:  press and hold the control tab (Ctrl) and left mouse click to follow links.           Colonoscopy Instructional Video                                                                                   OR    Type link address into your web browser's address bar:  [email or web address]/[email or web address]      Educational Booklet with pictures:      Colonoscopy Prep - Liquid      Comments:          IMPORTANT INFORMATION TO KNOW BEFORE YOUR PROCEDURE    [facility] 2nd Floor         If your procedure requires the administration of anesthesia, it is necessary for a responsible adult to drive you home. (Medical Transportation, Uber, Lyft, Taxi, etc. may ONLY be used if a responsible adult is present to accompany you home.  The responsible adult CAN'T be the  of the service).      person must be available to return to pick you up within 15 minutes of being notified of discharge.       Please bring a picture ID, insurance card, & copayment      Take Medications as directed below:                If you begin taking any blood thinning medications, injectable weight loss/diabetes medications (other than insulin) , or Adipex (Phentermine) please contact the endoscopy scheduling department listed below as soon as possible.    If you are diabetic see the attached instruction sheet regarding your medication.     If you take HEART, BLOOD PRESSURE, SEIZURE, PAIN, LUNG (including inhalers/nebulizers), ANTI-REJECTION (transplant patients), or PSYCHIATRIC medications, please take at your regular times with a sip of water or as directed by the scheduling nurse.     Important contact information:    Endoscopy [phone] Hours of operation Monday-Friday 8:00-4:30pm.    Questions about insurance or financial obligations call [phone] or [phone].    If  you have questions regarding the prep or need to reschedule, please call [phone]. After hours questions requiring immediate assistance, contact [facility] nurse line at [phone] or [phone].   NOTE:     On occasion, unforeseen circumstances may cause a delay in your procedure start time. We respect your time and appreciate your patience during these circumstances.      Comments:

## 2025-02-05 NOTE — TELEPHONE ENCOUNTER
", MD JOHNSON [facility] Endoscopist [facility] Patients  Caller: Unspecified (Today,  2:43 PM)  Procedure: Colonoscopy    Diagnosis: Surveillance colonoscopy - Hx of colon cancer    Procedure Timing: Within 4 weeks (Urgent)    *If within 4 weeks selected, please ayaz as high priority*    *If greater than 12 weeks, please select "5-12 weeks" and delay sending until 2 months prior to requested date*    Provider: Any GI provider    [place] [facility] [facility]    Additional Scheduling Information: No scheduling concerns    Prep Specifications:Extended/Constipation prep    Is the patient taking a GLP-1 Agonist:no    Have you attached a patient to this message: yes  "

## 2025-02-05 NOTE — PROGRESS NOTES
"GENERAL GI PATIENT INTAKE:    COVID symptoms in the last 7 days (runny nose, sore throat, congestion, cough, fever): No  PCP: Brittney Travis  If not PCP-  number given to establish [phone]: N/A    ALLERGIES REVIEWED:  Yes    CHIEF COMPLAINT:    Chief Complaint   Patient presents with    Initial Visit     Iron deficiency anemia       VITAL SIGNS:  BP (!) 149/78 (Patient Position: Sitting)   Pulse 101   Ht 5' 7" (1.702 m)   Wt 49.1 kg (108 lb 3.9 oz)   BMI 16.95 kg/m²      Change in medical, surgical, family or social history:  Reviewed by MD      REVIEWED MEDICATION LIST RECONCILED INCLUDING ABOVE MEDS:  Yes     "

## 2025-02-05 NOTE — TELEPHONE ENCOUNTER
Colonoscopy Procedure Prep Instructions        Date of procedure: 3/10/25 Arrive at: 6:00 am    Location of Department:   [facility] [address] [place] [address]  Take the [facility] Elevators to 2nd Floor Outpatient Surgery      As soon as possible:   your prep from pharmacy and over the counter DULCOLAX LAXATIVE TABLETS     What You Can do:  You may have clear liquids ONLY-see below for list.     Liquids That Are OK to Drink:   Water  Sports drinks (Gatorade, Power-Aid)  Coffee or tea (no cream or nondairy creamer)  Clear juices without pulp (apple, white grape)  Gelatin desserts (no fruit or toppings)  Clear soda (sprite, coke, ginger ale)  Chicken broth (until 12 midnight the night before procedure)    What You CANNOT do:   Do not EAT solid food, drink milk or anything   colored red.  Do not drink alcohol.  Do not take oral medications within 1 hour of starting each dose of SUPREP.  No gum chewing or candy morning of procedure.    Note:   You will be picking up (2- KITS) from the pharmacy.   Please disregard the insert instructions from pharmacy).  SUPREP Bowel Prep Kit is indicated for cleansing of the colon as a preparation for colonoscopy in adults.   Be sure to tell your doctor about all the medicines you take, including prescription and non-prescription medicines, vitamins, and herbal supplements. SUPREP Bowel Prep Kit may affect how other medicines work.  Medication taken by mouth may not be absorbed properly when taken within 1 hour before the start of each dose of SUPREP Bowel Prep Kit.  It is not uncommon to experience some abdominal cramping, nausea and/or vomiting when taking the prep. If you have nausea and/or vomiting while taking the prep, stop drinking for 20 to 30 minutes then continue.    Two (2)-SUPREP Bowel Prep Kit is a (4-dose) prep.   ALL 6-ounce bottles are required for a complete preparation for colonoscopy. Dilute the solution concentrate as directed  prior to use. You must drink water with each dose of SUPREP, and additional water after each dose.    How to take prep:    DOSE 1-- 2 Days Before Colonoscopy 3/8/25     Drink at least 6 to 8 glasses of clear liquids from time you wake up until you begin your prep and then continue until bedtime to avoid dehydration.     12:00 pm (NOON) Take four (4) Dulcolax (Bisacodyl) tablets with at least 8 oz. or more of clear liquids.    At 6:00 pm:  You must complete Steps 1 through 4 using one (1)   6-ounce bottle before going to bed as shown below:    Step 1-Pour ONE (1) 6-ounce bottle of SUPREP liquid into the mixing container  Step 2-Add cool drinking water to the 16-ounce line on the container and mix  Step 3-Drink ALL the liquid in the container.  Step 4-You must drink two (2) more 16-ounce containers of water over the next 1 hour    DOSE 2--Day Before Colonoscopy 3/9/25     Drink at least 6 to 8 glasses of clear liquids from time you wake up until you begin your prep and then continue until bedtime to avoid dehydration.      8:00 am:  For this dose, repeat Steps 1 through 4 shown above using one (1) 6-ounce bottle.        6:00 pm:  For this dose, repeat Steps 1 through 4 shown above using one (1) 6-ounce bottle.     IMPORTANT: If you experience preparation-related symptoms (for example, nausea, bloating, or cramping), stop, or slow the rate of drinking the additional water until your symptoms decrease.    DOSE 3--Day of the Colonoscopy 3/10/25  at 2-3 AM.    For this dose, repeat Steps 1 through 4 shown above using one (1) 6-ounce bottle.     You may continue drinking water/clear liquids until   4 hours before your colonoscopy or as directed by the scheduling nurse  5:00 am      For information about your procedure, two (2) things to view prior to colonoscopy:  Please watch this informational video. It is important to watch this animated consent video prior to your arrival. If you haven't watched the video prior to  arriving, you are required to watch it during admission which can causes delays.    Options for viewing:   Using a keyboard:  press and hold the control tab (Ctrl) and left mouse click to follow links.           Colonoscopy Instructional Video                                                                                   OR    Type link address into your web browser's address bar:  [email or web address]/[email or web address]      Educational Booklet with pictures:      Colonoscopy Prep - Liquid      Comments:          IMPORTANT INFORMATION TO KNOW BEFORE YOUR PROCEDURE    [facility] 2nd Floor         If your procedure requires the administration of anesthesia, it is necessary for a responsible adult to drive you home. (Medical Transportation, Uber, Lyft, Taxi, etc. may ONLY be used if a responsible adult is present to accompany you home.  The responsible adult CAN'T be the  of the service).      person must be available to return to pick you up within 15 minutes of being notified of discharge.       Please bring a picture ID, insurance card, & copayment      Take Medications as directed below:                If you begin taking any blood thinning medications, injectable weight loss/diabetes medications (other than insulin) , or Adipex (Phentermine) please contact the endoscopy scheduling department listed below as soon as possible.    If you are diabetic see the attached instruction sheet regarding your medication.     If you take HEART, BLOOD PRESSURE, SEIZURE, PAIN, LUNG (including inhalers/nebulizers), ANTI-REJECTION (transplant patients), or PSYCHIATRIC medications, please take at your regular times with a sip of water or as directed by the scheduling nurse.     Important contact information:    Endoscopy [phone] Hours of operation Monday-Friday 8:00-4:30pm.    Questions about insurance or financial obligations call [phone] or [phone].    If  you have questions regarding the prep or need to reschedule, please call [phone]. After hours questions requiring immediate assistance, contact [facility] nurse line at [phone] or [phone].   NOTE:     On occasion, unforeseen circumstances may cause a delay in your procedure start time. We respect your time and appreciate your patience during these circumstances.      Comments:

## 2025-02-06 ENCOUNTER — TELEPHONE (OUTPATIENT)
Dept: ENDOSCOPY | Facility: HOSPITAL | Age: OVER 89
End: 2025-02-06
Payer: MEDICARE

## 2025-02-06 NOTE — TELEPHONE ENCOUNTER
"Colonoscopy has been scheduled on 3/10/25.      From: Doris Matthews MD  Sent: 2/5/2025   2:44 PM CST  To: [facility] Endoscopist [facility] Patients    Procedure: Colonoscopy    Diagnosis: Surveillance colonoscopy - Hx of colon cancer    Procedure Timing: Within 4 weeks (Urgent)    *If within 4 weeks selected, please ayaz as high priority*    *If greater than 12 weeks, please select "5-12 weeks" and delay sending until 2 months prior to requested date*    Provider: Any GI provider    [place] [place] [facility], [place] [facility]    Additional Scheduling Information: No scheduling concerns    Prep Specifications:Extended/Constipation prep    Is the patient taking a GLP-1 Agonist:no    Have you attached a patient to this message: yes   "

## 2025-02-07 ENCOUNTER — TELEPHONE (OUTPATIENT)
Dept: ENDOSCOPY | Facility: HOSPITAL | Age: OVER 89
End: 2025-02-07
Payer: MEDICARE

## 2025-02-07 NOTE — PROGRESS NOTES
I have seen the patient, reviewed Doris Matthews MD  the GI fellow's history and physical, assessment, and plan. I have personally interviewed and examined the patient at bedside and I discussed the case with the GI fellow and I agree with the findings and plan as documented in the fellow's note.

## 2025-02-07 NOTE — TELEPHONE ENCOUNTER
Spoke to Nurse Melva Walls at [facility] to go over  2 day Prep instr.. She verbalized understanding and stating she will be assisting pt. In prep.

## 2025-02-07 NOTE — PROGRESS NOTES
[facility]    Name: Nydia Stevens  : 1934  Date of Service: 2025   PCP: Brittney Travis MD    Reason for visit:   Chief Complaint   Patient presents with    Initial Visit     Iron deficiency anemia       HPI:     The patient is a 89 yo female with PMHx HTN, HLD, and colon cancer s/p right hemicolectomy 3/20/20 presenting to clinic for hospital follow up. She was admitted in early December with bright red blood per rectum. Underwent EGD/colon but colonoscopy prep was inadequate. She is on PPI daily and daily iron supplement. Denies further hematochezia and hemoglobin is improved to 10.2 on most recent labs. Given her history and lack of adequate evaluation in the hospital we discussed repeat colonoscopy and patient does wish to have this evaluation despite age. No other concerning symptoms of weight loss, melena, nausea, vomiting, fatigue, weakness.     Most Recent Upper Endoscopy:   24  Impression:            - Normal esophagus.                          - Erosive gastropathy with no stigmata of recent                          bleeding.                          - Normal examined duodenum.                          - No specimens collected.     Most Recent Colonoscopy:   24  Impression:            - Preparation of the colon was inadequate.                          - Non-bleeding internal hemorrhoids.                          - Stool in the transverse colon.                          - No specimens collected.     Review of Systems:   Review of Systems   Constitutional:  Negative for chills, fever and weight loss.   Gastrointestinal:  Negative for abdominal pain, blood in stool, constipation, diarrhea, heartburn, melena, nausea and vomiting.       Medications:   Current Outpatient Medications:     amLODIPine (NORVASC) 10 MG tablet, Take 1 tablet (10 mg total) by mouth once daily., Disp: 90 tablet, Rfl: 3    cholecalciferol, vitamin D3, (VITAMIN D3) 25 mcg (1,000  "unit) capsule, Take 1,000 Units by mouth once daily. Hold until after surgery, Disp: , Rfl:     cyanocobalamin (VITAMIN B-12) 1000 MCG tablet, Take 1 tablet (1,000 mcg total) by mouth once daily. Hold until after surgery, Disp: 30 tablet, Rfl: 11    FEROSUL 325 mg (65 mg iron) Tab tablet, TAKE ONE TABLET BY MOUTH ON TUESDAY, THURSDAY, AND SATURDAY, Disp: 12 tablet, Rfl: 5    losartan (COZAAR) 100 MG tablet, Take 1 tablet (100 mg total) by mouth once daily., Disp: 30 tablet, Rfl: 5    rosuvastatin (CRESTOR) 40 MG Tab, TAKE ONE TABLET BY MOUTH EVERY DAY FOR CHOLESTEROL, Disp: 90 tablet, Rfl: 3    VIT C/VIT E ACETATE/LUTEIN/MIN (OCUVITE LUTEIN ORAL), Take 1 tablet by mouth once daily. Hold until after surgery, Disp: , Rfl:     calcium carbonate (OS-JAILYN) 500 mg calcium (1,250 mg) tablet, Take 1 tablet (500 mg total) by mouth once daily., Disp: , Rfl: 0    sodium,potassium,mag sulfates (SUPREP BOWEL PREP KIT) 17.5-3.13-1.6 gram SolR, Take 354 mLs by mouth once daily. for 2 days, Disp: 2 kit, Rfl: 0  No current facility-administered medications for this visit.    Facility-Administered Medications Ordered in Other Visits:     gabapentin capsule 300 mg, 300 mg, Oral, TID, Amanda Gaspar NP, 300 mg at 04/16/21 0814     Past medical history, past surgical history, family history, allergies, and social history reviewed and updated in EMR.     Vitals:   Vitals:    02/05/25 1400   BP: (!) 149/78   Patient Position: Sitting   Pulse: 101   Weight: 49.1 kg (108 lb 3.9 oz)   Height: 5' 7" (1.702 m)     Body mass index is 16.95 kg/m².    Physical Exam:   Physical Exam  Constitutional:       General: She is not in acute distress.  HENT:      Head: Normocephalic and atraumatic.   Pulmonary:      Effort: Pulmonary effort is normal.   Abdominal:      General: Abdomen is flat. There is no distension.      Palpations: Abdomen is soft.      Tenderness: There is no abdominal tenderness. There is no guarding.   Neurological:    "   Mental Status: She is alert.     Assessment:   1. Rectal bleeding  CBC W/ AUTO DIFFERENTIAL    IRON AND TIBC    FERRITIN      89 yo female with PMHx HTN, HLD, and colon cancer s/p right hemicolectomy 3/20/20 presenting to clinic for hospital follow up. She was admitted in early December with bright red blood per rectum. Underwent EGD/colon but colonoscopy prep was inadequate. She is on PPI daily and daily iron supplement. Denies further hematochezia and hemoglobin is improved to 10.2 on most recent labs. Given her history and lack of adequate evaluation in the hospital we discussed repeat colonoscopy and patient does wish to have this evaluation despite age.     Plan:   - colonoscopy ordered with constipation/extended prep on hospital floor (endo 2)   - continue daily PPI and iron  - recheck CBC  - avoid NSAIDs    Disposition: Follow-up PRN after scope    Discussed with staff attending. Attestation to follow.     Doris Matthews MD  Gastroenterology and Hepatology Fellow, PGY-4  Pager # [phone]

## 2025-02-08 ENCOUNTER — ANESTHESIA EVENT (OUTPATIENT)
Dept: ENDOSCOPY | Facility: HOSPITAL | Age: OVER 89
DRG: 394 | End: 2025-02-08
Payer: MEDICARE

## 2025-02-08 ENCOUNTER — HOSPITAL ENCOUNTER (INPATIENT)
Facility: HOSPITAL | Age: OVER 89
LOS: 2 days | Discharge: HOME HEALTH | DRG: 394 | End: 2025-02-11
Attending: EMERGENCY MEDICINE | Admitting: STUDENT IN AN ORGANIZED HEALTH CARE EDUCATION/TRAINING PROGRAM
Payer: MEDICARE

## 2025-02-08 DIAGNOSIS — R07.9 CHEST PAIN: ICD-10-CM

## 2025-02-08 DIAGNOSIS — R64 CACHEXIA: ICD-10-CM

## 2025-02-08 DIAGNOSIS — K92.1 HEMATOCHEZIA: ICD-10-CM

## 2025-02-08 DIAGNOSIS — R53.81 DEBILITY: Primary | ICD-10-CM

## 2025-02-08 DIAGNOSIS — K92.2 GI BLEED: ICD-10-CM

## 2025-02-08 PROBLEM — I50.32 CHRONIC DIASTOLIC HEART FAILURE: Status: ACTIVE | Noted: 2020-03-17

## 2025-02-08 LAB
ABO + RH BLD: ABNORMAL
ALBUMIN SERPL BCP-MCNC: 3.5 G/DL (ref 3.5–5.2)
ALP SERPL-CCNC: 45 U/L (ref 40–150)
ALT SERPL W/O P-5'-P-CCNC: 5 U/L (ref 10–44)
ANION GAP SERPL CALC-SCNC: 11 MMOL/L (ref 8–16)
AST SERPL-CCNC: 16 U/L (ref 10–40)
BACTERIA #/AREA URNS AUTO: ABNORMAL /HPF
BASOPHILS # BLD AUTO: 0.04 K/UL (ref 0–0.2)
BASOPHILS NFR BLD: 0.6 % (ref 0–1.9)
BILIRUB SERPL-MCNC: 0.2 MG/DL (ref 0.1–1)
BILIRUB UR QL STRIP: NEGATIVE
BLD GP AB SCN CELLS X3 SERPL QL: ABNORMAL
BLOOD GROUP ANTIBODIES SERPL: NORMAL
BNP SERPL-MCNC: 62 PG/ML (ref 0–99)
BUN SERPL-MCNC: 22 MG/DL (ref 8–23)
CALCIUM SERPL-MCNC: 8.8 MG/DL (ref 8.7–10.5)
CHLORIDE SERPL-SCNC: 107 MMOL/L (ref 95–110)
CLARITY UR REFRACT.AUTO: ABNORMAL
CO2 SERPL-SCNC: 21 MMOL/L (ref 23–29)
COLOR UR AUTO: YELLOW
CREAT SERPL-MCNC: 0.8 MG/DL (ref 0.5–1.4)
DAT IGG-SP REAG RBC-IMP: ABNORMAL
DIFFERENTIAL METHOD BLD: ABNORMAL
EOSINOPHIL # BLD AUTO: 0.1 K/UL (ref 0–0.5)
EOSINOPHIL NFR BLD: 1.5 % (ref 0–8)
ERYTHROCYTE [DISTWIDTH] IN BLOOD BY AUTOMATED COUNT: 14.6 % (ref 11.5–14.5)
EST. GFR  (NO RACE VARIABLE): >60 ML/MIN/1.73 M^2
GLUCOSE SERPL-MCNC: 99 MG/DL (ref 70–110)
GLUCOSE UR QL STRIP: NEGATIVE
HCT VFR BLD AUTO: 30.5 % (ref 37–48.5)
HCV AB SERPL QL IA: NORMAL
HGB BLD-MCNC: 9.4 G/DL (ref 12–16)
HGB UR QL STRIP: ABNORMAL
HIV 1+2 AB+HIV1 P24 AG SERPL QL IA: NORMAL
IMM GRANULOCYTES # BLD AUTO: 0.04 K/UL (ref 0–0.04)
IMM GRANULOCYTES NFR BLD AUTO: 0.6 % (ref 0–0.5)
KETONES UR QL STRIP: NEGATIVE
LEUKOCYTE ESTERASE UR QL STRIP: NEGATIVE
LYMPHOCYTES # BLD AUTO: 0.8 K/UL (ref 1–4.8)
LYMPHOCYTES NFR BLD: 12.7 % (ref 18–48)
MCH RBC QN AUTO: 29.5 PG (ref 27–31)
MCHC RBC AUTO-ENTMCNC: 30.8 G/DL (ref 32–36)
MCV RBC AUTO: 96 FL (ref 82–98)
MICROSCOPIC COMMENT: ABNORMAL
MONOCYTES # BLD AUTO: 0.8 K/UL (ref 0.3–1)
MONOCYTES NFR BLD: 12.7 % (ref 4–15)
NEUTROPHILS # BLD AUTO: 4.6 K/UL (ref 1.8–7.7)
NEUTROPHILS NFR BLD: 71.9 % (ref 38–73)
NITRITE UR QL STRIP: NEGATIVE
NRBC BLD-RTO: 0 /100 WBC
PH UR STRIP: 7 [PH] (ref 5–8)
PLATELET # BLD AUTO: 199 K/UL (ref 150–450)
PMV BLD AUTO: 9.7 FL (ref 9.2–12.9)
POTASSIUM SERPL-SCNC: 4.2 MMOL/L (ref 3.5–5.1)
PROT SERPL-MCNC: 6.7 G/DL (ref 6–8.4)
PROT UR QL STRIP: NEGATIVE
RBC # BLD AUTO: 3.19 M/UL (ref 4–5.4)
RBC #/AREA URNS AUTO: 12 /HPF (ref 0–4)
SODIUM SERPL-SCNC: 139 MMOL/L (ref 136–145)
SP GR UR STRIP: >1.03 (ref 1–1.03)
SPECIMEN OUTDATE: ABNORMAL
SQUAMOUS #/AREA URNS AUTO: 0 /HPF
TROPONIN I SERPL DL<=0.01 NG/ML-MCNC: <3 NG/L (ref 0–14)
URN SPEC COLLECT METH UR: ABNORMAL
WBC # BLD AUTO: 6.46 K/UL (ref 3.9–12.7)
WBC #/AREA URNS AUTO: 0 /HPF (ref 0–5)

## 2025-02-08 PROCEDURE — G0378 HOSPITAL OBSERVATION PER HR: HCPCS

## 2025-02-08 PROCEDURE — 84484 ASSAY OF TROPONIN QUANT: CPT

## 2025-02-08 PROCEDURE — 86880 COOMBS TEST DIRECT: CPT | Performed by: EMERGENCY MEDICINE

## 2025-02-08 PROCEDURE — 83880 ASSAY OF NATRIURETIC PEPTIDE: CPT

## 2025-02-08 PROCEDURE — 86900 BLOOD TYPING SEROLOGIC ABO: CPT | Performed by: EMERGENCY MEDICINE

## 2025-02-08 PROCEDURE — 87389 HIV-1 AG W/HIV-1&-2 AB AG IA: CPT | Performed by: PHYSICIAN ASSISTANT

## 2025-02-08 PROCEDURE — 85025 COMPLETE CBC W/AUTO DIFF WBC: CPT

## 2025-02-08 PROCEDURE — 63600175 PHARM REV CODE 636 W HCPCS

## 2025-02-08 PROCEDURE — 86870 RBC ANTIBODY IDENTIFICATION: CPT | Performed by: EMERGENCY MEDICINE

## 2025-02-08 PROCEDURE — 25000003 PHARM REV CODE 250

## 2025-02-08 PROCEDURE — 86803 HEPATITIS C AB TEST: CPT | Performed by: PHYSICIAN ASSISTANT

## 2025-02-08 PROCEDURE — 25500020 PHARM REV CODE 255: Performed by: EMERGENCY MEDICINE

## 2025-02-08 PROCEDURE — 81001 URINALYSIS AUTO W/SCOPE: CPT

## 2025-02-08 PROCEDURE — 93005 ELECTROCARDIOGRAM TRACING: CPT

## 2025-02-08 PROCEDURE — 99285 EMERGENCY DEPT VISIT HI MDM: CPT | Mod: 25

## 2025-02-08 PROCEDURE — 86902 BLOOD TYPE ANTIGEN DONOR EA: CPT | Performed by: EMERGENCY MEDICINE

## 2025-02-08 PROCEDURE — 86905 BLOOD TYPING RBC ANTIGENS: CPT | Performed by: EMERGENCY MEDICINE

## 2025-02-08 PROCEDURE — 86860 RBC ANTIBODY ELUTION: CPT | Performed by: EMERGENCY MEDICINE

## 2025-02-08 PROCEDURE — 80053 COMPREHEN METABOLIC PANEL: CPT

## 2025-02-08 RX ORDER — PANTOPRAZOLE SODIUM 40 MG/10ML
80 INJECTION, POWDER, LYOPHILIZED, FOR SOLUTION INTRAVENOUS
Status: COMPLETED | OUTPATIENT
Start: 2025-02-08 | End: 2025-02-08

## 2025-02-08 RX ORDER — SIMETHICONE 80 MG
1 TABLET,CHEWABLE ORAL 4 TIMES DAILY PRN
Status: DISCONTINUED | OUTPATIENT
Start: 2025-02-08 | End: 2025-02-11 | Stop reason: HOSPADM

## 2025-02-08 RX ORDER — IBUPROFEN 200 MG
16 TABLET ORAL
Status: DISCONTINUED | OUTPATIENT
Start: 2025-02-08 | End: 2025-02-11 | Stop reason: HOSPADM

## 2025-02-08 RX ORDER — LOSARTAN POTASSIUM 50 MG/1
100 TABLET ORAL DAILY
Status: DISCONTINUED | OUTPATIENT
Start: 2025-02-08 | End: 2025-02-11

## 2025-02-08 RX ORDER — ALUMINUM HYDROXIDE, MAGNESIUM HYDROXIDE, AND SIMETHICONE 1200; 120; 1200 MG/30ML; MG/30ML; MG/30ML
30 SUSPENSION ORAL 4 TIMES DAILY PRN
Status: DISCONTINUED | OUTPATIENT
Start: 2025-02-08 | End: 2025-02-11 | Stop reason: HOSPADM

## 2025-02-08 RX ORDER — SODIUM CHLORIDE 0.9 % (FLUSH) 0.9 %
10 SYRINGE (ML) INJECTION EVERY 12 HOURS PRN
Status: DISCONTINUED | OUTPATIENT
Start: 2025-02-08 | End: 2025-02-11 | Stop reason: HOSPADM

## 2025-02-08 RX ORDER — CALCIUM CARBONATE 200(500)MG
1000 TABLET,CHEWABLE ORAL DAILY
Status: DISCONTINUED | OUTPATIENT
Start: 2025-02-08 | End: 2025-02-11 | Stop reason: HOSPADM

## 2025-02-08 RX ORDER — ATORVASTATIN CALCIUM 40 MG/1
80 TABLET, FILM COATED ORAL DAILY
Status: DISCONTINUED | OUTPATIENT
Start: 2025-02-08 | End: 2025-02-11 | Stop reason: HOSPADM

## 2025-02-08 RX ORDER — BISACODYL 10 MG/1
10 SUPPOSITORY RECTAL DAILY PRN
Status: DISCONTINUED | OUTPATIENT
Start: 2025-02-08 | End: 2025-02-11 | Stop reason: HOSPADM

## 2025-02-08 RX ORDER — CHOLECALCIFEROL (VITAMIN D3) 25 MCG
1000 TABLET ORAL DAILY
Status: DISCONTINUED | OUTPATIENT
Start: 2025-02-08 | End: 2025-02-11 | Stop reason: HOSPADM

## 2025-02-08 RX ORDER — PROCHLORPERAZINE EDISYLATE 5 MG/ML
5 INJECTION INTRAMUSCULAR; INTRAVENOUS EVERY 6 HOURS PRN
Status: DISCONTINUED | OUTPATIENT
Start: 2025-02-08 | End: 2025-02-11 | Stop reason: HOSPADM

## 2025-02-08 RX ORDER — NALOXONE HCL 0.4 MG/ML
0.02 VIAL (ML) INJECTION
Status: DISCONTINUED | OUTPATIENT
Start: 2025-02-08 | End: 2025-02-11 | Stop reason: HOSPADM

## 2025-02-08 RX ORDER — IBUPROFEN 200 MG
24 TABLET ORAL
Status: DISCONTINUED | OUTPATIENT
Start: 2025-02-08 | End: 2025-02-11 | Stop reason: HOSPADM

## 2025-02-08 RX ORDER — AMLODIPINE BESYLATE 10 MG/1
10 TABLET ORAL DAILY
Status: DISCONTINUED | OUTPATIENT
Start: 2025-02-08 | End: 2025-02-11 | Stop reason: HOSPADM

## 2025-02-08 RX ORDER — TALC
6 POWDER (GRAM) TOPICAL NIGHTLY PRN
Status: DISCONTINUED | OUTPATIENT
Start: 2025-02-08 | End: 2025-02-11 | Stop reason: HOSPADM

## 2025-02-08 RX ORDER — POLYETHYLENE GLYCOL 3350 17 G/17G
17 POWDER, FOR SOLUTION ORAL 2 TIMES DAILY PRN
Status: DISCONTINUED | OUTPATIENT
Start: 2025-02-08 | End: 2025-02-11 | Stop reason: HOSPADM

## 2025-02-08 RX ORDER — LANOLIN ALCOHOL/MO/W.PET/CERES
1 CREAM (GRAM) TOPICAL
Status: DISCONTINUED | OUTPATIENT
Start: 2025-02-08 | End: 2025-02-11 | Stop reason: HOSPADM

## 2025-02-08 RX ORDER — PANTOPRAZOLE SODIUM 40 MG/10ML
40 INJECTION, POWDER, LYOPHILIZED, FOR SOLUTION INTRAVENOUS
Status: DISCONTINUED | OUTPATIENT
Start: 2025-02-08 | End: 2025-02-08

## 2025-02-08 RX ORDER — LANOLIN ALCOHOL/MO/W.PET/CERES
1000 CREAM (GRAM) TOPICAL DAILY
Status: DISCONTINUED | OUTPATIENT
Start: 2025-02-08 | End: 2025-02-11 | Stop reason: HOSPADM

## 2025-02-08 RX ORDER — GLUCAGON 1 MG
1 KIT INJECTION
Status: DISCONTINUED | OUTPATIENT
Start: 2025-02-08 | End: 2025-02-11 | Stop reason: HOSPADM

## 2025-02-08 RX ORDER — ONDANSETRON 8 MG/1
8 TABLET, ORALLY DISINTEGRATING ORAL EVERY 8 HOURS PRN
Status: DISCONTINUED | OUTPATIENT
Start: 2025-02-08 | End: 2025-02-11 | Stop reason: HOSPADM

## 2025-02-08 RX ORDER — PANTOPRAZOLE SODIUM 40 MG/10ML
40 INJECTION, POWDER, LYOPHILIZED, FOR SOLUTION INTRAVENOUS 2 TIMES DAILY
Status: DISCONTINUED | OUTPATIENT
Start: 2025-02-08 | End: 2025-02-11 | Stop reason: HOSPADM

## 2025-02-08 RX ORDER — POLYETHYLENE GLYCOL 3350, SODIUM SULFATE ANHYDROUS, SODIUM BICARBONATE, SODIUM CHLORIDE, POTASSIUM CHLORIDE 236; 22.74; 6.74; 5.86; 2.97 G/4L; G/4L; G/4L; G/4L; G/4L
4000 POWDER, FOR SOLUTION ORAL ONCE
Status: COMPLETED | OUTPATIENT
Start: 2025-02-08 | End: 2025-02-08

## 2025-02-08 RX ORDER — ACETAMINOPHEN 325 MG/1
650 TABLET ORAL EVERY 4 HOURS PRN
Status: DISCONTINUED | OUTPATIENT
Start: 2025-02-08 | End: 2025-02-11 | Stop reason: HOSPADM

## 2025-02-08 RX ADMIN — POLYETHYLENE GLYCOL 3350, SODIUM SULFATE ANHYDROUS, SODIUM BICARBONATE, SODIUM CHLORIDE, POTASSIUM CHLORIDE 4000 ML: 236; 22.74; 6.74; 5.86; 2.97 POWDER, FOR SOLUTION ORAL at 11:02

## 2025-02-08 RX ADMIN — CHOLECALCIFEROL TAB 25 MCG (1000 UNIT) 1000 UNITS: 25 TAB at 11:02

## 2025-02-08 RX ADMIN — CYANOCOBALAMIN TAB 1000 MCG 1000 MCG: 1000 TAB at 11:02

## 2025-02-08 RX ADMIN — ATORVASTATIN CALCIUM 80 MG: 40 TABLET, FILM COATED ORAL at 11:02

## 2025-02-08 RX ADMIN — CALCIUM CARBONATE (ANTACID) CHEW TAB 500 MG 1000 MG: 500 CHEW TAB at 11:02

## 2025-02-08 RX ADMIN — ACETAMINOPHEN 650 MG: 325 TABLET ORAL at 09:02

## 2025-02-08 RX ADMIN — AMLODIPINE BESYLATE 10 MG: 10 TABLET ORAL at 11:02

## 2025-02-08 RX ADMIN — PANTOPRAZOLE SODIUM 80 MG: 40 INJECTION, POWDER, LYOPHILIZED, FOR SOLUTION INTRAVENOUS at 11:02

## 2025-02-08 RX ADMIN — IOHEXOL 75 ML: 350 INJECTION, SOLUTION INTRAVENOUS at 07:02

## 2025-02-08 RX ADMIN — ACETAMINOPHEN 650 MG: 325 TABLET ORAL at 05:02

## 2025-02-08 RX ADMIN — LOSARTAN POTASSIUM 100 MG: 50 TABLET, FILM COATED ORAL at 11:02

## 2025-02-08 RX ADMIN — THERA TABS 1 TABLET: TAB at 11:02

## 2025-02-08 RX ADMIN — FERROUS SULFATE TAB EC 325 MG (65 MG FE EQUIVALENT) 1 EACH: 325 (65 FE) TABLET DELAYED RESPONSE at 11:02

## 2025-02-08 RX ADMIN — PANTOPRAZOLE SODIUM 40 MG: 40 INJECTION, POWDER, LYOPHILIZED, FOR SOLUTION INTRAVENOUS at 09:02

## 2025-02-08 NOTE — ASSESSMENT & PLAN NOTE
Patient is identified as having Diastolic (HFpEF) heart failure that is Chronic. CHF is currently controlled.   Latest ECHO performed and demonstrates- Results for orders placed during the hospital encounter of 02/16/21    Echo Color Flow Doppler? Yes    Interpretation Summary  · The left ventricle is normal in size with hyperdynamic systolic function. The estimated ejection fraction is 75%  · Indeterminate left ventricular diastolic function.  · Normal right ventricular size with normal right ventricular systolic function.  · There is a very late LVOT flow acceleration to 3.0m/s ( 36mmHg) due to hyperdynamic LV function and cavity obliteration at the end of systole.  · Mild left atrial enlargement.  · Mild tricuspid regurgitation.  · The estimated PA systolic pressure is 33 mmHg.  · Normal central venous pressure (3 mmHg).  · There is a right pleural effusion.    - Continue ACE/ARB and monitor clinical status closely.   - Monitor on telemetry.  - Patient is off CHF pathway.   - Monitor strict Is&Os and daily weights.   - Continue to stress to patient importance of self efficacy and  on diet for CHF.  - Last BNP reviewed- and noted below   Recent Labs   Lab 02/08/25  0742   BNP 62   - Dry appearing on exam

## 2025-02-08 NOTE — HPI
Sister Nydia Stevens is a 89 y.o. F with HTN, HLD, prediabetes, ANANDA 2/2 chronic blood loss, and colon cancer s/p right hemicolectomy in March of 2020 who is presenting to the ED for evaluation of GI bleeding and presyncopal episode. She reports having one episode of dark, tarry stool, along with bright red blood, early this morning. She attempted to grab onto her walker to stand from the toilet and felt weak and lightheaded and lowered herself to thr ground. She denies any falls or syncope and did not hit or head or lose consciousness. She is not on anticoagulation or antiplatelets. Of note, she was admitted in early December with bright red blood per rectum & underwent EGD/colon but colonoscopy prep was inadequate. She is on PPI daily and daily iron supplement, which she is compliant with. Pt denies fever, chills, chest pain, palpitations, SOB, cough, abdominal pain, N/V/D, dysuria, leg swelling or pain, HA, or recent sick contacts.     In the ED: VSS[facility]. CBC with stable normocytic anemia with Hgb 9.4 (from 10.2 on 1/16/25), no leukocytosis. CMP, BNP, HS troponin, and uA grossly unremarkable. CTH and c-spine without acute process. CTA without active extravasation or acute findings. Pt was given IV protonix 80 mg and placed in observation for further management.        Most Recent Upper Endoscopy:   12/9/24  Impression:    - Normal esophagus.                          - Erosive gastropathy with no stigmata of recent                          bleeding.                          - Normal examined duodenum.                          - No specimens collected.      Most Recent Colonoscopy:   12/9/24  Impression:     - Preparation of the colon was inadequate.                          - Non-bleeding internal hemorrhoids.                          - Stool in the transverse colon.                          - No specimens collected.

## 2025-02-08 NOTE — ASSESSMENT & PLAN NOTE
Malnutrition   BMI less than 19   Concern for cachexia as evidenced by loss of muscle mass and loss of body fat . Treatment to include nutrition consult, physical therapy, and occupational therapy.    Body mass index is 17.23 kg/m².  Albumin   Date Value Ref Range Status   02/08/2025 3.5 3.5 - 5.2 g/dL Final

## 2025-02-08 NOTE — ASSESSMENT & PLAN NOTE
Anemia is likely due to chronic blood loss and Iron deficiency. Most recent hemoglobin and hematocrit are listed below.  Recent Labs     02/08/25  0742   HGB 9.4*   HCT 30.5*     Plan  - Monitor serial CBC: Daily  - Transfuse PRBC if patient becomes hemodynamically unstable, symptomatic or H/H drops below 7/21.  - Patient has not received any PRBC transfusions to date  - Patient's anemia is currently stable  - See hematochezia

## 2025-02-08 NOTE — ED PROVIDER NOTES
Encounter Date: 2/8/2025       History     Chief Complaint   Patient presents with    Rectal Bleeding     Reports 1 episode of black tarry stool today and had syncopal episode when getting off of the toilet. - head trauma, - LOC.     Patient is a 89-year-old with history of colon cancer s/p hemicolectomy in 2020 presenting due to rectal bleeding and presyncopal episode.  She states that she was going to the bathroom this morning when she noticed there was a good amount of blood in the toilet.  When she was finished she got up to go to the bathroom.  She felt too weak to get back to her arm chair.  She called for help.  Lives at an assisted living facility.  She went to the ground because she can not stand any longer.  Currently she is asymptomatic.  Denies any abdominal pain, chest pain, shortness of breath, headache, changes in vision.        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    Hyperlipidemia     Hypertension     Osteoporosis      Past Surgical History:   Procedure Laterality Date    CATARACT EXTRACTION W/  INTRAOCULAR LENS IMPLANT Left 8/11/14    [facility]    CATARACT EXTRACTION W/  INTRAOCULAR LENS IMPLANT Right 09/15/14    [facility]    COLONOSCOPY N/A 3/19/2020    Procedure: COLONOSCOPY;  Surgeon: Real Mabry MD;  [facility]  Service: Endoscopy;  Laterality: N/A;    COLONOSCOPY N/A 3/23/2021    Procedure: COLONOSCOPY;  Surgeon: AUTUMN Berg MD;  Location[facility] (4TH FLR);  Service: Endoscopy;  Laterality: N/A;  covid test 3/20  benson    COLONOSCOPY N/A 2/15/2022    Procedure: COLONOSCOPY;   Surgeon: Doris Simpson MD;  Location: [facility] ENDO (4TH FLR);  Service: Endoscopy;  Laterality: N/A;  COVID test on 2/12/22 at [facility]  2/14/22 - Pt confirmed 0640 arrival, prep instructions reviewed, Pt verbalized understanding-[id number]    COLONOSCOPY N/A 12/9/2024    Procedure: COLONOSCOPY;  Surgeon: Vitaly Virgen MD;  Location: [facility] ENDO (2ND FLR);  Service: Gastroenterology;  Laterality: N/A;    ESOPHAGOGASTRODUODENOSCOPY N/A 3/19/2020    Procedure: EGD (ESOPHAGOGASTRODUODENOSCOPY);  Surgeon: Real Mabry MD;  Location: [facility] ENDO (2ND FLR);  Service: Endoscopy;  Laterality: N/A;    ESOPHAGOGASTRODUODENOSCOPY N/A 12/9/2024    Procedure: EGD (ESOPHAGOGASTRODUODENOSCOPY);  Surgeon: Vitaly Virgen MD;  Location: [facility] ENDO (2ND FLR);  Service: Gastroenterology;  Laterality: N/A;    FOOT SURGERY      left    HYSTERECTOMY  1962    ILEOCOLECTOMY N/A 4/15/2021    Procedure: ILEOCOLECTOMY;  Surgeon: AUTUMN Berg MD;  Location: [facility] OR 2ND FLR;  Service: Colon and Rectal;  Laterality: N/A;    INSERTION OF TUNNELED CENTRAL VENOUS CATHETER (CVC) WITH SUBCUTANEOUS PORT N/A 7/2/2021    Procedure: [id number]-EGGO-H-CATH;  Surgeon: Suresh Agrawal MD;  Location: [facility] OR 2ND FLR;  Service: Vascular;  Laterality: N/A;  Right IJ    LAPAROSCOPIC HEMICOLECTOMY Right 3/20/2020    Procedure: HEMICOLECTOMY, RIGHT;  Surgeon: AUTUMN Berg MD;  Location: [facility] OR 2ND FLR;  Service: Colon and Rectal;  Laterality: Right;    LYSIS OF ADHESIONS N/A 4/15/2021    Procedure: LYSIS, ADHESIONS;  Surgeon: AUTUMN Berg MD;  Location: [facility] OR 2ND FLR;  Service: Colon and Rectal;  Laterality: N/A;  Greater than 2 hours    MOBILIZATION OF SPLENIC FLEXURE N/A 4/15/2021    Procedure: MOBILIZATION, SPLENIC FLEXURE;  Surgeon: AUTUMN Berg MD;  Location: [facility] OR 2ND FLR;  Service: Colon and Rectal;  Laterality: N/A;     Family History   Problem Relation Name Age of Onset    Heart attack Father      Cataracts Brother      Heart  attack Brother      Diabetes Brother      No Known Problems Mother      Cataracts Sister      Stroke Sister      Diabetes Sister      Cataracts Sister      Stroke Sister      No Known Problems Maternal Aunt      No Known Problems Maternal Uncle      No Known Problems Paternal Aunt      No Known Problems Paternal Uncle      No Known Problems Maternal Grandmother      No Known Problems Maternal Grandfather      No Known Problems Paternal Grandmother      No Known Problems Paternal Grandfather      Amblyopia Neg Hx      Blindness Neg Hx      Cancer Neg Hx      Glaucoma Neg Hx      Hypertension Neg Hx      Macular degeneration Neg Hx      Retinal detachment Neg Hx      Strabismus Neg Hx      Thyroid disease Neg Hx      Colon cancer Neg Hx      Esophageal cancer Neg Hx      Irritable bowel syndrome Neg Hx      Rectal cancer Neg Hx      Stomach cancer Neg Hx      Ulcerative colitis Neg Hx      Crohn's disease Neg Hx      Celiac disease Neg Hx       Social History     Tobacco Use    Smoking status: Never    Smokeless tobacco: Never   Substance Use Topics    Alcohol use: No     Alcohol/week: 0.0 standard drinks of alcohol    Drug use: No     Review of Systems  Per HPI  Physical Exam     Initial Vitals [02/08/25 0624]   BP Pulse Resp Temp SpO2   (!) 114/55 80 (!) 26 97.1 °F (36.2 °C) 100 %      MAP       --         Physical Exam    Constitutional: She appears well-developed and well-nourished. She is not diaphoretic. No distress.   HENT: Mouth/Throat: No oropharyngeal exudate.   Eyes: Conjunctivae are normal. Right eye exhibits no discharge. Left eye exhibits no discharge. No scleral icterus.   Cardiovascular:  Normal rate and regular rhythm.           Murmur heard.  Pulmonary/Chest: Breath sounds normal. No stridor. No respiratory distress. She has no wheezes. She has no rhonchi. She has no rales.   Abdominal: Abdomen is soft. She exhibits no distension. There is no abdominal tenderness. There is no rebound and no guarding.    Genitourinary:    Genitourinary Comments: Dried blood around rectum.  No clear sign of external or internal hemorrhoids on digital exam.  No fissures.  Stool mixed with dark blood on exam.     Musculoskeletal:         General: No tenderness or edema.     Neurological: She is alert. GCS score is 15. GCS eye subscore is 4. GCS verbal subscore is 5. GCS motor subscore is 6.   Skin: Skin is warm and dry.   Dried blood down both her legs tracing the rectum.   Psychiatric: She has a normal mood and affect.         ED Course   Procedures  Labs Reviewed   CBC W/ AUTO DIFFERENTIAL - Abnormal       Result Value    WBC 6.46      RBC 3.19 (*)     Hemoglobin 9.4 (*)     Hematocrit 30.5 (*)     MCV 96      MCH 29.5      MCHC 30.8 (*)     RDW 14.6 (*)     Platelets 199      MPV 9.7      Immature Granulocytes 0.6 (*)     Gran # (ANC) 4.6      Immature Grans (Abs) 0.04      Lymph # 0.8 (*)     Mono # 0.8      Eos # 0.1      Baso # 0.04      nRBC 0      Gran % 71.9      Lymph % 12.7 (*)     Mono % 12.7      Eosinophil % 1.5      Basophil % 0.6      Differential Method Automated     COMPREHENSIVE METABOLIC PANEL - Abnormal    Sodium 139      Potassium 4.2      Chloride 107      CO2 21 (*)     Glucose 99      BUN 22      Creatinine 0.8      Calcium 8.8      Total Protein 6.7      Albumin 3.5      Total Bilirubin 0.2      Alkaline Phosphatase 45      AST 16      ALT 5 (*)     eGFR >60.0      Anion Gap 11     URINALYSIS, REFLEX TO URINE CULTURE - Abnormal    Specimen UA Urine, Clean Catch      Color, UA Yellow      Appearance, UA Hazy (*)     pH, UA 7.0      Specific Gravity, UA >1.030 (*)     Protein, UA Negative      Glucose, UA Negative      Ketones, UA Negative      Bilirubin (UA) Negative      Occult Blood UA 3+ (*)     Nitrite, UA Negative      Leukocytes, UA Negative      Narrative:     Specimen Source->Urine   URINALYSIS MICROSCOPIC - Abnormal    RBC, UA 12 (*)     WBC, UA 0      Bacteria Few (*)     Squam Epithel, UA 0       Microscopic Comment SEE COMMENT      Narrative:     Specimen Source->Urine   HEPATITIS C ANTIBODY    Hepatitis C Ab Non-reactive      Narrative:     Release to patient->Immediate   HIV 1 / 2 ANTIBODY    HIV 1/2 Ag/Ab Non-reactive      Narrative:     Release to patient->Immediate   TROPONIN I HIGH SENSITIVITY    Troponin I High Sensitivity <3     B-TYPE NATRIURETIC PEPTIDE    BNP 62     TYPE & SCREEN    Specimen Outdate 02/11/2025 23:59            Imaging Results              CTA Acute GI Bleed, Abdomen and Pelvis (Final result)  Result time 02/08/25 08:17:01   Procedure changed from CT Abdomen Pelvis With IV Contrast Routine Oral Contrast     Final result by Hong Willis MD (02/08/25 08:17:01)                   Impression:      1. No intraluminal intestinal, intraperitoneal, or retroperitoneal hemorrhage.  2. No definite acute findings identified in the abdomen or pelvis.  3. Additional details of chronic and incidental findings, as provided in the body of the report.      Electronically signed by: Hong Willis  Date:    02/08/2025  Time:    08:17               Narrative:    EXAMINATION:  CTA ACUTE GI BLEED, ABDOMEN AND PELVIS    CLINICAL HISTORY:  GI bleed;gi bleed;    TECHNIQUE:  Contiguous 2.5-mm axial images were obtained through the abdomen and pelvis following the administration of 75 cc of intravenous Omnipaque 350.  Sagittal and coronal reformats and maximum intensity projections were also submitted.    COMPARISON:  CTA of the abdomen and pelvis performed 12/08/2024.    FINDINGS:  BOWEL AND PERITONEUM    Bowel: Redemonstrated operative sequela partial colectomy with anastomosis again seen in the abdomen.  No evidence of bowel obstruction.    Intraluminal intestinal hemorrhage: None.    Free air or fluid: None.    VASCULATURE    Visceral arteries: Celiac, superior mesenteric, renal, and inferior mesenteric arteries appear grossly patent.  Moderate narrowing at the celiac axis origin.  Moderate  narrowing at the left renal artery origin.  Short segment moderate to high-grade narrowing at the OSCAR origin.    Abdominal aorta and iliac arteries: Nonaneurysmal.  Heavy atherosclerosis.    Mesenteric and portal veins: Normally patent.    Inferior vena cava: Normally patent.    ABDOMEN/PELVIS    Lower chest: Heavy atherosclerotic calcifications of the coronary arteries.  Dense mitral annular and aortic valvular calcifications.  9 mm solid nodule right lower lobe (series 2, image 10).  This appears essentially unchanged dating back to at least 02/26/2021 CT of the chest, and as such is felt most likely benign.  Atelectasis and/or scar noted elsewhere at the visualized lung bases.    Liver: Normal contour.  Suggested calcified granuloma.    Gallbladder/bile ducts: Cholelithiasis.  No definite pericholecystic inflammation or biliary ductal dilatation.    Pancreas: Unremarkable.    Spleen: Unremarkable.    Adrenals: Unremarkable.    Kidneys: Unremarkable.    Lymph nodes: No abdominal or pelvic lymphadenopathy.    Pelvis: Unremarkable.    Urinary bladder: Unremarkable.    Body wall: Relatively similar appearance of rectus diastasis with ventral hernia containing loops of nonobstructed, noninflamed small bowel compared to prior CT imaging of 12/08/2024.    Bones: No definite acute change.  Query osseous demineralization.  Remote fracture of the left inferior pubic ramus.  Remote operative sequela of left hip.                                       CT Cervical Spine Without Contrast (Final result)  Result time 02/08/25 07:57:38      Final result by Hong Willis MD (02/08/25 07:57:38)                   Impression:      1. No acute intracranial findings.  2. No acute cervical spine fracture.      Electronically signed by: Hong Willis  Date:    02/08/2025  Time:    07:57               Narrative:    EXAMINATION:  CT HEAD WITHOUT CONTRAST; CT CERVICAL SPINE WITHOUT CONTRAST    CLINICAL HISTORY:  Head trauma, minor  (Age >= 65y);; Neck trauma (Age >= 65y);    TECHNIQUE:  Low dose axial images were obtained through the head and cervical spine.  Coronal and sagittal reformations were also performed. Contrast was not administered.    COMPARISON:  CT head and cervical spine 12/21/2023.    FINDINGS:  Head:    Blood: No acute intracranial hemorrhage.    Parenchyma: No definite loss of gray-white differentiation to suggest acute or subacute transcortical infarct. 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  Generalized pattern of age-related parenchymal volume loss is noted elsewhere.  Nonspecific areas of white matter hypoattenuation may reflect sequela of chronic small vessel ischemic disease.    Ventricles/Extra-axial spaces: No abnormal extra-axial fluid collection. Basal cisterns are patent.    Vessels: Moderate atherosclerotic calcification.    Orbits: Status post bilateral lens replacements.    Scalp: Posterior scalp hematoma.    Skull: There are no depressed skull fractures or destructive bone lesions.  Stable nonaggressive appearing sclerotic lesion within the basisphenoid.    Sinuses and mastoids: Scattered minimal paranasal sinus mucosal thickening with prominent retention cysts within the alveolar recess of the right maxillary sinus.    Other findings: None    Cervical spine:    Fractures: No acute fractures    Alignment: There is no significant vertebral subluxation  Atlanto-axial and atlanto-occipital joints: Atlanto-axial and atlanto-occipital intervals are not widened.  Facet joints: There is no traumatic facet joint widening.  Vertebral bodies: Suspect osseous demineralization.  Relatively modest degenerative endplate change.  Discs: Degenerative disc disease.  Spinal canal and foraminal narrowing: Although CT does not optimally evaluate the soft tissue contents of the spinal canal and foramina, no critical stenosis is suggested.    At C4-5, mild-to-moderate central spinal canal stenosis  and moderate right foraminal narrowing new at C5-6, mild central spinal canal stenosis      Paraspinal soft tissues: Right chest port.    Upper Lungs:Clear                                       CT Head Without Contrast (Final result)  Result time 02/08/25 07:57:38      Final result by Hong Willis MD (02/08/25 07:57:38)                   Impression:      1. No acute intracranial findings.  2. No acute cervical spine fracture.      Electronically signed by: Hong Willis  Date:    02/08/2025  Time:    07:57               Narrative:    EXAMINATION:  CT HEAD WITHOUT CONTRAST; CT CERVICAL SPINE WITHOUT CONTRAST    CLINICAL HISTORY:  Head trauma, minor (Age >= 65y);; Neck trauma (Age >= 65y);    TECHNIQUE:  Low dose axial images were obtained through the head and cervical spine.  Coronal and sagittal reformations were also performed. Contrast was not administered.    COMPARISON:  CT head and cervical spine 12/21/2023.    FINDINGS:  Head:    Blood: No acute intracranial hemorrhage.    Parenchyma: No definite loss of gray-white differentiation to suggest acute or subacute transcortical infarct. 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  Generalized pattern of age-related parenchymal volume loss is noted elsewhere.  Nonspecific areas of white matter hypoattenuation may reflect sequela of chronic small vessel ischemic disease.    Ventricles/Extra-axial spaces: No abnormal extra-axial fluid collection. Basal cisterns are patent.    Vessels: Moderate atherosclerotic calcification.    Orbits: Status post bilateral lens replacements.    Scalp: Posterior scalp hematoma.    Skull: There are no depressed skull fractures or destructive bone lesions.  Stable nonaggressive appearing sclerotic lesion within the basisphenoid.    Sinuses and mastoids: Scattered minimal paranasal sinus mucosal thickening with prominent retention cysts within the alveolar recess of the right maxillary  sinus.    Other findings: None    Cervical spine:    Fractures: No acute fractures    Alignment: There is no significant vertebral subluxation  Atlanto-axial and atlanto-occipital joints: Atlanto-axial and atlanto-occipital intervals are not widened.  Facet joints: There is no traumatic facet joint widening.  Vertebral bodies: Suspect osseous demineralization.  Relatively modest degenerative endplate change.  Discs: Degenerative disc disease.  Spinal canal and foraminal narrowing: Although CT does not optimally evaluate the soft tissue contents of the spinal canal and foramina, no critical stenosis is suggested.    At C4-5, mild-to-moderate central spinal canal stenosis and moderate right foraminal narrowing new at C5-6, mild central spinal canal stenosis      Paraspinal soft tissues: Right chest port.    Upper Lungs:Clear                                       Medications   pantoprazole injection 80 mg (has no administration in time range)   sodium chloride 0.9% flush 10 mL (has no administration in time range)   melatonin tablet 6 mg (has no administration in time range)   ondansetron disintegrating tablet 8 mg (has no administration in time range)   prochlorperazine injection Soln 5 mg (has no administration in time range)   polyethylene glycol packet 17 g (has no administration in time range)   bisacodyL suppository 10 mg (has no administration in time range)   simethicone chewable tablet 80 mg (has no administration in time range)   aluminum-magnesium hydroxide-simethicone 200-200-20 mg/5 mL suspension 30 mL (has no administration in time range)   acetaminophen tablet 650 mg (has no administration in time range)   naloxone 0.4 mg/mL injection 0.02 mg (has no administration in time range)   glucose chewable tablet 16 g (has no administration in time range)   glucose chewable tablet 24 g (has no administration in time range)   dextrose 50% injection 12.5 g (has no administration in time range)   dextrose 50%  injection 25 g (has no administration in time range)   glucagon (human recombinant) injection 1 mg (has no administration in time range)   pantoprazole injection 40 mg (has no administration in time range)   amLODIPine tablet 10 mg (has no administration in time range)   calcium carbonate 200 mg calcium (500 mg) chewable tablet 1,000 mg (has no administration in time range)   vitamin D 1000 units tablet 1,000 Units (has no administration in time range)   cyanocobalamin tablet 1,000 mcg (has no administration in time range)   ferrous sulfate tablet 1 each (has no administration in time range)   losartan tablet 100 mg (has no administration in time range)   atorvastatin tablet 80 mg (has no administration in time range)   multivitamin tablet (has no administration in time range)   iohexoL (OMNIPAQUE 350) injection 75 mL (75 mLs Intravenous Given 2/8/25 0744)     Medical Decision Making  Nydia Stevens is a 89 y.o. female with a past medical history of colon cancer s/p hemicolectomy presenting to the ED with rectal bleeding and presyncope. History and physical exam as above. Initial vital signs stable and non-actionable. Initial work-up to include: Labs (CBC, CMP, Troponin, BNP, UA,), Imaging (CT Head, CT A/P with contrast,), EKG,    Differential diagnosis for this patient includes, but is not limited to:  UGI, LGI, hemorrhoid, ACS, cardiogenic syncope.     Results of workup include hemoglobin with slight decreased from baseline.  CTA without clear signs GI hemorrhage.  Vitals have remained HDS.  Troponin WNL.  EKG without significant changes.  Troponin WNL.  CT head without signs of acute intracranial process.  Recent EGD data reviewed showing signs of gastric erosion without signs of acute bleed.  This raises suspicion for UGI.  Patient is started on Protonix in ED. plan to admit to Hospital Medicine for observation with possible GI consult.  Discussed with plan with patient who is in agreement.  Questions  answered.            Amount and/or Complexity of Data Reviewed  Labs: ordered. Decision-making details documented in ED Course.  Radiology: ordered.    Risk  OTC drugs.  Prescription drug management.               ED Course as of 02/08/25 1018   Sat Feb 08, 2025   0852 Hemoglobin(!): 9.4 [MB]      ED Course User Index  [MB] Bertin Mondragon MD                           Clinical Impression:  Final diagnoses:  [K92.2] GI bleed  [R07.9] Chest pain          ED Disposition Condition    Observation                 Bertin Mondragon MD  Resident  02/08/25 1019

## 2025-02-08 NOTE — Clinical Note
Diagnosis: GI bleed [001471]   Future Attending Provider: LINDSEY ÁLVAREZ [[id number]]   Is the patient being sent to ED Observation?: No   Special Needs:: No Special Needs [1]

## 2025-02-08 NOTE — ASSESSMENT & PLAN NOTE
- 89 y.o. F with history of GI bleeding, ANANDA 2/2 chronic blood loss, and colon cancer s/p right hemicolectomy in March of 2020 presenting to the ED with recurrent GI bleeding most c/w hematochezia.   - HDS without tachycardia or hypotension   - Hgb 9.4 from 10.2 in January s/p 0 units pRBCs   - Protonix 80mg IV bolus x 1 then start Protonix 40mg IV BID though source of bleeding is most likely lower  - Hold all NSAIDs and heparin products  - CLD for now, NPO at midnight on Monday   - Follow H/H with CBC daily   - IVF hydration; maintain access with 2 large bore IVs  - Orthostatic vitals Q shift   - Anti-emetics as needed  - Transfuse pRBCs as needed for Hgb <7, type and screen ordered & blood consented   - Correct any coagulopathy with platelets and FFP to a goal of platelets >50K and INR <2.0  - GI consulted, appreciate recs   - Plan for colonoscopy Monday  - Golytely prep to start at today, will have additional prep ordered tomorrow  - Monitor Hgb qdaily   - Transfuse to keep Hgb >7, plts >50  - Hold anticoagulants if safe to do so per primary team  - If becomes hemodynamically unstable with associated large volume hematochezia, recommend STAT CTA and IR embolization if positive

## 2025-02-08 NOTE — H&P
[facility] - Emergency Dept  Hospital Medicine  History & Physical    Patient Name: Nydia Stevens  MRN: [id number]  Patient Class: OP- Observation  Admission Date: 2/8/2025  Attending Physician: Zahra Singh MD   Primary Care Provider: Brittney Travis MD         Patient information was obtained from patient, past medical records, and ER records.     Subjective:     Principal Problem:Hematochezia    Chief Complaint:   Chief Complaint   Patient presents with    Rectal Bleeding     Reports 1 episode of black tarry stool today and had syncopal episode when getting off of the toilet. - head trauma, - LOC.        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 F with HTN, HLD, prediabetes, ANANDA 2/2 chronic blood loss, and colon cancer s/p right hemicolectomy in March of 2020 who is presenting to the ED for evaluation of GI bleeding and presyncopal episode. She reports having one episode of dark, tarry stool, along with bright red blood, early this morning. She attempted to grab onto her walker to stand from the toilet and felt weak and lightheaded and lowered herself to thr ground. She denies any falls or syncope and did not hit or head or lose consciousness. She is not on anticoagulation or antiplatelets. Of note, she was admitted in early December with bright red blood per rectum & underwent EGD/colon but colonoscopy prep was inadequate. She is on PPI daily and daily iron supplement, which she is compliant with. Pt denies fever, chills, chest pain, palpitations, SOB, cough, abdominal pain, N/V/D, dysuria, leg swelling or pain, HA, or recent sick contacts.     In the ED: VSSAF. CBC with stable normocytic anemia with Hgb 9.4 (from 10.2 on 1/16/25), no leukocytosis. CMP, BNP, HS troponin, and uA grossly unremarkable. CTH and c-spine without acute process. CTA without active extravasation or acute findings. Pt was given IV protonix 80 mg and placed in observation for further management.        Most Recent Upper  Endoscopy:   12/9/24  Impression:    - Normal esophagus.                          - Erosive gastropathy with no stigmata of recent                          bleeding.                          - Normal examined duodenum.                          - No specimens collected.      Most Recent Colonoscopy:   12/9/24  Impression:     - Preparation of the colon was inadequate.                          - Non-bleeding internal hemorrhoids.                          - Stool in the transverse colon.                          - No specimens collected.    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    Hyperlipidemia     Hypertension     Osteoporosis        Past Surgical History:   Procedure Laterality Date    CATARACT EXTRACTION W/  INTRAOCULAR LENS IMPLANT Left 8/11/14    [facility]    CATARACT EXTRACTION W/  INTRAOCULAR LENS IMPLANT Right 09/15/14    [facility]    COLONOSCOPY N/A 3/19/2020    Procedure: COLONOSCOPY;  Surgeon: Real Mabry MD;  Location: [facility] (2ND FLR);  Service: Endoscopy;  Laterality: N/A;    COLONOSCOPY N/A 3/23/2021    Procedure: COLONOSCOPY;  Surgeon: AUTUMN Berg MD;  Location: [facility] (4TH FLR);  Service: Endoscopy;  Laterality: N/A;  covid test 3/20 [facility]    COLONOSCOPY N/A 2/15/2022    Procedure: COLONOSCOPY;  Surgeon: Doris Simpson MD;  Location: [facility] (4TH FLR);  Service: Endoscopy;  Laterality: N/A;  COVID test on 2/12/22 at [facility]  2/14/22 - Pt confirmed 0640 arrival, prep instructions reviewed, Pt verbalized [id number]    COLONOSCOPY N/A 12/9/2024    Procedure: COLONOSCOPY;  Surgeon: Vitaly Virgen MD;  Location: [facility] (2ND FLR);  Service: Gastroenterology;  Laterality: N/A;    ESOPHAGOGASTRODUODENOSCOPY N/A 3/19/2020    Procedure: EGD (ESOPHAGOGASTRODUODENOSCOPY);  Surgeon: Real Mabry MD;   Location: [facility] ENDO (2ND FLR);  Service: Endoscopy;  Laterality: N/A;    ESOPHAGOGASTRODUODENOSCOPY N/A 12/9/2024    Procedure: EGD (ESOPHAGOGASTRODUODENOSCOPY);  Surgeon: Vitaly Virgen MD;  Location: [facility] ENDO (2ND FLR);  Service: Gastroenterology;  Laterality: N/A;    FOOT SURGERY      left    HYSTERECTOMY  1962    ILEOCOLECTOMY N/A 4/15/2021    Procedure: ILEOCOLECTOMY;  Surgeon: AUTUMN Berg MD;  [facility]  Service: Colon and Rectal;  Laterality: N/A;    INSERTION OF TUNNELED CENTRAL VENOUS CATHETER (CVC) WITH SUBCUTANEOUS PORT N/A 7/2/2021    Procedure: [id number]-[id number];  Surgeon: Suresh Agrawal MD;  Location: [facility] OR 2ND FLR;  Service: Vascular;  Laterality: N/A;  Right IJ    LAPAROSCOPIC HEMICOLECTOMY Right 3/20/2020    Procedure: HEMICOLECTOMY, RIGHT;  Surgeon: AUTUMN Berg MD;  Location: [facility] OR [facility] FLR;  Service: Colon and Rectal;  Laterality: Right;    LYSIS OF ADHESIONS N/A 4/15/2021    Procedure: LYSIS, ADHESIONS;  Surgeon: AUTUMN Berg MD;  Location: [facility] OR [facility] FLR;  Service: Colon and Rectal;  Laterality: N/A;  Greater than 2 hours    MOBILIZATION OF SPLENIC FLEXURE N/A 4/15/2021    Procedure: MOBILIZATION, SPLENIC FLEXURE;  Surgeon: AUTUMN Berg MD;  [facility];  Service: Colon and Rectal;  Laterality: N/A;       Review of patient's allergies indicates:   Allergen Reactions    Aspirin        Contraindicated withHistory of GI bleed    Nsaids (non-steroidal anti-inflammatory drug)        Contraindicated withHistory of GI bleed       Current Facility-Administered Medications on File Prior to Encounter   Medication    gabapentin capsule 300 mg     Current Outpatient Medications on File Prior to Encounter   Medication Sig    amLODIPine (NORVASC) 10 MG tablet Take 1 tablet (10 mg total) by mouth once daily.    calcium carbonate (OS-JAILYN) 500 mg calcium (1,250 mg) tablet Take 1 tablet (500 mg total) by mouth once daily.    cholecalciferol, vitamin D3,  (VITAMIN D3) 25 mcg (1,000 unit) capsule Take 1,000 Units by mouth once daily. Hold until after surgery    cyanocobalamin (VITAMIN B-12) 1000 MCG tablet Take 1 tablet (1,000 mcg total) by mouth once daily. Hold until after surgery    FEROSUL 325 mg (65 mg iron) Tab tablet TAKE ONE TABLET BY MOUTH ON TUESDAY, THURSDAY, AND SATURDAY    losartan (COZAAR) 100 MG tablet Take 1 tablet (100 mg total) by mouth once daily.    rosuvastatin (CRESTOR) 40 MG Tab TAKE ONE TABLET BY MOUTH EVERY DAY FOR CHOLESTEROL    [] sodium,potassium,mag sulfates (SUPREP BOWEL PREP KIT) 17.5-3.13-1.6 gram SolR Take 354 mLs by mouth once daily. for 2 days    VIT C/VIT E ACETATE/LUTEIN/MIN (OCUVITE LUTEIN ORAL) Take 1 tablet by mouth once daily. Hold until after surgery     Family History       Problem Relation (Age of Onset)    Cataracts Brother, Sister, Sister    Diabetes Brother, Sister    Heart attack Father, Brother    No Known Problems Mother, Maternal Aunt, Maternal Uncle, Paternal Aunt, Paternal Uncle, Maternal Grandmother, Maternal Grandfather, Paternal Grandmother, Paternal Grandfather    Stroke Sister, Sister          Tobacco Use    Smoking status: Never    Smokeless tobacco: Never   Substance and Sexual Activity    Alcohol use: No     Alcohol/week: 0.0 standard drinks of alcohol    Drug use: No    Sexual activity: Never     Review of Systems   Constitutional:  Positive for activity change. Negative for chills and fever.   HENT:  Negative for trouble swallowing.    Eyes:  Negative for photophobia and visual disturbance.   Respiratory:  Negative for cough, chest tightness and shortness of breath.    Cardiovascular:  Negative for chest pain, palpitations and leg swelling.   Gastrointestinal:  Positive for blood in stool. Negative for abdominal pain, constipation, diarrhea, nausea and vomiting.   Genitourinary:  Negative for dysuria, frequency and hematuria.   Musculoskeletal:  Positive for arthralgias. Negative for back pain, gait  problem and neck pain.   Skin:  Negative for rash and wound.   Neurological:  Positive for weakness and light-headedness. Negative for dizziness, syncope and speech difficulty.   Psychiatric/Behavioral:  Negative for agitation and confusion. The patient is not nervous/anxious.      Objective:     Vital Signs (Most Recent):  Temp: 97.8 °F (36.6 °C) (02/08/25 0827)  Pulse: 77 (02/08/25 1159)  Resp: 17 (02/08/25 1159)  BP: (!) 163/74 (02/08/25 1159)  SpO2: 100 % (02/08/25 1159) Vital Signs (24h Range):  Temp:  [97.1 °F (36.2 °C)-97.8 °F (36.6 °C)] 97.8 °F (36.6 °C)  Pulse:  [] 77  Resp:  [13-26] 17  SpO2:  [99 %-100 %] 100 %  BP: (114-176)/(55-74) 163/74     Weight: 49.9 kg (110 lb)  Body mass index is 17.23 kg/m².     Physical Exam  Vitals and nursing note reviewed.   Constitutional:       General: She is not in acute distress.     Appearance: She is cachectic. She is ill-appearing (chronically).   HENT:      Head: Normocephalic and atraumatic.      Mouth/Throat:      Mouth: Mucous membranes are dry.   Eyes:      Extraocular Movements: Extraocular movements intact.      Conjunctiva/sclera: Conjunctivae normal.   Cardiovascular:      Rate and Rhythm: Normal rate and regular rhythm.      Heart sounds: Murmur heard.   Pulmonary:      Effort: Pulmonary effort is normal. No respiratory distress.      Breath sounds: Normal breath sounds. No wheezing.   Abdominal:      General: Bowel sounds are normal. There is no distension.      Palpations: Abdomen is soft.      Tenderness: There is no abdominal tenderness.   Musculoskeletal:         General: No tenderness. Normal range of motion.      Cervical back: Normal range of motion and neck supple.      Comments: Dried blood down bilateral lower extremities   Skin:     General: Skin is warm and dry.      Capillary Refill: Capillary refill takes 2 to 3 seconds.      Findings: No rash.   Neurological:      Mental Status: She is alert and oriented to person, place, and time.  Mental status is at baseline.   Psychiatric:         Behavior: Behavior normal.         Thought Content: Thought content normal.         Judgment: Judgment normal.                Significant Labs: All pertinent labs within the past 24 hours have been reviewed.  CBC:   Recent Labs   Lab 02/08/25  0742   WBC 6.46   HGB 9.4*   HCT 30.5*          Significant Imaging: I have reviewed all pertinent imaging results/findings within the past 24 hours.  CTA Acute GI Bleed, Abdomen and Pelvis  Narrative: EXAMINATION:  CTA ACUTE GI BLEED, ABDOMEN AND PELVIS    CLINICAL HISTORY:  GI bleed;gi bleed;    TECHNIQUE:  Contiguous 2.5-mm axial images were obtained through the abdomen and pelvis following the administration of 75 cc of intravenous Omnipaque 350.  Sagittal and coronal reformats and maximum intensity projections were also submitted.    COMPARISON:  CTA of the abdomen and pelvis performed 12/08/2024.    FINDINGS:  BOWEL AND PERITONEUM    Bowel: Redemonstrated operative sequela partial colectomy with anastomosis again seen in the abdomen.  No evidence of bowel obstruction.    Intraluminal intestinal hemorrhage: None.    Free air or fluid: None.    VASCULATURE    Visceral arteries: Celiac, superior mesenteric, renal, and inferior mesenteric arteries appear grossly patent.  Moderate narrowing at the celiac axis origin.  Moderate narrowing at the left renal artery origin.  Short segment moderate to high-grade narrowing at the OSCAR origin.    Abdominal aorta and iliac arteries: Nonaneurysmal.  Heavy atherosclerosis.    Mesenteric and portal veins: Normally patent.    Inferior vena cava: Normally patent.    ABDOMEN/PELVIS    Lower chest: Heavy atherosclerotic calcifications of the coronary arteries.  Dense mitral annular and aortic valvular calcifications.  9 mm solid nodule right lower lobe (series 2, image 10).  This appears essentially unchanged dating back to at least 02/26/2021 CT of the chest, and as such is felt  most likely benign.  Atelectasis and/or scar noted elsewhere at the visualized lung bases.    Liver: Normal contour.  Suggested calcified granuloma.    Gallbladder/bile ducts: Cholelithiasis.  No definite pericholecystic inflammation or biliary ductal dilatation.    Pancreas: Unremarkable.    Spleen: Unremarkable.    Adrenals: Unremarkable.    Kidneys: Unremarkable.    Lymph nodes: No abdominal or pelvic lymphadenopathy.    Pelvis: Unremarkable.    Urinary bladder: Unremarkable.    Body wall: Relatively similar appearance of rectus diastasis with ventral hernia containing loops of nonobstructed, noninflamed small bowel compared to prior CT imaging of 12/08/2024.    Bones: No definite acute change.  Query osseous demineralization.  Remote fracture of the left inferior pubic ramus.  Remote operative sequela of left hip.  Impression: 1. No intraluminal intestinal, intraperitoneal, or retroperitoneal hemorrhage.  2. No definite acute findings identified in the abdomen or pelvis.  3. Additional details of chronic and incidental findings, as provided in the body of the report.    Electronically signed by: Hong Willis  Date:    02/08/2025  Time:    08:17  CT Cervical Spine Without Contrast  Narrative: EXAMINATION:  CT HEAD WITHOUT CONTRAST; CT CERVICAL SPINE WITHOUT CONTRAST    CLINICAL HISTORY:  Head trauma, minor (Age >= 65y);; Neck trauma (Age >= 65y);    TECHNIQUE:  Low dose axial images were obtained through the head and cervical spine.  Coronal and sagittal reformations were also performed. Contrast was not administered.    COMPARISON:  CT head and cervical spine 12/21/2023.    FINDINGS:  Head:    Blood: No acute intracranial hemorrhage.    Parenchyma: No definite loss of gray-white differentiation to suggest acute or subacute transcortical infarct. 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  Generalized pattern of age-related parenchymal volume loss is noted elsewhere.  Nonspecific  areas of white matter hypoattenuation may reflect sequela of chronic small vessel ischemic disease.    Ventricles/Extra-axial spaces: No abnormal extra-axial fluid collection. Basal cisterns are patent.    Vessels: Moderate atherosclerotic calcification.    Orbits: Status post bilateral lens replacements.    Scalp: Posterior scalp hematoma.    Skull: There are no depressed skull fractures or destructive bone lesions.  Stable nonaggressive appearing sclerotic lesion within the basisphenoid.    Sinuses and mastoids: Scattered minimal paranasal sinus mucosal thickening with prominent retention cysts within the alveolar recess of the right maxillary sinus.    Other findings: None    Cervical spine:    Fractures: No acute fractures    Alignment: There is no significant vertebral subluxation  Atlanto-axial and atlanto-occipital joints: Atlanto-axial and atlanto-occipital intervals are not widened.  Facet joints: There is no traumatic facet joint widening.  Vertebral bodies: Suspect osseous demineralization.  Relatively modest degenerative endplate change.  Discs: Degenerative disc disease.  Spinal canal and foraminal narrowing: Although CT does not optimally evaluate the soft tissue contents of the spinal canal and foramina, no critical stenosis is suggested.    At C4-5, mild-to-moderate central spinal canal stenosis and moderate right foraminal narrowing new at C5-6, mild central spinal canal stenosis    Paraspinal soft tissues: Right chest port.    Upper Lungs:Clear  Impression: 1. No acute intracranial findings.  2. No acute cervical spine fracture.    Electronically signed by: Hong Willis  Date:    02/08/2025  Time:    07:57  CT Head Without Contrast  Narrative: EXAMINATION:  CT HEAD WITHOUT CONTRAST; CT CERVICAL SPINE WITHOUT CONTRAST    CLINICAL HISTORY:  Head trauma, minor (Age >= 65y);; Neck trauma (Age >= 65y);    TECHNIQUE:  Low dose axial images were obtained through the head and cervical spine.  Coronal and  sagittal reformations were also performed. Contrast was not administered.    COMPARISON:  CT head and cervical spine 12/21/2023.    FINDINGS:  Head:    Blood: No acute intracranial hemorrhage.    Parenchyma: No definite loss of gray-white differentiation to suggest acute or subacute transcortical infarct. 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  Generalized pattern of age-related parenchymal volume loss is noted elsewhere.  Nonspecific areas of white matter hypoattenuation may reflect sequela of chronic small vessel ischemic disease.    Ventricles/Extra-axial spaces: No abnormal extra-axial fluid collection. Basal cisterns are patent.    Vessels: Moderate atherosclerotic calcification.    Orbits: Status post bilateral lens replacements.    Scalp: Posterior scalp hematoma.    Skull: There are no depressed skull fractures or destructive bone lesions.  Stable nonaggressive appearing sclerotic lesion within the basisphenoid.    Sinuses and mastoids: Scattered minimal paranasal sinus mucosal thickening with prominent retention cysts within the alveolar recess of the right maxillary sinus.    Other findings: None    Cervical spine:    Fractures: No acute fractures    Alignment: There is no significant vertebral subluxation  Atlanto-axial and atlanto-occipital joints: Atlanto-axial and atlanto-occipital intervals are not widened.  Facet joints: There is no traumatic facet joint widening.  Vertebral bodies: Suspect osseous demineralization.  Relatively modest degenerative endplate change.  Discs: Degenerative disc disease.  Spinal canal and foraminal narrowing: Although CT does not optimally evaluate the soft tissue contents of the spinal canal and foramina, no critical stenosis is suggested.    At C4-5, mild-to-moderate central spinal canal stenosis and moderate right foraminal narrowing new at C5-6, mild central spinal canal stenosis    Paraspinal soft tissues: Right chest port.    Upper  Lungs:Clear  Impression: 1. No acute intracranial findings.  2. No acute cervical spine fracture.    Electronically signed by: Hong Willis  Date:    02/08/2025  Time:    07:57      Assessment/Plan:     * Hematochezia  - 89 y.o. F with history of GI bleeding, ANANDA 2/2 chronic blood loss, and colon cancer s/p right hemicolectomy in March of 2020 presenting to the ED with recurrent GI bleeding most c/w hematochezia.   - HDS without tachycardia or hypotension   - Hgb 9.4 from 10.2 in January s/p 0 units pRBCs   - Protonix 80mg IV bolus x 1 then start Protonix 40mg IV BID though source of bleeding is most likely lower  - Hold all NSAIDs and heparin products  - CLD for now, NPO at midnight on Monday   - Follow H/H with CBC daily   - IVF hydration; maintain access with 2 large bore IVs  - Orthostatic vitals Q shift   - Anti-emetics as needed  - Transfuse pRBCs as needed for Hgb <7, type and screen ordered & blood consented   - Correct any coagulopathy with platelets and FFP to a goal of platelets >50K and INR <2.0  - GI consulted, appreciate recs   - Plan for colonoscopy Monday  - Golytely prep to start at today, will have additional prep ordered tomorrow  - Monitor Hgb qdaily   - Transfuse to keep Hgb >7, plts >50  - Hold anticoagulants if safe to do so per primary team  - If becomes hemodynamically unstable with associated large volume hematochezia, recommend STAT CTA and IR embolization if positive    Chronic diastolic heart failure  Patient is identified as having Diastolic (HFpEF) heart failure that is Chronic. CHF is currently controlled.   Latest ECHO performed and demonstrates- Results for orders placed during the hospital encounter of 02/16/21    Echo Color Flow Doppler? Yes    Interpretation Summary  · The left ventricle is normal in size with hyperdynamic systolic function. The estimated ejection fraction is 75%  · Indeterminate left ventricular diastolic function.  · Normal right ventricular size with normal  right ventricular systolic function.  · There is a very late LVOT flow acceleration to 3.0m/s ( 36mmHg) due to hyperdynamic LV function and cavity obliteration at the end of systole.  · Mild left atrial enlargement.  · Mild tricuspid regurgitation.  · The estimated PA systolic pressure is 33 mmHg.  · Normal central venous pressure (3 mmHg).  · There is a right pleural effusion.    - Continue ACE/ARB and monitor clinical status closely.   - Monitor on telemetry.  - Patient is off CHF pathway.   - Monitor strict Is&Os and daily weights.   - Continue to stress to patient importance of self efficacy and  on diet for CHF.  - Last BNP reviewed- and noted below   Recent Labs   Lab 02/08/25  0742   BNP 62   - Dry appearing on exam     Iron deficiency anemia due to chronic blood loss  Anemia is likely due to chronic blood loss and Iron deficiency. Most recent hemoglobin and hematocrit are listed below.  Recent Labs     02/08/25  0742   HGB 9.4*   HCT 30.5*     Plan  - Monitor serial CBC: Daily  - Transfuse PRBC if patient becomes hemodynamically unstable, symptomatic or H/H drops below 7/21.  - Patient has not received any PRBC transfusions to date  - Patient's anemia is currently stable  - See hematochezia     Cachexia  Malnutrition   BMI less than 19   Concern for cachexia as evidenced by loss of muscle mass and loss of body fat . Treatment to include nutrition consult, physical therapy, and occupational therapy.    Body mass index is 17.23 kg/m².  Albumin   Date Value Ref Range Status   02/08/2025 3.5 3.5 - 5.2 g/dL Final       Debility  Patient with Chronic debility due to age-related physical debility. The patient's latest AMPAC (Activity Measure for Post Acute Care) Score is listed below.    AM-PAC Score - How much help does the patient need for each activity listed       Plan  - Progressive mobility protocol initated  - PT/OT consulted  - Fall precautions in place    Mixed hyperlipidemia  - Chronic   - Continue  statin     Primary hypertension  Patient's blood pressure range in the last 24 hours was: BP  Min: 114/55  Max: 176/72.The patient's inpatient anti-hypertensive regimen is listed below:  Current Antihypertensives  amLODIPine tablet 10 mg, Daily, Oral  losartan tablet 100 mg, Daily, Oral    Plan  - BP is controlled, no changes needed to their regimen      VTE Risk Mitigation (From admission, onward)           Ordered     IP VTE HIGH RISK PATIENT  Once         02/08/25 0927     Place sequential compression device  Until discontinued         02/08/25 0927     Place sequential compression device  Until discontinued         02/08/25 0926                         On 02/08/2025, patient should be placed in hospital observation services under my care in collaboration with Dr. Zahra Singh.           GANGA AbramsC  Department of Hospital Medicine  [facility] - Emergency Dept

## 2025-02-08 NOTE — ASSESSMENT & PLAN NOTE
Patient's blood pressure range in the last 24 hours was: BP  Min: 114/55  Max: 176/72.The patient's inpatient anti-hypertensive regimen is listed below:  Current Antihypertensives  amLODIPine tablet 10 mg, Daily, Oral  losartan tablet 100 mg, Daily, Oral    Plan  - BP is controlled, no changes needed to their regimen

## 2025-02-08 NOTE — ED TRIAGE NOTES
Nydia Stevens, a 89 y.o. female presents to the ED via EMS from home w/ complaint of one episode of loose black tarry stool this morning. Pt states she went to grab onto her walker when she got off the toilet and felt weak. Pt denies hitting head, LOC and blood thinners. Pt has no other complaints at this time.    Triage note:  Chief Complaint   Patient presents with    Rectal Bleeding     Reports 1 episode of black tarry stool today and had syncopal episode when getting off of the toilet. - head trauma, - LOC.     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

## 2025-02-08 NOTE — ASSESSMENT & PLAN NOTE
Patient with Chronic debility due to age-related physical debility. The patient's latest AMPAC (Activity Measure for Post Acute Care) Score is listed below.    AM-PAC Score - How much help does the patient need for each activity listed       Plan  - Progressive mobility protocol initated  - PT/OT consulted  - Fall precautions in place

## 2025-02-08 NOTE — ED NOTES
Telemetry Verification   Patient placed on Telemetry Box  Verified with War Room  Box # [id number]   Monitor Tech edou   Rate 82   Rhythm NS

## 2025-02-08 NOTE — CONSULTS
[facility]  Gastroenterology  Consult Note    Patient Name: Nydia Stevens  MRN: [id number]  Admission Date: 2/8/2025  Hospital Length of Stay: 0 days  Code Status: Full Code   Attending Provider: Zahra Singh MD   Consulting Provider: Tammy Persaud MD  Primary Care Physician: Brittney Travis MD  Principal Problem:Gastrointestinal bleed    Inpatient consult to Gastroenterology  Consult performed by: Tammy Persaud MD  Consult ordered by: Chitra Mata PA-C        Subjective:     HPI: Nydia Stevens is a 89 y.o. female with history of HTN, HLD, and colon cancer s/p right hemicolectomy 3/2020 (declined adjuvant chemotherapy) and ileocecectomy 4/21 for lesion at ileocolic anastomosis,  presenting to the ED for rectal bleeding.  Of note the patient was recently seen in GI Clinic last week as a hospital follow-up, patient hospitalized in December for rectal bleeding and underwent EGD/colon but colonoscopy prep was inadequate.  When she was seen in clinic last week her hemoglobin was stable and she had no further episodes of rectal bleeding, decision was made to have outpatient colonoscopy with extended prep for constipation.  Given that patient has been seen by [facility]S several times in the past for her surgeries, patient opted to have schedule her colonoscopy with [facility].  Patient now is in the ED for 1 episode of hematochezia last night.  Patient reports no preceding symptoms or abdominal pain, 1 episode of painless hematochezia last night, brought in for further evaluation.    Prior to the attempted colonoscopy in December, her last surveillance colonoscopy was 02/2022 which was normal.     Most Recent Upper Endoscopy:   12/9/24  Impression:            - Normal esophagus.                          - Erosive gastropathy with no stigmata of recent                          bleeding.                          - Normal examined duodenum.                          - No specimens  collected.      Most Recent Colonoscopy:   12/9/24  Impression:            - Preparation of the colon was inadequate.                          - Non-bleeding internal hemorrhoids.                          - Stool in the transverse colon.                          - No specimens collected.     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    Hyperlipidemia     Hypertension     Osteoporosis        Past Surgical History:   Procedure Laterality Date    CATARACT EXTRACTION W/  INTRAOCULAR LENS IMPLANT Left 8/11/14    [facility]    CATARACT EXTRACTION W/  INTRAOCULAR LENS IMPLANT Right 09/15/14    [facility]    COLONOSCOPY N/A 3/19/2020    Procedure: COLONOSCOPY;  Surgeon: Real Mabry MD;  Location: [facility] (2ND FLR);  Service: Endoscopy;  Laterality: N/A;    COLONOSCOPY N/A 3/23/2021    Procedure: COLONOSCOPY;  Surgeon: AUTUMN Berg MD;  Location: [facility] (4TH FLR);  Service: Endoscopy;  Laterality: N/A;  covid test 3/20 [facility]    COLONOSCOPY N/A 2/15/2022    Procedure: COLONOSCOPY;  Surgeon: Doris Simpson MD;  Location: [facility] (4TH FLR);  Service: Endoscopy;  Laterality: N/A;  COVID test on 2/12/22 at [facility]  2/14/22 - Pt confirmed 0640 arrival, prep instructions reviewed, Pt verbalized understanding-[id number]    COLONOSCOPY N/A 12/9/2024    Procedure: COLONOSCOPY;  Surgeon: Vitaly Virgen MD;  Location: [facility] (2ND FLR);  Service: Gastroenterology;  Laterality: N/A;    ESOPHAGOGASTRODUODENOSCOPY N/A 3/19/2020    Procedure: EGD (ESOPHAGOGASTRODUODENOSCOPY);  Surgeon: Real Mabry MD;  Location: [facility] (2ND FLR);  Service: Endoscopy;  Laterality: N/A;    ESOPHAGOGASTRODUODENOSCOPY N/A 12/9/2024    Procedure: EGD (ESOPHAGOGASTRODUODENOSCOPY);  Surgeon: Vitaly Virgen MD;  Location: [facility] (2ND FLR);  Service: Gastroenterology;   Laterality: N/A;    FOOT SURGERY      left    HYSTERECTOMY  1962    ILEOCOLECTOMY N/A 4/15/2021    Procedure: ILEOCOLECTOMY;  Surgeon: AUTUMN Berg MD;  Location: [facility] OR 2ND FLR;  Service: Colon and Rectal;  Laterality: N/A;    INSERTION OF TUNNELED CENTRAL VENOUS CATHETER (CVC) WITH SUBCUTANEOUS PORT N/A 7/2/2021    Procedure: [id number];  Surgeon: Suresh Agrawal MD;  Location: [facility] OR 2ND FLR;  Service: Vascular;  Laterality: N/A;  Right IJ    LAPAROSCOPIC HEMICOLECTOMY Right 3/20/2020    Procedure: HEMICOLECTOMY, RIGHT;  Surgeon: AUTUMN Berg MD;  Location: [facility] OR 2ND FLR;  Service: Colon and Rectal;  Laterality: Right;    LYSIS OF ADHESIONS N/A 4/15/2021    Procedure: LYSIS, ADHESIONS;  Surgeon: AUTUNM Berg MD;  Location: [facility] OR 2ND FLR;  Service: Colon and Rectal;  Laterality: N/A;  Greater than 2 hours    MOBILIZATION OF SPLENIC FLEXURE N/A 4/15/2021    Procedure: MOBILIZATION, SPLENIC FLEXURE;  Surgeon: AUTUMN Berg MD;  Location: [facility] OR 2ND FLR;  Service: Colon and Rectal;  Laterality: N/A;       Family History   Problem Relation Name Age of Onset    Heart attack Father      Cataracts Brother      Heart attack Brother      Diabetes Brother      No Known Problems Mother      Cataracts Sister      Stroke Sister      Diabetes Sister      Cataracts Sister      Stroke Sister      No Known Problems Maternal Aunt      No Known Problems Maternal Uncle      No Known Problems Paternal Aunt      No Known Problems Paternal Uncle      No Known Problems Maternal Grandmother      No Known Problems Maternal Grandfather      No Known Problems Paternal Grandmother      No Known Problems Paternal Grandfather      Amblyopia Neg Hx      Blindness Neg Hx      Cancer Neg Hx      Glaucoma Neg Hx      Hypertension Neg Hx      Macular degeneration Neg Hx      Retinal detachment Neg Hx      Strabismus Neg Hx      Thyroid disease Neg Hx      Colon cancer Neg Hx      Esophageal cancer Neg Hx       Irritable bowel syndrome Neg Hx      Rectal cancer Neg Hx      Stomach cancer Neg Hx      Ulcerative colitis Neg Hx      Crohn's disease Neg Hx      Celiac disease Neg Hx         Social History     Socioeconomic History    Marital status: Single   Occupational History    Occupation: teaching retired    Occupation: Nun.  [employer]   Tobacco Use    Smoking status: Never    Smokeless tobacco: Never   Substance and Sexual Activity    Alcohol use: No     Alcohol/week: 0.0 standard drinks of alcohol    Drug use: No    Sexual activity: Never   Social History Narrative    Member of [employer] order here in [place], [place]..  Lives with about 40-50 sisters.  All sisters with her are retired.       Social Drivers of Health     Financial Resource Strain: Low Risk  (12/20/2024)    Overall Financial Resource Strain (CARDIA)     Difficulty of Paying Living Expenses: Not hard at all   Food Insecurity: No Food Insecurity (12/20/2024)    Hunger Vital Sign     Worried About Running Out of Food in the Last Year: Never true     Ran Out of Food in the Last Year: Never true   Transportation Needs: No Transportation Needs (12/20/2024)    PRAPARE - Transportation     Lack of Transportation (Medical): No     Lack of Transportation (Non-Medical): No   Physical Activity: Insufficiently Active (12/20/2024)    Exercise Vital Sign     Days of Exercise per Week: 2 days     Minutes of Exercise per Session: 60 min   Stress: No Stress Concern Present (12/20/2024)    [employer] [place] [employer] - Occupational Stress Questionnaire     Feeling of Stress : Not at all   Housing Stability: Low Risk  (12/20/2024)    Housing Stability Vital Sign     Unable to Pay for Housing in the Last Year: No     Homeless in the Last Year: No       Current Facility-Administered Medications on File Prior to Encounter   Medication Dose Route Frequency Provider Last Rate Last Admin    gabapentin capsule 300 mg  300 mg Oral TID  Amanda Gaspar, NP   300 mg at 21 0814     Current Outpatient Medications on File Prior to Encounter   Medication Sig Dispense Refill    amLODIPine (NORVASC) 10 MG tablet Take 1 tablet (10 mg total) by mouth once daily. 90 tablet 3    calcium carbonate (OS-JAILYN) 500 mg calcium (1,250 mg) tablet Take 1 tablet (500 mg total) by mouth once daily.  0    cholecalciferol, vitamin D3, (VITAMIN D3) 25 mcg (1,000 unit) capsule Take 1,000 Units by mouth once daily. Hold until after surgery      cyanocobalamin (VITAMIN B-12) 1000 MCG tablet Take 1 tablet (1,000 mcg total) by mouth once daily. Hold until after surgery 30 tablet 11    FEROSUL 325 mg (65 mg iron) Tab tablet TAKE ONE TABLET BY MOUTH ON TUESDAY, THURSDAY, AND SATURDAY 12 tablet 5    losartan (COZAAR) 100 MG tablet Take 1 tablet (100 mg total) by mouth once daily. 30 tablet 5    rosuvastatin (CRESTOR) 40 MG Tab TAKE ONE TABLET BY MOUTH EVERY DAY FOR CHOLESTEROL 90 tablet 3    [] sodium,potassium,mag sulfates (SUPREP BOWEL PREP KIT) 17.5-3.13-1.6 gram SolR Take 354 mLs by mouth once daily. for 2 days 2 kit 0    VIT C/VIT E ACETATE/LUTEIN/MIN (OCUVITE LUTEIN ORAL) Take 1 tablet by mouth once daily. Hold until after surgery         Review of patient's allergies indicates:   Allergen Reactions    Aspirin        Contraindicated withHistory of GI bleed    Nsaids (non-steroidal anti-inflammatory drug)        Contraindicated withHistory of GI bleed       ROS - negative except for otherwise stated in HPI    Objective:     Vitals:    25 0832   BP: (!) 147/63   Pulse:    Resp:    Temp:        Physical Exam:  Constitutional:  not in acute distress and well developed  HENT: Head: Normal, normocephalic.  Eyes: conjunctiva clear and sclera nonicteric  GI: soft, non-tender, without masses or organomegaly  Skin: normal color on visualized skin  Neurological: alert, oriented       Significant Labs:  Recent Labs   Lab 25  0742   HGB 9.4*       Lab Results    Component Value Date    WBC 6.46 02/08/2025    HGB 9.4 (L) 02/08/2025    HCT 30.5 (L) 02/08/2025    MCV 96 02/08/2025     02/08/2025       Lab Results   Component Value Date     02/08/2025    K 4.2 02/08/2025     02/08/2025    CO2 21 (L) 02/08/2025    BUN 22 02/08/2025    CREATININE 0.8 02/08/2025    CALCIUM 8.8 02/08/2025    ANIONGAP 11 02/08/2025    ESTGFRAFRICA >60.0 06/06/2022    EGFRNONAA >60.0 06/06/2022       Lab Results   Component Value Date    ALT 5 (L) 02/08/2025    AST 16 02/08/2025    GGT 57 (H) 06/11/2020    ALKPHOS 45 02/08/2025    BILITOT 0.2 02/08/2025       Lab Results   Component Value Date    INR 1.0 12/07/2024    INR 0.9 03/17/2020    INR 1.0 03/02/2020       Significant Imaging:  Reviewed pertinent radiology findings.       Assessment/Plan:     Nydia Stevens is a 89 y.o. female with history of HTN, HLD, and colon cancer s/p right hemicolectomy 3/2020 (declined adjuvant chemotherapy) and ileocecectomy 4/2021 for lesion at ileocolic anastomosis,  presenting to the ED for rectal bleeding.  Similar presentation in December, attempted colonoscopy but inadequate prep.  Patient is scheduled to have outpatient colonoscopy with [facility]S, now presented to the ED, GI consulted for rectal bleeding.    Problem List:  Hematochezia  Colon cancer s/p R hemicolectomy 3/2020 and ileocecectomy 4/2021    Hemoglobin stable, higher this admission than it was previous admission for rectal bleeding in December.  Will plan for extended prep this weekend for colonoscopy on Monday.     Recommendations:  - Plan for colonoscopy Monday  - Clear liquid diet today and tomorrow, NPO at midnight Sunday night  - Golytely prep to start at today, will have additional prep ordered tomorrow  - Monitor Hgb qdaily   - Transfuse to keep Hgb >7, plts >50  - Hold anticoagulants if safe to do so per primary team  - If becomes hemodynamically unstable with associated large volume hematochezia, recommend STAT CTA and  IR embolization if positive      Thank you for involving us in the care of Nydia Stevens. Please call with any additional questions, concerns or changes in the patient's clinical status.     Tammy Persaud MD  Gastroenterology Fellow PGY IV  [facility]

## 2025-02-08 NOTE — H&P (VIEW-ONLY)
[facility]  Gastroenterology  Consult Note    Patient Name: Nydia Stevens  MRN: [id number]  Admission Date: 2/8/2025  Hospital Length of Stay: 0 days  Code Status: Full Code   Attending Provider: Zahra Singh MD   Consulting Provider: Tammy Persaud MD  Primary Care Physician: Brittney Travis MD  Principal Problem:Gastrointestinal bleed    Inpatient consult to Gastroenterology  Consult performed by: Tammy Persaud MD  Consult ordered by: Chitra Mata PA-C        Subjective:     HPI: Nydia Stevens is a 89 y.o. female with history of HTN, HLD, and colon cancer s/p right hemicolectomy 3/2020 (declined adjuvant chemotherapy) and ileocecectomy 4/21 for lesion at ileocolic anastomosis,  presenting to the ED for rectal bleeding.  Of note the patient was recently seen in GI Clinic last week as a hospital follow-up, patient hospitalized in December for rectal bleeding and underwent EGD/colon but colonoscopy prep was inadequate.  When she was seen in clinic last week her hemoglobin was stable and she had no further episodes of rectal bleeding, decision was made to have outpatient colonoscopy with extended prep for constipation.  Given that patient has been seen by [facility] several times in the past for her surgeries, patient opted to have schedule her colonoscopy with [facility].  Patient now is in the ED for 1 episode of hematochezia last night.  Patient reports no preceding symptoms or abdominal pain, 1 episode of painless hematochezia last night, brought in for further evaluation.    Prior to the attempted colonoscopy in December, her last surveillance colonoscopy was 02/2022 which was normal.     Most Recent Upper Endoscopy:   12/9/24  Impression:            - Normal esophagus.                          - Erosive gastropathy with no stigmata of recent                          bleeding.                          - Normal examined duodenum.                          - No specimens  collected.      Most Recent Colonoscopy:   12/9/24  Impression:            - Preparation of the colon was inadequate.                          - Non-bleeding internal hemorrhoids.                          - Stool in the transverse colon.                          - No specimens collected.     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    Hyperlipidemia     Hypertension     Osteoporosis        Past Surgical History:   Procedure Laterality Date    CATARACT EXTRACTION W/  INTRAOCULAR LENS IMPLANT Left 8/11/14    [facility]    CATARACT EXTRACTION W/  INTRAOCULAR LENS IMPLANT Right 09/15/14    [facility]    COLONOSCOPY N/A 3/19/2020    Procedure: COLONOSCOPY;  Surgeon: Real Mabry MD;  Location: [facility] (2ND FLR);  Service: Endoscopy;  Laterality: N/A;    COLONOSCOPY N/A 3/23/2021    Procedure: COLONOSCOPY;  Surgeon: AUTUMN Berg MD;  Location: [facility] (4TH FLR);  Service: Endoscopy;  Laterality: N/A;  covid test 3/20 [facility]    COLONOSCOPY N/A 2/15/2022    Procedure: COLONOSCOPY;  Surgeon: Doris Simpson MD;  Location: [facility] (4TH FLR);  Service: Endoscopy;  Laterality: N/A;  COVID test on 2/12/22 at [facility]  2/14/22 - Pt confirmed 0640 arrival, prep instructions reviewed, Pt verbalized understanding-[id number]    COLONOSCOPY N/A 12/9/2024    Procedure: COLONOSCOPY;  Surgeon: Vitaly Virgen MD;  Location: [facility] (2ND FLR);  Service: Gastroenterology;  Laterality: N/A;    ESOPHAGOGASTRODUODENOSCOPY N/A 3/19/2020    Procedure: EGD (ESOPHAGOGASTRODUODENOSCOPY);  Surgeon: Real Mabry MD;  Location: [facility] (2ND FLR);  Service: Endoscopy;  Laterality: N/A;    ESOPHAGOGASTRODUODENOSCOPY N/A 12/9/2024    Procedure: EGD (ESOPHAGOGASTRODUODENOSCOPY);  Surgeon: Vitaly Virgen MD;  Location: [facility] (2ND FLR);  Service: Gastroenterology;   Laterality: N/A;    FOOT SURGERY      left    HYSTERECTOMY  1962    ILEOCOLECTOMY N/A 4/15/2021    Procedure: ILEOCOLECTOMY;  Surgeon: AUTUMN Berg MD;  Location: [facility] OR 2ND FLR;  Service: Colon and Rectal;  Laterality: N/A;    INSERTION OF TUNNELED CENTRAL VENOUS CATHETER (CVC) WITH SUBCUTANEOUS PORT N/A 7/2/2021    Procedure: [id number]-[id number];  Surgeon: Suresh Agrawal MD;  Location: [facility] OR 2ND FLR;  Service: Vascular;  Laterality: N/A;  Right IJ    LAPAROSCOPIC HEMICOLECTOMY Right 3/20/2020    Procedure: HEMICOLECTOMY, RIGHT;  Surgeon: AUTUMN Berg MD;  Location: [facility] OR 2ND FLR;  Service: Colon and Rectal;  Laterality: Right;    LYSIS OF ADHESIONS N/A 4/15/2021    Procedure: LYSIS, ADHESIONS;  Surgeon: AUTUMN Berg MD;  Location: [facility] OR 2ND FLR;  Service: Colon and Rectal;  Laterality: N/A;  Greater than 2 hours    MOBILIZATION OF SPLENIC FLEXURE N/A 4/15/2021    Procedure: MOBILIZATION, SPLENIC FLEXURE;  Surgeon: AUTUMN Berg MD;  Location: [facility] OR 2ND FLR;  Service: Colon and Rectal;  Laterality: N/A;       Family History   Problem Relation Name Age of Onset    Heart attack Father      Cataracts Brother      Heart attack Brother      Diabetes Brother      No Known Problems Mother      Cataracts Sister      Stroke Sister      Diabetes Sister      Cataracts Sister      Stroke Sister      No Known Problems Maternal Aunt      No Known Problems Maternal Uncle      No Known Problems Paternal Aunt      No Known Problems Paternal Uncle      No Known Problems Maternal Grandmother      No Known Problems Maternal Grandfather      No Known Problems Paternal Grandmother      No Known Problems Paternal Grandfather      Amblyopia Neg Hx      Blindness Neg Hx      Cancer Neg Hx      Glaucoma Neg Hx      Hypertension Neg Hx      Macular degeneration Neg Hx      Retinal detachment Neg Hx      Strabismus Neg Hx      Thyroid disease Neg Hx      Colon cancer Neg Hx      Esophageal cancer Neg Hx       Irritable bowel syndrome Neg Hx      Rectal cancer Neg Hx      Stomach cancer Neg Hx      Ulcerative colitis Neg Hx      Crohn's disease Neg Hx      Celiac disease Neg Hx         Social History     Socioeconomic History    Marital status: Single   Occupational History    Occupation: teaching retired    Occupation: Nun.  [employer]   Tobacco Use    Smoking status: Never    Smokeless tobacco: Never   Substance and Sexual Activity    Alcohol use: No     Alcohol/week: 0.0 standard drinks of alcohol    Drug use: No    Sexual activity: Never   Social History Narrative    Member of [employer] order here in [place], [place]..  Lives with about 40-50 sisters.  All sisters with her are retired.       Social Drivers of Health     Financial Resource Strain: Low Risk  (12/20/2024)    Overall Financial Resource Strain (CARDIA)     Difficulty of Paying Living Expenses: Not hard at all   Food Insecurity: No Food Insecurity (12/20/2024)    Hunger Vital Sign     Worried About Running Out of Food in the Last Year: Never true     Ran Out of Food in the Last Year: Never true   Transportation Needs: No Transportation Needs (12/20/2024)    PRAPARE - Transportation     Lack of Transportation (Medical): No     Lack of Transportation (Non-Medical): No   Physical Activity: Insufficiently Active (12/20/2024)    Exercise Vital Sign     Days of Exercise per Week: 2 days     Minutes of Exercise per Session: 60 min   Stress: No Stress Concern Present (12/20/2024)    [employer] - Occupational Stress Questionnaire     Feeling of Stress : Not at all   Housing Stability: Low Risk  (12/20/2024)    Housing Stability Vital Sign     Unable to Pay for Housing in the Last Year: No     Homeless in the Last Year: No       Current Facility-Administered Medications on File Prior to Encounter   Medication Dose Route Frequency Provider Last Rate Last Admin    gabapentin capsule 300 mg  300 mg Oral TID  Amanda Gaspar, NP   300 mg at 21 0814     Current Outpatient Medications on File Prior to Encounter   Medication Sig Dispense Refill    amLODIPine (NORVASC) 10 MG tablet Take 1 tablet (10 mg total) by mouth once daily. 90 tablet 3    calcium carbonate (OS-JAILYN) 500 mg calcium (1,250 mg) tablet Take 1 tablet (500 mg total) by mouth once daily.  0    cholecalciferol, vitamin D3, (VITAMIN D3) 25 mcg (1,000 unit) capsule Take 1,000 Units by mouth once daily. Hold until after surgery      cyanocobalamin (VITAMIN B-12) 1000 MCG tablet Take 1 tablet (1,000 mcg total) by mouth once daily. Hold until after surgery 30 tablet 11    FEROSUL 325 mg (65 mg iron) Tab tablet TAKE ONE TABLET BY MOUTH ON TUESDAY, THURSDAY, AND SATURDAY 12 tablet 5    losartan (COZAAR) 100 MG tablet Take 1 tablet (100 mg total) by mouth once daily. 30 tablet 5    rosuvastatin (CRESTOR) 40 MG Tab TAKE ONE TABLET BY MOUTH EVERY DAY FOR CHOLESTEROL 90 tablet 3    [] sodium,potassium,mag sulfates (SUPREP BOWEL PREP KIT) 17.5-3.13-1.6 gram SolR Take 354 mLs by mouth once daily. for 2 days 2 kit 0    VIT C/VIT E ACETATE/LUTEIN/MIN (OCUVITE LUTEIN ORAL) Take 1 tablet by mouth once daily. Hold until after surgery         Review of patient's allergies indicates:   Allergen Reactions    Aspirin        Contraindicated withHistory of GI bleed    Nsaids (non-steroidal anti-inflammatory drug)        Contraindicated withHistory of GI bleed       ROS - negative except for otherwise stated in HPI    Objective:     Vitals:    25 0832   BP: (!) 147/63   Pulse:    Resp:    Temp:        Physical Exam:  Constitutional:  not in acute distress and well developed  HENT: Head: Normal, normocephalic.  Eyes: conjunctiva clear and sclera nonicteric  GI: soft, non-tender, without masses or organomegaly  Skin: normal color on visualized skin  Neurological: alert, oriented       Significant Labs:  Recent Labs   Lab 25  0742   HGB 9.4*       Lab Results    Component Value Date    WBC 6.46 02/08/2025    HGB 9.4 (L) 02/08/2025    HCT 30.5 (L) 02/08/2025    MCV 96 02/08/2025     02/08/2025       Lab Results   Component Value Date     02/08/2025    K 4.2 02/08/2025     02/08/2025    CO2 21 (L) 02/08/2025    BUN 22 02/08/2025    CREATININE 0.8 02/08/2025    CALCIUM 8.8 02/08/2025    ANIONGAP 11 02/08/2025    ESTGFRAFRICA >60.0 06/06/2022    EGFRNONAA >60.0 06/06/2022       Lab Results   Component Value Date    ALT 5 (L) 02/08/2025    AST 16 02/08/2025    GGT 57 (H) 06/11/2020    ALKPHOS 45 02/08/2025    BILITOT 0.2 02/08/2025       Lab Results   Component Value Date    INR 1.0 12/07/2024    INR 0.9 03/17/2020    INR 1.0 03/02/2020       Significant Imaging:  Reviewed pertinent radiology findings.       Assessment/Plan:     Nydia Stevens is a 89 y.o. female with history of HTN, HLD, and colon cancer s/p right hemicolectomy 3/2020 (declined adjuvant chemotherapy) and ileocecectomy 4/2021 for lesion at ileocolic anastomosis,  presenting to the ED for rectal bleeding.  Similar presentation in December, attempted colonoscopy but inadequate prep.  Patient is scheduled to have outpatient colonoscopy with [facility], now presented to the ED, GI consulted for rectal bleeding.    Problem List:  Hematochezia  Colon cancer s/p R hemicolectomy 3/2020 and ileocecectomy 4/2021    Hemoglobin stable, higher this admission than it was previous admission for rectal bleeding in December.  Will plan for extended prep this weekend for colonoscopy on Monday.     Recommendations:  - Plan for colonoscopy Monday  - Clear liquid diet today and tomorrow, NPO at midnight Sunday night  - Golytely prep to start at today, will have additional prep ordered tomorrow  - Monitor Hgb qdaily   - Transfuse to keep Hgb >7, plts >50  - Hold anticoagulants if safe to do so per primary team  - If becomes hemodynamically unstable with associated large volume hematochezia, recommend STAT CTA and  IR embolization if positive      Thank you for involving us in the care of Nydia Stevens. Please call with any additional questions, concerns or changes in the patient's clinical status.     Tammy Persaud MD  Gastroenterology Fellow PGY IV  [facility]

## 2025-02-08 NOTE — ED NOTES
Assumed care for pt after recieving report from nightshift RN. Pt. resting in bed. Pt. offered bathroom assistance and denies need at this time. Explanation of care/wait provided. Pt verbalizes no needs at this time. Bed in low, locked position with rails up and call bell in reach. Pt's white board updated with today's care team and plan.

## 2025-02-08 NOTE — SUBJECTIVE & OBJECTIVE
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    Hyperlipidemia     Hypertension     Osteoporosis        Past Surgical History:   Procedure Laterality Date    CATARACT EXTRACTION W/  INTRAOCULAR LENS IMPLANT Left 8/11/14    [facility]    CATARACT EXTRACTION W/  INTRAOCULAR LENS IMPLANT Right 09/15/14    [facility]    COLONOSCOPY N/A 3/19/2020    Procedure: COLONOSCOPY;  Surgeon: Real Mabry MD;  Location: [facility] ENDO (2ND FLR);  Service: Endoscopy;  Laterality: N/A;    COLONOSCOPY N/A 3/23/2021    Procedure: COLONOSCOPY;  Surgeon: AUTUMN Berg MD;  Location: [facility] ENDO (4TH FLR);  Service: Endoscopy;  Laterality: N/A;  covid test 3/20 [facility]    COLONOSCOPY N/A 2/15/2022    Procedure: COLONOSCOPY;  Surgeon: Doris Simpson MD;  Location: [facility] ENDO (4TH FLR);  Service: Endoscopy;  Laterality: N/A;  COVID test on 2/12/22 at [facility]  2/14/22 - Pt confirmed 0640 arrival, prep instructions reviewed, Pt verbalized understanding-[id number]    COLONOSCOPY N/A 12/9/2024    Procedure: COLONOSCOPY;  Surgeon: Vitaly Virgen MD;  Location: [facility] ENDO (2ND FLR);  Service: Gastroenterology;  Laterality: N/A;    ESOPHAGOGASTRODUODENOSCOPY N/A 3/19/2020    Procedure: EGD (ESOPHAGOGASTRODUODENOSCOPY);  Surgeon: Real Mabry MD;  Location: [facility] (2ND FLR);  Service: Endoscopy;  Laterality: N/A;    ESOPHAGOGASTRODUODENOSCOPY N/A 12/9/2024    Procedure: EGD (ESOPHAGOGASTRODUODENOSCOPY);  Surgeon: Vitaly Virgen MD;  Location: [facility] ENDO (2ND FLR);  Service: Gastroenterology;  Laterality: N/A;    FOOT SURGERY      left    HYSTERECTOMY  1962    ILEOCOLECTOMY N/A 4/15/2021    Procedure: ILEOCOLECTOMY;  Surgeon: AUTUMN Berg MD;  Location: [facility] OR 2ND FLR;  Service: Colon and Rectal;  Laterality: N/A;    INSERTION OF TUNNELED CENTRAL VENOUS CATHETER (CVC) WITH  SUBCUTANEOUS PORT N/A 2021    Procedure: [id number];  Surgeon: Suresh Agrawal MD;  Location: [facility] OR 2ND FLR;  Service: Vascular;  Laterality: N/A;  Right IJ    LAPAROSCOPIC HEMICOLECTOMY Right 3/20/2020    Procedure: HEMICOLECTOMY, RIGHT;  Surgeon: AUTUMN Berg MD;  Location: [facility] OR 2ND FLR;  Service: Colon and Rectal;  Laterality: Right;    LYSIS OF ADHESIONS N/A 4/15/2021    Procedure: LYSIS, ADHESIONS;  Surgeon: AUTUMN Berg MD;  Location: [facility] OR 2ND FLR;  Service: Colon and Rectal;  Laterality: N/A;  Greater than 2 hours    MOBILIZATION OF SPLENIC FLEXURE N/A 4/15/2021    Procedure: MOBILIZATION, SPLENIC FLEXURE;  Surgeon: AUTUMN Berg MD;  Location: [facility] OR 2ND FLR;  Service: Colon and Rectal;  Laterality: N/A;       Review of patient's allergies indicates:   Allergen Reactions    Aspirin        Contraindicated withHistory of GI bleed    Nsaids (non-steroidal anti-inflammatory drug)        Contraindicated withHistory of GI bleed       Current Facility-Administered Medications on File Prior to Encounter   Medication    gabapentin capsule 300 mg     Current Outpatient Medications on File Prior to Encounter   Medication Sig    amLODIPine (NORVASC) 10 MG tablet Take 1 tablet (10 mg total) by mouth once daily.    calcium carbonate (OS-JAILYN) 500 mg calcium (1,250 mg) tablet Take 1 tablet (500 mg total) by mouth once daily.    cholecalciferol, vitamin D3, (VITAMIN D3) 25 mcg (1,000 unit) capsule Take 1,000 Units by mouth once daily. Hold until after surgery    cyanocobalamin (VITAMIN B-12) 1000 MCG tablet Take 1 tablet (1,000 mcg total) by mouth once daily. Hold until after surgery    FEROSUL 325 mg (65 mg iron) Tab tablet TAKE ONE TABLET BY MOUTH ON TUESDAY, THURSDAY, AND SATURDAY    losartan (COZAAR) 100 MG tablet Take 1 tablet (100 mg total) by mouth once daily.    rosuvastatin (CRESTOR) 40 MG Tab TAKE ONE TABLET BY MOUTH EVERY DAY FOR CHOLESTEROL    [] sodium,potassium,mag  sulfates (SUPREP BOWEL PREP KIT) 17.5-3.13-1.6 gram SolR Take 354 mLs by mouth once daily. for 2 days    VIT C/VIT E ACETATE/LUTEIN/MIN (OCUVITE LUTEIN ORAL) Take 1 tablet by mouth once daily. Hold until after surgery     Family History       Problem Relation (Age of Onset)    Cataracts Brother, Sister, Sister    Diabetes Brother, Sister    Heart attack Father, Brother    No Known Problems Mother, Maternal Aunt, Maternal Uncle, Paternal Aunt, Paternal Uncle, Maternal Grandmother, Maternal Grandfather, Paternal Grandmother, Paternal Grandfather    Stroke Sister, Sister          Tobacco Use    Smoking status: Never    Smokeless tobacco: Never   Substance and Sexual Activity    Alcohol use: No     Alcohol/week: 0.0 standard drinks of alcohol    Drug use: No    Sexual activity: Never     Review of Systems   Constitutional:  Positive for activity change. Negative for chills and fever.   HENT:  Negative for trouble swallowing.    Eyes:  Negative for photophobia and visual disturbance.   Respiratory:  Negative for cough, chest tightness and shortness of breath.    Cardiovascular:  Negative for chest pain, palpitations and leg swelling.   Gastrointestinal:  Positive for blood in stool. Negative for abdominal pain, constipation, diarrhea, nausea and vomiting.   Genitourinary:  Negative for dysuria, frequency and hematuria.   Musculoskeletal:  Positive for arthralgias. Negative for back pain, gait problem and neck pain.   Skin:  Negative for rash and wound.   Neurological:  Positive for weakness and light-headedness. Negative for dizziness, syncope and speech difficulty.   Psychiatric/Behavioral:  Negative for agitation and confusion. The patient is not nervous/anxious.      Objective:     Vital Signs (Most Recent):  Temp: 97.8 °F (36.6 °C) (02/08/25 0827)  Pulse: 77 (02/08/25 1159)  Resp: 17 (02/08/25 1159)  BP: (!) 163/74 (02/08/25 1159)  SpO2: 100 % (02/08/25 1159) Vital Signs (24h Range):  Temp:  [97.1 °F (36.2 °C)-97.8  °F (36.6 °C)] 97.8 °F (36.6 °C)  Pulse:  [] 77  Resp:  [13-26] 17  SpO2:  [99 %-100 %] 100 %  BP: (114-176)/(55-74) 163/74     Weight: 49.9 kg (110 lb)  Body mass index is 17.23 kg/m².     Physical Exam  Vitals and nursing note reviewed.   Constitutional:       General: She is not in acute distress.     Appearance: She is cachectic. She is ill-appearing (chronically).   HENT:      Head: Normocephalic and atraumatic.      Mouth/Throat:      Mouth: Mucous membranes are dry.   Eyes:      Extraocular Movements: Extraocular movements intact.      Conjunctiva/sclera: Conjunctivae normal.   Cardiovascular:      Rate and Rhythm: Normal rate and regular rhythm.      Heart sounds: Murmur heard.   Pulmonary:      Effort: Pulmonary effort is normal. No respiratory distress.      Breath sounds: Normal breath sounds. No wheezing.   Abdominal:      General: Bowel sounds are normal. There is no distension.      Palpations: Abdomen is soft.      Tenderness: There is no abdominal tenderness.   Musculoskeletal:         General: No tenderness. Normal range of motion.      Cervical back: Normal range of motion and neck supple.      Comments: Dried blood down bilateral lower extremities   Skin:     General: Skin is warm and dry.      Capillary Refill: Capillary refill takes 2 to 3 seconds.      Findings: No rash.   Neurological:      Mental Status: She is alert and oriented to person, place, and time. Mental status is at baseline.   Psychiatric:         Behavior: Behavior normal.         Thought Content: Thought content normal.         Judgment: Judgment normal.                Significant Labs: All pertinent labs within the past 24 hours have been reviewed.  CBC:   Recent Labs   Lab 02/08/25  0742   WBC 6.46   HGB 9.4*   HCT 30.5*          Significant Imaging: I have reviewed all pertinent imaging results/findings within the past 24 hours.  CTA Acute GI Bleed, Abdomen and Pelvis  Narrative: EXAMINATION:  CTA ACUTE GI BLEED,  ABDOMEN AND PELVIS    CLINICAL HISTORY:  GI bleed;gi bleed;    TECHNIQUE:  Contiguous 2.5-mm axial images were obtained through the abdomen and pelvis following the administration of 75 cc of intravenous Omnipaque 350.  Sagittal and coronal reformats and maximum intensity projections were also submitted.    COMPARISON:  CTA of the abdomen and pelvis performed 12/08/2024.    FINDINGS:  BOWEL AND PERITONEUM    Bowel: Redemonstrated operative sequela partial colectomy with anastomosis again seen in the abdomen.  No evidence of bowel obstruction.    Intraluminal intestinal hemorrhage: None.    Free air or fluid: None.    VASCULATURE    Visceral arteries: Celiac, superior mesenteric, renal, and inferior mesenteric arteries appear grossly patent.  Moderate narrowing at the celiac axis origin.  Moderate narrowing at the left renal artery origin.  Short segment moderate to high-grade narrowing at the OSCAR origin.    Abdominal aorta and iliac arteries: Nonaneurysmal.  Heavy atherosclerosis.    Mesenteric and portal veins: Normally patent.    Inferior vena cava: Normally patent.    ABDOMEN/PELVIS    Lower chest: Heavy atherosclerotic calcifications of the coronary arteries.  Dense mitral annular and aortic valvular calcifications.  9 mm solid nodule right lower lobe (series 2, image 10).  This appears essentially unchanged dating back to at least 02/26/2021 CT of the chest, and as such is felt most likely benign.  Atelectasis and/or scar noted elsewhere at the visualized lung bases.    Liver: Normal contour.  Suggested calcified granuloma.    Gallbladder/bile ducts: Cholelithiasis.  No definite pericholecystic inflammation or biliary ductal dilatation.    Pancreas: Unremarkable.    Spleen: Unremarkable.    Adrenals: Unremarkable.    Kidneys: Unremarkable.    Lymph nodes: No abdominal or pelvic lymphadenopathy.    Pelvis: Unremarkable.    Urinary bladder: Unremarkable.    Body wall: Relatively similar appearance of rectus  diastasis with ventral hernia containing loops of nonobstructed, noninflamed small bowel compared to prior CT imaging of 12/08/2024.    Bones: No definite acute change.  Query osseous demineralization.  Remote fracture of the left inferior pubic ramus.  Remote operative sequela of left hip.  Impression: 1. No intraluminal intestinal, intraperitoneal, or retroperitoneal hemorrhage.  2. No definite acute findings identified in the abdomen or pelvis.  3. Additional details of chronic and incidental findings, as provided in the body of the report.    Electronically signed by: Hong Willis  Date:    02/08/2025  Time:    08:17  CT Cervical Spine Without Contrast  Narrative: EXAMINATION:  CT HEAD WITHOUT CONTRAST; CT CERVICAL SPINE WITHOUT CONTRAST    CLINICAL HISTORY:  Head trauma, minor (Age >= 65y);; Neck trauma (Age >= 65y);    TECHNIQUE:  Low dose axial images were obtained through the head and cervical spine.  Coronal and sagittal reformations were also performed. Contrast was not administered.    COMPARISON:  CT head and cervical spine 12/21/2023.    FINDINGS:  Head:    Blood: No acute intracranial hemorrhage.    Parenchyma: No definite loss of gray-white differentiation to suggest acute or subacute transcortical infarct. 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  Generalized pattern of age-related parenchymal volume loss is noted elsewhere.  Nonspecific areas of white matter hypoattenuation may reflect sequela of chronic small vessel ischemic disease.    Ventricles/Extra-axial spaces: No abnormal extra-axial fluid collection. Basal cisterns are patent.    Vessels: Moderate atherosclerotic calcification.    Orbits: Status post bilateral lens replacements.    Scalp: Posterior scalp hematoma.    Skull: There are no depressed skull fractures or destructive bone lesions.  Stable nonaggressive appearing sclerotic lesion within the basisphenoid.    Sinuses and mastoids: Scattered minimal  paranasal sinus mucosal thickening with prominent retention cysts within the alveolar recess of the right maxillary sinus.    Other findings: None    Cervical spine:    Fractures: No acute fractures    Alignment: There is no significant vertebral subluxation  Atlanto-axial and atlanto-occipital joints: Atlanto-axial and atlanto-occipital intervals are not widened.  Facet joints: There is no traumatic facet joint widening.  Vertebral bodies: Suspect osseous demineralization.  Relatively modest degenerative endplate change.  Discs: Degenerative disc disease.  Spinal canal and foraminal narrowing: Although CT does not optimally evaluate the soft tissue contents of the spinal canal and foramina, no critical stenosis is suggested.    At C4-5, mild-to-moderate central spinal canal stenosis and moderate right foraminal narrowing new at C5-6, mild central spinal canal stenosis    Paraspinal soft tissues: Right chest port.    Upper Lungs:Clear  Impression: 1. No acute intracranial findings.  2. No acute cervical spine fracture.    Electronically signed by: Hong Willis  Date:    02/08/2025  Time:    07:57  CT Head Without Contrast  Narrative: EXAMINATION:  CT HEAD WITHOUT CONTRAST; CT CERVICAL SPINE WITHOUT CONTRAST    CLINICAL HISTORY:  Head trauma, minor (Age >= 65y);; Neck trauma (Age >= 65y);    TECHNIQUE:  Low dose axial images were obtained through the head and cervical spine.  Coronal and sagittal reformations were also performed. Contrast was not administered.    COMPARISON:  CT head and cervical spine 12/21/2023.    FINDINGS:  Head:    Blood: No acute intracranial hemorrhage.    Parenchyma: No definite loss of gray-white differentiation to suggest acute or subacute transcortical infarct. 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  Generalized pattern of age-related parenchymal volume loss is noted elsewhere.  Nonspecific areas of white matter hypoattenuation may reflect sequela of  chronic small vessel ischemic disease.    Ventricles/Extra-axial spaces: No abnormal extra-axial fluid collection. Basal cisterns are patent.    Vessels: Moderate atherosclerotic calcification.    Orbits: Status post bilateral lens replacements.    Scalp: Posterior scalp hematoma.    Skull: There are no depressed skull fractures or destructive bone lesions.  Stable nonaggressive appearing sclerotic lesion within the basisphenoid.    Sinuses and mastoids: Scattered minimal paranasal sinus mucosal thickening with prominent retention cysts within the alveolar recess of the right maxillary sinus.    Other findings: None    Cervical spine:    Fractures: No acute fractures    Alignment: There is no significant vertebral subluxation  Atlanto-axial and atlanto-occipital joints: Atlanto-axial and atlanto-occipital intervals are not widened.  Facet joints: There is no traumatic facet joint widening.  Vertebral bodies: Suspect osseous demineralization.  Relatively modest degenerative endplate change.  Discs: Degenerative disc disease.  Spinal canal and foraminal narrowing: Although CT does not optimally evaluate the soft tissue contents of the spinal canal and foramina, no critical stenosis is suggested.    At C4-5, mild-to-moderate central spinal canal stenosis and moderate right foraminal narrowing new at C5-6, mild central spinal canal stenosis    Paraspinal soft tissues: Right chest port.    Upper Lungs:Clear  Impression: 1. No acute intracranial findings.  2. No acute cervical spine fracture.    Electronically signed by: Hong Willis  Date:    02/08/2025  Time:    07:57

## 2025-02-09 PROBLEM — D62 ACUTE BLOOD LOSS ANEMIA: Status: ACTIVE | Noted: 2020-03-02

## 2025-02-09 LAB
ANION GAP SERPL CALC-SCNC: 9 MMOL/L (ref 8–16)
BASOPHILS # BLD AUTO: 0.05 K/UL (ref 0–0.2)
BASOPHILS NFR BLD: 0.6 % (ref 0–1.9)
BUN SERPL-MCNC: 16 MG/DL (ref 8–23)
CALCIUM SERPL-MCNC: 8.5 MG/DL (ref 8.7–10.5)
CHLORIDE SERPL-SCNC: 103 MMOL/L (ref 95–110)
CO2 SERPL-SCNC: 23 MMOL/L (ref 23–29)
CREAT SERPL-MCNC: 0.7 MG/DL (ref 0.5–1.4)
DIFFERENTIAL METHOD BLD: ABNORMAL
EOSINOPHIL # BLD AUTO: 0.1 K/UL (ref 0–0.5)
EOSINOPHIL NFR BLD: 1.4 % (ref 0–8)
ERYTHROCYTE [DISTWIDTH] IN BLOOD BY AUTOMATED COUNT: 14.2 % (ref 11.5–14.5)
EST. GFR  (NO RACE VARIABLE): >60 ML/MIN/1.73 M^2
GLUCOSE SERPL-MCNC: 91 MG/DL (ref 70–110)
HCT VFR BLD AUTO: 27 % (ref 37–48.5)
HGB BLD-MCNC: 8.6 G/DL (ref 12–16)
IMM GRANULOCYTES # BLD AUTO: 0.03 K/UL (ref 0–0.04)
IMM GRANULOCYTES NFR BLD AUTO: 0.4 % (ref 0–0.5)
LYMPHOCYTES # BLD AUTO: 0.8 K/UL (ref 1–4.8)
LYMPHOCYTES NFR BLD: 10.1 % (ref 18–48)
MAGNESIUM SERPL-MCNC: 1.7 MG/DL (ref 1.6–2.6)
MCH RBC QN AUTO: 29.7 PG (ref 27–31)
MCHC RBC AUTO-ENTMCNC: 31.9 G/DL (ref 32–36)
MCV RBC AUTO: 93 FL (ref 82–98)
MONOCYTES # BLD AUTO: 0.8 K/UL (ref 0.3–1)
MONOCYTES NFR BLD: 10.1 % (ref 4–15)
NEUTROPHILS # BLD AUTO: 6.1 K/UL (ref 1.8–7.7)
NEUTROPHILS NFR BLD: 77.4 % (ref 38–73)
NRBC BLD-RTO: 0 /100 WBC
PHOSPHATE SERPL-MCNC: 3.6 MG/DL (ref 2.7–4.5)
PLATELET # BLD AUTO: 206 K/UL (ref 150–450)
PMV BLD AUTO: 9.6 FL (ref 9.2–12.9)
POTASSIUM SERPL-SCNC: 4.1 MMOL/L (ref 3.5–5.1)
RBC # BLD AUTO: 2.9 M/UL (ref 4–5.4)
SODIUM SERPL-SCNC: 135 MMOL/L (ref 136–145)
WBC # BLD AUTO: 7.93 K/UL (ref 3.9–12.7)

## 2025-02-09 PROCEDURE — 85025 COMPLETE CBC W/AUTO DIFF WBC: CPT

## 2025-02-09 PROCEDURE — 80048 BASIC METABOLIC PNL TOTAL CA: CPT

## 2025-02-09 PROCEDURE — 25000003 PHARM REV CODE 250

## 2025-02-09 PROCEDURE — 97530 THERAPEUTIC ACTIVITIES: CPT

## 2025-02-09 PROCEDURE — 83735 ASSAY OF MAGNESIUM: CPT

## 2025-02-09 PROCEDURE — 97162 PT EVAL MOD COMPLEX 30 MIN: CPT

## 2025-02-09 PROCEDURE — 21400001 HC TELEMETRY ROOM

## 2025-02-09 PROCEDURE — 36415 COLL VENOUS BLD VENIPUNCTURE: CPT

## 2025-02-09 PROCEDURE — 63600175 PHARM REV CODE 636 W HCPCS

## 2025-02-09 PROCEDURE — 84100 ASSAY OF PHOSPHORUS: CPT

## 2025-02-09 RX ORDER — SCOPOLAMINE 1 MG/3D
1 PATCH, EXTENDED RELEASE TRANSDERMAL
Status: DISCONTINUED | OUTPATIENT
Start: 2025-02-09 | End: 2025-02-11 | Stop reason: HOSPADM

## 2025-02-09 RX ORDER — SCOPOLAMINE 1 MG/3D
1 PATCH, EXTENDED RELEASE TRANSDERMAL
Status: DISCONTINUED | OUTPATIENT
Start: 2025-02-09 | End: 2025-02-09

## 2025-02-09 RX ORDER — ONDANSETRON HYDROCHLORIDE 2 MG/ML
4 INJECTION, SOLUTION INTRAVENOUS ONCE
Status: COMPLETED | OUTPATIENT
Start: 2025-02-09 | End: 2025-02-09

## 2025-02-09 RX ORDER — POLYETHYLENE GLYCOL 3350, SODIUM SULFATE ANHYDROUS, SODIUM BICARBONATE, SODIUM CHLORIDE, POTASSIUM CHLORIDE 236; 22.74; 6.74; 5.86; 2.97 G/4L; G/4L; G/4L; G/4L; G/4L
4000 POWDER, FOR SOLUTION ORAL ONCE
Status: COMPLETED | OUTPATIENT
Start: 2025-02-09 | End: 2025-02-09

## 2025-02-09 RX ADMIN — PANTOPRAZOLE SODIUM 40 MG: 40 INJECTION, POWDER, LYOPHILIZED, FOR SOLUTION INTRAVENOUS at 07:02

## 2025-02-09 RX ADMIN — AMLODIPINE BESYLATE 10 MG: 10 TABLET ORAL at 09:02

## 2025-02-09 RX ADMIN — ONDANSETRON 4 MG: 2 INJECTION INTRAMUSCULAR; INTRAVENOUS at 11:02

## 2025-02-09 RX ADMIN — ACETAMINOPHEN 650 MG: 325 TABLET ORAL at 04:02

## 2025-02-09 RX ADMIN — CHOLECALCIFEROL TAB 25 MCG (1000 UNIT) 1000 UNITS: 25 TAB at 09:02

## 2025-02-09 RX ADMIN — THERA TABS 1 TABLET: TAB at 09:02

## 2025-02-09 RX ADMIN — CYANOCOBALAMIN TAB 1000 MCG 1000 MCG: 1000 TAB at 09:02

## 2025-02-09 RX ADMIN — ATORVASTATIN CALCIUM 80 MG: 40 TABLET, FILM COATED ORAL at 09:02

## 2025-02-09 RX ADMIN — PANTOPRAZOLE SODIUM 40 MG: 40 INJECTION, POWDER, LYOPHILIZED, FOR SOLUTION INTRAVENOUS at 09:02

## 2025-02-09 RX ADMIN — CALCIUM CARBONATE (ANTACID) CHEW TAB 500 MG 1000 MG: 500 CHEW TAB at 09:02

## 2025-02-09 RX ADMIN — POLYETHYLENE GLYCOL 3350, SODIUM SULFATE ANHYDROUS, SODIUM BICARBONATE, SODIUM CHLORIDE, POTASSIUM CHLORIDE 4000 ML: 236; 22.74; 6.74; 5.86; 2.97 POWDER, FOR SOLUTION ORAL at 12:02

## 2025-02-09 RX ADMIN — LOSARTAN POTASSIUM 100 MG: 50 TABLET, FILM COATED ORAL at 09:02

## 2025-02-09 RX ADMIN — SCOPOLAMINE 1 PATCH: 1.5 PATCH, EXTENDED RELEASE TRANSDERMAL at 12:02

## 2025-02-09 NOTE — SUBJECTIVE & OBJECTIVE
Interval History: Pt seen and examined by me this morning. VSSAF. Per nursing, the patient drank ~ 2,000ml of golytely last night before having one episode of NBNB emesis. Pt to complete another jug of golytely today. Plan to pre-treat with scopolamine patch and one dose of IV zofran this afternoon. PRN antiemetics also in place. Pt continues to have dark red, liquid stools with visible clots. No new complaints.     Review of Systems   Constitutional:  Positive for activity change. Negative for chills and fever.   HENT:  Negative for trouble swallowing.    Eyes:  Negative for photophobia and visual disturbance.   Respiratory:  Negative for cough, chest tightness and shortness of breath.    Cardiovascular:  Negative for chest pain, palpitations and leg swelling.   Gastrointestinal:  Positive for blood in stool. Negative for abdominal pain, constipation, diarrhea, nausea and vomiting.   Genitourinary:  Negative for dysuria, frequency and hematuria.   Musculoskeletal:  Positive for arthralgias. Negative for back pain, gait problem and neck pain.   Skin:  Negative for rash and wound.   Neurological:  Positive for weakness and light-headedness. Negative for dizziness, syncope and speech difficulty.   Psychiatric/Behavioral:  Negative for agitation and confusion. The patient is not nervous/anxious.      Objective:     Vital Signs (Most Recent):  Temp: 98.4 °F (36.9 °C) (02/09/25 1127)  Pulse: 97 (02/09/25 1127)  Resp: 20 (02/09/25 1127)  BP: 127/60 (02/09/25 1127)  SpO2: 99 % (02/09/25 1127) Vital Signs (24h Range):  Temp:  [97.4 °F (36.3 °C)-98.6 °F (37 °C)] 98.4 °F (36.9 °C)  Pulse:  [68-97] 97  Resp:  [16-24] 20  SpO2:  [95 %-100 %] 99 %  BP: (120-168)/(58-72) 127/60     Weight: 51.8 kg (114 lb 3.2 oz)  Body mass index is 17.89 kg/m².  No intake or output data in the 24 hours ending 02/09/25 1212      Physical Exam  Vitals and nursing note reviewed.   Constitutional:       General: She is not in acute distress.      Appearance: She is cachectic. She is ill-appearing (chronically).   HENT:      Head: Normocephalic and atraumatic.      Mouth/Throat:      Mouth: Mucous membranes are moist.   Eyes:      Extraocular Movements: Extraocular movements intact.      Conjunctiva/sclera: Conjunctivae normal.   Cardiovascular:      Rate and Rhythm: Normal rate and regular rhythm.      Heart sounds: Murmur heard.   Pulmonary:      Effort: Pulmonary effort is normal. No respiratory distress.      Breath sounds: Normal breath sounds. No wheezing.   Abdominal:      General: Bowel sounds are normal. There is no distension.      Palpations: Abdomen is soft.      Tenderness: There is no abdominal tenderness.   Musculoskeletal:         General: No tenderness. Normal range of motion.      Cervical back: Normal range of motion and neck supple.   Skin:     General: Skin is warm and dry.      Capillary Refill: Capillary refill takes less than 2 seconds.      Findings: No rash.   Neurological:      Mental Status: She is alert and oriented to person, place, and time. Mental status is at baseline.   Psychiatric:         Behavior: Behavior normal.         Thought Content: Thought content normal.         Judgment: Judgment normal.             Significant Labs: All pertinent labs within the past 24 hours have been reviewed.  CBC:   Recent Labs   Lab 02/08/25  0742 02/09/25  0337   WBC 6.46 7.93   HGB 9.4* 8.6*   HCT 30.5* 27.0*    206       Significant Imaging: I have reviewed all pertinent imaging results/findings within the past 24 hours.

## 2025-02-09 NOTE — ACP (ADVANCE CARE PLANNING)
Advance Care Planning     Date: 02/08/2025    Today a voluntary meeting took place: bedside    Patient Participation: Patient is able to participate    Staff attendees (Name and  Role): Chitra Mata PA-C    ACP Conversation (General): Understanding of current condition      Code Status  I engaged the patient in a conversation about the patient's preferences for care at the very end of life. The patient wishes to have CPR and other invasive treatments performed when their heart and/or breathing stops. I communicated to the patient that a Full Code status would be placed in their chart. I spent a total of 16 minutes engaging the patient in this advance care planning discussion.    ACP Documents: Reviewed current existing forms    Goals of care: The patient endorses that what is most important right now is to focus on symptom/pain control and extending life as long as possible, even it it means sacrificing quality    Accordingly, we have decided that the best plan to meet the patient's goals includes continuing with treatment      Length of ACP   conversation in minutes: 16 minutes

## 2025-02-09 NOTE — ED NOTES
Telemetry Verification   Patient placed on Telemetry Box  Verified with War Room  Box # [id number]   Monitor Tech War Room   Rate    Rhythm

## 2025-02-09 NOTE — PT/OT/SLP EVAL
Physical Therapy Evaluation    Patient Name:  Nydia Stevens   MRN:  [id number]    Recommendations:     Discharge Recommendations: Moderate Intensity Therapy   Discharge Equipment Recommendations: bedside commode,   Barriers to discharge:  Increased skilled assistance required    Assessment:     Nydia Stevens is a 89 y.o. female admitted with a medical diagnosis of Hematochezia. Patient received in bed, with HOB elevated, and agreeable to evaluation this date. She demonstrated fair response to completed activities and provided education. Patient globally requiring Goldy for all bed mobility this date, with increased time for task completion. CGA provided for EOB sitting balance. Patient performed sit<>stand, with multiple attempts prior to completion of transfer with Goldy required. Patient only able to sustain standing position for ~5sec prior to self-returning to sitting position. Of note, patient not able to achieve full upright position, anticipated to be related to patient concern for possible episode of bowel incontinence due to patient being on bowel prep. Due to poor standing today, gait assessment not performed. Patient then returned back to bed; Writing therapist assisted RN (Mai) in [facility] & linen change. Based upon information gained, PT recommends moderate intensity skilled physical therapy services post-acutely. Provided recommendation based upon needed intensity to not only directly address patient's previously listed functional impairments, discrepancy between functional baseline & current mobility status, and increased falls risk + positive recent history in falls, but to also positively impact patient's quality of life & intervene on high risk for caregiver burnout. Performance deficits impacting function include weakness, impaired endurance, impaired functional mobility, impaired balance, gait instability, decreased lower extremity function.    Rehab Prognosis: Good; patient would  "benefit from acute skilled PT services to address these deficits and reach maximum level of function.    Recent Surgery: * No surgery found *      Plan:     During this hospitalization, patient to be seen 4 x/week to address the identified rehab impairments via gait training, therapeutic activities, therapeutic exercises, neuromuscular re-education and progress toward the following goals:    Plan of Care Expires:  03/09/25    Subjective     Chief Complaint: "I can't do this, not Today. I'm sorry.  Patient/Family Comments/goals: "Oh Alec."  Pain/Comfort:  Pain Rating 1: Unrated  Pain Addressed 1: Reposition, Distraction  Pain Rating Post-Intervention 1: Unrated    Patients cultural, spiritual, Jew conflicts given the current situation: no    Social History:  Residence: Patient  in a 2nd floor apartment at the [facility]  with elevator access. Patient's bathroom has a WIS.  Equipment Owned: walker, rolling, rollator, wheelchair, shower chair  Prior level of function:  Prior to admission, patient was modified independent for ambulation, utilizing her rollator. Patient was independent for all other functional mobility.  Assistance Upon Discharge:  Nuns & RNs    Objective:     Communicated with RN prior to session.  Patient found HOB elevated with telemetry, PureWick  upon PT entry to room.    General Precautions: Standard, fall   Orthopedic Precautions:N/A   Braces: N/A   Body mass index is 17.89 kg/m².  Oxygen Device: Room Air  Vitals: BP (!) 107/50 (BP Location: Left arm, Patient Position: Lying)   Pulse 97   Temp 97.8 °F (36.6 °C) (Oral)   Resp 16   Ht 5' 7" (1.702 m)   Wt 51.8 kg (114 lb 3.2 oz)   SpO2 99%   BMI 17.89 kg/m²     Exams:  Cognition:   Alert and Cooperative   Patient is oriented to Person, Place, Time, Situation  Command following: Follows two-step verbal commands  Fluency: clear/fluent  Postural Assessment: rounded shoulders and forward head  Physical Exam:    Left LE Right LE   Edema " absent absent   Sensation intact to light touch intact to light touch     LLE ROM: WFL  RLE ROM: WFL    LLE Strength (out of 5):   Hip Flexion:3: Holds test position  Hip Abduction:3+: Holds test position against SLIGHT pressure  Hip Adduction:3+: Holds test position against SLIGHT pressure  Knee Extension:4-: Holds test position against SLIGHT to MODERATE pressure  Ankle Dorsiflexion:4-: Holds test position against SLIGHT to MODERATE pressure    RLE Strength (out of 5):   Hip Flexion:3: Holds test position  Hip Abduction:3+: Holds test position against SLIGHT pressure  Hip Adduction:3+: Holds test position against SLIGHT pressure  Knee Extension:4-: Holds test position against SLIGHT to MODERATE pressure  Ankle Dorsiflexion:4-: Holds test position against SLIGHT to MODERATE pressure    Functional Mobility:    Bed Mobility:     Rolling Left: Minimal Assistance  Rolling Right: Minimal Assistance  EOB Scooting: Anterior: Contact Guard Assistance  Supine>Sit: Minimal Assistance with HOB Elevated  Sit>Supine: Minimal Assistance with HOB Elevated  *VC/TC for task sequencing  *Facilitation of trunk/LE management    Transfers:     Sit<>Stand: Minimal Assistance from Edge of Bed with Rolling Walker  *VC/TC for upright posturing, UE placement while utilizing AD  *Facilitation of trunk/LE extension    Gait: Deferred    Balance:   Static Sitting: Contact Guard Assistance  Dynamic Sitting: Contact Guard Assistance    Static Standing: Minimal Assistance  Dynamic Standing: Not assessed  Total Time Standing: ~30sec  *VC/TC for upright posturing, corrective WS  *Facilitation of trunk/hip extension    AM-PAC 6 CLICK MOBILITY  Total Score:14     Treatment & Education:  Patient Education Provided on:  The role of physical therapy and how the patient can benefit from skilled services  The negative effects of prolonged bed rest/sedentary behavior, along with the importance of OOB activity & patient participation with PT  The importance  of contacting RN, via call light, for mobility throughout the day  Pt white board updated with current therapists name and level of mobility assistance needed.     Patient Verbalized understanding of all topics touched on this date. All questions answered within the PT scope of practice    Patient left HOB elevated with all lines intact and call button in reach.    GOALS:   Multidisciplinary Problems       Physical Therapy Goals          Problem: Physical Therapy    Goal Priority Disciplines Outcome Interventions   Physical Therapy Goal     PT, PT/OT Progressing    Description: Goals to be met by: 25     Patient will increase functional independence with mobility by performin. Supine to sit with Set-up [place]  2. Sit to supine with Set-up [place]  3. Rolling to Left and Right with Modified [place].  4. Sit to stand transfer with Stand-by Assistance using LRAD as appropriate  5. Bed to chair transfer with Contact Guard Assistance using LRAD as appropriate  6. Gait  x 20 feet with Contact Guard Assistance using LRAD as appropriate  7. Sitting at edge of bed x10 minutes with [place]  8. Stand for 5 minutes with Stand-by Assistance using LRAD as appropriate  9. Lower extremity exercise program x15-20 reps per handout, with assistance as needed    DME Justifications (see above for complete DME recommendations)    Bedside Commode- Patient has a mobility limitation that significantly impairs their ability to participate in one or more mobility related activities of daily living, including toileting. This deficit can be resolved by using a bedside commode. Patient demonstrates mobility limitations that will cause them to be confined to one room at home without bathroom access for up to 30 days. Using a bedside commode will greatly improve the patient's ability to participate in MRADLs.                          History:     Past Medical History:   Diagnosis Date    Allergy     Cachexia  3/2/2020    Cancer     colon    Cataract     Dementia with behavioral disturbance     Encounter for blood transfusion     Hemolytic anemia 3/18/2020    Hyperlipidemia     Hypertension     Osteoporosis        Past Surgical History:   Procedure Laterality Date    CATARACT EXTRACTION W/  [facility]OC[facility]    [facility]CTION W/  INTRAOCULAR LENS [facility]PLANT [facility]    COLONOSCOPY N/A 3/19/2020    Procedure: COLONOSCOPY;  Surgeon: Real Mabry MD;  Location: [facility] ENDO (2ND FLR);  Service: Endoscopy;  Laterality: N/A;    COLONOSCOPY N/A 3/23/2021    Procedure: COLONOSCOPY;  Surgeon: AUTUMN Berg MD;  Location: [facility] ENDO (4TH FLR);  Service: Endoscopy;  Laterality: N/A;  covid test 3/20 [facility]    COLONOSCOPY N/A 2/15/2022    Procedure: COLONOSCOPY;  Surgeon: Doris Simpson MD;  Location: [facility] ENDO (4TH FLR);  Service: Endoscopy;  Laterality: N/A;  COVID test on 2/12/22 at [facility]  2/14/22 - Pt confirmed 0640 arrival, prep instructions reviewed, Pt verbalized understanding-ERW@0927    COLONOSCOPY N/A 12/9/2024    Procedure: COLONOSCOPY;  Surgeon: Vitaly Virgen MD;  Location: [facility] ENDO (2ND FLR);  Service: Gastroenterology;  Laterality: N/A;    ESOPHAGOGASTRODUODENOSCOPY N/A 3/19/2020    Procedure: EGD (ESOPHAGOGASTRODUODENOSCOPY);  Surgeon: Real Mabry MD;  Location: [facility] ENDO (2ND FLR);  Service: Endoscopy;  Laterality: N/A;    ESOPHAGOGASTRODUODENOSCOPY N/A 12/9/2024    Procedure: EGD (ESOPHAGOGASTRODUODENOSCOPY);  Surgeon: Vitaly Virgen MD;  Location: [facility] ENDO (2ND FLR);  Service: Gastroenterology;  Laterality: N/A;    FOOT SURGERY      left    HYSTERECTOMY  1962    ILEOCOLECTOMY N/A 4/15/2021    Procedure: ILEOCOLECTOMY;  Surgeon: AUTUMN Berg MD;  Location: [facility] OR 2ND FLR;  Service: Colon and Rectal;  Laterality: N/A;    INSERTION OF TUNNELED CENTRAL VENOUS CATHETER (CVC) WITH SUBCUTANEOUS PORT N/A 7/2/2021    Procedure:  [id number];  Surgeon: Suresh Agrawal MD;  Location: [facility] OR 2ND FLR;  Service: Vascular;  Laterality: N/A;  Right IJ    LAPAROSCOPIC HEMICOLECTOMY Right 3/20/2020    Procedure: HEMICOLECTOMY, RIGHT;  Surgeon: AUTUMN Berg MD;  Location: [facility] OR 2ND FLR;  Service: Colon and Rectal;  Laterality: Right;    LYSIS OF ADHESIONS N/A 4/15/2021    Procedure: LYSIS, ADHESIONS;  Surgeon: AUTUMN Berg MD;  Location: [facility] OR 2ND FLR;  Service: Colon and Rectal;  Laterality: N/A;  Greater than 2 hours    MOBILIZATION OF SPLENIC FLEXURE N/A 4/15/2021    Procedure: MOBILIZATION, SPLENIC FLEXURE;  Surgeon: AUTUMN Berg MD;  Location: [facility] OR 2ND FLR;  Service: Colon and Rectal;  Laterality: N/A;       Time Tracking:     PT Received On: 02/09/25  PT Start Time: 1331     PT Stop Time: 1402  PT Total Time (min): 31 min     Billable Minutes: Evaluation 11 and Therapeutic Activity 20 02/09/2025

## 2025-02-09 NOTE — PLAN OF CARE
Problem: Skin Injury Risk Increased  Goal: Skin Health and Integrity  Reactivated     Problem: Gastrointestinal Bleeding  Goal: Optimal Coping with Acute Illness  Reactivated  Goal: Hemostasis  Reactivated     Problem: Adult Inpatient Plan of Care  Goal: Plan of Care Review  Reactivated  Goal: Patient-Specific Goal (Individualized)  Reactivated  Goal: Absence of Hospital-Acquired Illness or Injury  Reactivated  Goal: Optimal Comfort and Wellbeing  Reactivated  Goal: Readiness for Transition of Care  Reactivated     Problem: Fall Injury Risk  Goal: Absence of Fall and Fall-Related Injury  Reactivated     Problem: Infection  Goal: Absence of Infection Signs and Symptoms  Reactivated

## 2025-02-09 NOTE — PLAN OF CARE
[facility] - Observation 11H  Initial Discharge Assessment       Primary Care Provider: Brittney Travis MD    Admission Diagnosis: GI bleed [K92.2]  Chest pain [R07.9]    Admission Date: 2/8/2025  Expected Discharge Date: 2/10/2025    Transition of Care Barriers: None    Resides at [facility]    Payor: MEDICARE / Plan: MEDICARE PART A & B / Product Type: Government /     Extended Emergency Contact Information  Primary Emergency Contact: Brenda Price Phone: [phone]  Relation: Friend  Preferred language: English   needed? No    Discharge Plan A: Home  Discharge Plan B: Home    [facility] - [place], [place] - [address]  [place] [address]  Phone: [phone] Fax: [phone]    [facility] - [place], [place] - [address]  [address]  [place] [address]  Phone: [phone] Fax: [phone]    [facility]  [address]  [facility] [address]  Phone: [phone] Fax: [phone]    Initial Assessment (most recent)       Adult Discharge Assessment - 02/09/25 0935          Discharge Assessment    Assessment Type Discharge Planning Assessment     Confirmed/corrected address, phone number and insurance Yes     Confirmed Demographics Correct on Facesheet     Source of Information patient;health record     When was your last doctors appointment? 01/16/25   PCP Visit    Does patient/caregiver understand observation status Yes     Communicated EUGENIO with patient/caregiver Yes     Reason For Admission Hematochezia     Do you expect to return to your current living situation? Yes     Do you have help at home or someone to help you manage your care at home? Yes     Who are your caregiver(s) and their phone number(s)? Brenda Price,  , [phone]     Prior to hospitilization cognitive status: Unable to Assess     Current cognitive status: Alert/Oriented     Walking or  Climbing Stairs Difficulty yes     Walking or Climbing Stairs ambulation difficulty, requires equipment     Mobility Management Rolling Walker     Equipment Currently Used at Home walker, rolling     Readmission within 30 days? No     Patient currently being followed by outpatient case management? No   discharged    Do you take prescription medications? Yes     Do you have any problems affording any of your prescribed medications? No     Is the patient taking medications as prescribed? yes     Who is going to help you get home at discharge? Sister from [place]     How do you get to doctors appointments? other (see comments)   Sister from [place]    Are you on dialysis? No     Do you take coumadin? No     Discharge Plan A Home     Discharge Plan B Home     DME Needed Upon Discharge  none     Discharge Plan discussed with: Patient     Transition of Care Barriers None                 CM met with patient for discharge planning assessment. Patient identifiers verified and correct for Sister Nydia. Ambulates independently with rolling walker. Not on dialysis, does not take Coumadin, and no hospitalization <30 days. All questions answered to patient's satisfaction.      Discharge Plan A and Plan B have been determined by review of patient's clinical status, future medical and therapeutic needs, and coverage/benefits for post-acute care in coordination with multidisciplinary team members.       Shani Granda RN  Weekend  - [facility]  [id number]                   Skyrizi Counseling: I discussed with the patient the risks of risankizumab-rzaa including but not limited to immunosuppression, and serious infections.  The patient understands that monitoring is required including a PPD at baseline and must alert us or the primary physician if symptoms of infection or other concerning signs are noted.

## 2025-02-09 NOTE — ASSESSMENT & PLAN NOTE
Patient is identified as having Diastolic (HFpEF) heart failure that is Chronic. CHF is currently controlled.   Latest ECHO performed and demonstrates- Results for orders placed during the hospital encounter of 02/16/21    Echo Color Flow Doppler? Yes    Interpretation Summary  · The left ventricle is normal in size with hyperdynamic systolic function. The estimated ejection fraction is 75%  · Indeterminate left ventricular diastolic function.  · Normal right ventricular size with normal right ventricular systolic function.  · There is a very late LVOT flow acceleration to 3.0m/s ( 36mmHg) due to hyperdynamic LV function and cavity obliteration at the end of systole.  · Mild left atrial enlargement.  · Mild tricuspid regurgitation.  · The estimated PA systolic pressure is 33 mmHg.  · Normal central venous pressure (3 mmHg).  · There is a right pleural effusion.    - Continue ACE/ARB and monitor clinical status closely.   - Monitor on telemetry.  - Patient is off CHF pathway.   - Monitor strict Is&Os and daily weights.   - Continue to stress to patient importance of self efficacy and  on diet for CHF.  - Last BNP reviewed- and noted below   Recent Labs   Lab 02/08/25  0742   BNP 62     - Euvolemic on exam

## 2025-02-09 NOTE — PROGRESS NOTES
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 F with HTN, HLD, prediabetes, ANANDA 2/2 chronic blood loss, and colon cancer s/p right hemicolectomy in March of 2020 who is presenting to the ED for evaluation of GI bleeding and presyncopal episode. She reports having one episode of dark, tarry stool, along with bright red blood, early this morning. She attempted to grab onto her walker to stand from the toilet and felt weak and lightheaded and lowered herself to thr ground. She denies any falls or syncope and did not hit or head or lose consciousness. She is not on anticoagulation or antiplatelets. Of note, she was admitted in early December with bright red blood per rectum & underwent EGD/colon but colonoscopy prep was inadequate. She is on PPI daily and daily iron supplement, which she is compliant with. Pt denies fever, chills, chest pain, palpitations, SOB, cough, abdominal pain, N/V/D, dysuria, leg swelling or pain, HA, or recent sick contacts.     In the ED: VSSAF. CBC with stable normocytic anemia with Hgb 9.4 (from 10.2 on 1/16/25), no leukocytosis. CMP, BNP, HS troponin, and uA grossly unremarkable. CTH and c-spine without acute process. CTA without active extravasation or acute findings. Pt was given IV protonix 80 mg and placed in observation for further management.        Most Recent Upper Endoscopy:   12/9/24  Impression:    - Normal esophagus.                          - Erosive gastropathy with no stigmata of recent                          bleeding.                          - Normal examined duodenum.                          - No specimens collected.      Most Recent Colonoscopy:    12/9/24  Impression:     - Preparation of the colon was inadequate.                          - Non-bleeding internal hemorrhoids.                          - Stool in the transverse colon.                          - No specimens collected.    Overview/Hospital Course:  Sister Nydia was placed in observation for hematochezia and acute blood loss anemia. Pt was started on IV protonix at admission. Hgb stable but downtrending (9.4 -> 8.6). GI consulted and suspect a lower GI bleeding and are planning for c-scope on 2/10 after extensive bowel prep.      Interval History: Pt seen and examined by me this morning. VSSAF. Per nursing, the patient drank ~ 2,000ml of golytely last night before having one episode of NBNB emesis. Pt to complete another jug of golytely today. Plan to pre-treat with scopolamine patch and one dose of IV zofran this afternoon. PRN antiemetics also in place. Pt continues to have dark red, liquid stools with visible clots. No new complaints.     Review of Systems   Constitutional:  Positive for activity change. Negative for chills and fever.   HENT:  Negative for trouble swallowing.    Eyes:  Negative for photophobia and visual disturbance.   Respiratory:  Negative for cough, chest tightness and shortness of breath.    Cardiovascular:  Negative for chest pain, palpitations and leg swelling.   Gastrointestinal:  Positive for blood in stool. Negative for abdominal pain, constipation, diarrhea, nausea and vomiting.   Genitourinary:  Negative for dysuria, frequency and hematuria.   Musculoskeletal:  Positive for arthralgias. Negative for back pain, gait problem and neck pain.   Skin:  Negative for rash and wound.   Neurological:  Positive for weakness and light-headedness. Negative for dizziness, syncope and speech difficulty.   Psychiatric/Behavioral:  Negative for agitation and confusion. The patient is not nervous/anxious.      Objective:     Vital Signs (Most Recent):  Temp: 98.4 °F (36.9 °C)  (02/09/25 1127)  Pulse: 97 (02/09/25 1127)  Resp: 20 (02/09/25 1127)  BP: 127/60 (02/09/25 1127)  SpO2: 99 % (02/09/25 1127) Vital Signs (24h Range):  Temp:  [97.4 °F (36.3 °C)-98.6 °F (37 °C)] 98.4 °F (36.9 °C)  Pulse:  [68-97] 97  Resp:  [16-24] 20  SpO2:  [95 %-100 %] 99 %  BP: (120-168)/(58-72) 127/60     Weight: 51.8 kg (114 lb 3.2 oz)  Body mass index is 17.89 kg/m².  No intake or output data in the 24 hours ending 02/09/25 1212      Physical Exam  Vitals and nursing note reviewed.   Constitutional:       General: She is not in acute distress.     Appearance: She is cachectic. She is ill-appearing (chronically).   HENT:      Head: Normocephalic and atraumatic.      Mouth/Throat:      Mouth: Mucous membranes are moist.   Eyes:      Extraocular Movements: Extraocular movements intact.      Conjunctiva/sclera: Conjunctivae normal.   Cardiovascular:      Rate and Rhythm: Normal rate and regular rhythm.      Heart sounds: Murmur heard.   Pulmonary:      Effort: Pulmonary effort is normal. No respiratory distress.      Breath sounds: Normal breath sounds. No wheezing.   Abdominal:      General: Bowel sounds are normal. There is no distension.      Palpations: Abdomen is soft.      Tenderness: There is no abdominal tenderness.   Musculoskeletal:         General: No tenderness. Normal range of motion.      Cervical back: Normal range of motion and neck supple.   Skin:     General: Skin is warm and dry.      Capillary Refill: Capillary refill takes less than 2 seconds.      Findings: No rash.   Neurological:      Mental Status: She is alert and oriented to person, place, and time. Mental status is at baseline.   Psychiatric:         Behavior: Behavior normal.         Thought Content: Thought content normal.         Judgment: Judgment normal.             Significant Labs: All pertinent labs within the past 24 hours have been reviewed.  CBC:   Recent Labs   Lab 02/08/25  0742 02/09/25  0337   WBC 6.46 7.93   HGB 9.4*  8.6*   HCT 30.5* 27.0*    206       Significant Imaging: I have reviewed all pertinent imaging results/findings within the past 24 hours.    Assessment and Plan     * Hematochezia  - 89 y.o. F with history of GI bleeding, ANANDA 2/2 chronic blood loss, and colon cancer s/p right hemicolectomy in March of 2020 presenting to the ED with recurrent GI bleeding most c/w hematochezia.   - HDS without tachycardia or hypotension   - Hgb 9.4 -> 8.6 (baseline~ 10.2 in January), s/p 0 units pRBCs   - Protonix 80mg IV bolus x 1 then start Protonix 40mg IV BID though source of bleeding is most likely lower  - Hold all NSAIDs and heparin products  - CLD for now, NPO at midnight for colonoscopy in the AM   - Follow H/H with CBC daily   - IVF hydration; maintain access with 2 large bore IVs  - Orthostatic vitals Q shift   - Anti-emetics as needed  - Transfuse pRBCs as needed for Hgb <7, type and screen ordered   - Correct any coagulopathy with platelets and FFP to a goal of platelets >50K and INR <2.0  - GI consulted, appreciate recs     Chronic diastolic heart failure  Patient is identified as having Diastolic (HFpEF) heart failure that is Chronic. CHF is currently controlled.   Latest ECHO performed and demonstrates- Results for orders placed during the hospital encounter of 02/16/21    Echo Color Flow Doppler? Yes    Interpretation Summary  · The left ventricle is normal in size with hyperdynamic systolic function. The estimated ejection fraction is 75%  · Indeterminate left ventricular diastolic function.  · Normal right ventricular size with normal right ventricular systolic function.  · There is a very late LVOT flow acceleration to 3.0m/s ( 36mmHg) due to hyperdynamic LV function and cavity obliteration at the end of systole.  · Mild left atrial enlargement.  · Mild tricuspid regurgitation.  · The estimated PA systolic pressure is 33 mmHg.  · Normal central venous pressure (3 mmHg).  · There is a right pleural  effusion.    - Continue ACE/ARB and monitor clinical status closely.   - Monitor on telemetry.  - Patient is off CHF pathway.   - Monitor strict Is&Os and daily weights.   - Continue to stress to patient importance of self efficacy and  on diet for CHF.  - Last BNP reviewed- and noted below   Recent Labs   Lab 02/08/25  0742   BNP 62     - Euvolemic on exam     Acute blood loss anemia  Anemia is likely due to acute blood loss which was from GI bleeding and Iron deficiency. Most recent hemoglobin and hematocrit are listed below.  Recent Labs     02/08/25  0742 02/09/25  0337   HGB 9.4* 8.6*   HCT 30.5* 27.0*       Plan  - Monitor serial CBC: Daily  - Transfuse PRBC if patient becomes hemodynamically unstable, symptomatic or H/H drops below 7/21.  - Patient has not received any PRBC transfusions to date  - Patient's anemia is currently stable  - See hematochezia     Cachexia  Malnutrition   BMI less than 19   Concern for cachexia as evidenced by loss of muscle mass and loss of body fat . Treatment to include nutrition consult, physical therapy, and occupational therapy.    Body mass index is 17.89 kg/m².  Albumin   Date Value Ref Range Status   02/08/2025 3.5 3.5 - 5.2 g/dL Final       Debility  Patient with Chronic debility due to age-related physical debility. The patient's latest AMPAC (Activity Measure for Post Acute Care) Score is listed below.    AM-PAC Score - How much help does the patient need for each activity listed  Basic Mobility Total Score: 13  Turning over in bed (including adjusting bedclothes, sheets and blankets)?: A little  Sitting down on and standing up from a chair with arms (e.g., wheelchair, bedside commode, etc.): A lot  Moving from lying on back to sitting on the side of the bed?: A little  Moving to and from a bed to a chair (including a wheelchair)?: A little  Need to walk in hospital room?: Unable  Climbing 3-5 steps with a railing?: Unable    Plan  - Progressive mobility protocol  initated  - PT/OT consulted  - Fall precautions in place    Mixed hyperlipidemia  - Chronic   - Continue statin     Primary hypertension  Patient's blood pressure range in the last 24 hours was: BP  Min: 114/55  Max: 176/72.The patient's inpatient anti-hypertensive regimen is listed below:  Current Antihypertensives  amLODIPine tablet 10 mg, Daily, Oral  losartan tablet 100 mg, Daily, Oral    Plan  - BP is controlled, no changes needed to their regimen      VTE Risk Mitigation (From admission, onward)           Ordered     IP VTE HIGH RISK PATIENT  Once         02/08/25 0927     Place sequential compression device  Until discontinued         02/08/25 0927     Place sequential compression device  Until discontinued         02/08/25 0926                    Discharge Planning   EUGENIO: 2/10/2025     Code Status: Full Code   Medical Readiness for Discharge Date:   Discharge Plan A: Home          Chitra Mata PA-C  Department of Hospital Medicine   [facility]

## 2025-02-09 NOTE — ASSESSMENT & PLAN NOTE
Patient with Chronic debility due to age-related physical debility. The patient's latest AMPAC (Activity Measure for Post Acute Care) Score is listed below.    AM-PAC Score - How much help does the patient need for each activity listed  Basic Mobility Total Score: 13  Turning over in bed (including adjusting bedclothes, sheets and blankets)?: A little  Sitting down on and standing up from a chair with arms (e.g., wheelchair, bedside commode, etc.): A lot  Moving from lying on back to sitting on the side of the bed?: A little  Moving to and from a bed to a chair (including a wheelchair)?: A little  Need to walk in hospital room?: Unable  Climbing 3-5 steps with a railing?: Unable    Plan  - Progressive mobility protocol initated  - PT/OT consulted  - Fall precautions in place

## 2025-02-09 NOTE — PLAN OF CARE
Problem: Skin Injury Risk Increased  Goal: Skin Health and Integrity  Outcome: Progressing     Problem: Adult Inpatient Plan of Care  Goal: Plan of Care Review  Outcome: Progressing     Problem: Infection  Goal: Absence of Infection Signs and Symptoms  Outcome: Progressing     Problem: Fall Injury Risk  Goal: Absence of Fall and Fall-Related Injury  Outcome: Progressing     Problem: Gastrointestinal Bleeding  Goal: Optimal Coping with Acute Illness  Outcome: Not Progressing  2/2 Golytelty prep in use bowel movements noted to red and watery with clots.   Clear liquid diet maintained. NPO at midnight for colonoscopy.

## 2025-02-09 NOTE — ASSESSMENT & PLAN NOTE
Malnutrition   BMI less than 19   Concern for cachexia as evidenced by loss of muscle mass and loss of body fat . Treatment to include nutrition consult, physical therapy, and occupational therapy.    Body mass index is 17.89 kg/m².  Albumin   Date Value Ref Range Status   02/08/2025 3.5 3.5 - 5.2 g/dL Final

## 2025-02-09 NOTE — NURSING TRANSFER
Patient drank 2000ml's of go-lytely prep and began to get sick. Vomited clear liquid x1. Patient stated that she is unable to consume any more of the prep.  ELIOT Gómez  made aware

## 2025-02-09 NOTE — HOSPITAL COURSE
Sister Nydia was placed in observation for hematochezia and acute blood loss anemia. Pt was started on IV protonix at admission. Hgb stable but downtrending (9.4 -> 7.2. GI consulted and suspect a lower GI bleeding. S/p colonoscopy with clipping. Repeat CBC showed Hgb of 6.8, will administer 1 unit RBC. Hgb increased and maintained >8. PT/OT recommending moderate intensity therapy, but patient decline SNF. Patient stable for discharge home with GI follow up. Discussed care plan with patient, verbalized understanding. All questions answered. Return precautions given

## 2025-02-09 NOTE — ASSESSMENT & PLAN NOTE
- 89 y.o. F with history of GI bleeding, ANANDA 2/2 chronic blood loss, and colon cancer s/p right hemicolectomy in March of 2020 presenting to the ED with recurrent GI bleeding most c/w hematochezia.   - HDS without tachycardia or hypotension   - Hgb 9.4 -> 8.6 (baseline~ 10.2 in January), s/p 0 units pRBCs   - Protonix 80mg IV bolus x 1 then start Protonix 40mg IV BID though source of bleeding is most likely lower  - Hold all NSAIDs and heparin products  - CLD for now, NPO at midnight for colonoscopy in the AM   - Follow H/H with CBC daily   - IVF hydration; maintain access with 2 large bore IVs  - Orthostatic vitals Q shift   - Anti-emetics as needed  - Transfuse pRBCs as needed for Hgb <7, type and screen ordered   - Correct any coagulopathy with platelets and FFP to a goal of platelets >50K and INR <2.0  - GI consulted, appreciate recs

## 2025-02-09 NOTE — PLAN OF CARE
Recommendations  1) When appropriate ADAT per MD  2) Monitor labs, meds, skin, weight    Goals: Meet % een/epn by next RD f/u  Nutrition Goal Status: new  Communication of RD Recs: other (comment) (poc)

## 2025-02-09 NOTE — ANESTHESIA PREPROCEDURE EVALUATION
[facility]  Anesthesia Pre-Operative Evaluation    Patient Name: Nydia Stevens  YOB: 1934  MRN: [id number]    SUBJECTIVE:   02/08/2025    Pre-operative evaluation for Procedure(s) (LRB):  COLONOSCOPY (N/A)    Nydia Stevens is a 89 y.o. female with a PMHx significant for HTN, ANANDA, colon cancer admitted for GI bleed. Patient now presents for the above procedure(s).    Previous Airway  Intubated: Postinduction  Mask Ventilation: Easy with oral airway  Attempts: 1  Attempted By: Resident anesthesiologist  Method of Intubation: Direct  Blade: Tatiana 3  Laryngeal View Grade: Grade I - full view of chords     LDA:   Port A Cath Single Lumen 07/02/21 0837 right internal jugular (Active)   Number of days: 1317            Peripheral IV - Single Lumen 02/08/25 0639 22 G Anterior;Left Forearm (Active)   Site Assessment Clean;Dry;Intact 02/08/25 0640   Extremity Assessment Distal to IV No abnormal discoloration;No redness;No swelling 02/08/25 0640   Dressing Status Clean;Dry;Intact 02/08/25 0640   Dressing Intervention Integrity maintained 02/08/25 0640   Number of days: 0       Female External Urinary Catheter w/ Suction 02/08/25 0644 (Active)   Skin no redness;no breakdown 02/08/25 0725   Tolerance no signs/symptoms of discomfort 02/08/25 0725   Suction Continuous suction at 70 mmHg 02/08/25 0725   Date of last wick change 02/08/25 02/08/25 0725   Time of last wick change 0644 02/08/25 0725   Output (mL) 0 mL 02/08/25 0725   Number of days: 0     Transthoracic Echo :  Results for orders placed during the hospital encounter of 02/16/21    Echo Color Flow Doppler? Yes    Interpretation Summary  · The left ventricle is normal in size with hyperdynamic systolic function. The estimated ejection fraction is 75%  · Indeterminate left ventricular diastolic function.  · Normal right ventricular size with normal right ventricular systolic function.  · There is a very late LVOT flow  acceleration to 3.0m/s ( 36mmHg) due to hyperdynamic LV function and cavity obliteration at the end of systole.  · Mild left atrial enlargement.  · Mild tricuspid regurgitation.  · The estimated PA systolic pressure is 33 mmHg.  · Normal central venous pressure (3 mmHg).  · There is a right pleural effusion.      Patient Active Problem List   Diagnosis    Primary hypertension    Mixed hyperlipidemia    Palliative care encounter    Closed fracture of left olecranon process    Debility    Aortic stenosis, mild    Osteoporosis    Cachexia    Iron deficiency anemia due to chronic blood loss    Hematochezia    Chronic diastolic heart failure    History of colon cancer    Carotid stenosis, asymptomatic, bilateral    H/O: hysterectomy    Prediabetes    Hyponatremia    B12 deficiency    Healthcare maintenance    Aortic atherosclerosis    Benign mole    Adrenal nodule    Cerebral atrophy, mild    History of hematuria    Rectal bleeding    History of GI bleed    Body mass index (BMI) less than 19       Review of patient's allergies indicates:   Allergen Reactions    Aspirin        Contraindicated withHistory of GI bleed    Nsaids (non-steroidal anti-inflammatory drug)        Contraindicated withHistory of GI bleed       Current Outpatient Medications   Medication Instructions    amLODIPine (NORVASC) 10 mg, Oral, Daily    calcium carbonate (OS-JAILYN) 500 mg, Oral, Daily    cholecalciferol (vitamin D3) (VITAMIN D3) 1,000 Units, Daily    cyanocobalamin (VITAMIN B-12) 1,000 mcg, Oral, Daily, Hold until after surgery    FEROSUL 325 mg (65 mg iron) Tab tablet TAKE ONE TABLET BY MOUTH ON TUESDAY, THURSDAY, AND SATURDAY    losartan (COZAAR) 100 mg, Oral, Daily    rosuvastatin (CRESTOR) 40 MG Tab TAKE ONE TABLET BY MOUTH EVERY DAY FOR CHOLESTEROL    VIT C/VIT E ACETATE/LUTEIN/MIN (OCUVITE LUTEIN ORAL) 1 tablet, Daily       Past Surgical History:   Procedure Laterality Date    CATARACT EXTRACTION W/  INTRAOCULAR LENS IMPLANT Left 8/11/14     [facility]    CATARACT EXTRACTION W/  INTRAOCULAR LENS IMPLANT Right 09/15/14    [facility]    COLONOSCOPY N/A 3/19/2020    Procedure: COLONOSCOPY;  Surgeon: Real Mabry MD;  Location: [facility] ENDO (2ND FLR);  Service: Endoscopy;  Laterality: N/A;    COLONOSCOPY N/A 3/23/2021    Procedure: COLONOSCOPY;  Surgeon: AUTUMN Berg MD;  Location: [facility] ENDO (4TH FLR);  Service: Endoscopy;  Laterality: N/A;  covid test 3/20 [facility]    COLONOSCOPY N/A 2/15/2022    Procedure: COLONOSCOPY;  Surgeon: Doris Simpson MD;  Location: [facility] ENDO (4TH FLR);  Service: Endoscopy;  Laterality: N/A;  COVID test on 2/12/22 at [facility]  2/14/22 - Pt confirmed 0640 arrival, prep instructions reviewed, Pt verbalized understanding-[id number]@[id number]    COLONOSCOPY N/A 12/9/2024    Procedure: COLONOSCOPY;  Surgeon: Vitaly Virgen MD;  Location: [facility] ENDO (2ND FLR);  Service: Gastroenterology;  Laterality: N/A;    ESOPHAGOGASTRODUODENOSCOPY N/A 3/19/2020    Procedure: EGD (ESOPHAGOGASTRODUODENOSCOPY);  Surgeon: Real aMbry MD;  Location: [facility] ENDO (2ND FLR);  Service: Endoscopy;  Laterality: N/A;    ESOPHAGOGASTRODUODENOSCOPY N/A 12/9/2024    Procedure: EGD (ESOPHAGOGASTRODUODENOSCOPY);  Surgeon: Vitaly Virgen MD;  Location: [facility] ENDO (2ND FLR);  Service: Gastroenterology;  Laterality: N/A;    FOOT SURGERY      left    HYSTERECTOMY  1962    ILEOCOLECTOMY N/A 4/15/2021    Procedure: ILEOCOLECTOMY;  Surgeon: AUTUMN Berg MD;  Location: [facility] OR 2ND FLR;  Service: Colon and Rectal;  Laterality: N/A;    INSERTION OF TUNNELED CENTRAL VENOUS CATHETER (CVC) WITH SUBCUTANEOUS PORT N/A 7/2/2021    Procedure: [id number];  Surgeon: Suresh Agrawal MD;  Location: [facility] OR 2ND FLR;  Service: Vascular;  Laterality: N/A;  Right IJ    LAPAROSCOPIC HEMICOLECTOMY Right 3/20/2020    Procedure: HEMICOLECTOMY, RIGHT;  Surgeon: AUTUMN Berg MD;  Location: [facility] OR 2ND FLR;  Service: Colon and Rectal;  Laterality: Right;    LYSIS OF ADHESIONS N/A  "4/15/2021    Procedure: LYSIS, ADHESIONS;  Surgeon: AUTUMN Berg MD;  Location: [facility] OR 2ND FLR;  Service: Colon and Rectal;  Laterality: N/A;  Greater than 2 hours    MOBILIZATION OF SPLENIC FLEXURE N/A 4/15/2021    Procedure: MOBILIZATION, SPLENIC FLEXURE;  Surgeon: AUTUMN Berg MD;  Location: [facility] OR 2ND FLR;  Service: Colon and Rectal;  Laterality: N/A;       Social History     Substance and Sexual Activity   Drug Use No     Alcohol Use: Not At Risk (12/20/2024)    AUDIT-C     Frequency of Alcohol Consumption: Never     Average Number of Drinks: Patient does not drink     Frequency of Binge Drinking: Never     Tobacco Use: Low Risk  (2/8/2025)    Patient History     Smoking Tobacco Use: Never     Smokeless Tobacco Use: Never     Passive Exposure: Not on file       OBJECTIVE:     Vital Signs Range:      2/4/2025     9:36 AM 2/5/2025     2:00 PM 2/8/2025     6:24 AM   Vitals - 1 value per visit   SYSTOLIC 169 149 114   DIASTOLIC 77 78 55   Pulse 69 101 80   Temp 36.4 °C (97.6 °F)  36.2 °C (97.1 °F)   Resp 20  26   SPO2 98 %  100 %   Weight (lb) 119.6 108.25 110   Weight (kg) 54.25 49.1 49.896   Height 5' 7" (1.702 m) 5' 7" (1.702 m) 5' 7" (1.702 m)   BMI (Calculated) 18.7 16.9 17.2   Pain Score  Zero          CBC:   Lab Results   Component Value Date    WBC 6.46 02/08/2025    HGB 9.4 (L) 02/08/2025    HCT 30.5 (L) 02/08/2025    MCV 96 02/08/2025     02/08/2025         CMP:   Sodium   Date Value Ref Range Status   02/08/2025 139 136 - 145 mmol/L Final     Potassium   Date Value Ref Range Status   02/08/2025 4.2 3.5 - 5.1 mmol/L Final     Chloride   Date Value Ref Range Status   02/08/2025 107 95 - 110 mmol/L Final     CO2   Date Value Ref Range Status   02/08/2025 21 (L) 23 - 29 mmol/L Final     Glucose   Date Value Ref Range Status   02/08/2025 99 70 - 110 mg/dL Final     BUN   Date Value Ref Range Status   02/08/2025 22 8 - 23 mg/dL Final     Creatinine   Date Value Ref Range Status   02/08/2025 " 0.8 0.5 - 1.4 mg/dL Final     Calcium   Date Value Ref Range Status   02/08/2025 8.8 8.7 - 10.5 mg/dL Final     Total Protein   Date Value Ref Range Status   02/08/2025 6.7 6.0 - 8.4 g/dL Final     Albumin   Date Value Ref Range Status   02/08/2025 3.5 3.5 - 5.2 g/dL Final     Total Bilirubin   Date Value Ref Range Status   02/08/2025 0.2 0.1 - 1.0 mg/dL Final     Comment:     For infants and newborns, interpretation of results should be based  on gestational age, weight and in agreement with clinical  observations.    Premature Infant recommended reference ranges:  Up to 24 hours.............<8.0 mg/dL  Up to 48 hours............<12.0 mg/dL  3-5 days..................<15.0 mg/dL  6-29 days.................<15.0 mg/dL       Alkaline Phosphatase   Date Value Ref Range Status   02/08/2025 45 40 - 150 U/L Final     AST   Date Value Ref Range Status   02/08/2025 16 10 - 40 U/L Final     ALT   Date Value Ref Range Status   02/08/2025 5 (L) 10 - 44 U/L Final     Anion Gap   Date Value Ref Range Status   02/08/2025 11 8 - 16 mmol/L Final     eGFR if    Date Value Ref Range Status   06/06/2022 >60.0 >60 mL/min/1.73 m^2 Final     eGFR if non    Date Value Ref Range Status   06/06/2022 >60.0 >60 mL/min/1.73 m^2 Final     Comment:     Calculation used to obtain the estimated glomerular filtration  rate (eGFR) is the CKD-EPI equation.          INR:  Lab Results   Component Value Date    INR 1.0 12/07/2024    INR 0.9 03/17/2020    INR 1.0 03/02/2020       Cardiac Studies    EKG:   Results for orders placed or performed in visit on 12/07/24   EKG 12-lead    Collection Time: 12/07/24  1:21 PM   Result Value Ref Range    QRS Duration 76 ms    [facility] QTC Calculation 425 ms    Narrative    Test Reason :    Vent. Rate :  89 BPM     Atrial Rate :  89 BPM     P-R Int : 158 ms          QRS Dur :  76 ms      QT Int : 350 ms       P-R-T Axes :  65  53  41 degrees    QTcB Int : 425 ms    Normal sinus  rhythm  Septal infarct (cited on or before 09-Jul-2024)  Abnormal ECG  When compared with ECG of 09-Jul-2024 08:13,  No significant change was found  Confirmed by Brittany Alcala ([id number]) on 12/8/2024 2:52:28 PM    Referred By: SILVANA SELF           Confirmed By: Brittany Alcala       Transthoracic Echo:  Results for orders placed during the hospital encounter of 02/16/21    Echo Color Flow Doppler? Yes    Interpretation Summary  · The left ventricle is normal in size with hyperdynamic systolic function. The estimated ejection fraction is 75%  · Indeterminate left ventricular diastolic function.  · Normal right ventricular size with normal right ventricular systolic function.  · There is a very late LVOT flow acceleration to 3.0m/s ( 36mmHg) due to hyperdynamic LV function and cavity obliteration at the end of systole.  · Mild left atrial enlargement.  · Mild tricuspid regurgitation.  · The estimated PA systolic pressure is 33 mmHg.  · Normal central venous pressure (3 mmHg).  · There is a right pleural effusion.      Transesophageal Echo:  No results found for this or any previous visit.      Nuclear Stress Test:  No results found for this or any previous visit.      Stress Echo:  No results found for this or any previous visit.      Nuclear Stress Echo:  No results found for this or any previous visit.      Cardiac Catheterization:  No results found for this or any previous visit.      Cardiac Device Check:  No results found for this or any previous visit.      ASSESSMENT/PLAN:                                                                                                                02/08/2025  Nydia Stevens is a 89 y.o., female.      Pre-op Assessment    I have reviewed the Patient Summary Reports.     I have reviewed the Nursing Notes. I have reviewed the NPO Status.   I have reviewed the Medications.     Review of Systems  Anesthesia Hx:  No problems with previous Anesthesia               Denies  Personal Hx of Anesthesia complications.                    Social:  Non-Smoker       Hematology/Oncology:       -- Anemia:                                  Cardiovascular:     Hypertension       CHF    hyperlipidemia                               Pulmonary:  Pulmonary Normal                       Psych:  Psychiatric History                Physical Exam  General: Well nourished, Cooperative, Oriented and Alert    Airway:  Mallampati: II   Mouth Opening: Normal  TM Distance: Normal  Tongue: Normal  Neck ROM: Normal ROM    Dental:  Intact    Chest/Lungs:  Clear to auscultation, Normal Respiratory Rate    Heart:  Rate: Normal  Rhythm: Regular Rhythm    Anesthesia Plan  Type of Anesthesia, risks & benefits discussed:    Anesthesia Type: Gen ETT, Gen Natural Airway, MAC  Intra-op Monitoring Plan: Standard ASA Monitors  Post Op Pain Control Plan: multimodal analgesia and IV/PO Opioids PRN  Induction:  IV  Airway Plan: Direct, Post-Induction  Informed Consent: Informed consent signed with the Patient and all parties understand the risks and agree with anesthesia plan.  All questions answered.   ASA Score: 3  Day of Surgery Review of History & Physical: H&P Update referred to the surgeon/provider.  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     .

## 2025-02-09 NOTE — ED NOTES
Nurse said  that she was in a pt's room and would call back in 10min to receive report. Telebox requested from

## 2025-02-09 NOTE — ASSESSMENT & PLAN NOTE
Anemia is likely due to acute blood loss which was from GI bleeding and Iron deficiency. Most recent hemoglobin and hematocrit are listed below.  Recent Labs     02/08/25  0742 02/09/25  0337   HGB 9.4* 8.6*   HCT 30.5* 27.0*       Plan  - Monitor serial CBC: Daily  - Transfuse PRBC if patient becomes hemodynamically unstable, symptomatic or H/H drops below 7/21.  - Patient has not received any PRBC transfusions to date  - Patient's anemia is currently stable  - See hematochezia

## 2025-02-09 NOTE — PLAN OF CARE
Problem: Physical Therapy  Goal: Physical Therapy Goal  Description: Goals to be met by: 25     Patient will increase functional independence with mobility by performin. Supine to sit with Set-up Keith  2. Sit to supine with Set-up Keith  3. Rolling to Left and Right with Modified Keith.  4. Sit to stand transfer with Stand-by Assistance using LRAD as appropriate  5. Bed to chair transfer with Contact Guard Assistance using LRAD as appropriate  6. Gait  x 20 feet with Contact Guard Assistance using LRAD as appropriate  7. Sitting at edge of bed x10 minutes with Keith  8. Stand for 5 minutes with Stand-by Assistance using LRAD as appropriate  9. Lower extremity exercise program x15-20 reps per handout, with assistance as needed    DME Justifications (see above for complete DME recommendations)    Bedside Commode- Patient has a mobility limitation that significantly impairs their ability to participate in one or more mobility related activities of daily living, including toileting. This deficit can be resolved by using a bedside commode. Patient demonstrates mobility limitations that will cause them to be confined to one room at home without bathroom access for up to 30 days. Using a bedside commode will greatly improve the patient's ability to participate in MRADLs.     Outcome: Progressing    Evaluation Complete. Goals Appropriate.

## 2025-02-09 NOTE — CONSULTS
"[facility]  Adult Nutrition  Consult Note    SUMMARY     Recommendations  1) When appropriate ADAT per MD  2) Monitor labs, meds, skin, weight    Goals: Meet % een/epn by next RD f/u  Nutrition Goal Status: new  Communication of RD Recs: other (comment) (poc)    Assessment and Plan    Nutrition Problem  Inadequate energy intake    Related to (etiology):   Inability to consume sufficient energy     Signs and Symptoms (as evidenced by):   NPO for colonoscopy    Interventions/Recommendations (treatment strategy):  Collaboration with other providers    Nutrition Diagnosis Status:   New     Malnutrition Assessment  Pending NFPE as appropriate     Reason for Assessment    Reason For Assessment: consult  Diagnosis: gastrointestinal disease (Hematochezia)  General Information Comments: 89 y.o. female with history of HTN, HLD, and colon cancer s/p right hemicolectomy 3/2020 (declined adjuvant chemotherapy) and ileocecectomy 4/21 for lesion at ileocolic anastomosis, presenting to the ED for rectal bleeding. RD consulted for malnutrition. RD reports emesis after drinking go-lytely earlier this morning. RD saw pt at bedside, NPO drinking gl-lytely. Denies current N/V. Eating 3 meals/day PTA. States she's unsure of her UBW but her cloths fit her the same. Per chart review 18#/13.6% wt loss x 1 year. NFPE was not appropriate at this time, RD to follow up.  Nutrition Discharge Planning: pending clinical course    Nutrition Related Social Determinants of Health: SDOH: Adequate food in home environment      Nutrition/Diet History    Spiritual, Cultural Beliefs, Temple Practices, Values that Affect Care: no  Food Allergies: NKFA    Anthropometrics    Height: 5' 7" (170.2 cm)  Height (inches): 67 in  Height Method: Stated  Weight: 51.8 kg (114 lb 3.2 oz)  Weight (lb): 114.2 lb  Weight Method: Bed Scale  Ideal Body Weight (IBW), Female: 135 lb  % Ideal Body Weight, Female (lb): 81.48 %  BMI (Calculated): " 17.9  BMI Grade: less than 23 (older than 65 years) - underweight       Lab/Procedures/Meds    Pertinent Labs Reviewed: reviewed  Pertinent Labs Comments: Na: 135  Pertinent Medications Reviewed: reviewed  Pertinent Medications Comments: Atorvastatin, calcium carbonate, cyanocobalamin, ferrous sulfate, MVI, vitamin D, pantoprazole    Estimated/Assessed Needs    Weight Used For Calorie Calculations: 51.8 kg (114 lb 3.2 oz)  Energy Calorie Requirements (kcal): 6134-1633 (30-35 kcal/kg)  Energy Need Method: Kcal/kg  Protein Requirements: 62-77 (1.2-1.5 g/kg)  Weight Used For Protein Calculations: 51.8 kg (114 lb 3.2 oz)     Estimated Fluid Requirement Method: RDA Method  RDA Method (mL): 1554         Nutrition Prescription Ordered    Current Diet Order: CLD    Evaluation of Received Nutrient/Fluid Intake    I/O: -  Comments: LBM 2/9 (liquid)  % Intake of Estimated Energy Needs: 0 - 25 %  % Meal Intake: 0 - 25 %    Nutrition Risk    Level of Risk/Frequency of Follow-up: moderate - high (f/u 1-2x/week)       Monitor and Evaluation    Food and Nutrient Intake: energy intake, food and beverage intake  Food and Nutrient Adminstration: diet order  Knowledge/Beliefs/Attitudes: food and nutrition knowledge/skill, beliefs and attitudes  Physical Activity and Function: nutrition-related ADLs and IADLs  Anthropometric Measurements: height/length, weight, weight change, body mass index  Biochemical Data, Medical Tests and Procedures: gastrointestinal profile, electrolyte and renal panel, glucose/endocrine profile, inflammatory profile, lipid profile  Nutrition-Focused Physical Findings: overall appearance, extremities, muscles and bones, head and eyes, skin       Nutrition Follow-Up    RD Follow-up?: Yes

## 2025-02-10 ENCOUNTER — ANESTHESIA (OUTPATIENT)
Dept: ENDOSCOPY | Facility: HOSPITAL | Age: OVER 89
DRG: 394 | End: 2025-02-10
Payer: MEDICARE

## 2025-02-10 LAB
ANION GAP SERPL CALC-SCNC: 12 MMOL/L (ref 8–16)
BASOPHILS # BLD AUTO: 0.05 K/UL (ref 0–0.2)
BASOPHILS # BLD AUTO: 0.05 K/UL (ref 0–0.2)
BASOPHILS NFR BLD: 0.4 % (ref 0–1.9)
BASOPHILS NFR BLD: 0.5 % (ref 0–1.9)
BLD PROD TYP BPU: NORMAL
BLOOD UNIT EXPIRATION DATE: NORMAL
BLOOD UNIT TYPE CODE: 6200
BLOOD UNIT TYPE: NORMAL
BUN SERPL-MCNC: 16 MG/DL (ref 8–23)
CALCIUM SERPL-MCNC: 8.3 MG/DL (ref 8.7–10.5)
CHLORIDE SERPL-SCNC: 103 MMOL/L (ref 95–110)
CO2 SERPL-SCNC: 24 MMOL/L (ref 23–29)
CODING SYSTEM: NORMAL
CREAT SERPL-MCNC: 0.9 MG/DL (ref 0.5–1.4)
CROSSMATCH INTERPRETATION: NORMAL
DIFFERENTIAL METHOD BLD: ABNORMAL
DIFFERENTIAL METHOD BLD: ABNORMAL
DISPENSE STATUS: NORMAL
EOSINOPHIL # BLD AUTO: 0.1 K/UL (ref 0–0.5)
EOSINOPHIL # BLD AUTO: 0.1 K/UL (ref 0–0.5)
EOSINOPHIL NFR BLD: 0.7 % (ref 0–8)
EOSINOPHIL NFR BLD: 0.8 % (ref 0–8)
ERYTHROCYTE [DISTWIDTH] IN BLOOD BY AUTOMATED COUNT: 14.5 % (ref 11.5–14.5)
ERYTHROCYTE [DISTWIDTH] IN BLOOD BY AUTOMATED COUNT: 14.6 % (ref 11.5–14.5)
EST. GFR  (NO RACE VARIABLE): >60 ML/MIN/1.73 M^2
GLUCOSE SERPL-MCNC: 83 MG/DL (ref 70–110)
HCT VFR BLD AUTO: 21.7 % (ref 37–48.5)
HCT VFR BLD AUTO: 24.2 % (ref 37–48.5)
HGB BLD-MCNC: 6.8 G/DL (ref 12–16)
HGB BLD-MCNC: 7.2 G/DL (ref 12–16)
IMM GRANULOCYTES # BLD AUTO: 0.06 K/UL (ref 0–0.04)
IMM GRANULOCYTES # BLD AUTO: 0.09 K/UL (ref 0–0.04)
IMM GRANULOCYTES NFR BLD AUTO: 0.6 % (ref 0–0.5)
IMM GRANULOCYTES NFR BLD AUTO: 0.7 % (ref 0–0.5)
LYMPHOCYTES # BLD AUTO: 1 K/UL (ref 1–4.8)
LYMPHOCYTES # BLD AUTO: 1.6 K/UL (ref 1–4.8)
LYMPHOCYTES NFR BLD: 10.1 % (ref 18–48)
LYMPHOCYTES NFR BLD: 13.1 % (ref 18–48)
MAGNESIUM SERPL-MCNC: 1.8 MG/DL (ref 1.6–2.6)
MCH RBC QN AUTO: 29.2 PG (ref 27–31)
MCH RBC QN AUTO: 29.8 PG (ref 27–31)
MCHC RBC AUTO-ENTMCNC: 29.8 G/DL (ref 32–36)
MCHC RBC AUTO-ENTMCNC: 31.3 G/DL (ref 32–36)
MCV RBC AUTO: 100 FL (ref 82–98)
MCV RBC AUTO: 93 FL (ref 82–98)
MONOCYTES # BLD AUTO: 0.8 K/UL (ref 0.3–1)
MONOCYTES # BLD AUTO: 0.9 K/UL (ref 0.3–1)
MONOCYTES NFR BLD: 7.1 % (ref 4–15)
MONOCYTES NFR BLD: 8.2 % (ref 4–15)
NEUTROPHILS # BLD AUTO: 8 K/UL (ref 1.8–7.7)
NEUTROPHILS # BLD AUTO: 9.6 K/UL (ref 1.8–7.7)
NEUTROPHILS NFR BLD: 77.9 % (ref 38–73)
NEUTROPHILS NFR BLD: 79.9 % (ref 38–73)
NRBC BLD-RTO: 0 /100 WBC
NRBC BLD-RTO: 0 /100 WBC
NUM UNITS TRANS PACKED RBC: NORMAL
PHOSPHATE SERPL-MCNC: 3.6 MG/DL (ref 2.7–4.5)
PLATELET # BLD AUTO: 196 K/UL (ref 150–450)
PLATELET # BLD AUTO: 217 K/UL (ref 150–450)
PMV BLD AUTO: 10 FL (ref 9.2–12.9)
PMV BLD AUTO: 9.9 FL (ref 9.2–12.9)
POTASSIUM SERPL-SCNC: 4 MMOL/L (ref 3.5–5.1)
RBC # BLD AUTO: 2.33 M/UL (ref 4–5.4)
RBC # BLD AUTO: 2.42 M/UL (ref 4–5.4)
SODIUM SERPL-SCNC: 139 MMOL/L (ref 136–145)
WBC # BLD AUTO: 10.01 K/UL (ref 3.9–12.7)
WBC # BLD AUTO: 12.32 K/UL (ref 3.9–12.7)

## 2025-02-10 PROCEDURE — 25000003 PHARM REV CODE 250

## 2025-02-10 PROCEDURE — P9016 RBC LEUKOCYTES REDUCED: HCPCS

## 2025-02-10 PROCEDURE — 25000003 PHARM REV CODE 250: Performed by: NURSE ANESTHETIST, CERTIFIED REGISTERED

## 2025-02-10 PROCEDURE — 0W3P8ZZ CONTROL BLEEDING IN GASTROINTESTINAL TRACT, VIA NATURAL OR ARTIFICIAL OPENING ENDOSCOPIC: ICD-10-PCS | Performed by: INTERNAL MEDICINE

## 2025-02-10 PROCEDURE — 84100 ASSAY OF PHOSPHORUS: CPT

## 2025-02-10 PROCEDURE — 36415 COLL VENOUS BLD VENIPUNCTURE: CPT

## 2025-02-10 PROCEDURE — D9220A PRA ANESTHESIA: Mod: ANES,,, | Performed by: ANESTHESIOLOGY

## 2025-02-10 PROCEDURE — 36430 TRANSFUSION BLD/BLD COMPNT: CPT

## 2025-02-10 PROCEDURE — D9220A PRA ANESTHESIA: Mod: CRNA,,, | Performed by: NURSE ANESTHETIST, CERTIFIED REGISTERED

## 2025-02-10 PROCEDURE — 63600175 PHARM REV CODE 636 W HCPCS

## 2025-02-10 PROCEDURE — 37000008 HC ANESTHESIA 1ST 15 MINUTES: Performed by: INTERNAL MEDICINE

## 2025-02-10 PROCEDURE — 63600175 PHARM REV CODE 636 W HCPCS: Performed by: NURSE ANESTHETIST, CERTIFIED REGISTERED

## 2025-02-10 PROCEDURE — 30233N1 TRANSFUSION OF NONAUTOLOGOUS RED BLOOD CELLS INTO PERIPHERAL VEIN, PERCUTANEOUS APPROACH: ICD-10-PCS | Performed by: RADIOLOGY

## 2025-02-10 PROCEDURE — 83735 ASSAY OF MAGNESIUM: CPT

## 2025-02-10 PROCEDURE — 85025 COMPLETE CBC W/AUTO DIFF WBC: CPT

## 2025-02-10 PROCEDURE — 80048 BASIC METABOLIC PNL TOTAL CA: CPT

## 2025-02-10 PROCEDURE — 21400001 HC TELEMETRY ROOM

## 2025-02-10 PROCEDURE — 85025 COMPLETE CBC W/AUTO DIFF WBC: CPT | Mod: 91

## 2025-02-10 PROCEDURE — 37000009 HC ANESTHESIA EA ADD 15 MINS: Performed by: INTERNAL MEDICINE

## 2025-02-10 PROCEDURE — 45378 DIAGNOSTIC COLONOSCOPY: CPT | Performed by: INTERNAL MEDICINE

## 2025-02-10 PROCEDURE — 25000003 PHARM REV CODE 250: Performed by: INTERNAL MEDICINE

## 2025-02-10 PROCEDURE — 86922 COMPATIBILITY TEST ANTIGLOB: CPT

## 2025-02-10 RX ORDER — LIDOCAINE HYDROCHLORIDE 20 MG/ML
INJECTION, SOLUTION EPIDURAL; INFILTRATION; INTRACAUDAL; PERINEURAL
Status: DISCONTINUED | OUTPATIENT
Start: 2025-02-10 | End: 2025-02-10

## 2025-02-10 RX ORDER — PROPOFOL 10 MG/ML
VIAL (ML) INTRAVENOUS
Status: DISCONTINUED | OUTPATIENT
Start: 2025-02-10 | End: 2025-02-10

## 2025-02-10 RX ORDER — VASOPRESSIN 20 [USP'U]/ML
INJECTION, SOLUTION INTRAMUSCULAR; SUBCUTANEOUS
Status: DISCONTINUED | OUTPATIENT
Start: 2025-02-10 | End: 2025-02-10

## 2025-02-10 RX ORDER — MUPIROCIN 20 MG/G
OINTMENT TOPICAL 2 TIMES DAILY
Status: DISCONTINUED | OUTPATIENT
Start: 2025-02-10 | End: 2025-02-11 | Stop reason: HOSPADM

## 2025-02-10 RX ORDER — SODIUM CHLORIDE 0.9 % (FLUSH) 0.9 %
3 SYRINGE (ML) INJECTION
Status: DISCONTINUED | OUTPATIENT
Start: 2025-02-10 | End: 2025-02-10 | Stop reason: HOSPADM

## 2025-02-10 RX ORDER — HYDROCODONE BITARTRATE AND ACETAMINOPHEN 500; 5 MG/1; MG/1
TABLET ORAL
Status: DISCONTINUED | OUTPATIENT
Start: 2025-02-10 | End: 2025-02-11 | Stop reason: HOSPADM

## 2025-02-10 RX ORDER — GLUCAGON 1 MG
1 KIT INJECTION
Status: DISCONTINUED | OUTPATIENT
Start: 2025-02-10 | End: 2025-02-10 | Stop reason: HOSPADM

## 2025-02-10 RX ADMIN — THERA TABS 1 TABLET: TAB at 01:02

## 2025-02-10 RX ADMIN — VASOPRESSIN 2 UNITS: 20 INJECTION INTRAVENOUS at 11:02

## 2025-02-10 RX ADMIN — SODIUM CHLORIDE: 0.9 INJECTION, SOLUTION INTRAVENOUS at 11:02

## 2025-02-10 RX ADMIN — PANTOPRAZOLE SODIUM 40 MG: 40 INJECTION, POWDER, LYOPHILIZED, FOR SOLUTION INTRAVENOUS at 09:02

## 2025-02-10 RX ADMIN — CALCIUM CARBONATE (ANTACID) CHEW TAB 500 MG 1000 MG: 500 CHEW TAB at 01:02

## 2025-02-10 RX ADMIN — PROPOFOL 150 MCG/KG/MIN: 10 INJECTION, EMULSION INTRAVENOUS at 11:02

## 2025-02-10 RX ADMIN — PANTOPRAZOLE SODIUM 40 MG: 40 INJECTION, POWDER, LYOPHILIZED, FOR SOLUTION INTRAVENOUS at 08:02

## 2025-02-10 RX ADMIN — LIDOCAINE HYDROCHLORIDE 100 MG: 20 INJECTION, SOLUTION EPIDURAL; INFILTRATION; INTRACAUDAL; PERINEURAL at 11:02

## 2025-02-10 RX ADMIN — CHOLECALCIFEROL TAB 25 MCG (1000 UNIT) 1000 UNITS: 25 TAB at 01:02

## 2025-02-10 RX ADMIN — AMLODIPINE BESYLATE 10 MG: 10 TABLET ORAL at 01:02

## 2025-02-10 RX ADMIN — PROPOFOL 50 MG: 10 INJECTION, EMULSION INTRAVENOUS at 11:02

## 2025-02-10 RX ADMIN — Medication 6 MG: at 09:02

## 2025-02-10 RX ADMIN — MUPIROCIN: 20 OINTMENT TOPICAL at 01:02

## 2025-02-10 RX ADMIN — ATORVASTATIN CALCIUM 80 MG: 40 TABLET, FILM COATED ORAL at 01:02

## 2025-02-10 RX ADMIN — SODIUM CHLORIDE, POTASSIUM CHLORIDE, SODIUM LACTATE AND CALCIUM CHLORIDE 250 ML: 600; 310; 30; 20 INJECTION, SOLUTION INTRAVENOUS at 05:02

## 2025-02-10 RX ADMIN — LOSARTAN POTASSIUM 100 MG: 50 TABLET, FILM COATED ORAL at 01:02

## 2025-02-10 RX ADMIN — VASOPRESSIN 1 UNITS: 20 INJECTION INTRAVENOUS at 11:02

## 2025-02-10 RX ADMIN — ACETAMINOPHEN 650 MG: 325 TABLET ORAL at 05:02

## 2025-02-10 RX ADMIN — FERROUS SULFATE TAB EC 325 MG (65 MG FE EQUIVALENT) 1 EACH: 325 (65 FE) TABLET DELAYED RESPONSE at 01:02

## 2025-02-10 RX ADMIN — CYANOCOBALAMIN TAB 1000 MCG 1000 MCG: 1000 TAB at 01:02

## 2025-02-10 NOTE — ASSESSMENT & PLAN NOTE
- 89 y.o. F with history of GI bleeding, ANANDA 2/2 chronic blood loss, and colon cancer s/p right hemicolectomy in March of 2020 presenting to the ED with recurrent GI bleeding most c/w hematochezia.   - HDS without tachycardia or hypotension   - Hgb 9.4 -> 6.8 (baseline~ 10.2 in January). Will transfuse 1u RBC.   - Protonix 80mg IV bolus x 1 then start Protonix 40mg IV BID though source of bleeding is most likely lower  - Hold all NSAIDs and heparin products  - Follow H/H with CBC daily   - IVF hydration; maintain access with 2 large bore IVs  - Orthostatic vitals Q shift   - Anti-emetics as needed  - Transfuse pRBCs as needed for Hgb <7, type and screen ordered   - Correct any coagulopathy with platelets and FFP to a goal of platelets >50K and INR <2.0  - GI consulted, appreciate recs   Impression:              - Hemorrhoids found on perianal exam.       - Blood in the entire examined colon.       - Side-to-side ileo-colonic anastomosis, characterized by erosion and erythema.       - One erosion at the colonic anastomosis with  active oozing blood. Clips (MR conditional) were placed.       - The examination was otherwise normal.       - No specimens collected.   Recommendation:          - Return patient to hospital [facility] for ongoing care.               - Advance diet as tolerated.               - Continue present medications.               - No recommendation at this time regarding repeat colonoscopy.               - Monitor clinical course.

## 2025-02-10 NOTE — SUBJECTIVE & OBJECTIVE
Interval History: NAEON. HDS. Patient underwent colonoscopy today. Site of anastomosis bleeding was clipped. Repeat Hgb down trended to 6.8. Will administer 1 unit RBC today. Repeat Hgb tonight to trend. She denies any blood bowel movements since yesterday. Patient without complains. Patient denies dizziness, lightheadedness, headache, CP, SOB, NVD, or weakness. Discussed SNF placement as recommended by PT/OT, patient declined and would like HH options.     Review of Systems   Constitutional:  Negative for chills and fever.   Respiratory:  Negative for chest tightness and shortness of breath.    Cardiovascular:  Negative for chest pain and leg swelling.   Gastrointestinal:  Negative for abdominal pain and nausea.   Neurological:  Negative for dizziness and weakness.     Objective:     Vital Signs (Most Recent):  Temp: 97.9 °F (36.6 °C) (02/10/25 1215)  Pulse: 81 (02/10/25 1434)  Resp: 12 (02/10/25 1215)  BP: (!) 112/53 (02/10/25 1308)  SpO2: 99 % (02/10/25 1215) Vital Signs (24h Range):  Temp:  [97.4 °F (36.3 °C)-99 °F (37.2 °C)] 97.9 °F (36.6 °C)  Pulse:  [] 81  Resp:  [6-20] 12  SpO2:  [93 %-100 %] 99 %  BP: ()/(49-62) 112/53     Weight: 51.8 kg (114 lb 3.2 oz)  Body mass index is 17.89 kg/m².    Intake/Output Summary (Last 24 hours) at 2/10/2025 1447  Last data filed at 2/10/2025 1126  Gross per 24 hour   Intake 660 ml   Output 500 ml   Net 160 ml         Physical Exam  Vitals and nursing note reviewed.   Constitutional:       Appearance: She is well-developed.   Eyes:      Pupils: Pupils are equal, round, and reactive to light.   Cardiovascular:      Rate and Rhythm: Normal rate and regular rhythm.   Pulmonary:      Effort: Pulmonary effort is normal.      Breath sounds: Normal breath sounds.   Abdominal:      Palpations: Abdomen is soft.      Tenderness: There is no abdominal tenderness.   Musculoskeletal:         General: No tenderness.   Skin:     General: Skin is warm and dry.   Neurological:       Mental Status: She is alert and oriented to person, place, and time.   Psychiatric:         Behavior: Behavior normal.             Significant Labs: All pertinent labs within the past 24 hours have been reviewed.  CBC:   Recent Labs   Lab 02/09/25  0337 02/10/25  0506 02/10/25  1408   WBC 7.93 12.32 10.01   HGB 8.6* 7.2* 6.8*   HCT 27.0* 24.2* 21.7*    217 196     CMP:   Recent Labs   Lab 02/09/25  0337 02/10/25  0506   * 139   K 4.1 4.0    103   CO2 23 24   GLU 91 83   BUN 16 16   CREATININE 0.7 0.9   CALCIUM 8.5* 8.3*   ANIONGAP 9 12       Significant Imaging: I have reviewed all pertinent imaging results/findings within the past 24 hours.    CTA Acute GI Bleed, Abdomen and Pelvis  Narrative: EXAMINATION:  CTA ACUTE GI BLEED, ABDOMEN AND PELVIS    CLINICAL HISTORY:  GI bleed;gi bleed;    TECHNIQUE:  Contiguous 2.5-mm axial images were obtained through the abdomen and pelvis following the administration of 75 cc of intravenous Omnipaque 350.  Sagittal and coronal reformats and maximum intensity projections were also submitted.    COMPARISON:  CTA of the abdomen and pelvis performed 12/08/2024.    FINDINGS:  BOWEL AND PERITONEUM    Bowel: Redemonstrated operative sequela partial colectomy with anastomosis again seen in the abdomen.  No evidence of bowel obstruction.    Intraluminal intestinal hemorrhage: None.    Free air or fluid: None.    VASCULATURE    Visceral arteries: Celiac, superior mesenteric, renal, and inferior mesenteric arteries appear grossly patent.  Moderate narrowing at the celiac axis origin.  Moderate narrowing at the left renal artery origin.  Short segment moderate to high-grade narrowing at the OSCAR origin.    Abdominal aorta and iliac arteries: Nonaneurysmal.  Heavy atherosclerosis.    Mesenteric and portal veins: Normally patent.    Inferior vena cava: Normally patent.    ABDOMEN/PELVIS    Lower chest: Heavy atherosclerotic calcifications of the coronary arteries.  Dense  mitral annular and aortic valvular calcifications.  9 mm solid nodule right lower lobe (series 2, image 10).  This appears essentially unchanged dating back to at least 02/26/2021 CT of the chest, and as such is felt most likely benign.  Atelectasis and/or scar noted elsewhere at the visualized lung bases.    Liver: Normal contour.  Suggested calcified granuloma.    Gallbladder/bile ducts: Cholelithiasis.  No definite pericholecystic inflammation or biliary ductal dilatation.    Pancreas: Unremarkable.    Spleen: Unremarkable.    Adrenals: Unremarkable.    Kidneys: Unremarkable.    Lymph nodes: No abdominal or pelvic lymphadenopathy.    Pelvis: Unremarkable.    Urinary bladder: Unremarkable.    Body wall: Relatively similar appearance of rectus diastasis with ventral hernia containing loops of nonobstructed, noninflamed small bowel compared to prior CT imaging of 12/08/2024.    Bones: No definite acute change.  Query osseous demineralization.  Remote fracture of the left inferior pubic ramus.  Remote operative sequela of left hip.  Impression: 1. No intraluminal intestinal, intraperitoneal, or retroperitoneal hemorrhage.  2. No definite acute findings identified in the abdomen or pelvis.  3. Additional details of chronic and incidental findings, as provided in the body of the report.    Electronically signed by: Hong Willis  Date:    02/08/2025  Time:    08:17  CT Cervical Spine Without Contrast  Narrative: EXAMINATION:  CT HEAD WITHOUT CONTRAST; CT CERVICAL SPINE WITHOUT CONTRAST    CLINICAL HISTORY:  Head trauma, minor (Age >= 65y);; Neck trauma (Age >= 65y);    TECHNIQUE:  Low dose axial images were obtained through the head and cervical spine.  Coronal and sagittal reformations were also performed. Contrast was not administered.    COMPARISON:  CT head and cervical spine 12/21/2023.    FINDINGS:  Head:    Blood: No acute intracranial hemorrhage.    Parenchyma: No definite loss of gray-white differentiation to  suggest acute or subacute transcortical infarct. 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  Generalized pattern of age-related parenchymal volume loss is noted elsewhere.  Nonspecific areas of white matter hypoattenuation may reflect sequela of chronic small vessel ischemic disease.    Ventricles/Extra-axial spaces: No abnormal extra-axial fluid collection. Basal cisterns are patent.    Vessels: Moderate atherosclerotic calcification.    Orbits: Status post bilateral lens replacements.    Scalp: Posterior scalp hematoma.    Skull: There are no depressed skull fractures or destructive bone lesions.  Stable nonaggressive appearing sclerotic lesion within the basisphenoid.    Sinuses and mastoids: Scattered minimal paranasal sinus mucosal thickening with prominent retention cysts within the alveolar recess of the right maxillary sinus.    Other findings: None    Cervical spine:    Fractures: No acute fractures    Alignment: There is no significant vertebral subluxation  Atlanto-axial and atlanto-occipital joints: Atlanto-axial and atlanto-occipital intervals are not widened.  Facet joints: There is no traumatic facet joint widening.  Vertebral bodies: Suspect osseous demineralization.  Relatively modest degenerative endplate change.  Discs: Degenerative disc disease.  Spinal canal and foraminal narrowing: Although CT does not optimally evaluate the soft tissue contents of the spinal canal and foramina, no critical stenosis is suggested.    At C4-5, mild-to-moderate central spinal canal stenosis and moderate right foraminal narrowing new at C5-6, mild central spinal canal stenosis    Paraspinal soft tissues: Right chest port.    Upper Lungs:Clear  Impression: 1. No acute intracranial findings.  2. No acute cervical spine fracture.    Electronically signed by: Hong Willis  Date:    02/08/2025  Time:    07:57  CT Head Without Contrast  Narrative: EXAMINATION:  CT HEAD WITHOUT CONTRAST;  CT CERVICAL SPINE WITHOUT CONTRAST    CLINICAL HISTORY:  Head trauma, minor (Age >= 65y);; Neck trauma (Age >= 65y);    TECHNIQUE:  Low dose axial images were obtained through the head and cervical spine.  Coronal and sagittal reformations were also performed. Contrast was not administered.    COMPARISON:  CT head and cervical spine 12/21/2023.    FINDINGS:  Head:    Blood: No acute intracranial hemorrhage.    Parenchyma: No definite loss of gray-white differentiation to suggest acute or subacute transcortical infarct. 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  Generalized pattern of age-related parenchymal volume loss is noted elsewhere.  Nonspecific areas of white matter hypoattenuation may reflect sequela of chronic small vessel ischemic disease.    Ventricles/Extra-axial spaces: No abnormal extra-axial fluid collection. Basal cisterns are patent.    Vessels: Moderate atherosclerotic calcification.    Orbits: Status post bilateral lens replacements.    Scalp: Posterior scalp hematoma.    Skull: There are no depressed skull fractures or destructive bone lesions.  Stable nonaggressive appearing sclerotic lesion within the basisphenoid.    Sinuses and mastoids: Scattered minimal paranasal sinus mucosal thickening with prominent retention cysts within the alveolar recess of the right maxillary sinus.    Other findings: None    Cervical spine:    Fractures: No acute fractures    Alignment: There is no significant vertebral subluxation  Atlanto-axial and atlanto-occipital joints: Atlanto-axial and atlanto-occipital intervals are not widened.  Facet joints: There is no traumatic facet joint widening.  Vertebral bodies: Suspect osseous demineralization.  Relatively modest degenerative endplate change.  Discs: Degenerative disc disease.  Spinal canal and foraminal narrowing: Although CT does not optimally evaluate the soft tissue contents of the spinal canal and foramina, no critical  stenosis is suggested.    At C4-5, mild-to-moderate central spinal canal stenosis and moderate right foraminal narrowing new at C5-6, mild central spinal canal stenosis    Paraspinal soft tissues: Right chest port.    Upper Lungs:Clear  Impression: 1. No acute intracranial findings.  2. No acute cervical spine fracture.    Electronically signed by: Hong Willis  Date:    02/08/2025  Time:    07:57

## 2025-02-10 NOTE — TREATMENT PLAN
GI Treatment Plan    S/p Colonoscopy today    Impression:            - Hemorrhoids found on perianal exam.                          - Blood in the entire examined colon.                          - Side-to-side ileo-colonic anastomosis,                          characterized by erosion and erythema.                          - One erosion at the colonic anastomosis with                          active oozing blood. Clips (MR conditional) were                          placed.                          - The examination was otherwise normal.                          - No specimens collected.   Recommendation:        - [facility].                          - Advance diet as tolerated.                          - Continue present medications.                          - No recommendation at this time regarding repeat                          colonoscopy.                          - Monitor clinical course.     Gi will continue to follow    Mino Hernandez MD  Gastroenterology Fellow, PGY-IV

## 2025-02-10 NOTE — NURSING TRANSFER
Nursing Transfer Note      2/10/2025   12:23 PM    Nurse giving handoff:CAMRYN Garner  Nurse receiving handoff:CAMRYN An      Transfer To: [id number]    Transfer via stretcher    Transfer with cardiac monitoring    Transported by PCT      Telemetry: Box Number [id number], Rate 82, and Rhythm NS  Order for Tele Monitor? Yes      Patient belongings transferred with patient: No    Chart send with patient: Yes    Patient reassessed at: 1145

## 2025-02-10 NOTE — PROVATION PATIENT INSTRUCTIONS
Discharge Summary/Instructions after an Endoscopic Procedure  Patient Name: Nydia Stevens  Patient MRN: [id number]  Patient YOB: 1934  Monday, February 10, 2025  Terrance Galvez MD  Dear patient,  As a result of recent federal legislation (The Federal Cures Act), you may   receive lab or pathology results from your procedure in your [facility]   account before your physician is able to contact you. Your physician or   their representative will relay the results to you with their   recommendations at their soonest availability.  Thank you,  RESTRICTIONS:  During your procedure today, you received medications for sedation.  These   medications may affect your judgment, balance and coordination.  Therefore,   for 24 hours, you have the following restrictions:   - DO NOT drive a car, operate machinery, make legal/financial decisions,   sign important papers or drink alcohol.    ACTIVITY:  Today: no heavy lifting, straining or running due to procedural   sedation/anesthesia.  The following day: return to full activity including work.  DIET:  Eat and drink normally unless instructed otherwise.     TREATMENT FOR COMMON SIDE EFFECTS:  - Mild abdominal pain, nausea, belching, bloating or excessive gas:  rest,   eat lightly and use a heating pad.  - Sore Throat: treat with throat lozenges and/or gargle with warm salt   water.  - Because air was used during the procedure, expelling large amounts of air   from your rectum or belching is normal.  - If a bowel prep was taken, you may not have a bowel movement for 1-3 days.    This is normal.  SYMPTOMS TO WATCH FOR AND REPORT TO YOUR PHYSICIAN:  1. Abdominal pain or bloating, other than gas cramps.  2. Chest pain.  3. Back pain.  4. Signs of infection such as: chills or fever occurring within 24 hours   after the procedure.  5. Rectal bleeding, which would show as bright red, maroon, or black stools.   (A tablespoon of blood from the rectum is not serious,  especially if   hemorrhoids are present.)  6. Vomiting.  7. Weakness or dizziness.  GO DIRECTLY TO THE NEAREST EMERGENCY ROOM IF YOU HAVE ANY OF THE FOLLOWING:      Difficulty breathing              Chills and/or fever over 101 F   Persistent vomiting and/or vomiting blood   Severe abdominal pain   Severe chest pain   Black, tarry stools   Bleeding- more than one tablespoon   Any other symptom or condition that you feel may need urgent attention  Your doctor recommends these additional instructions:  If any biopsies were taken, your doctors clinic will contact you in 1 to 2   weeks with any results.  - [facility].   - Advance diet as tolerated.   - Continue present medications.   - No recommendation at this time regarding repeat colonoscopy.   - Monitor clinical course.  For questions, problems or results please call your physician - Terrance Galvez MD at Work:  [phone].  [facility], EMERGENCY ROOM PHONE NUMBER: [phone]  IF A COMPLICATION OR EMERGENCY SITUATION ARISES AND YOU ARE UNABLE TO REACH   YOUR PHYSICIAN - GO DIRECTLY TO THE EMERGENCY ROOM.  Terrance Galvez MD  2/10/2025 11:44:20 AM  This report has been verified and signed electronically.  Dear patient,  As a result of recent federal legislation (The Federal Cures Act), you may   receive lab or pathology results from your procedure in your [facility]   account before your physician is able to contact you. Your physician or   their representative will relay the results to you with their   recommendations at their soonest availability.  Thank you,  PROVATION

## 2025-02-10 NOTE — ASSESSMENT & PLAN NOTE
Anemia is likely due to acute blood loss which was from GI bleeding and Iron deficiency. Most recent hemoglobin and hematocrit are listed below.  Recent Labs     02/09/25  0337 02/10/25  0506 02/10/25  1408   HGB 8.6* 7.2* 6.8*   HCT 27.0* 24.2* 21.7*       Plan  - Monitor serial CBC: Daily  - Transfuse PRBC if patient becomes hemodynamically unstable, symptomatic or H/H drops below 7/21.  - Patient's anemia is currently worsening.   - See hematochezia   - Will transfuse 1 unit RBC today  - consent obtained and placed in chart.   - Repeat Hgb pending for tonight.

## 2025-02-10 NOTE — INTERVAL H&P NOTE
The patient has been examined and the H&P has been reviewed:    Pre-Procedure H and P Addendum    Patient seen and examined.  History and exam unchanged from prior history and physical.      Procedure: Colonoscopy   Indication: Hematochezia   ASA Class: per anesthesiology  Airway: normal  Neck Mobility: full range of motion  Mallampatti score: per anesthesia  History of anesthesia problems: no  Family history of anesthesia problems: no  Anesthesia Plan: MAC      Active Hospital Problems    Diagnosis  POA    *Hematochezia [K92.1]  Yes    Body mass index (BMI) less than 19 [Z68.1]  Not Applicable    Chronic diastolic heart failure [I50.32]  Yes    Acute blood loss anemia [D62]  Yes    Cachexia [R64]  Yes    Debility [R53.81]  Yes    Primary hypertension [I10]  Yes    Mixed hyperlipidemia [E78.2]  Yes     Chronic      Resolved Hospital Problems    Diagnosis Date Resolved POA    Iron deficiency anemia [D50.9] 12/19/2023 Yes     Requiring transfusion in the past.  Associated with colon cancer.

## 2025-02-10 NOTE — ANESTHESIA POSTPROCEDURE EVALUATION
Anesthesia Post Evaluation    Patient: Nydia Stevens    Procedure(s) Performed: Procedure(s) (LRB):  COLONOSCOPY (N/A)    Final Anesthesia Type: general      Patient location during evaluation: PACU  Patient participation: Yes- Able to Participate  Level of consciousness: awake and alert  Post-procedure vital signs: reviewed and stable  Pain management: adequate  Airway patency: patent    PONV status at discharge: No PONV  Anesthetic complications: no      Cardiovascular status: blood pressure returned to baseline  Respiratory status: unassisted  Hydration status: euvolemic  Follow-up not needed.              Vitals Value Taken Time   /53 02/10/25 1216   Temp 36.6 °C (97.9 °F) 02/10/25 1215   Pulse 74 02/10/25 1223   Resp 13 02/10/25 1223   SpO2 98 % 02/10/25 1223   Vitals shown include unfiled device data.      No case tracking events are documented in the log.      Pain/Connie Score: Pain Rating Prior to Med Admin: 5 (2/9/2025  4:14 PM)  Pain Rating Post Med Admin: 0 (2/9/2025  5:14 PM)  Connie Score: 10 (2/10/2025 12:00 PM)

## 2025-02-10 NOTE — PT/OT/SLP PROGRESS
Occupational Therapy      Patient Name:  Nydia Stevens   MRN:  [id number]    Patient not seen today secondary to pt. Away at procedure (colonoscopy)  2/10/2025

## 2025-02-10 NOTE — TRANSFER OF CARE
"Anesthesia Transfer of Care Note    Patient: Nydia Stevens    Procedure(s) Performed: Procedure(s) (LRB):  COLONOSCOPY (N/A)    Patient location: PACU    Anesthesia Type: MAC    Transport from OR: Transported from OR on room air with adequate spontaneous ventilation    Post pain: adequate analgesia    Post assessment: no apparent anesthetic complications    Post vital signs: stable    Level of consciousness: sedated    Nausea/Vomiting: no nausea/vomiting    Complications: none    Transfer of care protocol was followed      Last vitals: Visit Vitals  BP (!) 122/58 (BP Location: Left arm, Patient Position: Lying)   Pulse 80   Temp 36.6 °C (97.9 °F) (Oral)   Resp 20   Ht 5' 7" (1.702 m)   Wt 51.8 kg (114 lb 3.2 oz)   SpO2 95%   Breastfeeding No   BMI 17.89 kg/m²     "

## 2025-02-10 NOTE — ASSESSMENT & PLAN NOTE
Patient with Chronic debility due to age-related physical debility. The patient's latest AMPAC (Activity Measure for Post Acute Care) Score is listed below.    AM-PAC Score - How much help does the patient need for each activity listed  Basic Mobility Total Score: 14  Turning over in bed (including adjusting bedclothes, sheets and blankets)?: A little  Sitting down on and standing up from a chair with arms (e.g., wheelchair, bedside commode, etc.): A lot  Moving from lying on back to sitting on the side of the bed?: A little  Moving to and from a bed to a chair (including a wheelchair)?: A lot  Need to walk in hospital room?: A lot  Climbing 3-5 steps with a railing?: A lot    Plan  - Progressive mobility protocol initated  - PT/OT consulted, recommended moderate intensity therapy. Patient declined and would like HH.   - Fall precautions in place

## 2025-02-10 NOTE — PLAN OF CARE
Problem: Skin Injury Risk Increased  Goal: Skin Health and Integrity  Outcome: Progressing     Problem: Gastrointestinal Bleeding  Goal: Optimal Coping with Acute Illness  Outcome: Progressing  Goal: Hemostasis  Outcome: Progressing     Problem: Infection  Goal: Absence of Infection Signs and Symptoms  Outcome: Progressing     Problem: Fall Injury Risk  Goal: Absence of Fall and Fall-Related Injury  Outcome: Progressing

## 2025-02-10 NOTE — PROGRESS NOTES
[facility] [address] [place] [address] Medicine  Progress Note    Patient Name: Nydia Stevens  MRN: [id number]  Patient Class: IP- Inpatient   Admission Date: 2/8/2025  Length of Stay: 1 days  Attending Physician: Mike Vargas DO  Primary Care Provider: Brittney Travis MD        Subjective     Principal Problem:Hematochezia        HPI:  Sister Nydia Stevens is a 89 y.o. F with HTN, HLD, prediabetes, ANANDA 2/2 chronic blood loss, and colon cancer s/p right hemicolectomy in March of 2020 who is presenting to the ED for evaluation of GI bleeding and presyncopal episode. She reports having one episode of dark, tarry stool, along with bright red blood, early this morning. She attempted to grab onto her walker to stand from the toilet and felt weak and lightheaded and lowered herself to thr ground. She denies any falls or syncope and did not hit or head or lose consciousness. She is not on anticoagulation or antiplatelets. Of note, she was admitted in early December with bright red blood per rectum & underwent EGD/colon but colonoscopy prep was inadequate. She is on PPI daily and daily iron supplement, which she is compliant with. Pt denies fever, chills, chest pain, palpitations, SOB, cough, abdominal pain, N/V/D, dysuria, leg swelling or pain, HA, or recent sick contacts.     In the ED: [facility]SS[facility]. CBC with stable normocytic anemia with Hgb 9.4 (from 10.2 on 1/16/25), no leukocytosis. CMP, BNP, HS troponin, and uA grossly unremarkable. CTH and c-spine without acute process. CTA without active extravasation or acute findings. Pt was given IV protonix 80 mg and placed in observation for further management.        Most Recent Upper Endoscopy:   12/9/24  Impression:    - Normal esophagus.                          - Erosive gastropathy with no stigmata of recent                          bleeding.                          - Normal examined duodenum.                          - No specimens collected.      Most Recent  Colonoscopy:   12/9/24  Impression:     - Preparation of the colon was inadequate.                          - Non-bleeding internal hemorrhoids.                          - Stool in the transverse colon.                          - No specimens collected.    Overview/Hospital Course:  Sister Nydia was placed in observation for hematochezia and acute blood loss anemia. Pt was started on IV protonix at admission. Hgb stable but downtrending (9.4 -> 7.2. GI consulted and suspect a lower GI bleeding. S/p colonoscopy with clipping. Repeat CBC showed Hgb of 6.8, will administer 1 unit RBC.     Interval History: NAEON. HDS. Patient underwent colonoscopy today. Site of anastomosis bleeding was clipped. Repeat Hgb down trended to 6.8. Will administer 1 unit RBC today. Repeat Hgb tonight to trend. She denies any blood bowel movements since yesterday. Patient without complains. Patient denies dizziness, lightheadedness, headache, CP, SOB, NVD, or weakness. Discussed SNF placement as recommended by PT/OT, patient declined and would like HH options.     Review of Systems   Constitutional:  Negative for chills and fever.   Respiratory:  Negative for chest tightness and shortness of breath.    Cardiovascular:  Negative for chest pain and leg swelling.   Gastrointestinal:  Negative for abdominal pain and nausea.   Neurological:  Negative for dizziness and weakness.     Objective:     Vital Signs (Most Recent):  Temp: 97.9 °F (36.6 °C) (02/10/25 1215)  Pulse: 81 (02/10/25 1434)  Resp: 12 (02/10/25 1215)  BP: (!) 112/53 (02/10/25 1308)  SpO2: 99 % (02/10/25 1215) Vital Signs (24h Range):  Temp:  [97.4 °F (36.3 °C)-99 °F (37.2 °C)] 97.9 °F (36.6 °C)  Pulse:  [] 81  Resp:  [6-20] 12  SpO2:  [93 %-100 %] 99 %  BP: ()/(49-62) 112/53     Weight: 51.8 kg (114 lb 3.2 oz)  Body mass index is 17.89 kg/m².    Intake/Output Summary (Last 24 hours) at 2/10/2025 3977  Last data filed at 2/10/2025 1126  Gross per 24 hour   Intake 660  ml   Output 500 ml   Net 160 ml         Physical Exam  Vitals and nursing note reviewed.   Constitutional:       Appearance: She is well-developed.   Eyes:      Pupils: Pupils are equal, round, and reactive to light.   Cardiovascular:      Rate and Rhythm: Normal rate and regular rhythm.   Pulmonary:      Effort: Pulmonary effort is normal.      Breath sounds: Normal breath sounds.   Abdominal:      Palpations: Abdomen is soft.      Tenderness: There is no abdominal tenderness.   Musculoskeletal:         General: No tenderness.   Skin:     General: Skin is warm and dry.   Neurological:      Mental Status: She is alert and oriented to person, place, and time.   Psychiatric:         Behavior: Behavior normal.             Significant Labs: All pertinent labs within the past 24 hours have been reviewed.  CBC:   Recent Labs   Lab 02/09/25  0337 02/10/25  0506 02/10/25  1408   WBC 7.93 12.32 10.01   HGB 8.6* 7.2* 6.8*   HCT 27.0* 24.2* 21.7*    217 196     CMP:   Recent Labs   Lab 02/09/25  0337 02/10/25  0506   * 139   K 4.1 4.0    103   CO2 23 24   GLU 91 83   BUN 16 16   CREATININE 0.7 0.9   CALCIUM 8.5* 8.3*   ANIONGAP 9 12       Significant Imaging: I have reviewed all pertinent imaging results/findings within the past 24 hours.    CTA Acute GI Bleed, Abdomen and Pelvis  Narrative: EXAMINATION:  CTA ACUTE GI BLEED, ABDOMEN AND PELVIS    CLINICAL HISTORY:  GI bleed;gi bleed;    TECHNIQUE:  Contiguous 2.5-mm axial images were obtained through the abdomen and pelvis following the administration of 75 cc of intravenous Omnipaque 350.  Sagittal and coronal reformats and maximum intensity projections were also submitted.    COMPARISON:  CTA of the abdomen and pelvis performed 12/08/2024.    FINDINGS:  BOWEL AND PERITONEUM    Bowel: Redemonstrated operative sequela partial colectomy with anastomosis again seen in the abdomen.  No evidence of bowel obstruction.    Intraluminal intestinal hemorrhage:  None.    Free air or fluid: None.    VASCULATURE    Visceral arteries: Celiac, superior mesenteric, renal, and inferior mesenteric arteries appear grossly patent.  Moderate narrowing at the celiac axis origin.  Moderate narrowing at the left renal artery origin.  Short segment moderate to high-grade narrowing at the OSCAR origin.    Abdominal aorta and iliac arteries: Nonaneurysmal.  Heavy atherosclerosis.    Mesenteric and portal veins: Normally patent.    Inferior vena cava: Normally patent.    ABDOMEN/PELVIS    Lower chest: Heavy atherosclerotic calcifications of the coronary arteries.  Dense mitral annular and aortic valvular calcifications.  9 mm solid nodule right lower lobe (series 2, image 10).  This appears essentially unchanged dating back to at least 02/26/2021 CT of the chest, and as such is felt most likely benign.  Atelectasis and/or scar noted elsewhere at the visualized lung bases.    Liver: Normal contour.  Suggested calcified granuloma.    Gallbladder/bile ducts: Cholelithiasis.  No definite pericholecystic inflammation or biliary ductal dilatation.    Pancreas: Unremarkable.    Spleen: Unremarkable.    Adrenals: Unremarkable.    Kidneys: Unremarkable.    Lymph nodes: No abdominal or pelvic lymphadenopathy.    Pelvis: Unremarkable.    Urinary bladder: Unremarkable.    Body wall: Relatively similar appearance of rectus diastasis with ventral hernia containing loops of nonobstructed, noninflamed small bowel compared to prior CT imaging of 12/08/2024.    Bones: No definite acute change.  Query osseous demineralization.  Remote fracture of the left inferior pubic ramus.  Remote operative sequela of left hip.  Impression: 1. No intraluminal intestinal, intraperitoneal, or retroperitoneal hemorrhage.  2. No definite acute findings identified in the abdomen or pelvis.  3. Additional details of chronic and incidental findings, as provided in the body of the report.    Electronically signed by: Hong  Quinet  Date:    02/08/2025  Time:    08:17  CT Cervical Spine Without Contrast  Narrative: EXAMINATION:  CT HEAD WITHOUT CONTRAST; CT CERVICAL SPINE WITHOUT CONTRAST    CLINICAL HISTORY:  Head trauma, minor (Age >= 65y);; Neck trauma (Age >= 65y);    TECHNIQUE:  Low dose axial images were obtained through the head and cervical spine.  Coronal and sagittal reformations were also performed. Contrast was not administered.    COMPARISON:  CT head and cervical spine 12/21/2023.    FINDINGS:  Head:    Blood: No acute intracranial hemorrhage.    Parenchyma: No definite loss of gray-white differentiation to suggest acute or subacute transcortical infarct. 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  Generalized pattern of age-related parenchymal volume loss is noted elsewhere.  Nonspecific areas of white matter hypoattenuation may reflect sequela of chronic small vessel ischemic disease.    Ventricles/Extra-axial spaces: No abnormal extra-axial fluid collection. Basal cisterns are patent.    Vessels: Moderate atherosclerotic calcification.    Orbits: Status post bilateral lens replacements.    Scalp: Posterior scalp hematoma.    Skull: There are no depressed skull fractures or destructive bone lesions.  Stable nonaggressive appearing sclerotic lesion within the basisphenoid.    Sinuses and mastoids: Scattered minimal paranasal sinus mucosal thickening with prominent retention cysts within the alveolar recess of the right maxillary sinus.    Other findings: None    Cervical spine:    Fractures: No acute fractures    Alignment: There is no significant vertebral subluxation  Atlanto-axial and atlanto-occipital joints: Atlanto-axial and atlanto-occipital intervals are not widened.  Facet joints: There is no traumatic facet joint widening.  Vertebral bodies: Suspect osseous demineralization.  Relatively modest degenerative endplate change.  Discs: Degenerative disc disease.  Spinal canal and  foraminal narrowing: Although CT does not optimally evaluate the soft tissue contents of the spinal canal and foramina, no critical stenosis is suggested.    At C4-5, mild-to-moderate central spinal canal stenosis and moderate right foraminal narrowing new at C5-6, mild central spinal canal stenosis    Paraspinal soft tissues: Right chest port.    Upper Lungs:Clear  Impression: 1. No acute intracranial findings.  2. No acute cervical spine fracture.    Electronically signed by: Hong Willis  Date:    02/08/2025  Time:    07:57  CT Head Without Contrast  Narrative: EXAMINATION:  CT HEAD WITHOUT CONTRAST; CT CERVICAL SPINE WITHOUT CONTRAST    CLINICAL HISTORY:  Head trauma, minor (Age >= 65y);; Neck trauma (Age >= 65y);    TECHNIQUE:  Low dose axial images were obtained through the head and cervical spine.  Coronal and sagittal reformations were also performed. Contrast was not administered.    COMPARISON:  CT head and cervical spine 12/21/2023.    FINDINGS:  Head:    Blood: No acute intracranial hemorrhage.    Parenchyma: No definite loss of gray-white differentiation to suggest acute or subacute transcortical infarct. 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  Generalized pattern of age-related parenchymal volume loss is noted elsewhere.  Nonspecific areas of white matter hypoattenuation may reflect sequela of chronic small vessel ischemic disease.    Ventricles/Extra-axial spaces: No abnormal extra-axial fluid collection. Basal cisterns are patent.    Vessels: Moderate atherosclerotic calcification.    Orbits: Status post bilateral lens replacements.    Scalp: Posterior scalp hematoma.    Skull: There are no depressed skull fractures or destructive bone lesions.  Stable nonaggressive appearing sclerotic lesion within the basisphenoid.    Sinuses and mastoids: Scattered minimal paranasal sinus mucosal thickening with prominent retention cysts within the alveolar recess of the right  maxillary sinus.    Other findings: None    Cervical spine:    Fractures: No acute fractures    Alignment: There is no significant vertebral subluxation  Atlanto-axial and atlanto-occipital joints: Atlanto-axial and atlanto-occipital intervals are not widened.  Facet joints: There is no traumatic facet joint widening.  Vertebral bodies: Suspect osseous demineralization.  Relatively modest degenerative endplate change.  Discs: Degenerative disc disease.  Spinal canal and foraminal narrowing: Although CT does not optimally evaluate the soft tissue contents of the spinal canal and foramina, no critical stenosis is suggested.    At C4-5, mild-to-moderate central spinal canal stenosis and moderate right foraminal narrowing new at C5-6, mild central spinal canal stenosis    Paraspinal soft tissues: Right chest port.    Upper Lungs:Clear  Impression: 1. No acute intracranial findings.  2. No acute cervical spine fracture.    Electronically signed by: Hong Willis  Date:    02/08/2025  Time:    07:57        Assessment and Plan     * Hematochezia  - 89 y.o. F with history of GI bleeding, ANANDA 2/2 chronic blood loss, and colon cancer s/p right hemicolectomy in March of 2020 presenting to the ED with recurrent GI bleeding most c/w hematochezia.   - HDS without tachycardia or hypotension   - Hgb 9.4 -> 6.8 (baseline~ 10.2 in January). Will transfuse 1u RBC.   - Protonix 80mg IV bolus x 1 then start Protonix 40mg IV BID though source of bleeding is most likely lower  - Hold all NSAIDs and heparin products  - Follow H/H with CBC daily   - IVF hydration; maintain access with 2 large bore IVs  - Orthostatic vitals Q shift   - Anti-emetics as needed  - Transfuse pRBCs as needed for Hgb <7, type and screen ordered   - Correct any coagulopathy with platelets and FFP to a goal of platelets >50K and INR <2.0  - GI consulted, appreciate recs   Impression:              - Hemorrhoids found on perianal exam.       - Blood in the entire  examined colon.       - Side-to-side ileo-colonic anastomosis, characterized by erosion and erythema.       - One erosion at the colonic anastomosis with  active oozing blood. Clips (MR conditional) were placed.       - The examination was otherwise normal.       - No specimens collected.   Recommendation:          - [facility] patient to [facility] for [facility] care.               - Advance diet as tolerated.               - Continue present medications.               - No recommendation at this time regarding repeat colonoscopy.               - Monitor clinical course.     Body mass index (BMI) less than 19        Chronic diastolic heart failure  Patient is identified as having Diastolic (HFpEF) heart failure that is Chronic. CHF is currently controlled.   Latest ECHO performed and demonstrates- Results for orders placed during the hospital encounter of 02/16/21    Echo Color Flow Doppler? Yes    Interpretation Summary  · The left ventricle is normal in size with hyperdynamic systolic function. The estimated ejection fraction is 75%  · Indeterminate left ventricular diastolic function.  · Normal right ventricular size with normal right ventricular systolic function.  · There is a very late LVOT flow acceleration to 3.0m/s ( 36mmHg) due to hyperdynamic LV function and cavity obliteration at the end of systole.  · Mild left atrial enlargement.  · Mild tricuspid regurgitation.  · The estimated PA systolic pressure is 33 mmHg.  · Normal central venous pressure (3 mmHg).  · There is a right pleural effusion.    - Continue ACE/ARB and monitor clinical status closely.   - Monitor on telemetry.  - Patient is off CHF pathway.   - Monitor strict Is&Os and daily weights.   - Continue to stress to patient importance of self efficacy and  on diet for CHF.  - Last BNP reviewed- and noted below   Recent Labs   Lab 02/08/25  0742   BNP 62     - Euvolemic on exam     Acute blood loss anemia  Anemia is likely due to acute  blood loss which was from GI bleeding and Iron deficiency. Most recent hemoglobin and hematocrit are listed below.  Recent Labs     02/09/25  0337 02/10/25  0506 02/10/25  1408   HGB 8.6* 7.2* 6.8*   HCT 27.0* 24.2* 21.7*       Plan  - Monitor serial CBC: Daily  - Transfuse PRBC if patient becomes hemodynamically unstable, symptomatic or H/H drops below 7/21.  - Patient's anemia is currently worsening.   - See hematochezia   - Will transfuse 1 unit RBC today  - consent obtained and placed in chart.   - Repeat Hgb pending for tonight.     Cachexia  Malnutrition   BMI less than 19   Concern for cachexia as evidenced by loss of muscle mass and loss of body fat . Treatment to include nutrition consult, physical therapy, and occupational therapy.    Body mass index is 17.89 kg/m².  Albumin   Date Value Ref Range Status   02/08/2025 3.5 3.5 - 5.2 g/dL Final       Debility  Patient with Chronic debility due to age-related physical debility. The patient's latest AMPAC (Activity Measure for Post Acute Care) Score is listed below.    AM-PAC Score - How much help does the patient need for each activity listed  Basic Mobility Total Score: 14  Turning over in bed (including adjusting bedclothes, sheets and blankets)?: A little  Sitting down on and standing up from a chair with arms (e.g., wheelchair, bedside commode, etc.): A lot  Moving from lying on back to sitting on the side of the bed?: A little  Moving to and from a bed to a chair (including a wheelchair)?: A lot  Need to walk in hospital room?: A lot  Climbing 3-5 steps with a railing?: A lot    Plan  - Progressive mobility protocol initated  - PT/OT consulted, recommended moderate intensity therapy. Patient declined and would like HH.   - Fall precautions in place    Mixed hyperlipidemia  - Chronic   - Continue statin     Primary hypertension  Patient's blood pressure range in the last 24 hours was: BP  Min: 114/55  Max: 176/72.The patient's inpatient anti-hypertensive  regimen is listed below:  Current Antihypertensives  amLODIPine tablet 10 mg, Daily, Oral  losartan tablet 100 mg, Daily, Oral    Plan  - BP is controlled, no changes needed to their regimen      VTE Risk Mitigation (From admission, onward)           Ordered     IP VTE HIGH RISK PATIENT  Once         02/08/25 0927     Place sequential compression device  Until discontinued         02/08/25 0927     Place sequential compression device  Until discontinued         02/08/25 0926                    Discharge Planning   EUGENIO: 2/11/2025     Code Status: Full Code   Medical Readiness for Discharge Date:   Discharge Plan A: Home                Please place Justification for DME        Cyndi Sena PA-C  Department of Hospital Medicine   [facility] - Observation [facility]

## 2025-02-11 ENCOUNTER — TELEPHONE (OUTPATIENT)
Dept: GASTROENTEROLOGY | Facility: CLINIC | Age: OVER 89
End: 2025-02-11
Payer: MEDICARE

## 2025-02-11 VITALS
RESPIRATION RATE: 20 BRPM | HEIGHT: 67 IN | BODY MASS INDEX: 17.92 KG/M2 | WEIGHT: 114.19 LBS | HEART RATE: 76 BPM | TEMPERATURE: 98 F | SYSTOLIC BLOOD PRESSURE: 128 MMHG | DIASTOLIC BLOOD PRESSURE: 59 MMHG | OXYGEN SATURATION: 93 %

## 2025-02-11 PROBLEM — E44.0 MODERATE PROTEIN-CALORIE MALNUTRITION: Status: ACTIVE | Noted: 2025-02-11

## 2025-02-11 LAB
ANION GAP SERPL CALC-SCNC: 9 MMOL/L (ref 8–16)
BASOPHILS # BLD AUTO: 0.03 K/UL (ref 0–0.2)
BASOPHILS # BLD AUTO: 0.04 K/UL (ref 0–0.2)
BASOPHILS NFR BLD: 0.4 % (ref 0–1.9)
BASOPHILS NFR BLD: 0.6 % (ref 0–1.9)
BUN SERPL-MCNC: 16 MG/DL (ref 8–23)
CALCIUM SERPL-MCNC: 7.8 MG/DL (ref 8.7–10.5)
CHLORIDE SERPL-SCNC: 104 MMOL/L (ref 95–110)
CO2 SERPL-SCNC: 23 MMOL/L (ref 23–29)
CREAT SERPL-MCNC: 0.7 MG/DL (ref 0.5–1.4)
DIFFERENTIAL METHOD BLD: ABNORMAL
DIFFERENTIAL METHOD BLD: ABNORMAL
EOSINOPHIL # BLD AUTO: 0.1 K/UL (ref 0–0.5)
EOSINOPHIL # BLD AUTO: 0.2 K/UL (ref 0–0.5)
EOSINOPHIL NFR BLD: 1.1 % (ref 0–8)
EOSINOPHIL NFR BLD: 2.2 % (ref 0–8)
ERYTHROCYTE [DISTWIDTH] IN BLOOD BY AUTOMATED COUNT: 15 % (ref 11.5–14.5)
ERYTHROCYTE [DISTWIDTH] IN BLOOD BY AUTOMATED COUNT: 15.1 % (ref 11.5–14.5)
EST. GFR  (NO RACE VARIABLE): >60 ML/MIN/1.73 M^2
GLUCOSE SERPL-MCNC: 90 MG/DL (ref 70–110)
HCT VFR BLD AUTO: 27.7 % (ref 37–48.5)
HCT VFR BLD AUTO: 30 % (ref 37–48.5)
HGB BLD-MCNC: 9 G/DL (ref 12–16)
HGB BLD-MCNC: 9.5 G/DL (ref 12–16)
IMM GRANULOCYTES # BLD AUTO: 0.08 K/UL (ref 0–0.04)
IMM GRANULOCYTES # BLD AUTO: 0.11 K/UL (ref 0–0.04)
IMM GRANULOCYTES NFR BLD AUTO: 1.1 % (ref 0–0.5)
IMM GRANULOCYTES NFR BLD AUTO: 1.5 % (ref 0–0.5)
LYMPHOCYTES # BLD AUTO: 0.6 K/UL (ref 1–4.8)
LYMPHOCYTES # BLD AUTO: 0.7 K/UL (ref 1–4.8)
LYMPHOCYTES NFR BLD: 8.1 % (ref 18–48)
LYMPHOCYTES NFR BLD: 9.2 % (ref 18–48)
MAGNESIUM SERPL-MCNC: 1.8 MG/DL (ref 1.6–2.6)
MCH RBC QN AUTO: 28.8 PG (ref 27–31)
MCH RBC QN AUTO: 29.2 PG (ref 27–31)
MCHC RBC AUTO-ENTMCNC: 31.7 G/DL (ref 32–36)
MCHC RBC AUTO-ENTMCNC: 32.5 G/DL (ref 32–36)
MCV RBC AUTO: 90 FL (ref 82–98)
MCV RBC AUTO: 91 FL (ref 82–98)
MONOCYTES # BLD AUTO: 0.8 K/UL (ref 0.3–1)
MONOCYTES # BLD AUTO: 1 K/UL (ref 0.3–1)
MONOCYTES NFR BLD: 10.7 % (ref 4–15)
MONOCYTES NFR BLD: 13.4 % (ref 4–15)
NEUTROPHILS # BLD AUTO: 5.3 K/UL (ref 1.8–7.7)
NEUTROPHILS # BLD AUTO: 5.7 K/UL (ref 1.8–7.7)
NEUTROPHILS NFR BLD: 73.5 % (ref 38–73)
NEUTROPHILS NFR BLD: 78.2 % (ref 38–73)
NRBC BLD-RTO: 0 /100 WBC
NRBC BLD-RTO: 0 /100 WBC
PHOSPHATE SERPL-MCNC: 2.8 MG/DL (ref 2.7–4.5)
PLATELET # BLD AUTO: 173 K/UL (ref 150–450)
PLATELET # BLD AUTO: 202 K/UL (ref 150–450)
PMV BLD AUTO: 10 FL (ref 9.2–12.9)
PMV BLD AUTO: 10.4 FL (ref 9.2–12.9)
POTASSIUM SERPL-SCNC: 3.5 MMOL/L (ref 3.5–5.1)
RBC # BLD AUTO: 3.08 M/UL (ref 4–5.4)
RBC # BLD AUTO: 3.3 M/UL (ref 4–5.4)
SODIUM SERPL-SCNC: 136 MMOL/L (ref 136–145)
WBC # BLD AUTO: 7.15 K/UL (ref 3.9–12.7)
WBC # BLD AUTO: 7.3 K/UL (ref 3.9–12.7)

## 2025-02-11 PROCEDURE — 84100 ASSAY OF PHOSPHORUS: CPT

## 2025-02-11 PROCEDURE — 80048 BASIC METABOLIC PNL TOTAL CA: CPT

## 2025-02-11 PROCEDURE — 83735 ASSAY OF MAGNESIUM: CPT

## 2025-02-11 PROCEDURE — 85025 COMPLETE CBC W/AUTO DIFF WBC: CPT

## 2025-02-11 PROCEDURE — 36415 COLL VENOUS BLD VENIPUNCTURE: CPT

## 2025-02-11 PROCEDURE — 97535 SELF CARE MNGMENT TRAINING: CPT

## 2025-02-11 PROCEDURE — 25000003 PHARM REV CODE 250

## 2025-02-11 PROCEDURE — 85025 COMPLETE CBC W/AUTO DIFF WBC: CPT | Mod: 91 | Performed by: PHYSICIAN ASSISTANT

## 2025-02-11 PROCEDURE — 97165 OT EVAL LOW COMPLEX 30 MIN: CPT

## 2025-02-11 PROCEDURE — 97116 GAIT TRAINING THERAPY: CPT

## 2025-02-11 PROCEDURE — 36415 COLL VENOUS BLD VENIPUNCTURE: CPT | Performed by: PHYSICIAN ASSISTANT

## 2025-02-11 RX ORDER — PANTOPRAZOLE SODIUM 40 MG/1
40 TABLET, DELAYED RELEASE ORAL 2 TIMES DAILY
Qty: 60 TABLET | Refills: 0 | Status: SHIPPED | OUTPATIENT
Start: 2025-02-11 | End: 2025-03-13

## 2025-02-11 RX ADMIN — CHOLECALCIFEROL TAB 25 MCG (1000 UNIT) 1000 UNITS: 25 TAB at 09:02

## 2025-02-11 RX ADMIN — CYANOCOBALAMIN TAB 1000 MCG 1000 MCG: 1000 TAB at 09:02

## 2025-02-11 RX ADMIN — CALCIUM CARBONATE (ANTACID) CHEW TAB 500 MG 1000 MG: 500 CHEW TAB at 09:02

## 2025-02-11 RX ADMIN — AMLODIPINE BESYLATE 10 MG: 10 TABLET ORAL at 09:02

## 2025-02-11 RX ADMIN — THERA TABS 1 TABLET: TAB at 09:02

## 2025-02-11 RX ADMIN — ATORVASTATIN CALCIUM 80 MG: 40 TABLET, FILM COATED ORAL at 09:02

## 2025-02-11 NOTE — PROGRESS NOTES
[facility]  Gastroenterology  Progress Note    Patient Name: Nydia Stevens  MRN: [id number]  Admission Date: 2/8/2025  Hospital Length of Stay: 2 days  Code Status: Full Code   Attending Provider: Mike Vargas DO  Consulting Provider: JALEN Bean-C  Primary Care Physician: Brittney Travis MD  Principal Problem: Hematochezia        Subjective:     Interval History: GI consulted for hematochezia. Colonoscopy done erosion at the colonic anastomosis with active oozing blood. Clips placed. No further complaints of bleeding/melena. Hgb stable at 9.0. NAEON. VSS.     Review of Systems   Constitutional:  Negative for fatigue.   Gastrointestinal:  Negative for abdominal pain, anal bleeding, blood in stool, diarrhea, nausea and vomiting.   Neurological:  Negative for dizziness and weakness.     Objective:     Vital Signs (Most Recent):  Temp: 98.1 °F (36.7 °C) (02/11/25 1105)  Pulse: 76 (02/11/25 1111)  Resp: 20 (02/11/25 1105)  BP: (!) 128/59 (02/11/25 1105)  SpO2: (!) 93 % (02/11/25 1105) Vital Signs (24h Range):  Temp:  [97.7 °F (36.5 °C)-98.6 °F (37 °C)] 98.1 °F (36.7 °C)  Pulse:  [68-86] 76  Resp:  [16-20] 20  SpO2:  [93 %-99 %] 93 %  BP: ()/(42-61) 128/59     Weight: 51.8 kg (114 lb 3.2 oz) (02/08/25 2241)  Body mass index is 17.89 kg/m².      Intake/Output Summary (Last 24 hours) at 2/11/2025 1252  Last data filed at 2/11/2025 0546  Gross per 24 hour   Intake 415 ml   Output 400 ml   Net 15 ml       Lines/Drains/Airways       Central Venous Catheter Line  Duration             Port A Cath Single Lumen 07/02/21 0837 right internal jugular 1320 days              Drain  Duration             Female External Urinary Catheter w/ Suction 02/08/25 0644 3 days              Peripheral Intravenous Line  Duration                  Peripheral IV - Single Lumen 02/08/25 0639 22 G Anterior;Left Forearm 3 days                     Physical Exam  Vitals and nursing note reviewed.   Constitutional:        General: She is not in acute distress.     Appearance: Normal appearance. She is not ill-appearing.   HENT:      Head: Normocephalic and atraumatic.   Eyes:      Extraocular Movements: Extraocular movements intact.   Cardiovascular:      Rate and Rhythm: Normal rate.   Pulmonary:      Effort: Pulmonary effort is normal. No respiratory distress.   Abdominal:      General: There is no distension.      Tenderness: There is no guarding.   Musculoskeletal:         General: Normal range of motion.      Cervical back: Normal range of motion.   Skin:     General: Skin is warm and dry.   Neurological:      Mental Status: She is alert and oriented to person, place, and time.          Significant Labs:  Recent Lab Results         02/11/25  1051   02/11/25  0447   02/10/25  1408        Anion Gap   9         Baso # 0.03   0.04   0.05       Basophil % 0.4   0.6   0.5       BUN   16         Calcium   7.8         Chloride   104         CO2   23         Creatinine   0.7         Differential Method Automated   Automated   Automated       eGFR   >60.0         Eos # 0.1   0.2   0.1       Eos % 1.1   2.2   0.7       Glucose   90         Gran # (ANC) 5.7   5.3   8.0       Gran % 78.2   73.5   79.9       Hematocrit 30.0   27.7   21.7       Hemoglobin 9.5   9.0   6.8       Immature Grans (Abs) 0.11  Comment: Mild elevation in immature granulocytes is non specific and   can be seen in a variety of conditions including stress response,   acute inflammation, trauma and pregnancy. Correlation with other   laboratory and clinical findings is essential.     0.08  Comment: Mild elevation in immature granulocytes is non specific and   can be seen in a variety of conditions including stress response,   acute inflammation, trauma and pregnancy. Correlation with other   laboratory and clinical findings is essential.     0.06  Comment: Mild elevation in immature granulocytes is non specific and   can be seen in a variety of conditions including  stress response,   acute inflammation, trauma and pregnancy. Correlation with other   laboratory and clinical findings is essential.         Immature Granulocytes 1.5   1.1   0.6       Lymph # 0.6   0.7   1.0       Lymph % 8.1   9.2   10.1       Magnesium    1.8         MCH 28.8   29.2   29.2       MCHC 31.7   32.5   31.3       MCV 91   90   93       Mono # 0.8   1.0   0.8       Mono % 10.7   13.4   8.2       MPV 10.0   10.4   10.0       nRBC 0   0   0       Phosphorus Level   2.8         Platelet Count 202   173   196       Potassium   3.5         RBC 3.30   3.08   2.33       RDW 15.1   15.0   14.6       Sodium   136         WBC 7.30   7.15   10.01                 Significant Imaging:  Imaging results within the past 24 hours have been reviewed.  Assessment/Plan:     GI  * Hematochezia  Nydia Stevens is a 89 y.o. female with history of HTN, HLD, and colon cancer s/p right hemicolectomy 3/2020 (declined adjuvant chemotherapy) and ileocecectomy 4/2021 for lesion at ileocolic anastomosis,  presenting to the ED for rectal bleeding.  Similar presentation in December, attempted colonoscopy but inadequate prep.  Patient is scheduled to have outpatient colonoscopy with CRS, now presented to the ED, GI consulted for rectal bleeding.     Problem List:  Hematochezia  Colon cancer s/p R hemicolectomy 3/2020 and ileocecectomy 4/2021 02/11/2025 - GI consulted for hematochezia. Colonoscopy done erosion at the colonic anastomosis with active oozing blood. Clips placed. No further complaints of bleeding/melena. Hgb stable at 9.0. NAEON. VSS.      Recommendations:  - Hgb stable, no further bleeding reported  - follow up in clinic in 8-12 weeks for f/u CBC - appt request placed with schedulers  - ok to DC from GI standpoint   - we will sign off       Thank you for involving us in the care of Nydia Stevens. Please call with any additional questions, concerns or changes in the patient's clinical status.          Case  discussed with Dr. Galvez and primary team   Thank you for your consult. I will sign off. Please contact us if you have any additional questions.    JALEN Bean-C  Gastroenterology  [facility]

## 2025-02-11 NOTE — PHYSICIAN QUERY
Please clarify anastomosis bleeding as it relates to the procedure right hemicolectomy:  Present, but not a complication of the procedure.

## 2025-02-11 NOTE — TELEPHONE ENCOUNTER
Appointment scheduled for patient to see Tammy Burns Np on 4/22 at 11:30.   Confirmation mailed.   Areli

## 2025-02-11 NOTE — PLAN OF CARE
02/11/25 1040   Post-Acute Status   Post-Acute Authorization Home Health   Home Health Status Referrals Sent     Pt is expected to discharge home with home health. SW sent referrals via Epic and is waiting for an accepting agency. Providers that are owned, operated, or affiliated with [facility] are included on the list.    Discharge Plan A and Plan B have been determined by review of patient's clinical status, future medical and therapeutic needs, and coverage/benefits for post-acute care in coordination with multidisciplinary team members.    JAIME will continue to follow up.      Natalie Montiel LMSW  [facility]  Ext. [phone]

## 2025-02-11 NOTE — PT/OT/SLP EVAL
Occupational Therapy   Evaluation/Treatment    Name: Nydia Stevens  MRN: [id number]  Admitting Diagnosis: Hematochezia  Recent Surgery: Procedure(s) (LRB):  COLONOSCOPY (N/A) 1 Day Post-Op    Recommendations:     Discharge Recommendations: Low Intensity Therapy  Discharge Equipment Recommendations:  none  Barriers to discharge:   (Increased level of assistance)    Assessment:     Nydia Stevens is a 89 y.o. female with a medical diagnosis of Hematochezia.  Performance deficits affecting function: weakness, impaired endurance, impaired self care skills, impaired functional mobility, gait instability, impaired balance, impaired cardiopulmonary response to activity, decreased lower extremity function. Patient would benefit from continued skilled acute OT 3x/wk to improve functional mobility, increase independence with ADLs, and address established goals. Recommending low intensity therapy once medically appropriate for discharge to increase maximal independence, reduce burden of care, and ensure safety.     Rehab Prognosis: Good; patient would benefit from acute skilled OT services to address these deficits and reach maximum level of function.       Plan:     Patient to be seen 4 x/week to address the above listed problems via self-care/home management, therapeutic activities, therapeutic exercises  Plan of Care Expires: 03/11/25  Plan of Care Reviewed with: patient    Subjective     Chief Complaint: Wanting to get dress  Patient/Family Comments/goals: To go home    Occupational Profile:  Living Environment: Patient lives in a 2nd floor apartment at the [facility] with elevator access. Patient's bathroom has a WIS. Patient was modified independent for ambulation, utilizing her rollator. Patient was independent for all other functional mobility. Patient will have assistance from nuns and nurses.  Equipment Used at Home: walker, rolling, wheelchair, rollator, shower chair    Pain/Comfort:  Pain Rating 1:  0/10  Pain Rating Post-Intervention 1: 0/10    Patients cultural, spiritual, Denominational conflicts given the current situation: no    Objective:     Communicated with: NSG prior to session.  Patient found HOB elevated with telemetry, PureWick upon OT entry to room.    General Precautions: Standard, fall  Orthopedic Precautions: N/A  Braces: N/A  Respiratory Status: Room air    Occupational Performance:      Functional Mobility/Transfers:  Patient completed Sit <> Stand Transfer with stand by assistance and contact guard assistance (from low surface of toilet )  with  rolling walker   Patient completed Toilet Transfer bedside chair>toilet; toilet>sofa with functional ambulation technique with contact guard assistance with  rolling walker  Functional Mobility: Patient ambulated with RW from sofa to bedside chair with CGA.    Activities of Daily Living:  Upper Body Dressing: setup for doffing front and back gown. Donning pullover shirt     Lower Body Dressing: minimum assistance Donning underwear with sanitary pads from sofa and donning pants. Patient also doffed socks and donned shoes.   Toileting: minimum assistance patient did use PureWick upon standing from bedside chair and tried to toilet when in the bathroom sitting on toilet but did not have to go again. Patient was thoroughly cleaned when on toilet with wet wipes. Patient assisted with hygiene.     Cognitive/Visual Perceptual:  Cognitive/Psychosocial Skills:     -       Oriented to: Person, Place, Time, and Situation   -       Follows Commands/attention:Follows multistep  commands  -       Communication: clear/fluent  -       Safety awareness/insight to disability: impaired  -       Mood/Affect/Coping skills/emotional control: Appropriate to situation  Visual/Perceptual:      -Intact      AMPAC 6 Click ADL:  AMPAC Total Score: 20    Treatment & Education:  Role of OT and POC  ADL retraining  Functional mobility training  Safety  Discharge planning  Importance  EOB/OOB activity    Patient wanted to get dressed during session and had clothes in room. However, they were extremely soiled with stool on them. Patient wanted to put them on multiple times during session to go home in. Patient educated with PCT also educating patient that they were very soiled and not ideal to go home in. Patient continued to insist that they can go straight into the washer machine when she gets home. Therapist obtained paper scrubs for patient to put on for the ride home. Patient then wanted her sweater/jacket that was in her belonging back too. This therapist believed it was soiled as well. Therapist recommended to not put it on as it needed to be cleaned as well. Therapist tried her best to explain to patient that she just wanted patient to be clean! PCT verbalized that she may provide patient with a sheet.     Patient left up in chair with all lines intact, call button in reach, nursing notified, and all needs met.     GOALS:   Multidisciplinary Problems       Occupational Therapy Goals          Problem: Occupational Therapy    Goal Priority Disciplines Outcome Interventions   Occupational Therapy Goal     OT, PT/OT Progressing    Description: Goals to be met by: 3/11/2025     Patient will increase functional independence with ADLs by performing:    UE Dressing with Set-up Assistance.  LE Dressing with Supervision.  Grooming while standing at sink with Modified Tucker.  Toileting from toilet with Modified Tucker for hygiene and clothing management.   Supine to sit with Supervision.  Stand pivot transfers with Modified Tucker.  Toilet transfer to toilet with Modified Tucker.                         \    History:     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    Hyperlipidemia     Hypertension      Osteoporosis          Past Surgical History:   Procedure Laterality Date    CATARACT EXTRACTION W/  INTRAOCULAR LENS IMPLANT Left 8/11/14    [facility]    CATARACT EXTRACTION W/  INTRAOCULAR LENS IMPLANT Right 09/15/14    [facility]    COLONOSCOPY N/A 3/19/2020    Procedure: COLONOSCOPY;  Surgeon: Real Mabry MD;  Location: [facility] ENDO (2ND FLR);  Service: Endoscopy;  Laterality: N/A;    COLONOSCOPY N/A 3/23/2021    Procedure: COLONOSCOPY;  Surgeon: AUTUMN Berg MD;  Location: [facility] ENDO (4TH FLR);  Service: Endoscopy;  Laterality: N/A;  covid test 3/20 [facility]    COLONOSCOPY N/A 2/15/2022    Procedure: COLONOSCOPY;  Surgeon: Doris Simpson MD;  Location: [facility] ENDO (4TH FLR);  Service: Endoscopy;  Laterality: N/A;  COVID test on 2/12/22 at [facility]  2/14/22 - Pt confirmed 0640 arrival, prep instructions reviewed, Pt verbalized understanding-ERW@0927    COLONOSCOPY N/A 12/9/2024    Procedure: COLONOSCOPY;  Surgeon: Vitaly Virgen MD;  Location: [facility] ENDO (2ND FLR);  Service: Gastroenterology;  Laterality: N/A;    COLONOSCOPY N/A 2/10/2025    Procedure: COLONOSCOPY;  Surgeon: Terrance Galvez MD;  Location: [facility] ENDO (2ND FLR);  Service: Endoscopy;  Laterality: N/A;    ESOPHAGOGASTRODUODENOSCOPY N/A 3/19/2020    Procedure: EGD (ESOPHAGOGASTRODUODENOSCOPY);  Surgeon: Real Marby MD;  Location: [facility] (2ND FLR);  Service: Endoscopy;  Laterality: N/A;    ESOPHAGOGASTRODUODENOSCOPY N/A 12/9/2024    Procedure: EGD (ESOPHAGOGASTRODUODENOSCOPY);  Surgeon: Vitaly Virgen MD;  Location: [facility] ENDO (2ND FLR);  Service: Gastroenterology;  Laterality: N/A;    FOOT SURGERY      left    HYSTERECTOMY  1962    ILEOCOLECTOMY N/A 4/15/2021    Procedure: ILEOCOLECTOMY;  Surgeon: AUTUMN Berg MD;  Location: [facility] OR 2ND FLR;  Service: Colon and Rectal;  Laterality: N/A;    INSERTION OF TUNNELED CENTRAL VENOUS CATHETER (CVC) WITH SUBCUTANEOUS PORT N/A 7/2/2021    Procedure: [id number];  Surgeon: Suresh PAYAN  MD Nghia;  Location: [facility] OR 2ND FLR;  Service: Vascular;  Laterality: N/A;  Right IJ    LAPAROSCOPIC HEMICOLECTOMY Right 3/20/2020    Procedure: HEMICOLECTOMY, RIGHT;  Surgeon: AUTUMN Berg MD;  Location: [facility] OR 2ND FLR;  Service: Colon and Rectal;  Laterality: Right;    LYSIS OF ADHESIONS N/A 4/15/2021    Procedure: LYSIS, ADHESIONS;  Surgeon: AUTUMN Berg MD;  Location: [facility] OR 2ND FLR;  Service: Colon and Rectal;  Laterality: N/A;  Greater than 2 hours    MOBILIZATION OF SPLENIC FLEXURE N/A 4/15/2021    Procedure: MOBILIZATION, SPLENIC FLEXURE;  Surgeon: AUTUMN Berg MD;  Location: [facility] OR 2ND FLR;  Service: Colon and Rectal;  Laterality: N/A;       Time Tracking:     OT Date of Treatment: 02/11/25  OT Start Time: 1341  OT Stop Time: 1424  OT Total Time (min): 43 min    Billable Minutes:Evaluation 10  Self Care/Home Management 33    2/11/2025

## 2025-02-11 NOTE — PLAN OF CARE
[facility]  Discharge Final Note    Primary Care Provider: Birttney Travis MD    Expected Discharge Date: 2/11/2025    Patient discharged to home via personal transportation.     Patient's bedside nurse and patient notified of the above.    Discharge Plan A and Plan B have been determined by review of patient's clinical status, future medical and therapeutic needs, and coverage/benefits for post-acute care in coordination with multidisciplinary team members.        Final Discharge Note (most recent)       Final Note - 02/11/25 1614          Final Note    Assessment Type Final Discharge Note (P)      Anticipated Discharge Disposition Home-Health Care Svc (P)         Post-Acute Status    Post-Acute Authorization Home Health (P)      Home Health Status Set-up Complete/Auth obtained (P)                      Important Message from Medicare  Important Message from Medicare regarding Discharge Appeal Rights: Given to patient/caregiver, Explained to patient/caregiver, Signed/date by patient/caregiver     Date IMM was signed: 02/11/25  Time IMM was signed: 1105    Contact Info       Brittney Travis MD   Specialty: Internal Medicine   Relationship: PCP - General    [facility] [address]  [facility] [address]   Phone: [phone]       Next Steps: Follow up in 1 week(s)    [facility]    [address], [address]  [place] [address]   Phone: [phone]       Next Steps: Follow up on 2/12/2025    Instructions: [facility] will be coming to visit you tomorrow, 2/12 to start services. If you have any questions, call the number listed.            Future Appointments   Date Time Provider Department Center   3/20/2025 11:40 AM Brittney Travis MD [facility]   4/22/2025 11:30 AM Tammy Burns NP [facility]   8/5/2025  9:15 AM INJECTION [facility]       SW scheduled post-discharge follow-up appointment and information added to AVS.     Patient has been accepted  with [facility] and they will admit to service on tomorrow, 2/12.      Natalie Montiel LMSW  [facility]  Ext. [phone]

## 2025-02-11 NOTE — PROGRESS NOTES
"[facility]  Adult Nutrition  Consult Note    SUMMARY     Recommendations  Continue Regular diet   RD following     Goals: Meet % een/epn by next RD f/u  Nutrition Goal Status: continues   Communication of RD Recs: (poc)    Assessment and Plan    Malnutrition Type:  Context: chronic illness  Level: moderate     Related to (etiology):   Decreased ability to consume sufficient energy PTA     Signs and Symptoms (as evidenced by):   18#/13.6% wt loss x 1 year.  Malnutrition Characteristic Summary:  Energy Intake (Malnutrition): less than 75% for greater than or equal to 1 month     Interventions (treatment strategy):  Continue Regular diet   RD following         Nutrition Diagnosis Status:   New  Reason for Assessment    Reason For Assessment: RD follow-up  Diagnosis: gastrointestinal disease (Hematochezia)  General Information Comments: 89 y.o. female with history of HTN, HLD, and colon cancer s/p right hemicolectomy 3/2020 (declined adjuvant chemotherapy) and ileocecectomy 4/21 for lesion at ileocolic anastomosis, presenting to the ED for rectal bleeding.  Pt is currently on a regular diet since 2/10 Denies current N/V- 3 meals PTA- however possible insufficient caloric consumption to meet needs. UBW unknown. Per chart review 18#/13.6% wt loss x 1 year. NFPE  completed, pt meets criteria for moderate calorie malnutrition in the context of chronic illness per ASPEN guidelines. Pt discharging to possible nursing home today.  Nutrition Discharge Planning: adequate nutritional intake     Nutrition/Diet History    Spiritual, Cultural Beliefs, Spiritism Practices, Values that Affect Care: no  Food Allergies: NKFA    Anthropometrics    Height: 5' 7" (170.2 cm)  Height (inches): 67 in  Height Method: Stated  Weight: 51.8 kg (114 lb 3.2 oz)  Weight (lb): 114.2 lb  Weight Method: Bed Scale  Ideal Body Weight (IBW), Female: 135 lb  % Ideal Body Weight, Female (lb): 81.48 %  BMI (Calculated): 17.9  BMI " Grade: less than 23 (older than 65 years) - underweight       Lab/Procedures/Meds    Pertinent Labs Reviewed: reviewed  Pertinent Labs Comments: Na: 135  Pertinent Medications Reviewed: reviewed  Pertinent Medications Comments: Atorvastatin, calcium carbonate, cyanocobalamin, ferrous sulfate, MVI, vitamin D, pantoprazole    Estimated/Assessed Needs    Weight Used For Calorie Calculations: 51.8 kg (114 lb 3.2 oz)  Energy Calorie Requirements (kcal): 2941-0676 (30-35 kcal/kg)  Energy Need Method: Kcal/kg  Protein Requirements: 62-77 (1.2-1.5 g/kg)  Weight Used For Protein Calculations: 51.8 kg (114 lb 3.2 oz)     Estimated Fluid Requirement Method: RDA Method  RDA Method (mL): 1554         Nutrition Prescription Ordered    Current Diet Order: Regular diet     Evaluation of Received Nutrient/Fluid Intake    I/O: -  Comments: LBM 2/10  % Intake of Estimated Energy Needs: 0-50%  % Meal Intake: 0-50%    Nutrition Risk    Level of Risk/Frequency of Follow-up: moderate - high (f/u 1-2x/week)       Monitor and Evaluation    Food and Nutrient Intake: energy intake, food and beverage intake  Food and Nutrient Adminstration: diet order  Knowledge/Beliefs/Attitudes: food and nutrition knowledge/skill, beliefs and attitudes  Physical Activity and Function: nutrition-related ADLs and IADLs  Anthropometric Measurements: height/length, weight, weight change, body mass index  Biochemical Data, Medical Tests and Procedures: gastrointestinal profile, electrolyte and renal panel, glucose/endocrine profile, inflammatory profile, lipid profile  Nutrition-Focused Physical Findings: overall appearance, extremities, muscles and bones, head and eyes, skin       Nutrition Follow-Up    RD Follow-up?: Yes

## 2025-02-11 NOTE — TELEPHONE ENCOUNTER
----- Message from JALEN Baer-ALEX sent at 2/11/2025 12:49 PM CST -----  Regarding: hospital f/u appt  Can we please get Mrs. Stevens a f/u appt with myself or Tammy in 8-12 weeks. Thanks

## 2025-02-11 NOTE — PLAN OF CARE
Problem: Skin Injury Risk Increased  Goal: Skin Health and Integrity  Outcome: Met     Problem: Gastrointestinal Bleeding  Goal: Optimal Coping with Acute Illness  Outcome: Met  Goal: Hemostasis  Outcome: Met     Problem: Adult Inpatient Plan of Care  Goal: Plan of Care Review  Outcome: Met  Goal: Patient-Specific Goal (Individualized)  Outcome: Met  Goal: Absence of Hospital-Acquired Illness or Injury  Outcome: Met  Goal: Optimal Comfort and Wellbeing  Outcome: Met  Goal: Readiness for Transition of Care  Outcome: Met     Problem: Infection  Goal: Absence of Infection Signs and Symptoms  Outcome: Met     Problem: Fall Injury Risk  Goal: Absence of Fall and Fall-Related Injury  Outcome: Met

## 2025-02-11 NOTE — NURSING
Discharge instructions and education given to pt. Verbalized understanding. Removal of IV. No redness, swelling or drainage noted. Telemetry removed. Pt escort refused, WC provided by caregiver.

## 2025-02-11 NOTE — PLAN OF CARE
02/11/25 1215   Post-Acute Status   Post-Acute Authorization Home Health   Home Health Status Set-up Complete/Auth obtained     SW received notification that pt has been accepted with [facility] and they will admit her tomorrow, 2/12.      Natalie Montiel LMSW  [facility]  Ext. [phone]

## 2025-02-11 NOTE — SUBJECTIVE & OBJECTIVE
Subjective:     Interval History: GI consulted for hematochezia. Colonoscopy done erosion at the colonic anastomosis with active oozing blood. Clips placed. No further complaints of bleeding/melena. Hgb stable at 9.0. NAEON. VSS.     Review of Systems   Constitutional:  Negative for fatigue.   Gastrointestinal:  Negative for abdominal pain, anal bleeding, blood in stool, diarrhea, nausea and vomiting.   Neurological:  Negative for dizziness and weakness.     Objective:     Vital Signs (Most Recent):  Temp: 98.1 °F (36.7 °C) (02/11/25 1105)  Pulse: 76 (02/11/25 1111)  Resp: 20 (02/11/25 1105)  BP: (!) 128/59 (02/11/25 1105)  SpO2: (!) 93 % (02/11/25 1105) Vital Signs (24h Range):  Temp:  [97.7 °F (36.5 °C)-98.6 °F (37 °C)] 98.1 °F (36.7 °C)  Pulse:  [68-86] 76  Resp:  [16-20] 20  SpO2:  [93 %-99 %] 93 %  BP: ()/(42-61) 128/59     Weight: 51.8 kg (114 lb 3.2 oz) (02/08/25 2241)  Body mass index is 17.89 kg/m².      Intake/Output Summary (Last 24 hours) at 2/11/2025 1252  Last data filed at 2/11/2025 0546  Gross per 24 hour   Intake 415 ml   Output 400 ml   Net 15 ml       Lines/Drains/Airways       Central Venous Catheter Line  Duration             Port A Cath Single Lumen 07/02/21 0837 right internal jugular 1320 days              Drain  Duration             Female External Urinary Catheter w/ Suction 02/08/25 0644 3 days              Peripheral Intravenous Line  Duration                  Peripheral IV - Single Lumen 02/08/25 0639 22 G Anterior;Left Forearm 3 days                     Physical Exam  Vitals and nursing note reviewed.   Constitutional:       General: She is not in acute distress.     Appearance: Normal appearance. She is not ill-appearing.   HENT:      Head: Normocephalic and atraumatic.   Eyes:      Extraocular Movements: Extraocular movements intact.   Cardiovascular:      Rate and Rhythm: Normal rate.   Pulmonary:      Effort: Pulmonary effort is normal. No respiratory distress.    Abdominal:      General: There is no distension.      Tenderness: There is no guarding.   Musculoskeletal:         General: Normal range of motion.      Cervical back: Normal range of motion.   Skin:     General: Skin is warm and dry.   Neurological:      Mental Status: She is alert and oriented to person, place, and time.          Significant Labs:  Recent Lab Results         02/11/25  1051   02/11/25  0447   02/10/25  1408        Anion Gap   9         Baso # 0.03   0.04   0.05       Basophil % 0.4   0.6   0.5       BUN   16         Calcium   7.8         Chloride   104         CO2   23         Creatinine   0.7         Differential Method Automated   Automated   Automated       eGFR   >60.0         Eos # 0.1   0.2   0.1       Eos % 1.1   2.2   0.7       Glucose   90         Gran # (ANC) 5.7   5.3   8.0       Gran % 78.2   73.5   79.9       Hematocrit 30.0   27.7   21.7       Hemoglobin 9.5   9.0   6.8       Immature Grans (Abs) 0.11  Comment: Mild elevation in immature granulocytes is non specific and   can be seen in a variety of conditions including stress response,   acute inflammation, trauma and pregnancy. Correlation with other   laboratory and clinical findings is essential.     0.08  Comment: Mild elevation in immature granulocytes is non specific and   can be seen in a variety of conditions including stress response,   acute inflammation, trauma and pregnancy. Correlation with other   laboratory and clinical findings is essential.     0.06  Comment: Mild elevation in immature granulocytes is non specific and   can be seen in a variety of conditions including stress response,   acute inflammation, trauma and pregnancy. Correlation with other   laboratory and clinical findings is essential.         Immature Granulocytes 1.5   1.1   0.6       Lymph # 0.6   0.7   1.0       Lymph % 8.1   9.2   10.1       Magnesium    1.8         MCH 28.8   29.2   29.2       MCHC 31.7   32.5   31.3       MCV 91   90   93        Mono # 0.8   1.0   0.8       Mono % 10.7   13.4   8.2       MPV 10.0   10.4   10.0       nRBC 0   0   0       Phosphorus Level   2.8         Platelet Count 202   173   196       Potassium   3.5         RBC 3.30   3.08   2.33       RDW 15.1   15.0   14.6       Sodium   136         WBC 7.30   7.15   10.01                 Significant Imaging:  Imaging results within the past 24 hours have been reviewed.

## 2025-02-11 NOTE — PT/OT/SLP PROGRESS
Physical Therapy  Treatment    Nydia Stevens   [id number]    Time Tracking:     PT Received On: 02/11/25   PT Start Time: 1013   PT Stop Time: 1038   PT Total Time (min): 25 min    Billable Minutes: Gait Training 25       Recommendations:     Therapy Intensity Recommendations at Discharge: Low Intensity Therapy     Equipment Needed After Discharge: none    Barriers to Discharge: None    Patient Information:     Recent Surgery: Procedure(s) (LRB):  COLONOSCOPY (N/A) 1 Day Post-Op    Diagnosis: Hematochezia    Length of Stay: 2 days    General Precautions: Standard, fall  Orthopedic Precautions: N/A  Brace: N/A    Assessment:     Nydia Stevens tolerated treatment well today. Upon arrival, Nydia was found supine resting in bed and receptive to start session. She ambulated 380 feet with RW and Contact Guard (A), required assistance turning RW. Noted veering gait, dec gait speed, and baseline B ankle pronation. Pt stated she felt very good while ambulating. Education provided to pt on importance of safely ambulating with rolator upon d/c. Patient continues to demonstrate the need for low intensity therapy on a scheduled basis exhibited by decreased independence with self-care and functional mobility. Discussed PT role, POC, and goals with patient and/or family; verbalized understanding. Nydia Stevens will continue to benefit from acute PT services to promote mobility during this admission and improve return to PLOF.    Problem List: weakness, impaired endurance, impaired self care skills, impaired functional mobility, gait instability, impaired balance, impaired cardiopulmonary response to activity, decreased lower extremity function    Rehab Prognosis: Good; patient would benefit from acute skilled PT services to address these deficits and reach maximum level of function.    Plan:     Patient to be seen 3 x/week to address the above listed problems via gait training, therapeutic activities,  "therapeutic exercises, neuromuscular re-education    Plan of Care Expires: 03/09/25  Plan of Care reviewed with: patient    Subjective:     Communicated with RN prior to treatment, appropriate to see for treatment.    Pt found supine in bed (HOB elevated) upon PT entry to room, agreeable to treatment.    Patient commenting: "I am going home today"    Does this patient have any cultural, spiritual, Latter-day conflicts given the current situation? Patient/family has no barriers to learning. Patient/family verbalizes understanding of his/her program and goals and demonstrates them correctly. No cultural, spiritual, or educational needs identified.    Objective:     Patient found with: telemetryJocelyne    Pain:  Pain Rating 1: 0/10  Location - Side 1:  (unreported)  Location - Orientation 1:  (unreported)  Location 1:  (unreported)  Pain Addressed 1: Reposition, Distraction  Pain Rating Post-Intervention 1: 0/10    Functional Mobility:    Bed Mobility:  Rolling to R: Stand-By Assist  Supine to Sitting: Stand-By Assist  Scooting towards EOB in sitting: Stand-By Assist    Transfers:  Sit to Stand: Stand-By Assist from EOB with Rolling walker x 2 trial(s)    Gait:  380 feet with RW and Contact Guard (A), required assistance turning RW. Noted veering gait, dec gait speed, and baseline B ankle pronation. Pt stated she felt very good while ambulating.    Assist level: Contact-Guard Assist  Device: Rollator    Balance:  Static Sit: Stand-By Assist at EOB    Static Stand: Contact-Guard Assist with Rolling walker      AM-PAC 6 CLICK MOBILITY  Turning over in bed (including adjusting bedclothes, sheets and blankets)?: 3  Sitting down on and standing up from a chair with arms (e.g., wheelchair, bedside commode, etc.): 3  Moving from lying on back to sitting on the side of the bed?: 3  Moving to and from a bed to a chair (including a wheelchair)?: 3  Need to walk in hospital room?: 3  Climbing 3-5 steps with a railing?: 2  Basic " Mobility Total Score: 17    Patient was left reclined in bedside chair with all lines intact and call button in reach.    GOALS:   Multidisciplinary Problems       Physical Therapy Goals          Problem: Physical Therapy    Goal Priority Disciplines Outcome Interventions   Physical Therapy Goal     PT, PT/OT Progressing    Description: Goals to be met by: 25     Patient will increase functional independence with mobility by performin. Supine to sit with Set-up Dickenson - Not met  2. Sit to supine with Set-up Dickenson - Not met  3. Rolling to Left and Right with Modified Dickenson. - Not met  4. Sit to stand transfer with Stand-by Assistance using LRAD as appropriate - Met   5. Bed to chair transfer with Contact Guard Assistance using LRAD as appropriate - Not met  6. Gait  x 20 feet with Contact Guard Assistance using LRAD as appropriate - Met   7. Sitting at edge of bed x10 minutes with Dickenson - Not met  8. Stand for 5 minutes with Stand-by Assistance using LRAD as appropriate - Not met  9. Lower extremity exercise program x15-20 reps per handout, with assistance as needed - Not met    10. Added on : Gait x 500 with Stand-By (A) using RW    DME Justifications (see above for complete DME recommendations)    Bedside Commode- Patient has a mobility limitation that significantly impairs their ability to participate in one or more mobility related activities of daily living, including toileting. This deficit can be resolved by using a bedside commode. Patient demonstrates mobility limitations that will cause them to be confined to one room at home without bathroom access for up to 30 days. Using a bedside commode will greatly improve the patient's ability to participate in MRADLs.                          Kennedy Degroot, SPT  2025

## 2025-02-11 NOTE — ASSESSMENT & PLAN NOTE
Malnutrition Type:  Context: chronic illness  Level: moderate    Related to (etiology):   Decreased ability to consume sufficient energy PTA    Signs and Symptoms (as evidenced by):     Malnutrition Characteristic Summary:  Energy Intake (Malnutrition): less than 75% for greater than or equal to 1 month      Interventions (treatment strategy):  Continue Regular diet   RD following       Nutrition Diagnosis Status:   New

## 2025-02-11 NOTE — PLAN OF CARE
Problem: Occupational Therapy  Goal: Occupational Therapy Goal  Description: Goals to be met by: 3/11/2025     Patient will increase functional independence with ADLs by performing:    UE Dressing with Set-up Assistance.  LE Dressing with Supervision.  Grooming while standing at sink with Modified Tift.  Toileting from toilet with Modified Tift for hygiene and clothing management.   Supine to sit with Supervision.  Stand pivot transfers with Modified Tift.  Toilet transfer to toilet with Modified Tift.    Outcome: Progressing   Patient's goals are set.

## 2025-02-11 NOTE — PLAN OF CARE
[facility]      HOME HEALTH ORDERS  FACE TO FACE ENCOUNTER    Patient Name: Nydia Stevens  YOB: 1934    PCP: Brittney Travis MD   PCP Address: [address] / [place] [address]  PCP Phone Number: [phone]  PCP Fax: [phone]    Encounter Date: 2/8/25    Admit to Home Health    Diagnoses:  Active Hospital Problems    Diagnosis  POA    *Hematochezia [K92.1]  Yes    Body mass index (BMI) less than 19 [Z68.1]  Not Applicable    Chronic diastolic heart failure [I50.32]  Yes    Acute blood loss anemia [D62]  Yes    Cachexia [R64]  Yes    Debility [R53.81]  Yes    Primary hypertension [I10]  Yes    Mixed hyperlipidemia [E78.2]  Yes     Chronic      Resolved Hospital Problems    Diagnosis Date Resolved POA    Iron deficiency anemia [D50.9] 12/19/2023 Yes     Requiring transfusion in the past.  Associated with colon cancer.         Follow Up Appointments:  Future Appointments   Date Time Provider Department Center   3/20/2025 11:40 AM Brittney Travis MD [facility]   8/5/2025  9:15 AM INJECTION [facility] AMB INF [facility]       Allergies:  Review of patient's allergies indicates:   Allergen Reactions    Aspirin        Contraindicated withHistory of GI bleed    Nsaids (non-steroidal anti-inflammatory drug)        Contraindicated withHistory of GI bleed       Medications: Review discharge medications with patient and family and provide education.    Current Facility-Administered Medications   Medication Dose Route Frequency Provider Last Rate Last Admin    0.9%  NaCl infusion (for blood administration)   Intravenous Q24H PRN Vadim Sue PA-C        acetaminophen tablet 650 mg  650 mg Oral Q4H PRN Chitra Mata PA-C   650 mg at 02/10/25 1702    aluminum-magnesium hydroxide-simethicone 200-200-20 mg/5 mL suspension 30 mL  30 mL Oral QID PRN Chitra Mata PA-C        amLODIPine tablet 10 mg  10 mg Oral Daily Chitra Mata PA-C   10 mg at 02/10/25  1308    atorvastatin tablet 80 mg  80 mg Oral Daily Chitra Mata PA-C   80 mg at 02/10/25 1306    bisacodyL suppository 10 mg  10 mg Rectal Daily PRN Chitra Mata PA-C        calcium carbonate 200 mg calcium (500 mg) chewable tablet 1,000 mg  1,000 mg Oral Daily Chitra Mata PA-C   1,000 mg at 02/10/25 1306    cyanocobalamin tablet 1,000 mcg  1,000 mcg Oral Daily Chitra Mata PA-C   1,000 mcg at 02/10/25 1306    dextrose 50% injection 12.5 g  12.5 g Intravenous PRN Chitra Mata PA-C        dextrose 50% injection 25 g  25 g Intravenous PRN Chitra Mata PA-C        ferrous sulfate tablet 1 each  1 tablet Oral Q48H Chitra Mata PA-C   1 each at 02/10/25 1306    glucagon (human recombinant) injection 1 mg  1 mg Intramuscular PRN Chitra Mata PA-C        glucose chewable tablet 16 g  16 g Oral PRN Chitra Mata PA-C        glucose chewable tablet 24 g  24 g Oral PRN Chitra Mata PA-C        melatonin tablet 6 mg  6 mg Oral Nightly PRN Chitra Mata PA-C   6 mg at 02/10/25 2153    multivitamin tablet  1 tablet Oral Daily Chitra Mata PA-C   1 tablet at 02/10/25 1306    mupirocin 2 % ointment   Nasal BID Baumgarten, Katherine L., MD   Given at 02/10/25 1302    naloxone 0.4 mg/mL injection 0.02 mg  0.02 mg Intravenous PRN Chitra Mata PA-C        ondansetron disintegrating tablet 8 mg  8 mg Oral Q8H PRN Chitra Mata PA-C        pantoprazole injection 40 mg  40 mg Intravenous BID Chitra Mata PA-C   40 mg at 02/10/25 2153    polyethylene glycol packet 17 g  17 g Oral BID PRN Chitra Mata PA-C        prochlorperazine injection Soln 5 mg  5 mg Intravenous Q6H PRN Chitra Mata PA-C        scopolamine 1.3-1.5 mg (1 mg over 3 days) 1 patch  1 patch Transdermal Q3 Days Chitra Mata PA-C   1 patch at 02/09/25 1247    simethicone chewable tablet 80 mg  1 tablet Oral QID PRN Chitra Mata PA-C        sodium chloride 0.9% flush 10 mL  10 mL Intravenous Q12H PRN  Chitra Mata PA-C        vitamin D 1000 units tablet 1,000 Units  1,000 Units Oral Daily Chitra Mata PA-C   1,000 Units at 02/10/25 1306     Facility-Administered Medications Ordered in Other Encounters   Medication Dose Route Frequency Provider Last Rate Last Admin    gabapentin capsule 300 mg  300 mg Oral TID Amanda Gaspar NP   300 mg at 04/16/21 0814        Medication List        START taking these medications      multivitamin Tab  Take 1 tablet by mouth once daily.     pantoprazole 40 MG tablet  Commonly known as: PROTONIX  Take 1 tablet (40 mg total) by mouth 2 (two) times daily.            CONTINUE taking these medications      amLODIPine 10 MG tablet  Commonly known as: NORVASC  Take 1 tablet (10 mg total) by mouth once daily.     calcium carbonate 500 mg calcium (1,250 mg) tablet  Commonly known as: OS-JAILYN  Take 1 tablet (500 mg total) by mouth once daily.     cholecalciferol (vitamin D3) 25 mcg (1,000 unit) capsule  Commonly known as: VITAMIN D3  Take 1,000 Units by mouth once daily. Hold until after surgery     cyanocobalamin 1000 MCG tablet  Commonly known as: VITAMIN B-12  Take 1 tablet (1,000 mcg total) by mouth once daily. Hold until after surgery     FeroSuL 325 mg (65 mg iron) Tab tablet  Generic drug: ferrous sulfate  TAKE ONE TABLET BY MOUTH ON TUESDAY, THURSDAY, AND SATURDAY     losartan 100 MG tablet  Commonly known as: COZAAR  Take 1 tablet (100 mg total) by mouth once daily.     OCUVITE LUTEIN ORAL  Take 1 tablet by mouth once daily. Hold until after surgery     rosuvastatin 40 MG Tab  Commonly known as: CRESTOR  TAKE ONE TABLET BY MOUTH EVERY DAY FOR CHOLESTEROL            STOP taking these medications      sodium,potassium,mag sulfates 17.5-3.13-1.6 gram Solr  Commonly known as: SUPREP BOWEL PREP KIT                I have seen and examined this patient within the last 30 days. My clinical findings that support the need for the home health skilled services and home bound  status are the following:no   Weakness/numbness causing balance and gait disturbance due to Weakness/Debility and Anemia making it taxing to leave home.     Diet:   cardiac diet    Labs:  Report Lab results to PCP.    Referrals/ Consults  Physical Therapy to evaluate and treat. Evaluate for home safety and equipment needs; Establish/upgrade home exercise program. Perform / instruct on therapeutic exercises, gait training, transfer training, and Range of Motion.  Occupational Therapy to evaluate and treat. Evaluate home environment for safety and equipment needs. Perform/Instruct on transfers, ADL training, ROM, and therapeutic exercises.  Aide to provide assistance with personal care, ADLs, and vital signs.    Activities:   activity as tolerated    Nursing:   Agency to admit patient within 24 hours of hospital discharge unless specified on physician order or at patient request    SN to complete comprehensive assessment including routine vital signs. Instruct on disease process and s/s of complications to report to MD. Review/verify medication list sent home with the patient at time of discharge  and instruct patient/caregiver as needed. Frequency may be adjusted depending on start of care date.     Skilled nurse to perform up to 3 visits PRN for symptoms related to diagnosis    Notify MD if SBP > 160 or < 90; DBP > 90 or < 50; HR > 120 or < 50; Temp > 101; O2 < 88%;     Ok to schedule additional visits based on staff availability and patient request on consecutive days within the home health episode.    When multiple disciplines ordered:    Start of Care occurs on Sunday - Wednesday schedule remaining discipline evaluations as ordered on separate consecutive days following the start of care.    Thursday SOC -schedule subsequent evaluations Friday and Monday the following week.     Friday - Saturday SOC - schedule subsequent discipline evaluations on consecutive days starting Monday of the following week.    For all  post-discharge communication and subsequent orders please contact patient's primary care physician. If unable to reach primary care physician or do not receive response within 30 minutes, please contact PCP for clinical staff order clarification    Home Health Aide:  Nursing Three times weekly, Physical Therapy Three times weekly, Occupational Therapy Three times weekly, and Home Health Aide Three times weekly    Wound Care Orders  no    I certify that this patient is confined to her home and needs intermittent skilled nursing care, physical therapy, and occupational therapy.

## 2025-02-11 NOTE — PLAN OF CARE
CHW was unable to schedule a follow up appointment pt PCP scheduling team will contact pt for a follow up appointment

## 2025-02-11 NOTE — ASSESSMENT & PLAN NOTE
Nydia Stevens is a 89 y.o. female with history of HTN, HLD, and colon cancer s/p right hemicolectomy 3/2020 (declined adjuvant chemotherapy) and ileocecectomy 4/2021 for lesion at ileocolic anastomosis,  presenting to the ED for rectal bleeding.  Similar presentation in December, attempted colonoscopy but inadequate prep.  Patient is scheduled to have outpatient colonoscopy with CRS, now presented to the ED, GI consulted for rectal bleeding.     Problem List:  Hematochezia  Colon cancer s/p R hemicolectomy 3/2020 and ileocecectomy 4/2021 02/11/2025 - GI consulted for hematochezia. Colonoscopy done erosion at the colonic anastomosis with active oozing blood. Clips placed. No further complaints of bleeding/melena. Hgb stable at 9.0. NAEON. VSS.      Recommendations:  - Hgb stable, no further bleeding reported  - follow up in clinic in 8-12 weeks for f/u CBC - appt request placed with schedulers  - ok to DC from GI standpoint   - we will sign off       Thank you for involving us in the care of Nydia Stevens. Please call with any additional questions, concerns or changes in the patient's clinical status.

## 2025-02-12 ENCOUNTER — PATIENT OUTREACH (OUTPATIENT)
Dept: ADMINISTRATIVE | Facility: CLINIC | Age: OVER 89
End: 2025-02-12
Payer: MEDICARE

## 2025-02-12 ENCOUNTER — TELEPHONE (OUTPATIENT)
Dept: ENDOSCOPY | Facility: HOSPITAL | Age: OVER 89
End: 2025-02-12
Payer: MEDICARE

## 2025-02-12 LAB — PATHOLOGIST INTERPRETATION AB/XM: NORMAL

## 2025-02-12 NOTE — TELEPHONE ENCOUNTER
Kaleigh Cadena, FNP-C  P [facility]  Caller: Unspecified (Yesterday, 10:04 AM)  Please cancel scheduled colonoscopy 3/10/25 with Dr. Grove. Patient just had one in the hospital, its no longer needed. Thanks

## 2025-02-12 NOTE — TELEPHONE ENCOUNTER
[place]ed patient re: [place] [facility] already had procedure done in hospital. Removed from schedule. Understanding verbalized.

## 2025-02-14 ENCOUNTER — IMMUNIZATION (OUTPATIENT)
Dept: INTERNAL MEDICINE | Facility: CLINIC | Age: OVER 89
End: 2025-02-14
Payer: MEDICARE

## 2025-02-14 ENCOUNTER — OFFICE VISIT (OUTPATIENT)
Dept: PRIMARY CARE CLINIC | Facility: CLINIC | Age: OVER 89
End: 2025-02-14
Payer: MEDICARE

## 2025-02-14 VITALS
TEMPERATURE: 98 F | OXYGEN SATURATION: 98 % | BODY MASS INDEX: 18.96 KG/M2 | WEIGHT: 120.81 LBS | SYSTOLIC BLOOD PRESSURE: 112 MMHG | DIASTOLIC BLOOD PRESSURE: 64 MMHG | HEART RATE: 109 BPM | HEIGHT: 67 IN

## 2025-02-14 DIAGNOSIS — Z87.19 HISTORY OF GI BLEED: Primary | ICD-10-CM

## 2025-02-14 DIAGNOSIS — Z23 NEED FOR VACCINATION: Primary | ICD-10-CM

## 2025-02-14 PROBLEM — K92.1 HEMATOCHEZIA: Status: RESOLVED | Noted: 2020-03-17 | Resolved: 2025-02-14

## 2025-02-14 PROBLEM — K62.5 RECTAL BLEEDING: Status: RESOLVED | Noted: 2024-12-07 | Resolved: 2025-02-14

## 2025-02-14 NOTE — PROGRESS NOTES
.  This note has been generated using voice-recognition software. There may be typographical errors that have been missed during proof-reading.     Primary Care Provider Appointment    Subjective:      Patient ID: Nydia Stevens is a 89 y.o. female here for follow up.     Chief Complaint: Follow-up  HPI:  This is an 89-year-old female with hypertension, hyperlipidemia, congestive heart failure, h/o colon cancer, iron deficiency anemia with history of GI bleed 12/2024, carotid artery disease, prediabetes, osteoporosis here for f/u.  patient last seen 01/16/2025 02/05/2025 patient seen by GI for history of GI bleed.  Colonoscopy ordered.  Ordered.  Follow-up p.r.n..    02/08/2025 patient admitted for GI bleed.  Colonoscopy with colonic anastomosis  bleeding.  Clips placed.  Hemoglobin stable.  Patient discharge.  Follow-up in clinic 812 weeks for CBC.  Discharge summary has not been completed.     Transitional Care Note    Family and/or Caretaker present at visit?  No.  Diagnostic tests reviewed/disposition: I have reviewed all completed as well as pending diagnostic tests at the time of discharge.  Disease/illness education: GIB  Home health/community services discussion/referrals: Patient has home health established at [facility] .   Establishment or re-establishment of referral orders for community resources: No other necessary community resources.   Discussion with other health care providers: No discussion with other health care providers necessary.          Patient Active Problem List   Diagnosis    Primary hypertension    Mixed hyperlipidemia    Palliative care encounter    Closed fracture of left olecranon process    Debility    Aortic stenosis, mild    Osteoporosis    Cachexia    Acute blood loss anemia    Chronic diastolic heart failure    History of colon cancer    Carotid stenosis, asymptomatic, bilateral    H/O: hysterectomy    Prediabetes    Hyponatremia    B12 deficiency    Healthcare maintenance     Aortic atherosclerosis    Benign mole    Adrenal nodule    Cerebral atrophy, mild    History of hematuria    History of GI bleed    Body mass index (BMI) less than 19    Moderate protein-calorie malnutrition        Past Surgical History:   Procedure Laterality Date    CATARACT EXTRACTION W/  INTRAOCULAR LENS IMPLANT Left 8/11/14    [facility]    CATARACT EXTRACTION W/  INTRAOCULAR LENS IMPLANT Right 09/15/14    [facility]    COLONOSCOPY N/A 3/19/2020    Procedure: COLONOSCOPY;  Surgeon: Real Mabry MD;  Location: [facility] ENDO (2ND FLR);  Service: Endoscopy;  Laterality: N/A;    COLONOSCOPY N/A 3/23/2021    Procedure: COLONOSCOPY;  Surgeon: AUTUMN Berg MD;  Location: [facility] ENDO (4TH FLR);  Service: Endoscopy;  Laterality: N/A;  covid test 3/20 [facility]    COLONOSCOPY N/A 2/15/2022    Procedure: COLONOSCOPY;  Surgeon: Doris Simpson MD;  Location: [facility] ENDO (4TH FLR);  Service: Endoscopy;  Laterality: N/A;  COVID test on 2/12/22 at [facility]  2/14/22 - Pt confirmed 0640 arrival, prep instructions reviewed, Pt verbalized [id number]    COLONOSCOPY N/A 12/9/2024    Procedure: COLONOSCOPY;  Surgeon: Vitaly Virgen MD;  Location: [facility] ENDO (2ND FLR);  Service: Gastroenterology;  Laterality: N/A;    COLONOSCOPY N/A 2/10/2025    Procedure: COLONOSCOPY;  Surgeon: Terrance Galvez MD;  Location: [facility] ENDO (2ND FLR);  Service: Endoscopy;  Laterality: N/A;    ESOPHAGOGASTRODUODENOSCOPY N/A 3/19/2020    Procedure: EGD (ESOPHAGOGASTRODUODENOSCOPY);  Surgeon: Real Mabry MD;  Location: [facility] ENDO (2ND FLR);  Service: Endoscopy;  Laterality: N/A;    ESOPHAGOGASTRODUODENOSCOPY N/A 12/9/2024    Procedure: EGD (ESOPHAGOGASTRODUODENOSCOPY);  Surgeon: Vitaly Virgen MD;  Location: [facility] ENDO (2ND FLR);  Service: Gastroenterology;  Laterality: N/A;    FOOT SURGERY      left    HYSTERECTOMY  1962    ILEOCOLECTOMY N/A 4/15/2021    Procedure: ILEOCOLECTOMY;  Surgeon: AUTUMN Berg MD;  Location: [facility] OR [address] [place] [address];   Service: Colon and Rectal;  Laterality: N/A;    INSERTION OF TUNNELED CENTRAL VENOUS CATHETER (CVC) WITH SUBCUTANEOUS PORT N/A 7/2/2021    Procedure: [id number];  Surgeon: Suresh Agrawal MD;  Location: [facility] OR 2ND FLR;  Service: Vascular;  Laterality: N/A;  Right IJ    LAPAROSCOPIC HEMICOLECTOMY Right 3/20/2020    Procedure: HEMICOLECTOMY, RIGHT;  Surgeon: AUTUMN Berg MD;  Location: [facility] OR 2ND FLR;  Service: Colon and Rectal;  Laterality: Right;    LYSIS OF ADHESIONS N/A 4/15/2021    Procedure: LYSIS, ADHESIONS;  Surgeon: AUTUMN Berg MD;  Location: [facility] OR 2ND FLR;  Service: Colon and Rectal;  Laterality: N/A;  Greater than 2 hours    MOBILIZATION OF SPLENIC FLEXURE N/A 4/15/2021    Procedure: MOBILIZATION, SPLENIC FLEXURE;  Surgeon: AUTUMN Berg MD;  Location: [facility] OR 2ND FLR;  Service: Colon and Rectal;  Laterality: N/A;       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    Hyperlipidemia     Hypertension     Osteoporosis        Social History     Socioeconomic History    Marital status: Single   Occupational History    Occupation: teaching retired    Occupation: Nun.  [employer]   Tobacco Use    Smoking status: Never    Smokeless tobacco: Never   Substance and Sexual Activity    Alcohol use: No     Alcohol/week: 0.0 standard drinks of alcohol    Drug use: No    Sexual activity: Never   Social History Narrative    Member of [employer] order here in [place], [place]..  Lives with about 40-50 sisters.  All sisters with her are retired.       Social Drivers of Health     Financial Resource Strain: Low Risk  (2/8/2025)    Overall Financial Resource Strain (CARDIA)     Difficulty of Paying Living Expenses: Not hard at all   Food Insecurity: No Food Insecurity (2/8/2025)    Hunger Vital Sign     Worried  "About Running Out of Food in the Last Year: Never true     Ran Out of Food in the Last Year: Never true   Transportation Needs: No Transportation Needs (2/8/2025)    TRANSPORTATION NEEDS     Transportation : No   Physical Activity: Insufficiently Active (12/20/2024)    Exercise Vital Sign     Days of Exercise per Week: 2 days     Minutes of Exercise per Session: 60 min   Stress: No Stress Concern Present (2/8/2025)    [employer] - Occupational Stress Questionnaire     Feeling of Stress : Not at all   Housing Stability: Unknown (2/8/2025)    Housing Stability Vital Sign     Unable to Pay for Housing in the Last Year: No     Homeless in the Last Year: No       Review of Systems         No data to display                 Checklist of Daily Activities:        Objective:   /64 (BP Location: Left arm, Patient Position: Sitting)   Pulse 109   Temp 98 °F (36.7 °C) (Oral)   Ht 5' 7" (1.702 m)   Wt 54.8 kg (120 lb 13 oz)   SpO2 98%   BMI 18.92 kg/m²     Physical Exam  Constitutional:       General: She is not in acute distress.     Appearance: Normal appearance. She is not ill-appearing, toxic-appearing or diaphoretic.   HENT:      Head: Normocephalic and atraumatic.   Cardiovascular:      Rate and Rhythm: Normal rate and regular rhythm.      Heart sounds: Murmur heard.   Pulmonary:      Effort: Pulmonary effort is normal.      Breath sounds: Normal breath sounds.   Abdominal:      Palpations: Abdomen is soft.   Musculoskeletal:      Right lower leg: No edema.      Left lower leg: No edema.   Lymphadenopathy:      Cervical: No cervical adenopathy.   Neurological:      Mental Status: She is alert.             Lab Results   Component Value Date    WBC 7.30 02/11/2025    HGB 9.5 (L) 02/11/2025    HCT 30.0 (L) 02/11/2025     02/11/2025    CHOL 270 (H) 11/07/2024    TRIG 84 11/07/2024    HDL 76 (H) 11/07/2024    ALT 5 (L) 02/08/2025    AST 16 02/08/2025     02/11/2025    K 3.5 " 02/11/2025     02/11/2025    CREATININE 0.7 02/11/2025    BUN 16 02/11/2025    CO2 23 02/11/2025    TSH 2.396 11/07/2024    INR 1.0 12/07/2024    HGBA1C 5.7 (H) 11/07/2024       Current Outpatient Medications on File Prior to Visit   Medication Sig Dispense Refill    amLODIPine (NORVASC) 10 MG tablet Take 1 tablet (10 mg total) by mouth once daily. 90 tablet 3    calcium carbonate (OS-JAILYN) 500 mg calcium (1,250 mg) tablet Take 1 tablet (500 mg total) by mouth once daily.  0    cholecalciferol, vitamin D3, (VITAMIN D3) 25 mcg (1,000 unit) capsule Take 1,000 Units by mouth once daily. Hold until after surgery      cyanocobalamin (VITAMIN B-12) 1000 MCG tablet Take 1 tablet (1,000 mcg total) by mouth once daily. Hold until after surgery 30 tablet 11    FEROSUL 325 mg (65 mg iron) Tab tablet TAKE ONE TABLET BY MOUTH ON TUESDAY, THURSDAY, AND SATURDAY 12 tablet 5    losartan (COZAAR) 100 MG tablet Take 1 tablet (100 mg total) by mouth once daily. 30 tablet 5    multivitamin Tab Take 1 tablet by mouth once daily. 30 tablet 11    pantoprazole (PROTONIX) 40 MG tablet Take 1 tablet (40 mg total) by mouth 2 (two) times daily. 60 tablet 0    rosuvastatin (CRESTOR) 40 MG Tab TAKE ONE TABLET BY MOUTH EVERY DAY FOR CHOLESTEROL 90 tablet 3    VIT C/VIT E ACETATE/LUTEIN/MIN (OCUVITE LUTEIN ORAL) Take 1 tablet by mouth once daily. Hold until after surgery       Current Facility-Administered Medications on File Prior to Visit   Medication Dose Route Frequency Provider Last Rate Last Admin    gabapentin capsule 300 mg  300 mg Oral TID Amanda Gaspar NP   300 mg at 04/16/21 0814         Assessment:   89 y.o. female with multiple co-morbid illnesses here for continued follow up of medical problems.      Plan:     Problem List Items Addressed This Visit          GI    History of GI bleed - Primary     S/p transfusion and hospitalizations, most recent 2/2025.  Bleeding appears to be associated with anastomosis on colon, s/p  clip.   No further bleeding since last visit.  Patient completely asymptomatic without any pain with bleeding.    Tylenol only for pain.  No aspirin.    Defer to Go concerning need fo rfurther colon            Health Maintenance         Date Due Completion Date    COVID-19 Vaccine (10 - 2024-25 season) 04/11/2025 2/14/2025    DEXA Scan 07/26/2025 7/26/2023    Override on 2/15/2016: Declined    Hemoglobin A1c (Prediabetes) 11/07/2025 11/7/2024    Colonoscopy 02/10/2028 2/10/2025    TETANUS VACCINE 02/15/2028 2/15/2018          Medications Reconciliation:   I have not reconciled the patient's home medications with the patient/family. I have updated all changes.  Refer to After-Visit Medication List.      Medication List            Accurate as of February 14, 2025 12:20 PM. If you have any questions, ask your nurse or doctor.                CONTINUE taking these medications      amLODIPine 10 MG tablet  Commonly known as: NORVASC  Take 1 tablet (10 mg total) by mouth once daily.     calcium carbonate 500 mg calcium (1,250 mg) tablet  Commonly known as: OS-JAILYN  Take 1 tablet (500 mg total) by mouth once daily.     cholecalciferol (vitamin D3) 25 mcg (1,000 unit) capsule  Commonly known as: VITAMIN D3     cyanocobalamin 1000 MCG tablet  Commonly known as: VITAMIN B-12  Take 1 tablet (1,000 mcg total) by mouth once daily. Hold until after surgery     FeroSuL 325 mg (65 mg iron) Tab tablet  Generic drug: ferrous sulfate  TAKE ONE TABLET BY MOUTH ON TUESDAY, THURSDAY, AND SATURDAY     losartan 100 MG tablet  Commonly known as: COZAAR  Take 1 tablet (100 mg total) by mouth once daily.     multivitamin Tab  Take 1 tablet by mouth once daily.     OCUVITE LUTEIN ORAL     pantoprazole 40 MG tablet  Commonly known as: PROTONIX  Take 1 tablet (40 mg total) by mouth 2 (two) times daily.     rosuvastatin 40 MG Tab  Commonly known as: CRESTOR  TAKE ONE TABLET BY MOUTH EVERY DAY FOR CHOLESTEROL                   Follow up in about 6  weeks (around 3/28/2025). Total clinical care time was 23 min. The following issues were discussed: recent GIB and hospitalization     Brittney Travis MD  Internal Medicine  [facility]  [phone]

## 2025-02-14 NOTE — ASSESSMENT & PLAN NOTE
S/p transfusion and hospitalizations, most recent 2/2025.  Bleeding appears to be associated with anastomosis on colon, s/p clip.   No further bleeding since last visit.  Patient completely asymptomatic without any pain with bleeding.    Tylenol only for pain.  No aspirin.    Defer to Go concerning need fo rfurther colon

## 2025-02-15 NOTE — ASSESSMENT & PLAN NOTE
Patient is identified as having Diastolic (HFpEF) heart failure that is Chronic. CHF is currently controlled.   Latest ECHO performed and demonstrates- Results for orders placed during the hospital encounter of 02/16/21    Echo Color Flow Doppler? Yes    Interpretation Summary  · The left ventricle is normal in size with hyperdynamic systolic function. The estimated ejection fraction is 75%  · Indeterminate left ventricular diastolic function.  · Normal right ventricular size with normal right ventricular systolic function.  · There is a very late LVOT flow acceleration to 3.0m/s ( 36mmHg) due to hyperdynamic LV function and cavity obliteration at the end of systole.  · Mild left atrial enlargement.  · Mild tricuspid regurgitation.  · The estimated PA systolic pressure is 33 mmHg.  · Normal central venous pressure (3 mmHg).  · There is a right pleural effusion.    - Continue ACE/ARB   - Continue to stress to patient importance of self efficacy and  on diet for CHF.  - Euvolemic on exam

## 2025-02-15 NOTE — DISCHARGE SUMMARY
[facility] - Observation [address] [place] [address] Medicine  Discharge Summary      Patient Name: Nydia Stevens  MRN: [id number]  SHANTANU: [id number]  Patient Class: IP- Inpatient  Admission Date: 2/8/2025  Hospital Length of Stay: 2 days  Discharge Date and Time: 2/11/2025  4:12 PM  Attending Physician: Mike Vargas MD  Discharging Provider: Noris Tan PA-C  Primary Care Provider: Brittney Travis MD  [facility] Medicine Team: [facility] Noris Tan PA-C  Primary Care Team: [facility]    HPI:   Sister Nydia Stevens is a 89 y.o. F with HTN, HLD, prediabetes, ANANDA 2/2 chronic blood loss, and colon cancer s/p right hemicolectomy in March of 2020 who is presenting to the ED for evaluation of GI bleeding and presyncopal episode. She reports having one episode of dark, tarry stool, along with bright red blood, early this morning. She attempted to grab onto her walker to stand from the toilet and felt weak and lightheaded and lowered herself to thr ground. She denies any falls or syncope and did not hit or head or lose consciousness. She is not on anticoagulation or antiplatelets. Of note, she was admitted in early December with bright red blood per rectum & underwent EGD/colon but colonoscopy prep was inadequate. She is on PPI daily and daily iron supplement, which she is compliant with. Pt denies fever, chills, chest pain, palpitations, SOB, cough, abdominal pain, N/V/D, dysuria, leg swelling or pain, HA, or recent sick contacts.     In the ED: VSSAF. CBC with stable normocytic anemia with Hgb 9.4 (from 10.2 on 1/16/25), no leukocytosis. CMP, BNP, HS troponin, and uA grossly unremarkable. CTH and c-spine without acute process. CTA without active extravasation or acute findings. Pt was given IV protonix 80 mg and placed in observation for further management.        Most Recent Upper Endoscopy:   12/9/24  Impression:    - Normal esophagus.                          - Erosive gastropathy with no stigmata of recent                           bleeding.                          - Normal examined duodenum.                          - No specimens collected.      Most Recent Colonoscopy:   12/9/24  Impression:     - Preparation of the colon was inadequate.                          - Non-bleeding internal hemorrhoids.                          - Stool in the transverse colon.                          - No specimens collected.    Procedure(s) (LRB):  COLONOSCOPY (N/A)      Hospital Course:   Sister Nydia was placed in observation for hematochezia and acute blood loss anemia. Pt was started on IV protonix at admission. Hgb stable but downtrending (9.4 -> 7.2. GI consulted and suspect a lower GI bleeding. S/p colonoscopy with clipping. Repeat CBC showed Hgb of 6.8, will administer 1 unit RBC. Hgb increased and maintained >8. PT/OT recommending moderate intensity therapy, but patient decline SNF. Patient stable for discharge home with GI follow up. Discussed care plan with patient, verbalized understanding. All questions answered. Return precautions given       Goals of Care Treatment Preferences:  Code Status: Full Code    Health care agent: Dr Abdullahi  Health care agent number: see chart    Living Will: Yes     What is most important right now is to focus on symptom/pain control, extending life as long as possible, even it it means sacrificing quality.  Accordingly, we have decided that the best plan to meet the patient's goals includes continuing with treatment.      SDOH Screening:  The patient was screened for utility difficulties, food insecurity, transport difficulties, housing insecurity, and interpersonal safety and there were no concerns identified this admission.     Consults:   Consults (From admission, onward)          Status Ordering Provider     Inpatient consult to Registered Dietitian/Nutritionist  Once        Provider:  (Not yet assigned)    Completed LEV TOPETE     Inpatient consult to Gastroenterology  Once         Provider:  (Not yet assigned)    Completed LEV TOPETE            Body mass index (BMI) less than 19        Chronic diastolic heart failure  Patient is identified as having Diastolic (HFpEF) heart failure that is Chronic. CHF is currently controlled.   Latest ECHO performed and demonstrates- Results for orders placed during the hospital encounter of 02/16/21    Echo Color Flow Doppler? Yes    Interpretation Summary  · The left ventricle is normal in size with hyperdynamic systolic function. The estimated ejection fraction is 75%  · Indeterminate left ventricular diastolic function.  · Normal right ventricular size with normal right ventricular systolic function.  · There is a very late LVOT flow acceleration to 3.0m/s ( 36mmHg) due to hyperdynamic LV function and cavity obliteration at the end of systole.  · Mild left atrial enlargement.  · Mild tricuspid regurgitation.  · The estimated PA systolic pressure is 33 mmHg.  · Normal central venous pressure (3 mmHg).  · There is a right pleural effusion.    - Continue ACE/ARB   - Continue to stress to patient importance of self efficacy and  on diet for CHF.  - Euvolemic on exam     Acute blood loss anemia  Anemia is likely due to acute blood loss which was from GI bleeding and Iron deficiency. Most recent hemoglobin and hematocrit are listed below.    Plan  - Transfuse PRBC if patient becomes hemodynamically unstable, symptomatic or H/H drops below 7/21.  - Patient's anemia is currently worsening.   - See hematochezia   - transfuse 1 unit RBC  - stable    Cachexia  Malnutrition   BMI less than 19   Concern for cachexia as evidenced by loss of muscle mass and loss of body fat . Treatment to include nutrition consult, physical therapy, and occupational therapy.    Body mass index is 17.89 kg/m².  Albumin   Date Value Ref Range Status   02/08/2025 3.5 3.5 - 5.2 g/dL Final       Debility  Patient with Chronic debility due to age-related physical debility. The  patient's latest AMPAC (Activity Measure for Post Acute Care) Score is listed below.    AM-PAC Score - How much help does the patient need for each activity listed  Basic Mobility Total Score: 14  Turning over in bed (including adjusting bedclothes, sheets and blankets)?: A little  Sitting down on and standing up from a chair with arms (e.g., wheelchair, bedside commode, etc.): A lot  Moving from lying on back to sitting on the side of the bed?: A little  Moving to and from a bed to a chair (including a wheelchair)?: A lot  Need to walk in hospital room?: A lot  Climbing 3-5 steps with a railing?: A lot    Plan  - Progressive mobility protocol initated  - PT/OT consulted, recommended moderate intensity therapy. Patient declined and would like HH.   - Fall precautions in place    Mixed hyperlipidemia  - Chronic   - Continue statin     Primary hypertension  Patient's blood pressure range in the last 24 hours was: BP  Min: 114/55  Max: 176/72.The patient's inpatient anti-hypertensive regimen is listed below:  Current Antihypertensives  amLODIPine tablet 10 mg, Daily, Oral  losartan tablet 100 mg, Daily, Oral    Plan  - BP is controlled, no changes needed to their regimen      Final Active Diagnoses:    Diagnosis Date Noted POA    Moderate protein-calorie malnutrition [E44.0] 02/11/2025 Yes    Body mass index (BMI) less than 19 [Z68.1] 02/08/2025 Not Applicable    Chronic diastolic heart failure [I50.32] 03/17/2020 Yes    Acute blood loss anemia [D62] 03/02/2020 Yes    Cachexia [R64] 03/02/2020 Yes    Debility [R53.81] 10/13/2016 Yes    Primary hypertension [I10] 01/22/2013 Yes    Mixed hyperlipidemia [E78.2] 01/22/2013 Yes     Chronic      Problems Resolved During this Admission:    Diagnosis Date Noted Date Resolved POA    PRINCIPAL PROBLEM:  Hematochezia [K92.1] 03/17/2020 02/14/2025 Yes    Iron deficiency anemia [D50.9] 06/20/2014 12/19/2023 Yes       Discharged Condition: stable    Disposition: Home or Self  "Care    Follow Up:   Follow-up Information       Brittney Travis MD Follow up in 1 week(s).    Specialty: Internal Medicine  Contact information:  [address]  [facility] [address]  [phone]               [facility] Follow up on 2/12/2025.    Why: [employer] [facility] will be coming to visit you tomorrow, 2/12 to start services. If you have any questions, call the number listed.  Contact information:  [address], [address]  [facility] [address] [phone]                         Patient Instructions:      3 IN 1 COMMODE FOR HOME USE     Order Specific Question Answer Comments   Type: Standard    Height: 5' 7" (1.702 m)    Weight: 51.8 kg (114 lb 3.2 oz)    Does patient have medical equipment at home? walker, rolling    Does patient have medical equipment at home? rollator    Does patient have medical equipment at home? wheelchair    Does patient have medical equipment at home? shower chair    Length of need (1-99 months): 99      Ambulatory referral/consult to Gastroenterology   Standing Status: Future   Referral Priority: Routine Referral Type: Consultation   Referral Reason: Specialty Services Required   Requested Specialty: Gastroenterology   Number of Visits Requested: 1     Diet Cardiac     Notify your health care provider if you experience any of the following:  severe uncontrolled pain     Notify your health care provider if you experience any of the following:  redness, tenderness, or signs of infection (pain, swelling, redness, odor or green/yellow discharge around incision site)     Notify your health care provider if you experience any of the following:  persistent dizziness, light-headedness, or visual disturbances     Notify your health care provider if you experience any of the following:  increased confusion or weakness     Activity as tolerated       Significant Diagnostic Studies: Labs: All labs within the past 24 hours have been reviewed    Pending Diagnostic Studies:       " None           Medications:  Reconciled Home Medications:      Medication List        START taking these medications      multivitamin Tab  Take 1 tablet by mouth once daily.     pantoprazole 40 MG tablet  Commonly known as: PROTONIX  Take 1 tablet (40 mg total) by mouth 2 (two) times daily.            CONTINUE taking these medications      amLODIPine 10 MG tablet  Commonly known as: NORVASC  Take 1 tablet (10 mg total) by mouth once daily.     calcium carbonate 500 mg calcium (1,250 mg) tablet  Commonly known as: OS-JAILYN  Take 1 tablet (500 mg total) by mouth once daily.     cholecalciferol (vitamin D3) 25 mcg (1,000 unit) capsule  Commonly known as: VITAMIN D3  Take 1,000 Units by mouth once daily. Hold until after surgery     cyanocobalamin 1000 MCG tablet  Commonly known as: VITAMIN B-12  Take 1 tablet (1,000 mcg total) by mouth once daily. Hold until after surgery     FeroSuL 325 mg (65 mg iron) Tab tablet  Generic drug: ferrous sulfate  TAKE ONE TABLET BY MOUTH ON TUESDAY, THURSDAY, AND SATURDAY     losartan 100 MG tablet  Commonly known as: COZAAR  Take 1 tablet (100 mg total) by mouth once daily.     OCUVITE LUTEIN ORAL  Take 1 tablet by mouth once daily. Hold until after surgery     rosuvastatin 40 MG Tab  Commonly known as: CRESTOR  TAKE ONE TABLET BY MOUTH EVERY DAY FOR CHOLESTEROL            STOP taking these medications      sodium,potassium,mag sulfates 17.5-3.13-1.6 gram Solr  Commonly known as: SUPREP BOWEL PREP KIT              Indwelling Lines/Drains at time of discharge:   Lines/Drains/Airways       Central Venous Catheter Line  Duration             Port A Cath Single Lumen 07/02/21 0837 right internal jugular 1324 days                    Time spent on the discharge of patient: 35 minutes of the time sent on discharge, including examining the patient, providing discharge instructions, arranging follow up, and documentation           Noris Tan PA-C  Department of Hospital Medicine  [facility]  Observation 11H

## 2025-02-15 NOTE — ASSESSMENT & PLAN NOTE
Anemia is likely due to acute blood loss which was from GI bleeding and Iron deficiency. Most recent hemoglobin and hematocrit are listed below.    Plan  - Transfuse PRBC if patient becomes hemodynamically unstable, symptomatic or H/H drops below 7/21.  - Patient's anemia is currently worsening.   - See hematochezia   - transfuse 1 unit RBC  - stable

## 2025-02-18 DIAGNOSIS — I10 ESSENTIAL HYPERTENSION: ICD-10-CM

## 2025-02-19 RX ORDER — LOSARTAN POTASSIUM 100 MG/1
100 TABLET ORAL DAILY
Qty: 90 TABLET | Refills: 3 | Status: SHIPPED | OUTPATIENT
Start: 2025-02-19

## 2025-03-26 ENCOUNTER — EXTERNAL HOME HEALTH (OUTPATIENT)
Dept: HOME HEALTH SERVICES | Facility: HOSPITAL | Age: OVER 89
End: 2025-03-26
Payer: MEDICARE

## 2025-03-27 NOTE — TELEPHONE ENCOUNTER
Immediate Brief Procedure Note    Patient Name: Yaquelin Prescott  YOB: 1945  DATE OF PROCEDURE: 3/27/2025  PROCEDURALIST: Kapil Christian MD  ASSISTANT(S): None  ANESTHESIA TYPE: Moderate sedation    PROCEDURE PERFORMED: Fistulgram    Pre-procedure Dx:   1. ESRD (end stage renal disease) on dialysis  (CMD)        Post-procedure Dx: Same    Findings: Patent fistula.  Lines drawn in the cannulation zone. Areas outside of the line are smaller and slightly deeper    Estimated Blood Loss: Less than 5 ml    Complications: None    Specimens Removed: None     Left voicemail for call back.

## 2025-03-28 ENCOUNTER — TELEPHONE (OUTPATIENT)
Dept: SURGERY | Facility: CLINIC | Age: OVER 89
End: 2025-03-28
Payer: MEDICARE

## 2025-03-28 ENCOUNTER — TELEPHONE (OUTPATIENT)
Dept: PRIMARY CARE CLINIC | Facility: CLINIC | Age: OVER 89
End: 2025-03-28

## 2025-03-28 ENCOUNTER — LAB VISIT (OUTPATIENT)
Dept: LAB | Facility: HOSPITAL | Age: OVER 89
End: 2025-03-28
Payer: MEDICARE

## 2025-03-28 ENCOUNTER — OFFICE VISIT (OUTPATIENT)
Dept: PRIMARY CARE CLINIC | Facility: CLINIC | Age: OVER 89
End: 2025-03-28
Payer: MEDICARE

## 2025-03-28 ENCOUNTER — TELEPHONE (OUTPATIENT)
Dept: PRIMARY CARE CLINIC | Facility: CLINIC | Age: OVER 89
End: 2025-03-28
Payer: MEDICARE

## 2025-03-28 VITALS
SYSTOLIC BLOOD PRESSURE: 128 MMHG | DIASTOLIC BLOOD PRESSURE: 68 MMHG | BODY MASS INDEX: 18.55 KG/M2 | WEIGHT: 118.19 LBS | HEART RATE: 86 BPM | OXYGEN SATURATION: 100 % | TEMPERATURE: 98 F | HEIGHT: 67 IN

## 2025-03-28 DIAGNOSIS — Z87.19 HISTORY OF GI BLEED: Primary | ICD-10-CM

## 2025-03-28 DIAGNOSIS — I10 PRIMARY HYPERTENSION: ICD-10-CM

## 2025-03-28 DIAGNOSIS — R63.4 WEIGHT LOSS: ICD-10-CM

## 2025-03-28 DIAGNOSIS — Z87.19 HISTORY OF GI BLEED: ICD-10-CM

## 2025-03-28 DIAGNOSIS — Z00.00 HEALTHCARE MAINTENANCE: ICD-10-CM

## 2025-03-28 DIAGNOSIS — K62.5 RECTAL BLEEDING: ICD-10-CM

## 2025-03-28 DIAGNOSIS — R82.90 ABNORMAL URINE ODOR: Primary | ICD-10-CM

## 2025-03-28 PROBLEM — E87.1 HYPONATREMIA: Status: RESOLVED | Noted: 2021-04-09 | Resolved: 2025-03-28

## 2025-03-28 LAB
ABSOLUTE EOSINOPHIL (OHS): 0.11 K/UL
ABSOLUTE MONOCYTE (OHS): 0.88 K/UL (ref 0.3–1)
ABSOLUTE NEUTROPHIL COUNT (OHS): 3.37 K/UL (ref 1.8–7.7)
ALBUMIN SERPL BCP-MCNC: 4.2 G/DL (ref 3.5–5.2)
ALP SERPL-CCNC: 57 UNIT/L (ref 40–150)
ALT SERPL W/O P-5'-P-CCNC: 11 UNIT/L (ref 10–44)
ANION GAP (OHS): 8 MMOL/L (ref 8–16)
AST SERPL-CCNC: 20 UNIT/L (ref 11–45)
BASOPHILS # BLD AUTO: 0.04 K/UL
BASOPHILS NFR BLD AUTO: 0.7 %
BILIRUB SERPL-MCNC: 0.3 MG/DL (ref 0.1–1)
BUN SERPL-MCNC: 28 MG/DL (ref 8–23)
CALCIUM SERPL-MCNC: 10 MG/DL (ref 8.7–10.5)
CHLORIDE SERPL-SCNC: 100 MMOL/L (ref 95–110)
CO2 SERPL-SCNC: 29 MMOL/L (ref 23–29)
CREAT SERPL-MCNC: 0.7 MG/DL (ref 0.5–1.4)
ERYTHROCYTE [DISTWIDTH] IN BLOOD BY AUTOMATED COUNT: 14.5 % (ref 11.5–14.5)
GFR SERPLBLD CREATININE-BSD FMLA CKD-EPI: >60 ML/MIN/1.73/M2
GLUCOSE SERPL-MCNC: 93 MG/DL (ref 70–110)
HCT VFR BLD AUTO: 32.1 % (ref 37–48.5)
HGB BLD-MCNC: 9.7 GM/DL (ref 12–16)
IMM GRANULOCYTES # BLD AUTO: 0.03 K/UL (ref 0–0.04)
IMM GRANULOCYTES NFR BLD AUTO: 0.5 % (ref 0–0.5)
IRON SATN MFR SERPL: 8 % (ref 20–50)
IRON SERPL-MCNC: 34 UG/DL (ref 30–160)
LYMPHOCYTES # BLD AUTO: 1.36 K/UL (ref 1–4.8)
MCH RBC QN AUTO: 28.2 PG (ref 27–50)
MCHC RBC AUTO-ENTMCNC: 30.2 G/DL (ref 32–36)
MCV RBC AUTO: 93 FL (ref 82–98)
NUCLEATED RBC (/100WBC) (OHS): 0 /100 WBC
PLATELET # BLD AUTO: 255 K/UL (ref 150–450)
PMV BLD AUTO: 10.4 FL (ref 9.2–12.9)
POTASSIUM SERPL-SCNC: 4.6 MMOL/L (ref 3.5–5.1)
PROT SERPL-MCNC: 7.7 GM/DL (ref 6–8.4)
RBC # BLD AUTO: 3.44 M/UL (ref 4–5.4)
RELATIVE EOSINOPHIL (OHS): 1.9 %
RELATIVE LYMPHOCYTE (OHS): 23.5 % (ref 18–48)
RELATIVE MONOCYTE (OHS): 15.2 % (ref 4–15)
RELATIVE NEUTROPHIL (OHS): 58.2 % (ref 38–73)
SODIUM SERPL-SCNC: 137 MMOL/L (ref 136–145)
TIBC SERPL-MCNC: 413 UG/DL (ref 250–450)
TRANSFERRIN SERPL-MCNC: 279 MG/DL (ref 200–375)
WBC # BLD AUTO: 5.79 K/UL (ref 3.9–12.7)

## 2025-03-28 PROCEDURE — 83540 ASSAY OF IRON: CPT

## 2025-03-28 PROCEDURE — 36415 COLL VENOUS BLD VENIPUNCTURE: CPT

## 2025-03-28 PROCEDURE — 85025 COMPLETE CBC W/AUTO DIFF WBC: CPT

## 2025-03-28 PROCEDURE — 80053 COMPREHEN METABOLIC PANEL: CPT

## 2025-03-28 NOTE — TELEPHONE ENCOUNTER
RN called the [facility] home and she was in the chapel. A representative answered and stated that the pt does not want a repeat c-scope.  She just had one in February.  Sounds like she may have a remanence of a polyp that was not removed or possible hemorrhoids that could have been causing the bleeding.  She is currently asymptomatic at this time and RN will call back on Monday to speak with the nurse.

## 2025-03-28 NOTE — TELEPHONE ENCOUNTER
----- Message from Izabella sent at 3/28/2025 11:05 AM CDT -----  Contact: [phone]  [email or web address] Patient is calling for Medical Advice regarding: Nurse Ms Frye is requesting a call back from the staff no relate to symtoms. Pharmacy name and phone#:Patient wants a call back or [facility]: call back only for this call please use [phone]: Thank you Please advise patient replies from provider may take up to 48 hours.

## 2025-03-28 NOTE — TELEPHONE ENCOUNTER
Nurse Titi forgot to mention that pt has been having a foul odor also, she does not complain of any burning or pain.

## 2025-03-28 NOTE — TELEPHONE ENCOUNTER
There is a history of bleeding with this patient, patient has had colonoscopies done and patient is known to [facility]. Pt was in hospital for this and was scheduled upon discharge for appt with gastro and not with CRS. Could you possibly schedule a sooner appt with gastro?

## 2025-03-28 NOTE — ASSESSMENT & PLAN NOTE
S/p transfusion and hospitalizations, most recent 2/2025.  Bleeding appears to be associated with anastomosis on colon, s/p clip.     Patient notes bleeding.  Tends to minimize symptoms.  Blood in  toilet even without bowel movement. Patient completely asymptomatic without any pain with bleeding.  Patient denies any diarrhea or constipation.    Tylenol only for pain.  No aspirin/  NSAIDs     Will attempt to move up GI appointment.    Labs today for evaluation.    Continue iron 3 days a week.

## 2025-03-28 NOTE — ASSESSMENT & PLAN NOTE
Power-of- completed 5/2022 with patient's assistant [facility].  Reviewed lapost and poa 5/2024  yearly labs 11/2024

## 2025-03-28 NOTE — ASSESSMENT & PLAN NOTE
Another 2 lb weight loss since last visit.  Caregiver present today.  Denies decrease in appetite.    Living facility will monitor patient appetite.  Will follow.

## 2025-03-28 NOTE — TELEPHONE ENCOUNTER
Good morning, pt was here to see Dr Travis this morning. Pt's caretaker reports blood with wiping. Pt has an upcoming appt but Dr Travis is requesting an asap appt, could you please schedule pt in the next few days, thank you.

## 2025-03-28 NOTE — TELEPHONE ENCOUNTER
She will bring patient in to [facility] lab on Monday to provide urine sample, can you place home collect order for this. She is not sure if it is urine that has foul smell or if it is coming from her bowels, she says it is very foul wherever this is coming from.

## 2025-03-28 NOTE — PROGRESS NOTES
.  This note has been generated using voice-recognition software. There may be typographical errors that have been missed during proof-reading.     Primary Care Provider Appointment    Subjective:      Patient ID: Nydia Stevens is a 89 y.o. female here for follow up.     Chief Complaint: Follow-up  HPI:  This is an 89-year-old female with hypertension, hyperlipidemia, congestive heart failure, h/o colon cancer, iron deficiency anemia with history of GI bleed 12/2024, carotid artery disease, prediabetes, osteoporosis here for f/u.  patient last seen   02/14/2025  2lb wt loss since last visit.  Pt notes large amounts of blood at times in the toilet.  Not nec associated with BM.        Problem List[1]     Past Surgical History:   Procedure Laterality Date    CATARACT EXTRACTION W/  INTRAOCULAR LENS IMPLANT Left 8/11/14    [facility]    CATARACT EXTRACTION W/  INTRAOCULAR LENS IMPLANT Right 09/15/14    [facility]    COLONOSCOPY N/A 3/19/2020    Procedure: COLONOSCOPY;  Surgeon: Real Mabry MD;  Location: [facility] (2ND FLR);  Service: Endoscopy;  Laterality: N/A;    COLONOSCOPY N/A 3/23/2021    Procedure: COLONOSCOPY;  Surgeon: AUTUMN Berg MD;  Location: [facility] (4TH FLR);  Service: Endoscopy;  Laterality: N/A;  covid test 3/20 [facility]    COLONOSCOPY N/A 2/15/2022    Procedure: COLONOSCOPY;  Surgeon: Doris Simpson MD;  Location: [facility] (4TH FLR);  Service: Endoscopy;  Laterality: N/A;  COVID test on 2/12/22 at [facility]  2/14/22 - Pt confirmed 0640 arrival, prep instructions reviewed, Pt verbalized understanding-[id number]    COLONOSCOPY N/A 12/9/2024    Procedure: COLONOSCOPY;  Surgeon: Vitaly Virgen MD;  Location: [facility] ENDO (2ND FLR);  Service: Gastroenterology;  Laterality: N/A;    COLONOSCOPY N/A 2/10/2025    Procedure: COLONOSCOPY;  Surgeon: Terrance Galvez MD;  Location: [facility] ENDO (2ND FLR);  Service: Endoscopy;  Laterality: N/A;    ESOPHAGOGASTRODUODENOSCOPY N/A 3/19/2020    Procedure: EGD  "(ESOPHAGOGASTRODUODENOSCOPY);  Surgeon: Real Mabry MD;  Location: [facility] ENDO (2ND FLR);  Service: Endoscopy;  Laterality: N/A;    ESOPHAGOGASTRODUODENOSCOPY N/A 12/9/2024    Procedure: EGD (ESOPHAGOGASTRODUODENOSCOPY);  Surgeon: Vitaly Virgen MD;  Location: [facility] ENDO (2ND FLR);  Service: Gastroenterology;  Laterality: N/A;    FOOT SURGERY      left    HYSTERECTOMY  1962    ILEOCOLECTOMY N/A 4/15/2021    Procedure: ILEOCOLECTOMY;  Surgeon: AUTUMN Berg MD;  Location: [facility] OR 2ND FLR;  Service: Colon and Rectal;  Laterality: N/A;    INSERTION OF TUNNELED CENTRAL VENOUS CATHETER (CVC) WITH SUBCUTANEOUS PORT N/A 7/2/2021    Procedure: [id number];  Surgeon: Suresh Agrawal MD;  Location: [facility] OR 2ND FLR;  Service: Vascular;  Laterality: N/A;  Right IJ    LAPAROSCOPIC HEMICOLECTOMY Right 3/20/2020    Procedure: HEMICOLECTOMY, RIGHT;  Surgeon: AUTUMN Berg MD;  Location: [facility] OR 2ND FLR;  Service: Colon and Rectal;  Laterality: Right;    LYSIS OF ADHESIONS N/A 4/15/2021    Procedure: LYSIS, ADHESIONS;  Surgeon: AUTUMN Berg MD;  Location: [facility] OR 2ND FLR;  Service: Colon and Rectal;  Laterality: N/A;  Greater than 2 hours    MOBILIZATION OF SPLENIC FLEXURE N/A 4/15/2021    Procedure: MOBILIZATION, SPLENIC FLEXURE;  Surgeon: AUTUMN Berg MD;  Location: [facility] OR 2ND FLR;  Service: Colon and Rectal;  Laterality: N/A;       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    Hyperlipidemia     Hypertension     Osteoporosis        Social History[2]    Review of Systems         No data to display                 Checklist of Daily Activities:        Objective:   /68 (BP Location: Left arm, Patient Position: Sitting)   Pulse 86   Temp 97.6 °F (36.4 °C) (Oral)   Ht 5' 7" (1.702 m)   Wt 53.6 kg (118 lb 2.7 oz)   SpO2 100%   BMI " 18.51 kg/m²     Physical Exam  Constitutional:       General: She is not in acute distress.     Appearance: Normal appearance. She is not ill-appearing, toxic-appearing or diaphoretic.   HENT:      Head: Normocephalic and atraumatic.   Cardiovascular:      Rate and Rhythm: Normal rate and regular rhythm.      Heart sounds: Murmur heard.   Pulmonary:      Effort: Pulmonary effort is normal.      Breath sounds: Normal breath sounds.   Abdominal:      Palpations: Abdomen is soft.      Tenderness: There is no abdominal tenderness. There is no guarding or rebound.   Musculoskeletal:      Right lower leg: Edema (B trace) present.      Left lower leg: Edema present.   Lymphadenopathy:      Cervical: No cervical adenopathy.   Neurological:      Mental Status: She is alert.             Lab Results   Component Value Date    WBC 7.30 02/11/2025    HGB 9.5 (L) 02/11/2025    HCT 30.0 (L) 02/11/2025     02/11/2025    CHOL 270 (H) 11/07/2024    TRIG 84 11/07/2024    HDL 76 (H) 11/07/2024    ALT 5 (L) 02/08/2025    AST 16 02/08/2025     02/11/2025    K 3.5 02/11/2025     02/11/2025    CREATININE 0.7 02/11/2025    BUN 16 02/11/2025    CO2 23 02/11/2025    TSH 2.396 11/07/2024    INR 1.0 12/07/2024    HGBA1C 5.7 (H) 11/07/2024       Medications Ordered Prior to Encounter[3]      Assessment:   89 y.o. female with multiple co-morbid illnesses here for continued follow up of medical problems.      Plan:     Problem List Items Addressed This Visit          Cardiac/Vascular    Primary hypertension    BP at goal.    Continue current BP meds            Endocrine    Weight loss     Another 2 lb weight loss since last visit.  Caregiver present today.  Denies decrease in appetite.    Living facility will monitor patient appetite.  Will follow.            GI    History of GI bleed - Primary    S/p transfusion and hospitalizations, most recent 2/2025.  Bleeding appears to be associated with anastomosis on colon, s/p clip.      Patient notes bleeding.  Tends to minimize symptoms.  Blood in  toilet even without bowel movement. Patient completely asymptomatic without any pain with bleeding.  Patient denies any diarrhea or constipation.    Tylenol only for pain.  No aspirin/  NSAIDs     Will attempt to move up GI appointment.    Labs today for evaluation.    Continue iron 3 days a week.         Relevant Orders    CBC Auto Differential    Comprehensive Metabolic Panel       Other    Healthcare maintenance    Power-of- completed 5/2022 with patient's assistant [employer] general.  Reviewed lapost and poa 5/2024  yearly labs 11/2024              Health Maintenance         Date Due Completion Date    COVID-19 Vaccine (10 - 2024-25 season) 04/11/2025 2/14/2025    DEXA Scan 07/26/2025 7/26/2023    Override on 2/15/2016: Declined    Hemoglobin A1c (Prediabetes) 11/07/2025 11/7/2024    Colonoscopy 02/10/2028 2/10/2025    TETANUS VACCINE 02/15/2028 2/15/2018          Medications Reconciliation:   I have reconciled the patient's home medications with the patient/family. I have updated all changes.  Refer to After-Visit Medication List.      Medication List            Accurate as of March 28, 2025  9:40 AM. If you have any questions, ask your nurse or doctor.                CONTINUE taking these medications      amLODIPine 10 MG tablet  Commonly known as: NORVASC  Take 1 tablet (10 mg total) by mouth once daily.     calcium carbonate 500 mg calcium (1,250 mg) tablet  Commonly known as: OS-JAILYN  Take 1 tablet (500 mg total) by mouth once daily.     cholecalciferol (vitamin D3) 25 mcg (1,000 unit) capsule  Commonly known as: VITAMIN D3     cyanocobalamin 1000 MCG tablet  Commonly known as: VITAMIN B-12  Take 1 tablet (1,000 mcg total) by mouth once daily. Hold until after surgery     FeroSuL 325 mg (65 mg iron) Tab tablet  Generic drug: ferrous sulfate  TAKE ONE TABLET BY MOUTH ON TUESDAY, THURSDAY, AND SATURDAY     losartan 100 MG tablet  Commonly  known as: COZAAR  Take 1 tablet (100 mg total) by mouth once daily.     multivitamin Tab  Take 1 tablet by mouth once daily.     OCUVITE LUTEIN ORAL     pantoprazole 40 MG tablet  Commonly known as: PROTONIX  Take 1 tablet (40 mg total) by mouth 2 (two) times daily.     rosuvastatin 40 MG Tab  Commonly known as: CRESTOR  TAKE ONE TABLET BY MOUTH EVERY DAY FOR CHOLESTEROL                   Follow up in about 3 months (around 6/28/2025). Total clinical care time was   Twenty-three min. The following issues were discussed:  GI bleed history, review of last labs, need for GI appointment earlier     Brittney Travis MD  Internal Medicine  [facility]  [phone]               [1]   Patient Active Problem List  Diagnosis    Primary hypertension    Mixed hyperlipidemia    Palliative care encounter    Closed fracture of left olecranon process    Debility    Aortic stenosis, mild    Osteoporosis    Cachexia    Acute blood loss anemia    Chronic diastolic heart failure    History of colon cancer    Carotid stenosis, asymptomatic, bilateral    H/O: hysterectomy    Weight loss    Prediabetes    B12 deficiency    Healthcare maintenance    Aortic atherosclerosis    Benign mole    Adrenal nodule    Cerebral atrophy, mild    History of hematuria    History of GI bleed    Body mass index (BMI) less than 19    Moderate protein-calorie malnutrition   [2]   Social History  Socioeconomic History    Marital status: Single   Occupational History    Occupation: teaching retired    Occupation: Nun.  [employer]   Tobacco Use    Smoking status: Never    Smokeless tobacco: Never   Substance and Sexual Activity    Alcohol use: No     Alcohol/week: 0.0 standard drinks of alcohol    Drug use: No    Sexual activity: Never   Social History Narrative    Member of [employer] order here in [place], [place]..  Lives with about 40-50 sisters.  All sisters with her are retired.       Social  Drivers of Health     Financial Resource Strain: Low Risk  (2/8/2025)    Overall Financial Resource Strain (CARDIA)     Difficulty of Paying Living Expenses: Not hard at all   Food Insecurity: No Food Insecurity (2/8/2025)    Hunger Vital Sign     Worried About Running Out of Food in the Last Year: Never true     Ran Out of Food in the Last Year: Never true   Transportation Needs: No Transportation Needs (2/8/2025)    TRANSPORTATION NEEDS     Transportation : No   Physical Activity: Insufficiently Active (12/20/2024)    Exercise Vital Sign     Days of Exercise per Week: 2 days     Minutes of Exercise per Session: 60 min   Stress: No Stress Concern Present (2/8/2025)    [place] [employer] - Occupational Stress Questionnaire     Feeling of Stress : Not at all   Housing Stability: Unknown (2/8/2025)    Housing Stability Vital Sign     Unable to Pay for Housing in the Last Year: No     Homeless in the Last Year: No   [3]   Current Outpatient Medications on File Prior to Visit   Medication Sig Dispense Refill    amLODIPine (NORVASC) 10 MG tablet Take 1 tablet (10 mg total) by mouth once daily. 90 tablet 3    calcium carbonate (OS-JAILYN) 500 mg calcium (1,250 mg) tablet Take 1 tablet (500 mg total) by mouth once daily.  0    cholecalciferol, vitamin D3, (VITAMIN D3) 25 mcg (1,000 unit) capsule Take 1,000 Units by mouth once daily. Hold until after surgery      cyanocobalamin (VITAMIN B-12) 1000 MCG tablet Take 1 tablet (1,000 mcg total) by mouth once daily. Hold until after surgery 30 tablet 11    FEROSUL 325 mg (65 mg iron) Tab tablet TAKE ONE TABLET BY MOUTH ON TUESDAY, THURSDAY, AND SATURDAY 12 tablet 5    losartan (COZAAR) 100 MG tablet Take 1 tablet (100 mg total) by mouth once daily. 90 tablet 3    multivitamin Tab Take 1 tablet by mouth once daily. 30 tablet 11    pantoprazole (PROTONIX) 40 MG tablet Take 1 tablet (40 mg total) by mouth 2 (two) times daily. 60 tablet 0    rosuvastatin (CRESTOR)  40 MG Tab TAKE ONE TABLET BY MOUTH EVERY DAY FOR CHOLESTEROL 90 tablet 3    VIT C/VIT E ACETATE/LUTEIN/MIN (OCUVITE LUTEIN ORAL) Take 1 tablet by mouth once daily. Hold until after surgery       Current Facility-Administered Medications on File Prior to Visit   Medication Dose Route Frequency Provider Last Rate Last Admin    gabapentin capsule 300 mg  300 mg Oral TID Amanda Gaspar NP   300 mg at 04/16/21 0814

## 2025-03-31 ENCOUNTER — RESULTS FOLLOW-UP (OUTPATIENT)
Dept: PRIMARY CARE CLINIC | Facility: CLINIC | Age: OVER 89
End: 2025-03-31

## 2025-03-31 ENCOUNTER — TELEPHONE (OUTPATIENT)
Dept: PRIMARY CARE CLINIC | Facility: CLINIC | Age: OVER 89
End: 2025-03-31
Payer: MEDICARE

## 2025-03-31 ENCOUNTER — TELEPHONE (OUTPATIENT)
Dept: GASTROENTEROLOGY | Facility: CLINIC | Age: OVER 89
End: 2025-03-31
Payer: MEDICARE

## 2025-03-31 ENCOUNTER — DOCUMENT SCAN (OUTPATIENT)
Dept: HOME HEALTH SERVICES | Facility: HOSPITAL | Age: OVER 89
End: 2025-03-31
Payer: MEDICARE

## 2025-03-31 NOTE — TELEPHONE ENCOUNTER
----- Message from Dolly sent at 3/31/2025  2:14 PM CDT -----  Regarding: Sooner Appt  Contact: Pt @[phone]  Pt is calling to speak to someone in the office; asking for a sooner appt than what they are scheduled for. Pt is already on the wait list; no available appts in Epic that are coming up soon. Pt is asking to speak to someone in the office. Please call to advise. Thanks. Reason for sooner appt: pt is experiencing bleeding

## 2025-03-31 NOTE — TELEPHONE ENCOUNTER
----- Message from Brittney Travis MD sent at 3/31/2025  8:32 AM CDT -----  Please call patient with results.  Nl renal and liver fx.  Anemia is very slightly improved.  Continue iron as levels are low.    ----- Message -----  From: Lab, Background User  Sent: 3/28/2025   3:55 PM CDT  To: Brittney Travis MD

## 2025-04-01 ENCOUNTER — APPOINTMENT (OUTPATIENT)
Dept: LAB | Facility: HOSPITAL | Age: OVER 89
End: 2025-04-01
Attending: INTERNAL MEDICINE
Payer: MEDICARE

## 2025-04-02 ENCOUNTER — TELEPHONE (OUTPATIENT)
Dept: SURGERY | Facility: CLINIC | Age: OVER 89
End: 2025-04-02
Payer: MEDICARE

## 2025-04-02 NOTE — TELEPHONE ENCOUNTER
RN called pt's number back to see if she needs an appt with [facility] for rectal bleeding, or if she is asking to have her Gastro appt moved up.  RN left a vmail with [facility] office number for pt to call back.

## 2025-04-13 ENCOUNTER — PATIENT MESSAGE (OUTPATIENT)
Dept: GASTROENTEROLOGY | Facility: CLINIC | Age: OVER 89
End: 2025-04-13
Payer: MEDICARE

## 2025-04-13 ENCOUNTER — RESULTS FOLLOW-UP (OUTPATIENT)
Dept: GASTROENTEROLOGY | Facility: CLINIC | Age: OVER 89
End: 2025-04-13

## 2025-04-13 DIAGNOSIS — D50.9 IRON DEFICIENCY ANEMIA, UNSPECIFIED IRON DEFICIENCY ANEMIA TYPE: Primary | ICD-10-CM

## 2025-04-13 NOTE — PROGRESS NOTES
Dr. Randolph should she be refer to GI nurse practitioner to discuss small-bowel video capsule endoscopy for evaluation of her iron deficiency anemia and prior GI bleeding.

## 2025-04-14 ENCOUNTER — DOCUMENT SCAN (OUTPATIENT)
Dept: HOME HEALTH SERVICES | Facility: HOSPITAL | Age: OVER 89
End: 2025-04-14
Payer: MEDICARE

## 2025-04-21 NOTE — PROGRESS NOTES
Gastroenterology Clinic Consultation Note    Reason for Visit:  The encounter diagnosis was Iron deficiency anemia due to chronic blood loss.    PCP:   Brittney Travis   [address] / [place] [address]      Initial HPI   This is a 89 y.o. female presenting for hospital followup for lower GI bleeding due to erosion noted near her anastomosis. She has been doing well. Repeat blood work done recently did show some ongoing anemia. She is taking her oral iron daily. Denies constipation. She has not had any blood in her stools. She is feeling well overall. She denies hx of SBO or small bowel abd surgeries. She has a hx of colon cancer with hemicolectomy done in 2020.       ROS:  Review of Systems   Constitutional:  Negative for chills, fever, malaise/fatigue and weight loss.   HENT:  Negative for sore throat.    Gastrointestinal:  Negative for abdominal pain, blood in stool, constipation, diarrhea, heartburn, melena, nausea and vomiting.   Skin:  Negative for rash.   Neurological:  Negative for dizziness, loss of consciousness and weakness.        Medical History:  has a past medical history of Allergy, Cachexia (3/2/2020), Cancer, Cataract, Dementia with behavioral disturbance, Encounter for blood transfusion, Hemolytic anemia (3/18/2020), Hyperlipidemia, Hypertension, and Osteoporosis.    Surgical History:  has a past surgical history that includes Hysterectomy (1962); Foot surgery; Cataract extraction w/  intraocular lens implant (Left, 8/11/14); Cataract extraction w/  intraocular lens implant (Right, 09/15/14); Esophagogastroduodenoscopy (N/A, 3/19/2020); Colonoscopy (N/A, 3/19/2020); Laparoscopic hemicolectomy (Right, 3/20/2020); Colonoscopy (N/A, 3/23/2021); Ileocolectomy (N/A, 4/15/2021); Lysis of adhesions (N/A, 4/15/2021); Mobilization of splenic flexure (N/A, 4/15/2021); Insertion of tunneled central venous catheter (CVC) with  "subcutaneous port (N/A, 7/2/2021); Colonoscopy (N/A, 2/15/2022); Esophagogastroduodenoscopy (N/A, 12/9/2024); Colonoscopy (N/A, 12/9/2024); and Colonoscopy (N/A, 2/10/2025).    Family History: family history includes Cataracts in her brother, sister, and sister; Diabetes in her brother and sister; Heart attack in her brother and father; No Known Problems in her maternal aunt, maternal grandfather, maternal grandmother, maternal uncle, mother, paternal aunt, paternal grandfather, paternal grandmother, and paternal uncle; Stroke in her sister and sister..       Review of patient's allergies indicates:   Allergen Reactions    Aspirin        Contraindicated withHistory of GI bleed    Nsaids (non-steroidal anti-inflammatory drug)        Contraindicated withHistory of GI bleed       Medications Ordered Prior to Encounter[1]      Objective Findings:    Vital Signs:  /71   Pulse 88   Ht 5' 7" (1.702 m)   Wt 54 kg (119 lb 0.8 oz)   BMI 18.65 kg/m²   Body mass index is 18.65 kg/m².    Physical Exam:  Physical Exam  Vitals and nursing note reviewed.   Constitutional:       Appearance: She is normal weight. She is not ill-appearing.   HENT:      Mouth/Throat:      Mouth: Mucous membranes are moist.      Pharynx: Oropharynx is clear.   Eyes:      General: No scleral icterus.  Abdominal:      General: Abdomen is flat. Bowel sounds are normal. There is no distension.      Palpations: Abdomen is soft. There is no mass.      Tenderness: There is no abdominal tenderness.      Hernia: No hernia is present.   Skin:     General: Skin is warm and dry.      Capillary Refill: Capillary refill takes less than 2 seconds.      Coloration: Skin is not jaundiced or pale.   Neurological:      Mental Status: She is alert and oriented to person, place, and time. Mental status is at baseline.             Labs:  Lab Results   Component Value Date    WBC 5.79 03/28/2025    HGB 9.7 (L) 03/28/2025    HCT 32.1 (L) 03/28/2025     " 03/28/2025    CRP 41.8 (H) 04/18/2021    CHOL 270 (H) 11/07/2024    TRIG 84 11/07/2024    HDL 76 (H) 11/07/2024    ALKPHOS 57 03/28/2025    LIPASE 58 12/24/2020    ALT 11 03/28/2025    AST 20 03/28/2025     03/28/2025    K 4.6 03/28/2025     03/28/2025    CREATININE 0.7 03/28/2025    BUN 28 (H) 03/28/2025    CO2 29 03/28/2025    TSH 2.396 11/07/2024    INR 1.0 12/07/2024    HGBA1C 5.7 (H) 11/07/2024       Imaging reviewed: No pertinent imaging reviewed       Endoscopy reviewed: Colonoscopy 2/10/2025  Impression:            - Hemorrhoids found on perianal exam.                          - Blood in the entire examined colon.                          - Side-to-side ileo-colonic anastomosis,                          characterized by erosion and erythema.                          - One erosion at the colonic anastomosis with                          active oozing blood. Clips (MR conditional) were                          placed.                          - The examination was otherwise normal.                          - No specimens collected.   Recommendation:        - [facility] to [facility] for [facility] care.                          - Advance diet as tolerated.                          - Continue present medications.                          - No recommendation at this time regarding repeat                          colonoscopy.                          - Monitor clinical course.   Attending Participation:        I personally performed the entire procedure.   Terrance Galvez MD   2/10/2025 11:44:20 AM     Assessment:  1. Iron deficiency anemia due to chronic blood loss      Orders Placed This Encounter    Case Request Endoscopy: IMAGING PROCEDURE, GI TRACT, INTRALUMINAL, VIA CAPSULE         Plan:  Video Capsule Endoscopy ordered for further evaluation of GI related causes for her anemia. Repeat blood work in 6-8 weeks. Iron supplements once daily.       Thank you for allowing me to participate in  this patient's care.    Sincerely,     Tammy Burns NP  Gastroenterology Department  [facility]               [1]   Current Outpatient Medications on File Prior to Visit   Medication Sig Dispense Refill    amLODIPine (NORVASC) 10 MG tablet Take 1 tablet (10 mg total) by mouth once daily. 90 tablet 3    cholecalciferol, vitamin D3, (VITAMIN D3) 25 mcg (1,000 unit) capsule Take 1,000 Units by mouth once daily. Hold until after surgery      cyanocobalamin (VITAMIN B-12) 1000 MCG tablet Take 1 tablet (1,000 mcg total) by mouth once daily. Hold until after surgery 30 tablet 11    FEROSUL 325 mg (65 mg iron) Tab tablet TAKE ONE TABLET BY MOUTH ON TUESDAY, THURSDAY, AND SATURDAY 12 tablet 5    losartan (COZAAR) 100 MG tablet Take 1 tablet (100 mg total) by mouth once daily. 90 tablet 3    multivitamin Tab Take 1 tablet by mouth once daily. 30 tablet 11    rosuvastatin (CRESTOR) 40 MG Tab TAKE ONE TABLET BY MOUTH EVERY DAY FOR CHOLESTEROL 90 tablet 3    VIT C/VIT E ACETATE/LUTEIN/MIN (OCUVITE LUTEIN ORAL) Take 1 tablet by mouth once daily. Hold until after surgery      calcium carbonate (OS-JAILYN) 500 mg calcium (1,250 mg) tablet Take 1 tablet (500 mg total) by mouth once daily.  0    pantoprazole (PROTONIX) 40 MG tablet Take 1 tablet (40 mg total) by mouth 2 (two) times daily. 60 tablet 0     Current Facility-Administered Medications on File Prior to Visit   Medication Dose Route Frequency Provider Last Rate Last Admin    gabapentin capsule 300 mg  300 mg Oral TID Amanda Gaspar NP   300 mg at 04/16/21 0814

## 2025-04-22 ENCOUNTER — OFFICE VISIT (OUTPATIENT)
Dept: GASTROENTEROLOGY | Facility: CLINIC | Age: OVER 89
End: 2025-04-22
Payer: MEDICARE

## 2025-04-22 VITALS
DIASTOLIC BLOOD PRESSURE: 71 MMHG | WEIGHT: 119.06 LBS | BODY MASS INDEX: 18.69 KG/M2 | HEART RATE: 88 BPM | HEIGHT: 67 IN | SYSTOLIC BLOOD PRESSURE: 131 MMHG

## 2025-04-22 DIAGNOSIS — D50.0 IRON DEFICIENCY ANEMIA DUE TO CHRONIC BLOOD LOSS: Primary | ICD-10-CM

## 2025-04-22 PROCEDURE — 99999 PR PBB SHADOW E&M-EST. PATIENT-LVL III: CPT | Mod: PBBFAC,,,

## 2025-04-22 PROCEDURE — 99214 OFFICE O/P EST MOD 30 MIN: CPT | Mod: S$PBB,,,

## 2025-04-22 PROCEDURE — 99213 OFFICE O/P EST LOW 20 MIN: CPT | Mod: PBBFAC

## 2025-04-25 ENCOUNTER — TELEPHONE (OUTPATIENT)
Dept: ENDOSCOPY | Facility: HOSPITAL | Age: OVER 89
End: 2025-04-25
Payer: MEDICARE

## 2025-04-25 NOTE — TELEPHONE ENCOUNTER
Contacted the patient to schedule an endoscopy procedure(s) Video Capsule Endoscopy (VCE) . Spoke with Nurse Goyal. Nurse Goyal unable to verify patient's  due not currently being in her office at time. Nurse Goyal states she will not be in the office until Monday and request a call back.

## 2025-04-25 NOTE — TELEPHONE ENCOUNTER
"Tammy Burns NP  P [facility] Endoscopist Clinic Patients  Caller: Unspecified (3 days ago, 11:59 AM)  Procedure: video capsule endoscopy    Diagnosis: Iron deficiency anemia    Procedure Timing: Within 12 weeks    *If within 4 weeks selected, please ayaz as high priority*    *If greater than 12 weeks, please select "5-12 weeks" and delay sending until 3 months prior to requested date*    Location: Any Site    Additional Scheduling Information: No scheduling concerns    Prep Specifications:Standard prep    Is the patient taking a GLP-1 Agonist:no    Have you attached a patient to this message: yes  "

## 2025-04-25 NOTE — TELEPHONE ENCOUNTER
"Yumiko Puente Shekeita H RN  Caller: Unspecified (2 days ago, 11:13 AM)         Previous Messages       ----- Message -----  From: Tana Hamilton MA  Sent: 4/23/2025  11:16 AM CDT  To: [facility] Endoscopy Schedulers  Subject: VCE                                              Sister Nydia saw Tammy Burns NP yesterday.  She is needing to be scheduled for a VCE.  Rajani has consent.   You need to speak with Nurse Goyal with the "mother house" where patient resides.  She can be reached at [phone] or [phone] between 6:30 and 2:30.  If no answer, they asked that you leave a message and someone will return your call.   Thank You!   Areli"

## 2025-04-28 ENCOUNTER — TELEPHONE (OUTPATIENT)
Dept: ENDOSCOPY | Facility: HOSPITAL | Age: OVER 89
End: 2025-04-28

## 2025-04-28 ENCOUNTER — CLINICAL SUPPORT (OUTPATIENT)
Dept: ENDOSCOPY | Facility: HOSPITAL | Age: OVER 89
End: 2025-04-28
Payer: MEDICARE

## 2025-04-28 DIAGNOSIS — D50.9 IRON DEFICIENCY ANEMIA, UNSPECIFIED IRON DEFICIENCY ANEMIA TYPE: ICD-10-CM

## 2025-04-28 DIAGNOSIS — D50.9 IRON DEFICIENCY ANEMIA, UNSPECIFIED IRON DEFICIENCY ANEMIA TYPE: Primary | ICD-10-CM

## 2025-04-28 NOTE — TELEPHONE ENCOUNTER
Referral for procedure from [facility]      Spoke to patient to schedule procedure(s) Video Capsule Endoscopy (VCE)       Physician to perform procedure(s) Dr. LEVI Epps  Date of Procedure (s) 5/26/25  Arrival Time 6:00 AM  Time of Procedure(s) 7:00 AM   Location of Procedure(s) [facility]  Type of Rx Prep sent to patient: Miralax  Instructions provided to patient via faxed to [phone]    Patient was informed on the following information and verbalized understanding. Screening questionnaire reviewed with patient and complete. If procedure requires anesthesia, a responsible adult needs to be present to accompany the patient home, patient cannot drive after receiving anesthesia. Appointment details are tentative, especially check-in time. Patient will receive a prep-op call 7 days prior to confirm check-in time for procedure. If applicable the patient should contact their pharmacy to verify Rx for procedure prep is ready for pick-up. Patient was advised to call the scheduling department at [phone] if pharmacy states no Rx is available. Patient was advised to call the endoscopy scheduling department if any questions or concerns arise.       Endoscopy Scheduling Department

## 2025-04-28 NOTE — TELEPHONE ENCOUNTER
Video Capsule Endoscopy with Miralax Bowel Prep        Date of procedure: 5/26/25 Arrive at: 6:00AM    Location of Department:   [facility] 4th Floor    What is a Video Capsule Endoscopy?  A video capsule endoscopy is a procedure that uses a tiny wireless camera to take pictures of your digestive tract. A capsule endoscopy camera sits inside a vitamin-size capsule that you will swallow. As the capsule travels through your digestive tract, the camera takes thousands of pictures that are transmitted to a recorder you wear on a belt around your waist.    Purchase these over the counter items:  one (1) 4.1 oz. bottle of Miralax (119 grams)   32 oz. of Gatorade/Power-Aid (use white/clear color)  Phazyme (trade name) or Simethicone (generic name) --- gas relief    How to take prep:    Day Before Procedure  5/25/25      Drink at least 6 to 8 glasses of clear liquids from time you wake up until you begin your prep and then continue until bedtime to avoid dehydration.     If you are a diabetic, please follow attached instructions for medication adjustment.        Do not eat any solid food on this day. You will start your clear liquid diet (see attached list).   At 12 noon, mix the 4.1 oz bottle (119 grams) of Miralax and the 32 oz Gatorade (use white/clear color) products, place in the refrigerator (do not add ice).     At 7:00 pm you will drink 8 oz of the Miralax/Gatorade mixture every 15 minutes until mixture is gone (a total of 32 oz).  Set a timer as a reminder.  After 7:00 pm you will take nothing by mouth except MIRALAX and necessary medications with a small sip of water.  Just before going to bed, take one dose of over-the-counter Phazyme or Simethicone as directed on the box.       IMPORTANT: If you experience preparation-related symptoms (for example, nausea, bloating, or cramping), stop or slow the rate of drinking the additional water until your symptoms decrease.    Day of  Swallowing Capsule  5/26/25      If you are a diabetic, please follow attached instructions for medication adjustment.    You will check in at the Endoscopy department [address] Do NOT wear any lipstick or chapstick to appointment. Please wear a one thin layer top or shirt.     After you check in, you will meet with a nurse who will go over instructions, ensure consent is complete, and give you the small video capsule to swallow.    You will be given water to drink when ingesting the capsule.    You may drink water throughout the test and are encouraged to walk as much as possible.    Oral Medications (except for diabetic medication) may be taken at 10:00 am.    You may drink 8ozs of clear liquids (see attached list) at 10:00 am and again at 2:00 pm.    Do not exercise, no heavy lifting, you may drive a car and return to work.     Avoid getting the recorder and sensor belt wet.     Observe the LED light on the data recorder at least every 15 minutes. If the light stops blinking blue, document the time and call our office at [phone].    Return to our office at 4:00 pm to the have the equipment removed. Your physician will discuss with you the outcome of the test and any further course of action needed.      After ingesting the capsule and until it leaves your body, avoid being near any source of powerful electromagnetic fields such as one created near an MRI device for 30 days.    If you suffer from any abdominal pain, nausea, or vomiting during this test contact your physician immediately at [phone]-René Smith.                     Comments:               IMPORTANT INFORMATION TO KNOW BEFORE YOUR PROCEDURE    [facility] 4th Floor         If your procedure requires the administration of anesthesia, it is necessary for a responsible adult to drive you home. (Medical Transportation, Uber, Lyft, Taxi, etc. may ONLY be used if a responsible adult is present to accompany  you home.  The responsible adult CAN'T be the  of the service).      person must be available to return to pick you up within 15 minutes of being notified of discharge.       Please bring a picture ID, insurance card, & copayment      Take Medications as directed below:        If you begin taking any blood thinning medications, injectable weight loss/diabetes medications (other than insulin) , or Adipex (Phentermine) please contact the endoscopy scheduling department listed below as soon as possible.    If you are diabetic see the attached instruction sheet regarding your medication.     If you take HEART, BLOOD PRESSURE, SEIZURE, PAIN, LUNG (including inhalers/nebulizers), ANTI-REJECTION (transplant patients), or PSYCHIATRIC medications, please take at your regular times with a sip of water or as directed by the scheduling nurse.     Important contact information:    Endoscopy [phone] Hours of operation Monday-Friday 8:00-4:30pm.    Questions about insurance or financial obligations call [phone] or [phone].    If you have questions regarding the prep or need to reschedule, please call [phone]. After hours questions requiring immediate assistance, contact [facility] On[facility] nurse line at [phone] or [phone].   NOTE:     On occasion, unforeseen circumstances may cause a delay in your procedure start time. We respect your time and appreciate your patience during these circumstances.      Comments:

## 2025-04-30 RX ORDER — FERROUS SULFATE 325(65) MG
TABLET ORAL
Qty: 30 TABLET | Refills: 5 | Status: SHIPPED | OUTPATIENT
Start: 2025-04-30

## 2025-05-16 RX ORDER — POLYETHYLENE GLYCOL 3350 17 G/17G
POWDER, FOR SOLUTION ORAL
Qty: 119 G | Refills: 0 | Status: SHIPPED | OUTPATIENT
Start: 2025-05-16

## 2025-05-20 DIAGNOSIS — E78.5 HYPERLIPIDEMIA, UNSPECIFIED HYPERLIPIDEMIA TYPE: ICD-10-CM

## 2025-05-20 RX ORDER — ROSUVASTATIN CALCIUM 40 MG/1
40 TABLET, COATED ORAL
Qty: 90 TABLET | Refills: 5 | Status: SHIPPED | OUTPATIENT
Start: 2025-05-20

## 2025-05-26 ENCOUNTER — DOCUMENT SCAN (OUTPATIENT)
Dept: HOME HEALTH SERVICES | Facility: HOSPITAL | Age: OVER 89
End: 2025-05-26
Payer: MEDICARE

## 2025-05-26 ENCOUNTER — HOSPITAL ENCOUNTER (OUTPATIENT)
Facility: HOSPITAL | Age: OVER 89
Discharge: HOME | End: 2025-05-26
Attending: STUDENT IN AN ORGANIZED HEALTH CARE EDUCATION/TRAINING PROGRAM | Admitting: STUDENT IN AN ORGANIZED HEALTH CARE EDUCATION/TRAINING PROGRAM
Payer: MEDICARE

## 2025-05-26 VITALS
RESPIRATION RATE: 16 BRPM | DIASTOLIC BLOOD PRESSURE: 69 MMHG | SYSTOLIC BLOOD PRESSURE: 157 MMHG | TEMPERATURE: 98 F | OXYGEN SATURATION: 99 % | HEART RATE: 97 BPM

## 2025-05-26 DIAGNOSIS — D50.9 IRON DEFICIENCY ANEMIA: ICD-10-CM

## 2025-05-26 PROCEDURE — 27201489 HC PILLCAM CAPSULE: Performed by: STUDENT IN AN ORGANIZED HEALTH CARE EDUCATION/TRAINING PROGRAM

## 2025-05-26 NOTE — PLAN OF CARE
Patient was unable to swallow the pill capsule. The patient stated she felt like it was stuck and then proceeded to retch the capsule back up. I informed the patient and her nurse that we would speak with doctor about alternative options and getting her rescheduled.

## 2025-05-27 DIAGNOSIS — I10 ESSENTIAL HYPERTENSION: ICD-10-CM

## 2025-05-28 RX ORDER — AMLODIPINE BESYLATE 10 MG/1
10 TABLET ORAL DAILY
Qty: 90 TABLET | Refills: 5 | Status: SHIPPED | OUTPATIENT
Start: 2025-05-28 | End: 2026-05-28

## 2025-06-03 ENCOUNTER — PATIENT MESSAGE (OUTPATIENT)
Dept: GASTROENTEROLOGY | Facility: CLINIC | Age: OVER 89
End: 2025-06-03
Payer: MEDICARE

## 2025-06-03 ENCOUNTER — LAB VISIT (OUTPATIENT)
Dept: LAB | Facility: HOSPITAL | Age: OVER 89
End: 2025-06-03
Attending: INTERNAL MEDICINE
Payer: MEDICARE

## 2025-06-03 ENCOUNTER — RESULTS FOLLOW-UP (OUTPATIENT)
Dept: GASTROENTEROLOGY | Facility: CLINIC | Age: OVER 89
End: 2025-06-03

## 2025-06-03 DIAGNOSIS — D50.9 IRON DEFICIENCY ANEMIA, UNSPECIFIED IRON DEFICIENCY ANEMIA TYPE: ICD-10-CM

## 2025-06-03 DIAGNOSIS — D50.9 IRON DEFICIENCY ANEMIA, UNSPECIFIED IRON DEFICIENCY ANEMIA TYPE: Primary | ICD-10-CM

## 2025-06-03 LAB
FERRITIN SERPL-MCNC: 49 NG/ML (ref 20–300)
HGB BLD-MCNC: 10.5 GM/DL (ref 12–16)
IRON SATN MFR SERPL: 7 % (ref 20–50)
IRON SERPL-MCNC: 32 UG/DL (ref 30–160)
TIBC SERPL-MCNC: 437 UG/DL (ref 250–450)
TRANSFERRIN SERPL-MCNC: 295 MG/DL (ref 200–375)

## 2025-06-03 PROCEDURE — 85018 HEMOGLOBIN: CPT

## 2025-06-03 PROCEDURE — 36415 COLL VENOUS BLD VENIPUNCTURE: CPT | Mod: PN

## 2025-06-03 PROCEDURE — 82728 ASSAY OF FERRITIN: CPT

## 2025-06-03 PROCEDURE — 84466 ASSAY OF TRANSFERRIN: CPT

## 2025-06-17 ENCOUNTER — EXTERNAL HOME HEALTH (OUTPATIENT)
Dept: HOME HEALTH SERVICES | Facility: HOSPITAL | Age: OVER 89
End: 2025-06-17
Payer: MEDICARE

## 2025-07-03 ENCOUNTER — OFFICE VISIT (OUTPATIENT)
Dept: PRIMARY CARE CLINIC | Facility: CLINIC | Age: OVER 89
End: 2025-07-03
Payer: MEDICARE

## 2025-07-03 VITALS
BODY MASS INDEX: 18.53 KG/M2 | DIASTOLIC BLOOD PRESSURE: 64 MMHG | OXYGEN SATURATION: 99 % | WEIGHT: 118.06 LBS | HEIGHT: 67 IN | TEMPERATURE: 98 F | HEART RATE: 83 BPM | SYSTOLIC BLOOD PRESSURE: 124 MMHG

## 2025-07-03 DIAGNOSIS — D50.0 IRON DEFICIENCY ANEMIA DUE TO CHRONIC BLOOD LOSS: ICD-10-CM

## 2025-07-03 DIAGNOSIS — I10 PRIMARY HYPERTENSION: Primary | ICD-10-CM

## 2025-07-03 DIAGNOSIS — Z00.00 HEALTHCARE MAINTENANCE: ICD-10-CM

## 2025-07-03 PROBLEM — E44.0 MODERATE PROTEIN-CALORIE MALNUTRITION: Status: RESOLVED | Noted: 2025-02-11 | Resolved: 2025-07-03

## 2025-07-03 PROBLEM — D62 ACUTE BLOOD LOSS ANEMIA: Status: RESOLVED | Noted: 2020-03-02 | Resolved: 2025-07-03

## 2025-07-03 PROBLEM — R64 CACHEXIA: Status: RESOLVED | Noted: 2020-03-02 | Resolved: 2025-07-03

## 2025-07-03 NOTE — ASSESSMENT & PLAN NOTE
Several acute episodes requiring hospitalization.  Followed by GI.  Currently on iron 3 days a week.  Patient denies any side effects.    Continue follow-up with GI and labs.  Continue iron 3 days a week.

## 2025-07-03 NOTE — ASSESSMENT & PLAN NOTE
Reviewed [place] post and power-of- today.  Patient indicates that she may want to switch to do not resuscitate.    Med rec completed today.    ADLs reviewed today.  yearly labs 11/2024

## 2025-07-03 NOTE — PROGRESS NOTES
.  This note has been generated using voice-recognition software. There may be typographical errors that have been missed during proof-reading.     Primary Care Provider Appointment    Subjective:      Patient ID: Nydia Stevens is a 89 y.o. female here for follow up.     Chief Complaint: Follow-up  HPI:  This is an 89-year-old female with hypertension, hyperlipidemia, congestive heart failure, h/o colon cancer, iron deficiency anemia with history of GI bleed 12/2024, carotid artery disease, prediabetes, osteoporosis here for f/u.  patient last seen  03/28/2025 04/22/2025 patient seen by GI for iron deficiency anemia.  Video capsule endoscopy ordered.  Iron daily.    04/23/2025 patient seen by outside podiatrist   1 lb weight loss since last visit.  Patient states that she has not seen any blood in her stool.  No abdominal pain.  Good appetite.  ADLs completed today.  Medications reviewed.    Advance Care Planning     Date: 07/03/2025    ACP Reviewed/No Changes  Voluntary advance care planning discussion had today with patient. Previously completed HCPOA and LW in electronic medical record is current, no changes made.      A total of 5 min was spent on advance care planning, goals of care discussion, emotional support, formulating and communicating prognosis and exploring burden/benefit of various approaches of treatment. This discussion occurred on a fully voluntary basis with the verbal consent of the patient and/or family.      Advance Care Planning     [place] post was reviewed today.  Currently it is documented that patient wants CPR.  Today she indicated that she would like to consider a do not resuscitate.  With any change in status, this should be addressed again.  Patient also notes that she would like to be buried and not cremated.              Problem List[1]     Past Surgical History:   Procedure Laterality Date    CATARACT EXTRACTION W/  INTRAOCULAR LENS IMPLANT Left 8/11/14    [place]    CATARACT  EXTRACTION W/  INTRAOCULAR LENS IMPLANT Right 09/15/14    [place]    COLONOSCOPY N/A 3/19/2020    Procedure: COLONOSCOPY;  Surgeon: Real Mabry MD;  Location: [facility] ENDO (2ND FLR);  Service: Endoscopy;  Laterality: N/A;    COLONOSCOPY N/A 3/23/2021    Procedure: COLONOSCOPY;  Surgeon: AUTUMN Berg MD;  Location: [facility] ENDO (4TH FLR);  Service: Endoscopy;  Laterality: N/A;  covid test 3/20 [facility]    COLONOSCOPY N/A 2/15/2022    Procedure: COLONOSCOPY;  Surgeon: Doris Simpson MD;  Location: [facility] ENDO (4TH FLR);  Service: Endoscopy;  Laterality: N/A;  COVID test on 2/12/22 at [facility]  2/14/22 - Pt confirmed 0640 arrival, prep instructions reviewed, Pt verbalized understanding-[id number]    COLONOSCOPY N/A 12/9/2024    Procedure: COLONOSCOPY;  Surgeon: Vitaly Virgen MD;  Location: [facility] ENDO (2ND FLR);  Service: Gastroenterology;  Laterality: N/A;    COLONOSCOPY N/A 2/10/2025    Procedure: COLONOSCOPY;  Surgeon: Terrance Galvez MD;  Location: [facility] ENDO (2ND FLR);  Service: Endoscopy;  Laterality: N/A;    ESOPHAGOGASTRODUODENOSCOPY N/A 3/19/2020    Procedure: EGD (ESOPHAGOGASTRODUODENOSCOPY);  Surgeon: Real Mabry MD;  Location: [facility] ENDO (2ND FLR);  Service: Endoscopy;  Laterality: N/A;    ESOPHAGOGASTRODUODENOSCOPY N/A 12/9/2024    Procedure: EGD (ESOPHAGOGASTRODUODENOSCOPY);  Surgeon: Vitaly Virgen MD;  Location: [facility] ENDO (2ND FLR);  Service: Gastroenterology;  Laterality: N/A;    FOOT SURGERY      left    HYSTERECTOMY  1962    ILEOCOLECTOMY N/A 4/15/2021    Procedure: ILEOCOLECTOMY;  Surgeon: AUTUMN Berg MD;  Location: [facility] OR 2ND FLR;  Service: Colon and Rectal;  Laterality: N/A;    INSERTION OF TUNNELED CENTRAL VENOUS CATHETER (CVC) WITH SUBCUTANEOUS PORT N/A 7/2/2021    Procedure: [id number];  Surgeon: Suresh Agrawal MD;  Location: [facility] OR [address] [place] [address];  Service: Vascular;  Laterality: N/A;  Right IJ    LAPAROSCOPIC HEMICOLECTOMY Right 3/20/2020    Procedure: HEMICOLECTOMY,  "RIGHT;  Surgeon: AUTUMN Berg MD;  Location: [facility] OR 2ND FLR;  Service: Colon and Rectal;  Laterality: Right;    LYSIS OF ADHESIONS N/A 4/15/2021    Procedure: LYSIS, ADHESIONS;  Surgeon: AUTUMN Berg MD;  Location: [facility] OR 2ND FLR;  Service: Colon and Rectal;  Laterality: N/A;  Greater than 2 hours    MOBILIZATION OF SPLENIC FLEXURE N/A 4/15/2021    Procedure: MOBILIZATION, SPLENIC FLEXURE;  Surgeon: AUTUMN Berg MD;  Location: [facility] OR 2ND FLR;  Service: Colon and Rectal;  Laterality: N/A;       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    Hyperlipidemia     Hypertension     Osteoporosis        Social History[2]    Review of Systems         No data to display                 Checklist of Daily Activities:  Bathing: Independent  Dressing: Independent  Grooming: Independent  Oral Care: Independent  Toileting: Independent  Transferring: Independent  Walking: Independent  Climbing Stairs: Needs Help  Eating: Independent  Shopping: Needs Help  Cooking: Does not do  Managing Medications: Independent  Using the Phone: Independent  Housework: Needs Help  Laundry: Does not do  Driving: Does not do  Managing Finances: Independent     Objective:   /64   Pulse 83   Temp 97.5 °F (36.4 °C) (Oral)   Ht 5' 7" (1.702 m)   Wt 53.5 kg (118 lb 0.9 oz)   SpO2 99%   BMI 18.49 kg/m²     Physical Exam  Constitutional:       General: She is not in acute distress.     Appearance: Normal appearance. She is not ill-appearing, toxic-appearing or diaphoretic.   HENT:      Head: Normocephalic and atraumatic.   Cardiovascular:      Rate and Rhythm: Normal rate and regular rhythm.      Heart sounds: Normal heart sounds.   Pulmonary:      Effort: Pulmonary effort is normal.      Breath sounds: Normal breath sounds.   Musculoskeletal:      Right lower leg: No " edema.      Left lower leg: No edema.   Lymphadenopathy:      Cervical: No cervical adenopathy.   Neurological:      Mental Status: She is alert.             Lab Results   Component Value Date    WBC 5.79 03/28/2025    HGB 10.5 (L) 06/03/2025    HCT 32.1 (L) 03/28/2025     03/28/2025    CHOL 270 (H) 11/07/2024    TRIG 84 11/07/2024    HDL 76 (H) 11/07/2024    ALT 11 03/28/2025    AST 20 03/28/2025     03/28/2025    K 4.6 03/28/2025     03/28/2025    CREATININE 0.7 03/28/2025    BUN 28 (H) 03/28/2025    CO2 29 03/28/2025    TSH 2.396 11/07/2024    INR 1.0 12/07/2024    HGBA1C 5.7 (H) 11/07/2024       Medications Ordered Prior to Encounter[3]      Assessment:   89 y.o. female with multiple co-morbid illnesses here for continued follow up of medical problems.      Plan:     Problem List Items Addressed This Visit          Cardiac/Vascular    Primary hypertension - Primary    BP at goal.    Continue current BP meds            Oncology    Iron deficiency anemia due to chronic blood loss     Several acute episodes requiring hospitalization.  Followed by GI.  Currently on iron 3 days a week.  Patient denies any side effects.    Continue follow-up with GI and labs.  Continue iron 3 days a week.            Other    Healthcare maintenance      Reviewed [place] post and power-of- today.  Patient indicates that she may want to switch to do not resuscitate.    Med rec completed today.    ADLs reviewed today.  yearly labs 11/2024              Health Maintenance         Date Due Completion Date    COVID-19 Vaccine (10 - 2024-25 season) 04/11/2025 2/14/2025    DEXA Scan 07/26/2025 7/26/2023    Override on 2/15/2016: Declined    Influenza Vaccine (1) 09/01/2025 9/6/2024    Hemoglobin A1c (Prediabetes) 11/07/2025 11/7/2024    Colonoscopy 02/10/2028 2/10/2025    TETANUS VACCINE 02/15/2028 2/15/2018          Medications Reconciliation:   I have reconciled the patient's home medications with the patient/family. I  have updated all changes.  Refer to After-Visit Medication List.      Medication List            Accurate as of July 3, 2025 11:47 AM. If you have any questions, ask your nurse or doctor.                CONTINUE taking these medications      amLODIPine 10 MG tablet  Commonly known as: NORVASC  Take 1 tablet (10 mg total) by mouth once daily.     calcium carbonate 500 mg calcium (1,250 mg) tablet  Commonly known as: OS-JAILYN  Take 1 tablet (500 mg total) by mouth once daily.     cholecalciferol (vitamin D3) 25 mcg (1,000 unit) capsule  Commonly known as: VITAMIN D3     cyanocobalamin 1000 MCG tablet  Commonly known as: VITAMIN B-12  Take 1 tablet (1,000 mcg total) by mouth once daily. Hold until after surgery     FeroSuL 325 mg (65 mg iron) Tab tablet  Generic drug: ferrous sulfate  TAKE ONE TABLET BY MOUTH ON TUESDAY, THURSDAY, AND SATURDAY     losartan 100 MG tablet  Commonly known as: COZAAR  Take 1 tablet (100 mg total) by mouth once daily.     multivitamin Tab  Take 1 tablet by mouth once daily.     OCUVITE LUTEIN ORAL     pantoprazole 40 MG tablet  Commonly known as: PROTONIX  Take 1 tablet (40 mg total) by mouth 2 (two) times daily.     polyethylene glycol 17 gram/dose powder  Commonly known as: GLYCOLAX  Follow instructions for video capsule endoscopy     rosuvastatin 40 MG Tab  Commonly known as: CRESTOR  TAKE ONE TABLET BY MOUTH EVERY DAY FOR CHOLESTEROL                   No follow-ups on file. Total clinical care time was   Forty-six min. The following issues were discussed:  med rec, ADLs, review of advanced care planning and discussion concerning do not resuscitate, review of labs, chronic iron deficiency with chronic GI bleed.     Brittney Travis MD  Internal Medicine  [facility]  [phone]                 [1]   Patient Active Problem List  Diagnosis    Primary hypertension    Mixed hyperlipidemia    Iron deficiency anemia due to chronic blood loss    Palliative care  encounter    Closed fracture of left olecranon process    Debility    Aortic stenosis, mild    Osteoporosis    Chronic diastolic heart failure    History of colon cancer    Carotid stenosis, asymptomatic, bilateral    H/O: hysterectomy    Weight loss    Prediabetes    B12 deficiency    Healthcare maintenance    Aortic atherosclerosis    Benign mole    Adrenal nodule    Cerebral atrophy, mild    History of hematuria    History of GI bleed    Body mass index (BMI) less than 19   [2]   Social History  Socioeconomic History    Marital status: Single   Occupational History    Occupation: teaching retired    Occupation: Nun.  [employer]   Tobacco Use    Smoking status: Never    Smokeless tobacco: Never   Substance and Sexual Activity    Alcohol use: No     Alcohol/week: 0.0 standard drinks of alcohol    Drug use: No    Sexual activity: Never   Social History Narrative    Member of [employer] order here in [place], [place]..  Lives with about 40-50 sisters.  All sisters with her are retired.       Social Drivers of Health     Financial Resource Strain: Low Risk  (2/8/2025)    Overall Financial Resource Strain (CARDIA)     Difficulty of Paying Living Expenses: Not hard at all   Food Insecurity: No Food Insecurity (2/8/2025)    Hunger Vital Sign     Worried About Running Out of Food in the Last Year: Never true     Ran Out of Food in the Last Year: Never true   Transportation Needs: No Transportation Needs (2/8/2025)    TRANSPORTATION NEEDS     Transportation : No   Physical Activity: Insufficiently Active (12/20/2024)    Exercise Vital Sign     Days of Exercise per Week: 2 days     Minutes of Exercise per Session: 60 min   Stress: No Stress Concern Present (2/8/2025)    [employer] [place] [employer] - Occupational Stress Questionnaire     Feeling of Stress : Not at all   Housing Stability: Unknown (2/8/2025)    Housing Stability Vital Sign     Unable to Pay for Housing in the Last Year:  No     Homeless in the Last Year: No   [3]   Current Outpatient Medications on File Prior to Visit   Medication Sig Dispense Refill    amLODIPine (NORVASC) 10 MG tablet Take 1 tablet (10 mg total) by mouth once daily. 90 tablet 5    calcium carbonate (OS-JAILYN) 500 mg calcium (1,250 mg) tablet Take 1 tablet (500 mg total) by mouth once daily.  0    cholecalciferol, vitamin D3, (VITAMIN D3) 25 mcg (1,000 unit) capsule Take 1,000 Units by mouth once daily. Hold until after surgery      cyanocobalamin (VITAMIN B-12) 1000 MCG tablet Take 1 tablet (1,000 mcg total) by mouth once daily. Hold until after surgery 30 tablet 11    FEROSUL 325 mg (65 mg iron) Tab tablet TAKE ONE TABLET BY MOUTH ON TUESDAY, THURSDAY, AND SATURDAY 30 tablet 5    losartan (COZAAR) 100 MG tablet Take 1 tablet (100 mg total) by mouth once daily. 90 tablet 3    multivitamin Tab Take 1 tablet by mouth once daily. 30 tablet 11    pantoprazole (PROTONIX) 40 MG tablet Take 1 tablet (40 mg total) by mouth 2 (two) times daily. 60 tablet 0    polyethylene glycol (GLYCOLAX) 17 gram/dose powder Follow instructions for video capsule endoscopy 119 g 0    rosuvastatin (CRESTOR) 40 MG Tab TAKE ONE TABLET BY MOUTH EVERY DAY FOR CHOLESTEROL 90 tablet 5    VIT C/VIT E ACETATE/LUTEIN/MIN (OCUVITE LUTEIN ORAL) Take 1 tablet by mouth once daily. Hold until after surgery       Current Facility-Administered Medications on File Prior to Visit   Medication Dose Route Frequency Provider Last Rate Last Admin    gabapentin capsule 300 mg  300 mg Oral TID Amanda Gaspar NP   300 mg at 04/16/21 0814

## 2025-07-23 ENCOUNTER — OFFICE VISIT (OUTPATIENT)
Dept: OPTOMETRY | Facility: CLINIC | Age: OVER 89
End: 2025-07-23
Payer: MEDICARE

## 2025-07-23 DIAGNOSIS — I10 PRIMARY HYPERTENSION: Primary | ICD-10-CM

## 2025-07-23 DIAGNOSIS — H35.371 EPIRETINAL MEMBRANE (ERM) OF RIGHT EYE: ICD-10-CM

## 2025-07-23 DIAGNOSIS — Z98.42 S/P BILATERAL CATARACT EXTRACTION: ICD-10-CM

## 2025-07-23 DIAGNOSIS — Z96.1 PSEUDOPHAKIA OF BOTH EYES: ICD-10-CM

## 2025-07-23 DIAGNOSIS — Z98.41 S/P BILATERAL CATARACT EXTRACTION: ICD-10-CM

## 2025-07-23 DIAGNOSIS — H52.7 REFRACTIVE ERRORS: ICD-10-CM

## 2025-07-23 PROCEDURE — 92015 DETERMINE REFRACTIVE STATE: CPT | Mod: ,,, | Performed by: OPTOMETRIST

## 2025-07-23 PROCEDURE — 99999 PR PBB SHADOW E&M-EST. PATIENT-LVL II: CPT | Mod: PBBFAC,,, | Performed by: OPTOMETRIST

## 2025-07-23 PROCEDURE — 99212 OFFICE O/P EST SF 10 MIN: CPT | Mod: PBBFAC,PO | Performed by: OPTOMETRIST

## 2025-07-23 PROCEDURE — 92134 CPTRZ OPH DX IMG PST SGM RTA: CPT | Mod: PBBFAC,PO | Performed by: OPTOMETRIST

## 2025-07-23 PROCEDURE — 92014 COMPRE OPH EXAM EST PT 1/>: CPT | Mod: S$PBB,,, | Performed by: OPTOMETRIST

## 2025-07-23 NOTE — PROGRESS NOTES
HPI    CC: VA OU no changes since last exam. No issues.  JANETT:06/12/2024    (-) Changes in vision   (-) Pain  (-) Irritation   (-) Itching   (-) Flashes  (-) Floaters  (+) Glasses wearer  (-) CL wearer  (-) Uses eye gtts    Does patient want a refraction today? yes    (-) Eye injury  (-) Eye surgery   (+)POHx ERM (see chart)  (-)FOHx    (-)DM  Hemoglobin A1C       Date                     Value               Ref Range             Status                11/07/2024               5.7 (H)             4.0 - 5.6 %           Final              Comment:    ADA Screening Guidelines:  5.7-6.4%  Consistent with   prediabetes  >or=6.5%  Consistent with diabetes    High levels of fetal   hemoglobin interfere with the HbA1C  assay. Heterozygous hemoglobin   variants (HbS, HgC, etc)do  not significantly interfere with this assay.     However, presence of multiple variants may affect accuracy.         12/19/2023               5.7 (H)             4.0 - 5.6 %           Final              Comment:    ADA Screening Guidelines:  5.7-6.4%  Consistent with   prediabetes  >or=6.5%  Consistent with diabetes    High levels of fetal   hemoglobin interfere with the HbA1C  assay. Heterozygous hemoglobin   variants (HbS, HgC, etc)do  not significantly interfere with this assay.     However, presence of multiple variants may affect accuracy.         12/01/2022               5.8 (H)             4.0 - 5.6 %           Final              Comment:    ADA Screening Guidelines:  5.7-6.4%  Consistent with   prediabetes  >or=6.5%  Consistent with diabetes    High levels of fetal   hemoglobin interfere with the HbA1C  assay. Heterozygous hemoglobin   variants (HbS, HgC, etc)do  not significantly interfere with this assay.     However, presence of multiple variants may affect accuracy.    ----------        Last edited by Paty Castle on 7/23/2025 10:44 AM.            Assessment /Plan     For exam results, see Encounter Report.    Primary  hypertension    Epiretinal membrane (ERM) of right eye  -     Posterior Segment OCT Retina-Both eyes  Prior finding of epiretinal membrane at posterior pole of the right eye.  Still only minimal impact on correctable VA. No need for treatment.        S/P bilateral cataract extraction  Pseudophakia of both eyes   2014 [place]    Refractive errors  Residual refractive error in each eye,  per refraction done on previous visit.  Stable distance VA and excellent near VA with present glasses.         Recheck in one year.

## 2025-07-29 ENCOUNTER — LAB VISIT (OUTPATIENT)
Dept: LAB | Facility: HOSPITAL | Age: OVER 89
End: 2025-07-29
Attending: INTERNAL MEDICINE
Payer: MEDICARE

## 2025-07-29 DIAGNOSIS — D50.9 IRON DEFICIENCY ANEMIA, UNSPECIFIED IRON DEFICIENCY ANEMIA TYPE: ICD-10-CM

## 2025-07-29 LAB
FERRITIN SERPL-MCNC: 75 NG/ML (ref 20–300)
HGB BLD-MCNC: 10 GM/DL (ref 12–16)
IRON SATN MFR SERPL: 6 % (ref 20–50)
IRON SERPL-MCNC: 26 UG/DL (ref 30–160)
TIBC SERPL-MCNC: 410 UG/DL (ref 250–450)
TRANSFERRIN SERPL-MCNC: 277 MG/DL (ref 200–375)

## 2025-07-29 PROCEDURE — 36415 COLL VENOUS BLD VENIPUNCTURE: CPT | Mod: PN

## 2025-07-29 PROCEDURE — 82728 ASSAY OF FERRITIN: CPT

## 2025-07-29 PROCEDURE — 84466 ASSAY OF TRANSFERRIN: CPT

## 2025-07-29 PROCEDURE — 85018 HEMOGLOBIN: CPT

## 2025-07-30 ENCOUNTER — PATIENT MESSAGE (OUTPATIENT)
Dept: GASTROENTEROLOGY | Facility: CLINIC | Age: OVER 89
End: 2025-07-30
Payer: MEDICARE

## 2025-07-30 ENCOUNTER — TELEPHONE (OUTPATIENT)
Dept: GASTROENTEROLOGY | Facility: CLINIC | Age: OVER 89
End: 2025-07-30
Payer: MEDICARE

## 2025-07-30 DIAGNOSIS — D50.9 IRON DEFICIENCY ANEMIA, UNSPECIFIED IRON DEFICIENCY ANEMIA TYPE: Primary | ICD-10-CM

## 2025-07-30 DIAGNOSIS — Z79.899 ENCOUNTER FOR MONITORING LONG-TERM PROTON PUMP INHIBITOR THERAPY: ICD-10-CM

## 2025-07-30 DIAGNOSIS — Z51.81 ENCOUNTER FOR MONITORING LONG-TERM PROTON PUMP INHIBITOR THERAPY: ICD-10-CM

## 2025-08-05 ENCOUNTER — INFUSION (OUTPATIENT)
Dept: INFECTIOUS DISEASES | Facility: HOSPITAL | Age: OVER 89
End: 2025-08-05
Payer: MEDICARE

## 2025-08-05 VITALS
TEMPERATURE: 98 F | SYSTOLIC BLOOD PRESSURE: 141 MMHG | OXYGEN SATURATION: 98 % | RESPIRATION RATE: 18 BRPM | HEART RATE: 94 BPM | HEIGHT: 67 IN | BODY MASS INDEX: 18.28 KG/M2 | WEIGHT: 116.5 LBS | DIASTOLIC BLOOD PRESSURE: 66 MMHG

## 2025-08-05 DIAGNOSIS — M81.0 AGE-RELATED OSTEOPOROSIS WITHOUT CURRENT PATHOLOGICAL FRACTURE: Primary | ICD-10-CM

## 2025-08-05 PROCEDURE — 96372 THER/PROPH/DIAG INJ SC/IM: CPT

## 2025-08-05 PROCEDURE — 63600175 PHARM REV CODE 636 W HCPCS: Mod: JZ,TB | Performed by: PHYSICIAN ASSISTANT

## 2025-08-05 RX ADMIN — DENOSUMAB 60 MG: 60 INJECTION SUBCUTANEOUS at 09:08

## 2025-08-05 NOTE — PROGRESS NOTES
Patient received Prolia injection - confirms use of calcium and vitamin D supplements and denies dental procedures over past 3 months - administered per guidelines.     Return appointment provided to patient.    Limited head-to-toe assessment due to privacy issues and visit reason though the opportunity was given for patient to express any concerns.

## 2025-08-08 ENCOUNTER — DOCUMENT SCAN (OUTPATIENT)
Dept: HOME HEALTH SERVICES | Facility: HOSPITAL | Age: OVER 89
End: 2025-08-08
Payer: MEDICARE

## 2025-08-18 ENCOUNTER — TELEPHONE (OUTPATIENT)
Dept: HEMATOLOGY/ONCOLOGY | Facility: CLINIC | Age: OVER 89
End: 2025-08-18
Payer: MEDICARE

## 2025-08-20 DIAGNOSIS — C18.9 COLON ADENOCARCINOMA: Primary | ICD-10-CM

## (undated) DEVICE — SUT 3-0 VICRYL SH CR/8 18

## (undated) DEVICE — SYR ONLY LUER LOCK 20CC

## (undated) DEVICE — SUT CTD VICRYL 2-0 VIL BR

## (undated) DEVICE — SUT CTD VICRYL VIL BR SH 27

## (undated) DEVICE — SEE MEDLINE ITEM 146417

## (undated) DEVICE — ELECTRODE REM PLYHSV RETURN 9

## (undated) DEVICE — SEE MEDLINE ITEM 157144

## (undated) DEVICE — NDL BOX COUNTER

## (undated) DEVICE — TRAY MINOR GEN SURG

## (undated) DEVICE — SOL NACL 0.9% INJ PF/50151

## (undated) DEVICE — SEE MEDLINE ITEM 146347

## (undated) DEVICE — DRAPE C ARM 42 X 120 10/BX

## (undated) DEVICE — DRESSING TRANS 4X4 TEGADERM

## (undated) DEVICE — SEE MEDLINE ITEM 156902

## (undated) DEVICE — IRRIGATOR ENDOSCOPY DISP.

## (undated) DEVICE — SEALER LIGASURE IMPACT 18CM

## (undated) DEVICE — DRAPE THYROID WITH ARMBOARD

## (undated) DEVICE — COVER LIGHT HANDLE 80/CA

## (undated) DEVICE — DRAPE ABDOMINAL TIBURON 14X11

## (undated) DEVICE — SUT VICRYL 3-0 27 SH

## (undated) DEVICE — Device

## (undated) DEVICE — POUCH SENSURA MIO 3/8X2 1/8IN

## (undated) DEVICE — SUT MONOCRYL 4-0 PS-2

## (undated) DEVICE — SOL CLEARIFY VISUALIZATION LAP

## (undated) DEVICE — SEE MEDLINE ITEM 152487

## (undated) DEVICE — SEE MEDLINE ITEM 157117

## (undated) DEVICE — SUT 1 36IN PDS II VIO MONO

## (undated) DEVICE — TUBING HF INSUFFLATION W/ FLTR

## (undated) DEVICE — ADHESIVE DERMABOND ADVANCED

## (undated) DEVICE — CUTTER PROXIMATE BLUE 75MM

## (undated) DEVICE — SUT MONOCRYL 4-0 PS-1 UND

## (undated) DEVICE — SUT PROLENE 3-0 30SH

## (undated) DEVICE — SOL NS 1000CC

## (undated) DEVICE — NDL 22GA X1 1/2 REG BEVEL

## (undated) DEVICE — SET DECANTER MEDICHOICE

## (undated) DEVICE — KIT ANTIFOG

## (undated) DEVICE — SYR 30CC LUER LOCK

## (undated) DEVICE — DRESSING TELFA STRL 4X3 LF

## (undated) DEVICE — TRAY FOLEY 16FR INFECTION CONT

## (undated) DEVICE — COVERS PROBE NR-48 STERILE

## (undated) DEVICE — STAPLER INT PROX TX 60X3.5MM

## (undated) DEVICE — RELOAD PROXIMATE CUT BLUE 75MM